# Patient Record
Sex: MALE | Race: BLACK OR AFRICAN AMERICAN | NOT HISPANIC OR LATINO | URBAN - METROPOLITAN AREA
[De-identification: names, ages, dates, MRNs, and addresses within clinical notes are randomized per-mention and may not be internally consistent; named-entity substitution may affect disease eponyms.]

---

## 2018-06-08 ENCOUNTER — INPATIENT (INPATIENT)
Facility: HOSPITAL | Age: 83
LOS: 2 days | Discharge: HOME HEALTH | End: 2018-06-11
Attending: INTERNAL MEDICINE | Admitting: INTERNAL MEDICINE
Payer: MEDICARE

## 2018-06-08 VITALS
HEART RATE: 92 BPM | DIASTOLIC BLOOD PRESSURE: 99 MMHG | OXYGEN SATURATION: 100 % | RESPIRATION RATE: 18 BRPM | TEMPERATURE: 98 F | SYSTOLIC BLOOD PRESSURE: 152 MMHG

## 2018-06-08 LAB
ALBUMIN SERPL ELPH-MCNC: 4.2 G/DL — SIGNIFICANT CHANGE UP (ref 3.3–5)
ALP SERPL-CCNC: 89 U/L — SIGNIFICANT CHANGE UP (ref 40–120)
ALT FLD-CCNC: 8 U/L — SIGNIFICANT CHANGE UP (ref 4–41)
APTT BLD: 36.6 SEC — SIGNIFICANT CHANGE UP (ref 27.5–37.4)
AST SERPL-CCNC: 26 U/L — SIGNIFICANT CHANGE UP (ref 4–40)
BASOPHILS # BLD AUTO: 0.02 K/UL — SIGNIFICANT CHANGE UP (ref 0–0.2)
BASOPHILS NFR BLD AUTO: 0.4 % — SIGNIFICANT CHANGE UP (ref 0–2)
BILIRUB SERPL-MCNC: 0.6 MG/DL — SIGNIFICANT CHANGE UP (ref 0.2–1.2)
BUN SERPL-MCNC: 28 MG/DL — HIGH (ref 7–23)
CALCIUM SERPL-MCNC: 9.5 MG/DL — SIGNIFICANT CHANGE UP (ref 8.4–10.5)
CHLORIDE SERPL-SCNC: 101 MMOL/L — SIGNIFICANT CHANGE UP (ref 98–107)
CO2 SERPL-SCNC: 27 MMOL/L — SIGNIFICANT CHANGE UP (ref 22–31)
CREAT SERPL-MCNC: 1.83 MG/DL — HIGH (ref 0.5–1.3)
EOSINOPHIL # BLD AUTO: 0.46 K/UL — SIGNIFICANT CHANGE UP (ref 0–0.5)
EOSINOPHIL NFR BLD AUTO: 8.2 % — HIGH (ref 0–6)
GLUCOSE SERPL-MCNC: 130 MG/DL — HIGH (ref 70–99)
HCT VFR BLD CALC: 31.1 % — LOW (ref 39–50)
HGB BLD-MCNC: 10.1 G/DL — LOW (ref 13–17)
IMM GRANULOCYTES # BLD AUTO: 0.02 # — SIGNIFICANT CHANGE UP
IMM GRANULOCYTES NFR BLD AUTO: 0.4 % — SIGNIFICANT CHANGE UP (ref 0–1.5)
INR BLD: 1.03 — SIGNIFICANT CHANGE UP (ref 0.88–1.17)
LYMPHOCYTES # BLD AUTO: 1.04 K/UL — SIGNIFICANT CHANGE UP (ref 1–3.3)
LYMPHOCYTES # BLD AUTO: 18.6 % — SIGNIFICANT CHANGE UP (ref 13–44)
MCHC RBC-ENTMCNC: 27.6 PG — SIGNIFICANT CHANGE UP (ref 27–34)
MCHC RBC-ENTMCNC: 32.5 % — SIGNIFICANT CHANGE UP (ref 32–36)
MCV RBC AUTO: 85 FL — SIGNIFICANT CHANGE UP (ref 80–100)
MONOCYTES # BLD AUTO: 0.42 K/UL — SIGNIFICANT CHANGE UP (ref 0–0.9)
MONOCYTES NFR BLD AUTO: 7.5 % — SIGNIFICANT CHANGE UP (ref 2–14)
NEUTROPHILS # BLD AUTO: 3.63 K/UL — SIGNIFICANT CHANGE UP (ref 1.8–7.4)
NEUTROPHILS NFR BLD AUTO: 64.9 % — SIGNIFICANT CHANGE UP (ref 43–77)
NRBC # FLD: 0 — SIGNIFICANT CHANGE UP
PLATELET # BLD AUTO: 212 K/UL — SIGNIFICANT CHANGE UP (ref 150–400)
PMV BLD: 9.5 FL — SIGNIFICANT CHANGE UP (ref 7–13)
POTASSIUM SERPL-MCNC: 5 MMOL/L — SIGNIFICANT CHANGE UP (ref 3.5–5.3)
POTASSIUM SERPL-SCNC: 5 MMOL/L — SIGNIFICANT CHANGE UP (ref 3.5–5.3)
PROT SERPL-MCNC: 8.4 G/DL — HIGH (ref 6–8.3)
PROTHROM AB SERPL-ACNC: 11.9 SEC — SIGNIFICANT CHANGE UP (ref 9.8–13.1)
RBC # BLD: 3.66 M/UL — LOW (ref 4.2–5.8)
RBC # FLD: 16.7 % — HIGH (ref 10.3–14.5)
SODIUM SERPL-SCNC: 141 MMOL/L — SIGNIFICANT CHANGE UP (ref 135–145)
TROPONIN T SERPL-MCNC: < 0.06 NG/ML — SIGNIFICANT CHANGE UP (ref 0–0.06)
WBC # BLD: 5.59 K/UL — SIGNIFICANT CHANGE UP (ref 3.8–10.5)
WBC # FLD AUTO: 5.59 K/UL — SIGNIFICANT CHANGE UP (ref 3.8–10.5)

## 2018-06-08 PROCEDURE — 71045 X-RAY EXAM CHEST 1 VIEW: CPT | Mod: 26

## 2018-06-08 PROCEDURE — 73552 X-RAY EXAM OF FEMUR 2/>: CPT | Mod: 26,RT

## 2018-06-08 PROCEDURE — 73502 X-RAY EXAM HIP UNI 2-3 VIEWS: CPT | Mod: 26,RT

## 2018-06-08 RX ORDER — SODIUM CHLORIDE 9 MG/ML
1000 INJECTION INTRAMUSCULAR; INTRAVENOUS; SUBCUTANEOUS ONCE
Qty: 0 | Refills: 0 | Status: COMPLETED | OUTPATIENT
Start: 2018-06-08 | End: 2018-06-08

## 2018-06-08 RX ADMIN — SODIUM CHLORIDE 1000 MILLILITER(S): 9 INJECTION INTRAMUSCULAR; INTRAVENOUS; SUBCUTANEOUS at 22:13

## 2018-06-08 NOTE — ED PROVIDER NOTE - ATTENDING CONTRIBUTION TO CARE
details… 89 yo male with PMH of BPH s/p prostatectomy, cataract surgery years ago, presents to the ED for fall two days ago on Wednesday morning in the setting of a syncopal episode. Pt states he had breakfast, immediately after felt "sleepy" and collapsed to the ground in the kitchen. Unwitnessed fall, patient alone in the home. States he woke up about 1-2 hours later with right hip pain. Able to ambulate immediately after he woke but has been using a cane he happened to own already to help ambulate since then. Pt denies preceding CP, SOB, palpitations, head or neck pain, tinnitus, change in vision, abdominal or back pain prior to the fall. Denies blood thinner use. Normally ambulates without assistance. Denies any cardiac history. A&O x3, GCS 15, at baseline is functional and performs his own ADL's. On exam: alert awake in mild pain, HEENT neg, neck non tender, lungs and heart normal, abdo soft non tender, neuro non focal. Musculoskeletal: Full ROM hips and knee b/l but with mild right hip pain with hip flexion; Mild tenderness to palpation over right hip and proximal RLE without overlying skin changes, crepitus, or palpable deformities; 2+ DP and PT pulses b/l, capillary refill less than 2 seconds b/l, sensation grossly intact b/l, strength 5/5 all motor groups b/l lower extremities; pt able to bear weight using cane for assistance. IMP 1) Syncope, 2) Fall with possible R hip/pelvis injury. Plan: labs EKG UA analgesia, Xrays Hip, pelvis

## 2018-06-08 NOTE — ED PROVIDER NOTE - OBJECTIVE STATEMENT
89 yo male with PMH of BPH s/p prostatectomy, cataract surgery years ago, presents to the ED for fall two days ago on Wednesday morning in the setting of a syncopal episode. Pt states he had breakfast, immediately after felt "sleepy" and collapsed to the ground in the kitchen. Unwitnessed fall, patient alone in the home. States he woke up about 1-2 hours later with right hip pain. Able to ambulate immediately after he woke but has been using a cane he happened to own already to help ambulate since then. Pt denies preceding CP, SOB, palpitations, head or neck pain, tinnitus, change in vision, abdominal or back pain prior to the fall. Denies blood thinner use. Normally ambulates without assistance. Denies any cardiac history. A&O x3, GCS 15, at baseline is functional and performs his own ADL's.     PMD: [facility]

## 2018-06-08 NOTE — ED PROVIDER NOTE - MEDICAL DECISION MAKING DETAILS
89 yo male with PMH of BPH, no known cardiac history presents to the ED s/p syncopal episode and fall two days ago on Wednesday morning, c/o right hip pain. Plan: EKG, CXR, xray pelvis, right hip, right femur, syncope w/u labs, admit vs. CDU for syncope w/u and echo; EKG showing RBBB and bifascicular block

## 2018-06-08 NOTE — ED PROVIDER NOTE - PHYSICAL EXAMINATION
MSK: Full ROM hips and knee b/l but with mild right hip pain with hip flexion; Mild tenderness to palpation over right hip and proximal RLE without overlying skin changes, crepitus, or palpable deformities; 2+ DP and PT pulses b/l, capillary refill less than 2 seconds b/l, sensation grossly intact b/l, strength 5/5 all motor groups b/l lower extremities; pt able to bear weight using cane for assistance

## 2018-06-08 NOTE — ED ADULT TRIAGE NOTE - CHIEF COMPLAINT QUOTE
C/o possible unwitnessed syncopal episode while home alone Wednesday reports being on the floor for unknown amount of time, denies LOC, denies hitting head but unsure if dizzy prior to fall. Denies blood thinner use, denies CP or SOB at this time, only reports right hip pain, unable to ambulate since fall. Normally ambulates without assistance. Denies PMH, EKG in progress

## 2018-06-08 NOTE — ED ADULT NURSE NOTE - OBJECTIVE STATEMENT
Skye RN:. 89 yo M received in spot 18.  Pt c/o unwitnessed fall on Wednesday.  Rpts he got himself up from the floor by himself but is unsure of how long he was lying there.  Denies blood thinner use.  Reports right hip pain, unable to ambulate since fall.  Skin tenting present.  Denies fever/chills, chest pain/palpitations, SOB,  abdominal pain, N/V/D,  headache, dizziness, weakness.  18gL AC

## 2018-06-08 NOTE — ED PROVIDER NOTE - ENMT, MLM
Dry mucous membranes; Airway patent, Nasal mucosa clear. Throat has no vesicles, no oropharyngeal exudates and uvula is midline.

## 2018-06-09 DIAGNOSIS — N40.0 BENIGN PROSTATIC HYPERPLASIA WITHOUT LOWER URINARY TRACT SYMPTOMS: ICD-10-CM

## 2018-06-09 DIAGNOSIS — R55 SYNCOPE AND COLLAPSE: ICD-10-CM

## 2018-06-09 DIAGNOSIS — Z29.9 ENCOUNTER FOR PROPHYLACTIC MEASURES, UNSPECIFIED: ICD-10-CM

## 2018-06-09 DIAGNOSIS — N17.9 ACUTE KIDNEY FAILURE, UNSPECIFIED: ICD-10-CM

## 2018-06-09 DIAGNOSIS — N18.9 CHRONIC KIDNEY DISEASE, UNSPECIFIED: ICD-10-CM

## 2018-06-09 LAB
APPEARANCE UR: CLEAR — SIGNIFICANT CHANGE UP
BACTERIA # UR AUTO: SIGNIFICANT CHANGE UP
BILIRUB UR-MCNC: NEGATIVE — SIGNIFICANT CHANGE UP
BLOOD UR QL VISUAL: NEGATIVE — SIGNIFICANT CHANGE UP
COLOR SPEC: YELLOW — SIGNIFICANT CHANGE UP
GLUCOSE UR-MCNC: NEGATIVE — SIGNIFICANT CHANGE UP
KETONES UR-MCNC: NEGATIVE — SIGNIFICANT CHANGE UP
LEUKOCYTE ESTERASE UR-ACNC: NEGATIVE — SIGNIFICANT CHANGE UP
MUCOUS THREADS # UR AUTO: SIGNIFICANT CHANGE UP
NITRITE UR-MCNC: NEGATIVE — SIGNIFICANT CHANGE UP
PH UR: 6.5 — SIGNIFICANT CHANGE UP (ref 4.6–8)
PROT UR-MCNC: 30 MG/DL — HIGH
RBC CASTS # UR COMP ASSIST: SIGNIFICANT CHANGE UP (ref 0–?)
SP GR SPEC: 1.01 — SIGNIFICANT CHANGE UP (ref 1–1.04)
UROBILINOGEN FLD QL: NORMAL MG/DL — SIGNIFICANT CHANGE UP
WBC UR QL: SIGNIFICANT CHANGE UP (ref 0–?)

## 2018-06-09 RX ORDER — ACETAMINOPHEN 500 MG
650 TABLET ORAL EVERY 6 HOURS
Qty: 0 | Refills: 0 | Status: DISCONTINUED | OUTPATIENT
Start: 2018-06-09 | End: 2018-06-11

## 2018-06-09 RX ORDER — HEPARIN SODIUM 5000 [USP'U]/ML
5000 INJECTION INTRAVENOUS; SUBCUTANEOUS EVERY 12 HOURS
Qty: 0 | Refills: 0 | Status: DISCONTINUED | OUTPATIENT
Start: 2018-06-09 | End: 2018-06-11

## 2018-06-09 RX ORDER — ACETAMINOPHEN 500 MG
650 TABLET ORAL ONCE
Qty: 0 | Refills: 0 | Status: COMPLETED | OUTPATIENT
Start: 2018-06-09 | End: 2018-06-09

## 2018-06-09 RX ADMIN — Medication 650 MILLIGRAM(S): at 17:04

## 2018-06-09 RX ADMIN — Medication 650 MILLIGRAM(S): at 05:00

## 2018-06-09 RX ADMIN — Medication 650 MILLIGRAM(S): at 10:54

## 2018-06-09 RX ADMIN — Medication 650 MILLIGRAM(S): at 11:20

## 2018-06-09 RX ADMIN — HEPARIN SODIUM 5000 UNIT(S): 5000 INJECTION INTRAVENOUS; SUBCUTANEOUS at 17:03

## 2018-06-09 RX ADMIN — HEPARIN SODIUM 5000 UNIT(S): 5000 INJECTION INTRAVENOUS; SUBCUTANEOUS at 05:17

## 2018-06-09 RX ADMIN — Medication 650 MILLIGRAM(S): at 04:04

## 2018-06-09 NOTE — H&P ADULT - HISTORY OF PRESENT ILLNESS
89 y/o M with h/o BPH s/p prostatectomy presents to the Ed s/p fall due to syncopal episode. Pt states the syncopal episode happened on Wednesday. Pt states he had breakfast and felt sleepy and when he woke up he found himself on the ground. Pt states he had pain in his right leg afterwards and he was walking with a cane. Pt usually independent, lives at home with daughter in law and walks without a cane or a walker. Pt states he follows up with his doctor regularly and he is not on any medications currently. Pt denies chest pain, shortness of breath, palpitations, numbness, tingling, slurred speech, tongue laceration, headache, visual/hearing changes, slurred speech, weakness, dysuria, urinary/bowel incontinence or any other complaints at this time.

## 2018-06-09 NOTE — PHYSICAL THERAPY INITIAL EVALUATION ADULT - PATIENT PROFILE REVIEW, REHAB EVAL
yes/Pt. profile reviewed, consulted with SHA NUNES prior to initial PT evaluation and tx, as per RN, Pt. is OK to participate in skilled therapy session, current activity orders; ambulate with assistance

## 2018-06-09 NOTE — H&P ADULT - ASSESSMENT
89 y/o M with h/o BPH s/p prostatectomy, presents to the ED for syncope and fall. Admit to telemetry.

## 2018-06-09 NOTE — H&P ADULT - ATTENDING COMMENTS
syncope does not appear cardiac, consulted neuro Dr Schreiber, TTE pending syncope does not appear cardiac, consulted neuro Dr Schreiber, renal Dr Villegas consulted for PHU/CKD, TTE pending

## 2018-06-09 NOTE — PHYSICAL THERAPY INITIAL EVALUATION ADULT - PLANNED THERAPY INTERVENTIONS, PT EVAL
gait training/stair negotiation/balance training/bed mobility training/strengthening/transfer training

## 2018-06-09 NOTE — PHYSICAL THERAPY INITIAL EVALUATION ADULT - ADDITIONAL COMMENTS
Pt. lives in a pvt home with daughter ( as per pt. daughter lives with pt. monday-friday) . Pt. has steps to negotiate at home. Pt. was previously ambulating household and community distances without the use of an AD independently. Pt. was previously independent with ADLs. Pt. returned to bed at end of therapy session with all lines and tubes intact, call bell in reach and in NAD.

## 2018-06-09 NOTE — PHYSICAL THERAPY INITIAL EVALUATION ADULT - PERTINENT HX OF CURRENT PROBLEM, REHAB EVAL
Pt is a 89 year old male admitted to [facility] secondary to syncope and collapse PMH: benign prostatic hypertrophy

## 2018-06-09 NOTE — H&P ADULT - NSHPLABSRESULTS_GEN_ALL_CORE
LABS:                        10.1   5.59  )-----------( 212      ( 08 Jun 2018 21:48 )             31.1     06-08    141  |  101  |  28<H>  ----------------------------<  130<H>  5.0   |  27  |  1.83<H>    Ca    9.5      08 Jun 2018 21:08    TPro  8.4<H>  /  Alb  4.2  /  TBili  0.6  /  DBili  x   /  AST  26  /  ALT  8   /  AlkPhos  89  06-08    PT/INR - ( 08 Jun 2018 21:48 )   PT: 11.9 SEC;   INR: 1.03          PTT - ( 08 Jun 2018 21:48 )  PTT:36.6 SEC    CAPILLARY BLOOD GLUCOSE      EKG shows NSR @ 89 bpm RBBB flatten T wave in AVL and V2 TWI in V1

## 2018-06-09 NOTE — H&P ADULT - PROBLEM SELECTOR PLAN 2
Kidney function at baseline  Continue to monitor kidney function, electrolytes,  and avoid nephrotoxic drugs.

## 2018-06-09 NOTE — H&P ADULT - PROBLEM SELECTOR PLAN 1
Admit to telemetry.   Trend CE. TTE ordered.   Orthostatics every 24 hours. PT  Fall precautions.   Check cbc,tsh,lipid, hemoglobin a1c, bmp with mag and phos.   f/u MD note

## 2018-06-09 NOTE — H&P ADULT - NSHPREVIEWOFSYSTEMS_GEN_ALL_CORE
Constitutional: No fever, fatigue or weight loss.  Skin: No rash.  Eyes: No recent vision problems or eye pain.  ENT: No congestion, ear pain, or sore throat.  Endocrine: No thyroid problems.  Cardiovascular: No chest pain or palpitation.  Respiratory: No cough, shortness of breath, congestion, or wheezing.  Gastrointestinal: No abdominal pain, nausea, vomiting, or diarrhea.  Genitourinary: No dysuria.  Musculoskeletal: No joint swelling.  Neurologic: No headache. + syncope.

## 2018-06-09 NOTE — H&P ADULT - NSHPPHYSICALEXAM_GEN_ALL_CORE
GENERAL APPEARANCE: Well developed, well nourished, alert and cooperative, and appears to be in no acute distress.  HEAD: normocephalic.  EYES: PERRL, EOMI.   EARS: External auditory canals clear, hearing grossly intact.  NECK: Neck supple, non-tender without lymphadenopathy, masses or thyromegaly.  CARDIAC: Normal S1 and S2. No S3, S4 or murmurs. Rhythm is regular.   LUNGS: Clear to auscultation and percussion without rales, rhonchi, wheezing or diminished breath sounds.  ABDOMEN: Positive bowel sounds. Soft, nondistended, nontender. No guarding or rebound. No masses.  EXTREMITIES: No significant deformity or joint abnormality. No edema. Peripheral pulses intact. Full ROM of all extremities. TTP to the right hip and RLE. No stepoff, palpable deformities or crepitus.   NEUROLOGICAL: CN II-XII intact. Strength and sensation symmetric and intact throughout.   SKIN: Skin normal color, texture and turgor with no lesions or eruptions.  PSYCHIATRIC: The mental examination revealed the patient was oriented to person, place, and time. The patient was able to demonstrate good judgement and reason, without hallucinations, abnormal affect or abnormal behaviors during the examination. Patient is not suicidal.

## 2018-06-09 NOTE — PHYSICAL THERAPY INITIAL EVALUATION ADULT - GAIT DISTANCE, PT EVAL
during ambulation, pt. presented with occassional Right knee buckling during single leg stance phase of gait./5 feet
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
## 2018-06-10 LAB
APPEARANCE UR: CLEAR — SIGNIFICANT CHANGE UP
BACTERIA UR CULT: SIGNIFICANT CHANGE UP
BASOPHILS # BLD AUTO: 0.02 K/UL — SIGNIFICANT CHANGE UP (ref 0–0.2)
BASOPHILS NFR BLD AUTO: 0.4 % — SIGNIFICANT CHANGE UP (ref 0–2)
BILIRUB UR-MCNC: NEGATIVE — SIGNIFICANT CHANGE UP
BLOOD UR QL VISUAL: NEGATIVE — SIGNIFICANT CHANGE UP
BUN SERPL-MCNC: 21 MG/DL — SIGNIFICANT CHANGE UP (ref 7–23)
CALCIUM SERPL-MCNC: 8.8 MG/DL — SIGNIFICANT CHANGE UP (ref 8.4–10.5)
CHLORIDE SERPL-SCNC: 103 MMOL/L — SIGNIFICANT CHANGE UP (ref 98–107)
CHLORIDE UR-SCNC: 57 MMOL/L — SIGNIFICANT CHANGE UP
CO2 SERPL-SCNC: 23 MMOL/L — SIGNIFICANT CHANGE UP (ref 22–31)
COLOR SPEC: YELLOW — SIGNIFICANT CHANGE UP
CREAT ?TM UR-MCNC: 105.4 MG/DL — SIGNIFICANT CHANGE UP
CREAT SERPL-MCNC: 1.48 MG/DL — HIGH (ref 0.5–1.3)
EOSINOPHIL # BLD AUTO: 0.86 K/UL — HIGH (ref 0–0.5)
EOSINOPHIL NFR BLD AUTO: 15.6 % — HIGH (ref 0–6)
GLUCOSE SERPL-MCNC: 85 MG/DL — SIGNIFICANT CHANGE UP (ref 70–99)
GLUCOSE UR-MCNC: NEGATIVE — SIGNIFICANT CHANGE UP
HCT VFR BLD CALC: 28.1 % — LOW (ref 39–50)
HGB BLD-MCNC: 9.1 G/DL — LOW (ref 13–17)
IMM GRANULOCYTES # BLD AUTO: 0.02 # — SIGNIFICANT CHANGE UP
IMM GRANULOCYTES NFR BLD AUTO: 0.4 % — SIGNIFICANT CHANGE UP (ref 0–1.5)
KETONES UR-MCNC: NEGATIVE — SIGNIFICANT CHANGE UP
LEUKOCYTE ESTERASE UR-ACNC: NEGATIVE — SIGNIFICANT CHANGE UP
LYMPHOCYTES # BLD AUTO: 1.46 K/UL — SIGNIFICANT CHANGE UP (ref 1–3.3)
LYMPHOCYTES # BLD AUTO: 26.4 % — SIGNIFICANT CHANGE UP (ref 13–44)
MAGNESIUM SERPL-MCNC: 2.2 MG/DL — SIGNIFICANT CHANGE UP (ref 1.6–2.6)
MCHC RBC-ENTMCNC: 27.4 PG — SIGNIFICANT CHANGE UP (ref 27–34)
MCHC RBC-ENTMCNC: 32.4 % — SIGNIFICANT CHANGE UP (ref 32–36)
MCV RBC AUTO: 84.6 FL — SIGNIFICANT CHANGE UP (ref 80–100)
MONOCYTES # BLD AUTO: 0.58 K/UL — SIGNIFICANT CHANGE UP (ref 0–0.9)
MONOCYTES NFR BLD AUTO: 10.5 % — SIGNIFICANT CHANGE UP (ref 2–14)
MUCOUS THREADS # UR AUTO: SIGNIFICANT CHANGE UP
NEUTROPHILS # BLD AUTO: 2.59 K/UL — SIGNIFICANT CHANGE UP (ref 1.8–7.4)
NEUTROPHILS NFR BLD AUTO: 46.7 % — SIGNIFICANT CHANGE UP (ref 43–77)
NITRITE UR-MCNC: NEGATIVE — SIGNIFICANT CHANGE UP
NRBC # FLD: 0 — SIGNIFICANT CHANGE UP
OSMOLALITY UR: 419 MOSMO/KG — SIGNIFICANT CHANGE UP (ref 50–1200)
PH UR: 6 — SIGNIFICANT CHANGE UP (ref 4.6–8)
PLATELET # BLD AUTO: 199 K/UL — SIGNIFICANT CHANGE UP (ref 150–400)
PMV BLD: 9.7 FL — SIGNIFICANT CHANGE UP (ref 7–13)
POTASSIUM SERPL-MCNC: 4.1 MMOL/L — SIGNIFICANT CHANGE UP (ref 3.5–5.3)
POTASSIUM SERPL-SCNC: 4.1 MMOL/L — SIGNIFICANT CHANGE UP (ref 3.5–5.3)
POTASSIUM UR-SCNC: 40 MMOL/L — SIGNIFICANT CHANGE UP
PROT UR-MCNC: 20 MG/DL — SIGNIFICANT CHANGE UP
PROT UR-MCNC: 34.6 MG/DL — SIGNIFICANT CHANGE UP
RBC # BLD: 3.32 M/UL — LOW (ref 4.2–5.8)
RBC # FLD: 16.7 % — HIGH (ref 10.3–14.5)
RBC CASTS # UR COMP ASSIST: SIGNIFICANT CHANGE UP (ref 0–?)
SODIUM SERPL-SCNC: 141 MMOL/L — SIGNIFICANT CHANGE UP (ref 135–145)
SODIUM UR-SCNC: 80 MMOL/L — SIGNIFICANT CHANGE UP
SP GR SPEC: 1.01 — SIGNIFICANT CHANGE UP (ref 1–1.04)
SPECIMEN SOURCE: SIGNIFICANT CHANGE UP
UROBILINOGEN FLD QL: 1 MG/DL — SIGNIFICANT CHANGE UP
UUN UR-MCNC: 557.9 MG/DL — SIGNIFICANT CHANGE UP
WBC # BLD: 5.53 K/UL — SIGNIFICANT CHANGE UP (ref 3.8–10.5)
WBC # FLD AUTO: 5.53 K/UL — SIGNIFICANT CHANGE UP (ref 3.8–10.5)
WBC UR QL: SIGNIFICANT CHANGE UP (ref 0–?)

## 2018-06-10 RX ORDER — ACETAMINOPHEN WITH CODEINE 300MG-30MG
5 TABLET ORAL ONCE
Qty: 0 | Refills: 0 | Status: DISCONTINUED | OUTPATIENT
Start: 2018-06-10 | End: 2018-06-10

## 2018-06-10 RX ADMIN — Medication 5 MILLILITER(S): at 11:04

## 2018-06-10 RX ADMIN — HEPARIN SODIUM 5000 UNIT(S): 5000 INJECTION INTRAVENOUS; SUBCUTANEOUS at 17:06

## 2018-06-10 RX ADMIN — Medication 650 MILLIGRAM(S): at 06:40

## 2018-06-10 RX ADMIN — HEPARIN SODIUM 5000 UNIT(S): 5000 INJECTION INTRAVENOUS; SUBCUTANEOUS at 06:07

## 2018-06-10 RX ADMIN — Medication 650 MILLIGRAM(S): at 06:07

## 2018-06-10 RX ADMIN — Medication 5 MILLILITER(S): at 10:12

## 2018-06-10 NOTE — CONSULT NOTE ADULT - PROBLEM SELECTOR RECOMMENDATION 9
initial imaging unremarkable  patient counseled on safety precautions  medical specialist recs appreciated

## 2018-06-10 NOTE — CONSULT NOTE ADULT - SUBJECTIVE AND OBJECTIVE BOX
[facility]), [facility]        Patient is a 89y old  Male who presents with a chief complaint of fall (2018 02:30)      HPI:  89 y/o M with h/o BPH s/p prostatectomy presents to the Ed s/p fall due to syncopal episode. Pt states the syncopal episode happened on Wednesday. Pt states he had breakfast and felt sleepy and when he woke up he found himself on the ground. Pt states he had pain in his right leg afterwards and he was walking with a cane. Pt usually independent, lives at home with daughter in law and walks without a cane or a walker. Pt states he follows up with his doctor regularly and he is not on any medications currently. Pt denies chest pain, shortness of breath, palpitations, numbness, tingling, slurred speech, tongue laceration, headache, visual/hearing changes, slurred speech, weakness, dysuria, urinary/bowel incontinence or any other complaints at this time.   he denies any confusion on coming together.   no tongue laceration or incontinence.  pt reports that he does not keep up with fluid intake.          *****PAST MEDICAL / Surgical  HISTORY:  PAST MEDICAL & SURGICAL HISTORY:  Benign prostatic hypertrophy  S/P cataract surgery, left: 3 yrs ago           *****FAMILY HISTORY:  FAMILY HISTORY:  No pertinent family history in first degree relatives           *****SOCIAL HISTORY:  Alcohol: None  Smoking: None  retired from the [employer].       *****ALLERGIES:   Allergies    No Known Allergies    Intolerances             *****MEDICATIONS: current medication reviewed and documented.   MEDICATIONS  (STANDING):  heparin  Injectable 5000 Unit(s) SubCutaneous every 12 hours    MEDICATIONS  (PRN):  acetaminophen   Tablet. 650 milliGRAM(s) Oral every 6 hours PRN mild and Moderate pain           *****REVIEW OF SYSTEM:  GEN: no fever, no chills, no pain  RESP: no SOB, no cough, no sputum  CVS: no chest pain, no palpitations, no edema  GI: no abdominal pain, no nausea, no vomiting, no constipation, no diarrhea  : no dysurea, no frequency, no hematurea  Neuro: no headache, no dizziness  PSYCH: no anxiety, no depression  Derm : no itching, no rash         *****VITAL SIGNS:  T(C): 36.8 (18 @ 12:26), Max: 36.8 (18 @ 20:51)  HR: 80 (18 @ 12:26) (80 - 92)  BP: 136/80 (18 @ 12:26) (136/80 - 184/98)  RR: 17 (18 @ 12:26) (14 - 18)  SpO2: 100% (18 @ 12:26) (98% - 100%)  Wt(kg): --     @ 07:01  -   @ 07:00  --------------------------------------------------------  IN: 100 mL / OUT: 500 mL / NET: -400 mL             *****PHYSICAL EXAM:    very pleasant gentleman.  Alert oriented x3  Attention comprehension are fair. Able to name, repeat, read without any difficulty.   Able to follow 3 step commands.     EOMI fundi not visualized,  VFF to confrontration  No facial asymmetry   Tongue is midline   Palate elevates symmetrically   Moving all 4 ext symmetrically no pronator drift.  R leg with pain on mvt of the R hip.   Reflexes are symmetric throughout   sensation is grossly symmetric  Gait : not assessed.  B/L down going toes        >>> <<<         *****LAB AND IMAGING:                          10.1   5.59  )-----------( 212      ( 2018 21:48 )             31.1                   141  |  101  |  28<H>  ----------------------------<  130<H>  5.0   |  27  |  1.83<H>    Ca    9.5      2018 21:08    TPro  8.4<H>  /  Alb  4.2  /  TBili  0.6  /  DBili  x   /  AST  26  /  ALT  8   /  AlkPhos  89      PT/INR - ( 2018 21:48 )   PT: 11.9 SEC;   INR: 1.03          PTT - ( 2018 21:48 )  PTT:36.6 SEC            CARDIAC MARKERS ( 2018 21:08 )  x     / < 0.06 ng/mL / x     / x     / x                  Urinalysis Basic - ( 2018 04:10 )    Color: YELLOW / Appearance: CLEAR / S.015 / pH: 6.5  Gluc: NEGATIVE / Ketone: NEGATIVE  / Bili: NEGATIVE / Urobili: NORMAL mg/dL   Blood: NEGATIVE / Protein: 30 mg/dL / Nitrite: NEGATIVE   Leuk Esterase: NEGATIVE / RBC: 0-2 / WBC 0-2   Sq Epi: x / Non Sq Epi: x / Bacteria: FEW    < from: Xray Femur 2 Views, Right (18 @ 22:26) >  No acute fracture or dislocation of the pelvis, right hip, or right   femur. There is bilateral hip arthrosis characterized by joint space   narrowing and osteophyte formation. There is lower lumbar spine   spondylosis. There are vascular calcifications.    IMPRESSION:     No acute fracture or dislocation.       < end of copied text >    < from: Xray Hip 2-3 Views, Right (18 @ 22:25) >    No acute fracture or dislocation of the pelvis, right hip, or right   femur. There is bilateral hip arthrosis characterized by joint space   narrowing and osteophyte formation. There is lower lumbar spine   spondylosis. There are vascular calcifications.    IMPRESSION:       < end of copied text >    [All pertinent recent Imaging reports reviewed]         *****A S S E S S M E N T   A N D   P L A N :      89 y/o M with h/o BPH s/p prostatectomy presents to the Ed s/p fall due to syncopal episode. Pt states the syncopal episode happened on Wednesday. Pt states he had breakfast and felt sleepy and when he woke up he found himself on the ground. Pt states he had pain in his right leg afterwards and he was walking with a cane. Pt usually independent, lives at home with daughter in law and walks without a cane or a walker. Pt states he follows up with his doctor regularly and he is not on any medications currently. Pt denies chest pain, shortness of breath, palpitations, numbness, tingling, slurred speech, tongue laceration, headache, visual/hearing changes, slurred speech, weakness, dysuria, urinary/bowel incontinence or any other complaints at this time.   he denies any confusion on coming together.   no tongue laceration or incontinence.  pt reports that he does not keep up with fluid intake.     acute kidney injury defer to nephrology for work up and management  doubt seizures as possible etiology.   lethargy prior to the syncope was likely related to the renal insufficiency  check orthostatics.   check post void residuals.          60 minutes spent on the total encounter;  more than 50 % of the visit was spent on counseling  and or coordinating care by the attending physician.    Thank you for allowing me to participate in the care of this ara patient. Please do not hesitate to call me if you have any questions.   ___________________________  Will follow with you.  Thank you,  Ava Schreiber MD  Diplomate of the American Board of Neurology and Psychiatry.  Diplomate of the American Board of Vascular Neurology.   [facility] ([facility]), [facility]   Ph: [phone]      This and subsequent notes were partially created using voice recognition software and will  inherently be subject to errors including those of syntax and sound alike substitutions which may escape proofreading. In such instances original meaning may be extrapolated by contextual derivation.
Patient seen and examined at bedside    Patient is a 89y old  Male who presents with a chief complaint of fall (2018 02:30)      HPI:  89 y/o M with h/o BPH s/p prostatectomy presents to the Ed s/p fall due to syncopal episode. Pt denies chest pain, shortness of breath, palpitations, numbness, tingling, slurred speech, tongue laceration, headache, visual/hearing changes, slurred speech, weakness, dysuria, urinary/bowel incontinence or any other complaints at this time. (2018 02:30)      PAST MEDICAL & SURGICAL HISTORY:  Benign prostatic hypertrophy  S/P cataract surgery, left: 3 yrs ago      MEDICATIONS  (STANDING):  acetaminophen with codeine Elixir 5 milliLiter(s) Oral once  heparin  Injectable 5000 Unit(s) SubCutaneous every 12 hours    MEDICATIONS  (PRN):  acetaminophen   Tablet. 650 milliGRAM(s) Oral every 6 hours PRN mild and Moderate pain      FAMILY HISTORY: Denies  SOCIAL HISTORY: Denies    REVIEW OF SYSTEMS:  CONSTITUTIONAL: (+) Malaise  EYES: No acute change in vision.  ENT:  No difficulty hearing, tinnitus, vertigo  NECK: No pain or stiffness  RESPIRATORY: No cough, wheezing, hemoptysis,  CARDIOVASCULAR: No chest pain, palpitations,  GASTROINTESTINAL: No hematemesis, diarrhea, melena, or hematochezia.  GENITOURINARY: No hematuria  NEUROLOGICAL: No headaches,  SKIN: No rashes or lesions   LYMPH NODES: No enlarged glands  ENDOCRINE: No heat or cold intolerance  MUSCULOSKELETAL: right hip pain.      Vital Signs Last 24 Hrs  T(C): 36.5 (10 Donny 2018 06:08), Max: 36.8 (2018 12:26)  T(F): 97.7 (10 Donny 2018 06:08), Max: 98.3 (2018 12:26)  HR: 80 (10 Donny 2018 06:08) (80 - 87)  BP: 140/88 (10 Donny 2018 06:08) (136/80 - 140/88)  BP(mean): --  RR: 15 (10 Donny 2018 06:08) (15 - 17)  SpO2: 98% (10 Donny 2018 06:08) (98% - 100%)    Constitutional: WD, NAD  HEENT: AT  Neck:  Supple  Respiratory:  CTA b/l Not using accessory muscles of respiration.   Cardiovascular:  no R/G. 2+ radial pulses b/l.   Gastrointestinal: Soft, NT, ND.  No organomegaly, rigidity, or RT.  Extremities: WWP.  Neurological:  right hip tenderness to palpation. Alert and awake.  Crude sensation intact.       LABS:                        9.1    5.53  )-----------( 199      ( 10 Donny 2018 06:00 )             28.1     06-10    141  |  103  |  21  ----------------------------<  85  4.1   |  23  |  1.48<H>    Ca    8.8      10 Donny 2018 06:00  Mg     2.2     06-10    TPro  8.4<H>  /  Alb  4.2  /  TBili  0.6  /  DBili  x   /  AST  26  /  ALT  8   /  AlkPhos  89  06-08    CARDIAC MARKERS ( 2018 21:08 )  x     / < 0.06 ng/mL / x     / x     / x          PT/INR - ( 2018 21:48 )   PT: 11.9 SEC;   INR: 1.03          PTT - ( 2018 21:48 )  PTT:36.6 SEC  Urinalysis Basic - ( 10 Donny 2018 05:58 )    Color: YELLOW / Appearance: CLEAR / S.014 / pH: 6.0  Gluc: NEGATIVE / Ketone: NEGATIVE  / Bili: NEGATIVE / Urobili: 1 mg/dL   Blood: NEGATIVE / Protein: 20 mg/dL / Nitrite: NEGATIVE   Leuk Esterase: NEGATIVE / RBC: 0-2 / WBC 0-2   Sq Epi: x / Non Sq Epi: x / Bacteria: x      CAPILLARY BLOOD GLUCOSE            RADIOLOGY & ADDITIONAL STUDIES:
[facility] Consult Note Nephrology - CONSULTATION NOTE    89 y/o M with h/o BPH s/p prostatectomy presents to the Ed s/p fall due to syncopal episode. Pt states the syncopal episode happened on Wednesday. Pt states he had breakfast and felt sleepy and when he woke up he found himself on the ground. Pt states he had pain in his right leg afterwards and he was walking with a cane. Pt usually independent, lives at home with daughter in law and walks without a cane or a walker. Pt states he follows up with his doctor regularly and he is not on any medications currently. Pt denies chest pain, shortness of breath, palpitations, numbness, tingling, slurred speech, tongue laceration, headache, visual/hearing changes, slurred speech, weakness, dysuria, urinary/bowel incontinence or any other complaints at this time    Pt denies any history of kidney disease; has not taken any nsaids; denies any n//v/d/v/ no hematuria or dysuria    PAST MEDICAL & SURGICAL HISTORY:  Benign prostatic hypertrophy  S/P cataract surgery, left: 3 yrs ago    No Known Allergies    Home Medications Reviewed  Hospital Medications:   MEDICATIONS  (STANDING):  heparin  Injectable 5000 Unit(s) SubCutaneous every 12 hours    SOCIAL HISTORY:  Denies ETOh,Smoking,   FAMILY HISTORY:  No pertinent family history in first degree relatives    REVIEW OF SYSTEMS:  CONSTITUTIONAL: No weakness, fevers or chills  EYES/ENT: No visual changes;  No vertigo or throat pain   NECK: No pain or stiffness  RESPIRATORY: No cough, wheezing, hemoptysis; No shortness of breath  CARDIOVASCULAR: No chest pain or palpitations.  GASTROINTESTINAL: No abdominal or epigastric pain. No nausea, vomiting, or hematemesis; No diarrhea or constipation. No melena or hematochezia.  GENITOURINARY: No dysuria, frequency, foamy urine, urinary urgency, incontinence or hematuria  NEUROLOGICAL: No numbness or weakness  SKIN: No itching, burning, rashes, or lesions   VASCULAR: No bilateral lower extremity edema.   All other review of systems is negative unless indicated above.    VITALS:  T(F): 98.3 (18 @ 12:26), Max: 98.3 (18 @ 20:51)  HR: 80 (18 @ 12:26)  BP: 136/80 (18 @ 12:26)  RR: 17 (18 @ 12:26)  SpO2: 100% (18 @ 12:26)  Wt(kg): --     @ 07:01  -   @ 07:00  --------------------------------------------------------  IN: 100 mL / OUT: 500 mL / NET: -400 mL      Height (cm): 177.8 ( @ 02:03)  Weight (kg): 70 (:03)  BMI (kg/m2): 22.1 ( @ :03)  BSA (m2): 1.87 (:03)  PHYSICAL EXAM:  Constitutional: NAD  HEENT: anicteric sclera, oropharynx clear, MMM  Neck: No JVD  Respiratory: CTAB, no wheezes, rales or rhonchi  Cardiovascular: S1, S2, RRR  Gastrointestinal: BS+, soft, NT/ND  Extremities: No cyanosis or clubbing. No peripheral edema  Neurological: A/O x 3, no focal deficits  Psychiatric: Normal mood, normal affect  : No CVA tenderness. No flores.       LABS:      141  |  101  |  28<H>  ----------------------------<  130<H>  5.0   |  27  |  1.83<H>    Ca    9.5      2018 21:08    TPro  8.4<H>  /  Alb  4.2  /  TBili  0.6  /  DBili      /  AST  26  /  ALT  8   /  AlkPhos  89  -08    Creatinine Trend: 1.83 <--                        10.1   5.59  )-----------( 212      ( 2018 21:48 )             31.1     Urine Studies:  Urinalysis Basic - ( 2018 04:10 )    Color: YELLOW / Appearance: CLEAR / S.015 / pH: 6.5  Gluc: NEGATIVE / Ketone: NEGATIVE  / Bili: NEGATIVE / Urobili: NORMAL mg/dL   Blood: NEGATIVE / Protein: 30 mg/dL / Nitrite: NEGATIVE   Leuk Esterase: NEGATIVE / RBC: 0-2 / WBC 0-2   Sq Epi:  / Non Sq Epi:  / Bacteria: FEW        RADIOLOGY & ADDITIONAL STUDIES:

## 2018-06-10 NOTE — CONSULT NOTE ADULT - PROBLEM SELECTOR RECOMMENDATION 2
with c/o pain to right hip/flank  initial imaging grossly unremarkable  c/w analgesics prn (avoid nsaids and nephrotoxic rx)

## 2018-06-11 ENCOUNTER — TRANSCRIPTION ENCOUNTER (OUTPATIENT)
Age: 83
End: 2018-06-11

## 2018-06-11 VITALS
OXYGEN SATURATION: 98 % | HEART RATE: 80 BPM | RESPIRATION RATE: 16 BRPM | DIASTOLIC BLOOD PRESSURE: 81 MMHG | TEMPERATURE: 98 F | SYSTOLIC BLOOD PRESSURE: 135 MMHG

## 2018-06-11 DIAGNOSIS — R52 PAIN, UNSPECIFIED: ICD-10-CM

## 2018-06-11 LAB
BASOPHILS # BLD AUTO: 0.03 K/UL — SIGNIFICANT CHANGE UP (ref 0–0.2)
BASOPHILS NFR BLD AUTO: 0.7 % — SIGNIFICANT CHANGE UP (ref 0–2)
BUN SERPL-MCNC: 21 MG/DL — SIGNIFICANT CHANGE UP (ref 7–23)
BUN SERPL-MCNC: 21 MG/DL — SIGNIFICANT CHANGE UP (ref 7–23)
CALCIUM SERPL-MCNC: 8.9 MG/DL — SIGNIFICANT CHANGE UP (ref 8.4–10.5)
CALCIUM SERPL-MCNC: 8.9 MG/DL — SIGNIFICANT CHANGE UP (ref 8.4–10.5)
CHLORIDE SERPL-SCNC: 101 MMOL/L — SIGNIFICANT CHANGE UP (ref 98–107)
CHLORIDE SERPL-SCNC: 101 MMOL/L — SIGNIFICANT CHANGE UP (ref 98–107)
CO2 SERPL-SCNC: 27 MMOL/L — SIGNIFICANT CHANGE UP (ref 22–31)
CO2 SERPL-SCNC: 27 MMOL/L — SIGNIFICANT CHANGE UP (ref 22–31)
CREAT SERPL-MCNC: 1.49 MG/DL — HIGH (ref 0.5–1.3)
CREAT SERPL-MCNC: 1.49 MG/DL — HIGH (ref 0.5–1.3)
EOSINOPHIL # BLD AUTO: 0.68 K/UL — HIGH (ref 0–0.5)
EOSINOPHIL NFR BLD AUTO: 15 % — HIGH (ref 0–6)
GLUCOSE SERPL-MCNC: 92 MG/DL — SIGNIFICANT CHANGE UP (ref 70–99)
GLUCOSE SERPL-MCNC: 92 MG/DL — SIGNIFICANT CHANGE UP (ref 70–99)
HCT VFR BLD CALC: 29.7 % — LOW (ref 39–50)
HCT VFR BLD CALC: 29.7 % — LOW (ref 39–50)
HGB BLD-MCNC: 9.3 G/DL — LOW (ref 13–17)
HGB BLD-MCNC: 9.3 G/DL — LOW (ref 13–17)
IMM GRANULOCYTES # BLD AUTO: 0.01 # — SIGNIFICANT CHANGE UP
IMM GRANULOCYTES NFR BLD AUTO: 0.2 % — SIGNIFICANT CHANGE UP (ref 0–1.5)
LYMPHOCYTES # BLD AUTO: 1.17 K/UL — SIGNIFICANT CHANGE UP (ref 1–3.3)
LYMPHOCYTES # BLD AUTO: 25.9 % — SIGNIFICANT CHANGE UP (ref 13–44)
MAGNESIUM SERPL-MCNC: 2.2 MG/DL — SIGNIFICANT CHANGE UP (ref 1.6–2.6)
MCHC RBC-ENTMCNC: 27.6 PG — SIGNIFICANT CHANGE UP (ref 27–34)
MCHC RBC-ENTMCNC: 27.6 PG — SIGNIFICANT CHANGE UP (ref 27–34)
MCHC RBC-ENTMCNC: 31.3 % — LOW (ref 32–36)
MCHC RBC-ENTMCNC: 31.3 % — LOW (ref 32–36)
MCV RBC AUTO: 88.1 FL — SIGNIFICANT CHANGE UP (ref 80–100)
MCV RBC AUTO: 88.1 FL — SIGNIFICANT CHANGE UP (ref 80–100)
MONOCYTES # BLD AUTO: 0.41 K/UL — SIGNIFICANT CHANGE UP (ref 0–0.9)
MONOCYTES NFR BLD AUTO: 9.1 % — SIGNIFICANT CHANGE UP (ref 2–14)
NEUTROPHILS # BLD AUTO: 2.22 K/UL — SIGNIFICANT CHANGE UP (ref 1.8–7.4)
NEUTROPHILS NFR BLD AUTO: 49.1 % — SIGNIFICANT CHANGE UP (ref 43–77)
NRBC # FLD: 0 — SIGNIFICANT CHANGE UP
NRBC # FLD: 0 — SIGNIFICANT CHANGE UP
PLATELET # BLD AUTO: 208 K/UL — SIGNIFICANT CHANGE UP (ref 150–400)
PLATELET # BLD AUTO: 208 K/UL — SIGNIFICANT CHANGE UP (ref 150–400)
PMV BLD: 9.7 FL — SIGNIFICANT CHANGE UP (ref 7–13)
PMV BLD: 9.7 FL — SIGNIFICANT CHANGE UP (ref 7–13)
POTASSIUM SERPL-MCNC: 4.8 MMOL/L — SIGNIFICANT CHANGE UP (ref 3.5–5.3)
POTASSIUM SERPL-MCNC: 4.8 MMOL/L — SIGNIFICANT CHANGE UP (ref 3.5–5.3)
POTASSIUM SERPL-SCNC: 4.8 MMOL/L — SIGNIFICANT CHANGE UP (ref 3.5–5.3)
POTASSIUM SERPL-SCNC: 4.8 MMOL/L — SIGNIFICANT CHANGE UP (ref 3.5–5.3)
RBC # BLD: 3.37 M/UL — LOW (ref 4.2–5.8)
RBC # BLD: 3.37 M/UL — LOW (ref 4.2–5.8)
RBC # FLD: 16.7 % — HIGH (ref 10.3–14.5)
RBC # FLD: 16.7 % — HIGH (ref 10.3–14.5)
SODIUM SERPL-SCNC: 139 MMOL/L — SIGNIFICANT CHANGE UP (ref 135–145)
SODIUM SERPL-SCNC: 139 MMOL/L — SIGNIFICANT CHANGE UP (ref 135–145)
WBC # BLD: 4.52 K/UL — SIGNIFICANT CHANGE UP (ref 3.8–10.5)
WBC # BLD: 4.52 K/UL — SIGNIFICANT CHANGE UP (ref 3.8–10.5)
WBC # FLD AUTO: 4.52 K/UL — SIGNIFICANT CHANGE UP (ref 3.8–10.5)
WBC # FLD AUTO: 4.52 K/UL — SIGNIFICANT CHANGE UP (ref 3.8–10.5)

## 2018-06-11 PROCEDURE — 76770 US EXAM ABDO BACK WALL COMP: CPT | Mod: 26

## 2018-06-11 PROCEDURE — 93306 TTE W/DOPPLER COMPLETE: CPT | Mod: 26

## 2018-06-11 RX ORDER — TAMSULOSIN HYDROCHLORIDE 0.4 MG/1
1 CAPSULE ORAL
Qty: 30 | Refills: 0 | OUTPATIENT
Start: 2018-06-11 | End: 2018-07-10

## 2018-06-11 RX ORDER — ACETAMINOPHEN WITH CODEINE 300MG-30MG
2 TABLET ORAL ONCE
Qty: 0 | Refills: 0 | Status: DISCONTINUED | OUTPATIENT
Start: 2018-06-11 | End: 2018-06-11

## 2018-06-11 RX ORDER — TAMSULOSIN HYDROCHLORIDE 0.4 MG/1
0.4 CAPSULE ORAL AT BEDTIME
Qty: 0 | Refills: 0 | Status: DISCONTINUED | OUTPATIENT
Start: 2018-06-11 | End: 2018-06-11

## 2018-06-11 RX ORDER — ACETAMINOPHEN WITH CODEINE 300MG-30MG
2 TABLET ORAL EVERY 6 HOURS
Qty: 0 | Refills: 0 | Status: DISCONTINUED | OUTPATIENT
Start: 2018-06-11 | End: 2018-06-11

## 2018-06-11 RX ADMIN — Medication 2 TABLET(S): at 11:41

## 2018-06-11 RX ADMIN — Medication 650 MILLIGRAM(S): at 00:00

## 2018-06-11 RX ADMIN — HEPARIN SODIUM 5000 UNIT(S): 5000 INJECTION INTRAVENOUS; SUBCUTANEOUS at 05:25

## 2018-06-11 RX ADMIN — Medication 2 TABLET(S): at 10:54

## 2018-06-11 RX ADMIN — Medication 650 MILLIGRAM(S): at 00:21

## 2018-06-11 NOTE — PROGRESS NOTE ADULT - PROBLEM SELECTOR PLAN 1
Plateau cr. PHU on likely CKD.   [place] UA.   Renal US suggestive of urinary retention. Check post void residual with bladder scan.   will keep off IVF and diuretics for now  encourage po intake  trend bmp daily. Will nephrology f/u after discharge.
ddx includes- pre renal vs atn vs obstruction vs ckd progression  cr improving  urine studies reviewed  f/u renal ultrasound  will keep off IVF and diuretics for now  encourage po intake  trend bmp daily.
MSK pain  tylenol # 3 prn   warm compress

## 2018-06-11 NOTE — PROGRESS NOTE ADULT - SUBJECTIVE AND OBJECTIVE BOX
Patient is a 89y old  Male who presents with a chief complaint of fall (09 Jun 2018 02:30)      INTERVAL HISTORY:  feels better      PHYSICAL EXAM:  T(C): 36.6 (06-10-18 @ 12:46), Max: 36.7 (06-09-18 @ 21:19)  HR: 80 (06-10-18 @ 12:46) (80 - 87)  BP: 172/96 (06-10-18 @ 12:46) (138/80 - 172/96)  RR: 17 (06-10-18 @ 12:46) (15 - 17)  SpO2: 100% (06-10-18 @ 12:46) (98% - 100%)  Wt(kg): --  I&O's Summary    09 Jun 2018 07:01  -  10 Donny 2018 07:00  --------------------------------------------------------  IN: 200 mL / OUT: 700 mL / NET: -500 mL          Appearance: Normal	  HEENT:    PERRL, EOMI	  Cardiovascular:  S1 S2, No JVD  Respiratory: Lungs clear to auscultation	  Psychiatry: Alert  Gastrointestinal:  Soft, Non-tender, + BS	  Skin: No rashes, No cyanosis  Extremities:  No edema of LE                                9.1    5.53  )-----------( 199      ( 10 Donny 2018 06:00 )             28.1     06-10    141  |  103  |  21  ----------------------------<  85  4.1   |  23  |  1.48<H>    Ca    8.8      10 Donny 2018 06:00  Mg     2.2     06-10    TPro  8.4<H>  /  Alb  4.2  /  TBili  0.6  /  DBili  x   /  AST  26  /  ALT  8   /  AlkPhos  89  06-08        Labs personally reviewed      ASSESSMENT/PLAN: 	  Assessment and Plan:   Assessment:  · Assessment		  89 y/o M with h/o BPH s/p prostatectomy, presents to the ED for syncope and fall. Admit to telemetry.     Problem/Plan - 1:  ·  Problem: Syncope.  Plan: does not appear cardiac. c/w tele to r/o denise/pasues   Neuro consult appreciated    Problem/Plan - 2:  ·  Problem: CKD (chronic kidney disease).  Plan: Kidney function at baseline  Continue to monitor kidney function, electrolytes,  and avoid nephrotoxic drugs.   Renal consult appreciated    Problem/Plan - 3:  ·  Problem: BPH (benign prostatic hyperplasia).  Plan: Continue to monitor urine output.     Problem/Plan - 4:  ·  Problem: Need for prophylactic measure.  Plan: Heparin SQ for vte prophylaxis.     Discharge planning if echo unremarkable and no events on tele over 72 hours             Peter Cristobal DO [facility]  Cardiovascular Medicine  [phone]
Patient seen and examined  no complaints    No Known Allergies    Hospital Medications:   MEDICATIONS  (STANDING):  heparin  Injectable 5000 Unit(s) SubCutaneous every 12 hours      VITALS:  T(F): 97.7 (06-10-18 @ 06:08), Max: 98 (18 @ 21:19)  HR: 80 (06-10-18 @ 06:08)  BP: 140/88 (06-10-18 @ 06:08)  RR: 15 (06-10-18 @ 06:08)  SpO2: 98% (06-10-18 @ 06:08)  Wt(kg): --     @ 07:01  -  06-10 @ 07:00  --------------------------------------------------------  IN: 200 mL / OUT: 700 mL / NET: -500 mL        PHYSICAL EXAM:  Constitutional: NAD  HEENT: anicteric sclera, oropharynx clear, MMM  Neck: No JVD  Respiratory: CTAB, no wheezes, rales or rhonchi  Cardiovascular: S1, S2, RRR  Gastrointestinal: BS+, soft, NT/ND  Extremities: No cyanosis or clubbing. No peripheral edema  Neurological: A/O x 3, no focal deficits  Psychiatric: Normal mood, normal affect  : No CVA tenderness. No flores.       LABS:  06-10    141  |  103  |  21  ----------------------------<  85  4.1   |  23  |  1.48<H>    Ca    8.8      10 Donny 2018 06:00  Mg     2.2     06-10    TPro  8.4<H>  /  Alb  4.2  /  TBili  0.6  /  DBili      /  AST  26  /  ALT  8   /  AlkPhos  89  08    Creatinine Trend: 1.48 <--, 1.83 <--                        9.1    5.53  )-----------( 199      ( 10 Donny 2018 06:00 )             28.1     Urine Studies:  Urinalysis Basic - ( 10 Donny 2018 05:58 )    Color: YELLOW / Appearance: CLEAR / S.014 / pH: 6.0  Gluc: NEGATIVE / Ketone: NEGATIVE  / Bili: NEGATIVE / Urobili: 1 mg/dL   Blood: NEGATIVE / Protein: 20 mg/dL / Nitrite: NEGATIVE   Leuk Esterase: NEGATIVE / RBC: 0-2 / WBC 0-2   Sq Epi:  / Non Sq Epi:  / Bacteria:       Sodium, Random Urine: 80 mmol/L (06-10 @ 05:50)  Potassium, Random Urine: 40.0 mmol/L (06-10 @ 05:50)  Chloride, Random Urine: 57 mmol/L (06-10 @ 05:50)  Creatinine, Random Urine: 105.40 mg/dL (06-10 @ 05:50)  Osmolality, Random Urine: 419 mosmo/kg (06-10 @ 05:50)  Protein, Random Urine: 34.6 mg/dL (06-10 @ 05:50)    RADIOLOGY & ADDITIONAL STUDIES:
Pt seen and examined at bedside  No complaints, other than right hip pain.     Allergies:  No Known Allergies    Hospital Medications:   MEDICATIONS  (STANDING):  heparin  Injectable 5000 Unit(s) SubCutaneous every 12 hours  tamsulosin 0.4 milliGRAM(s) Oral at bedtime      VITALS:  T(F): 98.1 (18 @ 13:37), Max: 98.1 (18 @ 13:37)  HR: 80 (18 @ 13:37)  BP: 135/81 (18 @ 13:37)  RR: 16 (18 @ 13:37)  SpO2: 98% (18 @ 13:37)  Wt(kg): --    06-10 @ 07:  -   @ 07:00  --------------------------------------------------------  IN: 100 mL / OUT: 600 mL / NET: -500 mL     @ 07:01  -   @ 16:23  --------------------------------------------------------  IN: 0 mL / OUT: 200 mL / NET: -200 mL        PHYSICAL EXAM:  Constitutional: NAD  HEENT: anicteric sclera, oropharynx clear, MMM  Neck: No JVD  Respiratory: CTAB, no wheezes, rales or rhonchi  Cardiovascular: S1, S2, RRR  Gastrointestinal: BS+, soft, NT/ND  Extremities: No cyanosis or clubbing. No peripheral edema  Neurological: A/O x 3, no focal deficits  Psychiatric: Normal mood, normal affect  : No CVA tenderness. No flores.   Skin: No rashes    LABS:      139  |  101  |  21  ----------------------------<  92  4.8   |  27  |  1.49<H>    Ca    8.9      2018 06:33  Mg     2.2           Creatinine Trend: 1.49 <--, 1.48 <--, 1.83 <--                        9.3    4.52  )-----------( 208      ( 2018 06:33 )             29.7     Urine Studies:  Urinalysis Basic - ( 10 Donny 2018 05:58 )    Color: YELLOW / Appearance: CLEAR / S.014 / pH: 6.0  Gluc: NEGATIVE / Ketone: NEGATIVE  / Bili: NEGATIVE / Urobili: 1 mg/dL   Blood: NEGATIVE / Protein: 20 mg/dL / Nitrite: NEGATIVE   Leuk Esterase: NEGATIVE / RBC: 0-2 / WBC 0-2   Sq Epi:  / Non Sq Epi:  / Bacteria:       Sodium, Random Urine: 80 mmol/L (06-10 @ 05:50)  Potassium, Random Urine: 40.0 mmol/L (06-10 @ 05:50)  Chloride, Random Urine: 57 mmol/L (06-10 @ 05:50)  Creatinine, Random Urine: 105.40 mg/dL (06-10 @ 05:50)  Osmolality, Random Urine: 419 mosmo/kg (06-10 @ 05:50)  Protein, Random Urine: 34.6 mg/dL (06-10 @ 05:50)    RADIOLOGY & ADDITIONAL STUDIES:  < from: US Kidney and Bladder (18 @ 08:38) >  IMPRESSION:     Parenchymal renal disease. No hydronephrosis.  Chronic bladder retention.    < end of copied text >
Patient seen and examined at bedside  c/o right hip/torso/flank pain  Case discussed with medical team    HPI:  89 y/o M with h/o BPH s/p prostatectomy presents to the Ed s/p fall due to syncopal episode. Pt states the syncopal episode happened on Wednesday. Pt states he had breakfast and felt sleepy and when he woke up he found himself on the ground. Pt states he had pain in his right leg afterwards and he was walking with a cane. Pt usually independent, lives at home with daughter in law and walks without a cane or a walker. Pt states he follows up with his doctor regularly and he is not on any medications currently. Pt denies chest pain, shortness of breath, palpitations, numbness, tingling, slurred speech, tongue laceration, headache, visual/hearing changes, slurred speech, weakness, dysuria, urinary/bowel incontinence or any other complaints at this time. (2018 02:30)      PAST MEDICAL & SURGICAL HISTORY:  Benign prostatic hypertrophy  S/P cataract surgery, left: 3 yrs ago      No Known Allergies       MEDICATIONS  (STANDING):  acetaminophen 300 mG/codeine 30 mG 2 Tablet(s) Oral once  heparin  Injectable 5000 Unit(s) SubCutaneous every 12 hours    MEDICATIONS  (PRN):  acetaminophen   Tablet. 650 milliGRAM(s) Oral every 6 hours PRN mild and Moderate pain  acetaminophen 300 mG/codeine 30 mG 2 Tablet(s) Oral every 6 hours PRN Moderate Pain (4 - 6)      REVIEW OF SYSTEMS:  CONSTITUTIONAL: (+) malaise.   EYES: No acute change in vision   ENT:  No tinnitus  NECK: No stiffness  RESPIRATORY: No hemoptysis  CARDIOVASCULAR: No chest pain, palpitations, syncope  GASTROINTESTINAL: No hematemesis, diarrhea, melena, or hematochezia.  GENITOURINARY: No hematuria  NEUROLOGICAL: musculoskeletal pain. No headaches  LYMPH Nodes: No enlarged glands  ENDOCRINE: No heat or cold intolerance	    T(C): 36.5 (18 @ 05:23), Max: 36.7 (06-10-18 @ 21:08)  HR: 78 (18 @ 05:23) (70 - 80)  BP: 140/80 (18 @ 05:23) (140/80 - 172/96)  RR: 16 (18 @ 05:23) (16 - 18)  SpO2: 98% (18 @ 05:23) (98% - 100%)    PHYSICAL EXAMINATION:   Constitutional: WD, NAD  HEENT: NC, AT  Neck:  Supple  Respiratory:  Adequate airflow b/l. Not using accessory muscles of respiration.  Cardiovascular:  S1 & S2 intact, no R/G, 2+ radial pulses b/l  Gastrointestinal: Soft, NT, ND, normoactive b.s., no organomegaly/RT/rigidity  Extremities: tender to palpation of right hip/flank/torso. WWP  Neurological:  Alert and awake.  No acute focal motor deficits. Crude sensation intact.     Labs and imaging reviewed    LABS:                        9.3    4.52  )-----------( 208      ( 2018 06:33 )             29.7     06-11    139  |  101  |  21  ----------------------------<  92  4.8   |  27  |  1.49<H>    Ca    8.9      2018 06:33  Mg     2.2     06-11            Urinalysis Basic - ( 10 Donny 2018 05:58 )    Color: YELLOW / Appearance: CLEAR / S.014 / pH: 6.0  Gluc: NEGATIVE / Ketone: NEGATIVE  / Bili: NEGATIVE / Urobili: 1 mg/dL   Blood: NEGATIVE / Protein: 20 mg/dL / Nitrite: NEGATIVE   Leuk Esterase: NEGATIVE / RBC: 0-2 / WBC 0-2   Sq Epi: x / Non Sq Epi: x / Bacteria: x      CAPILLARY BLOOD GLUCOSE                  RADIOLOGY & ADDITIONAL STUDIES:

## 2018-06-11 NOTE — DISCHARGE NOTE ADULT - CARE PROVIDER_API CALL
José Manuel Denton), Internal Medicine  [address] [place] [address]  Phone: [phone]  Fax: [phone]

## 2018-06-11 NOTE — PROGRESS NOTE ADULT - ASSESSMENT
89 y/o M with h/o BPH s/p prostatectomy a/w syncope found to have chuck on ?ckd4
89 y/o M with h/o BPH s/p prostatectomy a/w syncope found to have chuck on ?ckd4
89 y/o M with h/o BPH s/p prostatectomy presents to the Ed s/p fall 2/2 syncope

## 2018-06-11 NOTE — DISCHARGE NOTE ADULT - CARE PLAN
Principal Discharge DX:	Fall  Goal:	To prevent or reduce the incidence of falls and injuries.  To increase mobility and function and environmental safety.  Assessment and plan of treatment:	Fall precautions: keep your area clutter free, place night lights in hallways and stairwells, and add non-slip mats in the kitchen and bathrooms.  Exercise daily to improve muscle strength to avoid deconditioning.  Secondary Diagnosis:	Aortic regurgitation  Assessment and plan of treatment:	Your echocardiogram shows mild to moderate aortic regurgitation. You will need to monitor this with yearly echos to make sure the the leaky valve does not get bigger.  Secondary Diagnosis:	Bladder distension  Assessment and plan of treatment:	Your ultrasound shows that you have a distended bladder, and you were therefore started on Flomax. This could be causing you to have kidney injury. Please follow up with your PCP within one week.  Secondary Diagnosis:	Acute kidney injury  Assessment and plan of treatment:	as above

## 2018-06-11 NOTE — DISCHARGE NOTE ADULT - HOSPITAL COURSE
89 y/o M with h/o BPH s/p prostatectomy presents to the Ed s/p fall due to syncopal episode. Pt states the syncopal episode happened on Wednesday. Pt states he had breakfast and felt sleepy and when he woke up he found himself on the ground.     Pt found to be in PHU, Cr 1.83. A renal ultrasound showed bladder distension for which Flomax was started. Pt to f/u with renal post discharge.   Echo revealed mild-moderate AR.   Telemetry monitoring unremarkable.     Pt medically stable for discharge home.

## 2018-06-11 NOTE — DISCHARGE NOTE ADULT - MEDICATION SUMMARY - MEDICATIONS TO TAKE
I will START or STAY ON the medications listed below when I get home from the hospital:    tamsulosin 0.4 mg oral capsule  -- 1 cap(s) by mouth once a day (at bedtime)  -- Indication: For Bladder distension

## 2018-06-11 NOTE — DISCHARGE NOTE ADULT - PLAN OF CARE
To prevent or reduce the incidence of falls and injuries.  To increase mobility and function and environmental safety. Fall precautions: keep your area clutter free, place night lights in hallways and stairwells, and add non-slip mats in the kitchen and bathrooms.  Exercise daily to improve muscle strength to avoid deconditioning. Your echocardiogram shows mild to moderate aortic regurgitation. You will need to monitor this with yearly echos to make sure the the leaky valve does not get bigger. Your ultrasound shows that you have a distended bladder, and you were therefore started on Flomax. This could be causing you to have kidney injury. Please follow up with your PCP within one week. as above

## 2018-06-11 NOTE — DISCHARGE NOTE ADULT - PATIENT PORTAL LINK FT
You can access the [facility] Patient Portal, offered by [facility], by registering with the following website: [email or web address]

## 2018-06-28 NOTE — CONSULT NOTE ADULT - PROBLEM SELECTOR RECOMMENDATION 9
ddx includes- pre renal vs atn vs obstruction vs ckd progression  check ua  check urine na/cr/osm/k/cl  check urine pro/cr   check renal ultrasound  will keep off IVF and diuretics for now  encourage po intake  trend bmp daily O-L Flap Text: The defect edges were debeveled with a #15 scalpel blade.  Given the location of the defect, shape of the defect and the proximity to free margins an O-L flap was deemed most appropriate.  Using a sterile surgical marker, an appropriate advancement flap was drawn incorporating the defect and placing the expected incisions within the relaxed skin tension lines where possible.    The area thus outlined was incised deep to adipose tissue with a #15 scalpel blade.  The skin margins were undermined to an appropriate distance in all directions utilizing iris scissors.

## 2018-09-29 ENCOUNTER — INPATIENT (INPATIENT)
Facility: HOSPITAL | Age: 83
LOS: 7 days | Discharge: HOME | End: 2018-10-07
Attending: INTERNAL MEDICINE | Admitting: INTERNAL MEDICINE
Payer: COMMERCIAL

## 2018-09-29 VITALS
OXYGEN SATURATION: 97 % | TEMPERATURE: 98 F | DIASTOLIC BLOOD PRESSURE: 93 MMHG | SYSTOLIC BLOOD PRESSURE: 153 MMHG | RESPIRATION RATE: 16 BRPM | HEART RATE: 58 BPM

## 2018-09-29 DIAGNOSIS — Z29.9 ENCOUNTER FOR PROPHYLACTIC MEASURES, UNSPECIFIED: ICD-10-CM

## 2018-09-29 DIAGNOSIS — K62.5 HEMORRHAGE OF ANUS AND RECTUM: ICD-10-CM

## 2018-09-29 DIAGNOSIS — N40.0 BENIGN PROSTATIC HYPERPLASIA WITHOUT LOWER URINARY TRACT SYMPTOMS: ICD-10-CM

## 2018-09-29 DIAGNOSIS — N18.4 CHRONIC KIDNEY DISEASE, STAGE 4 (SEVERE): ICD-10-CM

## 2018-09-29 LAB
ALBUMIN SERPL ELPH-MCNC: 3.4 G/DL — SIGNIFICANT CHANGE UP (ref 3.3–5)
ALP SERPL-CCNC: 75 U/L — SIGNIFICANT CHANGE UP (ref 40–120)
ALT FLD-CCNC: 7 U/L — SIGNIFICANT CHANGE UP (ref 4–41)
APTT BLD: 38.6 SEC — HIGH (ref 27.5–37.4)
AST SERPL-CCNC: 18 U/L — SIGNIFICANT CHANGE UP (ref 4–40)
BASOPHILS # BLD AUTO: 0.01 K/UL — SIGNIFICANT CHANGE UP (ref 0–0.2)
BASOPHILS # BLD AUTO: 0.03 K/UL — SIGNIFICANT CHANGE UP (ref 0–0.2)
BASOPHILS NFR BLD AUTO: 0.2 % — SIGNIFICANT CHANGE UP (ref 0–2)
BASOPHILS NFR BLD AUTO: 0.5 % — SIGNIFICANT CHANGE UP (ref 0–2)
BILIRUB SERPL-MCNC: 0.3 MG/DL — SIGNIFICANT CHANGE UP (ref 0.2–1.2)
BLD GP AB SCN SERPL QL: NEGATIVE — SIGNIFICANT CHANGE UP
BUN SERPL-MCNC: 27 MG/DL — HIGH (ref 7–23)
CALCIUM SERPL-MCNC: 8.6 MG/DL — SIGNIFICANT CHANGE UP (ref 8.4–10.5)
CHLORIDE SERPL-SCNC: 103 MMOL/L — SIGNIFICANT CHANGE UP (ref 98–107)
CO2 SERPL-SCNC: 23 MMOL/L — SIGNIFICANT CHANGE UP (ref 22–31)
CREAT SERPL-MCNC: 1.63 MG/DL — HIGH (ref 0.5–1.3)
EOSINOPHIL # BLD AUTO: 0.26 K/UL — SIGNIFICANT CHANGE UP (ref 0–0.5)
EOSINOPHIL # BLD AUTO: 0.33 K/UL — SIGNIFICANT CHANGE UP (ref 0–0.5)
EOSINOPHIL NFR BLD AUTO: 4.1 % — SIGNIFICANT CHANGE UP (ref 0–6)
EOSINOPHIL NFR BLD AUTO: 5.4 % — SIGNIFICANT CHANGE UP (ref 0–6)
GLUCOSE SERPL-MCNC: 113 MG/DL — HIGH (ref 70–99)
HCT VFR BLD CALC: 26 % — LOW (ref 39–50)
HCT VFR BLD CALC: 27.8 % — LOW (ref 39–50)
HGB BLD-MCNC: 8.2 G/DL — LOW (ref 13–17)
HGB BLD-MCNC: 8.7 G/DL — LOW (ref 13–17)
IMM GRANULOCYTES # BLD AUTO: 0.02 # — SIGNIFICANT CHANGE UP
IMM GRANULOCYTES # BLD AUTO: 0.02 # — SIGNIFICANT CHANGE UP
IMM GRANULOCYTES NFR BLD AUTO: 0.3 % — SIGNIFICANT CHANGE UP (ref 0–1.5)
IMM GRANULOCYTES NFR BLD AUTO: 0.3 % — SIGNIFICANT CHANGE UP (ref 0–1.5)
INR BLD: 1.04 — SIGNIFICANT CHANGE UP (ref 0.88–1.17)
LYMPHOCYTES # BLD AUTO: 1.07 K/UL — SIGNIFICANT CHANGE UP (ref 1–3.3)
LYMPHOCYTES # BLD AUTO: 1.23 K/UL — SIGNIFICANT CHANGE UP (ref 1–3.3)
LYMPHOCYTES # BLD AUTO: 17.4 % — SIGNIFICANT CHANGE UP (ref 13–44)
LYMPHOCYTES # BLD AUTO: 19.6 % — SIGNIFICANT CHANGE UP (ref 13–44)
MCHC RBC-ENTMCNC: 27.4 PG — SIGNIFICANT CHANGE UP (ref 27–34)
MCHC RBC-ENTMCNC: 27.6 PG — SIGNIFICANT CHANGE UP (ref 27–34)
MCHC RBC-ENTMCNC: 31.3 % — LOW (ref 32–36)
MCHC RBC-ENTMCNC: 31.5 % — LOW (ref 32–36)
MCV RBC AUTO: 87.5 FL — SIGNIFICANT CHANGE UP (ref 80–100)
MCV RBC AUTO: 87.7 FL — SIGNIFICANT CHANGE UP (ref 80–100)
MONOCYTES # BLD AUTO: 0.39 K/UL — SIGNIFICANT CHANGE UP (ref 0–0.9)
MONOCYTES # BLD AUTO: 0.51 K/UL — SIGNIFICANT CHANGE UP (ref 0–0.9)
MONOCYTES NFR BLD AUTO: 6.2 % — SIGNIFICANT CHANGE UP (ref 2–14)
MONOCYTES NFR BLD AUTO: 8.3 % — SIGNIFICANT CHANGE UP (ref 2–14)
NEUTROPHILS # BLD AUTO: 4.2 K/UL — SIGNIFICANT CHANGE UP (ref 1.8–7.4)
NEUTROPHILS # BLD AUTO: 4.37 K/UL — SIGNIFICANT CHANGE UP (ref 1.8–7.4)
NEUTROPHILS NFR BLD AUTO: 68.1 % — SIGNIFICANT CHANGE UP (ref 43–77)
NEUTROPHILS NFR BLD AUTO: 69.6 % — SIGNIFICANT CHANGE UP (ref 43–77)
NRBC # FLD: 0 — SIGNIFICANT CHANGE UP
NRBC # FLD: 0 — SIGNIFICANT CHANGE UP
OB PNL STL: POSITIVE — SIGNIFICANT CHANGE UP
PLATELET # BLD AUTO: 195 K/UL — SIGNIFICANT CHANGE UP (ref 150–400)
PLATELET # BLD AUTO: 202 K/UL — SIGNIFICANT CHANGE UP (ref 150–400)
PMV BLD: 9.4 FL — SIGNIFICANT CHANGE UP (ref 7–13)
PMV BLD: 9.6 FL — SIGNIFICANT CHANGE UP (ref 7–13)
POTASSIUM SERPL-MCNC: 4.1 MMOL/L — SIGNIFICANT CHANGE UP (ref 3.5–5.3)
POTASSIUM SERPL-SCNC: 4.1 MMOL/L — SIGNIFICANT CHANGE UP (ref 3.5–5.3)
PROT SERPL-MCNC: 7.1 G/DL — SIGNIFICANT CHANGE UP (ref 6–8.3)
PROTHROM AB SERPL-ACNC: 12 SEC — SIGNIFICANT CHANGE UP (ref 9.8–13.1)
RBC # BLD: 2.97 M/UL — LOW (ref 4.2–5.8)
RBC # BLD: 3.17 M/UL — LOW (ref 4.2–5.8)
RBC # FLD: 16.7 % — HIGH (ref 10.3–14.5)
RBC # FLD: 16.8 % — HIGH (ref 10.3–14.5)
RH IG SCN BLD-IMP: POSITIVE — SIGNIFICANT CHANGE UP
SODIUM SERPL-SCNC: 142 MMOL/L — SIGNIFICANT CHANGE UP (ref 135–145)
WBC # BLD: 6.16 K/UL — SIGNIFICANT CHANGE UP (ref 3.8–10.5)
WBC # BLD: 6.28 K/UL — SIGNIFICANT CHANGE UP (ref 3.8–10.5)
WBC # FLD AUTO: 6.16 K/UL — SIGNIFICANT CHANGE UP (ref 3.8–10.5)
WBC # FLD AUTO: 6.28 K/UL — SIGNIFICANT CHANGE UP (ref 3.8–10.5)

## 2018-09-29 RX ORDER — TAMSULOSIN HYDROCHLORIDE 0.4 MG/1
0.4 CAPSULE ORAL AT BEDTIME
Qty: 0 | Refills: 0 | Status: DISCONTINUED | OUTPATIENT
Start: 2018-09-29 | End: 2018-09-29

## 2018-09-29 RX ORDER — SODIUM CHLORIDE 9 MG/ML
1000 INJECTION INTRAMUSCULAR; INTRAVENOUS; SUBCUTANEOUS ONCE
Qty: 0 | Refills: 0 | Status: COMPLETED | OUTPATIENT
Start: 2018-09-29 | End: 2018-09-29

## 2018-09-29 RX ORDER — PANTOPRAZOLE SODIUM 20 MG/1
80 TABLET, DELAYED RELEASE ORAL ONCE
Qty: 0 | Refills: 0 | Status: COMPLETED | OUTPATIENT
Start: 2018-09-29 | End: 2018-09-29

## 2018-09-29 RX ORDER — TAMSULOSIN HYDROCHLORIDE 0.4 MG/1
0.4 CAPSULE ORAL AT BEDTIME
Qty: 0 | Refills: 0 | Status: DISCONTINUED | OUTPATIENT
Start: 2018-09-29 | End: 2018-10-07

## 2018-09-29 RX ORDER — OXYCODONE AND ACETAMINOPHEN 5; 325 MG/1; MG/1
1 TABLET ORAL EVERY 6 HOURS
Qty: 0 | Refills: 0 | Status: DISCONTINUED | OUTPATIENT
Start: 2018-09-29 | End: 2018-10-04

## 2018-09-29 RX ADMIN — TAMSULOSIN HYDROCHLORIDE 0.4 MILLIGRAM(S): 0.4 CAPSULE ORAL at 22:36

## 2018-09-29 RX ADMIN — PANTOPRAZOLE SODIUM 80 MILLIGRAM(S): 20 TABLET, DELAYED RELEASE ORAL at 14:05

## 2018-09-29 RX ADMIN — SODIUM CHLORIDE 1000 MILLILITER(S): 9 INJECTION INTRAMUSCULAR; INTRAVENOUS; SUBCUTANEOUS at 13:27

## 2018-09-29 NOTE — ED ADULT NURSE NOTE - OBJECTIVE STATEMENT
pt A&Ox3 c/o one episode of bright red rectal bleeding that began this morning denies sob cp fever chills abdomen soft and non tender,

## 2018-09-29 NOTE — H&P ADULT - NSHPPHYSICALEXAM_GEN_ALL_CORE
PHYSICAL EXAM:    General: No acute distress.  HEENT: Normocephalic, atraumatic.  PERRL.  EOMI.  No scleral icterus.  Moist MM.  No oropharyngeal exudates.    Neck: Supple.  Full range of motion.  No JVD.  No carotid bruits.  No thyromegaly.  Trachea midline.  No lymphadenopathy.   Heart: RRR.  Normal S1 and S2.  No murmurs, rubs, or gallops.   Lungs: CTAB. No wheezes, crackles, or rhonchi.    Abdomen: BS+, soft, NT/ND.  No organomegaly.  Skin: Warm and dry.  No rashes.  Extremities: No edema, clubbing, or cyanosis.  2+ peripheral pulses b/l.  Musculoskeletal: No deformities.  No spinal or paraspinal tenderness.  Neuro: A&Ox3.  CN II-XII intact.  5/5 strength in UE and LE b/l.  Tactile sensation intact in UE and LE b/l.  Cerebellar function intact as assessed by finger-to-nose test. PHYSICAL EXAM:    General: No acute distress.  HEENT: Normocephalic, atraumatic.  PERRL.  EOMI.  No scleral icterus.      Neck: Supple.  Full range of motion.   Heart: RRR.  Normal S1 and S2.  No murmurs, rubs, or gallops.   Lungs: CTAB. No wheezes, crackles, or rhonchi.    Abdomen: BS+, soft, NT/ND.  No organomegaly.  Skin: Warm and dry.  No rashes.  Extremities: No edema, clubbing, or cyanosis.  2+ peripheral pulses b/l.  Musculoskeletal: No deformities.  No spinal or paraspinal tenderness.  Neuro: A&Ox3.  CN II-XII intact. PHYSICAL EXAM:    General: No acute distress.  HEENT: Normocephalic, atraumatic.  PERRL.  EOMI.  No scleral icterus.      Neck: Supple.  Full range of motion.   Heart: RRR.  Normal S1 and S2.  No murmurs, rubs, or gallops.   Lungs: CTAB. No wheezes, crackles, or rhonchi.    Abdomen: BS+, soft, NT/ND.  No organomegaly.  Skin: Warm and dry.  No rashes.  Extremities: No edema, clubbing, or cyanosis.  2+ peripheral pulses b/l.  Musculoskeletal: No deformities.  No spinal or paraspinal tenderness.  Neuro: A&Ox3.  CN II-XII intact.  ANGIE: Gross bright red blood

## 2018-09-29 NOTE — ED PROVIDER NOTE - PHYSICAL EXAMINATION
Gen: NAD, AOx3, able to make his needs known, non-toxic //            Head: NCAT //            HEENT: EOMI, oral mucosa moist, normal conjunctiva //            Lung: CTAB, no respiratory distress, no wheezes/rhonchi/rales B/L, speaking in full sentences. //            CV: RRR, no murmurs or rubs//            Abd: soft, NTND, no guarding, no CVA tenderness //            MSK: no visible deformities //            Neuro: No focal sensory or motor deficits //            Skin: Warm, well perfused //            Psych: normal affect.

## 2018-09-29 NOTE — ED PROVIDER NOTE - OBJECTIVE STATEMENT
89 y/o M w/ PMH of BPH s/p prostatectomy presenting with blood in his stool. Pt presents with adult nephew. HPI obtained through patient and nephew. Pt had a bowel movement this morning and his aid noticed that there was blood when she helped to clean him. Per nephew she reported it as bright red and she showed him what he described as blood clots. Pt reports never having this happen to him before. Right before he had the bloody bowel movement he felt dizzy. Currently feels well and has no complaints except for being hungry. Denies recent shortness of breath with activity or fatigue. Pt states that in March he suffered a fall and has been dealing with hip pain. Reports taking ibuprofen regularly for that pain. States he has had a colonoscopy in the past but nothing recently. No additional complaints at this time.

## 2018-09-29 NOTE — H&P ADULT - NSHPREVIEWOFSYSTEMS_GEN_ALL_CORE
REVIEW OF SYSTEMS:    CONSTITUTIONAL: Denies any fatigue, weakness, fevers or chills  EYES/ENT: No visual changes;  No vertigo or throat pain   NECK: No pain or stiffness  RESPIRATORY: No cough, wheezing, hemoptysis; No shortness of breath  CARDIOVASCULAR: No chest pain or palpitations  GASTROINTESTINAL: + BRBPR  GENITOURINARY: No dysuria, frequency or hematuria  NEUROLOGICAL: No numbness or weakness  SKIN: No itching, burning, rashes, or lesions   All other review of systems is negative unless indicated above.

## 2018-09-29 NOTE — H&P ADULT - PROBLEM SELECTOR PLAN 1
- DDx: Hemorrhoids, Colon CA, rectal ulcer, Anal fissures, diverticulosis  - Initial ANGIE w/ gross bright red blood in ED. BUN/Cr 16 making UGIB less likely. - Holding on CT A/P at this time as pt hemodynamically stable w/ no overt signs of bleeding at this time.   - Transfuse Hb < 7  - Maintain active Type and Screen  - Monitor CBC's daily - DDx: Hemorrhoids, Colon CA, rectal ulcer, Anal fissures, diverticulosis  - Initial ANGIE w/ gross bright red blood in ED. BUN/Cr 16 making UGIB less likely. - Holding on CT A/P at this time as pt hemodynamically stable w/ no overt signs of bleeding at this time. If pt has overt GI bleed would check stat CBC and consult GI for eval.  - Transfuse Hb < 7  - Maintain active Type and Screen  - Monitor CBC's daily - DDx: Hemorrhoids, Colon CA, rectal ulcer, Anal fissures, diverticulosis  - Initial ANGIE w/ gross bright red blood in ED. BUN/Cr 16 making UGIB less likely. - Holding on CT A/P at this time as pt hemodynamically stable w/ no overt signs of bleeding at this time. If pt has overt GI bleed would check stat CBC and consult GI for eval.  - Transfuse Hb < 7  - Maintain active Type and Screen  - Monitor CBC q8h

## 2018-09-29 NOTE — ED PROVIDER NOTE - MEDICAL DECISION MAKING DETAILS
89 y/o M w/ rectal bleeding. Likely GI bleed, possibly 2/2 NSAID use. No need for CT bleeding study to eval for lower GI bleed at this time as pt is hemodynamically stable, but will consider if pt begins to decompensate. Labs, fluids, fecal occult test, likely admission. Reassess.

## 2018-09-29 NOTE — ED PROVIDER NOTE - PROGRESS NOTE DETAILS
Oniel: Jimbo performed and chaperoned by medical student Iqra. Grossly positive. Card sent to lab for confirmation. Pt to be admitted.

## 2018-09-29 NOTE — H&P ADULT - HISTORY OF PRESENT ILLNESS
89 y/o M w/ a pmh significant for BPH s/p prostatectomy and CKD4 presents to the ED w/ a chief complaint of BRBPR.           In the ED, pt afebrile, /85, HR 82, RR 15 (100% RA).Labs notable for Hb 8.7 (MCV 87.7) BUN/Cr 16. FOBT positive. Pt bolused 1L NS and Protonix 80mg IVP x1. 89 y/o pleasant Male w/ a pmh significant for CKD4 and BPH s/p prostatectomy presenting to the ED w/ a chief complaint of BRBPR. Pt was in his usual state of health up until yesterday evening when his aid noticed some bright red blood in his diaper. He says this has never happened to him before. He says the stool was 'mostly brown w/ small red clots'. Pt had two other bowel movements after this episode that was w/o blood/clots. Of note, pt says he fell in March 2018, after which he was taking Ibuprofen (unknown dosage) two times per day for R hip pain. He had a colonoscopy done ~10-15 years ago that was reportedly normal per family at bedside. He has approximately 2 BM's per day and has no hx of constipation or painful bowel movements. He denies any new medications, n/v, hematemesis, hemoptysis, CP, palpitations SOB, abdominal pain, melena or hemorrhoids.      In the ED, pt afebrile, /85, HR 82, RR 15 (100% RA).Labs notable for Hb 8.7 (MCV 87.7) BUN/Cr 16. FOBT positive. Pt bolused 1L NS and Protonix 80mg IVP x1.

## 2018-09-29 NOTE — H&P ADULT - NSHPLABSRESULTS_GEN_ALL_CORE
8.7    6.16  )-----------( 202      ( 29 Sep 2018 11:45 )             27.8       09-29    142  |  103  |  27<H>  ----------------------------<  113<H>  4.1   |  23  |  1.63<H>    Ca    8.6      29 Sep 2018 11:45    TPro  7.1  /  Alb  3.4  /  TBili  0.3  /  DBili  x   /  AST  18  /  ALT  7   /  AlkPhos  75  09-29                  PT/INR - ( 29 Sep 2018 12:47 )   PT: 12.0 SEC;   INR: 1.04          PTT - ( 29 Sep 2018 12:47 )  PTT:38.6 SEC    Lactate Trend            CAPILLARY BLOOD GLUCOSE

## 2018-09-29 NOTE — CONSULT NOTE ADULT - ASSESSMENT
pt with brbpr.  normal mcv.  ? anemia secondary to bleeding or chronic.  willl do ct scan in am to rule out mass/ lesion, diverticular vs outlet less likely malignancy would recommend conservative managmetn. will speak with family in am

## 2018-09-29 NOTE — H&P ADULT - ASSESSMENT
89 y/o M w/ a pmh significant for BPH s/p prostatectomy and CKD4 presents to the ED w/ a chief complaint of BRBPR.

## 2018-09-29 NOTE — CONSULT NOTE ADULT - SUBJECTIVE AND OBJECTIVE BOX
Patient is a 89y Male     Patient is a 89y old  Male who presents with a chief complaint of     HPI:  89 y/o pleasant Male w/ a pmh significant for CKD4 and BPH s/p prostatectomy presenting to the ED w/ a chief complaint of BRBPR. Pt was in his usual state of health up until yesterday evening when his aid noticed some bright red blood in his diaper. He says this has never happened to him before. He says the stool was 'mostly brown w/ small red clots'. Pt had two other bowel movements after this episode that was w/o blood/clots. Of note, pt says he fell in March 2018, after which he was taking Ibuprofen (unknown dosage) two times per day for R hip pain. He had a colonoscopy done ~10-15 years ago that was reportedly normal per family at bedside. He has approximately 2 BM's per day and has no hx of constipation or painful bowel movements. He denies any new medications, n/v, hematemesis, hemoptysis, CP, palpitations SOB, abdominal pain, melena or hemorrhoids.      In the ED, pt afebrile, /85, HR 82, RR 15 (100% RA).Labs notable for Hb 8.7 (MCV 87.7) BUN/Cr 16. FOBT positive. Pt bolused 1L NS and Protonix 80mg IVP x1. (29 Sep 2018 15:55)      PAST MEDICAL & SURGICAL HISTORY:  Chronic kidney disease (CKD), stage IV (severe)  Benign prostatic hypertrophy  S/P cataract surgery, left: 3 yrs ago      MEDICATIONS  (STANDING):  tamsulosin 0.4 milliGRAM(s) Oral at bedtime      Allergies    No Known Allergies    Intolerances        SOCIAL HISTORY:  Denies ETOh,Smoking,     FAMILY HISTORY:  No pertinent family history in first degree relatives      REVIEW OF SYSTEMS:    CONSTITUTIONAL: No weakness, fevers or chills  EYES/ENT: No visual changes;  No vertigo or throat pain   NECK: No pain or stiffness  RESPIRATORY: No cough, wheezing, hemoptysis; No shortness of breath  CARDIOVASCULAR: No chest pain or palpitations  GASTROINTESTINAL: No abdominal or epigastric pain. No nausea, vomiting, or hematemesis; No diarrhea or constipation. No melena or hematochezia.  GENITOURINARY: No dysuria, frequency or hematuria  NEUROLOGICAL: No numbness or weakness  SKIN: No itching, burning, rashes, or lesions   All other review of systems is negative unless indicated above.    VITAL:  T(C): , Max: 36.8 (09-29-18 @ 18:55)  T(F): , Max: 98.2 (09-29-18 @ 18:55)  HR: 75 (09-29-18 @ 22:05)  BP: 155/94 (09-29-18 @ 22:05)  BP(mean): --  RR: 18 (09-29-18 @ 22:05)  SpO2: 100% (09-29-18 @ 22:05)  Wt(kg): --    I and O's:        PHYSICAL EXAM:    Constitutional: NAD  HEENT: PERRLA,   Neck: No JVD  Respiratory: CTA B/L  Cardiovascular: S1 and S2  Gastrointestinal: BS+, soft, NT/ND  Extremities: No peripheral edema  Neurological: A/O x 3, no focal deficits  Psychiatric: Normal mood, normal affect  : No [place]  Skin: No rashes  Access: Not applicable  Back: No CVA tenderness    LABS:                        8.2    6.28  )-----------( 195      ( 29 Sep 2018 16:29 )             26.0     09-29    142  |  103  |  27<H>  ----------------------------<  113<H>  4.1   |  23  |  1.63<H>    Ca    8.6      29 Sep 2018 11:45    TPro  7.1  /  Alb  3.4  /  TBili  0.3  /  DBili  x   /  AST  18  /  ALT  7   /  AlkPhos  75  09-29          RADIOLOGY & ADDITIONAL STUDIES:

## 2018-09-29 NOTE — H&P ADULT - ATTENDING COMMENTS
I agree with the abvoe information, changes made above as needed, pt p/w brbpr, lower gi bleed, symptomatic with fatigue, lethargy.   trend h/h, monitor clinical status, f/u ct abd/pelvis

## 2018-09-29 NOTE — ED PROVIDER NOTE - ATTENDING CONTRIBUTION TO CARE
Dr. Mishra: 89 yo male with BPH in ED after found to have blood in diaper this morning.  Pt denies any complaints, including abdominal pain, N/V/D or fever.  No CP/SOB.  He states that if he had not seen blood in diaper he would not have noticed anything.  He continues to eat and drink normally.  On exam pt well appearing, in NAD, heart RRR, lungs CTAB, abd NTND, extremities without swelling, strength 5/5 in all extremities and skin without rash.  Rectal exam as documented by Dr. Engle.

## 2018-09-30 DIAGNOSIS — D64.9 ANEMIA, UNSPECIFIED: ICD-10-CM

## 2018-09-30 DIAGNOSIS — N18.3 CHRONIC KIDNEY DISEASE, STAGE 3 (MODERATE): ICD-10-CM

## 2018-09-30 LAB
ALBUMIN SERPL ELPH-MCNC: 3.5 G/DL — SIGNIFICANT CHANGE UP (ref 3.3–5)
ALP SERPL-CCNC: 71 U/L — SIGNIFICANT CHANGE UP (ref 40–120)
ALT FLD-CCNC: 7 U/L — SIGNIFICANT CHANGE UP (ref 4–41)
AST SERPL-CCNC: 15 U/L — SIGNIFICANT CHANGE UP (ref 4–40)
BILIRUB SERPL-MCNC: 0.4 MG/DL — SIGNIFICANT CHANGE UP (ref 0.2–1.2)
BUN SERPL-MCNC: 25 MG/DL — HIGH (ref 7–23)
CALCIUM SERPL-MCNC: 8.7 MG/DL — SIGNIFICANT CHANGE UP (ref 8.4–10.5)
CHLORIDE SERPL-SCNC: 103 MMOL/L — SIGNIFICANT CHANGE UP (ref 98–107)
CO2 SERPL-SCNC: 28 MMOL/L — SIGNIFICANT CHANGE UP (ref 22–31)
CREAT SERPL-MCNC: 1.42 MG/DL — HIGH (ref 0.5–1.3)
GLUCOSE SERPL-MCNC: 88 MG/DL — SIGNIFICANT CHANGE UP (ref 70–99)
HCT VFR BLD CALC: 24.5 % — LOW (ref 39–50)
HGB BLD-MCNC: 7.8 G/DL — LOW (ref 13–17)
MAGNESIUM SERPL-MCNC: 1.7 MG/DL — SIGNIFICANT CHANGE UP (ref 1.6–2.6)
MCHC RBC-ENTMCNC: 28.1 PG — SIGNIFICANT CHANGE UP (ref 27–34)
MCHC RBC-ENTMCNC: 31.8 % — LOW (ref 32–36)
MCV RBC AUTO: 88.1 FL — SIGNIFICANT CHANGE UP (ref 80–100)
NRBC # FLD: 0 — SIGNIFICANT CHANGE UP
PHOSPHATE SERPL-MCNC: 2.7 MG/DL — SIGNIFICANT CHANGE UP (ref 2.5–4.5)
PLATELET # BLD AUTO: 183 K/UL — SIGNIFICANT CHANGE UP (ref 150–400)
PMV BLD: 9.3 FL — SIGNIFICANT CHANGE UP (ref 7–13)
POTASSIUM SERPL-MCNC: 3.8 MMOL/L — SIGNIFICANT CHANGE UP (ref 3.5–5.3)
POTASSIUM SERPL-SCNC: 3.8 MMOL/L — SIGNIFICANT CHANGE UP (ref 3.5–5.3)
PROT SERPL-MCNC: 6.5 G/DL — SIGNIFICANT CHANGE UP (ref 6–8.3)
RBC # BLD: 2.78 M/UL — LOW (ref 4.2–5.8)
RBC # FLD: 16.7 % — HIGH (ref 10.3–14.5)
SODIUM SERPL-SCNC: 142 MMOL/L — SIGNIFICANT CHANGE UP (ref 135–145)
WBC # BLD: 4.79 K/UL — SIGNIFICANT CHANGE UP (ref 3.8–10.5)
WBC # FLD AUTO: 4.79 K/UL — SIGNIFICANT CHANGE UP (ref 3.8–10.5)

## 2018-09-30 PROCEDURE — 74176 CT ABD & PELVIS W/O CONTRAST: CPT | Mod: 26

## 2018-09-30 RX ORDER — INFLUENZA VIRUS VACCINE 15; 15; 15; 15 UG/.5ML; UG/.5ML; UG/.5ML; UG/.5ML
0.5 SUSPENSION INTRAMUSCULAR ONCE
Qty: 0 | Refills: 0 | Status: COMPLETED | OUTPATIENT
Start: 2018-09-30 | End: 2018-10-07

## 2018-09-30 RX ADMIN — TAMSULOSIN HYDROCHLORIDE 0.4 MILLIGRAM(S): 0.4 CAPSULE ORAL at 21:21

## 2018-09-30 NOTE — CONSULT NOTE ADULT - SUBJECTIVE AND OBJECTIVE BOX
[facility] Consult Note Nephrology - CONSULTATION NOTE    89 y/o pleasant Male w/ a pmh significant for CKD3 ( with b/l cr appearing around 1.4 to 1.8mg/dl) and BPH s/p prostatectomy presenting to the ED w/ a chief complaint of BRBPR. Pt was in his usual state of health up until yesterday evening when his aid noticed some bright red blood in his diaper. He says this has never happened to him before. He says the stool was 'mostly brown w/ small red clots'. Pt had two other bowel movements after this episode that was w/o blood/clots. Of note, pt says he fell in March 2018, after which he was taking Ibuprofen (unknown dosage) two times per day for R hip pain. He had a colonoscopy done ~10-15 years ago that was reportedly normal per family at bedside. He has approximately 2 BM's per day and has no hx of constipation or painful bowel movements. He denies any new medications, n/v, hematemesis, hemoptysis, CP, palpitations SOB, abdominal pain, melena or hemorrhoids.      In the ED, pt afebrile, /85, HR 82, RR 15 (100% RA).Labs notable for Hb 8.7 (MCV 87.7) BUN/Cr 16. FOBT positive. Pt bolused 1L NS and Protonix 80mg IVP x1.     PAST MEDICAL & SURGICAL HISTORY:  Chronic kidney disease (CKD), stage IV (severe)  Benign prostatic hypertrophy  S/P cataract surgery, left: 3 yrs ago    No Known Allergies    Home Medications Reviewed  Hospital Medications:   MEDICATIONS  (STANDING):  influenza   Vaccine 0.5 milliLiter(s) IntraMuscular once  tamsulosin 0.4 milliGRAM(s) Oral at bedtime    SOCIAL HISTORY:  Denies ETOh,Smoking,   FAMILY HISTORY:  No pertinent family history in first degree relatives    REVIEW OF SYSTEMS:  CONSTITUTIONAL: No weakness, fevers or chills  EYES/ENT: No visual changes;  No vertigo or throat pain   NECK: No pain or stiffness  RESPIRATORY: No cough, wheezing, hemoptysis; No shortness of breath  CARDIOVASCULAR: No chest pain or palpitations.  GASTROINTESTINAL: +GIB  GENITOURINARY: No dysuria, frequency, foamy urine, urinary urgency, incontinence or hematuria  NEUROLOGICAL: No numbness or weakness  SKIN: No itching, burning, rashes, or lesions   VASCULAR: No bilateral lower extremity edema.   All other review of systems is negative unless indicated above.    VITALS:  T(F): 97.7 (09-30-18 @ 10:49), Max: 98.2 (09-29-18 @ 18:55)  HR: 80 (09-30-18 @ 10:49)  BP: 162/97 (09-30-18 @ 10:49)  RR: 18 (09-30-18 @ 10:49)  SpO2: 96% (09-30-18 @ 10:49)  Wt(kg): --    09-29 @ 07:01  -  09-30 @ 07:00  --------------------------------------------------------  IN: 0 mL / OUT: 350 mL / NET: -350 mL        PHYSICAL EXAM:  Constitutional: NAD  HEENT: anicteric sclera, oropharynx clear, MMM  Neck: No JVD  Respiratory: CTAB, no wheezes, rales or rhonchi  Cardiovascular: S1, S2, RRR  Gastrointestinal: BS+, soft, NT/ND  Extremities: No cyanosis or clubbing. No peripheral edema  Neurological: A/O x 2, no focal deficits  Psychiatric: Normal mood, normal affect  : No CVA tenderness. No flores.   Skin: No rashes      LABS:  09-30    142  |  103  |  25<H>  ----------------------------<  88  3.8   |  28  |  1.42<H>    Ca    8.7      30 Sep 2018 06:15  Phos  2.7     09-30  Mg     1.7     09-30    TPro  6.5  /  Alb  3.5  /  TBili  0.4  /  DBili      /  AST  15  /  ALT  7   /  AlkPhos  71  09-30    Creatinine Trend: 1.42 <--, 1.63 <--                        7.8    4.79  )-----------( 183      ( 30 Sep 2018 11:10 )             24.5     Urine Studies:      RADIOLOGY & ADDITIONAL STUDIES:

## 2018-09-30 NOTE — PROGRESS NOTE ADULT - SUBJECTIVE AND OBJECTIVE BOX
Patient seen and examined at bedside  No acute events noted overnight  Case discussed with medical team    HPI:  89 y/o pleasant Male w/ a pmh significant for CKD4 and BPH s/p prostatectomy presenting to the ED w/ a chief complaint of BRBPR. Pt was in his usual state of health up until yesterday evening when his aid noticed some bright red blood in his diaper. He says this has never happened to him before. He says the stool was 'mostly brown w/ small red clots'. Pt had two other bowel movements after this episode that was w/o blood/clots. Of note, pt says he fell in March 2018, after which he was taking Ibuprofen (unknown dosage) two times per day for R hip pain. He had a colonoscopy done ~10-15 years ago that was reportedly normal per family at bedside. He has approximately 2 BM's per day and has no hx of constipation or painful bowel movements. He denies any new medications, n/v, hematemesis, hemoptysis, CP, palpitations SOB, abdominal pain, melena or hemorrhoids.      In the ED, pt afebrile, /85, HR 82, RR 15 (100% RA).Labs notable for Hb 8.7 (MCV 87.7) BUN/Cr 16. FOBT positive. Pt bolused 1L NS and Protonix 80mg IVP x1. (29 Sep 2018 15:55)      PAST MEDICAL & SURGICAL HISTORY:  Chronic kidney disease (CKD), stage IV (severe)  Benign prostatic hypertrophy  S/P cataract surgery, left: 3 yrs ago      No Known Allergies       MEDICATIONS  (STANDING):  influenza   Vaccine 0.5 milliLiter(s) IntraMuscular once  tamsulosin 0.4 milliGRAM(s) Oral at bedtime    MEDICATIONS  (PRN):  oxyCODONE    5 mG/acetaminophen 325 mG 1 Tablet(s) Oral every 6 hours PRN hip pain      REVIEW OF SYSTEMS:  CONSTITUTIONAL: (+) malaise. lethargy  EYES: No acute change in vision   ENT:  No tinnitus  NECK: No stiffness  RESPIRATORY: No hemoptysis  CARDIOVASCULAR: No chest pain, palpitations, syncope  GASTROINTESTINAL: No hematemesis  GENITOURINARY: No hematuria  NEUROLOGICAL: No headaches  LYMPH Nodes: No enlarged glands  ENDOCRINE: No heat or cold intolerance	    T(C): 36.5 (09-30-18 @ 10:49), Max: 36.8 (09-29-18 @ 18:55)  HR: 80 (09-30-18 @ 10:49) (75 - 86)  BP: 162/97 (09-30-18 @ 10:49) (146/84 - 200/98)  RR: 18 (09-30-18 @ 10:49) (18 - 18)  SpO2: 96% (09-30-18 @ 10:49) (96% - 100%)    PHYSICAL EXAMINATION:   Constitutional: WD, NAD  HEENT: NC, AT  Neck:  Supple  Respiratory:  Adequate airflow b/l. Not using accessory muscles of respiration.  Cardiovascular:  S1 & S2 intact, no R/G, 2+ radial pulses b/l  Gastrointestinal: Soft, NT, ND, normoactive b.s., no organomegaly/RT/rigidity  Extremities: WWP  Neurological:  Alert and awake.  No acute focal motor deficits. Crude sensation intact.     Labs and imaging reviewed    LABS:                        7.8    4.79  )-----------( 183      ( 30 Sep 2018 11:10 )             24.5     09-30    142  |  103  |  25<H>  ----------------------------<  88  3.8   |  28  |  1.42<H>    Ca    8.7      30 Sep 2018 06:15  Phos  2.7     09-30  Mg     1.7     09-30    TPro  6.5  /  Alb  3.5  /  TBili  0.4  /  DBili  x   /  AST  15  /  ALT  7   /  AlkPhos  71  09-30        PT/INR - ( 29 Sep 2018 12:47 )   PT: 12.0 SEC;   INR: 1.04          PTT - ( 29 Sep 2018 12:47 )  PTT:38.6 SEC    CAPILLARY BLOOD GLUCOSE            LIVER FUNCTIONS - ( 30 Sep 2018 06:15 )  Alb: 3.5 g/dL / Pro: 6.5 g/dL / ALK PHOS: 71 u/L / ALT: 7 u/L / AST: 15 u/L / GGT: x               RADIOLOGY & ADDITIONAL STUDIES:

## 2018-09-30 NOTE — CONSULT NOTE ADULT - SUBJECTIVE AND OBJECTIVE BOX
HPI:  89 y/o pleasant Male w/ a pmh significant for CKD4 and BPH s/p prostatectomy presented to the ED w  BRBPR. Pt was in his usual state of health up until yesterday evening when his aid noticed some bright red blood in his diaper. He says this has never happened to him before. He says the stool was 'mostly brown w/ small red clots'. Pt had two other bowel movements after this episode that was w/o blood/clots. Of note, pt says he fell in March 2018, after which he was taking Ibuprofen (unknown dosage) two times per day for R hip pain. He had a colonoscopy done ~10-15 years ago that was reportedly normal per family at bedside. He has approximately 2 BM's per day and has no hx of constipation or painful bowel movements. He denies any new medications, n/v, hematemesis, hemoptysis, CP, palpitations SOB, abdominal pain, melena or hemorrhoids.      Pt was noted to have normocytic anemia w Hgb/Hct 8.2/26. WBC and Platelets normal. Pt denies hx of anemia. No other bleeding reported. Denies anorexia or weight loss. No fevers or night sweats.       PAST MEDICAL & SURGICAL HISTORY:  Chronic kidney disease (CKD), stage IV (severe)  Benign prostatic hypertrophy  S/P cataract surgery, left: 3 yrs ago      ROS:  Negative except for: RBPR    MEDICATIONS  (STANDING):  influenza   Vaccine 0.5 milliLiter(s) IntraMuscular once  tamsulosin 0.4 milliGRAM(s) Oral at bedtime    MEDICATIONS  (PRN):  oxyCODONE    5 mG/acetaminophen 325 mG 1 Tablet(s) Oral every 6 hours PRN hip pain      Allergies    No Known Allergies    Intolerances        Vital Signs Last 24 Hrs  T(C): 36.5 (30 Sep 2018 10:49), Max: 36.8 (29 Sep 2018 18:55)  T(F): 97.7 (30 Sep 2018 10:49), Max: 98.2 (29 Sep 2018 18:55)  HR: 80 (30 Sep 2018 10:49) (75 - 86)  BP: 162/97 (30 Sep 2018 10:49) (146/84 - 200/98)  BP(mean): --  RR: 18 (30 Sep 2018 10:49) (15 - 18)  SpO2: 96% (30 Sep 2018 10:49) (96% - 100%)    PHYSICAL EXAM  General: adult in NAD, Thin, Looks Chronically ill   HEENT: clear oropharynx, anicteric sclera, pink conjunctiva  Neck: supple  CV: normal S1/S2 with no murmur rubs or gallops  Lungs: positive air movement b/l ant lungs,clear to auscultation, no wheezes, no rales  Abdomen: soft non-tender non-distended, no hepatosplenomegaly  Ext: no clubbing cyanosis or edema  Skin: no rashes and no petechiae  Neuro: alert and oriented X 4, no focal deficits    LABS:                          8.2    6.28  )-----------( 195      ( 29 Sep 2018 16:29 )             26.0     Serial CBC's  09-29 @ 16:29  Hct-26.0 / Hgb-8.2 / Plat-195 / RBC-2.97 / WBC-6.28          Serial CBC's  09-29 @ 11:45  Hct-27.8 / Hgb-8.7 / Plat-202 / RBC-3.17 / WBC-6.16            09-30    142  |  103  |  25<H>  ----------------------------<  88  3.8   |  28  |  1.42<H>    Ca    8.7      30 Sep 2018 06:15  Phos  2.7     09-30  Mg     1.7     09-30    TPro  6.5  /  Alb  3.5  /  TBili  0.4  /  DBili  x   /  AST  15  /  ALT  7   /  AlkPhos  71  09-30      PT/INR - ( 29 Sep 2018 12:47 )   PT: 12.0 SEC;   INR: 1.04          PTT - ( 29 Sep 2018 12:47 )  PTT:38.6 SEC    WBC Count: 6.28 K/uL (09-29 @ 16:29)  Hemoglobin: 8.2 g/dL (09-29 @ 16:29)            RADIOLOGY & ADDITIONAL STUDIES:

## 2018-09-30 NOTE — CONSULT NOTE ADULT - ASSESSMENT
1. Normocytic Anemia w recent RBPR    -- await CT A/P   -- check Iron Studies, B12/Folate, Hemolysis labs, SPEP/RAUL  -- Anemia of CKD may be a factor as his CrCl is ~ 35-40. will check Epo level  -- If Iron stores adequate and pt not bleeding , can start Procrit 10,000 u TIW  -- Transfuse if Hgb < 7    Rosa Gloria MD  [phone]

## 2018-09-30 NOTE — PROGRESS NOTE ADULT - SUBJECTIVE AND OBJECTIVE BOX
PASCUAL OLSEN:[id number],   89yMale followed for:  No Known Allergies    PAST MEDICAL & SURGICAL HISTORY:  Chronic kidney disease (CKD), stage IV (severe)  Benign prostatic hypertrophy  S/P cataract surgery, left: 3 yrs ago    FAMILY HISTORY:  No pertinent family history in first degree relatives    MEDICATIONS  (STANDING):  influenza   Vaccine 0.5 milliLiter(s) IntraMuscular once  tamsulosin 0.4 milliGRAM(s) Oral at bedtime    MEDICATIONS  (PRN):  oxyCODONE    5 mG/acetaminophen 325 mG 1 Tablet(s) Oral every 6 hours PRN hip pain      Vital Signs Last 24 Hrs  T(C): 36.5 (30 Sep 2018 07:04), Max: 36.8 (29 Sep 2018 18:55)  T(F): 97.7 (30 Sep 2018 07:04), Max: 98.2 (29 Sep 2018 18:55)  HR: 80 (30 Sep 2018 07:04) (58 - 86)  BP: 146/84 (30 Sep 2018 07:04) (146/84 - 200/98)  BP(mean): --  RR: 18 (30 Sep 2018 07:04) (15 - 18)  SpO2: 100% (30 Sep 2018 07:04) (97% - 100%)  nc/at  s1s2  cta  soft, nt, nd no guarding or rebound  no c/c/e    CBC Full  -  ( 29 Sep 2018 16:29 )  WBC Count : 6.28 K/uL  Hemoglobin : 8.2 g/dL  Hematocrit : 26.0 %  Platelet Count - Automated : 195 K/uL  Mean Cell Volume : 87.5 fL  Mean Cell Hemoglobin : 27.6 pg  Mean Cell Hemoglobin Concentration : 31.5 %  Auto Neutrophil # : 4.37 K/uL  Auto Lymphocyte # : 1.23 K/uL  Auto Monocyte # : 0.39 K/uL  Auto Eosinophil # : 0.26 K/uL  Auto Basophil # : 0.01 K/uL  Auto Neutrophil % : 69.6 %  Auto Lymphocyte % : 19.6 %  Auto Monocyte % : 6.2 %  Auto Eosinophil % : 4.1 %  Auto Basophil % : 0.2 %    09-30    142  |  103  |  25<H>  ----------------------------<  88  3.8   |  28  |  1.42<H>    Ca    8.7      30 Sep 2018 06:15  Phos  2.7     09-30  Mg     1.7     09-30    TPro  6.5  /  Alb  3.5  /  TBili  0.4  /  DBili  x   /  AST  15  /  ALT  7   /  AlkPhos  71  09-30    PT/INR - ( 29 Sep 2018 12:47 )   PT: 12.0 SEC;   INR: 1.04          PTT - ( 29 Sep 2018 12:47 )  PTT:38.6 SEC

## 2018-09-30 NOTE — CONSULT NOTE ADULT - ASSESSMENT
89 y/o pleasant Male w/ a pmh significant for CKD3 vand BPH s/p prostatectomy presenting to the ED w/ a chief complaint of BRBPR.

## 2018-09-30 NOTE — CONSULT NOTE ADULT - SUBJECTIVE AND OBJECTIVE BOX
CARDIOLOGY ATTENDING      HISTORY OF PRESENT ILLNESS: He is a pleasant 89 y/o male PMH bifascicular block who is now admitted with BRBPR. He denies palpitations, syncope, nor angina. His last echo in June 2018 was normal. His RCRI score is 0-1 for mild CKD.     PAST MEDICAL & SURGICAL HISTORY:  Chronic kidney disease (CKD)  bifascicular block  Benign prostatic hypertrophy  S/P cataract surgery, left: 3 yrs ago    MEDICATIONS  (STANDING):  influenza   Vaccine 0.5 milliLiter(s) IntraMuscular once  tamsulosin 0.4 milliGRAM(s) Oral at bedtime    No Known Allergies    FAMILY HISTORY:  No pertinent family history in first degree relatives  Non-contributary for premature coronary disease or sudden cardiac death    SOCIAL HISTORY:    [x ] Non-smoker  [ ] Smoker  [ ] Alcohol      REVIEW OF SYSTEMS:  [ ]chest pain  [  ]shortness of breath  [  ]palpitations  [  ]syncope  [ ]near syncope [ ]upper extremity weakness   [ ] lower extremity weakness  [  ]diplopia  [  ]altered mental status   [  ]fevers  [ ]chills [ ]nausea  [ ]vomitting  [  ]dysphagia    [ ]abdominal pain  [ ]melena  [x ]BRBPR    [  ]epistaxis  [  ]rash    [ ]lower extremity edema        x] All others negative	  [ ] Unable to obtain    PHYSICAL EXAM:  T(C): 36.5 (09-30-18 @ 10:49), Max: 36.8 (09-29-18 @ 18:55)  HR: 80 (09-30-18 @ 10:49) (75 - 86)  BP: 162/97 (09-30-18 @ 10:49) (146/84 - 200/98)  RR: 18 (09-30-18 @ 10:49) (18 - 18)  SpO2: 96% (09-30-18 @ 10:49) (96% - 100%)  Wt(kg): --    Appearance: Normal	  HEENT:   Normal oral mucosa, PERRL, EOMI	  Lymphatic: No lymphadenopathy , no edema  Cardiovascular: Normal S1 S2, No JVD, No murmurs , Peripheral pulses palpable 2+ bilaterally  Respiratory: Lungs clear to auscultation, normal effort 	  Gastrointestinal:  Soft, Non-tender, + BS	  Skin: No rashes, No ecchymoses, No cyanosis, warm to touch  Musculoskeletal: Normal range of motion, normal strength  Psychiatry:  Mood & affect appropriate      TELEMETRY: 	    ECG:  	    Echo:  NST:  Cath:  	  	  LABS:	 	                          7.8    4.79  )-----------( 183      ( 30 Sep 2018 11:10 )             24.5     09-30    142  |  103  |  25<H>  ----------------------------<  88  3.8   |  28  |  1.42<H>    Ca    8.7      30 Sep 2018 06:15  Phos  2.7     09-30  Mg     1.7     09-30    TPro  6.5  /  Alb  3.5  /  TBili  0.4  /  DBili  x   /  AST  15  /  ALT  7   /  AlkPhos  71  09-30    proBNP:   Lipid Profile:   HgA1c:   TSH:     A/P) He is a pleasant 89 y/o male PMH bifascicular block who is now admitted with BRBPR. He denies palpitations, syncope, nor angina. His last echo in June 2018 was normal. His RCRI score is 0-1 for mild CKD.    -given RCRI score of 0-1 he is low risk for low risk procedures (colonoscopy +/- EGD) and is otherwise optimized from a cardiac perspective  -no further inpatient cardiac workup needed as long as he doesn't have syncope      Shayna Poole M.D., RS  Cardiac Electrophysiology  [facility]  [address] [place] [address], [address] [place] [address]  [email or web address]    office [phone]  pager [phone]

## 2018-10-01 LAB
ALBUMIN SERPL ELPH-MCNC: 3.1 G/DL — LOW (ref 3.3–5)
ALP SERPL-CCNC: 71 U/L — SIGNIFICANT CHANGE UP (ref 40–120)
ALT FLD-CCNC: 6 U/L — SIGNIFICANT CHANGE UP (ref 4–41)
AST SERPL-CCNC: 14 U/L — SIGNIFICANT CHANGE UP (ref 4–40)
BILIRUB SERPL-MCNC: 0.5 MG/DL — SIGNIFICANT CHANGE UP (ref 0.2–1.2)
BUN SERPL-MCNC: 24 MG/DL — HIGH (ref 7–23)
CALCIUM SERPL-MCNC: 8.2 MG/DL — LOW (ref 8.4–10.5)
CHLORIDE SERPL-SCNC: 104 MMOL/L — SIGNIFICANT CHANGE UP (ref 98–107)
CO2 SERPL-SCNC: 24 MMOL/L — SIGNIFICANT CHANGE UP (ref 22–31)
CREAT SERPL-MCNC: 1.36 MG/DL — HIGH (ref 0.5–1.3)
FERRITIN SERPL-MCNC: 149.5 NG/ML — SIGNIFICANT CHANGE UP (ref 30–400)
FOLATE SERPL-MCNC: > 20 NG/ML — HIGH (ref 4.7–20)
GLUCOSE SERPL-MCNC: 123 MG/DL — HIGH (ref 70–99)
HAPTOGLOB SERPL-MCNC: 187 MG/DL — SIGNIFICANT CHANGE UP (ref 34–200)
HCT VFR BLD CALC: 23 % — LOW (ref 39–50)
HCT VFR BLD CALC: 28.9 % — LOW (ref 39–50)
HCT VFR BLD CALC: 29.7 % — LOW (ref 39–50)
HGB BLD-MCNC: 7.2 G/DL — LOW (ref 13–17)
HGB BLD-MCNC: 9.3 G/DL — LOW (ref 13–17)
HGB BLD-MCNC: 9.6 G/DL — LOW (ref 13–17)
INR BLD: 1 — SIGNIFICANT CHANGE UP (ref 0.88–1.17)
IRON SATN MFR SERPL: 240 UG/DL — SIGNIFICANT CHANGE UP (ref 155–535)
IRON SATN MFR SERPL: 76 UG/DL — SIGNIFICANT CHANGE UP (ref 45–165)
LDH SERPL L TO P-CCNC: 226 U/L — HIGH (ref 135–225)
MAGNESIUM SERPL-MCNC: 1.7 MG/DL — SIGNIFICANT CHANGE UP (ref 1.6–2.6)
MCHC RBC-ENTMCNC: 27.8 PG — SIGNIFICANT CHANGE UP (ref 27–34)
MCHC RBC-ENTMCNC: 28 PG — SIGNIFICANT CHANGE UP (ref 27–34)
MCHC RBC-ENTMCNC: 28 PG — SIGNIFICANT CHANGE UP (ref 27–34)
MCHC RBC-ENTMCNC: 31.3 % — LOW (ref 32–36)
MCHC RBC-ENTMCNC: 32.2 % — SIGNIFICANT CHANGE UP (ref 32–36)
MCHC RBC-ENTMCNC: 32.3 % — SIGNIFICANT CHANGE UP (ref 32–36)
MCV RBC AUTO: 86.6 FL — SIGNIFICANT CHANGE UP (ref 80–100)
MCV RBC AUTO: 87 FL — SIGNIFICANT CHANGE UP (ref 80–100)
MCV RBC AUTO: 88.8 FL — SIGNIFICANT CHANGE UP (ref 80–100)
NRBC # FLD: 0 — SIGNIFICANT CHANGE UP
PLATELET # BLD AUTO: 159 K/UL — SIGNIFICANT CHANGE UP (ref 150–400)
PLATELET # BLD AUTO: 170 K/UL — SIGNIFICANT CHANGE UP (ref 150–400)
PLATELET # BLD AUTO: 184 K/UL — SIGNIFICANT CHANGE UP (ref 150–400)
PMV BLD: 9.6 FL — SIGNIFICANT CHANGE UP (ref 7–13)
PMV BLD: 9.9 FL — SIGNIFICANT CHANGE UP (ref 7–13)
PMV BLD: 9.9 FL — SIGNIFICANT CHANGE UP (ref 7–13)
POTASSIUM SERPL-MCNC: 3.5 MMOL/L — SIGNIFICANT CHANGE UP (ref 3.5–5.3)
POTASSIUM SERPL-SCNC: 3.5 MMOL/L — SIGNIFICANT CHANGE UP (ref 3.5–5.3)
PROT SERPL-MCNC: 6.4 G/DL — SIGNIFICANT CHANGE UP (ref 6–8.3)
PROT SERPL-MCNC: 6.8 G/DL — SIGNIFICANT CHANGE UP (ref 6–8.3)
PROTHROM AB SERPL-ACNC: 11.5 SEC — SIGNIFICANT CHANGE UP (ref 9.8–13.1)
RBC # BLD: 2.59 M/UL — LOW (ref 4.2–5.8)
RBC # BLD: 3.32 M/UL — LOW (ref 4.2–5.8)
RBC # BLD: 3.43 M/UL — LOW (ref 4.2–5.8)
RBC # FLD: 16 % — HIGH (ref 10.3–14.5)
RBC # FLD: 16.2 % — HIGH (ref 10.3–14.5)
RBC # FLD: 16.6 % — HIGH (ref 10.3–14.5)
RETICS #: 48 K/UL — SIGNIFICANT CHANGE UP (ref 25–125)
RETICS/RBC NFR: 1.4 % — SIGNIFICANT CHANGE UP (ref 0.5–2.5)
SODIUM SERPL-SCNC: 141 MMOL/L — SIGNIFICANT CHANGE UP (ref 135–145)
UIBC SERPL-MCNC: 163.8 UG/DL — SIGNIFICANT CHANGE UP (ref 110–370)
VIT B12 SERPL-MCNC: 499 PG/ML — SIGNIFICANT CHANGE UP (ref 200–900)
WBC # BLD: 6.77 K/UL — SIGNIFICANT CHANGE UP (ref 3.8–10.5)
WBC # BLD: 8.29 K/UL — SIGNIFICANT CHANGE UP (ref 3.8–10.5)
WBC # BLD: 8.34 K/UL — SIGNIFICANT CHANGE UP (ref 3.8–10.5)
WBC # FLD AUTO: 6.77 K/UL — SIGNIFICANT CHANGE UP (ref 3.8–10.5)
WBC # FLD AUTO: 8.29 K/UL — SIGNIFICANT CHANGE UP (ref 3.8–10.5)
WBC # FLD AUTO: 8.34 K/UL — SIGNIFICANT CHANGE UP (ref 3.8–10.5)

## 2018-10-01 PROCEDURE — 86334 IMMUNOFIX E-PHORESIS SERUM: CPT | Mod: 26

## 2018-10-01 PROCEDURE — 84165 PROTEIN E-PHORESIS SERUM: CPT | Mod: 26

## 2018-10-01 PROCEDURE — 36556 INSERT NON-TUNNEL CV CATH: CPT

## 2018-10-01 PROCEDURE — 76937 US GUIDE VASCULAR ACCESS: CPT | Mod: 26

## 2018-10-01 RX ORDER — CHLORHEXIDINE GLUCONATE 213 G/1000ML
1 SOLUTION TOPICAL
Qty: 0 | Refills: 0 | Status: DISCONTINUED | OUTPATIENT
Start: 2018-10-01 | End: 2018-10-07

## 2018-10-01 RX ORDER — PANTOPRAZOLE SODIUM 20 MG/1
8 TABLET, DELAYED RELEASE ORAL
Qty: 80 | Refills: 0 | Status: DISCONTINUED | OUTPATIENT
Start: 2018-10-01 | End: 2018-10-02

## 2018-10-01 RX ORDER — SODIUM CHLORIDE 9 MG/ML
1000 INJECTION INTRAMUSCULAR; INTRAVENOUS; SUBCUTANEOUS ONCE
Qty: 0 | Refills: 0 | Status: COMPLETED | OUTPATIENT
Start: 2018-10-01 | End: 2018-10-01

## 2018-10-01 RX ADMIN — SODIUM CHLORIDE 1000 MILLILITER(S): 9 INJECTION INTRAMUSCULAR; INTRAVENOUS; SUBCUTANEOUS at 00:13

## 2018-10-01 RX ADMIN — PANTOPRAZOLE SODIUM 10 MG/HR: 20 TABLET, DELAYED RELEASE ORAL at 00:30

## 2018-10-01 RX ADMIN — TAMSULOSIN HYDROCHLORIDE 0.4 MILLIGRAM(S): 0.4 CAPSULE ORAL at 21:18

## 2018-10-01 RX ADMIN — CHLORHEXIDINE GLUCONATE 1 APPLICATION(S): 213 SOLUTION TOPICAL at 05:25

## 2018-10-01 RX ADMIN — PANTOPRAZOLE SODIUM 10 MG/HR: 20 TABLET, DELAYED RELEASE ORAL at 09:51

## 2018-10-01 RX ADMIN — PANTOPRAZOLE SODIUM 10 MG/HR: 20 TABLET, DELAYED RELEASE ORAL at 21:18

## 2018-10-01 NOTE — PROCEDURE NOTE - NSPROCDETAILS_GEN_ALL_CORE
flushes easily/location identified, draped/prepped, sterile technique used/dressing applied/secured in place/blood seen on insertion/sterile technique, catheter placed/ultrasound utilization

## 2018-10-01 NOTE — PROGRESS NOTE ADULT - SUBJECTIVE AND OBJECTIVE BOX
SUBJECTIVE: No CP or SOB, events noted this AM s/p RRT/ s/p PRBCs      MEDICATIONS  (STANDING):  chlorhexidine 4% Liquid 1 Application(s) Topical <User Schedule>  influenza   Vaccine 0.5 milliLiter(s) IntraMuscular once  pantoprazole Infusion 8 mG/Hr (10 mL/Hr) IV Continuous <Continuous>  tamsulosin 0.4 milliGRAM(s) Oral at bedtime    MEDICATIONS  (PRN):  oxyCODONE    5 mG/acetaminophen 325 mG 1 Tablet(s) Oral every 6 hours PRN hip pain      LABS:                        9.6    8.29  )-----------( 170      ( 01 Oct 2018 06:40 )             29.7     141  |  104  |  24<H>  ----------------------------<  123<H>  3.5   |  24  |  1.36<H>    Ca    8.2<L>      01 Oct 2018 00:31  Phos  2.7     09-30  Mg     1.7     10-01    TPro  6.8  /  Alb  x   /  TBili  x   /  DBili  x   /  AST  x   /  ALT  x   /  AlkPhos  x   10-01  PT/INR - ( 01 Oct 2018 00:31 )   PT: 11.5 SEC;   INR: 1.00          PHYSICAL EXAM:  Vital Signs Last 24 Hrs  T(C): 36.8 (01 Oct 2018 09:42), Max: 36.8 (30 Sep 2018 21:17)  T(F): 98.2 (01 Oct 2018 09:42), Max: 98.3 (30 Sep 2018 21:17)  HR: 90 (01 Oct 2018 09:42) (72 - 90)  BP: 155/- (01 Oct 2018 09:42) (139/76 - 165/95)  BP(mean): 94 (01 Oct 2018 09:42) (94 - 94)  RR: 18 (01 Oct 2018 09:42) (18 - 18)  SpO2: 100% (01 Oct 2018 09:42) (88% - 100%)    Cardiovascular:  S1S2 RRR, No JVD  Respiratory: Lungs clear to auscultation, normal effort  Gastrointestinal: Abdomen soft, ND, NT, +BS  Skin: Warm, dry, intact. No rash.  Musculoskeletal: Normal ROM, normal strength  Ext: No C/C/E B/L LE    DIAGNOSTIC DATA    < from: Transthoracic Echocardiogram (06.11.18 @ 00:00) >  CONCLUSIONS:  1. Mitral annular calcification, otherwise normal mitral  valve. Minimal mitral regurgitation.  2. Aortic valve leaflet morphology not well visualized.  Mild-moderate aortic regurgitation.  3. Endocardium not well visualized; grossly normal left  ventricular systolic function.  4. Normal right ventricular size and function.  5. Estimated right ventricular systolic pressure equals 42  mm Hg, assuming right atrial pressure equals 10 mm Hg,  consistent with mild pulmonary hypertension.  ------------------------------------------------------------------------  Confirmed on  6/11/2018 - 16:34:30 by Nilo Diallo M.D.    < end of copied text >    < from: CT Abdomen and Pelvis w/ Oral Cont (09.30.18 @ 17:18) >  IMPRESSION:     No mass or lymphadenopathy.    Colonic diverticulosis.    Trabeculated urinary bladder with multiple diverticula and an enlarged   prostate gland again noted.    Severe L4 vertebral body compression deformity, new since 2/2/2015 but   otherwise age-indeterminate.    Unchanged 4.0 cm distal descending thoracic aortic aneurysm.     < end of copied text >      ASSESSMENT AND PLAN:    He is a pleasant 89 y/o male PMH bifascicular block who is now admitted with BRBPR. He denies palpitations, syncope, nor angina. His last echo in June 2018 was normal. His RCRI score is 0-1 for mild CKD.    -given RCRI score of 0-1 he is low risk for low risk procedures (colonoscopy +/- EGD) and is otherwise optimized from a cardiac perspective  -PRBCs per primary team  -no further inpatient cardiac workup needed as long as he doesn't have syncope

## 2018-10-01 NOTE — CHART NOTE - NSCHARTNOTEFT_GEN_A_CORE
Patient is a 89y old  Male who presents with a chief complaint of BRBPR (30 Sep 2018 13:38)    Vital Signs Last 24 Hrs  T(C): 36.8 (30 Sep 2018 21:17), Max: 36.8 (30 Sep 2018 21:17)  T(F): 98.3 (30 Sep 2018 21:17), Max: 98.3 (30 Sep 2018 21:17)  HR: 72 (30 Sep 2018 21:17) (72 - 80)  BP: 165/95 (30 Sep 2018 21:17) (146/84 - 165/95)  BP(mean): --  RR: 18 (30 Sep 2018 21:17) (18 - 18)  SpO2: 100% (30 Sep 2018 21:17) (96% - 100%)      PT/ INR - ( 01 Oct 2018 00:31 )   PT: 11.5 SEC;   INR: 1.00          PTT - ( 29 Sep 2018 12:47 )  PTT:38.6 SEC                        7.2    8.34  )-----------( 184      ( 01 Oct 2018 00:30 )             23.0     10-01    141  |  104  |  24<H>  ----------------------------<  123<H>  3.5   |  24  |  1.36<H>    Ca    8.2<L>      01 Oct 2018 00:31  Phos  2.7     09-30  Mg     1.7     10-01    TPro  6.4  /  Alb  3.1<L>  /  TBili  0.5  /  DBili  x   /  AST  14  /  ALT  6   /  AlkPhos  71  10-01    LIVER FUNCTIONS - ( 01 Oct 2018 00:31 )  Alb: 3.1 g/dL / Pro: 6.4 g/dL / ALK PHOS: 71 u/L / ALT: 6 u/L / AST: 14 u/L / GGT: x                                     RADIOLOGY & ADDITIONAL TESTS  Imaging Personally Reviewed:  X YES  [ ] NO    Consultant(s) Notes Reviewed:  X YES  [ ] NO        REVIEW OF SYSTEMS:    RESPIRATORY: No cough, wheezing, chills or hemoptysis; No shortness of breath  CARDIOVASCULAR: No chest pain, palpitations, dizziness, or leg swelling  GASTROINTESTINAL: No abdominal or epigastric pain. No nausea, vomiting, or hematemesis  GENITOURINARY: No dysuria, frequency, hematuria, or incontinence  NEUROLOGICAL: No headaches, memory loss, loss of strength, numbness, or tremors      PHYSICAL EXAM:    GENERAL: NAD, well-groomed, well-developed  NERVOUS SYSTEM:  Alert & Oriented. No acute focal motor deficits  CHEST/LUNG: Clear to percussion bilaterally; No rales, rhonchi, wheezing, or rubs  HEART: Regular rate and rhythm; No murmurs, rubs, or gallops, S1 & S2 intact  ABDOMEN: Soft, Nontender, Nondistended; Bowel sounds present  EXTREMITIES:  2+ Peripheral Pulses, No clubbing, cyanosis, or edema      Assessment: This is a 89 year old male with PMH of Chronic kidney disease (CKD) stage IV (severe), and BPH s/p prostatectomy admitted with BRBPR. Notified by RN patient had one large of bloody diarrhea with BP of 68/42 and altered mental status at which time rapid response was called.  Patient back to baseline and VSS.       Plan:  -NS bolus x2  - Transfuse 2 units of PRBC  -Protonix drip  - follow up labs post transfusion  - D/w MAR and agree with the plan  - will continue to follow up

## 2018-10-01 NOTE — PROGRESS NOTE ADULT - SUBJECTIVE AND OBJECTIVE BOX
Pt is seen and examined  pt is  lying in bed, comfortable  Yesterdays events noted, pt had bloody BM assoc w hypotension (BP 68/42)and AMS  transfused PRBCs       PAST MEDICAL & SURGICAL HISTORY:  Chronic kidney disease (CKD), stage IV (severe)  Benign prostatic hypertrophy  S/P cataract surgery, left: 3 yrs ago      ROS:  Negative except for: weakness, AMS    MEDICATIONS  (STANDING):  chlorhexidine 4% Liquid 1 Application(s) Topical <User Schedule>  influenza   Vaccine 0.5 milliLiter(s) IntraMuscular once  pantoprazole Infusion 8 mG/Hr (10 mL/Hr) IV Continuous <Continuous>  tamsulosin 0.4 milliGRAM(s) Oral at bedtime    MEDICATIONS  (PRN):  oxyCODONE    5 mG/acetaminophen 325 mG 1 Tablet(s) Oral every 6 hours PRN hip pain      Allergies    No Known Allergies    Intolerances        Vital Signs Last 24 Hrs  T(C): 36.4 (01 Oct 2018 05:25), Max: 36.8 (30 Sep 2018 21:17)  T(F): 97.6 (01 Oct 2018 05:25), Max: 98.3 (30 Sep 2018 21:17)  HR: 85 (01 Oct 2018 05:25) (72 - 85)  BP: 158/100 (01 Oct 2018 05:25) (139/76 - 165/95)  BP(mean): --  RR: 18 (01 Oct 2018 05:25) (18 - 18)  SpO2: 100% (01 Oct 2018 05:25) (88% - 100%)    PHYSICAL EXAM     General: adult in NAD, Thin, Looks Chronically ill   HEENT: clear oropharynx, anicteric sclera, pink conjunctiva  Neck: supple  CV: normal S1/S2 with no murmur rubs or gallops  Lungs: positive air movement b/l ant lungs,clear to auscultation, no wheezes, no rales  Abdomen: soft non-tender non-distended, no hepatosplenomegaly  Ext: no clubbing cyanosis or edema         LABS:                          7.2    8.34  )-----------( 184      ( 01 Oct 2018 00:30 )             23.0     Serial CBC's  10-01 @ 00:30  Hct-23.0 / Hgb-7.2 / Plat-184 / RBC-2.59 / WBC-8.34          Serial CBC's  09-30 @ 11:10  Hct-24.5 / Hgb-7.8 / Plat-183 / RBC-2.78 / WBC-4.79            10-01    141  |  104  |  24<H>  ----------------------------<  123<H>  3.5   |  24  |  1.36<H>    Ca    8.2<L>      01 Oct 2018 00:31  Phos  2.7     09-30  Mg     1.7     10-01    TPro  6.4  /  Alb  3.1<L>  /  TBili  0.5  /  DBili  x   /  AST  14  /  ALT  6   /  AlkPhos  71  10-01      PT/INR - ( 01 Oct 2018 00:31 )   PT: 11.5 SEC;   INR: 1.00          PTT - ( 29 Sep 2018 12:47 )  PTT:38.6 SEC    WBC Count: 8.34 K/uL (10-01 @ 00:30)  Hemoglobin: 7.2 g/dL (10-01 @ 00:30)            RADIOLOGY & ADDITIONAL STUDIES:

## 2018-10-01 NOTE — PROGRESS NOTE ADULT - SUBJECTIVE AND OBJECTIVE BOX
PASCUAL OLSEN:[id number],   89yMale followed for:  No Known Allergies    PAST MEDICAL & SURGICAL HISTORY:  Chronic kidney disease (CKD), stage IV (severe)  Benign prostatic hypertrophy  S/P cataract surgery, left: 3 yrs ago    FAMILY HISTORY:  No pertinent family history in first degree relatives    MEDICATIONS  (STANDING):  chlorhexidine 4% Liquid 1 Application(s) Topical <User Schedule>  influenza   Vaccine 0.5 milliLiter(s) IntraMuscular once  pantoprazole Infusion 8 mG/Hr (10 mL/Hr) IV Continuous <Continuous>  tamsulosin 0.4 milliGRAM(s) Oral at bedtime    MEDICATIONS  (PRN):  oxyCODONE    5 mG/acetaminophen 325 mG 1 Tablet(s) Oral every 6 hours PRN hip pain      Vital Signs Last 24 Hrs  T(C): 36.4 (01 Oct 2018 05:25), Max: 36.8 (30 Sep 2018 21:17)  T(F): 97.6 (01 Oct 2018 05:25), Max: 98.3 (30 Sep 2018 21:17)  HR: 85 (01 Oct 2018 05:25) (72 - 85)  BP: 158/100 (01 Oct 2018 05:25) (139/76 - 165/95)  BP(mean): --  RR: 18 (01 Oct 2018 05:25) (18 - 18)  SpO2: 100% (01 Oct 2018 05:25) (88% - 100%)  nc/at  s1s2  cta  soft, nt, nd no guarding or rebound  no c/c/e    CBC Full  -  ( 01 Oct 2018 06:40 )  WBC Count : 8.29 K/uL  Hemoglobin : 9.6 g/dL  Hematocrit : 29.7 %  Platelet Count - Automated : 170 K/uL  Mean Cell Volume : 86.6 fL  Mean Cell Hemoglobin : 28.0 pg  Mean Cell Hemoglobin Concentration : 32.3 %  Auto Neutrophil # : x  Auto Lymphocyte # : x  Auto Monocyte # : x  Auto Eosinophil # : x  Auto Basophil # : x  Auto Neutrophil % : x  Auto Lymphocyte % : x  Auto Monocyte % : x  Auto Eosinophil % : x  Auto Basophil % : x    10-01    141  |  104  |  24<H>  ----------------------------<  123<H>  3.5   |  24  |  1.36<H>    Ca    8.2<L>      01 Oct 2018 00:31  Phos  2.7     09-30  Mg     1.7     10-01    TPro  6.8  /  Alb  x   /  TBili  x   /  DBili  x   /  AST  x   /  ALT  x   /  AlkPhos  x   10-01    PT/INR - ( 01 Oct 2018 00:31 )   PT: 11.5 SEC;   INR: 1.00          PTT - ( 29 Sep 2018 12:47 )  PTT:38.6 SEC

## 2018-10-01 NOTE — PROGRESS NOTE ADULT - ASSESSMENT
no gib.  await offical ct, prelim negative, would transuse prior to d/c as age/ comorbidies put at risk for mi if bleeds agian op.  colonosocoyp, informed refusal.

## 2018-10-01 NOTE — PROGRESS NOTE ADULT - SUBJECTIVE AND OBJECTIVE BOX
Patient seen and examined at bedside  gi bleed s/p prbc transfusion  Case discussed with medical team    HPI:  89 y/o pleasant Male w/ a pmh significant for CKD4 and BPH s/p prostatectomy presenting to the ED w/ a chief complaint of BRBPR. Pt was in his usual state of health up until yesterday evening when his aid noticed some bright red blood in his diaper. He says this has never happened to him before. He says the stool was 'mostly brown w/ small red clots'. Pt had two other bowel movements after this episode that was w/o blood/clots. Of note, pt says he fell in March 2018, after which he was taking Ibuprofen (unknown dosage) two times per day for R hip pain. He had a colonoscopy done ~10-15 years ago that was reportedly normal per family at bedside. He has approximately 2 BM's per day and has no hx of constipation or painful bowel movements. He denies any new medications, n/v, hematemesis, hemoptysis, CP, palpitations SOB, abdominal pain, melena or hemorrhoids.      In the ED, pt afebrile, /85, HR 82, RR 15 (100% RA).Labs notable for Hb 8.7 (MCV 87.7) BUN/Cr 16. FOBT positive. Pt bolused 1L NS and Protonix 80mg IVP x1. (29 Sep 2018 15:55)      PAST MEDICAL & SURGICAL HISTORY:  Chronic kidney disease (CKD), stage IV (severe)  Benign prostatic hypertrophy  S/P cataract surgery, left: 3 yrs ago      No Known Allergies       MEDICATIONS  (STANDING):  chlorhexidine 4% Liquid 1 Application(s) Topical <User Schedule>  influenza   Vaccine 0.5 milliLiter(s) IntraMuscular once  pantoprazole Infusion 8 mG/Hr (10 mL/Hr) IV Continuous <Continuous>  tamsulosin 0.4 milliGRAM(s) Oral at bedtime    MEDICATIONS  (PRN):  oxyCODONE    5 mG/acetaminophen 325 mG 1 Tablet(s) Oral every 6 hours PRN hip pain      REVIEW OF SYSTEMS:  CONSTITUTIONAL: (+) malaise.   EYES: No acute change in vision   ENT:  No tinnitus  NECK: No stiffness  RESPIRATORY: No hemoptysis  CARDIOVASCULAR: No chest pain, palpitations, syncope  GASTROINTESTINAL: No hematemesis, diarrhea, melena, or hematochezia.  GENITOURINARY: No hematuria  NEUROLOGICAL: No headaches  LYMPH Nodes: No enlarged glands  ENDOCRINE: No heat or cold intolerance	    T(C): 36.8 (10-01-18 @ 09:42), Max: 36.8 (09-30-18 @ 21:17)  HR: 90 (10-01-18 @ 09:42) (72 - 90)  BP: 155/- (10-01-18 @ 09:42) (139/76 - 165/95)  RR: 18 (10-01-18 @ 09:42) (18 - 18)  SpO2: 100% (10-01-18 @ 09:42) (88% - 100%)    PHYSICAL EXAMINATION:   Constitutional: WD, NAD  HEENT: NC, AT  Neck:  Supple  Respiratory:  Adequate airflow b/l. Not using accessory muscles of respiration.  Cardiovascular:  S1 & S2 intact, no R/G, 2+ radial pulses b/l  Gastrointestinal: Soft, NT, ND, normoactive b.s., no organomegaly/RT/rigidity  Extremities: WWP  Neurological:  Alert and awake.  No acute focal motor deficits. Crude sensation intact.     Labs and imaging reviewed    LABS:                        9.6    8.29  )-----------( 170      ( 01 Oct 2018 06:40 )             29.7     10-01    141  |  104  |  24<H>  ----------------------------<  123<H>  3.5   |  24  |  1.36<H>    Ca    8.2<L>      01 Oct 2018 00:31  Phos  2.7     09-30  Mg     1.7     10-01    TPro  6.8  /  Alb  x   /  TBili  x   /  DBili  x   /  AST  x   /  ALT  x   /  AlkPhos  x   10-01        PT/INR - ( 01 Oct 2018 00:31 )   PT: 11.5 SEC;   INR: 1.00          PTT - ( 29 Sep 2018 12:47 )  PTT:38.6 SEC    CAPILLARY BLOOD GLUCOSE      POCT Blood Glucose.: 118 mg/dL (01 Oct 2018 00:13)        LIVER FUNCTIONS - ( 01 Oct 2018 06:40 )  Alb: x     / Pro: 6.8 g/dL / ALK PHOS: x     / ALT: x     / AST: x     / GGT: x               RADIOLOGY & ADDITIONAL STUDIES:

## 2018-10-01 NOTE — CHART NOTE - NSCHARTNOTEFT_GEN_A_CORE
89 year old male with PMH of Chronic kidney disease (CKD) stage IV (severe), and BPH s/p prostatectomy admitted with BRBPR. Notified by RN patient had one large bloody BM with BP of 68/42 and altered mental status at which time rapid response was called. Started IVF bolus, protonix gtt. Patient returned back to baseline with vitals WNL.     Plan:  -NS bolus x2  - Transfuse 2 units of PRBC  -Protonix drip  - follow up labs post transfusion  - D/w MAR and agree with the plan  - will continue to follow up.

## 2018-10-01 NOTE — PROGRESS NOTE ADULT - ASSESSMENT
89 y/o M w/ a pmh significant for BPH s/p prostatectomy and CKD4 presents to the ED w/ a chief complaint of BRBPR.

## 2018-10-01 NOTE — PROGRESS NOTE ADULT - ATTENDING COMMENTS
Agree with above assessment and plan as outlined above.    - No need for further inpatient cardiac work up.    Jeff Galicia MD, FACC

## 2018-10-01 NOTE — CONSULT NOTE ADULT - SUBJECTIVE AND OBJECTIVE BOX
[facility]        Patient is a 89y old  Male who presents with a chief complaint of gib (01 Oct 2018 08:37)      HPI:  89 y/o pleasant Male w/ a pmh significant for CKD4 and BPH s/p prostatectomy presenting to the ED w/ a chief complaint of BRBPR. Pt was in his usual state of health up until yesterday evening when his aid noticed some bright red blood in his diaper. He says this has never happened to him before. He says the stool was 'mostly brown w/ small red clots'. Pt had two other bowel movements after this episode that was w/o blood/clots. Of note, pt says he fell in 2018, after which he was taking Ibuprofen (unknown dosage) two times per day for R hip pain. He had a colonoscopy done ~10-15 years ago that was reportedly normal per family at bedside. He has approximately 2 BM's per day and has no hx of constipation or painful bowel movements. He denies any new medications, n/v, hematemesis, hemoptysis, CP, palpitations SOB, abdominal pain, melena or hemorrhoids.    pt with no new complaints.        *****PAST MEDICAL / Surgical  HISTORY:  PAST MEDICAL & SURGICAL HISTORY:  Chronic kidney disease (CKD), stage IV (severe)  Benign prostatic hypertrophy  S/P cataract surgery, left: 3 yrs ago           *****FAMILY HISTORY:  FAMILY HISTORY:  No pertinent family history in first degree relatives           *****SOCIAL HISTORY:  Alcohol: None  Smoking: None         *****ALLERGIES:   Allergies    No Known Allergies    Intolerances             *****MEDICATIONS: current medication reviewed and documented.   MEDICATIONS  (STANDING):  chlorhexidine 4% Liquid 1 Application(s) Topical <User Schedule>  influenza   Vaccine 0.5 milliLiter(s) IntraMuscular once  pantoprazole Infusion 8 mG/Hr (10 mL/Hr) IV Continuous <Continuous>  tamsulosin 0.4 milliGRAM(s) Oral at bedtime    MEDICATIONS  (PRN):  oxyCODONE    5 mG/acetaminophen 325 mG 1 Tablet(s) Oral every 6 hours PRN hip pain           *****REVIEW OF SYSTEM:  GEN: no fever, no chills, no pain  RESP: no SOB, no cough, no sputum  CVS: no chest pain, no palpitations, no edema  GI: no abdominal pain, no nausea, no vomiting, no constipation, no diarrhea  : no dysurea, no frequency, no hematurea  Neuro: no headache, no dizziness  PSYCH: no anxiety, no depression  Derm : no itching, no rash         *****VITAL SIGNS:  T(C): 36.8 (10-01-18 @ 09:42), Max: 36.8 (18 @ 21:17)  HR: 90 (10-01-18 @ 09:42) (72 - 90)  BP: 155/- (10-01-18 @ 09:42) (139/76 - 165/95)  RR: 18 (10-01-18 @ 09:42) (18 - 18)  SpO2: 100% (10-01-18 @ 09:42) (88% - 100%)  Wt(kg): --     @ 07:01  -  10-01 @ 07:00  --------------------------------------------------------  IN: 708 mL / OUT: 800 mL / NET: -92 mL             *****PHYSICAL EXAM:  very pleasant gentleman.  Alert oriented x3  Attention comprehension are fair. Able to name, repeat, read without any difficulty.   Able to follow 3 step commands.     EOMI fundi not visualized,  VFF to confrontration  No facial asymmetry   Tongue is midline   Palate elevates symmetrically   Moving all 4 ext symmetrically no pronator drift.  R leg with pain on mvt of the R hip.   Reflexes are symmetric throughout   sensation is grossly symmetric  Gait : not assessed.  B/L down going toes   	         *****LAB AND IMAGIN.6    8.29  )-----------( 170      ( 01 Oct 2018 06:40 )             29.7               10    141  |  104  |  24<H>  ----------------------------<  123<H>  3.5   |  24  |  1.36<H>    Ca    8.2<L>      01 Oct 2018 00:31  Phos  2.7       Mg     1.7     10-01    TPro  6.8  /  Alb  x   /  TBili  x   /  DBili  x   /  AST  x   /  ALT  x   /  AlkPhos  x   10    PT/INR - ( 01 Oct 2018 00:31 )   PT: 11.5 SEC;   INR: 1.00          PTT - ( 29 Sep 2018 12:47 )  PTT:38.6 SEC                          < from: CT Abdomen and Pelvis w/ Oral Cont (18 @ 17:18) >  bronchiectasis.     Limited evaluation of solid viscera and bowel on this noncontrast study.  LIVER: Within normal limits.  BILE DUCTS: Normal caliber.  GALLBLADDER: Within normal limits.  SPLEEN: Within normal limits.  PANCREAS: Within normal limits.  ADRENALS: Within normal limits.  KIDNEYS/URETERS: Within normal limits.    BLADDER: Trabeculated appearing urinary bladder with multiple bladder   diverticulum again noted.  REPRODUCTIVE ORGANS: The prostate is markedly enlarged.    BOWEL: No mass. No bowel obstruction or edema. Colonic diverticulosis   without diverticulitis. Appendix is normal.  PERITONEUM: No ascites or pneumoperitoneum.  VESSELS:  Distal descending thoracic aorta measures 4.0 cm, unchanged.   Ectatic right common iliac artery measures 2.0 cm, unchanged. Ectatic   left common iliac artery measures 1.7 cm, unchanged. Atherosclerotic   changes.  RETROPERITONEUM: No lymphadenopathy.    ABDOMINAL WALL: Left inguinal hernia contains nonobstructed small bowel.  BONES: Severe L4 vertebral body compression deformity, new since 2015   but otherwise age-indeterminate.    IMPRESSION:     No mass or lymphadenopathy.    Colonic diverticulosis.    Trabeculated urinary bladder with multiple diverticula and an enlarged   prostate gland again noted.    Severe L4 vertebral body compression deformity, new since 2015 but     < end of copied text >    [All pertinent recent Imaging reports reviewed]         *****A S S E S S M E N T   A N D   P L A N :      89 y/o M w/ a pmh significant for BPH s/p prostatectomy and CKD4 presents to the ED w/ a chief complaint of BRBPR.    Problem/Plan - 1:  ·  Problem: Bright red blood per rectum.  Plan: downtrending h/h   -Trend H/H  -Transfuse prbc if hgb < 7.5 or additional active gi bleed   monitor vitals and clinical status closely.     Problem/Plan - 2: l 4 compression fracture, likely old,   no limitation of mvt.   can consider tlso brace if there is pain with ambulation.      Will follow with you.  Thank you,  Ava Schreiber MD  Diplomate of the American Board of Neurology and Psychiatry.  Diplomate of the American Board of Vascular Neurology.   [facility] ([facility]   Ph: [phone]    Differential diagnosis and plan of care discussed with patient after the evaluation.   Advanced care planning options discussed.   Pain assessed and judicious use of narcotics when appropriate was discussed.  Importance of Fall prevention discussed.  Counseling on Smoking and Alcohol cessation was offered when appropriate.  Counseling on Diet, exercise, and medication compliance was done.     80 minutes spent on the total encounter;  more than 50 % of the visit was spent on counseling  and or coordinating care by the attending physician.    Thank you for allowing me to participate in the care of this ara patient. Please do not hesitate to call me if you have any questions.     This and subsequent notes were partially created using voice recognition software and will  inherently be subject to errors including those of syntax and sound alike substitutions which may escape proofreading. In such instances original meaning may be extrapolated by contextual derivation.

## 2018-10-01 NOTE — PROGRESS NOTE ADULT - ASSESSMENT
89 y/o pleasant Male w/ a pmh significant for CKD3 and BPH s/p prostatectomy presenting to the ED w/ a chief complaint of BRBPR. Renal following for PHU, CKD      PHU on CKD stage III. b/l cr apperas to be around 1.4mg/dl  Age and HTN nephropathy likely underlying cause  cr improving  keep off nephrotoxins  stabilize GIB  trend bmp daily.    Anemia.  likely 2/2 blood loss/GIB. unlikely 2/2 CKD  Tsat 31% ferritin 149- not low  PRBC prn  f/u GI. on PPI drip    labs, chart reviewed

## 2018-10-01 NOTE — PROGRESS NOTE ADULT - ASSESSMENT
1. Normocytic Anemia sec to acute blood loss from rectal bleed    -- s/p 2 u PRBCs yesterday, f/u CBC  -- Keep Hgb > 8 in light of active bleed  -- await final CT A/P report  -- Await Iron Studies, B12/Folate, Hemolysis labs, SPEP/RAUL, Epo level  -- Anemia of CKD less likely a major factor  -- Hold off on Procrit in light of Active Bleed  -- GI eval re poss scope      Rosa Gloria MD  [phone]

## 2018-10-01 NOTE — PROGRESS NOTE ADULT - SUBJECTIVE AND OBJECTIVE BOX
[facility] - Crystal / Corrie S /Kylah/ LARON Villegas/ LARON Beard/ Shiva Mullen / MAGUI Njeranu  ---------------------------------------------------------------------------------------------------------------    Patient seen and examined bedside    Subjective and Objective: No new complaints today. reports last blood per rectum last night  denies urinary sxs/V/D    Allergies: No Known Allergies    Hospital Medications:   MEDICATIONS  (STANDING):  chlorhexidine 4% Liquid 1 Application(s) Topical <User Schedule>  influenza   Vaccine 0.5 milliLiter(s) IntraMuscular once  pantoprazole Infusion 8 mG/Hr (10 mL/Hr) IV Continuous <Continuous>  tamsulosin 0.4 milliGRAM(s) Oral at bedtime      VITALS:  T(F): 98.2 (10-01-18 @ 09:42), Max: 98.3 (09-30-18 @ 21:17)  HR: 90 (10-01-18 @ 09:42)  BP: 155/- (10-01-18 @ 09:42)  RR: 18 (10-01-18 @ 09:42)  SpO2: 100% (10-01-18 @ 09:42)  Wt(kg): --    09-30 @ 07:01  -  10-01 @ 07:00  --------------------------------------------------------  IN: 708 mL / OUT: 800 mL / NET: -92 mL      Height (cm): 175.26 (10-01 @ 05:25)  Weight (kg): 57 (10-01 @ 05:25)  BMI (kg/m2): 18.6 (10-01 @ 05:25)  BSA (m2): 1.7 (10-01 @ 05:25)    PHYSICAL EXAM:  Constitutional: NAD  HEENT: anicteric sclera, oropharynx clear  Neck: No JVD  Respiratory: CTAB, no wheezes, rales or rhonchi  Cardiovascular: S1, S2, RRR  Gastrointestinal: BS+, soft, NT/ND  Extremities: No cyanosis or clubbing. No peripheral edema  Neurological: A/O x 3, no focal deficits  Psychiatric: Normal mood, normal affect  : No CVA tenderness. No flores.   Skin: No rashes      LABS:  10-01    141  |  104  |  24<H>  ----------------------------<  123<H>  3.5   |  24  |  1.36<H>    Ca    8.2<L>      01 Oct 2018 00:31  Phos  2.7     09-30  Mg     1.7     10-01    TPro  6.8  /  Alb      /  TBili      /  DBili      /  AST      /  ALT      /  AlkPhos      10-01    Creatinine Trend: 1.36 <--, 1.42 <--, 1.63 <--                        9.6    8.29  )-----------( 170      ( 01 Oct 2018 06:40 )             29.7     Urine Studies:        RADIOLOGY & ADDITIONAL STUDIES:

## 2018-10-02 LAB
BASOPHILS # BLD AUTO: 0.03 K/UL — SIGNIFICANT CHANGE UP (ref 0–0.2)
BASOPHILS NFR BLD AUTO: 0.4 % — SIGNIFICANT CHANGE UP (ref 0–2)
BUN SERPL-MCNC: 23 MG/DL — SIGNIFICANT CHANGE UP (ref 7–23)
CALCIUM SERPL-MCNC: 8.8 MG/DL — SIGNIFICANT CHANGE UP (ref 8.4–10.5)
CHLORIDE SERPL-SCNC: 103 MMOL/L — SIGNIFICANT CHANGE UP (ref 98–107)
CO2 SERPL-SCNC: 23 MMOL/L — SIGNIFICANT CHANGE UP (ref 22–31)
CREAT SERPL-MCNC: 1.31 MG/DL — HIGH (ref 0.5–1.3)
EOSINOPHIL # BLD AUTO: 0.63 K/UL — HIGH (ref 0–0.5)
EOSINOPHIL NFR BLD AUTO: 8.8 % — HIGH (ref 0–6)
EPO SERPL-MCNC: 29.2 MIU/ML — HIGH (ref 2.6–18.5)
GAS PNL BLDMV: SIGNIFICANT CHANGE UP
GLUCOSE SERPL-MCNC: 79 MG/DL — SIGNIFICANT CHANGE UP (ref 70–99)
HCT VFR BLD CALC: 23.7 % — LOW (ref 39–50)
HCT VFR BLD CALC: 28.8 % — LOW (ref 39–50)
HCT VFR BLD CALC: 30.4 % — LOW (ref 39–50)
HGB BLD-MCNC: 7.7 G/DL — LOW (ref 13–17)
HGB BLD-MCNC: 9.3 G/DL — LOW (ref 13–17)
HGB BLD-MCNC: 9.8 G/DL — LOW (ref 13–17)
IMM GRANULOCYTES # BLD AUTO: 0.02 # — SIGNIFICANT CHANGE UP
IMM GRANULOCYTES NFR BLD AUTO: 0.3 % — SIGNIFICANT CHANGE UP (ref 0–1.5)
LYMPHOCYTES # BLD AUTO: 1.49 K/UL — SIGNIFICANT CHANGE UP (ref 1–3.3)
LYMPHOCYTES # BLD AUTO: 20.9 % — SIGNIFICANT CHANGE UP (ref 13–44)
MAGNESIUM SERPL-MCNC: 1.8 MG/DL — SIGNIFICANT CHANGE UP (ref 1.6–2.6)
MCHC RBC-ENTMCNC: 27.7 PG — SIGNIFICANT CHANGE UP (ref 27–34)
MCHC RBC-ENTMCNC: 27.7 PG — SIGNIFICANT CHANGE UP (ref 27–34)
MCHC RBC-ENTMCNC: 28.4 PG — SIGNIFICANT CHANGE UP (ref 27–34)
MCHC RBC-ENTMCNC: 32.2 % — SIGNIFICANT CHANGE UP (ref 32–36)
MCHC RBC-ENTMCNC: 32.3 % — SIGNIFICANT CHANGE UP (ref 32–36)
MCHC RBC-ENTMCNC: 32.5 % — SIGNIFICANT CHANGE UP (ref 32–36)
MCV RBC AUTO: 85.7 FL — SIGNIFICANT CHANGE UP (ref 80–100)
MCV RBC AUTO: 85.9 FL — SIGNIFICANT CHANGE UP (ref 80–100)
MCV RBC AUTO: 87.5 FL — SIGNIFICANT CHANGE UP (ref 80–100)
MONOCYTES # BLD AUTO: 0.58 K/UL — SIGNIFICANT CHANGE UP (ref 0–0.9)
MONOCYTES NFR BLD AUTO: 8.1 % — SIGNIFICANT CHANGE UP (ref 2–14)
NEUTROPHILS # BLD AUTO: 4.37 K/UL — SIGNIFICANT CHANGE UP (ref 1.8–7.4)
NEUTROPHILS NFR BLD AUTO: 61.5 % — SIGNIFICANT CHANGE UP (ref 43–77)
NRBC # FLD: 0 — SIGNIFICANT CHANGE UP
PHOSPHATE SERPL-MCNC: 2.7 MG/DL — SIGNIFICANT CHANGE UP (ref 2.5–4.5)
PLATELET # BLD AUTO: 152 K/UL — SIGNIFICANT CHANGE UP (ref 150–400)
PLATELET # BLD AUTO: 154 K/UL — SIGNIFICANT CHANGE UP (ref 150–400)
PLATELET # BLD AUTO: 157 K/UL — SIGNIFICANT CHANGE UP (ref 150–400)
PMV BLD: 9.2 FL — SIGNIFICANT CHANGE UP (ref 7–13)
PMV BLD: 9.5 FL — SIGNIFICANT CHANGE UP (ref 7–13)
PMV BLD: 9.8 FL — SIGNIFICANT CHANGE UP (ref 7–13)
POTASSIUM SERPL-MCNC: 4.1 MMOL/L — SIGNIFICANT CHANGE UP (ref 3.5–5.3)
POTASSIUM SERPL-SCNC: 4.1 MMOL/L — SIGNIFICANT CHANGE UP (ref 3.5–5.3)
RBC # BLD: 2.71 M/UL — LOW (ref 4.2–5.8)
RBC # BLD: 3.36 M/UL — LOW (ref 4.2–5.8)
RBC # BLD: 3.54 M/UL — LOW (ref 4.2–5.8)
RBC # FLD: 16 % — HIGH (ref 10.3–14.5)
RBC # FLD: 16 % — HIGH (ref 10.3–14.5)
RBC # FLD: 16.1 % — HIGH (ref 10.3–14.5)
SODIUM SERPL-SCNC: 140 MMOL/L — SIGNIFICANT CHANGE UP (ref 135–145)
WBC # BLD: 7.06 K/UL — SIGNIFICANT CHANGE UP (ref 3.8–10.5)
WBC # BLD: 7.12 K/UL — SIGNIFICANT CHANGE UP (ref 3.8–10.5)
WBC # BLD: 8.45 K/UL — SIGNIFICANT CHANGE UP (ref 3.8–10.5)
WBC # FLD AUTO: 7.06 K/UL — SIGNIFICANT CHANGE UP (ref 3.8–10.5)
WBC # FLD AUTO: 7.12 K/UL — SIGNIFICANT CHANGE UP (ref 3.8–10.5)
WBC # FLD AUTO: 8.45 K/UL — SIGNIFICANT CHANGE UP (ref 3.8–10.5)

## 2018-10-02 RX ORDER — PANTOPRAZOLE SODIUM 20 MG/1
8 TABLET, DELAYED RELEASE ORAL
Qty: 80 | Refills: 0 | Status: DISCONTINUED | OUTPATIENT
Start: 2018-10-02 | End: 2018-10-05

## 2018-10-02 RX ORDER — SODIUM CHLORIDE 9 MG/ML
1000 INJECTION INTRAMUSCULAR; INTRAVENOUS; SUBCUTANEOUS
Qty: 0 | Refills: 0 | Status: DISCONTINUED | OUTPATIENT
Start: 2018-10-02 | End: 2018-10-03

## 2018-10-02 RX ORDER — LACTOBACILLUS ACIDOPHILUS 100MM CELL
1 CAPSULE ORAL DAILY
Qty: 0 | Refills: 0 | Status: DISCONTINUED | OUTPATIENT
Start: 2018-10-02 | End: 2018-10-07

## 2018-10-02 RX ORDER — PANTOPRAZOLE SODIUM 20 MG/1
40 TABLET, DELAYED RELEASE ORAL
Qty: 0 | Refills: 0 | Status: DISCONTINUED | OUTPATIENT
Start: 2018-10-02 | End: 2018-10-02

## 2018-10-02 RX ORDER — PANTOPRAZOLE SODIUM 20 MG/1
40 TABLET, DELAYED RELEASE ORAL ONCE
Qty: 0 | Refills: 0 | Status: COMPLETED | OUTPATIENT
Start: 2018-10-02 | End: 2018-10-02

## 2018-10-02 RX ADMIN — Medication 1 TABLET(S): at 18:57

## 2018-10-02 RX ADMIN — TAMSULOSIN HYDROCHLORIDE 0.4 MILLIGRAM(S): 0.4 CAPSULE ORAL at 21:53

## 2018-10-02 RX ADMIN — PANTOPRAZOLE SODIUM 40 MILLIGRAM(S): 20 TABLET, DELAYED RELEASE ORAL at 11:55

## 2018-10-02 RX ADMIN — PANTOPRAZOLE SODIUM 10 MG/HR: 20 TABLET, DELAYED RELEASE ORAL at 15:35

## 2018-10-02 RX ADMIN — CHLORHEXIDINE GLUCONATE 1 APPLICATION(S): 213 SOLUTION TOPICAL at 05:07

## 2018-10-02 NOTE — PROGRESS NOTE ADULT - SUBJECTIVE AND OBJECTIVE BOX
Pt is seen and examined  No new complaints  Transfused 2 u PRBCs        PAST MEDICAL & SURGICAL HISTORY:  Chronic kidney disease (CKD), stage IV (severe)  Benign prostatic hypertrophy  S/P cataract surgery, left: 3 yrs ago      ROS:  Negative except for: fatigue    MEDICATIONS  (STANDING):  chlorhexidine 4% Liquid 1 Application(s) Topical <User Schedule>  influenza   Vaccine 0.5 milliLiter(s) IntraMuscular once  pantoprazole Infusion 8 mG/Hr (10 mL/Hr) IV Continuous <Continuous>  tamsulosin 0.4 milliGRAM(s) Oral at bedtime    MEDICATIONS  (PRN):  oxyCODONE    5 mG/acetaminophen 325 mG 1 Tablet(s) Oral every 6 hours PRN hip pain      Allergies    No Known Allergies    Intolerances        Vital Signs Last 24 Hrs  T(C): 36.7 (02 Oct 2018 05:04), Max: 36.8 (01 Oct 2018 09:42)  T(F): 98.1 (02 Oct 2018 05:04), Max: 98.2 (01 Oct 2018 09:42)  HR: 88 (02 Oct 2018 05:04) (77 - 743)  BP: 159/92 (02 Oct 2018 05:04) (148/78 - 169/102)  BP(mean): 94 (01 Oct 2018 09:42) (94 - 94)  RR: 18 (02 Oct 2018 05:04) (14 - 20)  SpO2: 98% (02 Oct 2018 05:04) (96% - 100%)    PHYSICAL EXAM     General: adult in NAD, Thin, Looks Chronically ill   HEENT: clear oropharynx, anicteric sclera, pink conjunctiva  Neck: supple  CV: normal S1/S2 with no murmur rubs or gallops  Lungs: positive air movement b/l ant lungs,clear to auscultation, no wheezes, no rales  Abdomen: soft non-tender non-distended, no hepatosplenomegaly  Ext: no clubbing cyanosis or edema    LABS:                          9.3    7.06  )-----------( 152      ( 02 Oct 2018 00:44 )             28.8     Serial CBC's  10-02 @ 00:44  Hct-28.8 / Hgb-9.3 / Plat-152 / RBC-3.36 / WBC-7.06          Serial CBC's  10-01 @ 16:50  Hct-28.9 / Hgb-9.3 / Plat-159 / RBC-3.32 / WBC-6.77            10-01    141  |  104  |  24<H>  ----------------------------<  123<H>  3.5   |  24  |  1.36<H>    Ca    8.2<L>      01 Oct 2018 00:31  Mg     1.7     10-01    TPro  6.8  /  Alb  x   /  TBili  x   /  DBili  x   /  AST  x   /  ALT  x   /  AlkPhos  x   10-01      PT/INR - ( 01 Oct 2018 00:31 )   PT: 11.5 SEC;   INR: 1.00              WBC Count: 7.06 K/uL (10-02 @ 00:44)  Hemoglobin: 9.3 g/dL (10-02 @ 00:44)            RADIOLOGY & ADDITIONAL STUDIES:

## 2018-10-02 NOTE — PROGRESS NOTE ADULT - ASSESSMENT
89 y/o pleasant Male w/ a pmh significant for CKD3 and BPH s/p prostatectomy presenting to the ED w/ a chief complaint of BRBPR. Renal following for PHU, CKD      PHU on CKD stage III. b/l cr apperas to be around 1.4mg/dl  Age and HTN nephropathy likely underlying cause  cr improving  keep off nephrotoxins  stabilize GIB  trend bmp daily.    Anemia.  likely 2/2 blood loss/GIB. unlikely 2/2 CKD  Tsat 31% ferritin 149- not low  PRBC prn  f/u GI. on PPI drip    labs, chart reviewed

## 2018-10-02 NOTE — PROGRESS NOTE ADULT - SUBJECTIVE AND OBJECTIVE BOX
Patient seen and examined at bedside  Case discussed with medical team    HPI:  89 y/o pleasant Male w/ a pmh significant for CKD4 and BPH s/p prostatectomy presenting to the ED w/ a chief complaint of BRBPR. Pt was in his usual state of health up until yesterday evening when his aid noticed some bright red blood in his diaper. He says this has never happened to him before. He says the stool was 'mostly brown w/ small red clots'. Pt had two other bowel movements after this episode that was w/o blood/clots. Of note, pt says he fell in March 2018, after which he was taking Ibuprofen (unknown dosage) two times per day for R hip pain. He had a colonoscopy done ~10-15 years ago that was reportedly normal per family at bedside. He has approximately 2 BM's per day and has no hx of constipation or painful bowel movements. He denies any new medications, n/v, hematemesis, hemoptysis, CP, palpitations SOB, abdominal pain, melena or hemorrhoids.      In the ED, pt afebrile, /85, HR 82, RR 15 (100% RA).Labs notable for Hb 8.7 (MCV 87.7) BUN/Cr 16. FOBT positive. Pt bolused 1L NS and Protonix 80mg IVP x1. (29 Sep 2018 15:55)      PAST MEDICAL & SURGICAL HISTORY:  Chronic kidney disease (CKD), stage IV (severe)  Benign prostatic hypertrophy  S/P cataract surgery, left: 3 yrs ago      No Known Allergies       MEDICATIONS  (STANDING):  chlorhexidine 4% Liquid 1 Application(s) Topical <User Schedule>  influenza   Vaccine 0.5 milliLiter(s) IntraMuscular once  pantoprazole Infusion 8 mG/Hr (10 mL/Hr) IV Continuous <Continuous>  tamsulosin 0.4 milliGRAM(s) Oral at bedtime    MEDICATIONS  (PRN):  oxyCODONE    5 mG/acetaminophen 325 mG 1 Tablet(s) Oral every 6 hours PRN hip pain      REVIEW OF SYSTEMS:  CONSTITUTIONAL: (+) malaise.   EYES: No acute change in vision   ENT:  No tinnitus  NECK: No stiffness  RESPIRATORY: No hemoptysis  CARDIOVASCULAR: No chest pain, palpitations, syncope  GASTROINTESTINAL: No hematemesis, diarrhea, melena, or hematochezia.  GENITOURINARY: No hematuria  NEUROLOGICAL: No headaches  LYMPH Nodes: No enlarged glands  ENDOCRINE: No heat or cold intolerance	    Vital Signs Last 24 Hrs  T(C): 36.6 (02 Oct 2018 09:50), Max: 36.7 (02 Oct 2018 01:17)  T(F): 97.9 (02 Oct 2018 09:50), Max: 98.1 (02 Oct 2018 01:17)  HR: 87 (02 Oct 2018 09:50) (77 - 743)  BP: 148/93 (02 Oct 2018 09:50) (148/78 - 169/102)  BP(mean): --  RR: 18 (02 Oct 2018 09:50) (14 - 20)  SpO2: 96% (02 Oct 2018 09:50) (96% - 100%)    PHYSICAL EXAMINATION:   Constitutional: WD, NAD  HEENT: NC, AT  Neck:  Supple  Respiratory:  Adequate airflow b/l. Not using accessory muscles of respiration.  Cardiovascular:  S1 & S2 intact, no R/G, 2+ radial pulses b/l  Gastrointestinal: Soft, NT, ND, normoactive b.s., no organomegaly/RT/rigidity  Extremities: WWP  Neurological:  Alert and awake.  No acute focal motor deficits. Crude sensation intact.     Labs and imaging reviewed

## 2018-10-02 NOTE — CHART NOTE - NSCHARTNOTEFT_GEN_A_CORE
9/2 1 episode of painless rectal bleed, pt denies dizziness, no N/V, abdomen soft, VSS, pt is on clears, PPI. Spoke with pt's HCP who called requesting an update. As  of now, conservative management as per Dr Norton and Dr Branham. Pt is updated with POC    ADSNP [id number]

## 2018-10-02 NOTE — PROGRESS NOTE ADULT - SUBJECTIVE AND OBJECTIVE BOX
[facility]        - Patient seen and examined.  - Today, patient is without complaints.         *****MEDICATIONS: Current medication reviewed and documented.    MEDICATIONS  (STANDING):  chlorhexidine 4% Liquid 1 Application(s) Topical <User Schedule>  influenza   Vaccine 0.5 milliLiter(s) IntraMuscular once  pantoprazole Infusion 8 mG/Hr (10 mL/Hr) IV Continuous <Continuous>  tamsulosin 0.4 milliGRAM(s) Oral at bedtime    MEDICATIONS  (PRN):  oxyCODONE    5 mG/acetaminophen 325 mG 1 Tablet(s) Oral every 6 hours PRN hip pain           ***** REVIEW OF SYSTEM:  GEN: no fever, no chills, no pain  RESP: no SOB, no cough, no sputum  CVS: no chest pain, no palpitations, no edema  GI: no abdominal pain, no nausea, no vomiting, no constipation, no diarrhea  : no dysurea, no frequency  NEURO: no headache, no diziness  PSYCH: no depression, not anxious  Derm : no itching, no rash         ***** VITAL SIGNS:  T(F): 97.2 (10-02-18 @ 14:10), Max: 98.1 (10-02-18 @ 01:17)  HR: 88 (10-02-18 @ 14:10) (77 - 743)  BP: 113/62 (10-02-18 @ 14:10) (113/62 - 169/102)  RR: 18 (10-02-18 @ 14:10) (14 - 20)  SpO2: 100% (10-02-18 @ 14:10) (96% - 100%)  Wt(kg): --  ,   I&O's Summary    01 Oct 2018 07:01  -  02 Oct 2018 07:00  --------------------------------------------------------  IN: 0 mL / OUT: 375 mL / NET: -375 mL             *****PHYSICAL EXAM:   very pleasant gentleman.  Alert oriented x3  Attention comprehension are fair. Able to name, repeat, read without any difficulty.   Able to follow 3 step commands.     EOMI fundi not visualized,  VFF to confrontration  No facial asymmetry   Tongue is midline   Palate elevates symmetrically   Moving all 4 ext symmetrically no pronator drift.  R leg with pain on mvt of the R hip.   Reflexes are symmetric throughout   sensation is grossly symmetric  Gait : not assessed.  B/L down going toes   	         *****LAB AND IMAGIN.8    7.12  )-----------( 157      ( 02 Oct 2018 06:44 )             30.4               10-02    140  |  103  |  23  ----------------------------<  79  4.1   |  23  |  1.31<H>    Ca    8.8      02 Oct 2018 06:44  Phos  2.7     10-  Mg     1.8     10-    TPro  6.8  /  Alb  x   /  TBili  x   /  DBili  x   /  AST  x   /  ALT  x   /  AlkPhos  x   10-    PT/INR - ( 01 Oct 2018 00:31 )   PT: 11.5 SEC;   INR: 1.00                               [All pertinent recent Imaging/Reports reviewed]           *****A S S E S S M E N T   A N D   P L A N :        89 y/o M w/ a pmh significant for BPH s/p prostatectomy and CKD4 presents to the ED w/ a chief complaint of BRBPR.    Problem/Plan - 1: gi bleed.   h/h stable.     Problem/Plan - 2: l 4 compression fracture, likely old,   no limitation of mvt  can consider tlso brace if there is pain with ambulation.        Thank you for allowing me to participate in the care of this patient. Please do not hesitate to call me if you have any  questions.        ________________  Ava Schreiber MD  [facility] ([facility]  [phone]     30 minutes spent on total encounter; more than 50 % of the visit was  spent counseling and or  coordinating care by the attending physician.   At the present time, [facility] does not  provide outpatient followup, best to call the your insurance to find a participating provider.  This was explained to you at the time of the visit. Alternatively, if your insurance allows it, you can follow up with a neurologist  Dr. Jed Gallagher([place]) [phone] or Dr. Abdiaziz Scott ( [place]) [phone]

## 2018-10-02 NOTE — PROGRESS NOTE ADULT - SUBJECTIVE AND OBJECTIVE BOX
Pt seen and examined at bedside  feels well.   denies any compls  no recurrence of rectal bleeding, though has not moved bowels in 2 days.  no abd pain, vomiting  on clear liquid diet  no dyspnea, though using nasal O2    Allergies:  No Known Allergies    Hospital Medications:   MEDICATIONS  (STANDING):  chlorhexidine 4% Liquid 1 Application(s) Topical <User Schedule>  influenza   Vaccine 0.5 milliLiter(s) IntraMuscular once  pantoprazole    Tablet 40 milliGRAM(s) Oral once  pantoprazole    Tablet 40 milliGRAM(s) Oral before breakfast  tamsulosin 0.4 milliGRAM(s) Oral at bedtime         VITALS:  T(F): 97.9 (10-02-18 @ 09:50), Max: 98.1 (10-02-18 @ 01:17)  HR: 87 (10-02-18 @ 09:50)  BP: 148/93 (10-02-18 @ 09:50)  RR: 18 (10-02-18 @ 09:50)  SpO2: 96% (10-02-18 @ 09:50)  Wt(kg): --    10-01 @ 07:01  -  10-02 @ 07:00  --------------------------------------------------------  IN: 0 mL / OUT: 375 mL / NET: -375 mL        PHYSICAL EXAM:  Constitutional: NAD. appears comfortable lying down  HEENT: anicteric sclera, oropharynx clear, MMM  Neck: No JVD  Respiratory: CTAB, no wheezes, rales or rhonchi  Cardiovascular: S1, S2, RRR  Gastrointestinal: BS+, soft, NT/ND  Extremities: No cyanosis or clubbing. No peripheral edema  Neurological: A/O x 3, no focal deficits  Psychiatric: Normal mood, normal affect  : No CVA tenderness. No flores.   Skin: No rashes       LABS:  10-02    140  |  103  |  23  ----------------------------<  79  4.1   |  23  |  1.31<H>    Ca    8.8      02 Oct 2018 06:44  Phos  2.7     10-02  Mg     1.8     10-02    TPro  6.8  /  Alb      /  TBili      /  DBili      /  AST      /  ALT      /  AlkPhos      10-01    Creatinine Trend: 1.31 <--, 1.36 <--, 1.42 <--, 1.63 <--                        9.8    7.12  )-----------( 157      ( 02 Oct 2018 06:44 )             30.4     Urine Studies:      RADIOLOGY & ADDITIONAL STUDIES:

## 2018-10-02 NOTE — PROGRESS NOTE ADULT - SUBJECTIVE AND OBJECTIVE BOX
SUBJECTIVE: No CP or SOB    MEDICATIONS  (STANDING):  chlorhexidine 4% Liquid 1 Application(s) Topical <User Schedule>  influenza   Vaccine 0.5 milliLiter(s) IntraMuscular once  pantoprazole Infusion 8 mG/Hr (10 mL/Hr) IV Continuous <Continuous>  tamsulosin 0.4 milliGRAM(s) Oral at bedtime    LABS:                        9.8    7.12  )-----------( 157      ( 02 Oct 2018 06:44 )             30.4     140  |  103  |  23  ----------------------------<  79  4.1   |  23  |  1.31<H>    Ca    8.8      02 Oct 2018 06:44  Phos  2.7     10-02  Mg     1.8     10-02    TPro  6.8  /  Alb  x   /  TBili  x   /  DBili  x   /  AST  x   /  ALT  x   /  AlkPhos  x   10-01    Creatinine Trend: 1.31<--, 1.36<--, 1.42<--, 1.63<--     PHYSICAL EXAM  Vital Signs Last 24 Hrs  T(C): 36.2 (02 Oct 2018 14:10), Max: 36.7 (02 Oct 2018 01:17)  T(F): 97.2 (02 Oct 2018 14:10), Max: 98.1 (02 Oct 2018 01:17)  HR: 88 (02 Oct 2018 14:10) (77 - 743)  BP: 113/62 (02 Oct 2018 14:10) (113/62 - 169/102)  RR: 18 (02 Oct 2018 14:10) (14 - 20)  SpO2: 100% (02 Oct 2018 14:10) (96% - 100%)    Cardiovascular:  S1S2 RRR, No JVD  Respiratory: Lungs clear to auscultation, normal effort  Gastrointestinal: Abdomen soft, ND, NT, +BS  Skin: Warm, dry, intact. No rash.  Musculoskeletal: Normal ROM, normal strength  Ext: No C/C/E B/L LE    DIAGNOSTIC DATA    < from: Transthoracic Echocardiogram (06.11.18 @ 00:00) >  CONCLUSIONS:  1. Mitral annular calcification, otherwise normal mitral  valve. Minimal mitral regurgitation.  2. Aortic valve leaflet morphology not well visualized.  Mild-moderate aortic regurgitation.  3. Endocardium not well visualized; grossly normal left  ventricular systolic function.  4. Normal right ventricular size and function.  5. Estimated right ventricular systolic pressure equals 42  mm Hg, assuming right atrial pressure equals 10 mm Hg,  consistent with mild pulmonary hypertension.  ------------------------------------------------------------------------  Confirmed on  6/11/2018 - 16:34:30 by Nilo Diallo M.D.    < end of copied text >    < from: CT Abdomen and Pelvis w/ Oral Cont (09.30.18 @ 17:18) >  IMPRESSION:     No mass or lymphadenopathy.    Colonic diverticulosis.    Trabeculated urinary bladder with multiple diverticula and an enlarged   prostate gland again noted.    Severe L4 vertebral body compression deformity, new since 2/2/2015 but   otherwise age-indeterminate.    Unchanged 4.0 cm distal descending thoracic aortic aneurysm.     < end of copied text >      ASSESSMENT AND PLAN:    He is a pleasant 89 y/o male PMH bifascicular block who is now admitted with BRBPR/ suspected diverticular bleed per GI.  His last echo in June 2018 was normal. His RCRI score is 0-1 for mild CKD.    -given RCRI score of 0-1 he is low risk for low risk procedures (colonoscopy +/- EGD) and is otherwise optimized from a cardiac perspective if planned  -PRBCs per primary team  -no further inpatient cardiac workup needed as long as he doesn't have syncope

## 2018-10-02 NOTE — PROGRESS NOTE ADULT - ASSESSMENT
1. Normocytic Anemia sec to acute blood loss from rectal bleed    -- appropriate response to PRBCs  -- CT A/P w no masses or TRAVON but extensive diverticulosis  -- declined colonoscopy  -- Iron Studies normal (post transfusion)  -- No B12/Folate Deficiency  -- No Hemolysis  -- Keep Hgb > 8 in light of active bleed  -- await final CT A/P report  -- Await SPEP/RAUL, Epo level  -- Anemia of CKD less likely a major factor, Creatinine trending down  -- Hold off on Procrit in light of Active Bleed    Rosa Gloria MD  [phone]

## 2018-10-02 NOTE — PROGRESS NOTE ADULT - SUBJECTIVE AND OBJECTIVE BOX
PASCUAL OLSEN:[id number],   89yMale followed for:  No Known Allergies    PAST MEDICAL & SURGICAL HISTORY:  Chronic kidney disease (CKD), stage IV (severe)  Benign prostatic hypertrophy  S/P cataract surgery, left: 3 yrs ago    FAMILY HISTORY:  No pertinent family history in first degree relatives    MEDICATIONS  (STANDING):  chlorhexidine 4% Liquid 1 Application(s) Topical <User Schedule>  influenza   Vaccine 0.5 milliLiter(s) IntraMuscular once  pantoprazole Infusion 8 mG/Hr (10 mL/Hr) IV Continuous <Continuous>  tamsulosin 0.4 milliGRAM(s) Oral at bedtime    MEDICATIONS  (PRN):  oxyCODONE    5 mG/acetaminophen 325 mG 1 Tablet(s) Oral every 6 hours PRN hip pain      Vital Signs Last 24 Hrs  T(C): 36.7 (02 Oct 2018 05:04), Max: 36.8 (01 Oct 2018 09:42)  T(F): 98.1 (02 Oct 2018 05:04), Max: 98.2 (01 Oct 2018 09:42)  HR: 88 (02 Oct 2018 05:04) (77 - 743)  BP: 159/92 (02 Oct 2018 05:04) (148/78 - 169/102)  BP(mean): 94 (01 Oct 2018 09:42) (94 - 94)  RR: 18 (02 Oct 2018 05:04) (14 - 20)  SpO2: 98% (02 Oct 2018 05:04) (96% - 100%)  nc/at  s1s2  cta  soft, nt, nd no guarding or rebound  no c/c/e    CBC Full  -  ( 02 Oct 2018 06:44 )  WBC Count : 7.12 K/uL  Hemoglobin : 9.8 g/dL  Hematocrit : 30.4 %  Platelet Count - Automated : 157 K/uL  Mean Cell Volume : 85.9 fL  Mean Cell Hemoglobin : 27.7 pg  Mean Cell Hemoglobin Concentration : 32.2 %  Auto Neutrophil # : 4.37 K/uL  Auto Lymphocyte # : 1.49 K/uL  Auto Monocyte # : 0.58 K/uL  Auto Eosinophil # : 0.63 K/uL  Auto Basophil # : 0.03 K/uL  Auto Neutrophil % : 61.5 %  Auto Lymphocyte % : 20.9 %  Auto Monocyte % : 8.1 %  Auto Eosinophil % : 8.8 %  Auto Basophil % : 0.4 %    10-02    140  |  103  |  23  ----------------------------<  79  4.1   |  23  |  1.31<H>    Ca    8.8      02 Oct 2018 06:44  Phos  2.7     10-02  Mg     1.8     10-02    TPro  6.8  /  Alb  x   /  TBili  x   /  DBili  x   /  AST  x   /  ALT  x   /  AlkPhos  x   10-01    PT/INR - ( 01 Oct 2018 00:31 )   PT: 11.5 SEC;   INR: 1.00

## 2018-10-02 NOTE — PROGRESS NOTE ADULT - ATTENDING COMMENTS
Agree with above assessment and plan as outlined above.    - No need for further inpatient cardiac work up.    Jeff Galicia MD, FACC Agree with above.   No further inpatient cardiac workup needed at this time.     Rashmi Morrison MD

## 2018-10-03 LAB
BLD GP AB SCN SERPL QL: NEGATIVE — SIGNIFICANT CHANGE UP
BUN SERPL-MCNC: 32 MG/DL — HIGH (ref 7–23)
CALCIUM SERPL-MCNC: 8 MG/DL — LOW (ref 8.4–10.5)
CHLORIDE SERPL-SCNC: 105 MMOL/L — SIGNIFICANT CHANGE UP (ref 98–107)
CO2 SERPL-SCNC: 22 MMOL/L — SIGNIFICANT CHANGE UP (ref 22–31)
CREAT SERPL-MCNC: 1.58 MG/DL — HIGH (ref 0.5–1.3)
GAS PNL BLDMV: SIGNIFICANT CHANGE UP
GLUCOSE SERPL-MCNC: 87 MG/DL — SIGNIFICANT CHANGE UP (ref 70–99)
HCT VFR BLD CALC: 24 % — LOW (ref 39–50)
HCT VFR BLD CALC: 24.2 % — LOW (ref 39–50)
HGB BLD-MCNC: 7.8 G/DL — LOW (ref 13–17)
HGB BLD-MCNC: 7.9 G/DL — LOW (ref 13–17)
MCHC RBC-ENTMCNC: 28.1 PG — SIGNIFICANT CHANGE UP (ref 27–34)
MCHC RBC-ENTMCNC: 28.5 PG — SIGNIFICANT CHANGE UP (ref 27–34)
MCHC RBC-ENTMCNC: 32.2 % — SIGNIFICANT CHANGE UP (ref 32–36)
MCHC RBC-ENTMCNC: 32.9 % — SIGNIFICANT CHANGE UP (ref 32–36)
MCV RBC AUTO: 85.4 FL — SIGNIFICANT CHANGE UP (ref 80–100)
MCV RBC AUTO: 88.3 FL — SIGNIFICANT CHANGE UP (ref 80–100)
NRBC # FLD: 0 — SIGNIFICANT CHANGE UP
NRBC # FLD: 0 — SIGNIFICANT CHANGE UP
PLATELET # BLD AUTO: 138 K/UL — LOW (ref 150–400)
PLATELET # BLD AUTO: 139 K/UL — LOW (ref 150–400)
PMV BLD: 9.6 FL — SIGNIFICANT CHANGE UP (ref 7–13)
PMV BLD: 9.7 FL — SIGNIFICANT CHANGE UP (ref 7–13)
POTASSIUM SERPL-MCNC: 4.4 MMOL/L — SIGNIFICANT CHANGE UP (ref 3.5–5.3)
POTASSIUM SERPL-SCNC: 4.4 MMOL/L — SIGNIFICANT CHANGE UP (ref 3.5–5.3)
RBC # BLD: 2.74 M/UL — LOW (ref 4.2–5.8)
RBC # BLD: 2.81 M/UL — LOW (ref 4.2–5.8)
RBC # FLD: 15.5 % — HIGH (ref 10.3–14.5)
RBC # FLD: 15.5 % — HIGH (ref 10.3–14.5)
RH IG SCN BLD-IMP: POSITIVE — SIGNIFICANT CHANGE UP
SODIUM SERPL-SCNC: 142 MMOL/L — SIGNIFICANT CHANGE UP (ref 135–145)
WBC # BLD: 7.4 K/UL — SIGNIFICANT CHANGE UP (ref 3.8–10.5)
WBC # BLD: 7.59 K/UL — SIGNIFICANT CHANGE UP (ref 3.8–10.5)
WBC # FLD AUTO: 7.4 K/UL — SIGNIFICANT CHANGE UP (ref 3.8–10.5)
WBC # FLD AUTO: 7.59 K/UL — SIGNIFICANT CHANGE UP (ref 3.8–10.5)

## 2018-10-03 PROCEDURE — 93010 ELECTROCARDIOGRAM REPORT: CPT

## 2018-10-03 PROCEDURE — 99223 1ST HOSP IP/OBS HIGH 75: CPT | Mod: AI

## 2018-10-03 RX ORDER — SOD SULF/SODIUM/NAHCO3/KCL/PEG
4000 SOLUTION, RECONSTITUTED, ORAL ORAL ONCE
Qty: 0 | Refills: 0 | Status: COMPLETED | OUTPATIENT
Start: 2018-10-03 | End: 2018-10-03

## 2018-10-03 RX ADMIN — CHLORHEXIDINE GLUCONATE 1 APPLICATION(S): 213 SOLUTION TOPICAL at 07:00

## 2018-10-03 RX ADMIN — PANTOPRAZOLE SODIUM 10 MG/HR: 20 TABLET, DELAYED RELEASE ORAL at 07:01

## 2018-10-03 RX ADMIN — Medication 1 TABLET(S): at 12:14

## 2018-10-03 RX ADMIN — Medication 4000 MILLILITER(S): at 18:30

## 2018-10-03 RX ADMIN — TAMSULOSIN HYDROCHLORIDE 0.4 MILLIGRAM(S): 0.4 CAPSULE ORAL at 21:32

## 2018-10-03 NOTE — CONSULT NOTE ADULT - SUBJECTIVE AND OBJECTIVE BOX
General Surgery Consult  Consulting surgical team: A  Consulting attending: Dr. Michaels     HPI: Pia Joyce is a 89 y.o. with history of CKD4, BPH s/p prostatectomy, cataract surgery, and bifascicular block who initially presented to the hospital on 9/29 for BRBPR. The patient denies any history of similar bleeding. The patient denies any abdominal pain, back pain, lightheadedness, fatigue, nausea or vomiting.     The patient's last colonoscopy was 10-15 years; reports that it was normal      PAST MEDICAL HISTORY:  Chronic kidney disease (CKD), stage IV (severe)  Benign prostatic hypertrophy  History of BPH  No pertinent past medical history      PAST SURGICAL HISTORY:  S/P cataract surgery, left  No significant past surgical history      MEDICATIONS:  chlorhexidine 4% Liquid 1 Application(s) Topical <User Schedule>  influenza   Vaccine 0.5 milliLiter(s) IntraMuscular once  lactobacillus acidophilus 1 Tablet(s) Oral daily  oxyCODONE    5 mG/acetaminophen 325 mG 1 Tablet(s) Oral every 6 hours PRN  pantoprazole Infusion 8 mG/Hr IV Continuous <Continuous>  polyethylene glycol/electrolyte Solution. 4000 milliLiter(s) Oral once  tamsulosin 0.4 milliGRAM(s) Oral at bedtime      ALLERGIES:  No Known Allergies      VITALS & I/Os:  Vital Signs Last 24 Hrs  T(C): 36.9 (03 Oct 2018 09:53), Max: 37 (02 Oct 2018 16:45)  T(F): 98.4 (03 Oct 2018 09:53), Max: 98.6 (02 Oct 2018 16:45)  HR: 82 (03 Oct 2018 09:53) (79 - 94)  BP: 132/83 (03 Oct 2018 09:53) (122/70 - 132/83)  BP(mean): --  RR: 18 (03 Oct 2018 09:53) (17 - 19)  SpO2: 100% (03 Oct 2018 09:53) (100% - 100%)    I&O's Summary    02 Oct 2018 07:01  -  03 Oct 2018 07:00  --------------------------------------------------------  IN: 390 mL / OUT: 600 mL / NET: -210 mL    03 Oct 2018 07:01  -  03 Oct 2018 14:34  --------------------------------------------------------  IN: 0 mL / OUT: 275 mL / NET: -275 mL        PHYSICAL EXAM:  General: No acute distress  Respiratory: Nonlabored  Cardiovascular: RRR  Abdominal: Soft, nondistended, nontender. No rebound or guarding. No organomegaly, no palpable mass.  Extremities: Warm    LABS:                        7.8    7.59  )-----------( 139      ( 03 Oct 2018 12:28 )             24.2     10-03    142  |  105  |  32<H>  ----------------------------<  87  4.4   |  22  |  1.58<H>    Ca    8.0<L>      03 Oct 2018 06:30  Phos  2.7     10-02  Mg     1.8     10-02      IMAGING:  CT Abdomen and Pelvis w/ Oral Cont (09.30.18 @ 17:18)  LOWER CHEST: Mild bibasilar dependent changes. Trace left lower lobe   bronchiectasis.     Limited evaluation of solid viscera and bowel on this noncontrast study.  LIVER: Within normal limits.  BILE DUCTS: Normal caliber.  GALLBLADDER: Within normal limits.  SPLEEN: Within normal limits.  PANCREAS: Within normal limits.  ADRENALS: Within normal limits.  KIDNEYS/URETERS: Within normal limits.    BLADDER: Trabeculated appearing urinary bladder with multiple bladder   diverticulum again noted.  REPRODUCTIVE ORGANS: The prostate is markedly enlarged.    BOWEL: No mass. No bowel obstruction or edema. Colonic diverticulosis   without diverticulitis. Appendix is normal.  PERITONEUM: No ascites or pneumoperitoneum.  VESSELS:  Distal descending thoracic aorta measures 4.0 cm, unchanged.   Ectatic right common iliac artery measures 2.0 cm, unchanged. Ectatic   left common iliac artery measures 1.7 cm, unchanged. Atherosclerotic   changes.  RETROPERITONEUM: No lymphadenopathy.    ABDOMINAL WALL: Left inguinal hernia contains nonobstructed small bowel.  BONES: Severe L4 vertebral body compression deformity, new since 2/2/2015   but otherwise age-indeterminate.    IMPRESSION:     No mass or lymphadenopathy.    Colonic diverticulosis.    Trabeculated urinary bladder with multiple diverticula and an enlarged   prostate gland again noted.    Severe L4 vertebral body compression deformity, new since 2/2/2015 but   otherwise age-indeterminate.    Unchanged 4.0 cm distal descending thoracic aortic aneurysm. General Surgery Consult  Consulting surgical team: A  Consulting attending: Dr. Michaels     HPI: Pia Joyce is a 89 y.o. with history of CKD4, BPH s/p prostatectomy, cataract surgery, and bifascicular block who initially presented to the hospital on 9/29 for BRBPR. The patient denies any history of similar bleeding. The patient denies any abdominal pain, back pain, lightheadedness, fatigue, nausea or vomiting.     Patient states that he has not had any melena or BRBPR for over 24 hours.     The patient's last colonoscopy was 10-15 years; reports that it was normal      PAST MEDICAL HISTORY:  Chronic kidney disease (CKD), stage IV (severe)  Benign prostatic hypertrophy  History of BPH  No pertinent past medical history      PAST SURGICAL HISTORY:  S/P cataract surgery, left  No significant past surgical history      MEDICATIONS:  chlorhexidine 4% Liquid 1 Application(s) Topical <User Schedule>  influenza   Vaccine 0.5 milliLiter(s) IntraMuscular once  lactobacillus acidophilus 1 Tablet(s) Oral daily  oxyCODONE    5 mG/acetaminophen 325 mG 1 Tablet(s) Oral every 6 hours PRN  pantoprazole Infusion 8 mG/Hr IV Continuous <Continuous>  polyethylene glycol/electrolyte Solution. 4000 milliLiter(s) Oral once  tamsulosin 0.4 milliGRAM(s) Oral at bedtime      ALLERGIES:  No Known Allergies      VITALS & I/Os:  Vital Signs Last 24 Hrs  T(C): 36.9 (03 Oct 2018 09:53), Max: 37 (02 Oct 2018 16:45)  T(F): 98.4 (03 Oct 2018 09:53), Max: 98.6 (02 Oct 2018 16:45)  HR: 82 (03 Oct 2018 09:53) (79 - 94)  BP: 132/83 (03 Oct 2018 09:53) (122/70 - 132/83)  BP(mean): --  RR: 18 (03 Oct 2018 09:53) (17 - 19)  SpO2: 100% (03 Oct 2018 09:53) (100% - 100%)    I&O's Summary    02 Oct 2018 07:01  -  03 Oct 2018 07:00  --------------------------------------------------------  IN: 390 mL / OUT: 600 mL / NET: -210 mL    03 Oct 2018 07:01  -  03 Oct 2018 14:34  --------------------------------------------------------  IN: 0 mL / OUT: 275 mL / NET: -275 mL        PHYSICAL EXAM:  General: No acute distress  Respiratory: Nonlabored  Cardiovascular: normotensive, regular rate   Abdominal: Soft, nondistended, nontender. No rebound or guarding. No organomegaly, no palpable mass.  Extremities: Warm  Rectal: no external hemorrhoid seen, no blood in rectal vault      LABS:                        7.8    7.59  )-----------( 139      ( 03 Oct 2018 12:28 )             24.2     10-03    142  |  105  |  32<H>  ----------------------------<  87  4.4   |  22  |  1.58<H>    Ca    8.0<L>      03 Oct 2018 06:30  Phos  2.7     10-02  Mg     1.8     10-02      IMAGING:  CT Abdomen and Pelvis w/ Oral Cont (09.30.18 @ 17:18)  LOWER CHEST: Mild bibasilar dependent changes. Trace left lower lobe   bronchiectasis.     Limited evaluation of solid viscera and bowel on this noncontrast study.  LIVER: Within normal limits.  BILE DUCTS: Normal caliber.  GALLBLADDER: Within normal limits.  SPLEEN: Within normal limits.  PANCREAS: Within normal limits.  ADRENALS: Within normal limits.  KIDNEYS/URETERS: Within normal limits.    BLADDER: Trabeculated appearing urinary bladder with multiple bladder   diverticulum again noted.  REPRODUCTIVE ORGANS: The prostate is markedly enlarged.    BOWEL: No mass. No bowel obstruction or edema. Colonic diverticulosis   without diverticulitis. Appendix is normal.  PERITONEUM: No ascites or pneumoperitoneum.  VESSELS:  Distal descending thoracic aorta measures 4.0 cm, unchanged.   Ectatic right common iliac artery measures 2.0 cm, unchanged. Ectatic   left common iliac artery measures 1.7 cm, unchanged. Atherosclerotic   changes.  RETROPERITONEUM: No lymphadenopathy.    ABDOMINAL WALL: Left inguinal hernia contains nonobstructed small bowel.  BONES: Severe L4 vertebral body compression deformity, new since 2/2/2015   but otherwise age-indeterminate.    IMPRESSION:     No mass or lymphadenopathy.    Colonic diverticulosis.    Trabeculated urinary bladder with multiple diverticula and an enlarged   prostate gland again noted.    Severe L4 vertebral body compression deformity, new since 2/2/2015 but   otherwise age-indeterminate.    Unchanged 4.0 cm distal descending thoracic aortic aneurysm.

## 2018-10-03 NOTE — PROGRESS NOTE ADULT - ATTENDING COMMENTS
Agree with above.   No further inpatient cardiac workup needed at this time.     Rashmi Morrison MD Patient seen and examined.  Agree with above.   -optimized from cv perspective for procedures    Rashmi Morrison MD

## 2018-10-03 NOTE — PROGRESS NOTE ADULT - SUBJECTIVE AND OBJECTIVE BOX
Patient seen and examined at bedside  Case discussed with medical team  gi bleed, s/p prbc transfusion overnight    HPI:  89 y/o pleasant Male w/ a pmh significant for CKD4 and BPH s/p prostatectomy presenting to the ED w/ a chief complaint of BRBPR. Pt was in his usual state of health up until yesterday evening when his aid noticed some bright red blood in his diaper. He says this has never happened to him before. He says the stool was 'mostly brown w/ small red clots'. Pt had two other bowel movements after this episode that was w/o blood/clots. Of note, pt says he fell in March 2018, after which he was taking Ibuprofen (unknown dosage) two times per day for R hip pain. He had a colonoscopy done ~10-15 years ago that was reportedly normal per family at bedside. He has approximately 2 BM's per day and has no hx of constipation or painful bowel movements. He denies any new medications, n/v, hematemesis, hemoptysis, CP, palpitations SOB, abdominal pain, melena or hemorrhoids.      In the ED, pt afebrile, /85, HR 82, RR 15 (100% RA).Labs notable for Hb 8.7 (MCV 87.7) BUN/Cr 16. FOBT positive. Pt bolused 1L NS and Protonix 80mg IVP x1. (29 Sep 2018 15:55)      PAST MEDICAL & SURGICAL HISTORY:  Chronic kidney disease (CKD), stage IV (severe)  Benign prostatic hypertrophy  S/P cataract surgery, left: 3 yrs ago      No Known Allergies       MEDICATIONS  (STANDING):  chlorhexidine 4% Liquid 1 Application(s) Topical <User Schedule>  influenza   Vaccine 0.5 milliLiter(s) IntraMuscular once  pantoprazole Infusion 8 mG/Hr (10 mL/Hr) IV Continuous <Continuous>  tamsulosin 0.4 milliGRAM(s) Oral at bedtime    MEDICATIONS  (PRN):  oxyCODONE    5 mG/acetaminophen 325 mG 1 Tablet(s) Oral every 6 hours PRN hip pain      REVIEW OF SYSTEMS:  CONSTITUTIONAL: (+) malaise.   EYES: No acute change in vision   ENT:  No tinnitus  NECK: No stiffness  RESPIRATORY: No hemoptysis  CARDIOVASCULAR: No chest pain, palpitations, syncope  GASTROINTESTINAL: gi bleed  GENITOURINARY: No hematuria  NEUROLOGICAL: No headaches  LYMPH Nodes: No enlarged glands  ENDOCRINE: No heat or cold intolerance	    Vital Signs Last 24 Hrs  T(C): 36.9 (03 Oct 2018 09:53), Max: 37 (02 Oct 2018 16:45)  T(F): 98.4 (03 Oct 2018 09:53), Max: 98.6 (02 Oct 2018 16:45)  HR: 82 (03 Oct 2018 09:53) (79 - 94)  BP: 132/83 (03 Oct 2018 09:53) (113/62 - 132/83)  BP(mean): --  RR: 18 (03 Oct 2018 09:53) (17 - 19)  SpO2: 100% (03 Oct 2018 09:53) (100% - 100%)  PHYSICAL EXAMINATION:   Constitutional: NAD  HEENT: NC, AT  Neck:  Supple  Respiratory:  Adequate airflow b/l. Not using accessory muscles of respiration.  Cardiovascular:  S1 & S2 intact, no R/G, 2+ radial pulses b/l  Gastrointestinal: Soft, NT, ND, normoactive b.s., no organomegaly/RT/rigidity  Extremities: WWP  Neurological:  Alert and awake.  No acute focal motor deficits. Crude sensation intact.     Labs and imaging reviewed

## 2018-10-03 NOTE — PROVIDER CONTACT NOTE (OTHER) - ACTION/TREATMENT ORDERED:
MATT Malloy made aware, no further interventions at this time. Will continue to monitor
Administer ordered PRBC.
MATT Claire made aware, no further interventions at this time. Will continue to monitor

## 2018-10-03 NOTE — CONSULT NOTE ADULT - ASSESSMENT
Pia Joyce is a 89 y.o. with history of CKD4, BPH s/p prostatectomy, cataract surgery, and bifascicular block who initially presented to the hospital on 9/29 for BRBPR. Required a total of 3 units pRBC since admission; continued trend down since last transfusion (10/1).    Plan:  - GI planning for colonoscopy on 10/4  - Will discuss with attending    Omar Blount, PGY2  [id number] Pia Joyce is a 89 y.o. with history of CKD4, BPH s/p prostatectomy, cataract surgery, and bifascicular block who initially presented to the hospital on 9/29 for BRBPR. Required a total of 3 units pRBC since admission; continued trend down since last transfusion (10/1). Hematocrit stable since 2040 on 10/2.    Plan:  - GI planning for colonoscopy on 10/4  - Trend H&H; transfuse as needed  - Colorectal surgery will follow    Omar Blount, PGY2  [id number]

## 2018-10-03 NOTE — PROGRESS NOTE ADULT - ASSESSMENT
89 y/o pleasant Male w/ a pmh significant for CKD3 and BPH s/p prostatectomy presenting to the ED w/ a chief complaint of BRBPR. Renal following for PHU, CKD      PHU on CKD stage III. b/l cr apperas to be around 1.4mg/dl  Age and HTN nephropathy likely underlying cause    HTN, controlled well  Anemia.  likely 2/2 blood loss/GIB. unlikely 2/2 CKD  Tsat 31% ferritin 149- not low    labs, chart reviewed

## 2018-10-03 NOTE — PROGRESS NOTE ADULT - SUBJECTIVE AND OBJECTIVE BOX
Pt seen, no complaints.       MEDICATIONS  (STANDING):  chlorhexidine 4% Liquid 1 Application(s) Topical <User Schedule>  influenza   Vaccine 0.5 milliLiter(s) IntraMuscular once  lactobacillus acidophilus 1 Tablet(s) Oral daily  pantoprazole Infusion 8 mG/Hr (10 mL/Hr) IV Continuous <Continuous>  polyethylene glycol/electrolyte Solution. 4000 milliLiter(s) Oral once  tamsulosin 0.4 milliGRAM(s) Oral at bedtime    MEDICATIONS  (PRN):  oxyCODONE    5 mG/acetaminophen 325 mG 1 Tablet(s) Oral every 6 hours PRN hip pain      ROS  limited 2/2 participation, no pain, no CP/SOB/n/v/d/abd pain/edema    Vital Signs Last 24 Hrs  T(C): 36.9 (03 Oct 2018 09:53), Max: 37 (02 Oct 2018 16:45)  T(F): 98.4 (03 Oct 2018 09:53), Max: 98.6 (02 Oct 2018 16:45)  HR: 82 (03 Oct 2018 09:53) (79 - 94)  BP: 132/83 (03 Oct 2018 09:53) (113/62 - 132/83)  BP(mean): --  RR: 18 (03 Oct 2018 09:53) (17 - 19)  SpO2: 100% (03 Oct 2018 09:53) (100% - 100%)    PE  NAD  Awake, alert  Anicteric, MMM  RRR  CTAB  Abd soft, NT, ND  No c/c/e  No rash grossly  FROM                          7.8    7.59  )-----------( 139      ( 03 Oct 2018 12:28 )             24.2       10-03    142  |  105  |  32<H>  ----------------------------<  87  4.4   |  22  |  1.58<H>    Ca    8.0<L>      03 Oct 2018 06:30  Phos  2.7     10-02  Mg     1.8     10-02

## 2018-10-03 NOTE — PROGRESS NOTE ADULT - ASSESSMENT
pt still with rectal bleeding.  spoke with pa last night and called son.  pt was refusing colonosopcy.  I was told "daughter in law" was angry. I offered to speak with her and called all numbers on the computer.  spoke with son who agreed we should follow patient wishes.  This am, patient says "don't talk to my son"  he is biploar.  He now agrees to colonosocpy.  I explained that this is still likely a diverticular bleed.  I will consult colorectal surgey and prep for colonosocpy tomorrow.  can consider ct bleeding study if more blood today.  would transfuse another unit of prbcs.  of note the patient seems to have issues with short term memory, but clearly has capacity.

## 2018-10-03 NOTE — PROGRESS NOTE ADULT - SUBJECTIVE AND OBJECTIVE BOX
SUBJECTIVE: No CP or SOB    MEDICATIONS  (STANDING):  chlorhexidine 4% Liquid 1 Application(s) Topical <User Schedule>  influenza   Vaccine 0.5 milliLiter(s) IntraMuscular once  lactobacillus acidophilus 1 Tablet(s) Oral daily  pantoprazole Infusion 8 mG/Hr (10 mL/Hr) IV Continuous <Continuous>  polyethylene glycol/electrolyte Solution. 4000 milliLiter(s) Oral once  sodium chloride 0.9%. 1000 milliLiter(s) (65 mL/Hr) IV Continuous <Continuous>  tamsulosin 0.4 milliGRAM(s) Oral at bedtime    MEDICATIONS  (PRN):  oxyCODONE    5 mG/acetaminophen 325 mG 1 Tablet(s) Oral every 6 hours PRN hip pain      LABS:                        7.9    7.40  )-----------( 138      ( 03 Oct 2018 06:30 )             24.0     142  |  105  |  32<H>  ----------------------------<  87  4.4   |  22  |  1.58<H>    Ca    8.0<L>      03 Oct 2018 06:30  Phos  2.7     10-02  Mg     1.8     10-02    Creatinine Trend: 1.58<--, 1.31<--, 1.36<--, 1.42<--, 1.63<--     PHYSICAL EXAM  Vital Signs Last 24 Hrs  T(C): 36.9 (03 Oct 2018 09:53), Max: 37 (02 Oct 2018 16:45)  T(F): 98.4 (03 Oct 2018 09:53), Max: 98.6 (02 Oct 2018 16:45)  HR: 82 (03 Oct 2018 09:53) (79 - 94)  BP: 132/83 (03 Oct 2018 09:53) (113/62 - 132/83)  RR: 18 (03 Oct 2018 09:53) (17 - 19)  SpO2: 100% (03 Oct 2018 09:53) (100% - 100%)    Cardiovascular:  S1S2 RRR, No JVD  Respiratory: Lungs clear to auscultation, normal effort  Gastrointestinal: Abdomen soft, ND, NT, +BS  Skin: Warm, dry, intact. No rash.  Musculoskeletal: Normal ROM, normal strength  Ext: No C/C/E B/L LE    DIAGNOSTIC DATA    < from: Transthoracic Echocardiogram (06.11.18 @ 00:00) >  CONCLUSIONS:  1. Mitral annular calcification, otherwise normal mitral  valve. Minimal mitral regurgitation.  2. Aortic valve leaflet morphology not well visualized.  Mild-moderate aortic regurgitation.  3. Endocardium not well visualized; grossly normal left  ventricular systolic function.  4. Normal right ventricular size and function.  5. Estimated right ventricular systolic pressure equals 42  mm Hg, assuming right atrial pressure equals 10 mm Hg,  consistent with mild pulmonary hypertension.  ------------------------------------------------------------------------  Confirmed on  6/11/2018 - 16:34:30 by Nilo Diallo M.D.    < end of copied text >    < from: CT Abdomen and Pelvis w/ Oral Cont (09.30.18 @ 17:18) >  IMPRESSION:     No mass or lymphadenopathy.    Colonic diverticulosis.    Trabeculated urinary bladder with multiple diverticula and an enlarged   prostate gland again noted.    Severe L4 vertebral body compression deformity, new since 2/2/2015 but   otherwise age-indeterminate.    Unchanged 4.0 cm distal descending thoracic aortic aneurysm.     < end of copied text >      ASSESSMENT AND PLAN:    He is a pleasant 89 y/o male PMH bifascicular block who is now admitted with BRBPR/ suspected diverticular bleed per GI.  His last echo in June 2018 was normal. His RCRI score is 0-1 for mild CKD.    -given RCRI score of 0-1 he is low risk for low risk procedures (colonoscopy +/- EGD) and is otherwise optimized from a cardiac perspective if planned  -PRBCs per primary team  -no further inpatient cardiac workup needed as long as he doesn't have syncope

## 2018-10-03 NOTE — PROGRESS NOTE ADULT - SUBJECTIVE AND OBJECTIVE BOX
[facility] - Crystal / Corrie S /Kylah/ LARON Villegas/ LARON Beard/ Shiva Mullen / MAGUI Njeranu  ---------------------------------------------------------------------------------------------------------------    Patient seen and examined bedside    Subjective and Objective: No overnight events, denies sob. No new complaints today.     Allergies: No Known Allergies      Hospital Medications:   MEDICATIONS  (STANDING):  chlorhexidine 4% Liquid 1 Application(s) Topical <User Schedule>  influenza   Vaccine 0.5 milliLiter(s) IntraMuscular once  lactobacillus acidophilus 1 Tablet(s) Oral daily  pantoprazole Infusion 8 mG/Hr (10 mL/Hr) IV Continuous <Continuous>  polyethylene glycol/electrolyte Solution. 4000 milliLiter(s) Oral once  tamsulosin 0.4 milliGRAM(s) Oral at bedtime      VITALS:  T(F): 98.4 (10-03-18 @ 09:53), Max: 98.6 (10-02-18 @ 16:45)  HR: 82 (10-03-18 @ 09:53)  BP: 132/83 (10-03-18 @ 09:53)  RR: 18 (10-03-18 @ 09:53)  SpO2: 100% (10-03-18 @ 09:53)  Wt(kg): --    10-02 @ 07:01  -  10-03 @ 07:00  --------------------------------------------------------  IN: 390 mL / OUT: 600 mL / NET: -210 mL    10-03 @ 07:01  -  10-03 @ 13:44  --------------------------------------------------------  IN: 0 mL / OUT: 275 mL / NET: -275 mL      PHYSICAL EXAM:  Constitutional: NAD  HEENT: anicteric sclera, oropharynx clear  Neck: No JVD  Respiratory: CTAB, no wheezes, rales or rhonchi  Cardiovascular: S1, S2, RRR  Gastrointestinal: BS+, soft, NT/ND  Extremities: No cyanosis or clubbing. No peripheral edema  Neurological: A/O x 3, no focal deficits  Psychiatric: Normal mood, normal affect  : No CVA tenderness. No flores.   Skin: No rashes    LABS:  10-03    142  |  105  |  32<H>  ----------------------------<  87  4.4   |  22  |  1.58<H>    Ca    8.0<L>      03 Oct 2018 06:30  Phos  2.7     10-02  Mg     1.8     10-02      Creatinine Trend: 1.58 <--, 1.31 <--, 1.36 <--, 1.42 <--, 1.63 <--                        7.8    7.59  )-----------( 139      ( 03 Oct 2018 12:28 )             24.2     Urine Studies:        RADIOLOGY & ADDITIONAL STUDIES:

## 2018-10-03 NOTE — PROGRESS NOTE ADULT - ASSESSMENT
1. Normocytic Anemia sec to acute blood loss from rectal bleed    -- appropriate response to PRBCs  -- CT A/P w no masses or TRAVON but extensive diverticulosis  -- declined colonoscopy initially but apparently now willing  -- Iron Studies normal (post transfusion)  -- No B12/Folate Deficiency  -- No Hemolysis  -- Keep Hgb > 8 in light of active bleed  -- neg SPEP, Epo level low at 29  -- Anemia of CKD less likely a major factor  -- Hold off on Procrit in light of Active Bleed  -- f/u GI    Pls call with questions, [phone]

## 2018-10-03 NOTE — PROGRESS NOTE ADULT - SUBJECTIVE AND OBJECTIVE BOX
PASCUAL OLSEN:[id number],   89yMale followed for:  No Known Allergies    PAST MEDICAL & SURGICAL HISTORY:  Chronic kidney disease (CKD), stage IV (severe)  Benign prostatic hypertrophy  S/P cataract surgery, left: 3 yrs ago    FAMILY HISTORY:  No pertinent family history in first degree relatives    MEDICATIONS  (STANDING):  chlorhexidine 4% Liquid 1 Application(s) Topical <User Schedule>  influenza   Vaccine 0.5 milliLiter(s) IntraMuscular once  lactobacillus acidophilus 1 Tablet(s) Oral daily  pantoprazole Infusion 8 mG/Hr (10 mL/Hr) IV Continuous <Continuous>  sodium chloride 0.9%. 1000 milliLiter(s) (65 mL/Hr) IV Continuous <Continuous>  tamsulosin 0.4 milliGRAM(s) Oral at bedtime    MEDICATIONS  (PRN):  oxyCODONE    5 mG/acetaminophen 325 mG 1 Tablet(s) Oral every 6 hours PRN hip pain      Vital Signs Last 24 Hrs  T(C): 36.9 (03 Oct 2018 09:53), Max: 37 (02 Oct 2018 16:45)  T(F): 98.4 (03 Oct 2018 09:53), Max: 98.6 (02 Oct 2018 16:45)  HR: 82 (03 Oct 2018 09:53) (79 - 94)  BP: 132/83 (03 Oct 2018 09:53) (113/62 - 132/83)  BP(mean): --  RR: 18 (03 Oct 2018 09:53) (17 - 19)  SpO2: 100% (03 Oct 2018 09:53) (100% - 100%)  nc/at  s1s2  cta  soft, nt, nd no guarding or rebound  no c/c/e    CBC Full  -  ( 03 Oct 2018 06:30 )  WBC Count : 7.40 K/uL  Hemoglobin : 7.9 g/dL  Hematocrit : 24.0 %  Platelet Count - Automated : 138 K/uL  Mean Cell Volume : 85.4 fL  Mean Cell Hemoglobin : 28.1 pg  Mean Cell Hemoglobin Concentration : 32.9 %  Auto Neutrophil # : x  Auto Lymphocyte # : x  Auto Monocyte # : x  Auto Eosinophil # : x  Auto Basophil # : x  Auto Neutrophil % : x  Auto Lymphocyte % : x  Auto Monocyte % : x  Auto Eosinophil % : x  Auto Basophil % : x    10-03    142  |  105  |  32<H>  ----------------------------<  87  4.4   |  22  |  1.58<H>    Ca    8.0<L>      03 Oct 2018 06:30  Phos  2.7     10-02  Mg     1.8     10-02

## 2018-10-04 LAB
BUN SERPL-MCNC: 43 MG/DL — HIGH (ref 7–23)
CALCIUM SERPL-MCNC: 8.7 MG/DL — SIGNIFICANT CHANGE UP (ref 8.4–10.5)
CHLORIDE SERPL-SCNC: 107 MMOL/L — SIGNIFICANT CHANGE UP (ref 98–107)
CO2 SERPL-SCNC: 23 MMOL/L — SIGNIFICANT CHANGE UP (ref 22–31)
CREAT SERPL-MCNC: 1.61 MG/DL — HIGH (ref 0.5–1.3)
GLUCOSE SERPL-MCNC: 82 MG/DL — SIGNIFICANT CHANGE UP (ref 70–99)
HCT VFR BLD CALC: 25.3 % — LOW (ref 39–50)
HGB BLD-MCNC: 8.3 G/DL — LOW (ref 13–17)
MAGNESIUM SERPL-MCNC: 1.9 MG/DL — SIGNIFICANT CHANGE UP (ref 1.6–2.6)
MCHC RBC-ENTMCNC: 28.5 PG — SIGNIFICANT CHANGE UP (ref 27–34)
MCHC RBC-ENTMCNC: 32.8 % — SIGNIFICANT CHANGE UP (ref 32–36)
MCV RBC AUTO: 86.9 FL — SIGNIFICANT CHANGE UP (ref 80–100)
NRBC # FLD: 0 — SIGNIFICANT CHANGE UP
PHOSPHATE SERPL-MCNC: 2.5 MG/DL — SIGNIFICANT CHANGE UP (ref 2.5–4.5)
PLATELET # BLD AUTO: 144 K/UL — LOW (ref 150–400)
PMV BLD: 10.3 FL — SIGNIFICANT CHANGE UP (ref 7–13)
POTASSIUM SERPL-MCNC: 4 MMOL/L — SIGNIFICANT CHANGE UP (ref 3.5–5.3)
POTASSIUM SERPL-SCNC: 4 MMOL/L — SIGNIFICANT CHANGE UP (ref 3.5–5.3)
RBC # BLD: 2.91 M/UL — LOW (ref 4.2–5.8)
RBC # FLD: 15.7 % — HIGH (ref 10.3–14.5)
SODIUM SERPL-SCNC: 143 MMOL/L — SIGNIFICANT CHANGE UP (ref 135–145)
WBC # BLD: 7.35 K/UL — SIGNIFICANT CHANGE UP (ref 3.8–10.5)
WBC # FLD AUTO: 7.35 K/UL — SIGNIFICANT CHANGE UP (ref 3.8–10.5)

## 2018-10-04 RX ORDER — SODIUM CHLORIDE 9 MG/ML
1000 INJECTION INTRAMUSCULAR; INTRAVENOUS; SUBCUTANEOUS
Qty: 0 | Refills: 0 | Status: DISCONTINUED | OUTPATIENT
Start: 2018-10-04 | End: 2018-10-05

## 2018-10-04 RX ORDER — OXYCODONE AND ACETAMINOPHEN 5; 325 MG/1; MG/1
1 TABLET ORAL EVERY 6 HOURS
Qty: 0 | Refills: 0 | Status: DISCONTINUED | OUTPATIENT
Start: 2018-10-04 | End: 2018-10-07

## 2018-10-04 RX ADMIN — PANTOPRAZOLE SODIUM 10 MG/HR: 20 TABLET, DELAYED RELEASE ORAL at 19:15

## 2018-10-04 RX ADMIN — CHLORHEXIDINE GLUCONATE 1 APPLICATION(S): 213 SOLUTION TOPICAL at 05:36

## 2018-10-04 RX ADMIN — Medication 10 MILLIGRAM(S): at 01:23

## 2018-10-04 RX ADMIN — SODIUM CHLORIDE 75 MILLILITER(S): 9 INJECTION INTRAMUSCULAR; INTRAVENOUS; SUBCUTANEOUS at 19:15

## 2018-10-04 RX ADMIN — SODIUM CHLORIDE 75 MILLILITER(S): 9 INJECTION INTRAMUSCULAR; INTRAVENOUS; SUBCUTANEOUS at 11:43

## 2018-10-04 RX ADMIN — PANTOPRAZOLE SODIUM 10 MG/HR: 20 TABLET, DELAYED RELEASE ORAL at 01:23

## 2018-10-04 RX ADMIN — TAMSULOSIN HYDROCHLORIDE 0.4 MILLIGRAM(S): 0.4 CAPSULE ORAL at 22:49

## 2018-10-04 RX ADMIN — PANTOPRAZOLE SODIUM 10 MG/HR: 20 TABLET, DELAYED RELEASE ORAL at 14:05

## 2018-10-04 NOTE — PROGRESS NOTE ADULT - SUBJECTIVE AND OBJECTIVE BOX
[facility]        - Patient seen and examined.  - Today, patient is without complaints.         *****MEDICATIONS: Current medication reviewed and documented.    MEDICATIONS  (STANDING):  chlorhexidine 4% Liquid 1 Application(s) Topical <User Schedule>  influenza   Vaccine 0.5 milliLiter(s) IntraMuscular once  lactobacillus acidophilus 1 Tablet(s) Oral daily  pantoprazole Infusion 8 mG/Hr (10 mL/Hr) IV Continuous <Continuous>  sodium chloride 0.9%. 1000 milliLiter(s) (75 mL/Hr) IV Continuous <Continuous>  tamsulosin 0.4 milliGRAM(s) Oral at bedtime    MEDICATIONS  (PRN):  oxyCODONE    5 mG/acetaminophen 325 mG 1 Tablet(s) Oral every 6 hours PRN hip pain           ***** REVIEW OF SYSTEM:  GEN: no fever, no chills, no pain  RESP: no SOB, no cough, no sputum  CVS: no chest pain, no palpitations, no edema  GI: no abdominal pain, no nausea, no vomiting, no constipation, no diarrhea  : no dysurea, no frequency  NEURO: no headache, no diziness  PSYCH: no depression, not anxious  Derm : no itching, no rash         ***** VITAL SIGNS:  T(F): 97.5 (10-04-18 @ 09:42), Max: 98.4 (10-03-18 @ 17:10)  HR: 88 (10-04-18 @ 09:42) (81 - 92)  BP: 141/83 (10-04-18 @ 09:42) (136/90 - 145/93)  RR: 18 (10-04-18 @ 09:42) (17 - 18)  SpO2: 100% (10-04-18 @ 09:42) (100% - 100%)  Wt(kg): --  ,   I&O's Summary    03 Oct 2018 07:01  -  04 Oct 2018 07:00  --------------------------------------------------------  IN: 0 mL / OUT: 375 mL / NET: -375 mL             *****PHYSICAL EXAM:  very pleasant gentleman.  Alert oriented x3  Attention comprehension are fair. Able to name, repeat, read without any difficulty.   Able to follow 3 step commands.     EOMI fundi not visualized,  VFF to confrontration  No facial asymmetry   Tongue is midline   Palate elevates symmetrically   Moving all 4 ext symmetrically no pronator drift.  R leg with pain on mvt of the R hip.   Reflexes are symmetric throughout   sensation is grossly symmetric  Gait : not assessed.  B/L down going toes          *****LAB AND IMAGIN.3    7.35  )-----------( 144      ( 04 Oct 2018 06:30 )             25.3               10-04    143  |  107  |  43<H>  ----------------------------<  82  4.0   |  23  |  1.61<H>    Ca    8.7      04 Oct 2018 06:30  Phos  2.5     10-04  Mg     1.9     10-04                           [All pertinent recent Imaging/Reports reviewed]           *****A S S E S S M E N T   A N D   P L A N :       89 y/o M w/ a pmhx significant for BPH s/p prostatectomy and CKD4 presents to the ED w/ a chief complaint of BRBPR.    Problem/Plan - 1: gi bleed.    pt agreeable to colonoscopy.     Problem/Plan - 2: l 4 compression fracture, likely old,   no limitation of mvt  can consider tlso brace if there is pain with ambulation.    Thank you for allowing me to participate in the care of this patient. Please do not hesitate to call me if you have any  questions.        ________________  Ava Schreiber MD  [facility] ([facility]  [phone]     30 minutes spent on total encounter; more than 50 % of the visit was  spent counseling and or  coordinating care by the attending physician.   At the present time, [facility] does not  provide outpatient followup, best to call the your insurance to find a participating provider.  This was explained to you at the time of the visit. Alternatively, if your insurance allows it, you can follow up with a neurologist  Dr. Jed Gallagher([place]) [phone] or Dr. Abdiaziz Scott ( [place]) [phone]

## 2018-10-04 NOTE — PROGRESS NOTE ADULT - SUBJECTIVE AND OBJECTIVE BOX
"I'm hungry!"  Patient with no anginal chest pain or shortness of breath  No syncope/near syncope or palps  ROS otherwise negative       MEDICATIONS  (STANDING):  chlorhexidine 4% Liquid 1 Application(s) Topical <User Schedule>  influenza   Vaccine 0.5 milliLiter(s) IntraMuscular once  lactobacillus acidophilus 1 Tablet(s) Oral daily  pantoprazole Infusion 8 mG/Hr (10 mL/Hr) IV Continuous <Continuous>  tamsulosin 0.4 milliGRAM(s) Oral at bedtime    MEDICATIONS  (PRN):  oxyCODONE    5 mG/acetaminophen 325 mG 1 Tablet(s) Oral every 6 hours PRN hip pain      LABS:                        8.3    7.35  )-----------( 144      ( 04 Oct 2018 06:30 )             25.3     Hemoglobin: 8.3 g/dL (10-04 @ 06:30)  Hemoglobin: 7.8 g/dL (10-03 @ 12:28)  Hemoglobin: 7.9 g/dL (10-03 @ 06:30)  Hemoglobin: 7.7 g/dL (10-02 @ 20:40)  Hemoglobin: 9.8 g/dL (10-02 @ 06:44)    10-04    143  |  107  |  43<H>  ----------------------------<  82  4.0   |  23  |  1.61<H>    Ca    8.7      04 Oct 2018 06:30  Phos  2.5     10-04  Mg     1.9     10-04      Creatinine Trend: 1.61<--, 1.58<--, 1.31<--, 1.36<--, 1.42<--, 1.63<--           PHYSICAL EXAM  Vital Signs Last 24 Hrs  T(C): 36.7 (04 Oct 2018 05:35), Max: 36.9 (03 Oct 2018 09:53)  T(F): 98 (04 Oct 2018 05:35), Max: 98.4 (03 Oct 2018 09:53)  HR: 92 (04 Oct 2018 05:35) (81 - 92)  BP: 138/84 (04 Oct 2018 05:35) (132/83 - 145/93)  BP(mean): --  RR: 18 (04 Oct 2018 05:35) (17 - 18)  SpO2: 100% (04 Oct 2018 05:35) (100% - 100%)      Cardiovascular:  S1S2 RRR, No JVD  Respiratory: Lungs clear to auscultation, normal effort  Gastrointestinal: Abdomen soft, ND, NT, +BS  Skin: Warm, dry, intact. No rash.  Musculoskeletal: Normal ROM, normal strength  Ext: No C/C/E B/L LE    DIAGNOSTIC DATA    < from: Transthoracic Echocardiogram (06.11.18 @ 00:00) >  CONCLUSIONS:  1. Mitral annular calcification, otherwise normal mitral  valve. Minimal mitral regurgitation.  2. Aortic valve leaflet morphology not well visualized.  Mild-moderate aortic regurgitation.  3. Endocardium not well visualized; grossly normal left  ventricular systolic function.  4. Normal right ventricular size and function.  5. Estimated right ventricular systolic pressure equals 42  mm Hg, assuming right atrial pressure equals 10 mm Hg,  consistent with mild pulmonary hypertension.  ------------------------------------------------------------------------  Confirmed on  6/11/2018 - 16:34:30 by Nilo Diallo M.D.    < end of copied text >    < from: CT Abdomen and Pelvis w/ Oral Cont (09.30.18 @ 17:18) >  IMPRESSION:     No mass or lymphadenopathy.    Colonic diverticulosis.    Trabeculated urinary bladder with multiple diverticula and an enlarged   prostate gland again noted.    Severe L4 vertebral body compression deformity, new since 2/2/2015 but   otherwise age-indeterminate.    Unchanged 4.0 cm distal descending thoracic aortic aneurysm.     < end of copied text >      ASSESSMENT AND PLAN:    He is a pleasant 89 y/o male PMH bifascicular block who is now admitted with BRBPR/ suspected diverticular bleed per GI.  His last echo in June 2018 was normal. His RCRI score is 0-1 for mild CKD.    -given RCRI score of 0-1 he is low risk for low risk procedures (colonoscopy +/- EGD) and is otherwise optimized from a cardiac perspective if planned (likely for colonoscopy today)  -Anemia - received PRBC 10/1 and 10/3 with slight improvement in h/h   - care  per primary team  -no further inpatient cardiac workup needed as long as he doesn't have syncope

## 2018-10-04 NOTE — PROGRESS NOTE ADULT - SUBJECTIVE AND OBJECTIVE BOX
Patient seen and examined at bedside  No acute events noted overnight  Case discussed with medical team    HPI:  89 y/o pleasant Male w/ a pmh significant for CKD4 and BPH s/p prostatectomy presenting to the ED w/ a chief complaint of BRBPR. Pt was in his usual state of health up until yesterday evening when his aid noticed some bright red blood in his diaper. He says this has never happened to him before. He says the stool was 'mostly brown w/ small red clots'. Pt had two other bowel movements after this episode that was w/o blood/clots. Of note, pt says he fell in March 2018, after which he was taking Ibuprofen (unknown dosage) two times per day for R hip pain. He had a colonoscopy done ~10-15 years ago that was reportedly normal per family at bedside. He has approximately 2 BM's per day and has no hx of constipation or painful bowel movements. He denies any new medications, n/v, hematemesis, hemoptysis, CP, palpitations SOB, abdominal pain, melena or hemorrhoids.      In the ED, pt afebrile, /85, HR 82, RR 15 (100% RA).Labs notable for Hb 8.7 (MCV 87.7) BUN/Cr 16. FOBT positive. Pt bolused 1L NS and Protonix 80mg IVP x1. (29 Sep 2018 15:55)      PAST MEDICAL & SURGICAL HISTORY:  Chronic kidney disease (CKD), stage IV (severe)  Benign prostatic hypertrophy  S/P cataract surgery, left: 3 yrs ago      No Known Allergies       MEDICATIONS  (STANDING):  chlorhexidine 4% Liquid 1 Application(s) Topical <User Schedule>  influenza   Vaccine 0.5 milliLiter(s) IntraMuscular once  lactobacillus acidophilus 1 Tablet(s) Oral daily  pantoprazole Infusion 8 mG/Hr (10 mL/Hr) IV Continuous <Continuous>  tamsulosin 0.4 milliGRAM(s) Oral at bedtime    MEDICATIONS  (PRN):  oxyCODONE    5 mG/acetaminophen 325 mG 1 Tablet(s) Oral every 6 hours PRN hip pain      REVIEW OF SYSTEMS:  CONSTITUTIONAL: (+) malaise.   EYES: No acute change in vision   ENT:  No tinnitus  NECK: No stiffness  RESPIRATORY: No hemoptysis  CARDIOVASCULAR: No chest pain, palpitations, syncope  GASTROINTESTINAL: No hematemesis, diarrhea, melena, or hematochezia.  GENITOURINARY: No hematuria  NEUROLOGICAL: No headaches  LYMPH Nodes: No enlarged glands  ENDOCRINE: No heat or cold intolerance	    T(C): 36.4 (10-04-18 @ 09:42), Max: 36.9 (10-03-18 @ 17:10)  HR: 88 (10-04-18 @ 09:42) (81 - 92)  BP: 141/83 (10-04-18 @ 09:42) (136/90 - 145/93)  RR: 18 (10-04-18 @ 09:42) (17 - 18)  SpO2: 100% (10-04-18 @ 09:42) (100% - 100%)    PHYSICAL EXAMINATION:   Constitutional: WD, NAD  HEENT: NC, AT  Neck:  Supple  Respiratory:  Adequate airflow b/l. Not using accessory muscles of respiration.  Cardiovascular:  S1 & S2 intact, no R/G, 2+ radial pulses b/l  Gastrointestinal: Soft, NT, ND, normoactive b.s., no organomegaly/RT/rigidity  Extremities: WWP  Neurological:  Alert and awake.  No acute focal motor deficits. Crude sensation intact.     Labs and imaging reviewed    LABS:                        8.3    7.35  )-----------( 144      ( 04 Oct 2018 06:30 )             25.3     10-04    143  |  107  |  43<H>  ----------------------------<  82  4.0   |  23  |  1.61<H>    Ca    8.7      04 Oct 2018 06:30  Phos  2.5     10-04  Mg     1.9     10-04              CAPILLARY BLOOD GLUCOSE                  RADIOLOGY & ADDITIONAL STUDIES:

## 2018-10-04 NOTE — PROGRESS NOTE ADULT - SUBJECTIVE AND OBJECTIVE BOX
Pt seen and examined at bedside  feels fairly well  had slight recal bleeding last night  to have colonoscopy yesterday  npo  no dyspnea      Allergies:  No Known Allergies    Hospital Medications:   MEDICATIONS  (STANDING):  chlorhexidine 4% Liquid 1 Application(s) Topical <User Schedule>  influenza   Vaccine 0.5 milliLiter(s) IntraMuscular once  lactobacillus acidophilus 1 Tablet(s) Oral daily  pantoprazole Infusion 8 mG/Hr (10 mL/Hr) IV Continuous <Continuous>  tamsulosin 0.4 milliGRAM(s) Oral at bedtime         VITALS:  T(F): 97.5 (10-04-18 @ 09:42), Max: 98.4 (10-03-18 @ 17:10)  HR: 88 (10-04-18 @ 09:42)  BP: 141/83 (10-04-18 @ 09:42)  RR: 18 (10-04-18 @ 09:42)  SpO2: 100% (10-04-18 @ 09:42)  Wt(kg): --    10-03 @ 07:01  -  10-04 @ 07:00  --------------------------------------------------------  IN: 0 mL / OUT: 375 mL / NET: -375 mL        PHYSICAL EXAM:  Constitutional: NAD.lying comfortably in bed  HEENT: anicteric sclera, oropharynx clear, MMM  Neck:   JVD  flat. poor skin turgor  Respiratory: CTAB, no wheezes, rales or rhonchi  Cardiovascular: S1, S2, RRR  Gastrointestinal: BS+, soft, NT/ND  Extremities: No cyanosis or clubbing. No peripheral edema  Neurological: A/O x 3, no focal deficits  Psychiatric: Normal mood, normal affect  : No CVA tenderness. No flores.   Skin: No rashes       LABS:  10-04    143  |  107  |  43<H>  ----------------------------<  82  4.0   |  23  |  1.61<H>    Ca    8.7      04 Oct 2018 06:30  Phos  2.5     10-04  Mg     1.9     10-04      Creatinine Trend: 1.61 <--, 1.58 <--, 1.31 <--, 1.36 <--, 1.42 <--, 1.63 <--                        8.3    7.35  )-----------( 144      ( 04 Oct 2018 06:30 )             25.3     Urine Studies:      RADIOLOGY & ADDITIONAL STUDIES:

## 2018-10-05 ENCOUNTER — TRANSCRIPTION ENCOUNTER (OUTPATIENT)
Age: 83
End: 2018-10-05

## 2018-10-05 LAB
BUN SERPL-MCNC: 34 MG/DL — HIGH (ref 7–23)
CALCIUM SERPL-MCNC: 9.3 MG/DL — SIGNIFICANT CHANGE UP (ref 8.4–10.5)
CHLORIDE SERPL-SCNC: 108 MMOL/L — HIGH (ref 98–107)
CO2 SERPL-SCNC: 22 MMOL/L — SIGNIFICANT CHANGE UP (ref 22–31)
CREAT SERPL-MCNC: 1.47 MG/DL — HIGH (ref 0.5–1.3)
GLUCOSE SERPL-MCNC: 118 MG/DL — HIGH (ref 70–99)
HCT VFR BLD CALC: 28.9 % — LOW (ref 39–50)
HGB BLD-MCNC: 9.3 G/DL — LOW (ref 13–17)
MAGNESIUM SERPL-MCNC: 1.9 MG/DL — SIGNIFICANT CHANGE UP (ref 1.6–2.6)
MCHC RBC-ENTMCNC: 29.1 PG — SIGNIFICANT CHANGE UP (ref 27–34)
MCHC RBC-ENTMCNC: 32.2 % — SIGNIFICANT CHANGE UP (ref 32–36)
MCV RBC AUTO: 90.3 FL — SIGNIFICANT CHANGE UP (ref 80–100)
NRBC # FLD: 0 — SIGNIFICANT CHANGE UP
PHOSPHATE SERPL-MCNC: 2.6 MG/DL — SIGNIFICANT CHANGE UP (ref 2.5–4.5)
PLATELET # BLD AUTO: 180 K/UL — SIGNIFICANT CHANGE UP (ref 150–400)
PMV BLD: 10 FL — SIGNIFICANT CHANGE UP (ref 7–13)
POTASSIUM SERPL-MCNC: 4.4 MMOL/L — SIGNIFICANT CHANGE UP (ref 3.5–5.3)
POTASSIUM SERPL-SCNC: 4.4 MMOL/L — SIGNIFICANT CHANGE UP (ref 3.5–5.3)
RBC # BLD: 3.2 M/UL — LOW (ref 4.2–5.8)
RBC # FLD: 15.9 % — HIGH (ref 10.3–14.5)
SODIUM SERPL-SCNC: 142 MMOL/L — SIGNIFICANT CHANGE UP (ref 135–145)
WBC # BLD: 6.42 K/UL — SIGNIFICANT CHANGE UP (ref 3.8–10.5)
WBC # FLD AUTO: 6.42 K/UL — SIGNIFICANT CHANGE UP (ref 3.8–10.5)

## 2018-10-05 RX ORDER — PANTOPRAZOLE SODIUM 20 MG/1
40 TABLET, DELAYED RELEASE ORAL
Qty: 0 | Refills: 0 | Status: DISCONTINUED | OUTPATIENT
Start: 2018-10-05 | End: 2018-10-07

## 2018-10-05 RX ORDER — TAMSULOSIN HYDROCHLORIDE 0.4 MG/1
1 CAPSULE ORAL
Qty: 0 | Refills: 0 | COMMUNITY
Start: 2018-10-05

## 2018-10-05 RX ORDER — PANTOPRAZOLE SODIUM 20 MG/1
1 TABLET, DELAYED RELEASE ORAL
Qty: 0 | Refills: 0 | COMMUNITY
Start: 2018-10-05

## 2018-10-05 RX ORDER — LACTOBACILLUS ACIDOPHILUS 100MM CELL
1 CAPSULE ORAL
Qty: 0 | Refills: 0 | COMMUNITY
Start: 2018-10-05

## 2018-10-05 RX ADMIN — PANTOPRAZOLE SODIUM 40 MILLIGRAM(S): 20 TABLET, DELAYED RELEASE ORAL at 17:36

## 2018-10-05 RX ADMIN — TAMSULOSIN HYDROCHLORIDE 0.4 MILLIGRAM(S): 0.4 CAPSULE ORAL at 23:36

## 2018-10-05 RX ADMIN — CHLORHEXIDINE GLUCONATE 1 APPLICATION(S): 213 SOLUTION TOPICAL at 05:32

## 2018-10-05 RX ADMIN — Medication 1 TABLET(S): at 12:27

## 2018-10-05 RX ADMIN — SODIUM CHLORIDE 75 MILLILITER(S): 9 INJECTION INTRAMUSCULAR; INTRAVENOUS; SUBCUTANEOUS at 05:50

## 2018-10-05 RX ADMIN — PANTOPRAZOLE SODIUM 10 MG/HR: 20 TABLET, DELAYED RELEASE ORAL at 05:50

## 2018-10-05 NOTE — PROGRESS NOTE ADULT - ASSESSMENT
PASCUAL OLSEN is a 89yo F admitted after p/w hematochezia in ED requiring transfusions. Hematocrit stable since 2040 on 10/2.    PLAN:  - GI thinks pt had diverticular bleed  - Trend H&H; transfuse as needed  - Will continue to follow closely with you    A SURGERY  [id number]

## 2018-10-05 NOTE — PROGRESS NOTE ADULT - ASSESSMENT
1. Normocytic Anemia sec to acute blood loss from rectal bleed    -- colonoscopy report noted. Likelt diverticular bleed  -- GI and CRS follow up  -- CT A/P w no masses or TRAVON but extensive diverticulosis  -- Keep Hgb > 8 in light of active bleed  -- neg SPEP, Epo level low at 29  -- Anemia of CKD less likely a major factor  -- Hold off on Procrit in light of Active Bleed     Carlos Gloria MD  [phone]

## 2018-10-05 NOTE — DISCHARGE NOTE ADULT - HOSPITAL COURSE
89 y/o M w/ a pmh significant for BPH s/p prostatectomy and CKD4 presents to the ED w/ a chief complaint of BRBPR.     Hospital Course    Bright red blood per rectum- GI Dr Norton consulted. Transfused 2 U PRBC 10/1. 10/2 & 10/3 s/p 2 u PRBC (total 4 units). Colorectal Sx Dr Michaels consulted. CT A/P- No mass or lymphadenopathy. Colonic diverticulosis. Trabeculated urinary bladder with multiple diverticula and an enlarged prostate gland. 10/4 s/p Colonoscopy-  Multiple small and large-mouthed diverticula were found in the sigmoid colon. No specimen collected    Anemia-  CT A/P w no masses or TRAVON but extensive diverticulosis. Iron Studies normal, No B12/Folate Deficiency, No Hemolysis. Keep Hgb > 8 in light of active bleed. SPEP- neg, RAUL- NO EVIDENCE OF MONOCLONAL COMPONENTS. Hold off on Procrit in light of Active Bleed    BPH - Tamsulosin 0.4mg PO QD    CKD 4- Nephro Dr Villegas following. Avoid nephrotoxins, NSAIDs, Renally dose meds    Compression fracture- Neuro Dr Schreiber consulted. CT A/P- Severe L4 vertebral body compression deformity, new since 2/2/2015 age-indeterminate. Unchanged 4.0 cm distal descending thoracic aortic aneurysm. Per Neuro if pain w/ ambulation, can consider TLSO brace ( pt denies pain with ambulation). PT -home no needs    **10/1 s/p RRT  hypotensive and AMS with large bloody BM. s/p 2 units PRBC  Dispo Home with home care and outpatient f/u 89 y/o M w/ a pmh significant for BPH s/p prostatectomy and CKD4 presents to the ED w/ a chief complaint of BRBPR.     Hospital Course    Bright red blood per rectum- GI Dr Norton consulted. Transfused 2 U PRBC 10/1. 10/2 & 10/3 s/p 2 u PRBC (total 4 units). Colorectal Sx Dr Michaels consulted. CT A/P- No mass or lymphadenopathy. Colonic diverticulosis. Trabeculated urinary bladder with multiple diverticula and an enlarged prostate gland. 10/4 s/p Colonoscopy-  Multiple small and large-mouthed diverticula were found in the sigmoid colon. No specimen collected. Hgb remained stable on discharge.     Anemia-  CT A/P w no masses or TRAVON but extensive diverticulosis. Iron Studies normal, No B12/Folate Deficiency, No Hemolysis. Keep Hgb > 8 in light of active bleed. SPEP- neg, RAUL- NO EVIDENCE OF MONOCLONAL COMPONENTS. Hold off on Procrit in light of Active Bleed    BPH - Tamsulosin 0.4mg PO QD    CKD 4- Nephro Dr Villegas following. Avoid nephrotoxins, NSAIDs, Renally dose meds    Compression fracture- Neuro Dr Schreiber consulted. CT A/P- Severe L4 vertebral body compression deformity, new since 2/2/2015 age-indeterminate. Unchanged 4.0 cm distal descending thoracic aortic aneurysm. Per Neuro if pain w/ ambulation, can consider TLSO brace ( pt denies pain with ambulation). PT -home no needs    Dispo Home with home care and outpatient f/u

## 2018-10-05 NOTE — PROGRESS NOTE ADULT - ASSESSMENT
Pia Joyce is a 89 y.o. with history of CKD4, BPH s/p prostatectomy, cataract surgery, and bifascicular block who initially presented to the hospital on 9/29 for BRBPR. Required a total of 3 units pRBC since admission; continued trend down since last transfusion (10/1). Hematocrit stable since 2040 on 10/2.    Plan:  - F/U Colonoscopy planning  - C/w CLD  - Trend H&H; transfuse as needed  - Colorectal surgery will follow    [id number]

## 2018-10-05 NOTE — PROGRESS NOTE ADULT - SUBJECTIVE AND OBJECTIVE BOX
PASCUAL OLSEN:[id number],   89yMale followed for:  No Known Allergies    PAST MEDICAL & SURGICAL HISTORY:  Chronic kidney disease (CKD), stage IV (severe)  Benign prostatic hypertrophy  S/P cataract surgery, left: 3 yrs ago    FAMILY HISTORY:  No pertinent family history in first degree relatives    MEDICATIONS  (STANDING):  chlorhexidine 4% Liquid 1 Application(s) Topical <User Schedule>  influenza   Vaccine 0.5 milliLiter(s) IntraMuscular once  lactobacillus acidophilus 1 Tablet(s) Oral daily  pantoprazole Infusion 8 mG/Hr (10 mL/Hr) IV Continuous <Continuous>  tamsulosin 0.4 milliGRAM(s) Oral at bedtime    MEDICATIONS  (PRN):  oxyCODONE    5 mG/acetaminophen 325 mG 1 Tablet(s) Oral every 6 hours PRN hip pain      Vital Signs Last 24 Hrs  T(C): 36.5 (05 Oct 2018 07:18), Max: 36.8 (04 Oct 2018 21:01)  T(F): 97.7 (05 Oct 2018 07:18), Max: 98.3 (04 Oct 2018 21:01)  HR: 85 (05 Oct 2018 07:18) (63 - 88)  BP: 136/81 (05 Oct 2018 07:18) (136/81 - 152/91)  BP(mean): --  RR: 16 (05 Oct 2018 07:18) (16 - 18)  SpO2: 100% (05 Oct 2018 07:18) (95% - 100%)  nc/at  s1s2  cta  soft, nt, nd no guarding or rebound  no c/c/e    CBC Full  -  ( 04 Oct 2018 06:30 )  WBC Count : 7.35 K/uL  Hemoglobin : 8.3 g/dL  Hematocrit : 25.3 %  Platelet Count - Automated : 144 K/uL  Mean Cell Volume : 86.9 fL  Mean Cell Hemoglobin : 28.5 pg  Mean Cell Hemoglobin Concentration : 32.8 %  Auto Neutrophil # : x  Auto Lymphocyte # : x  Auto Monocyte # : x  Auto Eosinophil # : x  Auto Basophil # : x  Auto Neutrophil % : x  Auto Lymphocyte % : x  Auto Monocyte % : x  Auto Eosinophil % : x  Auto Basophil % : x    10-04    143  |  107  |  43<H>  ----------------------------<  82  4.0   |  23  |  1.61<H>    Ca    8.7      04 Oct 2018 06:30  Phos  2.5     10-04  Mg     1.9     10-04

## 2018-10-05 NOTE — PROGRESS NOTE ADULT - SUBJECTIVE AND OBJECTIVE BOX
Patient seen and examined at bedside  No acute events noted overnight  Case discussed with medical team    HPI:  89 y/o pleasant Male w/ a pmh significant for CKD4 and BPH s/p prostatectomy presenting to the ED w/ a chief complaint of BRBPR. Pt was in his usual state of health up until yesterday evening when his aid noticed some bright red blood in his diaper. He says this has never happened to him before. He says the stool was 'mostly brown w/ small red clots'. Pt had two other bowel movements after this episode that was w/o blood/clots. Of note, pt says he fell in March 2018, after which he was taking Ibuprofen (unknown dosage) two times per day for R hip pain. He had a colonoscopy done ~10-15 years ago that was reportedly normal per family at bedside. He has approximately 2 BM's per day and has no hx of constipation or painful bowel movements. He denies any new medications, n/v, hematemesis, hemoptysis, CP, palpitations SOB, abdominal pain, melena or hemorrhoids.      In the ED, pt afebrile, /85, HR 82, RR 15 (100% RA).Labs notable for Hb 8.7 (MCV 87.7) BUN/Cr 16. FOBT positive. Pt bolused 1L NS and Protonix 80mg IVP x1. (29 Sep 2018 15:55)      PAST MEDICAL & SURGICAL HISTORY:  Chronic kidney disease (CKD), stage IV (severe)  Benign prostatic hypertrophy  S/P cataract surgery, left: 3 yrs ago      No Known Allergies       MEDICATIONS  (STANDING):  chlorhexidine 4% Liquid 1 Application(s) Topical <User Schedule>  influenza   Vaccine 0.5 milliLiter(s) IntraMuscular once  lactobacillus acidophilus 1 Tablet(s) Oral daily  pantoprazole Infusion 8 mG/Hr (10 mL/Hr) IV Continuous <Continuous>  tamsulosin 0.4 milliGRAM(s) Oral at bedtime    MEDICATIONS  (PRN):  oxyCODONE    5 mG/acetaminophen 325 mG 1 Tablet(s) Oral every 6 hours PRN hip pain      REVIEW OF SYSTEMS:  CONSTITUTIONAL: (+) malaise.   EYES: No acute change in vision   ENT:  No tinnitus  NECK: No stiffness  RESPIRATORY: No hemoptysis  CARDIOVASCULAR: No chest pain, palpitations, syncope  GASTROINTESTINAL: No hematemesis, diarrhea, melena, or hematochezia.  GENITOURINARY: No hematuria  NEUROLOGICAL: No headaches  LYMPH Nodes: No enlarged glands  ENDOCRINE: No heat or cold intolerance	    T(C): 36.5 (10-05-18 @ 07:18), Max: 36.8 (10-04-18 @ 21:01)  HR: 85 (10-05-18 @ 07:18) (63 - 88)  BP: 136/81 (10-05-18 @ 07:18) (136/81 - 152/91)  RR: 16 (10-05-18 @ 07:18) (16 - 18)  SpO2: 100% (10-05-18 @ 07:18) (95% - 100%)    PHYSICAL EXAMINATION:   Constitutional: WD, NAD  HEENT: NC, AT  Neck:  Supple  Respiratory:  Adequate airflow b/l. Not using accessory muscles of respiration.  Cardiovascular:  S1 & S2 intact, no R/G, 2+ radial pulses b/l  Gastrointestinal: Soft, NT, ND, normoactive b.s., no organomegaly/RT/rigidity  Extremities: WWP  Neurological:  Alert and awake.  No acute focal motor deficits. Crude sensation intact.     Labs and imaging reviewed    LABS:                        8.3    7.35  )-----------( 144      ( 04 Oct 2018 06:30 )             25.3     10-04    143  |  107  |  43<H>  ----------------------------<  82  4.0   |  23  |  1.61<H>    Ca    8.7      04 Oct 2018 06:30  Phos  2.5     10-04  Mg     1.9     10-04              CAPILLARY BLOOD GLUCOSE                  RADIOLOGY & ADDITIONAL STUDIES:

## 2018-10-05 NOTE — PROGRESS NOTE ADULT - SUBJECTIVE AND OBJECTIVE BOX
[facility]        - Patient seen and examined.  - Today, patient is without complaints.         *****MEDICATIONS: Current medication reviewed and documented.    MEDICATIONS  (STANDING):  chlorhexidine 4% Liquid 1 Application(s) Topical <User Schedule>  influenza   Vaccine 0.5 milliLiter(s) IntraMuscular once  lactobacillus acidophilus 1 Tablet(s) Oral daily  pantoprazole    Tablet 40 milliGRAM(s) Oral two times a day  tamsulosin 0.4 milliGRAM(s) Oral at bedtime    MEDICATIONS  (PRN):  oxyCODONE    5 mG/acetaminophen 325 mG 1 Tablet(s) Oral every 6 hours PRN hip pain           ***** REVIEW OF SYSTEM:  GEN: no fever, no chills, no pain  RESP: no SOB, no cough, no sputum  CVS: no chest pain, no palpitations, no edema  GI: no abdominal pain, no nausea, no vomiting, no constipation, no diarrhea  : no dysurea, no frequency  NEURO: no headache, no diziness  PSYCH: no depression, not anxious  Derm : no itching, no rash         ***** VITAL SIGNS:  T(F): 98.7 (10-05-18 @ 21:26), Max: 98.7 (10-05-18 @ 21:26)  HR: 87 (10-05-18 @ 21:26) (84 - 87)  BP: 117/73 (10-05-18 @ 21:26) (117/73 - 136/81)  RR: 17 (10-05-18 @ 21:26) (16 - 17)  SpO2: 100% (10-05-18 @ 21:26) (100% - 100%)  Wt(kg): --  ,   I&O's Summary    04 Oct 2018 07:01  -  05 Oct 2018 07:00  --------------------------------------------------------  IN: 0 mL / OUT: 450 mL / NET: -450 mL             *****PHYSICAL EXAM:   alert oriented x 3 attention comprehension are fair.  Able to name, repeat.   EOmi fundi not visualized   no nystagmus VFF to confrontation  Tongue is midline  Palate elevates symmetrically   Moving all 4 ext spontaneously no drift appreciated    Gait not assessed.            *****LAB AND IMAGIN.3    6.42  )-----------( 180      ( 05 Oct 2018 13:34 )             28.9               10-05    142  |  108<H>  |  34<H>  ----------------------------<  118<H>  4.4   |  22  |  1.47<H>    Ca    9.3      05 Oct 2018 13:34  Phos  2.6     10-05  Mg     1.9     10-                           [All pertinent recent Imaging/Reports reviewed]       *****A S S E S S M E N T   A N D   P L A N :       89 y/o M w/ a pmhx significant for BPH s/p prostatectomy and CKD4 presents to the ED w/ a chief complaint of BRBPR.    Problem/Plan - 1: gi bleed.   s/p colonoscopy no source of blood was found.     Problem/Plan - 2: l 4 compression fracture, likely old,   no limitation of mvt  can consider tlso brace if there is pain with ambulation.         *****A S S E S S M E N T   A N D   P L A N :  	        Thank you for allowing me to participate in the care of this patient. Please do not hesitate to call me if you have any  questions.        ________________  Ava Schreiber MD  [facility] ([facility]  [phone]     30 minutes spent on total encounter; more than 50 % of the visit was  spent counseling and or  coordinating care by the attending physician.   At the present time, [facility] does not  provide outpatient followup, best to call the your insurance to find a participating provider.  This was explained to you at the time of the visit. Alternatively, if your insurance allows it, you can follow up with a neurologist  Dr. Jed Gallagher([place]) [phone] or Dr. Abdiaziz Scott ( [place]) [phone]

## 2018-10-05 NOTE — PROGRESS NOTE ADULT - SUBJECTIVE AND OBJECTIVE BOX
General Surgery Consult  Consulting surgical team: A  Consulting attending: Dr. Brando Yu melena overnight.  denies n/v, +Flatus, tolerating CLD      Vital Signs Last 24 Hrs  T(C): 36.8 (04 Oct 2018 21:01), Max: 36.8 (04 Oct 2018 21:01)  T(F): 98.3 (04 Oct 2018 21:01), Max: 98.3 (04 Oct 2018 21:01)  HR: 63 (04 Oct 2018 21:01) (63 - 88)  BP: 140/90 (04 Oct 2018 21:01) (140/90 - 152/91)  BP(mean): --  RR: 18 (04 Oct 2018 21:01) (18 - 18)  SpO2: 100% (04 Oct 2018 21:01) (95% - 100%)    10-04-18 @ 07:01  -  10-05-18 @ 07:00  --------------------------------------------------------  IN: 0 mL / OUT: 0 mL / NET: 0 mL      MEDICATIONS  (STANDING):  chlorhexidine 4% Liquid 1 Application(s) Topical <User Schedule>  influenza   Vaccine 0.5 milliLiter(s) IntraMuscular once  lactobacillus acidophilus 1 Tablet(s) Oral daily  pantoprazole Infusion 8 mG/Hr (10 mL/Hr) IV Continuous <Continuous>  sodium chloride 0.9%. 1000 milliLiter(s) (75 mL/Hr) IV Continuous <Continuous>  tamsulosin 0.4 milliGRAM(s) Oral at bedtime    MEDICATIONS  (PRN):  oxyCODONE    5 mG/acetaminophen 325 mG 1 Tablet(s) Oral every 6 hours PRN hip pain      PHYSICAL EXAM:  General: No acute distress  Respiratory: Nonlabored  Cardiovascular: normotensive, regular rate   Abdominal: Soft, nondistended, nontender. No rebound or guarding. No organomegaly, no palpable mass.  Extremities: Warm  Rectal: no external hemorrhoid seen, no blood in rectal vault      LABS:         CBC (10-04 @ 06:30)                              8.3<L>                         7.35    )----------------(  144<L>     --    % Neutrophils, --    % Lymphocytes, ANC: --                                  25.3<L>                [facility] (10-04 @ 06:30)             143     |  107     |  43<H> 		Ca++ --      Ca 8.7                ---------------------------------( 82    		Mg 1.9                4.0     |  23      |  1.61<H>			Ph 2.5

## 2018-10-05 NOTE — PROGRESS NOTE ADULT - ASSESSMENT
89 y/o pleasant Male w/ a pmh significant for CKD3 and BPH s/p prostatectomy presenting to the ED w/ a chief complaint of BRBPR. Renal following for PHU, CKD      PHU on CKD stage III. b/l cr apperas to be around 1.4mg/dl  Age and HTN nephropathy likely underlying cause    HTN, controlled well  Anemia.  likely 2/2 blood loss/GIB. unlikely 2/2 CKD  Tsat 31% ferritin 149- not low     chart reviewed. no new labs today

## 2018-10-05 NOTE — DISCHARGE NOTE ADULT - MEDICATION SUMMARY - MEDICATIONS TO TAKE
I will START or STAY ON the medications listed below when I get home from the hospital:    tamsulosin 0.4 mg oral capsule  -- 1 cap(s) by mouth once a day (at bedtime)  -- Indication: For BPH (benign prostatic hyperplasia)    lactobacillus acidophilus oral capsule  -- 1 tab(s) by mouth 2 times a day  -- Indication: For Rectal bleeding    pantoprazole 40 mg oral delayed release tablet  -- 1 tab(s) by mouth 2 times a day  -- Indication: For Bright red blood per rectum

## 2018-10-05 NOTE — PROGRESS NOTE ADULT - SUBJECTIVE AND OBJECTIVE BOX
Patient with no anginal chest pain or shortness of breath  No syncope/near syncope or palps  ROS otherwise negative       MEDICATIONS  (STANDING):  chlorhexidine 4% Liquid 1 Application(s) Topical <User Schedule>  influenza   Vaccine 0.5 milliLiter(s) IntraMuscular once  lactobacillus acidophilus 1 Tablet(s) Oral daily  pantoprazole Infusion 8 mG/Hr (10 mL/Hr) IV Continuous <Continuous>  tamsulosin 0.4 milliGRAM(s) Oral at bedtime    MEDICATIONS  (PRN):  oxyCODONE    5 mG/acetaminophen 325 mG 1 Tablet(s) Oral every 6 hours PRN hip pain      LABS:                        8.3    7.35  )-----------( 144      ( 04 Oct 2018 06:30 )             25.3     Hemoglobin: 8.3 g/dL (10-04 @ 06:30)  Hemoglobin: 7.8 g/dL (10-03 @ 12:28)  Hemoglobin: 7.9 g/dL (10-03 @ 06:30)  Hemoglobin: 7.7 g/dL (10-02 @ 20:40)  Hemoglobin: 9.8 g/dL (10-02 @ 06:44)    10-04    143  |  107  |  43<H>  ----------------------------<  82  4.0   |  23  |  1.61<H>    Ca    8.7      04 Oct 2018 06:30  Phos  2.5     10-04  Mg     1.9     10-04      Creatinine Trend: 1.61<--, 1.58<--, 1.31<--, 1.36<--, 1.42<--, 1.63<--           PHYSICAL EXAM  Vital Signs Last 24 Hrs  T(C): 36.5 (05 Oct 2018 07:18), Max: 36.8 (04 Oct 2018 21:01)  T(F): 97.7 (05 Oct 2018 07:18), Max: 98.3 (04 Oct 2018 21:01)  HR: 85 (05 Oct 2018 07:18) (63 - 88)  BP: 136/81 (05 Oct 2018 07:18) (136/81 - 152/91)  BP(mean): --  RR: 16 (05 Oct 2018 07:18) (16 - 18)  SpO2: 100% (05 Oct 2018 07:18) (95% - 100%)        Cardiovascular:  S1S2 RRR, No JVD  Respiratory: Lungs clear to auscultation, normal effort  Gastrointestinal: Abdomen soft, ND, NT, +BS  Skin: Warm, dry, intact. No rash.  Musculoskeletal: Normal ROM, normal strength  Ext: No C/C/E B/L LE    DIAGNOSTIC DATA    < from: Transthoracic Echocardiogram (06.11.18 @ 00:00) >  CONCLUSIONS:  1. Mitral annular calcification, otherwise normal mitral  valve. Minimal mitral regurgitation.  2. Aortic valve leaflet morphology not well visualized.  Mild-moderate aortic regurgitation.  3. Endocardium not well visualized; grossly normal left  ventricular systolic function.  4. Normal right ventricular size and function.  5. Estimated right ventricular systolic pressure equals 42  mm Hg, assuming right atrial pressure equals 10 mm Hg,  consistent with mild pulmonary hypertension.  ------------------------------------------------------------------------  Confirmed on  6/11/2018 - 16:34:30 by Nilo Diallo M.D.    < end of copied text >    < from: CT Abdomen and Pelvis w/ Oral Cont (09.30.18 @ 17:18) >  IMPRESSION:     No mass or lymphadenopathy.    Colonic diverticulosis.    Trabeculated urinary bladder with multiple diverticula and an enlarged   prostate gland again noted.    Severe L4 vertebral body compression deformity, new since 2/2/2015 but   otherwise age-indeterminate.    Unchanged 4.0 cm distal descending thoracic aortic aneurysm.     < end of copied text >      ASSESSMENT AND PLAN:    He is a pleasant 89 y/o male PMH bifascicular block who is now admitted with BRBPR/ suspected diverticular bleed per GI.  His last echo in June 2018 was normal. His RCRI score is 0-1 for mild CKD.  Patient is s/p colonoscopy showing likely diverticular bleed.     - he tolerated the procedure well   -Anemia - received PRBC 10/1 and 10/3   - pending todays labs  - GI /surgery noted - no apparent plans for further procedures at this time  - care  per primary team  -no further inpatient cardiac workup needed as long as he doesn't have syncope

## 2018-10-05 NOTE — DISCHARGE NOTE ADULT - PLAN OF CARE
Remain free from bleeding - GI Dr Norton consulted  - Transfused 2 U PRBC on 10/1. 10/2 & 10/3 s/p 2 u PRBC (total 4 units) in the hospital  - Colorectal Sx Dr Michaels consulted no intervention offered  - CT A/P- No mass or lymphadenopathy. Colonic diverticulosis. Trabeculated urinary bladder with multiple diverticula and an enlarged prostate gland   - 10/4 s/p Colonoscopy-  Multiple small and large-mouthed diverticula were found in the sigmoid colon. No specimen collected  - follow up with your PCP and GI as outpatient for further management -  CT A/P w no masses or TRAVON but extensive diverticulosis  - Iron Studies normal, No B12/Folate Deficiency, No Hemolysis  - Keep Hgb > 8 in light of active bleed  - SPEP- neg, RAUL- NO EVIDENCE OF MONOCLONAL COMPONENTS  - Hold off on Procrit in light of Active Bleed for now  - follow up with your PCP and Heme Dr Gloria as outpatient for further management - Tamsulosin 0.4mg PO QD  - follow up with your PCP as outpatient for further management - Nephro Dr Villegas following  - Avoid nephrotoxins, NSAIDs, Renally dose meds  - follow up with your PCP as outpatient for further management Follow-up with GI and Colorectal Surgery Follow-up with primary care doctor Follow-up with your primary care doctor

## 2018-10-05 NOTE — DISCHARGE NOTE ADULT - CARE PROVIDER_API CALL
Nigel Norton (DO), Gastroenterology; Internal Medicine  [address]  [place] [address]  Phone: [phone]  Fax: [phone]    Carlos Gloria), Hematology; Medical Oncology  [address] [address]  Phone: [phone]  Fax: [phone]    Raya Rojas), Internal Medicine; Nephrology  [address] [place] [address]  Phone: [phone]  Fax: [phone]    Rashmi Morrison), Cardiovascular Disease; Internal Medicine; Interventional Cardiology  [address] [facility]  [place] [address]  Phone: [phone]  Fax: [phone]

## 2018-10-05 NOTE — PROGRESS NOTE ADULT - SUBJECTIVE AND OBJECTIVE BOX
[facility] - Crystal / Corrie LARRY /Kylah/ LARON Villegas/ LARON Beard/ Shiva Mullen / MAGUI Montielu  ---------------------------------------------------------------------------------------------------------------    Patient seen and examined bedside    Subjective and Objective: No overnight events. No complaints today. off PPI drip. tolerating reg diet. no further bleeding    Allergies: No Known Allergies      Hospital Medications:   MEDICATIONS  (STANDING):  chlorhexidine 4% Liquid 1 Application(s) Topical <User Schedule>  influenza   Vaccine 0.5 milliLiter(s) IntraMuscular once  lactobacillus acidophilus 1 Tablet(s) Oral daily  pantoprazole    Tablet 40 milliGRAM(s) Oral two times a day  tamsulosin 0.4 milliGRAM(s) Oral at bedtime      VITALS:  T(F): 97.7 (10-05-18 @ 07:18), Max: 98.3 (10-04-18 @ 21:01)  HR: 85 (10-05-18 @ 07:18)  BP: 136/81 (10-05-18 @ 07:18)  RR: 16 (10-05-18 @ 07:18)  SpO2: 100% (10-05-18 @ 07:18)  Wt(kg): --    10-04 @ 07:01  -  10-05 @ 07:00  --------------------------------------------------------  IN: 0 mL / OUT: 450 mL / NET: -450 mL    PHYSICAL EXAM:  Constitutional: NAD  HEENT: anicteric sclera, oropharynx clear  Neck: No JVD  Respiratory: CTAB, no wheezes, rales or rhonchi  Cardiovascular: S1, S2, RRR  Gastrointestinal: BS+, soft, NT/ND  Extremities: No cyanosis or clubbing. No peripheral edema  Neurological: A/O x 3, no focal deficits  Psychiatric: Normal mood, normal affect  : No CVA tenderness. No flores.   Skin: No rashes    LABS:  10-05    142  |  108<H>  |  34<H>  ----------------------------<  118<H>  4.4   |  22  |  1.47<H>    Ca    9.3      05 Oct 2018 13:34  Phos  2.6     10-05  Mg     1.9     10-05      Creatinine Trend: 1.47 <--, 1.61 <--, 1.58 <--, 1.31 <--, 1.36 <--, 1.42 <--, 1.63 <--                        9.3    6.42  )-----------( 180      ( 05 Oct 2018 13:34 )             28.9     Urine Studies:        RADIOLOGY & ADDITIONAL STUDIES:

## 2018-10-05 NOTE — PROGRESS NOTE ADULT - ATTENDING COMMENTS
Patient seen and examined, agree with above assessment and plan as transcribed above.    - Outpatient cardiac f/u  - No clinical CHF  - I will sign off please call back if needed    Jeff Galicia MD, FACC

## 2018-10-05 NOTE — DISCHARGE NOTE ADULT - CARE PROVIDERS DIRECT ADDRESSES
[email or web address],DirectAddress_Unknown,DirectAddress_Unknown,DirectAddress_Unknown

## 2018-10-05 NOTE — DISCHARGE NOTE ADULT - SECONDARY DIAGNOSIS.
Anemia due to acute blood loss BPH (benign prostatic hyperplasia) CKD (chronic kidney disease), stage III

## 2018-10-05 NOTE — PROGRESS NOTE ADULT - SUBJECTIVE AND OBJECTIVE BOX
[facility] TEAM SURGERY DAILY PROGRESS NOTE    SUBJECTIVE:    The patient was seen and examined at bedside this morning.  -  had colonoscopy, no mass or lesion so GI is thinking this is diverticulosis  -  pt with no bm and no melena overnight      OBJECTIVE:    Vital Signs Last 24 Hrs  T(C): 36.5 (05 Oct 2018 17:01), Max: 36.8 (04 Oct 2018 21:01)  T(F): 97.7 (05 Oct 2018 17:01), Max: 98.3 (04 Oct 2018 21:01)  HR: 84 (05 Oct 2018 17:01) (63 - 85)  BP: 132/74 (05 Oct 2018 17:01) (132/74 - 140/90)  BP(mean): --  RR: 17 (05 Oct 2018 17:01) (16 - 18)  SpO2: 100% (05 Oct 2018 17:01) (100% - 100%)    I&O's Detail    04 Oct 2018 07:01  -  05 Oct 2018 07:00  --------------------------------------------------------  IN:  Total IN: 0 mL    OUT:    Voided: 450 mL  Total OUT: 450 mL    Total NET: -450 mL      LABS:                        9.3    6.42  )-----------( 180      ( 05 Oct 2018 13:34 )             28.9     10-05    142  |  108<H>  |  34<H>  ----------------------------<  118<H>  4.4   |  22  |  1.47<H>    Ca    9.3      05 Oct 2018 13:34  Phos  2.6     10-05  Mg     1.9     10-05      EXAM:  General: No acute distress  Respiratory: Nonlabored  Cardiovascular: normotensive, regular rate   Abdominal: Soft, nondistended, nontender. No rebound or guarding. No organomegaly, no palpable mass.  Extremities: Warm  Rectal: no external hemorrhoid seen, no blood in rectal vault [facility] TEAM SURGERY DAILY PROGRESS NOTE    SUBJECTIVE:    The patient was seen and examined at bedside this morning.  -  had colonoscopy, no mass or lesion so GI is thinking this is diverticulosis  -  pt with no bm and no melena overnight      OBJECTIVE:    Vital Signs Last 24 Hrs  T(C): 36.5 (05 Oct 2018 17:01), Max: 36.8 (04 Oct 2018 21:01)  T(F): 97.7 (05 Oct 2018 17:01), Max: 98.3 (04 Oct 2018 21:01)  HR: 84 (05 Oct 2018 17:01) (63 - 85)  BP: 132/74 (05 Oct 2018 17:01) (132/74 - 140/90)  BP(mean): --  RR: 17 (05 Oct 2018 17:01) (16 - 18)  SpO2: 100% (05 Oct 2018 17:01) (100% - 100%)    I&O's Detail    04 Oct 2018 07:01  -  05 Oct 2018 07:00  --------------------------------------------------------  IN:  Total IN: 0 mL    OUT:    Voided: 450 mL  Total OUT: 450 mL    Total NET: -450 mL      LABS:                        9.3    6.42  )-----------( 180      ( 05 Oct 2018 13:34 )             28.9     10-05    142  |  108<H>  |  34<H>  ----------------------------<  118<H>  4.4   |  22  |  1.47<H>    Ca    9.3      05 Oct 2018 13:34  Phos  2.6     10-05  Mg     1.9     10-05      EXAM:  General: No acute distress  Respiratory: Nonlabored  Cardiovascular: normotensive, regular rate   Abdominal: Soft, nondistended, nontender. No rebound or guarding. No organomegaly, no palpable mass.  Extremities: Warm  Rectal: no dried or judi blood at rectum upon visualilzation [facility] TEAM SURGERY DAILY PROGRESS NOTE    SUBJECTIVE:    The patient was seen and examined at bedside this morning.  -  had colonoscopy, no mass or lesion so GI is thinking this is diverticulosis  -  pt with no bm and no melena/hematochezia overnight      OBJECTIVE:    Vital Signs Last 24 Hrs  T(C): 36.5 (05 Oct 2018 17:01), Max: 36.8 (04 Oct 2018 21:01)  T(F): 97.7 (05 Oct 2018 17:01), Max: 98.3 (04 Oct 2018 21:01)  HR: 84 (05 Oct 2018 17:01) (63 - 85)  BP: 132/74 (05 Oct 2018 17:01) (132/74 - 140/90)  BP(mean): --  RR: 17 (05 Oct 2018 17:01) (16 - 18)  SpO2: 100% (05 Oct 2018 17:01) (100% - 100%)    I&O's Detail    04 Oct 2018 07:01  -  05 Oct 2018 07:00  --------------------------------------------------------  IN:  Total IN: 0 mL    OUT:    Voided: 450 mL  Total OUT: 450 mL    Total NET: -450 mL      LABS:                        9.3    6.42  )-----------( 180      ( 05 Oct 2018 13:34 )             28.9     10-05    142  |  108<H>  |  34<H>  ----------------------------<  118<H>  4.4   |  22  |  1.47<H>    Ca    9.3      05 Oct 2018 13:34  Phos  2.6     10-05  Mg     1.9     10-05      EXAM:  General: No acute distress  Respiratory: Nonlabored  Cardiovascular: normotensive, regular rate   Abdominal: Soft, nondistended, nontender. No rebound or guarding. No organomegaly, no palpable mass.  Extremities: Warm  Rectal: no dried or judi blood at rectum upon visualilzation

## 2018-10-05 NOTE — PROGRESS NOTE ADULT - ASSESSMENT
likely diverticular bleed, no bm overnight.  start regular diet.  observe another 24-48 hours.  melena on colonosocpy, kunaley old blood, not good visualization, but no mass or lesion could be seen.  likely divertiuclar, but can't rule out avm

## 2018-10-05 NOTE — DISCHARGE NOTE ADULT - [facility], social work and an eval for an aide pending insurance approval with start of care day after discharge

## 2018-10-06 LAB
BUN SERPL-MCNC: 29 MG/DL — HIGH (ref 7–23)
CALCIUM SERPL-MCNC: 9.2 MG/DL — SIGNIFICANT CHANGE UP (ref 8.4–10.5)
CHLORIDE SERPL-SCNC: 110 MMOL/L — HIGH (ref 98–107)
CO2 SERPL-SCNC: 24 MMOL/L — SIGNIFICANT CHANGE UP (ref 22–31)
CREAT SERPL-MCNC: 1.54 MG/DL — HIGH (ref 0.5–1.3)
GLUCOSE SERPL-MCNC: 111 MG/DL — HIGH (ref 70–99)
HCT VFR BLD CALC: 24.7 % — LOW (ref 39–50)
HCT VFR BLD CALC: 26.1 % — LOW (ref 39–50)
HGB BLD-MCNC: 7.9 G/DL — LOW (ref 13–17)
HGB BLD-MCNC: 8.4 G/DL — LOW (ref 13–17)
MAGNESIUM SERPL-MCNC: 1.8 MG/DL — SIGNIFICANT CHANGE UP (ref 1.6–2.6)
MCHC RBC-ENTMCNC: 28.4 PG — SIGNIFICANT CHANGE UP (ref 27–34)
MCHC RBC-ENTMCNC: 29.1 PG — SIGNIFICANT CHANGE UP (ref 27–34)
MCHC RBC-ENTMCNC: 32 % — SIGNIFICANT CHANGE UP (ref 32–36)
MCHC RBC-ENTMCNC: 32.2 % — SIGNIFICANT CHANGE UP (ref 32–36)
MCV RBC AUTO: 88.8 FL — SIGNIFICANT CHANGE UP (ref 80–100)
MCV RBC AUTO: 90.3 FL — SIGNIFICANT CHANGE UP (ref 80–100)
NRBC # FLD: 0 — SIGNIFICANT CHANGE UP
NRBC # FLD: 0 — SIGNIFICANT CHANGE UP
PHOSPHATE SERPL-MCNC: 2.2 MG/DL — LOW (ref 2.5–4.5)
PLATELET # BLD AUTO: 164 K/UL — SIGNIFICANT CHANGE UP (ref 150–400)
PLATELET # BLD AUTO: 169 K/UL — SIGNIFICANT CHANGE UP (ref 150–400)
PMV BLD: 9.7 FL — SIGNIFICANT CHANGE UP (ref 7–13)
PMV BLD: 9.8 FL — SIGNIFICANT CHANGE UP (ref 7–13)
POTASSIUM SERPL-MCNC: 4.1 MMOL/L — SIGNIFICANT CHANGE UP (ref 3.5–5.3)
POTASSIUM SERPL-SCNC: 4.1 MMOL/L — SIGNIFICANT CHANGE UP (ref 3.5–5.3)
RBC # BLD: 2.78 M/UL — LOW (ref 4.2–5.8)
RBC # BLD: 2.89 M/UL — LOW (ref 4.2–5.8)
RBC # FLD: 16 % — HIGH (ref 10.3–14.5)
RBC # FLD: 16.3 % — HIGH (ref 10.3–14.5)
SODIUM SERPL-SCNC: 145 MMOL/L — SIGNIFICANT CHANGE UP (ref 135–145)
WBC # BLD: 5.34 K/UL — SIGNIFICANT CHANGE UP (ref 3.8–10.5)
WBC # BLD: 5.53 K/UL — SIGNIFICANT CHANGE UP (ref 3.8–10.5)
WBC # FLD AUTO: 5.34 K/UL — SIGNIFICANT CHANGE UP (ref 3.8–10.5)
WBC # FLD AUTO: 5.53 K/UL — SIGNIFICANT CHANGE UP (ref 3.8–10.5)

## 2018-10-06 RX ORDER — POTASSIUM PHOSPHATE, MONOBASIC POTASSIUM PHOSPHATE, DIBASIC 236; 224 MG/ML; MG/ML
15 INJECTION, SOLUTION INTRAVENOUS ONCE
Qty: 0 | Refills: 0 | Status: COMPLETED | OUTPATIENT
Start: 2018-10-06 | End: 2018-10-06

## 2018-10-06 RX ADMIN — Medication 1 TABLET(S): at 12:33

## 2018-10-06 RX ADMIN — TAMSULOSIN HYDROCHLORIDE 0.4 MILLIGRAM(S): 0.4 CAPSULE ORAL at 21:42

## 2018-10-06 RX ADMIN — PANTOPRAZOLE SODIUM 40 MILLIGRAM(S): 20 TABLET, DELAYED RELEASE ORAL at 18:16

## 2018-10-06 RX ADMIN — POTASSIUM PHOSPHATE, MONOBASIC POTASSIUM PHOSPHATE, DIBASIC 62.5 MILLIMOLE(S): 236; 224 INJECTION, SOLUTION INTRAVENOUS at 12:33

## 2018-10-06 RX ADMIN — PANTOPRAZOLE SODIUM 40 MILLIGRAM(S): 20 TABLET, DELAYED RELEASE ORAL at 05:55

## 2018-10-06 RX ADMIN — CHLORHEXIDINE GLUCONATE 1 APPLICATION(S): 213 SOLUTION TOPICAL at 05:55

## 2018-10-06 NOTE — PROGRESS NOTE ADULT - SUBJECTIVE AND OBJECTIVE BOX
[facility] TEAM SURGERY DAILY PROGRESS NOTE    SUBJECTIVE:  The patient denies any hematochezia or melena. Denies abdominal pain.    OBJECTIVE:  Vital Signs Last 24 Hrs  T(C): 37.1 (05 Oct 2018 21:26), Max: 37.1 (05 Oct 2018 21:26)  T(F): 98.7 (05 Oct 2018 21:26), Max: 98.7 (05 Oct 2018 21:26)  HR: 73 (06 Oct 2018 08:30) (73 - 87)  BP: 150/95 (06 Oct 2018 08:30) (117/73 - 150/95)  BP(mean): --  RR: 16 (06 Oct 2018 08:30) (16 - 17)  SpO2: 100% (06 Oct 2018 08:30) (100% - 100%)    MEDICATIONS  (STANDING):  chlorhexidine 4% Liquid 1 Application(s) Topical <User Schedule>  influenza   Vaccine 0.5 milliLiter(s) IntraMuscular once  lactobacillus acidophilus 1 Tablet(s) Oral daily  pantoprazole    Tablet 40 milliGRAM(s) Oral two times a day  potassium phosphate IVPB 15 milliMole(s) IV Intermittent once  tamsulosin 0.4 milliGRAM(s) Oral at bedtime    MEDICATIONS  (PRN):  oxyCODONE    5 mG/acetaminophen 325 mG 1 Tablet(s) Oral every 6 hours PRN hip pain    LABS:  CBC (10-06 @ 06:30)                              7.9<L>                         5.53    )----------------(  164        --    % Neutrophils, --    % Lymphocytes, ANC: --                                  24.7<L>              CBC (10-05 @ 13:34)                              9.3<L>                         6.42    )----------------(  180        --    % Neutrophils, --    % Lymphocytes, ANC: --                                  28.9<L>                BMP (10-06 @ 06:30)             145     |  110<H>  |  29<H> 		Ca++ --      Ca 9.2                ---------------------------------( 111<H>		Mg 1.8                4.1     |  24      |  1.54<H>			Ph 2.2<L>  BMP (10-05 @ 13:34)             142     |  108<H>  |  34<H> 		Ca++ --      Ca 9.3                ---------------------------------( 118<H>		Mg 1.9                4.4     |  22      |  1.47<H>			Ph 2.6       EXAM:  General: No acute distress  Respiratory: Nonlabored  Cardiovascular: normotensive, regular rate   Abdominal: Soft, nondistended, nontender. No rebound or guarding. No organomegaly, no palpable mass.  Extremities: Warm      LABS:  CBC (10-06 @ 06:30)                              7.9<L>                         5.53    )----------------(  164        --    % Neutrophils, --    % Lymphocytes, ANC: --                                  24.7<L>              CBC (10-05 @ 13:34)                              9.3<L>                         6.42    )----------------(  180        --    % Neutrophils, --    % Lymphocytes, ANC: --                                  28.9<L>                BMP (10-06 @ 06:30)             145     |  110<H>  |  29<H> 		Ca++ --      Ca 9.2                ---------------------------------( 111<H>		Mg 1.8                4.1     |  24      |  1.54<H>			Ph 2.2<L>  BMP (10-05 @ 13:34)             142     |  108<H>  |  34<H> 		Ca++ --      Ca 9.3                ---------------------------------( 118<H>		Mg 1.9                4.4     |  22      |  1.47<H>			Ph 2.6         RADS:  Colonoscopy (10.04.18 @ 15:50)   Multiple small and large-mouthed diverticula were found in the sigmoid colon.                                                                                   Impression:            - Diverticulosis in the sigmoid colon.  - No specimens collected.

## 2018-10-06 NOTE — PROGRESS NOTE ADULT - SUBJECTIVE AND OBJECTIVE BOX
[facility] - Crystal / Corrie S /Kylah/ S Villegas/ S Kisha/ Shiva Sebas / MAGUI Njeru  ---------------------------------------------------------------------------------------------------------------    Patient seen and examined bedside    Subjective and Objective: No overnight events, denies V/D/sob/further bleeding. No complaints today. feeling better    Allergies: No Known Allergies      Hospital Medications:   MEDICATIONS  (STANDING):  chlorhexidine 4% Liquid 1 Application(s) Topical <User Schedule>  influenza   Vaccine 0.5 milliLiter(s) IntraMuscular once  lactobacillus acidophilus 1 Tablet(s) Oral daily  pantoprazole    Tablet 40 milliGRAM(s) Oral two times a day  tamsulosin 0.4 milliGRAM(s) Oral at bedtime    VITALS:  T(F): 97.9 (10-06-18 @ 10:15), Max: 98.7 (10-05-18 @ 21:26)  HR: 86 (10-06-18 @ 10:15)  BP: 165/95 (10-06-18 @ 10:15)  RR: 17 (10-06-18 @ 10:15)  SpO2: 95% (10-06-18 @ 10:15)  Wt(kg): --        PHYSICAL EXAM:  Constitutional: NAD  HEENT: anicteric sclera, oropharynx clear  Neck: No JVD  Respiratory: CTAB, no wheezes, rales or rhonchi  Cardiovascular: S1, S2, RRR  Gastrointestinal: BS+, soft, NT/ND  Extremities: No cyanosis or clubbing. No peripheral edema  Neurological: A/O x 2, no focal deficits  Psychiatric: Normal mood, normal affect  : No CVA tenderness. No flores.   Skin: No rashes    LABS:  10-06    145  |  110<H>  |  29<H>  ----------------------------<  111<H>  4.1   |  24  |  1.54<H>    Ca    9.2      06 Oct 2018 06:30  Phos  2.2     10-06  Mg     1.8     10-06      Creatinine Trend: 1.54 <--, 1.47 <--, 1.61 <--, 1.58 <--, 1.31 <--, 1.36 <--, 1.42 <--                        8.4    5.34  )-----------( 169      ( 06 Oct 2018 11:35 )             26.1     Urine Studies:        RADIOLOGY & ADDITIONAL STUDIES:

## 2018-10-06 NOTE — PROGRESS NOTE ADULT - ASSESSMENT
89 y/o pleasant Male w/ a pmh significant for CKD3 and BPH s/p prostatectomy presenting to the ED w/ a chief complaint of BRBPR. Renal following for PHU, CKD      PHU on CKD stage III. b/l cr apperas to be around 1.4mg/dl  Age and HTN nephropathy likely underlying cause    HTN, controlled well  Anemia.  likely 2/2 blood loss/GIB. unlikely 2/2 CKD  Tsat 31% ferritin 149- not low    labs, chart reviewed.

## 2018-10-06 NOTE — PROGRESS NOTE ADULT - SUBJECTIVE AND OBJECTIVE BOX
Pt seen, no complaints.       MEDICATIONS  (STANDING):  chlorhexidine 4% Liquid 1 Application(s) Topical <User Schedule>  influenza   Vaccine 0.5 milliLiter(s) IntraMuscular once  lactobacillus acidophilus 1 Tablet(s) Oral daily  pantoprazole    Tablet 40 milliGRAM(s) Oral two times a day  potassium phosphate IVPB 15 milliMole(s) IV Intermittent once  tamsulosin 0.4 milliGRAM(s) Oral at bedtime    MEDICATIONS  (PRN):  oxyCODONE    5 mG/acetaminophen 325 mG 1 Tablet(s) Oral every 6 hours PRN hip pain      ROS  No fever, sweats, chills  No epistaxis, HA, sore throat  No CP, SOB, cough, sputum  No n/v/d, abd pain, melena, hematochezia  No edema  No rash  No anxiety  No back pain, joint pain  No bleeding, bruising  No dysuria, hematuria    Vital Signs Last 24 Hrs  T(C): 36.6 (06 Oct 2018 10:15), Max: 37.1 (05 Oct 2018 21:26)  T(F): 97.9 (06 Oct 2018 10:15), Max: 98.7 (05 Oct 2018 21:26)  HR: 86 (06 Oct 2018 10:15) (73 - 87)  BP: 165/95 (06 Oct 2018 10:15) (117/73 - 165/95)  BP(mean): --  RR: 17 (06 Oct 2018 10:15) (16 - 17)  SpO2: 95% (06 Oct 2018 10:15) (95% - 100%)    PE  NAD  Awake, alert  Anicteric, MMM  RRR  CTAB  Abd soft, NT, ND  No c/c/e  No rash grossly  FROM                          7.9    5.53  )-----------( 164      ( 06 Oct 2018 06:30 )             24.7       10-06    145  |  110<H>  |  29<H>  ----------------------------<  111<H>  4.1   |  24  |  1.54<H>    Ca    9.2      06 Oct 2018 06:30  Phos  2.2     10-06  Mg     1.8     10-06 Pt seen, no complaints.       MEDICATIONS  (STANDING):  chlorhexidine 4% Liquid 1 Application(s) Topical <User Schedule>  influenza   Vaccine 0.5 milliLiter(s) IntraMuscular once  lactobacillus acidophilus 1 Tablet(s) Oral daily  pantoprazole    Tablet 40 milliGRAM(s) Oral two times a day  potassium phosphate IVPB 15 milliMole(s) IV Intermittent once  tamsulosin 0.4 milliGRAM(s) Oral at bedtime    MEDICATIONS  (PRN):  oxyCODONE    5 mG/acetaminophen 325 mG 1 Tablet(s) Oral every 6 hours PRN hip pain      ROS  no pain, no bleeding, no CP/SOB/n/v/d/abd pain. Remainder of ROS limited 2/2 participation    Vital Signs Last 24 Hrs  T(C): 36.6 (06 Oct 2018 10:15), Max: 37.1 (05 Oct 2018 21:26)  T(F): 97.9 (06 Oct 2018 10:15), Max: 98.7 (05 Oct 2018 21:26)  HR: 86 (06 Oct 2018 10:15) (73 - 87)  BP: 165/95 (06 Oct 2018 10:15) (117/73 - 165/95)  BP(mean): --  RR: 17 (06 Oct 2018 10:15) (16 - 17)  SpO2: 95% (06 Oct 2018 10:15) (95% - 100%)    PE  NAD  Awake, alert  Anicteric, MMM  RRR  CTAB ant chest  Abd soft, NT, ND  No c/c/e  No rash grossly  FROM                          7.9    5.53  )-----------( 164      ( 06 Oct 2018 06:30 )             24.7       10-06    145  |  110<H>  |  29<H>  ----------------------------<  111<H>  4.1   |  24  |  1.54<H>    Ca    9.2      06 Oct 2018 06:30  Phos  2.2     10-06  Mg     1.8     10-06

## 2018-10-06 NOTE — PROGRESS NOTE ADULT - SUBJECTIVE AND OBJECTIVE BOX
PASCUAL OLSEN:[id number],   89yMale followed for:  No Known Allergies    PAST MEDICAL & SURGICAL HISTORY:  Chronic kidney disease (CKD), stage IV (severe)  Benign prostatic hypertrophy  S/P cataract surgery, left: 3 yrs ago    FAMILY HISTORY:  No pertinent family history in first degree relatives    MEDICATIONS  (STANDING):  chlorhexidine 4% Liquid 1 Application(s) Topical <User Schedule>  influenza   Vaccine 0.5 milliLiter(s) IntraMuscular once  lactobacillus acidophilus 1 Tablet(s) Oral daily  pantoprazole    Tablet 40 milliGRAM(s) Oral two times a day  potassium phosphate IVPB 15 milliMole(s) IV Intermittent once  tamsulosin 0.4 milliGRAM(s) Oral at bedtime    MEDICATIONS  (PRN):  oxyCODONE    5 mG/acetaminophen 325 mG 1 Tablet(s) Oral every 6 hours PRN hip pain      Vital Signs Last 24 Hrs  T(C): 37.1 (05 Oct 2018 21:26), Max: 37.1 (05 Oct 2018 21:26)  T(F): 98.7 (05 Oct 2018 21:26), Max: 98.7 (05 Oct 2018 21:26)  HR: 73 (06 Oct 2018 08:30) (73 - 87)  BP: 150/95 (06 Oct 2018 08:30) (117/73 - 150/95)  BP(mean): --  RR: 16 (06 Oct 2018 08:30) (16 - 17)  SpO2: 100% (06 Oct 2018 08:30) (100% - 100%)  nc/at  s1s2  cta  soft, nt, nd no guarding or rebound  no c/c/e    CBC Full  -  ( 06 Oct 2018 06:30 )  WBC Count : 5.53 K/uL  Hemoglobin : 7.9 g/dL  Hematocrit : 24.7 %  Platelet Count - Automated : 164 K/uL  Mean Cell Volume : 88.8 fL  Mean Cell Hemoglobin : 28.4 pg  Mean Cell Hemoglobin Concentration : 32.0 %  Auto Neutrophil # : x  Auto Lymphocyte # : x  Auto Monocyte # : x  Auto Eosinophil # : x  Auto Basophil # : x  Auto Neutrophil % : x  Auto Lymphocyte % : x  Auto Monocyte % : x  Auto Eosinophil % : x  Auto Basophil % : x    10-06    145  |  110<H>  |  29<H>  ----------------------------<  111<H>  4.1   |  24  |  1.54<H>    Ca    9.2      06 Oct 2018 06:30  Phos  2.2     10-06  Mg     1.8     10-06

## 2018-10-06 NOTE — PROGRESS NOTE ADULT - ASSESSMENT
1. Normocytic Anemia sec to acute blood loss from rectal bleed    -- colonoscopy report noted. Likelt diverticular bleed  -- GI and CRS follow up  -- CT A/P w no masses or TRAVON but extensive diverticulosis  -- Keep Hgb > 8 in light of active bleed, adequate today  -- neg SPEP, Epo level low at 29  -- Anemia of CKD less likely a major factor  -- Hold off on Procrit in light of Active Bleed     Will follow, [phone]

## 2018-10-06 NOTE — PROGRESS NOTE ADULT - ASSESSMENT
PASCUAL OLSEN is a 89yo F admitted after p/w hematochezia in ED requiring transfusions. Hematocrit decreased in the past 24 hours; 28.9 (10/5) to 24.7 (10/6). However, no more GIB noted.     PLAN:  - GI thinks pt had diverticular bleed  - Trend H&H; transfuse as needed  - Will continue to follow closely with you    A SURGERY  [id number] PASCUAL OLSEN is a 89yo F admitted after p/w hematochezia in ED requiring transfusions. Hematocrit decreased in the past 24 hours; 28.9 (10/5) to 24.7 (10/6). However, no more GIB noted.     PLAN:  - GI thinks pt had diverticular bleed  - Trend H&H; transfuse as needed  - Would recommend repeat of H&H today  - Will continue to follow closely with you    A SURGERY  [phone]

## 2018-10-06 NOTE — PROGRESS NOTE ADULT - SUBJECTIVE AND OBJECTIVE BOX
[facility]        - Patient seen and examined.  - Today, patient is without complaints.         *****MEDICATIONS: Current medication reviewed and documented.    MEDICATIONS  (STANDING):  chlorhexidine 4% Liquid 1 Application(s) Topical <User Schedule>  influenza   Vaccine 0.5 milliLiter(s) IntraMuscular once  lactobacillus acidophilus 1 Tablet(s) Oral daily  pantoprazole    Tablet 40 milliGRAM(s) Oral two times a day  tamsulosin 0.4 milliGRAM(s) Oral at bedtime    MEDICATIONS  (PRN):  oxyCODONE    5 mG/acetaminophen 325 mG 1 Tablet(s) Oral every 6 hours PRN hip pain           ***** REVIEW OF SYSTEM:  GEN: no fever, no chills, no pain  RESP: no SOB, no cough, no sputum  CVS: no chest pain, no palpitations, no edema  GI: no abdominal pain, no nausea, no vomiting, no constipation, no diarrhea  : no dysurea, no frequency  NEURO: no headache, no diziness  PSYCH: no depression, not anxious  Derm : no itching, no rash         ***** VITAL SIGNS:  T(F): 98.7 (10-05-18 @ 21:26), Max: 98.7 (10-05-18 @ 21:26)  HR: 87 (10-05-18 @ 21:26) (84 - 87)  BP: 117/73 (10-05-18 @ 21:26) (117/73 - 132/74)  RR: 17 (10-05-18 @ 21:26) (17 - 17)  SpO2: 100% (10-05-18 @ 21:26) (100% - 100%)  Wt(kg): --  ,   I&O's Summary           *****PHYSICAL EXAM:   alert oriented x 3 attention comprehension are fair.  Able to name, repeat.   EOmi fundi not visualized   no nystagmus VFF to confrontation  Tongue is midline  Palate elevates symmetrically   Moving all 4 ext spontaneously no drift appreciated    Gait not assessed.            *****LAB AND IMAGIN.9    5.53  )-----------( 164      ( 06 Oct 2018 06:30 )             24.7               10    145  |  110<H>  |  29<H>  ----------------------------<  111<H>  4.1   |  24  |  1.54<H>    Ca    9.2      06 Oct 2018 06:30  Phos  2.2     10-06  Mg     1.8     10-                           [All pertinent recent Imaging/Reports reviewed]           *****A S S E S S M E N T   A N D   P L A N :         89 y/o M w/ a pmhx significant for BPH s/p prostatectomy and CKD4 presents to the ED w/ a chief complaint of BRBPR.    Problem/Plan - 1: gi bleed.   s/p colonoscopy no source of blood was found.     Problem/Plan - 2: l 4 compression fracture, likely old,   no limitation of mvt    Thank you for allowing me to participate in the care of this patient. Please do not hesitate to call me if you have any  questions.        ________________  Ava Schreiber MD  [facility] ([facility]  [phone]     30 minutes spent on total encounter; more than 50 % of the visit was  spent counseling and or  coordinating care by the attending physician.   At the present time, [facility] does not  provide outpatient followup, best to call the your insurance to find a participating provider.  This was explained to you at the time of the visit. Alternatively, if your insurance allows it, you can follow up with a neurologist  Dr. Jed Gallagher([place]) [phone] or Dr. Abdiaziz Scott ( [place]) [phone]

## 2018-10-07 VITALS
TEMPERATURE: 98 F | HEART RATE: 98 BPM | SYSTOLIC BLOOD PRESSURE: 113 MMHG | RESPIRATION RATE: 18 BRPM | DIASTOLIC BLOOD PRESSURE: 80 MMHG | OXYGEN SATURATION: 100 %

## 2018-10-07 RX ADMIN — Medication 1 TABLET(S): at 11:18

## 2018-10-07 RX ADMIN — INFLUENZA VIRUS VACCINE 0.5 MILLILITER(S): 15; 15; 15; 15 SUSPENSION INTRAMUSCULAR at 15:18

## 2018-10-07 RX ADMIN — PANTOPRAZOLE SODIUM 40 MILLIGRAM(S): 20 TABLET, DELAYED RELEASE ORAL at 06:21

## 2018-10-07 RX ADMIN — CHLORHEXIDINE GLUCONATE 1 APPLICATION(S): 213 SOLUTION TOPICAL at 07:34

## 2018-10-07 NOTE — PROGRESS NOTE ADULT - SUBJECTIVE AND OBJECTIVE BOX
Patient seen and examined at bedside  No acute events noted overnight  Case discussed with medical team    HPI:  89 y/o pleasant Male w/ a pmh significant for CKD4 and BPH s/p prostatectomy presenting to the ED w/ a chief complaint of BRBPR. Pt was in his usual state of health up until yesterday evening when his aid noticed some bright red blood in his diaper. He says this has never happened to him before. He says the stool was 'mostly brown w/ small red clots'. Pt had two other bowel movements after this episode that was w/o blood/clots. Of note, pt says he fell in March 2018, after which he was taking Ibuprofen (unknown dosage) two times per day for R hip pain. He had a colonoscopy done ~10-15 years ago that was reportedly normal per family at bedside. He has approximately 2 BM's per day and has no hx of constipation or painful bowel movements. He denies any new medications, n/v, hematemesis, hemoptysis, CP, palpitations SOB, abdominal pain, melena or hemorrhoids.      In the ED, pt afebrile, /85, HR 82, RR 15 (100% RA).Labs notable for Hb 8.7 (MCV 87.7) BUN/Cr 16. FOBT positive. Pt bolused 1L NS and Protonix 80mg IVP x1. (29 Sep 2018 15:55)      PAST MEDICAL & SURGICAL HISTORY:  Chronic kidney disease (CKD), stage IV (severe)  Benign prostatic hypertrophy  S/P cataract surgery, left: 3 yrs ago      No Known Allergies       MEDICATIONS  (STANDING):  chlorhexidine 4% Liquid 1 Application(s) Topical <User Schedule>  influenza   Vaccine 0.5 milliLiter(s) IntraMuscular once  lactobacillus acidophilus 1 Tablet(s) Oral daily  pantoprazole Infusion 8 mG/Hr (10 mL/Hr) IV Continuous <Continuous>  tamsulosin 0.4 milliGRAM(s) Oral at bedtime    MEDICATIONS  (PRN):  oxyCODONE    5 mG/acetaminophen 325 mG 1 Tablet(s) Oral every 6 hours PRN hip pain      REVIEW OF SYSTEMS:  CONSTITUTIONAL: (+) malaise.   EYES: No acute change in vision   ENT:  No tinnitus  NECK: No stiffness  RESPIRATORY: No hemoptysis  CARDIOVASCULAR: No chest pain, palpitations, syncope  GASTROINTESTINAL: No hematemesis, diarrhea, melena, or hematochezia.  GENITOURINARY: No hematuria  NEUROLOGICAL: No headaches  LYMPH Nodes: No enlarged glands  ENDOCRINE: No heat or cold intolerance	    Vital Signs Last 24 Hrs  T(C): 36.4 (07 Oct 2018 06:06), Max: 36.8 (06 Oct 2018 21:54)  T(F): 97.6 (07 Oct 2018 06:06), Max: 98.3 (06 Oct 2018 21:54)  HR: 80 (07 Oct 2018 06:06) (78 - 88)  BP: 152/96 (07 Oct 2018 06:06) (152/96 - 169/84)  BP(mean): --  RR: 18 (07 Oct 2018 06:06) (17 - 18)  SpO2: 99% (07 Oct 2018 06:06) (95% - 100%)    PHYSICAL EXAMINATION:   Constitutional: WD, NAD  HEENT: NC, AT  Neck:  Supple  Respiratory:  Adequate airflow b/l. Not using accessory muscles of respiration.  Cardiovascular:  S1 & S2 intact, no R/G, 2+ radial pulses b/l  Gastrointestinal: Soft, NT, ND, normoactive b.s., no organomegaly/RT/rigidity  Extremities: WWP  Neurological:  Alert and awake.  No acute focal motor deficits. Crude sensation intact.     Labs and imaging reviewed

## 2018-10-07 NOTE — PROGRESS NOTE ADULT - PROBLEM SELECTOR PROBLEM 1
Bright red blood per rectum
CKD (chronic kidney disease), stage III
Bright red blood per rectum
CKD (chronic kidney disease), stage III

## 2018-10-07 NOTE — PROGRESS NOTE ADULT - REASON FOR ADMISSION
gi bleed.
gib
medicine
medicine
brbpr
rectal bleeding/chuck
gi bleed

## 2018-10-07 NOTE — PROGRESS NOTE ADULT - PROVIDER SPECIALTY LIST ADULT
Cardiology
Gastroenterology
Heme/Onc
Internal Medicine
Nephrology
Neurology
Surgery
Nephrology
Internal Medicine
Internal Medicine

## 2018-10-07 NOTE — PROGRESS NOTE ADULT - PROBLEM SELECTOR PLAN 1
clinically improved. h/h stable  advance diet, po protonix  if labs and clinical status are stable then d/c planning tmrw
persistent, likely diverticular bleed  s/p another prbc transfusion  monitor clinical status, repeat cbc, maintain h/h > 7.5, transfuse prn, monitor vitals and clinical status closely, conservative therapy for now
Cr further dec to 1.31  off iv fluids  lytes ok  oral hydration. on clear liquid diet  advised inc po Intake
Cr rel stable, at 1.4-1.5. 1.54 today. clinically euvolemic  montiro BMP daily  avoid nephrotoxics/NSAIDS  encourage po intake
Cr trending up, 1.3 to 1.61  appears ' dry '  exacerbatred by colon preparation for colonoscoy,npo  will give iv ns 75 cc hrly for 24 hrs     advised inc po Intake  montiro BMP daily  avoid nephrotoxics  stabilize GIB
Cr trending up, 1.3 to 1.61 10/4   montiro BMP daily  avoid nephrotoxics/NSAIDS
colonoscopy today  monitor clinical status, repeat cbc, maintain h/h > 7.5, transfuse prn, monitor vitals and clinical status closely
downtrending h/h yesterday, s/p prbc transfusion with appropraite increased in h/h. C/w close monitoring of s/s. Trend H/H  -Transfuse prbc if hgb < 7.5 or additional active gi bleed
improved, tolerating diet, no additional acute events noted, h/h stable, d/c home
s/p colonoscopy  like diverticular bleed, melena  monitor clinical status and labs closely for 24-48 hrs  protonix, diet as tolerated
Cr rel stable  watch off iv fluids  lytes ok  oral hydration. on clear liquid diet  advised inc po Intake  montiro BMP daily  avoid nephrotoxics  stabilize GIB
downtrending h/h   -F/u CT A/P  -Trend H/H  -Transfuse prbc if hgb < 7.5 or additional active gi bleed   monitor vitals and clinical status closely

## 2018-10-07 NOTE — PROGRESS NOTE ADULT - PROBLEM SELECTOR PLAN 5
dvt/gi ppx  scd's b/l

## 2018-10-07 NOTE — PROGRESS NOTE ADULT - PROBLEM SELECTOR PLAN 3
- c/w Tamsulosin 0.4mg PO QD

## 2018-10-07 NOTE — PROGRESS NOTE ADULT - SUBJECTIVE AND OBJECTIVE BOX
PASCUAL OLSEN:[id number],   89yMale followed for:  No Known Allergies    PAST MEDICAL & SURGICAL HISTORY:  Chronic kidney disease (CKD), stage IV (severe)  Benign prostatic hypertrophy  S/P cataract surgery, left: 3 yrs ago    FAMILY HISTORY:  No pertinent family history in first degree relatives    MEDICATIONS  (STANDING):  chlorhexidine 4% Liquid 1 Application(s) Topical <User Schedule>  influenza   Vaccine 0.5 milliLiter(s) IntraMuscular once  lactobacillus acidophilus 1 Tablet(s) Oral daily  pantoprazole    Tablet 40 milliGRAM(s) Oral two times a day  tamsulosin 0.4 milliGRAM(s) Oral at bedtime    MEDICATIONS  (PRN):  oxyCODONE    5 mG/acetaminophen 325 mG 1 Tablet(s) Oral every 6 hours PRN hip pain      Vital Signs Last 24 Hrs  T(C): 36.4 (07 Oct 2018 06:06), Max: 36.8 (06 Oct 2018 21:54)  T(F): 97.6 (07 Oct 2018 06:06), Max: 98.3 (06 Oct 2018 21:54)  HR: 80 (07 Oct 2018 06:06) (78 - 88)  BP: 152/96 (07 Oct 2018 06:06) (152/96 - 169/84)  BP(mean): --  RR: 18 (07 Oct 2018 06:06) (17 - 18)  SpO2: 99% (07 Oct 2018 06:06) (95% - 100%)  nc/at  s1s2  cta  soft, nt, nd no guarding or rebound  no c/c/e    CBC Full  -  ( 06 Oct 2018 11:35 )  WBC Count : 5.34 K/uL  Hemoglobin : 8.4 g/dL  Hematocrit : 26.1 %  Platelet Count - Automated : 169 K/uL  Mean Cell Volume : 90.3 fL  Mean Cell Hemoglobin : 29.1 pg  Mean Cell Hemoglobin Concentration : 32.2 %  Auto Neutrophil # : x  Auto Lymphocyte # : x  Auto Monocyte # : x  Auto Eosinophil # : x  Auto Basophil # : x  Auto Neutrophil % : x  Auto Lymphocyte % : x  Auto Monocyte % : x  Auto Eosinophil % : x  Auto Basophil % : x    10-06    145  |  110<H>  |  29<H>  ----------------------------<  111<H>  4.1   |  24  |  1.54<H>    Ca    9.2      06 Oct 2018 06:30  Phos  2.2     10-06  Mg     1.8     10-06

## 2018-10-07 NOTE — PROGRESS NOTE ADULT - PROBLEM SELECTOR PROBLEM 4
Chronic kidney disease (CKD), stage IV (severe)

## 2018-10-07 NOTE — PROGRESS NOTE ADULT - PROBLEM SELECTOR PLAN 4
- Monitor UOP  - Avoid nephrotoxins, NSAIDs, Renally dose meds
- Monitor UOP  - Avoid nephrotoxins, NSAIDs, Renally dose meds
- Avoid nephrotoxins, NSAIDs  - Renally dose meds
- Monitor UOP  - Avoid nephrotoxins, NSAIDs, Renally dose meds

## 2018-10-07 NOTE — PROGRESS NOTE ADULT - PROBLEM SELECTOR PROBLEM 2
Anemia
Symptomatic anemia
Symptomatic anemia
Anemia
Symptomatic anemia

## 2018-10-07 NOTE — PROGRESS NOTE ADULT - SUBJECTIVE AND OBJECTIVE BOX
Pt seen, no complaints.       MEDICATIONS  (STANDING):  chlorhexidine 4% Liquid 1 Application(s) Topical <User Schedule>  influenza   Vaccine 0.5 milliLiter(s) IntraMuscular once  lactobacillus acidophilus 1 Tablet(s) Oral daily  pantoprazole    Tablet 40 milliGRAM(s) Oral two times a day  tamsulosin 0.4 milliGRAM(s) Oral at bedtime    MEDICATIONS  (PRN):  oxyCODONE    5 mG/acetaminophen 325 mG 1 Tablet(s) Oral every 6 hours PRN hip pain      ROS  No fever, sweats, chills  No epistaxis, HA, sore throat  No CP, SOB, cough, sputum  No n/v/d, abd pain, melena, hematochezia  No edema  No rash  No anxiety  No back pain, joint pain  No bleeding, bruising  No dysuria, hematuria    Vital Signs Last 24 Hrs  T(C): 36.4 (07 Oct 2018 10:51), Max: 36.8 (06 Oct 2018 21:54)  T(F): 97.5 (07 Oct 2018 10:51), Max: 98.3 (06 Oct 2018 21:54)  HR: 94 (07 Oct 2018 10:51) (78 - 94)  BP: 136/84 (07 Oct 2018 10:51) (136/84 - 169/84)  BP(mean): --  RR: 18 (07 Oct 2018 10:51) (18 - 18)  SpO2: 98% (07 Oct 2018 10:51) (98% - 100%)    PE  NAD  Awake, alert  Anicteric, MMM  RRR  CTAB  Abd soft, NT, ND  No c/c/e  No rash grossly  FROM                          8.4    5.34  )-----------( 169      ( 06 Oct 2018 11:35 )             26.1       10-06    145  |  110<H>  |  29<H>  ----------------------------<  111<H>  4.1   |  24  |  1.54<H>    Ca    9.2      06 Oct 2018 06:30  Phos  2.2     10-06  Mg     1.8     10-06

## 2018-10-07 NOTE — PROGRESS NOTE ADULT - ASSESSMENT
1. Normocytic Anemia sec to acute blood loss from rectal bleed    -- colonoscopy report noted. Likelt diverticular bleed  -- GI and CRS follow up  -- CT A/P w no masses or TRAVON but extensive diverticulosis  -- Keep Hgb > 8 in light of active bleed, adequate today  -- neg SPEP, Epo level low at 29  -- Anemia of CKD less likely a major factor  -- Hold off on Procrit in light of Active Bleed     Will follow, d/c planning from heme standpoint, [phone]

## 2018-10-07 NOTE — PROGRESS NOTE ADULT - PROBLEM SELECTOR PLAN 2
hb dropped 9.3 >7.9  PRBC prn. f/u GI, hem  watch H/H closely  c/w PPI drip
2/2 lower gi bleed  as above
PRBC prn. f/u GI, hem  watch H/H closely  s/p colonoscoy 10/4 likely diverticular bleed  off PPI drip
PRBC prn. f/u GI, hem  watch H/H closely. Hb 9.3>7.9 trending down. prn PRBC  s/p colonoscoy 10/4 likely diverticular bleed  off PPI drip
hb dropped 9.3 >7.9 > 8.3  PRBC prn. f/u GI, hem  watch H/H closely  to have colonoscoy today  c/w PPI drip
hb stable 9.3  no recurrence of bleeding
2/2 gi bleed  as above

## 2019-03-14 ENCOUNTER — INPATIENT (INPATIENT)
Facility: HOSPITAL | Age: 84
LOS: 26 days | End: 2019-04-10
Attending: INTERNAL MEDICINE | Admitting: INTERNAL MEDICINE
Payer: COMMERCIAL

## 2019-03-14 VITALS
TEMPERATURE: 98 F | OXYGEN SATURATION: 98 % | DIASTOLIC BLOOD PRESSURE: 85 MMHG | HEART RATE: 94 BPM | RESPIRATION RATE: 18 BRPM | SYSTOLIC BLOOD PRESSURE: 154 MMHG

## 2019-03-14 NOTE — ED ADULT TRIAGE NOTE - CHIEF COMPLAINT QUOTE
BIBEMS complaining of urinary attention, A/Ox4, brought in for weakness/urinary retention and loss of appetite for 4 days. PMH GERD. Denies pain, patient states he urinated 30ml last night into a urinal but it was very dark. Patients sclera are yellow.

## 2019-03-15 DIAGNOSIS — R53.1 WEAKNESS: ICD-10-CM

## 2019-03-15 PROBLEM — N18.4 CHRONIC KIDNEY DISEASE, STAGE 4 (SEVERE): Chronic | Status: ACTIVE | Noted: 2018-09-29

## 2019-03-15 LAB
ALBUMIN SERPL ELPH-MCNC: 2.8 G/DL — LOW (ref 3.3–5)
ALP SERPL-CCNC: 290 U/L — HIGH (ref 40–120)
ALT FLD-CCNC: 331 U/L — HIGH (ref 4–41)
AMMONIA BLD-MCNC: 91 UMOL/L — HIGH (ref 11–55)
AMYLASE P1 CFR SERPL: 142 U/L — HIGH (ref 25–125)
ANION GAP SERPL CALC-SCNC: 13 MMO/L — SIGNIFICANT CHANGE UP (ref 7–14)
APPEARANCE UR: CLEAR — SIGNIFICANT CHANGE UP
APTT BLD: 46.5 SEC — HIGH (ref 27.5–36.3)
AST SERPL-CCNC: 591 U/L — HIGH (ref 4–40)
BASOPHILS # BLD AUTO: 0.04 K/UL — SIGNIFICANT CHANGE UP (ref 0–0.2)
BASOPHILS NFR BLD AUTO: 0.8 % — SIGNIFICANT CHANGE UP (ref 0–2)
BILIRUB DIRECT SERPL-MCNC: 16.6 MG/DL — HIGH (ref 0.1–0.2)
BILIRUB SERPL-MCNC: 20.1 MG/DL — HIGH (ref 0.2–1.2)
BILIRUB UR-MCNC: HIGH
BLOOD UR QL VISUAL: NEGATIVE — SIGNIFICANT CHANGE UP
BUN SERPL-MCNC: 24 MG/DL — HIGH (ref 7–23)
C3 SERPL-MCNC: 77.5 MG/DL — LOW (ref 90–180)
C4 SERPL-MCNC: 24 MG/DL — SIGNIFICANT CHANGE UP (ref 10–40)
CALCIUM SERPL-MCNC: 9.3 MG/DL — SIGNIFICANT CHANGE UP (ref 8.4–10.5)
CHLORIDE SERPL-SCNC: 106 MMOL/L — SIGNIFICANT CHANGE UP (ref 98–107)
CO2 SERPL-SCNC: 21 MMOL/L — LOW (ref 22–31)
COLOR SPEC: SIGNIFICANT CHANGE UP
CREAT SERPL-MCNC: 1.78 MG/DL — HIGH (ref 0.5–1.3)
CRP SERPL-MCNC: 30.6 MG/L — HIGH
EOSINOPHIL # BLD AUTO: 0.33 K/UL — SIGNIFICANT CHANGE UP (ref 0–0.5)
EOSINOPHIL NFR BLD AUTO: 6.3 % — HIGH (ref 0–6)
ERYTHROCYTE [SEDIMENTATION RATE] IN BLOOD: 32 MM/HR — HIGH (ref 1–15)
GGT SERPL-CCNC: 240 U/L — HIGH (ref 8–61)
GLUCOSE SERPL-MCNC: 93 MG/DL — SIGNIFICANT CHANGE UP (ref 70–99)
GLUCOSE UR-MCNC: NEGATIVE — SIGNIFICANT CHANGE UP
HCT VFR BLD CALC: 28.3 % — LOW (ref 39–50)
HGB BLD-MCNC: 10 G/DL — LOW (ref 13–17)
IMM GRANULOCYTES NFR BLD AUTO: 1 % — SIGNIFICANT CHANGE UP (ref 0–1.5)
INR BLD: 1.88 — HIGH (ref 0.88–1.17)
KETONES UR-MCNC: NEGATIVE — SIGNIFICANT CHANGE UP
LACTATE SERPL-SCNC: 2.8 MMOL/L — HIGH (ref 0.5–2)
LEUKOCYTE ESTERASE UR-ACNC: NEGATIVE — SIGNIFICANT CHANGE UP
LIDOCAIN IGE QN: 42.7 U/L — SIGNIFICANT CHANGE UP (ref 7–60)
LYMPHOCYTES # BLD AUTO: 1.11 K/UL — SIGNIFICANT CHANGE UP (ref 1–3.3)
LYMPHOCYTES # BLD AUTO: 21.1 % — SIGNIFICANT CHANGE UP (ref 13–44)
MCHC RBC-ENTMCNC: 26.8 PG — LOW (ref 27–34)
MCHC RBC-ENTMCNC: 35.3 % — SIGNIFICANT CHANGE UP (ref 32–36)
MCV RBC AUTO: 75.9 FL — LOW (ref 80–100)
MONOCYTES # BLD AUTO: 0.81 K/UL — SIGNIFICANT CHANGE UP (ref 0–0.9)
MONOCYTES NFR BLD AUTO: 15.4 % — HIGH (ref 2–14)
NEUTROPHILS # BLD AUTO: 2.91 K/UL — SIGNIFICANT CHANGE UP (ref 1.8–7.4)
NEUTROPHILS NFR BLD AUTO: 55.4 % — SIGNIFICANT CHANGE UP (ref 43–77)
NITRITE UR-MCNC: NEGATIVE — SIGNIFICANT CHANGE UP
NRBC # FLD: 0 K/UL — LOW (ref 25–125)
PH UR: 6.5 — SIGNIFICANT CHANGE UP (ref 5–8)
PLATELET # BLD AUTO: 145 K/UL — LOW (ref 150–400)
PMV BLD: SIGNIFICANT CHANGE UP FL (ref 7–13)
POTASSIUM SERPL-MCNC: 4.2 MMOL/L — SIGNIFICANT CHANGE UP (ref 3.5–5.3)
POTASSIUM SERPL-SCNC: 4.2 MMOL/L — SIGNIFICANT CHANGE UP (ref 3.5–5.3)
PROT SERPL-MCNC: 7.2 G/DL — SIGNIFICANT CHANGE UP (ref 6–8.3)
PROT UR-MCNC: 10 — SIGNIFICANT CHANGE UP
PROTHROM AB SERPL-ACNC: 21.8 SEC — HIGH (ref 9.8–13.1)
RBC # BLD: 3.73 M/UL — LOW (ref 4.2–5.8)
RBC # FLD: 26.5 % — HIGH (ref 10.3–14.5)
SODIUM SERPL-SCNC: 140 MMOL/L — SIGNIFICANT CHANGE UP (ref 135–145)
SP GR SPEC: 1.01 — SIGNIFICANT CHANGE UP (ref 1–1.04)
UROBILINOGEN FLD QL: HIGH
WBC # BLD: 5.25 K/UL — SIGNIFICANT CHANGE UP (ref 3.8–10.5)
WBC # FLD AUTO: 5.25 K/UL — SIGNIFICANT CHANGE UP (ref 3.8–10.5)

## 2019-03-15 PROCEDURE — 74176 CT ABD & PELVIS W/O CONTRAST: CPT | Mod: 26

## 2019-03-15 RX ORDER — AMLODIPINE BESYLATE 2.5 MG/1
5 TABLET ORAL DAILY
Qty: 0 | Refills: 0 | Status: DISCONTINUED | OUTPATIENT
Start: 2019-03-15 | End: 2019-03-20

## 2019-03-15 RX ORDER — HYDRALAZINE HCL 50 MG
25 TABLET ORAL ONCE
Qty: 0 | Refills: 0 | Status: DISCONTINUED | OUTPATIENT
Start: 2019-03-15 | End: 2019-03-15

## 2019-03-15 RX ORDER — SODIUM CHLORIDE 9 MG/ML
1000 INJECTION INTRAMUSCULAR; INTRAVENOUS; SUBCUTANEOUS ONCE
Qty: 0 | Refills: 0 | Status: COMPLETED | OUTPATIENT
Start: 2019-03-15 | End: 2019-03-15

## 2019-03-15 RX ORDER — SODIUM CHLORIDE 9 MG/ML
1000 INJECTION, SOLUTION INTRAVENOUS
Qty: 0 | Refills: 0 | Status: DISCONTINUED | OUTPATIENT
Start: 2019-03-15 | End: 2019-03-16

## 2019-03-15 RX ORDER — AMLODIPINE BESYLATE 2.5 MG/1
5 TABLET ORAL DAILY
Qty: 0 | Refills: 0 | Status: DISCONTINUED | OUTPATIENT
Start: 2019-03-15 | End: 2019-03-15

## 2019-03-15 RX ORDER — TAMSULOSIN HYDROCHLORIDE 0.4 MG/1
0.4 CAPSULE ORAL AT BEDTIME
Qty: 0 | Refills: 0 | Status: DISCONTINUED | OUTPATIENT
Start: 2019-03-15 | End: 2019-03-23

## 2019-03-15 RX ORDER — PANTOPRAZOLE SODIUM 20 MG/1
40 TABLET, DELAYED RELEASE ORAL DAILY
Qty: 0 | Refills: 0 | Status: DISCONTINUED | OUTPATIENT
Start: 2019-03-15 | End: 2019-03-23

## 2019-03-15 RX ADMIN — AMLODIPINE BESYLATE 5 MILLIGRAM(S): 2.5 TABLET ORAL at 18:57

## 2019-03-15 RX ADMIN — TAMSULOSIN HYDROCHLORIDE 0.4 MILLIGRAM(S): 0.4 CAPSULE ORAL at 21:14

## 2019-03-15 RX ADMIN — PANTOPRAZOLE SODIUM 40 MILLIGRAM(S): 20 TABLET, DELAYED RELEASE ORAL at 11:58

## 2019-03-15 RX ADMIN — SODIUM CHLORIDE 1000 MILLILITER(S): 9 INJECTION INTRAMUSCULAR; INTRAVENOUS; SUBCUTANEOUS at 04:34

## 2019-03-15 RX ADMIN — SODIUM CHLORIDE 50 MILLILITER(S): 9 INJECTION, SOLUTION INTRAVENOUS at 21:14

## 2019-03-15 RX ADMIN — SODIUM CHLORIDE 50 MILLILITER(S): 9 INJECTION, SOLUTION INTRAVENOUS at 10:40

## 2019-03-15 NOTE — PROGRESS NOTE ADULT - ASSESSMENT
1. Hyperbilirubinemia    -- highly susp for malignancy but CT A/P unremarkable  -- consider U/S w doppler of Liver  -- GI following  -- check AFP, CEA, CA 19-9    2. Anemia    -- stable  -- hx of GI Bleed sec to divertic  -- monitor for now    Carlos Gloria MD  [phone]

## 2019-03-15 NOTE — CONSULT NOTE ADULT - ASSESSMENT
# Encephalopathy  # Urinary retention  # R/O biliary sepsis      would recommend:    1. Repeat urine analysis  2. Obtain HIDA scan to rule out cholecystitis  3. Follow up culture      will follow the patient with you and make further recommendation based on the clinical course and Lab results  Thank you for the opportunity to participate in Ms. OLSEN's care A 89 yo Male with BPH, Cataracts, Diverticulosis, Anemia 2/2 GI Bleed with previous PRBC transfusions, who presents to the ER for evaluation of  generalized  weakness, urinary retention and confusion. On admission, he found to have elevated bilirubin, LFTS, transaminitis, scleral jaundice. and urinary retention. Oshea catheter has placed with negative Urine analysis. The CT abdomen  and pelvis shows Distended gallbladder. Consider further evaluation with ultrasound if  acute cholecystitis is a concern. No discrete mass identified on this limited noncontrast study. The ID consult requested to assist with evaluation of Biliary sepsis.    # Encephalopathy  # Urinary retention  # R/O biliary sepsis    would recommend:    1. Repeat urine analysis  2. Obtain HIDA scan to rule out cholecystitis  3. Follow up culture  4. MRCP of abdomen and pelvis with contrast for further evaluation   5. Hold off antibiotic fro now.      will follow the patient with you and make further recommendation based on the clinical course and Lab results  Thank you for the opportunity to participate in Ms. OLSEN's care

## 2019-03-15 NOTE — PROGRESS NOTE ADULT - ASSESSMENT
await ct scan.  concern for malignacy, pancreatic vs mets.  furthrer recommendation when results obtained.  workup refused on prior admission

## 2019-03-15 NOTE — CONSULT NOTE ADULT - ASSESSMENT
89 yo M with PMHx CKD, BPH, Diverticulosis, Anemia 2/2 GI Bleed with previous PRBC transfusions, who p/w 2 days genearlized weakness, urinary retention and confusion, found to have elevated LFTs. Renal consult called for PHU, CKD management.      PHU on CKD 3 b/l Cr 1.4-1.6 10/2018   PHU most likely 2/2 pre renal. clincially appears dry. C3 77- low, C4 wnl -noted- likely 2/2 dec synthesis 2/2 liver dz   urinary retention on CT w/o hydronephrosis. no e/o acute GN  electrolytes acceptable  Transaminitis and Bilirubinemia, r/o liver failure, r/o biliary etiology:  BPH: c/w flomax  Hepatic encephalopathy- sr NH4 level 91  HTN    labs, chart reviewed  bladder scan for bladder volume, if >300ml -consider flores insertion vs straight cath q6h  c/w D5NS for now  diet per GI/medicine  f/u GI eval. f/u hepatic/gi labs  consider adding lactulose  watch BP for now  avoid ACEi/ARB/NSAIDs/nephrotoxics  montior BMP, LFTs daily  will f/u

## 2019-03-15 NOTE — H&P ADULT - NSHPLABSRESULTS_GEN_ALL_CORE
Labs and imaging reviewed    LABS:                        10.0   5.25  )-----------( 145      ( 15 Mar 2019 03:00 )             28.3     03-15    140  |  106  |  24<H>  ----------------------------<  93  4.2   |  21<L>  |  1.78<H>    Ca    9.3      15 Mar 2019 03:00    TPro  7.2  /  Alb  2.8<L>  /  TBili  20.1<H>  /  DBili  x   /  AST  591<H>  /  ALT  331<H>  /  AlkPhos  290<H>  03-15        PT/INR - ( 15 Mar 2019 05:02 )   PT: 21.8 SEC;   INR: 1.88          PTT - ( 15 Mar 2019 05:02 )  PTT:46.5 SEC  Urinalysis Basic - ( 15 Mar 2019 06:18 )    Color: DARK YELLOW / Appearance: CLEAR / S.013 / pH: 6.5  Gluc: NEGATIVE / Ketone: NEGATIVE  / Bili: SMALL / Urobili: SMALL   Blood: NEGATIVE / Protein: 10 / Nitrite: NEGATIVE   Leuk Esterase: NEGATIVE / RBC: x / WBC x   Sq Epi: x / Non Sq Epi: x / Bacteria: x      CAPILLARY BLOOD GLUCOSE            LIVER FUNCTIONS - ( 15 Mar 2019 03:00 )  Alb: 2.8 g/dL / Pro: 7.2 g/dL / ALK PHOS: 290 u/L / ALT: 331 u/L / AST: 591 u/L / GGT: x               RADIOLOGY & ADDITIONAL STUDIES:

## 2019-03-15 NOTE — CONSULT NOTE ADULT - SUBJECTIVE AND OBJECTIVE BOX
89 yo M, poor historian, charts reviewed, with PMHx BPH, Cataracts, Diverticulosis, Anemia 2/2 GI Bleed with previous PRBC transfusions, who p/w 2 days genearlized weakness, urinary retention and confusion. LFTs significant for transaminitis, scleral jaundice. Had recent admission 10/18 for rectal bleed 2/2 diverticulitis,  fall 2/2 syncope, anemia seen by Dr. Pearl (heme/onc) . On admission patient with scleral icterus. Last ECHO  with mild MR and normal LV fx. Denies SOB, chest pain, N/V, recent travels.       REVIEW OF SYSTEMS: Total of twelve systems have been reviewed with patient and found to be negative unless mentioned in HPI        PAST MEDICAL & SURGICAL HISTORY:  Chronic kidney disease (CKD), stage IV (severe)  Benign prostatic hypertrophy  S/P cataract surgery, left: 3 yrs ago            SOCIAL HISTORY  Alcohol: Does not drink  Tobacco: Does not smoke  Illicit substance use: None      FAMILY HISTORY: Non contributory to the present illness        No Known Allergies        T(C): 36.3 (03-15-19 @ 17:55), Max: 36.8 (03-15-19 @ 09:56)  HR: 84 (03-15-19 @ 17:55) (69 - 94)  BP: 177/100 (03-15-19 @ 17:55) (154/85 - 196/103)  RR: 17 (03-15-19 @ 17:55) (16 - 18)  SpO2: 100% (03-15-19 @ 17:55) (98% - 100%)        PHYSICAL EXAM:  GENERAL: Not in distress   CHEST/LUNG:  Aire ntry bilaterally  HEART: s1 and s2 present  ABDOMEN:  Nontender and  Nondistended  EXTREMITIES: No pedal  edema  CNS: Awake and Alert      LABS:                        10.0   5.25  )-----------( 145      ( 15 Mar 2019 03:00 )             28.3         -15    140  |  106  |  24<H>  ----------------------------<  93  4.2   |  21<L>  |  1.78<H>    Ca    9.3      15 Mar 2019 03:00    TPro  x   /  Alb  x   /  TBili  x   /  DBili  16.6<H>  /  AST  x   /  ALT  x   /  AlkPhos  x   03-15    PT/INR - ( 15 Mar 2019 05:02 )   PT: 21.8 SEC;   INR: 1.88     PTT - ( 15 Mar 2019 05:02 )  PTT:46.5 SEC      Urinalysis Basic - ( 15 Mar 2019 06:18 )  Color: DARK YELLOW / Appearance: CLEAR / S.013 / pH: 6.5  Gluc: NEGATIVE / Ketone: NEGATIVE  / Bili: SMALL / Urobili: SMALL   Blood: NEGATIVE / Protein: 10 / Nitrite: NEGATIVE   Leuk Esterase: NEGATIVE / RBC: x / WBC x   Sq Epi: x / Non Sq Epi: x / Bacteria: x          MEDICATIONS  (STANDING):  amLODIPine   Tablet 5 milliGRAM(s) Oral daily  dextrose 5% + sodium chloride 0.9%. 1000 milliLiter(s) (50 mL/Hr) IV Continuous <Continuous>  pantoprazole  Injectable 40 milliGRAM(s) IV Push daily  tamsulosin Oral Tab/Cap - Peds 0.4 milliGRAM(s) Oral at bedtime    MEDICATIONS  (PRN):          RADIOLOGY & ADDITIONAL TESTS:    3/15/19 : CT Abdomen and Pelvis No Cont (03.15.19 @ 09:39) Distended gallbladder. Consider further evaluation with ultrasound if  acute cholecystitis is a concern.  No discrete mass identified on this limited noncontrast study.          MICROBIOLOGY DATA:    pending A 89 yo Male with BPH, Cataracts, Diverticulosis, Anemia 2/2 GI Bleed with previous PRBC transfusions, who presents to the ER for evaluation of  generalized  weakness, urinary retention and confusion. On admission, he found to have elevated bilirubin, LFTS, transaminitis, scleral jaundice. and urinary retention. Oshea catheter has placed with negative Urine analysis. The CT abdomen  and pelvis shows Distended gallbladder. Consider further evaluation with ultrasound if  acute cholecystitis is a concern. No discrete mass identified on this limited noncontrast study. The ID consult requested to assist with evaluation of Biliary sepsis.        REVIEW OF SYSTEMS: Total of twelve systems have been reviewed with patient and found to be negative unless mentioned in HPI        PAST MEDICAL & SURGICAL HISTORY:  Chronic kidney disease (CKD), stage IV (severe)  Benign prostatic hypertrophy  S/P cataract surgery, left: 3 yrs ago            SOCIAL HISTORY  Alcohol: Does not drink  Tobacco: Does not smoke  Illicit substance use: None      FAMILY HISTORY: Non contributory to the present illness        No Known Allergies        T(C): 36.3 (03-15-19 @ 17:55), Max: 36.8 (03-15-19 @ 09:56)  HR: 84 (03-15-19 @ 17:55) (69 - 94)  BP: 177/100 (03-15-19 @ 17:55) (154/85 - 196/103)  RR: 17 (03-15-19 @ 17:55) (16 - 18)  SpO2: 100% (03-15-19 @ 17:55) (98% - 100%)        PHYSICAL EXAM:  GENERAL: Not in distress   HEENT: Sclera jaundiced  CHEST/LUNG:  Air entry bilaterally  HEART: s1 and s2 present  ABDOMEN:  Nontender and  Nondistended  : Oshea catheter in placed  EXTREMITIES: No pedal  edema  CNS: Awake and Alert      LABS:                        10.0   5.25  )-----------( 145      ( 15 Mar 2019 03:00 )             28.3         -15    140  |  106  |  24<H>  ----------------------------<  93  4.2   |  21<L>  |  1.78<H>    Ca    9.3      15 Mar 2019 03:00    TPro  x   /  Alb  x   /  TBili  x   /  DBili  16.6<H>  /  AST  x   /  ALT  x   /  AlkPhos  x   03-15    PT/INR - ( 15 Mar 2019 05:02 )   PT: 21.8 SEC;   INR: 1.88     PTT - ( 15 Mar 2019 05:02 )  PTT:46.5 SEC      Urinalysis Basic - ( 15 Mar 2019 06:18 )  Color: DARK YELLOW / Appearance: CLEAR / S.013 / pH: 6.5  Gluc: NEGATIVE / Ketone: NEGATIVE  / Bili: SMALL / Urobili: SMALL   Blood: NEGATIVE / Protein: 10 / Nitrite: NEGATIVE   Leuk Esterase: NEGATIVE / RBC: x / WBC x   Sq Epi: x / Non Sq Epi: x / Bacteria: x          MEDICATIONS  (STANDING):  amLODIPine   Tablet 5 milliGRAM(s) Oral daily  dextrose 5% + sodium chloride 0.9%. 1000 milliLiter(s) (50 mL/Hr) IV Continuous <Continuous>  pantoprazole  Injectable 40 milliGRAM(s) IV Push daily  tamsulosin Oral Tab/Cap - Peds 0.4 milliGRAM(s) Oral at bedtime    MEDICATIONS  (PRN):          RADIOLOGY & ADDITIONAL TESTS:    3/15/19 : CT Abdomen and Pelvis No Cont (03.15.19 @ 09:39) Distended gallbladder. Consider further evaluation with ultrasound if  acute cholecystitis is a concern.  No discrete mass identified on this limited noncontrast study.          MICROBIOLOGY DATA:    pending

## 2019-03-15 NOTE — ED PROVIDER NOTE - NS ED ROS FT
GENERAL: +generalized weakness. No fever or chills  EYES: no change in vision  HEENT: no trouble swallowing or speaking  CARDIAC: no chest pain  PULMONARY: no cough or SOB  GI: no abdominal pain, no nausea, no vomiting, no diarrhea or constipation  : +retention  SKIN: no rashes  NEURO: no headache, numbness, focal weakness, tingling.   MSK: No joint pain     ~Oswaldo Ruiz PGY1

## 2019-03-15 NOTE — H&P ADULT - HISTORY OF PRESENT ILLNESS
HPI:  89 yo M, poor historian, charts reviewed, with PMHx BPH, Cataracts, Diverticulosis, Anemia 2/2 GI Bleed with previous PRBC transfusions, who p/w 2 days genearlized weakness, urinary retention and confusion.  Upon arrival to the ED, pt with scleral icterus and labs significant for transaminitis and TBili > 20

## 2019-03-15 NOTE — PROGRESS NOTE ADULT - SUBJECTIVE AND OBJECTIVE BOX
Patient is a 89y Male     Patient is a 89y old  Male who presents with a chief complaint of jaudnice    HPI: 89 year old known in past for gib.  refused workup, now with weakness, found to be jaundice      PAST MEDICAL & SURGICAL HISTORY:  Chronic kidney disease (CKD), stage IV (severe)  Benign prostatic hypertrophy  S/P cataract surgery, left: 3 yrs ago      MEDICATIONS  (STANDING):      Allergies    No Known Allergies    Intolerances        SOCIAL HISTORY:  Denies ETOh,Smoking,     FAMILY HISTORY:  No pertinent family history in first degree relatives      REVIEW OF SYSTEMS:    CONSTITUTIONAL: No weakness, fevers or chills  EYES/ENT: No visual changes;  No vertigo or throat pain   NECK: No pain or stiffness  RESPIRATORY: No cough, wheezing, hemoptysis; No shortness of breath  CARDIOVASCULAR: No chest pain or palpitations  GASTROINTESTINAL: No abdominal or epigastric pain. No nausea, vomiting, or hematemesis; No diarrhea or constipation. No melena or hematochezia.  GENITOURINARY: No dysuria, frequency or hematuria  NEUROLOGICAL: No numbness or weakness  SKIN: No itching, burning, rashes, or lesions   All other review of systems is negative unless indicated above.    VITAL:  T(C): , Max: 36.4 (03-14-19 @ 21:55)  T(F): , Max: 97.6 (03-14-19 @ 21:55)  HR: 83 (03-15-19 @ 06:44)  BP: 157/97 (03-15-19 @ 06:44)  BP(mean): --  RR: 17 (03-15-19 @ 06:44)  SpO2: 100% (03-15-19 @ 06:44)  Wt(kg): --    I and O's:        PHYSICAL EXAM:    Constitutional: NAD  HEENT: PERRLA,   Neck: No JVD  Respiratory: CTA B/L  Cardiovascular: S1 and S2  Gastrointestinal: BS+, soft, NT/ND  Extremities: No peripheral edema  Neurological: A/O x 3, no focal deficits  Psychiatric: Normal mood, normal affect  : No Oshea  Skin: No rashes  Access: Not applicable  Back: No CVA tenderness    LABS:                        10.0   5.25  )-----------( 145      ( 15 Mar 2019 03:00 )             28.3     03-15    140  |  106  |  24<H>  ----------------------------<  93  4.2   |  21<L>  |  1.78<H>    Ca    9.3      15 Mar 2019 03:00    TPro  7.2  /  Alb  2.8<L>  /  TBili  20.1<H>  /  DBili  x   /  AST  591<H>  /  ALT  331<H>  /  AlkPhos  290<H>  03-15          RADIOLOGY & ADDITIONAL STUDIES:

## 2019-03-15 NOTE — CONSULT NOTE ADULT - SUBJECTIVE AND OBJECTIVE BOX
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  Upon arrival to the ED, pt with scleral icterus and labs significant for transaminitis and TBili > 20    called to evaluate pt for ams         *****PAST MEDICAL / Surgical  HISTORY:  PAST MEDICAL & SURGICAL HISTORY:  Chronic kidney disease (CKD), stage IV (severe)  Benign prostatic hypertrophy  S/P cataract surgery, left: 3 yrs ago           *****FAMILY HISTORY:  FAMILY HISTORY:  No pertinent family history in first degree relatives           *****SOCIAL HISTORY:  Alcohol: None  Smoking: None         *****ALLERGIES:   Allergies    No Known Allergies    Intolerances             *****MEDICATIONS: current medication reviewed and documented.   MEDICATIONS  (STANDING):  amLODIPine   Tablet 5 milliGRAM(s) Oral daily  dextrose 5% + sodium chloride 0.9%. 1000 milliLiter(s) (50 mL/Hr) IV Continuous <Continuous>  pantoprazole  Injectable 40 milliGRAM(s) IV Push daily  tamsulosin Oral Tab/Cap - Peds 0.4 milliGRAM(s) Oral at bedtime    MEDICATIONS  (PRN):           *****REVIEW OF SYSTEM:  GEN: no fever, no chills, no pain  RESP: no SOB, no cough, no sputum  CVS: no chest pain, no palpitations, no edema  GI: no abdominal pain, no nausea, no vomiting, no constipation, no diarrhea  : no dysurea, no frequency, no hematurea  Neuro: no headache, no dizziness  PSYCH: no anxiety, no depression  Derm : no itching, no rash         *****VITAL SIGNS:  T(C): 36.3 (03-15-19 @ 20:55), Max: 36.8 (03-15-19 @ 09:56)  HR: 81 (03-15-19 @ 20:55) (69 - 94)  BP: 157/95 (03-15-19 @ 20:55) (154/85 - 196/103)  RR: 17 (03-15-19 @ 20:55) (16 - 18)  SpO2: 100% (03-15-19 @ 20:55) (98% - 100%)  Wt(kg): --    03-15 @ 07:01  -  03-15 @ 21:00  --------------------------------------------------------  IN: 250 mL / OUT: 0 mL / NET: 250 mL             *****PHYSICAL EXAM:   Alert oriented x 2   able to recognize grand daughter at bedside.    Attention comprehension are fair. Able to name, repeat, without any difficulty.   Able to follow 1-2 step commands.     EOMI fundi not visualized,  VFF to confrontration  No facial asymmetry   Tongue is midline   Palate elevates symmetrically   Moving all 4 ext symmetrically no pronator drift   Reflexes are symmetric throughout   sensation is grossly symmetric  Gait : not assessed.  B/L down going toes               *****LAB AND IMAGING:                          10.0   5.25  )-----------( 145      ( 15 Mar 2019 03:00 )             28.3               03-15    140  |  106  |  24<H>  ----------------------------<  93  4.2   |  21<L>  |  1.78<H>    Ca    9.3      15 Mar 2019 03:00    TPro  x   /  Alb  x   /  TBili  x   /  DBili  16.6<H>  /  AST  x   /  ALT  x   /  AlkPhos  x   03-15    PT/INR - ( 15 Mar 2019 05:02 )   PT: 21.8 SEC;   INR: 1.88          PTT - ( 15 Mar 2019 05:02 )  PTT:46.5 SEC                        Urinalysis Basic - ( 15 Mar 2019 06:18 )    Color: DARK YELLOW / Appearance: CLEAR / S.013 / pH: 6.5  Gluc: NEGATIVE / Ketone: NEGATIVE  / Bili: SMALL / Urobili: SMALL   Blood: NEGATIVE / Protein: 10 / Nitrite: NEGATIVE   Leuk Esterase: NEGATIVE / RBC: x / WBC x   Sq Epi: x / Non Sq Epi: x / Bacteria: x        [All pertinent recent Imaging reports reviewed]         *****A S S E S S M E N T   A N D   P L A N :     89 yo M, poor historian, charts reviewed, with PMHx BPH, Cataracts, Diverticulosis, Anemia 2/2 GI Bleed with previous PRBC transfusions, who p/w 2 days genearlized weakness, urinary retention and confusion.  Upon arrival to the ED, pt with scleral icterus and labs significant for transaminitis and TBili > 20    called to evaluate pt for ams         Problem/Recommendations 1:  Multifactorial toxic metabolic encephalopathy likely related to high bilirubin  +/- chronic renal insufficiency   will correct metabolic dyscrasias  will continue to monitor         Problem/Recommendations 2:  Hyperbilirubinemia of unclear etiology  gi f/u   u/s pending        ___________________________  Will follow with you.  Thank you,  Ava Schreiber MD  Diplomate of the American Board of Neurology and Psychiatry.  Diplomate of the American Board of Vascular Neurology.   [facility] ([facility]), [facility]   Ph: [phone]    Differential diagnosis and plan of care discussed with patient after the evaluation.   Advanced care planning options discussed.   Pain assessed and judicious use of narcotics when appropriate was discussed.  Importance of Fall prevention discussed.  Counseling on Smoking and Alcohol cessation was offered when appropriate.  Counseling on Diet, exercise, and medication compliance was done.     62 minutes spent on the total encounter;  more than 50 % of the visit was spent on counseling  and or coordinating care by the attending physician.    Thank you for allowing me to participate in the care of this ara patient. Please do not hesitate to call me if you have any questions.     This and subsequent notes were partially created using voice recognition software and will  inherently be subject to errors including those of syntax and sound alike substitutions which may escape proofreading. In such instances original meaning may be extrapolated by contextual derivation.

## 2019-03-15 NOTE — ED PROVIDER NOTE - PHYSICAL EXAMINATION
Gen: AAOx3, non-toxic  Head: NCAT  HEENT: EOMI, oral mucosa moist, normal conjunctiva  Lung: CTAB, no respiratory distress, no wheezes/rhonchi/rales B/L, speaking in full sentences  CV: RRR, no murmurs, rubs or gallops  Abd: soft, NTND, no guarding, no CVA tenderness  MSK: no visible deformities  Neuro: No focal sensory or motor deficits, normal CN exam   Skin: jaundiced. Warm, well perfused, no rash  Psych: normal affect.     ~Oswaldo Ruiz PGY1

## 2019-03-15 NOTE — ED PROVIDER NOTE - ATTENDING CONTRIBUTION TO CARE
MD Jenkins:  I performed a face to face bedside interview with patient regarding history of present illness, review of symptoms and past medical history. I completed an independent physical exam(documented below).  I have discussed patient's plan of care with resident.   I agree with note as stated above, having amended the EMR as needed to reflect my findings. I have discussed the assessment and plan of care.  This includes during the time I functioned as the attending physician for this patient.  PE:  Gen: Alert, NAD  Head: NC, AT,  EOMI, severe icterus of b/l sclera  ENT:  normal hearing, patent oropharynx without erythema/exudate  Neck: +supple, no tenderness/meningismus/JVD, +Trachea midline  Chest: no chest wall tenderness, equal chest rise  Pulm: Bilateral BS, normal resp effort, no wheeze/stridor/retractions  CV: RRR, no M/R/G, +dist pulses  Abd: +BS, soft, NT/ND  Rectal: deferred  Mskel: no edema/erythema/cyanosis  Skin: severely jaundiced  Neuro: AAOx3  MDM:  89yo M w/ pmh of BPH, CKD (stage IV), c/o general weakness for days, urinary retention X 1 day associated w/ dark urine. Pt is severely jaundiced w/ icteric sclera (supposedly new per his daughter in law). Pt urinating into urinal in his room. post void bladder scan, labs, ua, possibly imaging.

## 2019-03-15 NOTE — ED PROVIDER NOTE - CLINICAL SUMMARY MEDICAL DECISION MAKING FREE TEXT BOX
Generalized weakness + urinary retention. Pt A&Ox3, NAD. Denies any pain, fever, or SOB. Will screen basic labs, flores, UA, then reassess.

## 2019-03-15 NOTE — CONSULT NOTE ADULT - SUBJECTIVE AND OBJECTIVE BOX
[facility] [place] [facility] - Crystal / Corrie S /Kylah/ S Bakari/ LARON Beard/ Shiva Mullen / MAGUI Njeranu  ---------------------------------------------------------------------------------------------------------------  Patient seen and examined bedside    PAST MEDICAL & SURGICAL HISTORY:  Chronic kidney disease (CKD), stage IV (severe)  Benign prostatic hypertrophy  S/P cataract surgery, left: 3 yrs ago      Allergies: No Known Allergies    Home Medications Reviewed  Hospital Medications:   MEDICATIONS  (STANDING):  dextrose 5% + sodium chloride 0.9%. 1000 milliLiter(s) (50 mL/Hr) IV Continuous <Continuous>  pantoprazole  Injectable 40 milliGRAM(s) IV Push daily  tamsulosin Oral Tab/Cap - Peds 0.4 milliGRAM(s) Oral at bedtime    SOCIAL HISTORY:  Denies ETOh,Smoking, illicit drug use  FAMILY HISTORY:  No pertinent family history in first degree relatives      REVIEW OF SYSTEMS:  limited, as above. demented    VITALS:  T(F): 97.3 (03-15-19 @ 13:59), Max: 98.2 (03-15-19 @ 09:56)  HR: 69 (03-15-19 @ 13:59)  BP: 166/78 (03-15-19 @ 13:59)  RR: 18 (03-15-19 @ 13:59)  SpO2: 100% (03-15-19 @ 13:59)  Wt(kg): --    Height (cm): 180.34 (03-15 @ 13:59)  Weight (kg): 53.5 (03-15 @ 13:59)  BMI (kg/m2): 16.5 (03-15 @ 13:59)  BSA (m2): 1.68 (03-15 @ 13:59)    PHYSICAL EXAM:  Constitutional: NAD  HEENT: anicteric sclera, oropharynx clear, MMM  Neck: No JVD  Respiratory: CTAB, no wheezes, rales or rhonchi  Cardiovascular: S1, S2, RRR  Gastrointestinal: BS+, soft, NT/ND  Extremities: No cyanosis or clubbing. No peripheral edema  Neurological: A/O x1  Psychiatric: Normal mood, normal affect  : No CVA tenderness. No flores.   Skin: dry      LABS:  03-15    140  |  106  |  24<H>  ----------------------------<  93  4.2   |  21<L>  |  1.78<H>    Ca    9.3      15 Mar 2019 03:00    TPro      /  Alb      /  TBili      /  DBili  16.6<H>  /  AST      /  ALT      /  AlkPhos      03-15    Creatinine Trend: 1.78 <--                        10.0   5.25  )-----------( 145      ( 15 Mar 2019 03:00 )             28.3     Urine Studies:  Urinalysis Basic - ( 15 Mar 2019 06:18 )    Color: DARK YELLOW / Appearance: CLEAR / S.013 / pH: 6.5  Gluc: NEGATIVE / Ketone: NEGATIVE  / Bili: SMALL / Urobili: SMALL   Blood: NEGATIVE / Protein: 10 / Nitrite: NEGATIVE   Leuk Esterase: NEGATIVE / RBC:  / WBC    Sq Epi:  / Non Sq Epi:  / Bacteria:           RADIOLOGY & ADDITIONAL STUDIES: [facility] [place] [facility] - Crystal / Corrie S /Kylah/ S Bakari/ S Beard/ Shiva Mullen / MAGUI Njeru  ---------------------------------------------------------------------------------------------------------------  Patient seen and examined bedside    89 yo M, currently confused, chart reviewed, with PMHx CKD, BPH, Cataracts, Diverticulosis, Anemia 2/2 GI Bleed with previous PRBC transfusions, who p/w 2 days genearlized weakness, urinary retention and confusion. Upon arrival to the ED, pt with scleral icterus and labs significant for transaminitis and TBili > 20. CT abd showed markedly distended bladder. Renal consult called for   PHU, CKD management. pt states he urinated twice so far today, states he is hungry, didn't eat for 2 days. Denied abd pain/V/D/sob. per RN pt came up from ER w/wet sheets.     PAST MEDICAL & SURGICAL HISTORY:  Chronic kidney disease (CKD), stage IV (severe)  Benign prostatic hypertrophy  S/P cataract surgery, left: 3 yrs ago      Allergies: No Known Allergies    Home Medications Reviewed  Hospital Medications:   MEDICATIONS  (STANDING):  dextrose 5% + sodium chloride 0.9%. 1000 milliLiter(s) (50 mL/Hr) IV Continuous <Continuous>  pantoprazole  Injectable 40 milliGRAM(s) IV Push daily  tamsulosin Oral Tab/Cap - Peds 0.4 milliGRAM(s) Oral at bedtime    SOCIAL HISTORY:  unavailable  FAMILY HISTORY:  No pertinent family history in first degree relatives      REVIEW OF SYSTEMS:  limited, as above. demented, confused    VITALS:  T(F): 97.3 (03-15-19 @ 13:59), Max: 98.2 (03-15-19 @ 09:56)  HR: 69 (03-15-19 @ 13:59)  BP: 166/78 (03-15-19 @ 13:59)  RR: 18 (03-15-19 @ 13:59)  SpO2: 100% (03-15-19 @ 13:59)  Wt(kg): --    Height (cm): 180.34 (03-15 @ 13:59)  Weight (kg): 53.5 (03-15 @ 13:59)  BMI (kg/m2): 16.5 (03-15 @ 13:59)  BSA (m2): 1.68 (03-15 @ 13:59)    PHYSICAL EXAM:  Constitutional: NAD, cachectic  HEENT: icteric sclera+  Neck: No JVD  Respiratory: CTAB, no wheezes, rales or rhonchi  Cardiovascular: S1, S2, RRR  Gastrointestinal: BS+, soft, NT/ND  Extremities: No cyanosis or clubbing. No peripheral edema  Neurological: A/O x1  Psychiatric: Normal mood, normal affect  : No CVA tenderness. No flores.   Skin: dry      LABS:  03-15    140  |  106  |  24<H>  ----------------------------<  93  4.2   |  21<L>  |  1.78<H>    Ca    9.3      15 Mar 2019 03:00    TPro      /  Alb      /  TBili      /  DBili  16.6<H>  /  AST      /  ALT      /  AlkPhos      03-15    Creatinine Trend: 1.78 <--                        10.0   5.25  )-----------( 145      ( 15 Mar 2019 03:00 )             28.3     Urine Studies:  Urinalysis Basic - ( 15 Mar 2019 06:18 )    Color: DARK YELLOW / Appearance: CLEAR / S.013 / pH: 6.5  Gluc: NEGATIVE / Ketone: NEGATIVE  / Bili: SMALL / Urobili: SMALL   Blood: NEGATIVE / Protein: 10 / Nitrite: NEGATIVE   Leuk Esterase: NEGATIVE / RBC:  / WBC    Sq Epi:  / Non Sq Epi:  / Bacteria:     RADIOLOGY & ADDITIONAL STUDIES:  < from: CT Abdomen and Pelvis No Cont (03.15.19 @ 09:39) >    IMPRESSION:   Distended gallbladder. Consider further evaluation with ultrasound if   acute cholecystitis is a concern.  KIDNEYS/URETERS: Within normal limits.  No discrete mass identified on this limited noncontrast study.    < end of copied text >

## 2019-03-15 NOTE — CONSULT NOTE ADULT - SUBJECTIVE AND OBJECTIVE BOX
HISTORY OF PRESENT ILLNESS: HPI:    HPI:    89 yo M, poor historian, charts reviewed, with PMHx BPH, Cataracts, Diverticulosis, Anemia 2/2 GI Bleed with previous PRBC transfusions, who p/w 2 days genearlized weakness, urinary retention and confusion. LFTs significant for transaminitis, scleral jaundice. Had recent admission 10/18 for rectal bleed 2/2 diverticulitis, 6/18 syncope . On admission patient with scleral icterus. Last ECHO 6/18 with mild MR and normal LV fx      PAST MEDICAL & SURGICAL HISTORY:  Chronic kidney disease (CKD), stage IV (severe)  Benign prostatic hypertrophy  S/P cataract surgery, left: 3 yrs ago          MEDICATIONS:  MEDICATIONS  (STANDING):  dextrose 5% + sodium chloride 0.9%. 1000 milliLiter(s) (50 mL/Hr) IV Continuous <Continuous>  pantoprazole  Injectable 40 milliGRAM(s) IV Push daily  tamsulosin Oral Tab/Cap - Peds 0.4 milliGRAM(s) Oral at bedtime      Allergies    No Known Allergies    Intolerances        FAMILY HISTORY:  No pertinent family history in first degree relatives    Non-contributary for premature coronary disease or sudden cardiac death    SOCIAL HISTORY:    [ ] Non-smoker  [ ] Smoker  [ ] Alcohol      REVIEW OF SYSTEMS:  [ ]chest pain  [  ]shortness of breath  [  ]palpitations  [  ]syncope  [ ]near syncope [ ]upper extremity weakness   [ ] lower extremity weakness  [  ]diplopia  [  ]altered mental status   [  ]fevers  [ ]chills [ ]nausea  [ ]vomitting  [  ]dysphagia    [ ]abdominal pain  [ ]melena  [ ]BRBPR    [  ]epistaxis  [  ]rash    [ ]lower extremity edema        [ ] All others negative	  [ ] Unable to obtain      LABS:	 	    CARDIAC MARKERS:                              10.0   5.25  )-----------( 145      ( 15 Mar 2019 03:00 )             28.3     Hb Trend: 10.0<--    03-15    140  |  106  |  24<H>  ----------------------------<  93  4.2   |  21<L>  |  1.78<H>    Ca    9.3      15 Mar 2019 03:00    TPro  7.2  /  Alb  2.8<L>  /  TBili  20.1<H>  /  DBili  x   /  AST  591<H>  /  ALT  331<H>  /  AlkPhos  290<H>  03-15    Creatinine Trend: 1.78<--    Coags:  PT/INR - ( 15 Mar 2019 05:02 )   PT: 21.8 SEC;   INR: 1.88          PTT - ( 15 Mar 2019 05:02 )  PTT:46.5 SEC    proBNP:   Lipid Profile:   HgA1c:   TSH:     PHYSICAL EXAM:  T(C): 36.8 (03-15-19 @ 09:56), Max: 36.8 (03-15-19 @ 09:56)  HR: 77 (03-15-19 @ 09:56) (77 - 94)  BP: 190/110 (03-15-19 @ 09:56) (154/85 - 196/103)  RR: 18 (03-15-19 @ 09:56) (16 - 18)  SpO2: 100% (03-15-19 @ 09:56) (98% - 100%)  Wt(kg): --  I&O's Summary      Gen: Appears well in NAD  HEENT:  (-)icterus (-)pallor  CV: N S1 S2 1/6 DEBBIE (+)2 Pulses B/l  Resp:  Clear to ausculatation B/L, normal effort  GI: (+) BS Soft, NT, ND  Lymph:  (-)Edema, (-)obvious lymphadenopathy  Skin: Warm to touch, Normal turgor  Psych: Appropriate mood and affect        TELEMETRY: 	      ECG:  	    RADIOLOGY:         CXR:     ASSESSMENT/PLAN: 	89y Male HISTORY OF PRESENT ILLNESS: HPI:    HPI:    89 yo M, poor historian, charts reviewed, with PMHx BPH, Cataracts, Diverticulosis, Anemia 2/2 GI Bleed with previous PRBC transfusions, who p/w 2 days genearlized weakness, urinary retention and confusion. LFTs significant for transaminitis, scleral jaundice. Had recent admission 10/18 for rectal bleed 2/2 diverticulitis, 6/18 fall 2/2 syncope, anemia seen by Dr. Pearl (heme/onc) . On admission patient with scleral icterus. Last ECHO 6/18 with mild MR and normal LV fx.     PAST MEDICAL & SURGICAL HISTORY:  Chronic kidney disease (CKD), stage IV (severe)  Benign prostatic hypertrophy  S/P cataract surgery, left: 3 yrs ago    MEDICATIONS:  MEDICATIONS  (STANDING):  dextrose 5% + sodium chloride 0.9%. 1000 milliLiter(s) (50 mL/Hr) IV Continuous <Continuous>  pantoprazole  Injectable 40 milliGRAM(s) IV Push daily  tamsulosin Oral Tab/Cap - Peds 0.4 milliGRAM(s) Oral at bedtime    Allergies    No Known Allergies    FAMILY HISTORY:  No pertinent family history in first degree relatives    Non-contributary for premature coronary disease or sudden cardiac death    SOCIAL HISTORY:    [X ] Non-smoker  [ ] Smoker  [ ] Alcohol      REVIEW OF SYSTEMS:  [ ]chest pain  [  ]shortness of breath  [  ]palpitations  [  ]syncope  [ ]near syncope [X ]  generalized extremity weakness   [ ] lower extremity weakness  [  ]diplopia  [  ]altered mental status   [  ]fevers  [ ]chills [ ]nausea  [ ]vomitting  [  ]dysphagia    [ ]abdominal pain  [ ]melena  [ ]BRBPR    [  ]epistaxis  [  ]rash    [ ]lower extremity edema        [X ] All others negative	  [ ] Unable to obtain    LABS:	 	    CARDIAC MARKERS:                        10.0   5.25  )-----------( 145      ( 15 Mar 2019 03:00 )             28.3     Hb Trend: 10.0<--    03-15    140  |  106  |  24<H>  ----------------------------<  93  4.2   |  21<L>  |  1.78<H>    Ca    9.3      15 Mar 2019 03:00    TPro  7.2  /  Alb  2.8<L>  /  TBili  20.1<H>  /  DBili  x   /  AST  591<H>  /  ALT  331<H>  /  AlkPhos  290<H>  03-15    Creatinine Trend: 1.78<--    Coags:  PT/INR - ( 15 Mar 2019 05:02 )   PT: 21.8 SEC;   INR: 1.88        PTT - ( 15 Mar 2019 05:02 )  PTT:46.5 SEC    proBNP:   Lipid Profile:   HgA1c:   TSH:     PHYSICAL EXAM:  T(C): 36.8 (03-15-19 @ 09:56), Max: 36.8 (03-15-19 @ 09:56)  HR: 77 (03-15-19 @ 09:56) (77 - 94)  BP: 190/110 (03-15-19 @ 09:56) (154/85 - 196/103)  RR: 18 (03-15-19 @ 09:56) (16 - 18)  SpO2: 100% (03-15-19 @ 09:56) (98% - 100%)  Wt(kg): --  I&O's Summary    Gen: Appears well in NAD  HEENT:  (+)icterus (-)pallor  CV: N S1 S2 1/6 DEBBIE (+)2 Pulses B/l  Resp:  Clear to ausculatation B/L, normal effort  GI: (+) BS Soft, NT, ND  Lymph:  (-)Edema, (-)obvious lymphadenopathy  Skin: Warm to touch, Normal turgor  Psych: Appropriate mood and affect    TELEMETRY: 	      ECG:  	NSR w/RBBB    RADIOLOGY:         CXR: pending     ASSESSMENT/PLAN: 	    89 yo M, poor historian, charts reviewed, with PMHx BPH, Cataracts, Diverticulosis, Anemia 2/2 GI Bleed with previous PRBC transfusions, who p/w 2 days generalized  weakness, urinary retention and confusion. LFTs significant for transaminitis, scleral jaundice.    -- NSR with RBBB on EKG  -- GI consult appreciated, f/u CT Abd / Pelv results r/o malignancy  -- further cardiac w/u pending GI work up HISTORY OF PRESENT ILLNESS: HPI:    HPI:    89 yo M, poor historian, charts reviewed, with PMHx BPH, Cataracts, Diverticulosis, Anemia 2/2 GI Bleed with previous PRBC transfusions, who p/w 2 days genearlized weakness, urinary retention and confusion. LFTs significant for transaminitis, scleral jaundice. Had recent admission 10/18 for rectal bleed 2/2 diverticulitis, 6/18 fall 2/2 syncope, anemia seen by Dr. Pearl (heme/onc) . On admission patient with scleral icterus. Last ECHO 6/18 with mild MR and normal LV fx. Denies SOB, chest pain, N/V, recent travels.     PAST MEDICAL & SURGICAL HISTORY:  Chronic kidney disease (CKD), stage IV (severe)  Benign prostatic hypertrophy  S/P cataract surgery, left: 3 yrs ago    MEDICATIONS:  MEDICATIONS  (STANDING):  dextrose 5% + sodium chloride 0.9%. 1000 milliLiter(s) (50 mL/Hr) IV Continuous <Continuous>  pantoprazole  Injectable 40 milliGRAM(s) IV Push daily  tamsulosin Oral Tab/Cap - Peds 0.4 milliGRAM(s) Oral at bedtime    Allergies    No Known Allergies    FAMILY HISTORY:  No pertinent family history in first degree relatives    Non-contributary for premature coronary disease or sudden cardiac death    SOCIAL HISTORY:    [X ] Non-smoker  [ ] Smoker  [ ] Alcohol      REVIEW OF SYSTEMS:  [ ]chest pain  [  ]shortness of breath  [  ]palpitations  [  ]syncope  [ ]near syncope [X ]  generalized extremity weakness   [ ] lower extremity weakness  [  ]diplopia  [  ]altered mental status   [  ]fevers  [ ]chills [ ]nausea  [ ]vomitting  [  ]dysphagia    [ ]abdominal pain  [ ]melena  [ ]BRBPR    [  ]epistaxis  [  ]rash    [ ]lower extremity edema        [X ] All others negative	  [ ] Unable to obtain    LABS:	 	    CARDIAC MARKERS:                        10.0   5.25  )-----------( 145      ( 15 Mar 2019 03:00 )             28.3     Hb Trend: 10.0<--    03-15    140  |  106  |  24<H>  ----------------------------<  93  4.2   |  21<L>  |  1.78<H>    Ca    9.3      15 Mar 2019 03:00    TPro  7.2  /  Alb  2.8<L>  /  TBili  20.1<H>  /  DBili  x   /  AST  591<H>  /  ALT  331<H>  /  AlkPhos  290<H>  03-15    Creatinine Trend: 1.78<--    Coags:  PT/INR - ( 15 Mar 2019 05:02 )   PT: 21.8 SEC;   INR: 1.88        PTT - ( 15 Mar 2019 05:02 )  PTT:46.5 SEC    proBNP:   Lipid Profile:   HgA1c:   TSH:     PHYSICAL EXAM:  T(C): 36.8 (03-15-19 @ 09:56), Max: 36.8 (03-15-19 @ 09:56)  HR: 77 (03-15-19 @ 09:56) (77 - 94)  BP: 190/110 (03-15-19 @ 09:56) (154/85 - 196/103)  RR: 18 (03-15-19 @ 09:56) (16 - 18)  SpO2: 100% (03-15-19 @ 09:56) (98% - 100%)  Wt(kg): --  I&O's Summary    Gen: Appears well in NAD  HEENT:  (+)icterus (-)pallor  CV: N S1 S2 1/6 DEBBIE (+)2 Pulses B/l  Resp:  Clear to ausculatation B/L, normal effort  GI: (+) BS Soft, NT, ND  Lymph:  (-)Edema, (-)obvious lymphadenopathy  Skin: Warm to touch, Normal turgor  Psych: Appropriate mood and affect    TELEMETRY: 	      ECG:  	NSR w/RBBB    RADIOLOGY:         CXR: pending     ASSESSMENT/PLAN: 	    89 yo M, poor historian, charts reviewed, with PMHx BPH, Cataracts, Diverticulosis, Anemia 2/2 GI Bleed with previous PRBC transfusions, who p/w 2 days generalized  weakness, urinary retention and confusion. LFTs significant for transaminitis, scleral jaundice.    -- NSR with RBBB on EKG  -- GI consult appreciated, f/u CT Abd / Pelv results r/o malignancy  -- further cardiac w/u pending GI work up

## 2019-03-15 NOTE — H&P ADULT - ASSESSMENT
89 yo M p/w confusion and generalized weakness    -> Transaminitis and Bilirubinemia:      - f/u ct a/p      - f/u hepatic/gi labs      - unspecified etiology      - all consultants management appreciated  -> Metabolic Encephalopathy:      - 2/2 hepatic/gi      - f/u ammonia       - neuro checks       - fall precautions   -> BPH:     - flomax  -> Need for prophylactic measure:      - dvt/gi ppx 89 yo M p/w confusion and generalized weakness    -> Transaminitis and Bilirubinemia, r/o liver failure, r/o biliary etiology:      - f/u ct a/p      - f/u hepatic/gi labs      - unspecified etiology      - all consultants management appreciated  -> Metabolic Encephalopathy:      - 2/2 hepatic/gi      - f/u ammonia       - neuro checks       - fall precautions   -> BPH:     - flomax  -> Need for prophylactic measure:      - dvt/gi ppx  -> Severe Protein Calorie Malnutrition:      - supplement diet

## 2019-03-15 NOTE — H&P ADULT - NSHPPHYSICALEXAM_GEN_ALL_CORE
T(C): 36.8 (03-15-19 @ 09:56), Max: 36.8 (03-15-19 @ 09:56)  HR: 77 (03-15-19 @ 09:56) (77 - 94)  BP: 190/110 (03-15-19 @ 09:56) (154/85 - 196/103)  RR: 18 (03-15-19 @ 09:56) (16 - 18)  SpO2: 100% (03-15-19 @ 09:56) (98% - 100%)    PHYSICAL EXAMINATION:   Constitutional: ill appearing  HEENT: scleral icterus, bitemp wasting.   Neck:  Supple  Respiratory:  Adequate airflow b/l. Not using accessory muscles of respiration.  Cardiovascular:  S1 & S2 intact, no R/G, 2+ radial pulses b/l  Gastrointestinal: Soft, NT, ND, normoactive b.s., no organomegaly/RT/rigidity  Extremities: WWP  Neurological:  confused, disoriented, awake, arousable. No acute focal motor deficits. Crude sensation intact.

## 2019-03-15 NOTE — PROGRESS NOTE ADULT - SUBJECTIVE AND OBJECTIVE BOX
89 yo M, poor historian, charts reviewed, with PMHx BPH, Cataracts, Diverticulosis, Anemia 2/2 GI Bleed with previous PRBC transfusions, who p/w 2 days genearlized weakness, urinary retention and confusion.  Upon arrival to the ED, pt with scleral icterus and labs significant for transaminitis and TBili > 20     Known to me from prior admission for anemia and rectal bleeding. He had colonoscopy w severe diverticulosis    CT A/P w/o contrast showing distended Gall Bladder but no distinct  masses      Pt is seen and examined  pt is awake and lying in bed/out of bed to chair  pt seems comfortable and denies any complaints at this time      PAST MEDICAL & SURGICAL HISTORY:  Chronic kidney disease (CKD), stage IV (severe)  Benign prostatic hypertrophy  S/P cataract surgery, left: 3 yrs ago      ROS:  Negative except for:    MEDICATIONS  (STANDING):  amLODIPine   Tablet 5 milliGRAM(s) Oral daily  dextrose 5% + sodium chloride 0.9%. 1000 milliLiter(s) (50 mL/Hr) IV Continuous <Continuous>  pantoprazole  Injectable 40 milliGRAM(s) IV Push daily  tamsulosin Oral Tab/Cap - Peds 0.4 milliGRAM(s) Oral at bedtime    MEDICATIONS  (PRN):      Allergies    No Known Allergies    Intolerances        Vital Signs Last 24 Hrs  T(C): 36.3 (15 Mar 2019 17:55), Max: 36.8 (15 Mar 2019 09:56)  T(F): 97.3 (15 Mar 2019 17:55), Max: 98.2 (15 Mar 2019 09:56)  HR: 84 (15 Mar 2019 17:55) (69 - 94)  BP: 177/100 (15 Mar 2019 17:55) (154/85 - 196/103)  BP(mean): --  RR: 17 (15 Mar 2019 17:55) (16 - 18)  SpO2: 100% (15 Mar 2019 17:55) (98% - 100%)    PHYSICAL EXAM  General: elderly male in NAD  HEENT: + Icterus  Neck: supple  CV: normal S1/S2 with no murmur rubs or gallops  Lungs: positive air movement b/l ant lungs,clear to auscultation, no wheezes, no rales  Abdomen: soft non-tender non-distended, no hepatosplenomegaly  Ext: no clubbing cyanosis or edema    LABS:                          10.0   5.25  )-----------( 145      ( 15 Mar 2019 03:00 )             28.3     Serial CBC's  03-15 @ 03:00  Hct-28.3 / Hgb-10.0 / Plat-145 / RBC-3.73 / WBC-5.25            03-15    140  |  106  |  24<H>  ----------------------------<  93  4.2   |  21<L>  |  1.78<H>    Ca    9.3      15 Mar 2019 03:00    TPro  x   /  Alb  x   /  TBili  x   /  DBili  16.6<H>  /  AST  x   /  ALT  x   /  AlkPhos  x   03-15      PT/INR - ( 15 Mar 2019 05:02 )   PT: 21.8 SEC;   INR: 1.88          PTT - ( 15 Mar 2019 05:02 )  PTT:46.5 SEC    Hemoglobin: 10.0 g/dL (03-15 @ 03:00)  Platelet Count - Automated: 145 K/uL (03-15 @ 03:00)            RADIOLOGY & ADDITIONAL STUDIES:

## 2019-03-15 NOTE — H&P ADULT - NSICDXPASTMEDICALHX_GEN_ALL_CORE_FT
PAST MEDICAL HISTORY:  Benign prostatic hypertrophy     Chronic kidney disease (CKD), stage IV (severe)

## 2019-03-16 LAB
AFP-TM SERPL-MCNC: < 1.8 NG/ML — SIGNIFICANT CHANGE UP
ALBUMIN SERPL ELPH-MCNC: 2.5 G/DL — LOW (ref 3.3–5)
ALBUMIN SERPL ELPH-MCNC: 2.5 G/DL — LOW (ref 3.3–5)
ALP SERPL-CCNC: 270 U/L — HIGH (ref 40–120)
ALP SERPL-CCNC: 270 U/L — HIGH (ref 40–120)
ALT FLD-CCNC: 290 U/L — HIGH (ref 4–41)
ALT FLD-CCNC: 290 U/L — HIGH (ref 4–41)
ANION GAP SERPL CALC-SCNC: 14 MMO/L — SIGNIFICANT CHANGE UP (ref 7–14)
ANION GAP SERPL CALC-SCNC: 14 MMO/L — SIGNIFICANT CHANGE UP (ref 7–14)
APTT BLD: 48.6 SEC — HIGH (ref 27.5–36.3)
AST SERPL-CCNC: 516 U/L — HIGH (ref 4–40)
AST SERPL-CCNC: 516 U/L — HIGH (ref 4–40)
BASOPHILS # BLD AUTO: 0.04 K/UL — SIGNIFICANT CHANGE UP (ref 0–0.2)
BASOPHILS NFR BLD AUTO: 0.7 % — SIGNIFICANT CHANGE UP (ref 0–2)
BILIRUB DIRECT SERPL-MCNC: 13.6 MG/DL — HIGH (ref 0.1–0.2)
BILIRUB SERPL-MCNC: 20 MG/DL — HIGH (ref 0.2–1.2)
BILIRUB SERPL-MCNC: 20 MG/DL — HIGH (ref 0.2–1.2)
BUN SERPL-MCNC: 20 MG/DL — SIGNIFICANT CHANGE UP (ref 7–23)
BUN SERPL-MCNC: 20 MG/DL — SIGNIFICANT CHANGE UP (ref 7–23)
CALCIUM SERPL-MCNC: 9 MG/DL — SIGNIFICANT CHANGE UP (ref 8.4–10.5)
CALCIUM SERPL-MCNC: 9 MG/DL — SIGNIFICANT CHANGE UP (ref 8.4–10.5)
CANCER AG125 SERPL-ACNC: 15 U/ML — SIGNIFICANT CHANGE UP
CANCER AG19-9 SERPL-ACNC: < 2 U/ML — SIGNIFICANT CHANGE UP
CANCER AG19-9 SERPL-ACNC: < 2 U/ML — SIGNIFICANT CHANGE UP
CEA SERPL-MCNC: 14.7 NG/ML — HIGH (ref 1–3.8)
CERULOPLASMIN SERPL-MCNC: 24 MG/DL — SIGNIFICANT CHANGE UP (ref 15–30)
CHLORIDE SERPL-SCNC: 107 MMOL/L — SIGNIFICANT CHANGE UP (ref 98–107)
CHLORIDE SERPL-SCNC: 107 MMOL/L — SIGNIFICANT CHANGE UP (ref 98–107)
CHOLEST SERPL-MCNC: 89 MG/DL — LOW (ref 120–199)
CO2 SERPL-SCNC: 21 MMOL/L — LOW (ref 22–31)
CO2 SERPL-SCNC: 21 MMOL/L — LOW (ref 22–31)
CREAT SERPL-MCNC: 1.5 MG/DL — HIGH (ref 0.5–1.3)
CREAT SERPL-MCNC: 1.5 MG/DL — HIGH (ref 0.5–1.3)
EOSINOPHIL # BLD AUTO: 0.52 K/UL — HIGH (ref 0–0.5)
EOSINOPHIL NFR BLD AUTO: 9.5 % — HIGH (ref 0–6)
FERRITIN SERPL-MCNC: 827 NG/ML — HIGH (ref 30–400)
FIBRINOGEN PPP-MCNC: 106.5 MG/DL — LOW (ref 350–510)
FOLATE SERPL-MCNC: 17.8 NG/ML — SIGNIFICANT CHANGE UP (ref 4.7–20)
GLUCOSE SERPL-MCNC: 96 MG/DL — SIGNIFICANT CHANGE UP (ref 70–99)
GLUCOSE SERPL-MCNC: 96 MG/DL — SIGNIFICANT CHANGE UP (ref 70–99)
HAPTOGLOB SERPL-MCNC: < 20 MG/DL — LOW (ref 34–200)
HAV IGM SER-ACNC: NONREACTIVE — SIGNIFICANT CHANGE UP
HBA1C BLD-MCNC: 4.6 % — SIGNIFICANT CHANGE UP (ref 4–5.6)
HBV CORE IGM SER-ACNC: NONREACTIVE — SIGNIFICANT CHANGE UP
HBV SURFACE AG SER-ACNC: NONREACTIVE — SIGNIFICANT CHANGE UP
HCT VFR BLD CALC: 27.4 % — LOW (ref 39–50)
HCT VFR BLD CALC: 27.4 % — LOW (ref 39–50)
HCV AB S/CO SERPL IA: 0.2 S/CO — SIGNIFICANT CHANGE UP (ref 0–0.79)
HCV AB SERPL-IMP: SIGNIFICANT CHANGE UP
HDLC SERPL-MCNC: 11 MG/DL — LOW (ref 35–55)
HGB BLD-MCNC: 9.6 G/DL — LOW (ref 13–17)
HGB BLD-MCNC: 9.6 G/DL — LOW (ref 13–17)
IMM GRANULOCYTES NFR BLD AUTO: 0.5 % — SIGNIFICANT CHANGE UP (ref 0–1.5)
IRON SATN MFR SERPL: 180 UG/DL — HIGH (ref 45–165)
LDH SERPL L TO P-CCNC: 360 U/L — HIGH (ref 135–225)
LIPID PNL WITH DIRECT LDL SERPL: 55 MG/DL — SIGNIFICANT CHANGE UP
LYMPHOCYTES # BLD AUTO: 1 K/UL — SIGNIFICANT CHANGE UP (ref 1–3.3)
LYMPHOCYTES # BLD AUTO: 18.2 % — SIGNIFICANT CHANGE UP (ref 13–44)
MAGNESIUM SERPL-MCNC: 1.9 MG/DL — SIGNIFICANT CHANGE UP (ref 1.6–2.6)
MCHC RBC-ENTMCNC: 26.1 PG — LOW (ref 27–34)
MCHC RBC-ENTMCNC: 26.1 PG — LOW (ref 27–34)
MCHC RBC-ENTMCNC: 35 % — SIGNIFICANT CHANGE UP (ref 32–36)
MCHC RBC-ENTMCNC: 35 % — SIGNIFICANT CHANGE UP (ref 32–36)
MCV RBC AUTO: 74.5 FL — LOW (ref 80–100)
MCV RBC AUTO: 74.5 FL — LOW (ref 80–100)
MONOCYTES # BLD AUTO: 0.72 K/UL — SIGNIFICANT CHANGE UP (ref 0–0.9)
MONOCYTES NFR BLD AUTO: 13.1 % — SIGNIFICANT CHANGE UP (ref 2–14)
NEUTROPHILS # BLD AUTO: 3.17 K/UL — SIGNIFICANT CHANGE UP (ref 1.8–7.4)
NEUTROPHILS NFR BLD AUTO: 58 % — SIGNIFICANT CHANGE UP (ref 43–77)
NRBC # FLD: 0 K/UL — LOW (ref 25–125)
NRBC # FLD: 0 K/UL — LOW (ref 25–125)
PHOSPHATE SERPL-MCNC: 2.3 MG/DL — LOW (ref 2.5–4.5)
PLATELET # BLD AUTO: 146 K/UL — LOW (ref 150–400)
PLATELET # BLD AUTO: 146 K/UL — LOW (ref 150–400)
PMV BLD: SIGNIFICANT CHANGE UP FL (ref 7–13)
PMV BLD: SIGNIFICANT CHANGE UP FL (ref 7–13)
POTASSIUM SERPL-MCNC: 3.9 MMOL/L — SIGNIFICANT CHANGE UP (ref 3.5–5.3)
POTASSIUM SERPL-MCNC: 3.9 MMOL/L — SIGNIFICANT CHANGE UP (ref 3.5–5.3)
POTASSIUM SERPL-SCNC: 3.9 MMOL/L — SIGNIFICANT CHANGE UP (ref 3.5–5.3)
POTASSIUM SERPL-SCNC: 3.9 MMOL/L — SIGNIFICANT CHANGE UP (ref 3.5–5.3)
PREALB SERPL-MCNC: 3 MG/DL — LOW (ref 20–40)
PROT SERPL-MCNC: 6.7 G/DL — SIGNIFICANT CHANGE UP (ref 6–8.3)
PROT SERPL-MCNC: 6.7 G/DL — SIGNIFICANT CHANGE UP (ref 6–8.3)
RBC # BLD: 3.68 M/UL — LOW (ref 4.2–5.8)
RBC # BLD: 3.68 M/UL — LOW (ref 4.2–5.8)
RBC # FLD: 27.3 % — HIGH (ref 10.3–14.5)
RBC # FLD: 27.3 % — HIGH (ref 10.3–14.5)
SODIUM SERPL-SCNC: 142 MMOL/L — SIGNIFICANT CHANGE UP (ref 135–145)
SODIUM SERPL-SCNC: 142 MMOL/L — SIGNIFICANT CHANGE UP (ref 135–145)
SPECIMEN SOURCE: SIGNIFICANT CHANGE UP
TRIGL SERPL-MCNC: 76 MG/DL — SIGNIFICANT CHANGE UP (ref 10–149)
TSH SERPL-MCNC: 1.85 UIU/ML — SIGNIFICANT CHANGE UP (ref 0.27–4.2)
VIT B12 SERPL-MCNC: > 2000 PG/ML — HIGH (ref 200–900)
WBC # BLD: 5.48 K/UL — SIGNIFICANT CHANGE UP (ref 3.8–10.5)
WBC # BLD: 5.48 K/UL — SIGNIFICANT CHANGE UP (ref 3.8–10.5)
WBC # FLD AUTO: 5.48 K/UL — SIGNIFICANT CHANGE UP (ref 3.8–10.5)
WBC # FLD AUTO: 5.48 K/UL — SIGNIFICANT CHANGE UP (ref 3.8–10.5)

## 2019-03-16 RX ORDER — SODIUM CHLORIDE 9 MG/ML
1000 INJECTION, SOLUTION INTRAVENOUS
Qty: 0 | Refills: 0 | Status: DISCONTINUED | OUTPATIENT
Start: 2019-03-16 | End: 2019-03-19

## 2019-03-16 RX ORDER — SODIUM CHLORIDE 9 MG/ML
500 INJECTION INTRAMUSCULAR; INTRAVENOUS; SUBCUTANEOUS ONCE
Qty: 0 | Refills: 0 | Status: COMPLETED | OUTPATIENT
Start: 2019-03-16 | End: 2019-03-16

## 2019-03-16 RX ORDER — PHYTONADIONE (VIT K1) 5 MG
5 TABLET ORAL ONCE
Qty: 0 | Refills: 0 | Status: COMPLETED | OUTPATIENT
Start: 2019-03-16 | End: 2019-03-16

## 2019-03-16 RX ORDER — POTASSIUM PHOSPHATE, MONOBASIC POTASSIUM PHOSPHATE, DIBASIC 236; 224 MG/ML; MG/ML
15 INJECTION, SOLUTION INTRAVENOUS ONCE
Qty: 0 | Refills: 0 | Status: COMPLETED | OUTPATIENT
Start: 2019-03-16 | End: 2019-03-16

## 2019-03-16 RX ADMIN — POTASSIUM PHOSPHATE, MONOBASIC POTASSIUM PHOSPHATE, DIBASIC 62.5 MILLIMOLE(S): 236; 224 INJECTION, SOLUTION INTRAVENOUS at 10:16

## 2019-03-16 RX ADMIN — SODIUM CHLORIDE 50 MILLILITER(S): 9 INJECTION, SOLUTION INTRAVENOUS at 05:29

## 2019-03-16 RX ADMIN — SODIUM CHLORIDE 75 MILLILITER(S): 9 INJECTION, SOLUTION INTRAVENOUS at 22:56

## 2019-03-16 RX ADMIN — SODIUM CHLORIDE 75 MILLILITER(S): 9 INJECTION, SOLUTION INTRAVENOUS at 10:16

## 2019-03-16 RX ADMIN — AMLODIPINE BESYLATE 5 MILLIGRAM(S): 2.5 TABLET ORAL at 05:29

## 2019-03-16 RX ADMIN — TAMSULOSIN HYDROCHLORIDE 0.4 MILLIGRAM(S): 0.4 CAPSULE ORAL at 22:56

## 2019-03-16 RX ADMIN — Medication 101 MILLIGRAM(S): at 15:38

## 2019-03-16 RX ADMIN — PANTOPRAZOLE SODIUM 40 MILLIGRAM(S): 20 TABLET, DELAYED RELEASE ORAL at 11:41

## 2019-03-16 RX ADMIN — SODIUM CHLORIDE 500 MILLILITER(S): 9 INJECTION INTRAMUSCULAR; INTRAVENOUS; SUBCUTANEOUS at 10:15

## 2019-03-16 NOTE — PROGRESS NOTE ADULT - SUBJECTIVE AND OBJECTIVE BOX
Patient seen and examined at bedside  No acute events noted overnight  Case discussed with medical team    HPI:  HPI:  89 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  Upon arrival to the ED, pt with scleral icterus and labs significant for transaminitis and TBili > 20 (15 Mar 2019 06:59)      PAST MEDICAL & SURGICAL HISTORY:  Chronic kidney disease (CKD), stage IV (severe)  Benign prostatic hypertrophy  S/P cataract surgery, left: 3 yrs ago      No Known Allergies       MEDICATIONS  (STANDING):  amLODIPine   Tablet 5 milliGRAM(s) Oral daily  dextrose 5% + sodium chloride 0.9%. 1000 milliLiter(s) (75 mL/Hr) IV Continuous <Continuous>  pantoprazole  Injectable 40 milliGRAM(s) IV Push daily  potassium phosphate IVPB 15 milliMole(s) IV Intermittent once  sodium chloride 0.9% Bolus 500 milliLiter(s) IV Bolus once  tamsulosin Oral Tab/Cap - Peds 0.4 milliGRAM(s) Oral at bedtime    MEDICATIONS  (PRN):      REVIEW OF SYSTEMS:  CONSTITUTIONAL: (+) malaise.   EYES: No acute change in vision   ENT:  No tinnitus  NECK: No stiffness  RESPIRATORY: No hemoptysis  CARDIOVASCULAR: No chest pain, palpitations, syncope  GASTROINTESTINAL: mild abd pain. No hematemesis, diarrhea, melena, or hematochezia.  GENITOURINARY: No hematuria  NEUROLOGICAL: No headaches  LYMPH Nodes: No enlarged glands  ENDOCRINE: No heat or cold intolerance	    T(C): 36.6 (19 @ 05:25), Max: 36.8 (03-15-19 @ 09:56)  HR: 65 (19 @ 05:25) (65 - 84)  BP: 132/67 (19 @ 05:25) (132/67 - 190/110)  RR: 16 (19 @ 05:25) (16 - 18)  SpO2: 100% (19 @ 05:25) (100% - 100%)    PHYSICAL EXAMINATION:   Constitutional: NAD  HEENT: scleral icterus. AT  Neck:  Supple  Respiratory:  Adequate airflow b/l. Not using accessory muscles of respiration.  Cardiovascular:  S1 & S2 intact, no R/G, 2+ radial pulses b/l  Gastrointestinal: Soft, mild tenderness, ND, normoactive b.s., no organomegaly/RT/rigidity  Extremities: poor skin turgor and elasticity. WWP  Neurological:  mild confusion. awake.  No acute focal motor deficits. Crude sensation intact.     Labs and imaging reviewed    LABS:                        9.6    5.48  )-----------( 146      ( 16 Mar 2019 05:45 )             27.4     03-    142  |  107  |  20  ----------------------------<  96  3.9   |  21<L>  |  1.50<H>    Ca    9.0      16 Mar 2019 05:45  Phos  2.3     -  Mg     1.9     -    TPro  6.7  /  Alb  2.5<L>  /  TBili  20.0<H>  /  DBili  13.6<H>  /  AST  516<H>  /  ALT  290<H>  /  AlkPhos  270<H>  03-16        PT/INR - ( 15 Mar 2019 05:02 )   PT: 21.8 SEC;   INR: 1.88          PTT - ( 16 Mar 2019 05:45 )  PTT:48.6 SEC  Urinalysis Basic - ( 15 Mar 2019 06:18 )    Color: DARK YELLOW / Appearance: CLEAR / S.013 / pH: 6.5  Gluc: NEGATIVE / Ketone: NEGATIVE  / Bili: SMALL / Urobili: SMALL   Blood: NEGATIVE / Protein: 10 / Nitrite: NEGATIVE   Leuk Esterase: NEGATIVE / RBC: x / WBC x   Sq Epi: x / Non Sq Epi: x / Bacteria: x      CAPILLARY BLOOD GLUCOSE            LIVER FUNCTIONS - ( 16 Mar 2019 05:45 )  Alb: 2.5 g/dL / Pro: 6.7 g/dL / ALK PHOS: 270 u/L / ALT: 290 u/L / AST: 516 u/L / GGT: x               RADIOLOGY & ADDITIONAL STUDIES:

## 2019-03-16 NOTE — CHART NOTE - NSCHARTNOTEFT_GEN_A_CORE
Nuclear Medicine technician E[place] called, patient needs to NPO for HIDA scan as ordered by attending. NPO after midnight for imaging to be done tomorrow since Nuclear medicine finishes at 4pm today and patient is already consuming lunch at this time.    Thank you,  Sarah Shetty NP

## 2019-03-16 NOTE — PROGRESS NOTE ADULT - SUBJECTIVE AND OBJECTIVE BOX
Patient is seen and examined at the bed side, is afebrile.        REVIEW OF SYSTEMS: All other review systems are negative        No Known Allergies      Vital Signs Last 24 Hrs  T(C): 36.1 (16 Mar 2019 14:49), Max: 36.6 (16 Mar 2019 05:25)  T(F): 97 (16 Mar 2019 14:49), Max: 97.8 (16 Mar 2019 05:25)  HR: 84 (16 Mar 2019 14:49) (65 - 84)  BP: 130/75 (16 Mar 2019 14:49) (123/71 - 157/95)  BP(mean): --  RR: 18 (16 Mar 2019 14:49) (16 - 18)  SpO2: 100% (16 Mar 2019 14:49) (100% - 100%)      PHYSICAL EXAM:  GENERAL: Not in distress   HEENT: Sclera jaundiced  CHEST/LUNG:  Air entry bilaterally  HEART: s1 and s2 present  ABDOMEN:  Nontender and  Nondistended  : Oshea catheter in placed  EXTREMITIES: No pedal  edema  CNS: Awake and Alert        LABS:                        9.6    5.48  )-----------( 146      ( 16 Mar 2019 05:45 )             27.4                           10.0   5.25  )-----------( 145      ( 15 Mar 2019 03:00 )             28.3             03-16    142  |  107  |  20  ----------------------------<  96  3.9   |  21<L>  |  1.50<H>    Ca    9.0      16 Mar 2019 05:45  Phos  2.3     -16  Mg     1.9     -16    TPro  6.7  /  Alb  2.5<L>  /  TBili  20.0<H>  /  DBili  13.6<H>  /  AST  516<H>  /  ALT  290<H>  /  AlkPhos  270<H>  -    03-15    140  |  106  |  24<H>  ----------------------------<  93  4.2   |  21<L>  |  1.78<H>    Ca    9.3      15 Mar 2019 03:00    TPro  x   /  Alb  x   /  TBili  x   /  DBili  16.6<H>  /  AST  x   /  ALT  x   /  AlkPhos  x   03-15    PT/INR - ( 15 Mar 2019 05:02 )   PT: 21.8 SEC;   INR: 1.88     PTT - ( 15 Mar 2019 05:02 )  PTT:46.5 SEC      Urinalysis Basic - ( 15 Mar 2019 06:18 )  Color: DARK YELLOW / Appearance: CLEAR / S.013 / pH: 6.5  Gluc: NEGATIVE / Ketone: NEGATIVE  / Bili: SMALL / Urobili: SMALL   Blood: NEGATIVE / Protein: 10 / Nitrite: NEGATIVE   Leuk Esterase: NEGATIVE / RBC: x / WBC x   Sq Epi: x / Non Sq Epi: x / Bacteria: x          MEDICATIONS  (STANDING):  amLODIPine   Tablet 5 milliGRAM(s) Oral daily  dextrose 5% + sodium chloride 0.9%. 1000 milliLiter(s) (75 mL/Hr) IV Continuous <Continuous>  pantoprazole  Injectable 40 milliGRAM(s) IV Push daily  tamsulosin Oral Tab/Cap - Peds 0.4 milliGRAM(s) Oral at bedtime    MEDICATIONS  (PRN):          RADIOLOGY & ADDITIONAL TESTS:    3/15/19 : CT Abdomen and Pelvis No Cont (03.15.19 @ 09:39) Distended gallbladder. Consider further evaluation with ultrasound if  acute cholecystitis is a concern.  No discrete mass identified on this limited noncontrast study.          MICROBIOLOGY DATA:    Culture - Blood (03.15.19 @ 14:03)    Culture - Blood:   NO ORGANISMS ISOLATED  NO ORGANISMS ISOLATED AT 24 HOURS    Specimen Source: BLOOD Patient is seen and examined at the bed side, is afebrile. He has no complaints.         REVIEW OF SYSTEMS: All other review systems are negative        ALLERGIES: No Known Allergies      Vital Signs Last 24 Hrs  T(C): 36.1 (16 Mar 2019 14:49), Max: 36.6 (16 Mar 2019 05:25)  T(F): 97 (16 Mar 2019 14:49), Max: 97.8 (16 Mar 2019 05:25)  HR: 84 (16 Mar 2019 14:49) (65 - 84)  BP: 130/75 (16 Mar 2019 14:49) (123/71 - 157/95)  BP(mean): --  RR: 18 (16 Mar 2019 14:49) (16 - 18)  SpO2: 100% (16 Mar 2019 14:49) (100% - 100%)      PHYSICAL EXAM:  GENERAL: Not in distress   HEENT: Sclera jaundiced  CHEST/LUNG:  Air entry bilaterally  HEART: s1 and s2 present  ABDOMEN:  Nontender and  Nondistended  : Oshea catheter in placed  EXTREMITIES: No pedal  edema  CNS: Awake and Alert        LABS:                        9.6    5.48  )-----------( 146      ( 16 Mar 2019 05:45 )             27.4                           10.0   5.25  )-----------( 145      ( 15 Mar 2019 03:00 )             28.3             03-16    142  |  107  |  20  ----------------------------<  96  3.9   |  21<L>  |  1.50<H>    Ca    9.0      16 Mar 2019 05:45  Phos  2.3     03-16  Mg     1.9     -16    TPro  6.7  /  Alb  2.5<L>  /  TBili  20.0<H>  /  DBili  13.6<H>  /  AST  516<H>  /  ALT  290<H>  /  AlkPhos  270<H>      03-15    140  |  106  |  24<H>  ----------------------------<  93  4.2   |  21<L>  |  1.78<H>    Ca    9.3      15 Mar 2019 03:00    TPro  x   /  Alb  x   /  TBili  x   /  DBili  16.6<H>  /  AST  x   /  ALT  x   /  AlkPhos  x   03-15    PT/INR - ( 15 Mar 2019 05:02 )   PT: 21.8 SEC;   INR: 1.88     PTT - ( 15 Mar 2019 05:02 )  PTT:46.5 SEC      Urinalysis Basic - ( 15 Mar 2019 06:18 )  Color: DARK YELLOW / Appearance: CLEAR / S.013 / pH: 6.5  Gluc: NEGATIVE / Ketone: NEGATIVE  / Bili: SMALL / Urobili: SMALL   Blood: NEGATIVE / Protein: 10 / Nitrite: NEGATIVE   Leuk Esterase: NEGATIVE / RBC: x / WBC x   Sq Epi: x / Non Sq Epi: x / Bacteria: x          MEDICATIONS  (STANDING):  amLODIPine   Tablet 5 milliGRAM(s) Oral daily  dextrose 5% + sodium chloride 0.9%. 1000 milliLiter(s) (75 mL/Hr) IV Continuous <Continuous>  pantoprazole  Injectable 40 milliGRAM(s) IV Push daily  tamsulosin Oral Tab/Cap - Peds 0.4 milliGRAM(s) Oral at bedtime    MEDICATIONS  (PRN):          RADIOLOGY & ADDITIONAL TESTS:      3/15/19 : CT Abdomen and Pelvis No Cont (03.15.19 @ 09:39) Distended gallbladder. Consider further evaluation with ultrasound if  acute cholecystitis is a concern.  No discrete mass identified on this limited noncontrast study.          MICROBIOLOGY DATA:    Culture - Blood (03.15.19 @ 14:03)    Culture - Blood:   NO ORGANISMS ISOLATED  NO ORGANISMS ISOLATED AT 24 HOURS    Specimen Source: BLOOD

## 2019-03-16 NOTE — PROGRESS NOTE ADULT - ASSESSMENT
A 89 yo Male with BPH, Cataracts, Diverticulosis, Anemia 2/2 GI Bleed with previous PRBC transfusions, who presents to the ER for evaluation of  generalized  weakness, urinary retention and confusion. On admission, he found to have elevated bilirubin, LFTS, transaminitis, scleral jaundice. and urinary retention. Oshea catheter has placed with negative Urine analysis. The CT abdomen  and pelvis shows Distended gallbladder. Consider further evaluation with ultrasound if  acute cholecystitis is a concern. No discrete mass identified on this limited noncontrast study. The ID consult requested to assist with evaluation of Biliary sepsis.    # Encephalopathy  # Urinary retention  # R/O biliary sepsis    would recommend:    1. Obtain HIDA scan to rule out cholecystitis  3. Follow up culture  4. MRCP of abdomen and pelvis with contrast for further evaluation   5. Hold off antibiotic fro now.      will follow the patient with you A 89 yo Male with BPH, Cataracts, Diverticulosis, Anemia 2/2 GI Bleed with previous PRBC transfusions, who presents to the ER for evaluation of  generalized  weakness, urinary retention and confusion. On admission, he found to have elevated bilirubin, LFTS, transaminitis, scleral jaundice. and urinary retention. Oshea catheter has placed with negative Urine analysis. The CT abdomen  and pelvis shows Distended gallbladder. Consider further evaluation with ultrasound if  acute cholecystitis is a concern. No discrete mass identified on this limited noncontrast study. The ID consult requested to assist with evaluation of Biliary sepsis.    # Encephalopathy  # Urinary retention  # R/O biliary sepsis - Most likely acute cholestatic hepatitis as per GI    would recommend:    1. HIDA scan to rule out cholecystitis   3. Follow up final culture  4. Trend LFTs and bilirubins  5. Hold off antibiotic for now.    d/w patient   will follow the patient with you

## 2019-03-16 NOTE — PROGRESS NOTE ADULT - SUBJECTIVE AND OBJECTIVE BOX
Subjective: pt seen and examined, no complaints, ROS - .     no chest pain or sob overnight     amLODIPine   Tablet 5 milliGRAM(s) Oral daily  dextrose 5% + sodium chloride 0.9%. 1000 milliLiter(s) IV Continuous <Continuous>  pantoprazole  Injectable 40 milliGRAM(s) IV Push daily  tamsulosin Oral Tab/Cap - Peds 0.4 milliGRAM(s) Oral at bedtime                            10.0   5.25  )-----------( 145      ( 15 Mar 2019 03:00 )             28.3       Hemoglobin: 10.0 g/dL (03-15 @ 03:00)      03-15    140  |  106  |  24<H>  ----------------------------<  93  4.2   |  21<L>  |  1.78<H>    Ca    9.3      15 Mar 2019 03:00    TPro  x   /  Alb  x   /  TBili  x   /  DBili  16.6<H>  /  AST  x   /  ALT  x   /  AlkPhos  x   03-15    Creatinine Trend: 1.78<--    COAGS:           T(C): 36.3 (03-16-19 @ 01:36), Max: 36.8 (03-15-19 @ 09:56)  HR: 69 (03-16-19 @ 01:36) (69 - 84)  BP: 139/73 (03-16-19 @ 01:36) (139/73 - 190/110)  RR: 18 (03-16-19 @ 01:36) (17 - 18)  SpO2: 100% (03-16-19 @ 01:36) (100% - 100%)  Wt(kg): --    I&O's Summary    15 Mar 2019 07:01  -  16 Mar 2019 03:53  --------------------------------------------------------  IN: 800 mL / OUT: 775 mL / NET: 25 mL        Gen: Appears well in NAD  HEENT:  (+)icterus (-)pallor  CV: N S1 S2 1/6 DEBBIE (+)2 Pulses B/l  Resp:  Clear to ausculatation B/L, normal effort  GI: (+) BS Soft, NT, ND  Lymph:  (-)Edema, (-)obvious lymphadenopathy  Skin: Warm to touch, Normal turgor  Psych: Appropriate mood and affect    TELEMETRY: 	      ECG:  	NSR w/RBBB    RADIOLOGY:         CXR: pending     ASSESSMENT/PLAN: 	    89 yo M, poor historian, charts reviewed, with PMHx BPH, Cataracts, Diverticulosis, Anemia 2/2 GI Bleed with previous PRBC transfusions, who p/w 2 days generalized  weakness, urinary retention and confusion. LFTs significant for transaminitis, scleral jaundice.    --  GI / DVT prophylaxis,  keep K>4, mag >2.0   -- further cardiac w/u pending GI work up   -- BP stable on norvasc,   -- H/H stable , GI follow up   ** no s/s of chf on exam   D/W Dr Morrison

## 2019-03-16 NOTE — PHYSICAL THERAPY INITIAL EVALUATION ADULT - PERTINENT HX OF CURRENT PROBLEM, REHAB EVAL
Patient is a 89 year old male admitted to [facility] on 3/15 with 2 days generalized weakness, urinary retention and confusion.  PMH includes: BPH, Cataracts, Diverticulosis, Anemia 2/2 GI Bleed with previous PRBC transfusions.

## 2019-03-16 NOTE — PROGRESS NOTE ADULT - ASSESSMENT
1. Hyperbilirubinemia    -- GI eval noted - acute cholestatic hepatitis of unclear etiology   -- no evidence of malignancy on CT A/P  -- consider U/S w doppler of Liver  -- tumor markers unremarkable   -- coagulopathy secondary to liver disease - monitor ; trial Vit K     2. Anemia    -- no acute hemolysis ; lab values altered due to acute liver disease  -- check Retic, Chloe, repeat Hapto , stool heme , Hb electrophoresis   -- stable  -- hx of GI Bleed sec to divertic  -- monitor for now

## 2019-03-16 NOTE — PROGRESS NOTE ADULT - SUBJECTIVE AND OBJECTIVE BOX
[facility] - Crystal / Corrie LARRY /Kylah/ LARON Villegas/ LARON Beard/ Shiva Mullen / MAGUI Montielu  ---------------------------------------------------------------------------------------------------------------    Patient seen and examined bedside    Subjective and Objective: denied N/V/D/sob. No complaints today. states feeling better  Bladder Volume by Ultrasound : 400 mL yesterday, flores was inserted    Allergies: No Known Allergies      Hospital Medications:   MEDICATIONS  (STANDING):  amLODIPine   Tablet 5 milliGRAM(s) Oral daily  dextrose 5% + sodium chloride 0.9%. 1000 milliLiter(s) (75 mL/Hr) IV Continuous <Continuous>  pantoprazole  Injectable 40 milliGRAM(s) IV Push daily  phytonadione  IVPB 5 milliGRAM(s) IV Intermittent once  tamsulosin Oral Tab/Cap - Peds 0.4 milliGRAM(s) Oral at bedtime      VITALS:  T(F): 97.8 (19 @ 05:25), Max: 97.8 (19 @ 05:25)  HR: 65 (19 @ 05:25)  BP: 132/67 (19 @ 05:25)  RR: 16 (19 @ 05:25)  SpO2: 100% (19 @ 05:25)  Wt(kg): --    03-15 @ 07:01  -   @ 07:00  --------------------------------------------------------  IN: 1150 mL / OUT: 775 mL / NET: 375 mL     @ 07:01  -   @ 14:08  --------------------------------------------------------  IN: 0 mL / OUT: 300 mL / NET: -300 mL      PHYSICAL EXAM:  Constitutional: NAD  HEENT: icteric sclera+, oropharynx clear  Neck: No JVD  Respiratory: CTAB, no wheezes, rales or rhonchi  Cardiovascular: S1, S2, RRR  Gastrointestinal: BS+, soft, NT/ND  Extremities: No cyanosis or clubbing. No peripheral edema  Neurological: A/O x 2, no focal deficits  Psychiatric: Normal mood, normal affect  : No CVA tenderness. +flores w/clear urine 250ml in bag   Skin: dry      LABS:      142  |  107  |  20  ----------------------------<  96  3.9   |  21<L>  |  1.50<H>    Ca    9.0      16 Mar 2019 05:45  Phos  2.3       Mg     1.9         TPro  6.7  /  Alb  2.5<L>  /  TBili  20.0<H>  /  DBili  13.6<H>  /  AST  516<H>  /  ALT  290<H>  /  AlkPhos  270<H>      Creatinine Trend: 1.50 <--, 1.78 <--                        9.6    5.48  )-----------( 146      ( 16 Mar 2019 05:45 )             27.4     Urine Studies:  Urinalysis Basic - ( 15 Mar 2019 06:18 )    Color: DARK YELLOW / Appearance: CLEAR / S.013 / pH: 6.5  Gluc: NEGATIVE / Ketone: NEGATIVE  / Bili: SMALL / Urobili: SMALL   Blood: NEGATIVE / Protein: 10 / Nitrite: NEGATIVE   Leuk Esterase: NEGATIVE / RBC:  / WBC    Sq Epi:  / Non Sq Epi:  / Bacteria:       RADIOLOGY & ADDITIONAL STUDIES:

## 2019-03-16 NOTE — PROGRESS NOTE ADULT - ASSESSMENT
89 yo M p/w confusion and generalized weakness    -> Transaminitis and Bilirubinemia, r/o liver failure, r/o biliary etiology such as cholangitis, r/o cholecystitis:      - f/u u/s abd      - f/u hida scan       - stable labs      - adjust management accordingly (ie: abx, intervention, etc.)      - all consultants management appreciated  -> Lactate elevation:      - ivf, trend lactate to wnl   -> Metabolic Encephalopathy:      - 2/2 hepatic/gi      - treat underlying hepatic/gi etiology, f/u ammonia       - neuro checks       - fall precautions   -> BPH:     - flomax  -> Need for prophylactic measure:      - dvt/gi ppx  -> Severe Protein Calorie Malnutrition:      - supplement diet

## 2019-03-16 NOTE — PROGRESS NOTE ADULT - SUBJECTIVE AND OBJECTIVE BOX
89 yo M, poor historian, charts reviewed, with PMHx BPH, Cataracts, Diverticulosis, Anemia 2/2 GI Bleed with previous PRBC transfusions, who p/w 2 days genearlized weakness, urinary retention and confusion.  Upon arrival to the ED, pt with scleral icterus and labs significant for transaminitis and TBili > 20     Known to me from prior admission for anemia and rectal bleeding. He had colonoscopy w severe diverticulosis    CT A/P w/o contrast showing distended Gall Bladder but no distinct  masses      Pt is seen and examined  pt is awake and lying in bed/out of bed to chair  pt seems comfortable and denies any complaints at this time      PAST MEDICAL & SURGICAL HISTORY:  Chronic kidney disease (CKD), stage IV (severe)  Benign prostatic hypertrophy  S/P cataract surgery, left: 3 yrs ago      ROS:  Negative except for:    MEDICATIONS  (STANDING):  amLODIPine   Tablet 5 milliGRAM(s) Oral daily  dextrose 5% + sodium chloride 0.9%. 1000 milliLiter(s) (50 mL/Hr) IV Continuous <Continuous>  pantoprazole  Injectable 40 milliGRAM(s) IV Push daily  tamsulosin Oral Tab/Cap - Peds 0.4 milliGRAM(s) Oral at bedtime    MEDICATIONS  (PRN):      Allergies    No Known Allergies    Intolerances      Vital Signs Last 24 Hrs  T(C): 36.6 (16 Mar 2019 05:25), Max: 36.6 (16 Mar 2019 05:25)  T(F): 97.8 (16 Mar 2019 05:25), Max: 97.8 (16 Mar 2019 05:25)  HR: 65 (16 Mar 2019 05:25) (65 - 84)  BP: 132/67 (16 Mar 2019 05:25) (132/67 - 178/99)  BP(mean): --  RR: 16 (16 Mar 2019 05:25) (16 - 18)  SpO2: 100% (16 Mar 2019 05:25) (100% - 100%)    PHYSICAL EXAM  General: elderly male in NAD  HEENT: + Icterus  Neck: supple  CV: normal S1/S2 with no murmur rubs or gallops  Lungs: positive air movement b/l ant lungs,clear to auscultation, no wheezes, no rales  Abdomen: soft non-tender non-distended, no hepatosplenomegaly  Ext: no clubbing cyanosis or edema    LABS:                            9.6    5.48  )-----------( 146      ( 16 Mar 2019 05:45 )             27.4         RADIOLOGY & ADDITIONAL STUDIES:

## 2019-03-16 NOTE — PROGRESS NOTE ADULT - SUBJECTIVE AND OBJECTIVE BOX
PASCUAL OLSEN:[id number],   89yMale followed for:  No Known Allergies    PAST MEDICAL & SURGICAL HISTORY:  Chronic kidney disease (CKD), stage IV (severe)  Benign prostatic hypertrophy  S/P cataract surgery, left: 3 yrs ago    FAMILY HISTORY:  No pertinent family history in first degree relatives    MEDICATIONS  (STANDING):  amLODIPine   Tablet 5 milliGRAM(s) Oral daily  dextrose 5% + sodium chloride 0.9%. 1000 milliLiter(s) (75 mL/Hr) IV Continuous <Continuous>  pantoprazole  Injectable 40 milliGRAM(s) IV Push daily  tamsulosin Oral Tab/Cap - Peds 0.4 milliGRAM(s) Oral at bedtime    MEDICATIONS  (PRN):      Vital Signs Last 24 Hrs  T(C): 36.6 (16 Mar 2019 05:25), Max: 36.7 (15 Mar 2019 13:10)  T(F): 97.8 (16 Mar 2019 05:25), Max: 98 (15 Mar 2019 13:10)  HR: 65 (16 Mar 2019 05:25) (65 - 84)  BP: 132/67 (16 Mar 2019 05:25) (132/67 - 178/99)  BP(mean): --  RR: 16 (16 Mar 2019 05:25) (16 - 18)  SpO2: 100% (16 Mar 2019 05:25) (100% - 100%)  nc/at  s1s2  cta  soft, nt, nd no guarding or rebound  no c/c/e    CBC Full  -  ( 16 Mar 2019 05:45 )  WBC Count : 5.48 K/uL  Hemoglobin : 9.6 g/dL  Hematocrit : 27.4 %  Platelet Count - Automated : 146 K/uL  Mean Cell Volume : 74.5 fL  Mean Cell Hemoglobin : 26.1 pg  Mean Cell Hemoglobin Concentration : 35.0 %  Auto Neutrophil # : 3.17 K/uL  Auto Lymphocyte # : 1.00 K/uL  Auto Monocyte # : 0.72 K/uL  Auto Eosinophil # : 0.52 K/uL  Auto Basophil # : 0.04 K/uL  Auto Neutrophil % : 58.0 %  Auto Lymphocyte % : 18.2 %  Auto Monocyte % : 13.1 %  Auto Eosinophil % : 9.5 %  Auto Basophil % : 0.7 %    03-16    142  |  107  |  20  ----------------------------<  96  3.9   |  21<L>  |  1.50<H>    Ca    9.0      16 Mar 2019 05:45  Phos  2.3     03-16  Mg     1.9     03-16    TPro  6.7  /  Alb  2.5<L>  /  TBili  20.0<H>  /  DBili  13.6<H>  /  AST  516<H>  /  ALT  290<H>  /  AlkPhos  270<H>  03-16    PT/INR - ( 15 Mar 2019 05:02 )   PT: 21.8 SEC;   INR: 1.88          PTT - ( 16 Mar 2019 05:45 )  PTT:48.6 SEC

## 2019-03-16 NOTE — PHYSICAL THERAPY INITIAL EVALUATION ADULT - ADDITIONAL COMMENTS
Patient reports he lives with his daughter-in-law in a private house, 3 steps to enter.  Patient reports he required varying levels of assistance with ADLs and ambulated with a rollator prior to admission. Patient has a home health aide 9am-1pm.    Patient was left semi-supine in bed as found, all lines/tubes intact and call calderon within reach, SHA viveros

## 2019-03-16 NOTE — PROGRESS NOTE ADULT - ATTENDING COMMENTS
Agree with above.   follow up GI  further cardiac workup pending clinical course and GI workup    Rashmi Morrison MD

## 2019-03-16 NOTE — PROGRESS NOTE ADULT - ASSESSMENT
acute cholestatic hepatitis, ? medication induced. vitamin k for inr.  will recommend hep profile autoimmune, acute issue like this in elderly most commonly related to new meds. will speak to fmaily, it is unclar.

## 2019-03-16 NOTE — PHYSICAL THERAPY INITIAL EVALUATION ADULT - GAIT DEVIATIONS NOTED, PT EVAL
decreased step length/decreased weight-shifting ability/decreased velocity of limb motion/decreased stride length/increased time in double stance/decreased praveen

## 2019-03-16 NOTE — PHYSICAL THERAPY INITIAL EVALUATION ADULT - PATIENT PROFILE REVIEW, REHAB EVAL
PT orders received:  no formal activity order. Consult with RN Evelin VELAZQUEZ, pt may participate in PT evaluation./yes

## 2019-03-16 NOTE — PROGRESS NOTE ADULT - ASSESSMENT
89 yo M with PMHx CKD, BPH, Diverticulosis, Anemia 2/2 GI Bleed with previous PRBC transfusions, who p/w 2 days genearlized weakness, urinary retention and confusion, found to have elevated LFTs. Renal following for PHU, CKD management.    PHU on CKD 3 b/l Cr 1.4-1.6 10/2018  PHU most likely 2/2 pre renal. clincially appears dry. C3 77- low, C4 wnl -noted- likely 2/2 dec synthesis 2/2 liver dz   urinary retention on CT w/o hydronephrosis. no e/o acute GN  electrolytes acceptable. Cr trending down 1.8>1.5, at baseline now. u/o 775+300ml noted since flores insertion -adequate  Transaminitis and Bilirubinemia, -acute cholestatic hepatitis of unclear etiology   Hepatic encephalopathy- sr NH4 level 91. more alert today  HTN, controlled-bp better today  BPH: c/w flomax    labs, chart reviewed  keep flores for now  may d/c D5NS if tolerating po food  diet, w/u per GI/medicine  f/u GI and hem  lactulose per GI  c/w Norvasc  avoid ACEi/ARB/NSAIDs/nephrotoxics  montior BMP, LFTs daily  will f/u

## 2019-03-17 LAB
24R-OH-CALCIDIOL SERPL-MCNC: 11.2 NG/ML — LOW (ref 30–80)
DAT C3-SP REAG RBC QL: NEGATIVE — SIGNIFICANT CHANGE UP
DAT POLY-SP REAG RBC QL: NEGATIVE — SIGNIFICANT CHANGE UP
DIRECT COOMBS IGG: NEGATIVE — SIGNIFICANT CHANGE UP
HAPTOGLOB SERPL-MCNC: < 20 MG/DL — LOW (ref 34–200)
RETICS #: 75 K/UL — SIGNIFICANT CHANGE UP (ref 25–125)
RETICS/RBC NFR: 2.1 % — SIGNIFICANT CHANGE UP (ref 0.5–2.5)

## 2019-03-17 PROCEDURE — 78226 HEPATOBILIARY SYSTEM IMAGING: CPT | Mod: 26,GC

## 2019-03-17 RX ORDER — ONDANSETRON 8 MG/1
4 TABLET, FILM COATED ORAL ONCE
Qty: 0 | Refills: 0 | Status: COMPLETED | OUTPATIENT
Start: 2019-03-17 | End: 2019-03-17

## 2019-03-17 RX ADMIN — TAMSULOSIN HYDROCHLORIDE 0.4 MILLIGRAM(S): 0.4 CAPSULE ORAL at 21:44

## 2019-03-17 RX ADMIN — ONDANSETRON 4 MILLIGRAM(S): 8 TABLET, FILM COATED ORAL at 09:30

## 2019-03-17 RX ADMIN — SODIUM CHLORIDE 75 MILLILITER(S): 9 INJECTION, SOLUTION INTRAVENOUS at 05:21

## 2019-03-17 NOTE — PROGRESS NOTE ADULT - SUBJECTIVE AND OBJECTIVE BOX
[facility]        - Patient seen and examined.  - Today, patient is without complaints.         *****MEDICATIONS: Current medication reviewed and documented.    MEDICATIONS  (STANDING):  amLODIPine   Tablet 5 milliGRAM(s) Oral daily  dextrose 5% + sodium chloride 0.9%. 1000 milliLiter(s) (75 mL/Hr) IV Continuous <Continuous>  pantoprazole  Injectable 40 milliGRAM(s) IV Push daily  tamsulosin Oral Tab/Cap - Peds 0.4 milliGRAM(s) Oral at bedtime    MEDICATIONS  (PRN):          ***** VITAL SIGNS:  T(F): 97.4 (19 @ 05:14), Max: 98 (19 @ 14:31)  HR: 86 (19 @ 05:14) (79 - 89)  BP: 125/68 (19 @ 05:14) (125/68 - 138/69)  RR: 16 (19 @ 05:14) (16 - 18)  SpO2: 100% (19 @ 05:14) (100% - 100%)  Wt(kg): --  ,   I&O's Summary    17 Mar 2019 07:01  -  18 Mar 2019 07:00  --------------------------------------------------------  IN: 0 mL / OUT: 700 mL / NET: -700 mL             *****PHYSICAL EXAM:   Alert oriented x 2   able to recognize grand daughter at bedside.    Attention comprehension are fair. Able to name, repeat, without any difficulty.   Able to follow 1-2 step commands.     EOMI fundi not visualized,  VFF to confrontration  No facial asymmetry   Tongue is midline   Palate elevates symmetrically   Moving all 4 ext symmetrically no pronator drift   Reflexes are symmetric throughout   sensation is grossly symmetric  Gait : not assessed.  B/L down going toes            *****LAB AND IMAGIN.2    5.19  )-----------( 117      ( 18 Mar 2019 06:35 )             23.0               -18    142  |  111<H>  |  16  ----------------------------<  171<H>  3.9   |  20<L>  |  1.57<H>    Ca    8.8      18 Mar 2019 06:35    TPro  5.7<L>  /  Alb  2.2<L>  /  TBili  18.4<H>  /  DBili  x   /  AST  462<H>  /  ALT  264<H>  /  AlkPhos  259<H>                           [All pertinent recent Imaging/Reports reviewed]           *****A S S E S S M E N T   A N D   P L A N :  89 yo M, poor historian, charts reviewed, with PMHx BPH, Cataracts, Diverticulosis, Anemia 2/2 GI Bleed with previous PRBC transfusions, who p/w 2 days genearlized weakness, urinary retention and confusion.  Upon arrival to the ED, pt with scleral icterus and labs significant for transaminitis and TBili > 20    called to evaluate pt for ams         Problem/Recommendations 1:  Multifactorial toxic metabolic encephalopathy  improving   will correct metabolic dyscrasias  will continue to monitor         Problem/Recommendations 2:  Hyperbilirubinemia  suspicious for cholestatic pattern   gi f/u         Thank you for allowing me to participate in the care of this patient. Please do not hesitate to call me if you have any  questions.        ________________  Ava Schreiber MD  [facility] ([facility]  [phone]      30 minutes spent on total encounter; more than 50 % of the visit was  spent counseling about plan of care, compliance to diet/exercise and medication regimen and or  coordinating care by the attending physician.      It is advised that s stroke patients follow up with DULCE MARIA Rausch @ [phone] in 1- 2 weeks.   Others please follow up with Dr. Michael Nissenbaum [phone]

## 2019-03-17 NOTE — PROGRESS NOTE ADULT - ASSESSMENT
painless jaundice. will order mrcp.  Pt does not have symptoms of acute chica.  mixed cholestatic/ hepatocellual picture, but more cholestatic.  most common would be drug. Hida not helpful with severe cholestasis.  hold all drugs, hepatoxic, ? new meds, will again try to reach family.

## 2019-03-17 NOTE — PROGRESS NOTE ADULT - SUBJECTIVE AND OBJECTIVE BOX
PASCUAL OLSEN:[id number],   89yMale followed for:  No Known Allergies    PAST MEDICAL & SURGICAL HISTORY:  Chronic kidney disease (CKD), stage IV (severe)  Benign prostatic hypertrophy  S/P cataract surgery, left: 3 yrs ago    FAMILY HISTORY:  No pertinent family history in first degree relatives    MEDICATIONS  (STANDING):  amLODIPine   Tablet 5 milliGRAM(s) Oral daily  dextrose 5% + sodium chloride 0.9%. 1000 milliLiter(s) (75 mL/Hr) IV Continuous <Continuous>  pantoprazole  Injectable 40 milliGRAM(s) IV Push daily  tamsulosin Oral Tab/Cap - Peds 0.4 milliGRAM(s) Oral at bedtime    MEDICATIONS  (PRN):      Vital Signs Last 24 Hrs  T(C): 36.2 (17 Mar 2019 05:12), Max: 36.7 (16 Mar 2019 21:29)  T(F): 97.2 (17 Mar 2019 05:12), Max: 98 (16 Mar 2019 21:29)  HR: 83 (17 Mar 2019 05:12) (75 - 84)  BP: 134/79 (17 Mar 2019 05:12) (123/64 - 134/79)  BP(mean): --  RR: 17 (17 Mar 2019 05:12) (17 - 18)  SpO2: 100% (17 Mar 2019 05:12) (99% - 100%)  nc/at  s1s2  cta  soft, nt, nd no guarding or rebound  no c/c/e    CBC Full  -  ( 16 Mar 2019 05:45 )  WBC Count : 5.48 K/uL  Hemoglobin : 9.6 g/dL  Hematocrit : 27.4 %  Platelet Count - Automated : 146 K/uL  Mean Cell Volume : 74.5 fL  Mean Cell Hemoglobin : 26.1 pg  Mean Cell Hemoglobin Concentration : 35.0 %  Auto Neutrophil # : 3.17 K/uL  Auto Lymphocyte # : 1.00 K/uL  Auto Monocyte # : 0.72 K/uL  Auto Eosinophil # : 0.52 K/uL  Auto Basophil # : 0.04 K/uL  Auto Neutrophil % : 58.0 %  Auto Lymphocyte % : 18.2 %  Auto Monocyte % : 13.1 %  Auto Eosinophil % : 9.5 %  Auto Basophil % : 0.7 %    03-16    142  |  107  |  20  ----------------------------<  96  3.9   |  21<L>  |  1.50<H>    Ca    9.0      16 Mar 2019 05:45  Phos  2.3     03-16  Mg     1.9     03-16    TPro  6.7  /  Alb  2.5<L>  /  TBili  20.0<H>  /  DBili  13.6<H>  /  AST  516<H>  /  ALT  290<H>  /  AlkPhos  270<H>  03-16    PTT - ( 16 Mar 2019 05:45 )  PTT:48.6 SEC

## 2019-03-18 LAB
ALBUMIN SERPL ELPH-MCNC: 2.2 G/DL — LOW (ref 3.3–5)
ALP SERPL-CCNC: 259 U/L — HIGH (ref 40–120)
ALT FLD-CCNC: 264 U/L — HIGH (ref 4–41)
ANION GAP SERPL CALC-SCNC: 11 MMO/L — SIGNIFICANT CHANGE UP (ref 7–14)
AST SERPL-CCNC: 462 U/L — HIGH (ref 4–40)
BASOPHILS # BLD AUTO: 0.02 K/UL — SIGNIFICANT CHANGE UP (ref 0–0.2)
BASOPHILS NFR BLD AUTO: 0.4 % — SIGNIFICANT CHANGE UP (ref 0–2)
BILIRUB SERPL-MCNC: 18.4 MG/DL — HIGH (ref 0.2–1.2)
BUN SERPL-MCNC: 16 MG/DL — SIGNIFICANT CHANGE UP (ref 7–23)
CALCIUM SERPL-MCNC: 8.8 MG/DL — SIGNIFICANT CHANGE UP (ref 8.4–10.5)
CHLORIDE SERPL-SCNC: 111 MMOL/L — HIGH (ref 98–107)
CO2 SERPL-SCNC: 20 MMOL/L — LOW (ref 22–31)
CREAT SERPL-MCNC: 1.57 MG/DL — HIGH (ref 0.5–1.3)
EOSINOPHIL # BLD AUTO: 0.28 K/UL — SIGNIFICANT CHANGE UP (ref 0–0.5)
EOSINOPHIL NFR BLD AUTO: 5.4 % — SIGNIFICANT CHANGE UP (ref 0–6)
GLUCOSE SERPL-MCNC: 171 MG/DL — HIGH (ref 70–99)
HAV IGM SER-ACNC: NONREACTIVE — SIGNIFICANT CHANGE UP
HBV CORE IGM SER-ACNC: NONREACTIVE — SIGNIFICANT CHANGE UP
HBV SURFACE AG SER-ACNC: NONREACTIVE — SIGNIFICANT CHANGE UP
HCT VFR BLD CALC: 23 % — LOW (ref 39–50)
HCV AB S/CO SERPL IA: 0.18 S/CO — SIGNIFICANT CHANGE UP (ref 0–0.79)
HCV AB SERPL-IMP: SIGNIFICANT CHANGE UP
HGB BLD-MCNC: 8.2 G/DL — LOW (ref 13–17)
IMM GRANULOCYTES NFR BLD AUTO: 0.6 % — SIGNIFICANT CHANGE UP (ref 0–1.5)
LACTATE SERPL-SCNC: 1.6 MMOL/L — SIGNIFICANT CHANGE UP (ref 0.5–2)
LYMPHOCYTES # BLD AUTO: 0.76 K/UL — LOW (ref 1–3.3)
LYMPHOCYTES # BLD AUTO: 14.6 % — SIGNIFICANT CHANGE UP (ref 13–44)
MCHC RBC-ENTMCNC: 26.6 PG — LOW (ref 27–34)
MCHC RBC-ENTMCNC: 35.7 % — SIGNIFICANT CHANGE UP (ref 32–36)
MCV RBC AUTO: 74.7 FL — LOW (ref 80–100)
MONOCYTES # BLD AUTO: 0.63 K/UL — SIGNIFICANT CHANGE UP (ref 0–0.9)
MONOCYTES NFR BLD AUTO: 12.1 % — SIGNIFICANT CHANGE UP (ref 2–14)
NEUTROPHILS # BLD AUTO: 3.47 K/UL — SIGNIFICANT CHANGE UP (ref 1.8–7.4)
NEUTROPHILS NFR BLD AUTO: 66.9 % — SIGNIFICANT CHANGE UP (ref 43–77)
NRBC # FLD: 0 K/UL — LOW (ref 25–125)
PLATELET # BLD AUTO: 117 K/UL — LOW (ref 150–400)
PMV BLD: SIGNIFICANT CHANGE UP FL (ref 7–13)
POTASSIUM SERPL-MCNC: 3.9 MMOL/L — SIGNIFICANT CHANGE UP (ref 3.5–5.3)
POTASSIUM SERPL-SCNC: 3.9 MMOL/L — SIGNIFICANT CHANGE UP (ref 3.5–5.3)
PROT SERPL-MCNC: 5.7 G/DL — LOW (ref 6–8.3)
RBC # BLD: 3.08 M/UL — LOW (ref 4.2–5.8)
RBC # FLD: 27.9 % — HIGH (ref 10.3–14.5)
SODIUM SERPL-SCNC: 142 MMOL/L — SIGNIFICANT CHANGE UP (ref 135–145)
WBC # BLD: 5.19 K/UL — SIGNIFICANT CHANGE UP (ref 3.8–10.5)
WBC # FLD AUTO: 5.19 K/UL — SIGNIFICANT CHANGE UP (ref 3.8–10.5)

## 2019-03-18 PROCEDURE — 74181 MRI ABDOMEN W/O CONTRAST: CPT | Mod: 26

## 2019-03-18 PROCEDURE — 76705 ECHO EXAM OF ABDOMEN: CPT | Mod: 26

## 2019-03-18 RX ORDER — ENOXAPARIN SODIUM 100 MG/ML
30 INJECTION SUBCUTANEOUS DAILY
Qty: 0 | Refills: 0 | Status: DISCONTINUED | OUTPATIENT
Start: 2019-03-18 | End: 2019-03-19

## 2019-03-18 RX ADMIN — SODIUM CHLORIDE 75 MILLILITER(S): 9 INJECTION, SOLUTION INTRAVENOUS at 08:33

## 2019-03-18 RX ADMIN — ENOXAPARIN SODIUM 30 MILLIGRAM(S): 100 INJECTION SUBCUTANEOUS at 12:54

## 2019-03-18 RX ADMIN — AMLODIPINE BESYLATE 5 MILLIGRAM(S): 2.5 TABLET ORAL at 05:16

## 2019-03-18 RX ADMIN — TAMSULOSIN HYDROCHLORIDE 0.4 MILLIGRAM(S): 0.4 CAPSULE ORAL at 21:06

## 2019-03-18 RX ADMIN — SODIUM CHLORIDE 75 MILLILITER(S): 9 INJECTION, SOLUTION INTRAVENOUS at 05:15

## 2019-03-18 RX ADMIN — SODIUM CHLORIDE 75 MILLILITER(S): 9 INJECTION, SOLUTION INTRAVENOUS at 21:06

## 2019-03-18 RX ADMIN — PANTOPRAZOLE SODIUM 40 MILLIGRAM(S): 20 TABLET, DELAYED RELEASE ORAL at 12:48

## 2019-03-18 NOTE — PROGRESS NOTE ADULT - SUBJECTIVE AND OBJECTIVE BOX
[facility] - Crystal / Corrie LARRY /Kylah/ LARON Villegas/ LARON Beard/ Shiva Mullen / MAGUI Montielu  ---------------------------------------------------------------------------------------------------------------    Patient seen and examined bedside    Subjective and Objective: No overnight events, denied sob. No complaints today. feeling better  NPO    Allergies: No Known Allergies      Hospital Medications:   MEDICATIONS  (STANDING):  amLODIPine   Tablet 5 milliGRAM(s) Oral daily  dextrose 5% + sodium chloride 0.9%. 1000 milliLiter(s) (75 mL/Hr) IV Continuous <Continuous>  enoxaparin Injectable 30 milliGRAM(s) SubCutaneous daily  pantoprazole  Injectable 40 milliGRAM(s) IV Push daily  tamsulosin Oral Tab/Cap - Peds 0.4 milliGRAM(s) Oral at bedtime    VITALS:  T(F): 97.4 (19 @ 05:14), Max: 98 (19 @ 14:31)  HR: 86 (19 @ 05:14)  BP: 125/68 (19 @ 05:14)  RR: 16 (19 @ 05:14)  SpO2: 100% (19 @ 05:14)  Wt(kg): --     07:01  -   @ 07:00  --------------------------------------------------------  IN: 0 mL / OUT: 700 mL / NET: -700 mL     @ 07:01  -   @ 14:07  --------------------------------------------------------  IN: 0 mL / OUT: 400 mL / NET: -400 mL      PHYSICAL EXAM:  Constitutional: NAD  HEENT: anicteric sclera, oropharynx clear  Neck: No JVD  Respiratory: CTAB, no wheezes, rales or rhonchi  Cardiovascular: S1, S2, RRR  Gastrointestinal: BS+, soft, NT/ND  Extremities: No cyanosis or clubbing. No peripheral edema  Neurological: A/O x 2, no focal deficits  Psychiatric: Normal mood, normal affect  : No CVA tenderness. +flores.   Skin: No rashes      LABS:      142  |  111<H>  |  16  ----------------------------<  171<H>  3.9   |  20<L>  |  1.57<H>    Ca    8.8      18 Mar 2019 06:35    TPro  5.7<L>  /  Alb  2.2<L>  /  TBili  18.4<H>  /  DBili      /  AST  462<H>  /  ALT  264<H>  /  AlkPhos  259<H>      Creatinine Trend: 1.57 <--, 1.50 <--, 1.78 <--                        8.2    5.19  )-----------( 117      ( 18 Mar 2019 06:35 )             23.0     Urine Studies:  Urinalysis Basic - ( 15 Mar 2019 06:18 )    Color: DARK YELLOW / Appearance: CLEAR / S.013 / pH: 6.5  Gluc: NEGATIVE / Ketone: NEGATIVE  / Bili: SMALL / Urobili: SMALL   Blood: NEGATIVE / Protein: 10 / Nitrite: NEGATIVE   Leuk Esterase: NEGATIVE / RBC:  / WBC    Sq Epi:  / Non Sq Epi:  / Bacteria:           RADIOLOGY & ADDITIONAL STUDIES:

## 2019-03-18 NOTE — PROGRESS NOTE ADULT - ASSESSMENT
A 89 yo Male with BPH, Cataracts, Diverticulosis, Anemia 2/2 GI Bleed with previous PRBC transfusions, who presents to the ER for evaluation of  generalized  weakness, urinary retention and confusion. On admission, he found to have elevated bilirubin, LFTS, transaminitis, scleral jaundice. and urinary retention. Oshea catheter has placed with negative Urine analysis. The CT abdomen  and pelvis shows Distended gallbladder. Consider further evaluation with ultrasound if  acute cholecystitis is a concern. No discrete mass identified on this limited noncontrast study. The ID consult requested to assist with evaluation of Biliary sepsis.    # Encephalopathy  # Urinary retention  # R/O biliary sepsis/ Cholangitis - Most likely acute cholestatic hepatitis as per GI    would recommend:    1. Follow up MRCP result  3. Consider Surgical evaluation since GB distended with sludge abd stone, possibly one of the stone may obstructing CBD, hence, bilirubin and LFTs are elevated  4. Trend LFTs and bilirubins  5. Hold off antibiotic since has no fever, no leukocytosis and no RUQ or epigastric pain    d/w patient, Covering NP and DR. Branham    will follow the patient with you

## 2019-03-18 NOTE — PROGRESS NOTE ADULT - ASSESSMENT
89 yo M with PMHx CKD, BPH, Diverticulosis, Anemia 2/2 GI Bleed with previous PRBC transfusions, who p/w 2 days genearlized weakness, urinary retention and confusion, found to have elevated LFTs. Renal following for PHU, CKD management.    PHU on CKD 3 b/l Cr 1.4-1.6 10/2018  PHU most likely 2/2 pre renal. clincially appears dry. C3 77- low, C4 wnl -noted- likely 2/2 dec synthesis 2/2 liver dz   urinary retention on CT w/o hydronephrosis. no e/o acute GN  electrolytes acceptable. Cr trended down 1.8>1.5, now stable at baseline. u/o 700ml noted past 24 hrs  Transaminitis and Bilirubinemia, -acute cholestatic hepatitis of unclear etiology   Hepatic encephalopathy- sr NH4 level 91 on admission. more alert today  HTN, controlled  BPH: c/w flomax    labs, chart reviewed  keep flores for now  c/w D5NS @75ml/hr while NPO  diet, w/u per GI/medicine  f/u GI and hem  awit mri/ mrcp-per GI  c/w Norvasc  avoid ACEi/ARB/NSAIDs/nephrotoxics  montior BMP, LFTs daily  will f/u

## 2019-03-18 NOTE — PROGRESS NOTE ADULT - ASSESSMENT
no dilated bile duct on ct.  hida not helpful with cholestatic hepatitis.  awit mri/ mrcp.  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

## 2019-03-18 NOTE — PROGRESS NOTE ADULT - SUBJECTIVE AND OBJECTIVE BOX
[facility]        - Patient seen and examined.  - Today, patient is without complaints.         *****MEDICATIONS: Current medication reviewed and documented.    MEDICATIONS  (STANDING):  amLODIPine   Tablet 5 milliGRAM(s) Oral daily  dextrose 5% + sodium chloride 0.9%. 1000 milliLiter(s) (75 mL/Hr) IV Continuous <Continuous>  enoxaparin Injectable 30 milliGRAM(s) SubCutaneous daily  pantoprazole  Injectable 40 milliGRAM(s) IV Push daily  tamsulosin Oral Tab/Cap - Peds 0.4 milliGRAM(s) Oral at bedtime    MEDICATIONS  (PRN):          ***** VITAL SIGNS:  T(F): 97.1 (19 @ 14:52), Max: 97.6 (19 @ 21:41)  HR: 85 (19 @ 14:52) (79 - 86)  BP: 110/61 (19 @ 14:52) (110/61 - 128/72)  RR: 17 (19 @ 14:52) (16 - 17)  SpO2: 96% (19 @ 14:52) (96% - 100%)  Wt(kg): --  ,   I&O's Summary    17 Mar 2019 07:  -  18 Mar 2019 07:00  --------------------------------------------------------  IN: 0 mL / OUT: 700 mL / NET: -700 mL    18 Mar 2019 07:  -  18 Mar 2019 16:35  --------------------------------------------------------  IN: 0 mL / OUT: 500 mL / NET: -500 mL             *****PHYSICAL EXAM:  Alert oriented x 2   able to recognize grand daughter at bedside.    Attention comprehension are fair. Able to name, repeat, without any difficulty.   Able to follow 1-2 step commands.     EOMI fundi not visualized,  VFF to confrontration  No facial asymmetry   Tongue is midline   Palate elevates symmetrically   Moving all 4 ext symmetrically no pronator drift   Reflexes are symmetric throughout   sensation is grossly symmetric  Gait : not assessed.  B/L down going toes            *****LAB AND IMAGIN.2    5.19  )-----------( 117      ( 18 Mar 2019 06:35 )             23.0               03-18    142  |  111<H>  |  16  ----------------------------<  171<H>  3.9   |  20<L>  |  1.57<H>    Ca    8.8      18 Mar 2019 06:35    TPro  5.7<L>  /  Alb  2.2<L>  /  TBili  18.4<H>  /  DBili  x   /  AST  462<H>  /  ALT  264<H>  /  AlkPhos  259<H>                           [All pertinent recent Imaging/Reports reviewed]           *****A S S E S S M E N T   A N D   P L A N :        89 yo M, poor historian, charts reviewed, with PMHx BPH, Cataracts, Diverticulosis, Anemia 2/2 GI Bleed with previous PRBC transfusions, who p/w 2 days genearlized weakness, urinary retention and confusion.  Upon arrival to the ED, pt with scleral icterus and labs significant for transaminitis and TBili > 20    called to evaluate pt for ams         Problem/Recommendations 1:  Multifactorial toxic metabolic encephalopathy  improving   will correct metabolic dyscrasias  will continue to monitor         Problem/Recommendations 2:  Hyperbilirubinemia  suspicious for cholestatic pattern   gi f/u   pt going for ercp today.       Thank you for allowing me to participate in the care of this patient. Please do not hesitate to call me if you have any  questions.        ________________  Ava Schreiber MD  [facility] ([facility]  [phone]      30 minutes spent on total encounter; more than 50 % of the visit was  spent counseling about plan of care, compliance to diet/exercise and medication regimen and or  coordinating care by the attending physician.      It is advised that s stroke patients follow up with DULCE MARIA Rausch @ [phone] in 1- 2 weeks.   Others please follow up with Dr. Michael Nissenbaum [phone]

## 2019-03-18 NOTE — PROGRESS NOTE ADULT - SUBJECTIVE AND OBJECTIVE BOX
Patient seen and examined at bedside  No acute events noted overnight  Case discussed with medical team    HPI:  89 yo M, poor historian, charts reviewed, with PMHx BPH, Cataracts, Diverticulosis, Anemia 2/2 GI Bleed with previous PRBC transfusions, who p/w 2 days genearlized weakness, urinary retention and confusion.  Upon arrival to the ED, pt with scleral icterus and labs significant for transaminitis and TBili > 20 (15 Mar 2019 06:59)      PAST MEDICAL & SURGICAL HISTORY:  Chronic kidney disease (CKD), stage IV (severe)  Benign prostatic hypertrophy  S/P cataract surgery, left: 3 yrs ago      No Known Allergies       MEDICATIONS  (STANDING):  amLODIPine   Tablet 5 milliGRAM(s) Oral daily  dextrose 5% + sodium chloride 0.9%. 1000 milliLiter(s) (75 mL/Hr) IV Continuous <Continuous>  pantoprazole  Injectable 40 milliGRAM(s) IV Push daily  tamsulosin Oral Tab/Cap - Peds 0.4 milliGRAM(s) Oral at bedtime    MEDICATIONS  (PRN):      REVIEW OF SYSTEMS:  CONSTITUTIONAL: (+) malaise.   EYES: No acute change in vision   ENT:  No tinnitus  NECK: No stiffness  RESPIRATORY: No hemoptysis  CARDIOVASCULAR: No chest pain, palpitations, syncope  GASTROINTESTINAL: No hematemesis, diarrhea, melena, or hematochezia.  GENITOURINARY: No hematuria  NEUROLOGICAL: No headaches  LYMPH Nodes: No enlarged glands  ENDOCRINE: No heat or cold intolerance	    T(C): 36.3 (03-18-19 @ 05:14), Max: 36.7 (03-17-19 @ 14:31)  HR: 86 (03-18-19 @ 05:14) (79 - 89)  BP: 125/68 (03-18-19 @ 05:14) (125/68 - 138/69)  RR: 16 (03-18-19 @ 05:14) (16 - 18)  SpO2: 100% (03-18-19 @ 05:14) (100% - 100%)    PHYSICAL EXAMINATION:   Constitutional: NAD  HEENT: icterus. AT  Neck:  Supple  Respiratory:  Adequate airflow b/l. Not using accessory muscles of respiration.  Cardiovascular:  S1 & S2 intact, no R/G, 2+ radial pulses b/l  Gastrointestinal: Soft, NT, ND, normoactive b.s., no organomegaly/RT/rigidity  Extremities: WWP  Neurological:  Awake. No acute focal motor deficits. Crude sensation intact.     Labs and imaging reviewed    LABS:                        8.2    5.19  )-----------( 117      ( 18 Mar 2019 06:35 )             23.0     03-18    142  |  111<H>  |  16  ----------------------------<  171<H>  3.9   |  20<L>  |  1.57<H>    Ca    8.8      18 Mar 2019 06:35    TPro  5.7<L>  /  Alb  2.2<L>  /  TBili  18.4<H>  /  DBili  x   /  AST  462<H>  /  ALT  264<H>  /  AlkPhos  259<H>  03-18            CAPILLARY BLOOD GLUCOSE            LIVER FUNCTIONS - ( 18 Mar 2019 06:35 )  Alb: 2.2 g/dL / Pro: 5.7 g/dL / ALK PHOS: 259 u/L / ALT: 264 u/L / AST: 462 u/L / GGT: x               RADIOLOGY & ADDITIONAL STUDIES:

## 2019-03-18 NOTE — PROGRESS NOTE ADULT - SUBJECTIVE AND OBJECTIVE BOX
Patient is seen and examined at the bed side, is afebrile.  The HIDA scan shows no acute cholecystitis but suspicious for CBD obstruction. And the abdominal US shows  Gallbladder distended with sludge and stones.        REVIEW OF SYSTEMS: All other review systems are negative        ALLERGIES: No Known Allergies        Vital Signs Last 24 Hrs  T(C): 36.3 (18 Mar 2019 05:14), Max: 36.7 (17 Mar 2019 14:31)  T(F): 97.4 (18 Mar 2019 05:14), Max: 98 (17 Mar 2019 14:31)  HR: 86 (18 Mar 2019 05:14) (79 - 89)  BP: 125/68 (18 Mar 2019 05:14) (125/68 - 138/69)  BP(mean): --  RR: 16 (18 Mar 2019 05:14) (16 - 18)  SpO2: 100% (18 Mar 2019 05:14) (100% - 100%)        PHYSICAL EXAM:  GENERAL: Not in distress   HEENT: Sclera jaundiced  CHEST/LUNG:  Air entry bilaterally  HEART: s1 and s2 present  ABDOMEN:  Nontender and  Nondistended  : Oshea catheter in placed  EXTREMITIES: No pedal  edema  CNS: Awake and Alert        LABS:                        8.2    5.19  )-----------( 117      ( 18 Mar 2019 06:35 )             23.0                           9.6    5.48  )-----------( 146      ( 16 Mar 2019 05:45 )             27.4         18    142  |  111<H>  |  16  ----------------------------<  171<H>  3.9   |  20<L>  |  1.57<H>    Ca    8.8      18 Mar 2019 06:35    TPro  5.7<L>  /  Alb  2.2<L>  /  TBili  18.4<H>  /  DBili  x   /  AST  462<H>  /  ALT  264<H>  /  AlkPhos  259<H>  -18      03-16    142  |  107  |  20  ----------------------------<  96  3.9   |  21<L>  |  1.50<H>    Ca    9.0      16 Mar 2019 05:45  Phos  2.3     03-16  Mg     1.9         TPro  6.7  /  Alb  2.5<L>  /  TBili  20.0<H>  /  DBili  13.6<H>  /  AST  516<H>  /  ALT  290<H>  /  AlkPhos  270<H>        Urinalysis Basic - ( 15 Mar 2019 06:18 )  Color: DARK YELLOW / Appearance: CLEAR / S.013 / pH: 6.5  Gluc: NEGATIVE / Ketone: NEGATIVE  / Bili: SMALL / Urobili: SMALL   Blood: NEGATIVE / Protein: 10 / Nitrite: NEGATIVE   Leuk Esterase: NEGATIVE / RBC: x / WBC x   Sq Epi: x / Non Sq Epi: x / Bacteria: x          MEDICATIONS  (STANDING):  amLODIPine   Tablet 5 milliGRAM(s) Oral daily  dextrose 5% + sodium chloride 0.9%. 1000 milliLiter(s) (75 mL/Hr) IV Continuous <Continuous>  enoxaparin Injectable 30 milliGRAM(s) SubCutaneous daily  pantoprazole  Injectable 40 milliGRAM(s) IV Push daily  tamsulosin Oral Tab/Cap - Peds 0.4 milliGRAM(s) Oral at bedtime    MEDICATIONS  (PRN):        RADIOLOGY & ADDITIONAL TESTS:    3/17/19 : NM Hepatobiliary Imaging (19 @ 12:21) Abnormal hepatobiliary scan suspicious for common bile duct obstruction. Hepatocellular dysfunction. No evidence of acute cholecystitis.      3/18/19 : US Abdomen Limited (19 @ 07:48) Gallbladder is distended with sludge and stones.  Mural thickening of the wall of the common bile duct, possibly  cholangitis. No biliary ductal dilatation.      3/15/19 : CT Abdomen and Pelvis No Cont (03.15.19 @ 09:39) Distended gallbladder. Consider further evaluation with ultrasound if  acute cholecystitis is a concern.  No discrete mass identified on this limited noncontrast study.          MICROBIOLOGY DATA:    Culture - Blood (03.15.19 @ 14:03)    Culture - Blood:   NO ORGANISMS ISOLATED  NO ORGANISMS ISOLATED AT 24 HOURS    Specimen Source: BLOOD

## 2019-03-18 NOTE — PROGRESS NOTE ADULT - ASSESSMENT
89 yo M p/w confusion and generalized weakness    -> Transaminitis and Bilirubinemia 2/2 CBD obstruction:      - noted on imaging      - F/u MRCP      - If confirmed then will need intervention        - adjust management accordingly (ie: abx, intervention, etc.)      - all consultants management appreciated  -> Lactate elevation:      - ivf, trend lactate to wnl   -> Metabolic Encephalopathy:      - 2/2 cbd obstruction and bilirubinemia       - treat underlying gi condition      - neuro checks       - fall precautions   -> BPH:     - flomax  -> Need for prophylactic measure:      - dvt/gi ppx  -> Severe Protein Calorie Malnutrition:      - supplement diet

## 2019-03-18 NOTE — PROGRESS NOTE ADULT - SUBJECTIVE AND OBJECTIVE BOX
PASCUAL OLSEN:[id number],   89yMale followed for:  No Known Allergies    PAST MEDICAL & SURGICAL HISTORY:  Chronic kidney disease (CKD), stage IV (severe)  Benign prostatic hypertrophy  S/P cataract surgery, left: 3 yrs ago    FAMILY HISTORY:  No pertinent family history in first degree relatives    MEDICATIONS  (STANDING):  amLODIPine   Tablet 5 milliGRAM(s) Oral daily  dextrose 5% + sodium chloride 0.9%. 1000 milliLiter(s) (75 mL/Hr) IV Continuous <Continuous>  enoxaparin Injectable 30 milliGRAM(s) SubCutaneous daily  pantoprazole  Injectable 40 milliGRAM(s) IV Push daily  tamsulosin Oral Tab/Cap - Peds 0.4 milliGRAM(s) Oral at bedtime    MEDICATIONS  (PRN):      Vital Signs Last 24 Hrs  T(C): 36.3 (18 Mar 2019 05:14), Max: 36.7 (17 Mar 2019 14:31)  T(F): 97.4 (18 Mar 2019 05:14), Max: 98 (17 Mar 2019 14:31)  HR: 86 (18 Mar 2019 05:14) (79 - 89)  BP: 125/68 (18 Mar 2019 05:14) (125/68 - 138/69)  BP(mean): --  RR: 16 (18 Mar 2019 05:14) (16 - 18)  SpO2: 100% (18 Mar 2019 05:14) (100% - 100%)  nc/at  s1s2  cta  soft, nt, nd no guarding or rebound  no c/c/e    CBC Full  -  ( 18 Mar 2019 06:35 )  WBC Count : 5.19 K/uL  Hemoglobin : 8.2 g/dL  Hematocrit : 23.0 %  Platelet Count - Automated : 117 K/uL  Mean Cell Volume : 74.7 fL  Mean Cell Hemoglobin : 26.6 pg  Mean Cell Hemoglobin Concentration : 35.7 %  Auto Neutrophil # : 3.47 K/uL  Auto Lymphocyte # : 0.76 K/uL  Auto Monocyte # : 0.63 K/uL  Auto Eosinophil # : 0.28 K/uL  Auto Basophil # : 0.02 K/uL  Auto Neutrophil % : 66.9 %  Auto Lymphocyte % : 14.6 %  Auto Monocyte % : 12.1 %  Auto Eosinophil % : 5.4 %  Auto Basophil % : 0.4 %    03-18    142  |  111<H>  |  16  ----------------------------<  171<H>  3.9   |  20<L>  |  1.57<H>    Ca    8.8      18 Mar 2019 06:35    TPro  5.7<L>  /  Alb  2.2<L>  /  TBili  18.4<H>  /  DBili  x   /  AST  462<H>  /  ALT  264<H>  /  AlkPhos  259<H>  03-18

## 2019-03-19 LAB
ALBUMIN SERPL ELPH-MCNC: 2.4 G/DL — LOW (ref 3.3–5)
ALP SERPL-CCNC: 260 U/L — HIGH (ref 40–120)
ALT FLD-CCNC: 253 U/L — HIGH (ref 4–41)
ANA PAT FLD IF-IMP: SIGNIFICANT CHANGE UP
ANA PAT FLD IF-IMP: SIGNIFICANT CHANGE UP
ANA TITR SER: SIGNIFICANT CHANGE UP
ANA TITR SER: SIGNIFICANT CHANGE UP
ANION GAP SERPL CALC-SCNC: 12 MMO/L — SIGNIFICANT CHANGE UP (ref 7–14)
AST SERPL-CCNC: 415 U/L — HIGH (ref 4–40)
BILIRUB SERPL-MCNC: 19.2 MG/DL — HIGH (ref 0.2–1.2)
BUN SERPL-MCNC: 18 MG/DL — SIGNIFICANT CHANGE UP (ref 7–23)
CALCIUM SERPL-MCNC: 8.9 MG/DL — SIGNIFICANT CHANGE UP (ref 8.4–10.5)
CHLORIDE SERPL-SCNC: 113 MMOL/L — HIGH (ref 98–107)
CO2 SERPL-SCNC: 19 MMOL/L — LOW (ref 22–31)
CREAT SERPL-MCNC: 1.64 MG/DL — HIGH (ref 0.5–1.3)
GLUCOSE SERPL-MCNC: 100 MG/DL — HIGH (ref 70–99)
HCT VFR BLD CALC: 26.1 % — LOW (ref 39–50)
HGB A MFR BLD: 97.6 % — SIGNIFICANT CHANGE UP
HGB A2 MFR BLD: 2.2 % — LOW (ref 2.4–3.5)
HGB BLD-MCNC: 9.3 G/DL — LOW (ref 13–17)
HGB ELECT COMMENT: SIGNIFICANT CHANGE UP
HGB F MFR BLD: < 1 % — SIGNIFICANT CHANGE UP (ref 0–1.5)
INR BLD: 1.99 — HIGH (ref 0.88–1.17)
MCHC RBC-ENTMCNC: 26.8 PG — LOW (ref 27–34)
MCHC RBC-ENTMCNC: 35.6 % — SIGNIFICANT CHANGE UP (ref 32–36)
MCV RBC AUTO: 75.2 FL — LOW (ref 80–100)
MITOCHONDRIA AB SER-ACNC: SIGNIFICANT CHANGE UP
NRBC # FLD: 0 K/UL — LOW (ref 25–125)
PLATELET # BLD AUTO: 122 K/UL — LOW (ref 150–400)
PMV BLD: SIGNIFICANT CHANGE UP FL (ref 7–13)
POTASSIUM SERPL-MCNC: 3.8 MMOL/L — SIGNIFICANT CHANGE UP (ref 3.5–5.3)
POTASSIUM SERPL-SCNC: 3.8 MMOL/L — SIGNIFICANT CHANGE UP (ref 3.5–5.3)
PROT SERPL-MCNC: 5.9 G/DL — LOW (ref 6–8.3)
PROTHROM AB SERPL-ACNC: 23.2 SEC — HIGH (ref 9.8–13.1)
RBC # BLD: 3.47 M/UL — LOW (ref 4.2–5.8)
RBC # FLD: 27.4 % — HIGH (ref 10.3–14.5)
SMOOTH MUSCLE AB SER-ACNC: SIGNIFICANT CHANGE UP
SODIUM SERPL-SCNC: 144 MMOL/L — SIGNIFICANT CHANGE UP (ref 135–145)
WBC # BLD: 5.32 K/UL — SIGNIFICANT CHANGE UP (ref 3.8–10.5)
WBC # FLD AUTO: 5.32 K/UL — SIGNIFICANT CHANGE UP (ref 3.8–10.5)

## 2019-03-19 RX ORDER — SODIUM CHLORIDE 9 MG/ML
1000 INJECTION, SOLUTION INTRAVENOUS
Qty: 0 | Refills: 0 | Status: DISCONTINUED | OUTPATIENT
Start: 2019-03-19 | End: 2019-03-21

## 2019-03-19 RX ORDER — PHYTONADIONE (VIT K1) 5 MG
10 TABLET ORAL ONCE
Qty: 0 | Refills: 0 | Status: COMPLETED | OUTPATIENT
Start: 2019-03-19 | End: 2019-03-19

## 2019-03-19 RX ORDER — ERGOCALCIFEROL 1.25 MG/1
50000 CAPSULE ORAL
Qty: 0 | Refills: 0 | Status: DISCONTINUED | OUTPATIENT
Start: 2019-03-19 | End: 2019-03-28

## 2019-03-19 RX ADMIN — AMLODIPINE BESYLATE 5 MILLIGRAM(S): 2.5 TABLET ORAL at 05:47

## 2019-03-19 RX ADMIN — Medication 102 MILLIGRAM(S): at 09:40

## 2019-03-19 RX ADMIN — PANTOPRAZOLE SODIUM 40 MILLIGRAM(S): 20 TABLET, DELAYED RELEASE ORAL at 12:20

## 2019-03-19 RX ADMIN — SODIUM CHLORIDE 75 MILLILITER(S): 9 INJECTION, SOLUTION INTRAVENOUS at 12:21

## 2019-03-19 RX ADMIN — TAMSULOSIN HYDROCHLORIDE 0.4 MILLIGRAM(S): 0.4 CAPSULE ORAL at 22:24

## 2019-03-19 RX ADMIN — ENOXAPARIN SODIUM 30 MILLIGRAM(S): 100 INJECTION SUBCUTANEOUS at 17:12

## 2019-03-19 RX ADMIN — ERGOCALCIFEROL 50000 UNIT(S): 1.25 CAPSULE ORAL at 12:20

## 2019-03-19 NOTE — CONSULT NOTE ADULT - ASSESSMENT
Patient is a 89M with concern for cholestatic hepatitis  - recommend IR consult for liver biopsy  - no acute surgical intervention at this time  - please call with any additional questions  - discussed with Dr. Dudley Menard PGY2  [id number]

## 2019-03-19 NOTE — PROGRESS NOTE ADULT - SUBJECTIVE AND OBJECTIVE BOX
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  Upon arrival to the ED, pt with scleral icterus and labs significant for transaminitis and TBili > 20 (15 Mar 2019 06:59)      PAST MEDICAL & SURGICAL HISTORY:  Chronic kidney disease (CKD), stage IV (severe)  Benign prostatic hypertrophy  S/P cataract surgery, left: 3 yrs ago      No Known Allergies       MEDICATIONS  (STANDING):  amLODIPine   Tablet 5 milliGRAM(s) Oral daily  dextrose 5% + sodium chloride 0.9%. 1000 milliLiter(s) (75 mL/Hr) IV Continuous <Continuous>  enoxaparin Injectable 30 milliGRAM(s) SubCutaneous daily  pantoprazole  Injectable 40 milliGRAM(s) IV Push daily  tamsulosin Oral Tab/Cap - Peds 0.4 milliGRAM(s) Oral at bedtime    MEDICATIONS  (PRN):      REVIEW OF SYSTEMS:  CONSTITUTIONAL: (+) malaise.   EYES: No acute change in vision   ENT:  No tinnitus  NECK: No stiffness  RESPIRATORY: No hemoptysis  CARDIOVASCULAR: No chest pain, palpitations, syncope  GASTROINTESTINAL: abd discomfort. No hematemesis, diarrhea, melena, or hematochezia.  GENITOURINARY: No hematuria  NEUROLOGICAL: No headaches  LYMPH Nodes: No enlarged glands  ENDOCRINE: No heat or cold intolerance	    T(C): 36.7 (03-18-19 @ 20:59), Max: 36.7 (03-18-19 @ 20:59)  HR: 70 (03-19-19 @ 05:45) (70 - 85)  BP: 123/58 (03-19-19 @ 05:45) (110/58 - 123/58)  RR: 16 (03-19-19 @ 05:45) (16 - 17)  SpO2: 100% (03-19-19 @ 05:45) (96% - 100%)    PHYSICAL EXAMINATION:   Constitutional: NAD  HEENT: scleral icterus. AT  Neck:  Supple  Respiratory:  Adequate airflow b/l. Not using accessory muscles of respiration.  Cardiovascular:  S1 & S2 intact, no R/G, 2+ radial pulses b/l  Gastrointestinal: Soft, NT, ND, normoactive b.s., no organomegaly/RT/rigidity  Extremities: WWP  Neurological:  Alert and awake.  No acute focal motor deficits. Crude sensation intact.     Labs and imaging reviewed    LABS:                        9.3    5.32  )-----------( 122      ( 19 Mar 2019 05:12 )             26.1     03-19    144  |  113<H>  |  18  ----------------------------<  100<H>  3.8   |  19<L>  |  1.64<H>    Ca    8.9      19 Mar 2019 05:12    TPro  5.9<L>  /  Alb  2.4<L>  /  TBili  19.2<H>  /  DBili  x   /  AST  415<H>  /  ALT  253<H>  /  AlkPhos  260<H>  03-19        PT/INR - ( 19 Mar 2019 05:12 )   PT: 23.2 SEC;   INR: 1.99              CAPILLARY BLOOD GLUCOSE            LIVER FUNCTIONS - ( 19 Mar 2019 05:12 )  Alb: 2.4 g/dL / Pro: 5.9 g/dL / ALK PHOS: 260 u/L / ALT: 253 u/L / AST: 415 u/L / GGT: x               RADIOLOGY & ADDITIONAL STUDIES:

## 2019-03-19 NOTE — PROGRESS NOTE ADULT - ASSESSMENT
defer to id.  re: surgery eval.  positive hida is expected in setting of increased bili. lfts trending down.  will sondier biopsy.  cholestatic hepatitis likly acute drug reaction as opposed to stone.  no billiary dilatation.  typically do not treat asymptomatic cholelithiasis, and no sign of mirizzi syndorome.  will discuss case with dr. flannery

## 2019-03-19 NOTE — CONSULT NOTE ADULT - SUBJECTIVE AND OBJECTIVE BOX
CC: 89y old Male admitted with a chief complaint of generalized weakness, urinary retention, and confusion X 2 days, now    HPI: Patient is a 89M with PMHx BPH, Cataracts, Diverticulosis, Anemia 2/2 GI Bleed with previous PRBC transfusions, who p/w 2 days genearlized weakness, urinary retention and confusion.  Upon arrival to the ED, pt with scleral icterus and labs significant for transaminitis and TBili > 20.  Patient admitted to medicine for workup.  GI evaluation with concern for cholestatic hepatitis.  Requesting surgical eval for liver biopsy.  Patient denies any abdominal pain, N/V.  He has never had abdominal surgery in the past.        PMHx: Chronic kidney disease (CKD), stage IV (severe)  Benign prostatic hypertrophy  History of BPH  GIB    PSHx: S/P cataract surgery, left      Medications (inpatient): amLODIPine   Tablet 5 milliGRAM(s) Oral daily  dextrose 5% + sodium chloride 0.9%. 1000 milliLiter(s) IV Continuous <Continuous>  enoxaparin Injectable 30 milliGRAM(s) SubCutaneous daily  ergocalciferol 55807 Unit(s) Oral every week  pantoprazole  Injectable 40 milliGRAM(s) IV Push daily  tamsulosin Oral Tab/Cap - Peds 0.4 milliGRAM(s) Oral at bedtime      Allergies: No Known Allergies    Family Hx: No pertinent family history in first degree relatives      Physical Exam  T(C): 36.7  HR: 70 (70 - 82)  BP: 104/61 (104/61 - 123/58)  RR: 16 (16 - 17)  SpO2: 100% (98% - 100%)  Tmax: T(C): , Max: 36.7 (03-18-19 @ 20:59)    03-18-19 - 03-19-19  --------------------------------------------------------  IN:  Total IN: 0 mL    OUT:    Indwelling Catheter - Urethral: 500 mL  Total OUT: 500 mL    Total NET: -500 mL      03-19-19 - 03-19-19  --------------------------------------------------------  IN:  Total IN: 0 mL    OUT:    Indwelling Catheter - Urethral: 200 mL  Total OUT: 200 mL    Total NET: -200 mL        General: well developed, well nourished, NAD  Neuro: alert and oriented, no focal deficits, moves all extremities spontaneously  HEENT: NCAT, EOMI, scleral icterus   Respiratory: airway patent, respirations unlabored  CVS: regular rate and rhythm  Abdomen: soft, nontender, nondistended  Extremities: no edema, sensation and movement grossly intact  Skin: warm, dry, jaundiced     Labs:                        9.3    5.32  )-----------( 122      ( 19 Mar 2019 05:12 )             26.1     PT/INR - ( 19 Mar 2019 05:12 )   PT: 23.2 SEC;   INR: 1.99            03-19    144  |  113<H>  |  18  ----------------------------<  100<H>  3.8   |  19<L>  |  1.64<H>    Ca    8.9      19 Mar 2019 05:12    TPro  5.9<L>  /  Alb  2.4<L>  /  TBili  19.2<H>  /  DBili  x   /  AST  415<H>  /  ALT  253<H>  /  AlkPhos  260<H>  03-19            Imaging and other studies:  < from: CT Abdomen and Pelvis No Cont (03.15.19 @ 09:39) >  IMPRESSION:   Distended gallbladder. Consider further evaluation with ultrasound if   acute cholecystitis is a concern.    No discrete mass identified on this limited noncontrast study.    < end of copied text >

## 2019-03-19 NOTE — PROGRESS NOTE ADULT - ASSESSMENT
A 89 yo Male with BPH, Cataracts, Diverticulosis, Anemia 2/2 GI Bleed with previous PRBC transfusions, who presents to the ER for evaluation of  generalized  weakness, urinary retention and confusion. On admission, he found to have elevated bilirubin, LFTS, transaminitis, scleral jaundice. and urinary retention. Oshea catheter has placed with negative Urine analysis. The CT abdomen  and pelvis shows Distended gallbladder. Consider further evaluation with ultrasound if  acute cholecystitis is a concern. No discrete mass identified on this limited noncontrast study. The ID consult requested to assist with evaluation of Biliary sepsis.    # Encephalopathy  # Urinary retention  # R/O biliary sepsis/ Cholangitis - Most likely acute cholestatic hepatitis as per GI  # Distended GB with cholelithiasis on MRCP 3/18/19    would recommend:    1. Surgical evaluation ask to evaluate if  since  is a candidate for cholecystectomy or [id number] distended with cholelithiasis, if he  2. Trend LFTs and bilirubins  3. Hold off antibiotic since he has no septic indices    d/w patient, Covering NP and DR. Branham    will follow the patient with you A 89 yo Male with BPH, Cataracts, Diverticulosis, Anemia 2/2 GI Bleed with previous PRBC transfusions, who presents to the ER for evaluation of  generalized  weakness, urinary retention and confusion. On admission, he found to have elevated bilirubin, LFTS, transaminitis, scleral jaundice. and urinary retention. Oshea catheter has placed with negative Urine analysis. The CT abdomen  and pelvis shows Distended gallbladder. Consider further evaluation with ultrasound if  acute cholecystitis is a concern. No discrete mass identified on this limited noncontrast study. The ID consult requested to assist with evaluation of Biliary sepsis.    # Encephalopathy  # Urinary retention  # R/O biliary sepsis/ Cholangitis - Most likely acute cholestatic hepatitis as per GI  # Distended GB with cholelithiasis on MRCP 3/18/19    would recommend:    1. Surgical evaluation ask to evaluate if  since  is a candidate for cholecystectomy in the setting of distended Gall bladder with cholelithiasis  2. Trend LFTs and bilirubins  3. Hold off antibiotic since he has no septic indices    d/w patient, Covering NP and DR. Branham    will follow the patient with you A 89 yo Male with BPH, Cataracts, Diverticulosis, Anemia 2/2 GI Bleed with previous PRBC transfusions, who presents to the ER for evaluation of  generalized  weakness, urinary retention and confusion. On admission, he found to have elevated bilirubin, LFTS, transaminitis, scleral jaundice. and urinary retention. Oshea catheter has placed with negative Urine analysis. The CT abdomen  and pelvis shows Distended gallbladder. Consider further evaluation with ultrasound if  acute cholecystitis is a concern. No discrete mass identified on this limited noncontrast study. The ID consult requested to assist with evaluation of Biliary sepsis.    # Encephalopathy  # Urinary retention  # R/O biliary sepsis/ Cholangitis - Most likely acute cholestatic hepatitis as per GI  # Distended GB with cholelithiasis on MRCP 3/18/19    would recommend:    1. If spiked fever , obtain cultures   2. Surgical evaluation ask to evaluate if patient  is a candidate for cholecystectomy in the setting of distended Gall bladder with cholelithiasis, to prevent further problem  3. Trend LFTs and bilirubins  4. Hold off antibiotic since he has no septic indices at this time    d/w patient and family at the bed side    will follow the patient with you A 89 yo Male with BPH, Cataracts, Diverticulosis, Anemia 2/2 GI Bleed with previous PRBC transfusions, who presents to the ER for evaluation of  generalized  weakness, urinary retention and confusion. On admission, he found to have elevated bilirubin, LFTS, transaminitis, scleral jaundice. and urinary retention. Oshea catheter has placed with negative Urine analysis. The CT abdomen  and pelvis shows Distended gallbladder. Consider further evaluation with ultrasound if  acute cholecystitis is a concern. No discrete mass identified on this limited noncontrast study. The ID consult requested to assist with evaluation of Biliary sepsis.    # Encephalopathy  # Urinary retention  # R/O biliary sepsis/ Cholangitis - Most likely acute cholestatic hepatitis as per GI  # Distended GB with cholelithiasis on MRCP 3/18/19    would recommend:    1. If spiked fever , obtain cultures   2. Surgical evaluation ask to evaluate if patient  is a candidate for cholecystectomy in the setting of distended Gall bladder with cholelithiasis, to prevent further problem, sepsis  3. Trend LFTs and bilirubins  4. Hold off antibiotic since he has no septic indices at this time    d/w patient and family at the bed side    will follow the patient with you

## 2019-03-19 NOTE — PROGRESS NOTE ADULT - SUBJECTIVE AND OBJECTIVE BOX
Pt seen and examined at bedside  denies any compls  no abd pain. no nausea,vomiting,diarrea  no fever  no dyspnea      Allergies:  No Known Allergies    Hospital Medications:   MEDICATIONS  (STANDING):  amLODIPine   Tablet 5 milliGRAM(s) Oral daily  dextrose 5% + sodium chloride 0.9%. 1000 milliLiter(s) (75 mL/Hr) IV Continuous <Continuous>  enoxaparin Injectable 30 milliGRAM(s) SubCutaneous daily  ergocalciferol 94262 Unit(s) Oral every week  pantoprazole  Injectable 40 milliGRAM(s) IV Push daily  tamsulosin Oral Tab/Cap - Peds 0.4 milliGRAM(s) Oral at bedtime         VITALS:  T(F): 98.1 (19 @ 20:59), Max: 98.1 (19 @ 20:59)  HR: 70 (19 @ 05:45)  BP: 123/58 (19 @ 05:45)  RR: 16 (19 @ 05:45)  SpO2: 100% (19 @ 05:45)  Wt(kg): --     @ 07:01  -   @ 07:00  --------------------------------------------------------  IN: 0 mL / OUT: 500 mL / NET: -500 mL        PHYSICAL EXAM:  Constitutional: NAD, sitting comfortably in chair. cachectic  HEENT: deeply icteric  sclera, oropharynx clear, MMM  Neck: No JVD  Respiratory: CTAB, no wheezes, rales or rhonchi  Cardiovascular: S1, S2, RRR  Gastrointestinal: BS+, soft, NT/ND, no ascites  Extremities: No cyanosis or clubbing. No peripheral edema  Neurological: A/O x 3, no focal deficits  Psychiatric: Normal mood, normal affect  : No CVA tenderness.  flores. in place  Skin: No rashes       LABS:      144  |  113<H>  |  18  ----------------------------<  100<H>  3.8   |  19<L>  |  1.64<H>    Ca    8.9      19 Mar 2019 05:12    TPro  5.9<L>  /  Alb  2.4<L>  /  TBili  19.2<H>  /  DBili      /  AST  415<H>  /  ALT  253<H>  /  AlkPhos  260<H>      Creatinine Trend: 1.64 <--, 1.57 <--, 1.50 <--, 1.78 <--                        9.3    5.32  )-----------( 122      ( 19 Mar 2019 05:12 )             26.1     Urine Studies:  Urinalysis Basic - ( 15 Mar 2019 06:18 )    Color: DARK YELLOW / Appearance: CLEAR / S.013 / pH: 6.5  Gluc: NEGATIVE / Ketone: NEGATIVE  / Bili: SMALL / Urobili: SMALL   Blood: NEGATIVE / Protein: 10 / Nitrite: NEGATIVE   Leuk Esterase: NEGATIVE / RBC:  / WBC    Sq Epi:  / Non Sq Epi:  / Bacteria:         RADIOLOGY & ADDITIONAL STUDIES:

## 2019-03-19 NOTE — PROGRESS NOTE ADULT - ASSESSMENT
89 yo M p/w confusion and generalized weakness    -> Transaminitis and Bilirubinemia 2/2 previous CBD obstruction:      - MRCP (+) cholelithiasis, (-) cbd obstruction      - f/u gi for management, potentially previous cbd obstruction 2/2 passed stone       - adjust management accordingly (ie: diet, intervention, etc.)      - all consultants management appreciated  -> Lactate elevation:      - ivf, trend lactate to wnl   -> Metabolic Encephalopathy:      - improved       - treat underlying gi condition      - neuro checks       - fall precautions   -> BPH:     - flomax  -> Need for prophylactic measure:      - dvt/gi ppx  -> Severe Protein Calorie Malnutrition:      - supplement diet 89 yo M p/w confusion and generalized weakness    -> Transaminitis and Bilirubinemia 2/2 cholestatic hepatitis:      - MRCP (+) cholelithiasis, (-) cbd obstruction      - all consultants management appreciated      - adjust management accordingly (ie: diet, intervention, etc.)  -> Lactate elevation:      - ivf, trend lactate to wnl   -> Metabolic Encephalopathy:      - improved       - treat underlying gi condition      - neuro checks       - fall precautions   -> BPH:     - flomax  -> Need for prophylactic measure:      - dvt/gi ppx  -> Severe Protein Calorie Malnutrition:      - supplement diet 89 yo M p/w confusion and generalized weakness    -> Transaminitis and Bilirubinemia 2/2 cholestatic hepatitis:      - MRCP (+) cholelithiasis, (-) cbd obstruction      - surgery consulted for liver bx      - all consultants management appreciated      - adjust management accordingly (ie: diet, intervention, etc.)  -> Lactate elevation:      - ivf, trend lactate to wnl   -> Metabolic Encephalopathy:      - improved       - treat underlying gi condition      - neuro checks       - fall precautions   -> BPH:     - flomax  -> Need for prophylactic measure:      - dvt/gi ppx  -> Severe Protein Calorie Malnutrition:      - supplement diet

## 2019-03-19 NOTE — PROGRESS NOTE ADULT - ASSESSMENT
89 yo M with PMHx CKD, BPH, Diverticulosis, Anemia 2/2 GI Bleed with previous PRBC transfusions, who p/w 2 days genearlized weakness, urinary retention and confusion, found to have elevated LFTs. Renal following for PHU, CKD management.    PHU on CKD 3 b/l Cr 1.4-1.6 10/2018  PHU most likely 2/2 pre renal. clincially appears dry. C3 77- low, C4 wnl -noted- likely 2/2 dec synthesis 2/2 liver dz   urinary retention on CT w/o hydronephrosis. no e/o acute GN  electrolytes acceptable. Cr trended down 1.8>1.5, now stable at baseline. u/o 700ml noted past 24 hrs  Transaminitis and Bilirubinemia, -acute cholestatic hepatitis of unclear etiology   Hepatic encephalopathy- sr NH4 level 91 on admission. more alert today  HTN, controlled  BPH: c/w flomax    labs, chart reviewed  keep flores for now  c/w D5NS @75ml/hr while NPO  diet, w/u per GI/medicine  f/u GI and hem  awit mri/ mrcp-per GI  c/w Norvasc  avoid ACEi/ARB/NSAIDs/nephrotoxics  montior BMP, LFTs daily  will f/u

## 2019-03-19 NOTE — PROGRESS NOTE ADULT - SUBJECTIVE AND OBJECTIVE BOX
Patient is seen and examined at the bed side, is afebrile.  The MRCP of abdomen shows Gallbladder is distended with cholelithiasis.  The LFTs are trending down but bilirubin.       REVIEW OF SYSTEMS: All other review systems are negative        ALLERGIES: No Known Allergies        Vital Signs Last 24 Hrs  T(C): 36.7 (18 Mar 2019 20:59), Max: 36.7 (18 Mar 2019 20:59)  T(F): 98.1 (18 Mar 2019 20:59), Max: 98.1 (18 Mar 2019 20:59)  HR: 70 (19 Mar 2019 14:58) (70 - 82)  BP: 104/61 (19 Mar 2019 14:58) (104/61 - 123/58)  BP(mean): --  RR: 16 (19 Mar 2019 14:58) (16 - 17)  SpO2: 100% (19 Mar 2019 14:58) (98% - 100%)        PHYSICAL EXAM:  GENERAL: Not in distress   HEENT: Sclera jaundiced  CHEST/LUNG:  Air entry bilaterally  HEART: s1 and s2 present  ABDOMEN:  Nontender and  Nondistended  : Oshea catheter in placed  EXTREMITIES: No pedal  edema  CNS: Awake and Alert        LABS:                        9.3    5.32  )-----------( 122      ( 19 Mar 2019 05:12 )             26.1                           8.2    5.19  )-----------( 117      ( 18 Mar 2019 06:35 )             23.0            0319    144  |  113<H>  |  18  ----------------------------<  100<H>  3.8   |  19<L>  |  1.64<H>    Ca    8.9      19 Mar 2019 05:12    TPro  5.9<L>  /  Alb  2.4<L>  /  TBili  19.2<H>  /  DBili  x   /  AST  415<H>  /  ALT  253<H>  /  AlkPhos  260<H>  --18    142  |  111<H>  |  16  ----------------------------<  171<H>  3.9   |  20<L>  |  1.57<H>    Ca    8.8      18 Mar 2019 06:35    TPro  5.7<L>  /  Alb  2.2<L>  /  TBili  18.4<H>  /  DBili  x   /  AST  462<H>  /  ALT  264<H>  /  AlkPhos  259<H>          Urinalysis Basic - ( 15 Mar 2019 06:18 )  Color: DARK YELLOW / Appearance: CLEAR / S.013 / pH: 6.5  Gluc: NEGATIVE / Ketone: NEGATIVE  / Bili: SMALL / Urobili: SMALL   Blood: NEGATIVE / Protein: 10 / Nitrite: NEGATIVE   Leuk Esterase: NEGATIVE / RBC: x / WBC x   Sq Epi: x / Non Sq Epi: x / Bacteria: x      MEDICATIONS  (STANDING):  amLODIPine   Tablet 5 milliGRAM(s) Oral daily  dextrose 5% + sodium chloride 0.9%. 1000 milliLiter(s) (75 mL/Hr) IV Continuous <Continuous>  enoxaparin Injectable 30 milliGRAM(s) SubCutaneous daily  ergocalciferol 01487 Unit(s) Oral every week  pantoprazole  Injectable 40 milliGRAM(s) IV Push daily  tamsulosin Oral Tab/Cap - Peds 0.4 milliGRAM(s) Oral at bedtime    MEDICATIONS  (PRN):        RADIOLOGY & ADDITIONAL TESTS:    3/18/19 : MR MRCP No Cont (19 @ 14:22) Gallbladder is distended with cholelithiasis.  No choledocholithiasis or biliary ductal dilatation.      3/17/19 : NM Hepatobiliary Imaging (19 @ 12:21) Abnormal hepatobiliary scan suspicious for common bile duct obstruction. Hepatocellular dysfunction. No evidence of acute cholecystitis.      3/18/19 : US Abdomen Limited (19 @ 07:48) Gallbladder is distended with sludge and stones.  Mural thickening of the wall of the common bile duct, possibly  cholangitis. No biliary ductal dilatation.      3/15/19 : CT Abdomen and Pelvis No Cont (03.15.19 @ 09:39) Distended gallbladder. Consider further evaluation with ultrasound if  acute cholecystitis is a concern.  No discrete mass identified on this limited noncontrast study.          MICROBIOLOGY DATA:    Culture - Blood (03.15.19 @ 14:03)    Culture - Blood:   NO ORGANISMS ISOLATED  NO ORGANISMS ISOLATED AT 24 HOURS    Specimen Source: BLOOD Patient is seen and examined at the bed side, is afebrile.  The MRCP of abdomen shows Gallbladder is distended with cholelithiasis.  The LFTs are trending down but not bilirubin.       REVIEW OF SYSTEMS: All other review systems are negative        ALLERGIES: No Known Allergies        Vital Signs Last 24 Hrs  T(C): 36.7 (18 Mar 2019 20:59), Max: 36.7 (18 Mar 2019 20:59)  T(F): 98.1 (18 Mar 2019 20:59), Max: 98.1 (18 Mar 2019 20:59)  HR: 70 (19 Mar 2019 14:58) (70 - 82)  BP: 104/61 (19 Mar 2019 14:58) (104/61 - 123/58)  BP(mean): --  RR: 16 (19 Mar 2019 14:58) (16 - 17)  SpO2: 100% (19 Mar 2019 14:58) (98% - 100%)        PHYSICAL EXAM:  GENERAL: Not in distress   HEENT: Sclera jaundiced  CHEST/LUNG:  Air entry bilaterally  HEART: s1 and s2 present  ABDOMEN:  Nontender and  Nondistended  : Oshea catheter in placed  EXTREMITIES: No pedal  edema  CNS: Awake and Alert        LABS:                        9.3    5.32  )-----------( 122      ( 19 Mar 2019 05:12 )             26.1                           8.2    5.19  )-----------( 117      ( 18 Mar 2019 06:35 )             23.0            03-19    144  |  113<H>  |  18  ----------------------------<  100<H>  3.8   |  19<L>  |  1.64<H>    Ca    8.9      19 Mar 2019 05:12    TPro  5.9<L>  /  Alb  2.4<L>  /  TBili  19.2<H>  /  DBili  x   /  AST  415<H>  /  ALT  253<H>  /  AlkPhos  260<H>  --18    142  |  111<H>  |  16  ----------------------------<  171<H>  3.9   |  20<L>  |  1.57<H>    Ca    8.8      18 Mar 2019 06:35    TPro  5.7<L>  /  Alb  2.2<L>  /  TBili  18.4<H>  /  DBili  x   /  AST  462<H>  /  ALT  264<H>  /  AlkPhos  259<H>          Urinalysis Basic - ( 15 Mar 2019 06:18 )  Color: DARK YELLOW / Appearance: CLEAR / S.013 / pH: 6.5  Gluc: NEGATIVE / Ketone: NEGATIVE  / Bili: SMALL / Urobili: SMALL   Blood: NEGATIVE / Protein: 10 / Nitrite: NEGATIVE   Leuk Esterase: NEGATIVE / RBC: x / WBC x   Sq Epi: x / Non Sq Epi: x / Bacteria: x      MEDICATIONS  (STANDING):  amLODIPine   Tablet 5 milliGRAM(s) Oral daily  dextrose 5% + sodium chloride 0.9%. 1000 milliLiter(s) (75 mL/Hr) IV Continuous <Continuous>  enoxaparin Injectable 30 milliGRAM(s) SubCutaneous daily  ergocalciferol 53536 Unit(s) Oral every week  pantoprazole  Injectable 40 milliGRAM(s) IV Push daily  tamsulosin Oral Tab/Cap - Peds 0.4 milliGRAM(s) Oral at bedtime    MEDICATIONS  (PRN):        RADIOLOGY & ADDITIONAL TESTS:    3/18/19 : MR MRCP No Cont (19 @ 14:22) Gallbladder is distended with cholelithiasis.  No choledocholithiasis or biliary ductal dilatation.      3/17/19 : NM Hepatobiliary Imaging (19 @ 12:21) Abnormal hepatobiliary scan suspicious for common bile duct obstruction. Hepatocellular dysfunction. No evidence of acute cholecystitis.      3/18/19 : US Abdomen Limited (19 @ 07:48) Gallbladder is distended with sludge and stones.  Mural thickening of the wall of the common bile duct, possibly  cholangitis. No biliary ductal dilatation.      3/15/19 : CT Abdomen and Pelvis No Cont (03.15.19 @ 09:39) Distended gallbladder. Consider further evaluation with ultrasound if  acute cholecystitis is a concern.  No discrete mass identified on this limited noncontrast study.          MICROBIOLOGY DATA:    Culture - Blood (03.15.19 @ 14:03)    Culture - Blood:   NO ORGANISMS ISOLATED  NO ORGANISMS ISOLATED AT 24 HOURS    Specimen Source: BLOOD

## 2019-03-19 NOTE — PROGRESS NOTE ADULT - SUBJECTIVE AND OBJECTIVE BOX
S: no chest pain or sob             amLODIPine   Tablet 5 milliGRAM(s) Oral daily  dextrose 5% + sodium chloride 0.9%. 1000 milliLiter(s) IV Continuous <Continuous>  enoxaparin Injectable 30 milliGRAM(s) SubCutaneous daily  ergocalciferol 13099 Unit(s) Oral every week  pantoprazole  Injectable 40 milliGRAM(s) IV Push daily  tamsulosin Oral Tab/Cap - Peds 0.4 milliGRAM(s) Oral at bedtime                            9.3    5.32  )-----------( 122      ( 19 Mar 2019 05:12 )             26.1       03-19    144  |  113<H>  |  18  ----------------------------<  100<H>  3.8   |  19<L>  |  1.64<H>    Ca    8.9      19 Mar 2019 05:12    TPro  5.9<L>  /  Alb  2.4<L>  /  TBili  19.2<H>  /  DBili  x   /  AST  415<H>  /  ALT  253<H>  /  AlkPhos  260<H>  03-19            T(C): 36.7 (03-18-19 @ 20:59), Max: 36.7 (03-18-19 @ 20:59)  HR: 70 (03-19-19 @ 05:45) (70 - 85)  BP: 123/58 (03-19-19 @ 05:45) (110/58 - 123/58)  RR: 16 (03-19-19 @ 05:45) (16 - 17)  SpO2: 100% (03-19-19 @ 05:45) (96% - 100%)  Wt(kg): --    I&O's Summary    18 Mar 2019 07:01  -  19 Mar 2019 07:00  --------------------------------------------------------  IN: 0 mL / OUT: 500 mL / NET: -500 mL      Heart: normal S1, S2, RRR, no m/r/g  Lungs: cta b/l  Abd: soft nT  Ext: no edema     TELEMETRY: 	      ECG:  	NSR w/RBBB    RADIOLOGY:         CXR: pending     ASSESSMENT/PLAN: 	    89 yo M, poor historian, charts reviewed, with PMHx BPH, Cataracts, Diverticulosis, Anemia 2/2 GI Bleed with previous PRBC transfusions, who p/w 2 days generalized  weakness, urinary retention and confusion. LFTs significant for transaminitis, scleral jaundice.    -no clinical heart failure or anginal symptoms  -follow up surgery and GI  -further cardiac workup pending clinical course  -primary care per medicine    Rashmi Morrison MD

## 2019-03-19 NOTE — CHART NOTE - NSCHARTNOTEFT_GEN_A_CORE
89 yo M with PMHx BPH, Cataracts, Diverticulosis, Anemia 2/2 GI Bleed  who p/w 2 days generalized weakness, urinary retention and confusion, is now complaining of felling cold.     Patient is a 89y old  Male who presents with a chief complaint of generalized weakness, urinary retention, and confusion X 2 days (20 Mar 2019 05:16)   SOB    INTERVAL HPI/OVERNIGHT EVENTS:    MEDICATIONS  (STANDING):  amLODIPine   Tablet 5 milliGRAM(s) Oral daily  dextrose 5% + sodium chloride 0.9%. 1000 milliLiter(s) (75 mL/Hr) IV Continuous <Continuous>  ergocalciferol 26366 Unit(s) Oral every week  pantoprazole  Injectable 40 milliGRAM(s) IV Push daily  tamsulosin Oral Tab/Cap - Peds 0.4 milliGRAM(s) Oral at bedtime      MEDICATIONS  (PRN):      Allergies    No Known Allergies    Intolerances        REVIEW OF SYSTEMS:  CONSTITUTIONAL: No fever, weight loss, or fatigue    ENMT:  No difficulty hearing, tinnitus, vertigo; No sinus or throat pain  RESPIRATORY: No cough, wheezing, chills or hemoptysis; No shortness of breath  CARDIOVASCULAR: No chest pain, palpitations, dizziness, or leg swelling  GASTROINTESTINAL: No abdominal or epigastric pain. No nausea, vomiting, or hematemesis; No diarrhea or constipation.  GENITOURINARY: No dysuria, frequency, hematuria, or incontinence  NEUROLOGICAL: No headaches, memory loss, loss of strength, numbness, or tremors  SKIN: No itching, burning, rashes, or lesions   LYMPH NODES: No enlarged glands  MUSCULOSKELETAL: No joint pain or swelling; No muscle, back, or extremity pain      Vital Signs Last 24 Hrs      BP: 103/61   HR: 72   RR: 18   T 33.9 C (rectal)  SpO2: 100%     PHYSICAL EXAM:    NECK: Supple, No JVD, Normal thyroid  NERVOUS SYSTEM:  Alert & Oriented X3, Good concentration; Motor Strength 5/5 B/L upper and lower extremities; DTRs 2+ intact and symmetric  CHEST/LUNG: Clear to auscultation bilaterally; No rales, rhonchi, wheezing, or rubs  HEART: Regular rate and rhythm; No murmurs, rubs, or gallops  ABDOMEN: Soft, Nontender, Nondistended; Bowel sounds present  EXTREMITIES:  2+ Peripheral Pulses, No clubbing, cyanosis, or edema  LYMPH: No lymphadenopathy noted  SKIN: No rashes or lesions    LABS:                          9.3    5.32  )-----------( 122      ( 19 Mar 2019 05:12 )             26.1         144  |  113<H>  |  18  ----------------------------<  100<H>  3.8   |  19<L>  |  1.64<H>    Ca    8.9      19 Mar 2019 05:12    TPro  5.9<L>  /  Alb  2.4<L>  /  TBili  19.2<H>  /  DBili  x   /  AST  415<H>  /  ALT  253<H>  /  AlkPhos  260<H>  03-19      PT/INR - ( 19 Mar 2019 05:12 )   PT: 23.2 SEC;   INR: 1.99       LIVER FUNCTIONS - ( 19 Mar 2019 05:12 )  Alb: 2.4 g/dL / Pro: 5.9 g/dL / ALK PHOS: 260 u/L / ALT: 253 u/L / AST: 415 u/L / GGT: x                Assessment and Plan:       Assessment	  89 yo Male with periods of confusion and hypothermia.     -> Hypothermia      - VS every 4 hours      - hypothermia blanket      - neuro checks      -> Metabolic Encephalopathy:      - neuro checks       - fall precautions     -> BPH:     - flomax    -> Need for prophylactic measure:      - dvt/gi ppx    -> Severe Protein Calorie Malnutrition:      - supplement diet       Care Discussed with patient and RN Leona

## 2019-03-19 NOTE — PROGRESS NOTE ADULT - SUBJECTIVE AND OBJECTIVE BOX
PASCUAL OLSEN:[id number],   89yMale followed for:  No Known Allergies    PAST MEDICAL & SURGICAL HISTORY:  Chronic kidney disease (CKD), stage IV (severe)  Benign prostatic hypertrophy  S/P cataract surgery, left: 3 yrs ago    FAMILY HISTORY:  No pertinent family history in first degree relatives    MEDICATIONS  (STANDING):  amLODIPine   Tablet 5 milliGRAM(s) Oral daily  dextrose 5% + sodium chloride 0.9%. 1000 milliLiter(s) (75 mL/Hr) IV Continuous <Continuous>  enoxaparin Injectable 30 milliGRAM(s) SubCutaneous daily  ergocalciferol 80587 Unit(s) Oral every week  pantoprazole  Injectable 40 milliGRAM(s) IV Push daily  tamsulosin Oral Tab/Cap - Peds 0.4 milliGRAM(s) Oral at bedtime    MEDICATIONS  (PRN):      Vital Signs Last 24 Hrs  T(C): 36.7 (18 Mar 2019 20:59), Max: 36.7 (18 Mar 2019 20:59)  T(F): 98.1 (18 Mar 2019 20:59), Max: 98.1 (18 Mar 2019 20:59)  HR: 70 (19 Mar 2019 05:45) (70 - 85)  BP: 123/58 (19 Mar 2019 05:45) (110/58 - 123/58)  BP(mean): --  RR: 16 (19 Mar 2019 05:45) (16 - 17)  SpO2: 100% (19 Mar 2019 05:45) (96% - 100%)  nc/at  s1s2  cta  soft, nt, nd no guarding or rebound  no c/c/e    CBC Full  -  ( 19 Mar 2019 05:12 )  WBC Count : 5.32 K/uL  Hemoglobin : 9.3 g/dL  Hematocrit : 26.1 %  Platelet Count - Automated : 122 K/uL  Mean Cell Volume : 75.2 fL  Mean Cell Hemoglobin : 26.8 pg  Mean Cell Hemoglobin Concentration : 35.6 %  Auto Neutrophil # : x  Auto Lymphocyte # : x  Auto Monocyte # : x  Auto Eosinophil # : x  Auto Basophil # : x  Auto Neutrophil % : x  Auto Lymphocyte % : x  Auto Monocyte % : x  Auto Eosinophil % : x  Auto Basophil % : x    03-19    144  |  113<H>  |  18  ----------------------------<  100<H>  3.8   |  19<L>  |  1.64<H>    Ca    8.9      19 Mar 2019 05:12    TPro  5.9<L>  /  Alb  2.4<L>  /  TBili  19.2<H>  /  DBili  x   /  AST  415<H>  /  ALT  253<H>  /  AlkPhos  260<H>  03-19    PT/INR - ( 19 Mar 2019 05:12 )   PT: 23.2 SEC;   INR: 1.99

## 2019-03-19 NOTE — CHART NOTE - NSCHARTNOTEFT_GEN_A_CORE
sx consulted Dr. Meneses awaiting recs   hold off on liver bx as per medical attending until sx evaluates pt

## 2019-03-20 LAB
ALBUMIN SERPL ELPH-MCNC: 2.3 G/DL — LOW (ref 3.3–5)
ALP SERPL-CCNC: 232 U/L — HIGH (ref 40–120)
ALT FLD-CCNC: 238 U/L — HIGH (ref 4–41)
ANION GAP SERPL CALC-SCNC: 10 MMO/L — SIGNIFICANT CHANGE UP (ref 7–14)
ANION GAP SERPL CALC-SCNC: 11 MMO/L — SIGNIFICANT CHANGE UP (ref 7–14)
APPEARANCE UR: SIGNIFICANT CHANGE UP
APTT BLD: 65.8 SEC — HIGH (ref 27.5–36.3)
AST SERPL-CCNC: 397 U/L — HIGH (ref 4–40)
BACTERIA # UR AUTO: HIGH
BACTERIA BLD CULT: SIGNIFICANT CHANGE UP
BILIRUB SERPL-MCNC: 18.4 MG/DL — HIGH (ref 0.2–1.2)
BILIRUB UR-MCNC: SIGNIFICANT CHANGE UP
BLOOD UR QL VISUAL: SIGNIFICANT CHANGE UP
BUN SERPL-MCNC: 19 MG/DL — SIGNIFICANT CHANGE UP (ref 7–23)
BUN SERPL-MCNC: 20 MG/DL — SIGNIFICANT CHANGE UP (ref 7–23)
CALCIUM SERPL-MCNC: 8.8 MG/DL — SIGNIFICANT CHANGE UP (ref 8.4–10.5)
CALCIUM SERPL-MCNC: 8.9 MG/DL — SIGNIFICANT CHANGE UP (ref 8.4–10.5)
CHLORIDE SERPL-SCNC: 114 MMOL/L — HIGH (ref 98–107)
CHLORIDE SERPL-SCNC: 116 MMOL/L — HIGH (ref 98–107)
CO2 SERPL-SCNC: 18 MMOL/L — LOW (ref 22–31)
CO2 SERPL-SCNC: 19 MMOL/L — LOW (ref 22–31)
COARSE GRAN CASTS #/AREA URNS AUTO: SIGNIFICANT CHANGE UP (ref 0–?)
COLOR SPEC: SIGNIFICANT CHANGE UP
CREAT SERPL-MCNC: 1.88 MG/DL — HIGH (ref 0.5–1.3)
CREAT SERPL-MCNC: 1.97 MG/DL — HIGH (ref 0.5–1.3)
EPI CELLS # UR: SIGNIFICANT CHANGE UP
GLUCOSE SERPL-MCNC: 100 MG/DL — HIGH (ref 70–99)
GLUCOSE SERPL-MCNC: 84 MG/DL — SIGNIFICANT CHANGE UP (ref 70–99)
GLUCOSE UR-MCNC: 100 — SIGNIFICANT CHANGE UP
HCT VFR BLD CALC: 23 % — LOW (ref 39–50)
HGB BLD-MCNC: 8.2 G/DL — LOW (ref 13–17)
INR BLD: 2 — HIGH (ref 0.88–1.17)
KETONES UR-MCNC: NEGATIVE — SIGNIFICANT CHANGE UP
LEUKOCYTE ESTERASE UR-ACNC: HIGH
MAGNESIUM SERPL-MCNC: 1.6 MG/DL — SIGNIFICANT CHANGE UP (ref 1.6–2.6)
MCHC RBC-ENTMCNC: 26.9 PG — LOW (ref 27–34)
MCHC RBC-ENTMCNC: 35.7 % — SIGNIFICANT CHANGE UP (ref 32–36)
MCV RBC AUTO: 75.4 FL — LOW (ref 80–100)
MRSA SPEC QL CULT: SIGNIFICANT CHANGE UP
NITRITE UR-MCNC: NEGATIVE — SIGNIFICANT CHANGE UP
NRBC # FLD: 0.02 K/UL — LOW (ref 25–125)
PH UR: 6 — SIGNIFICANT CHANGE UP (ref 5–8)
PHOSPHATE SERPL-MCNC: 2.1 MG/DL — LOW (ref 2.5–4.5)
PLATELET # BLD AUTO: 120 K/UL — LOW (ref 150–400)
PMV BLD: SIGNIFICANT CHANGE UP FL (ref 7–13)
POTASSIUM SERPL-MCNC: 3.7 MMOL/L — SIGNIFICANT CHANGE UP (ref 3.5–5.3)
POTASSIUM SERPL-MCNC: SIGNIFICANT CHANGE UP MMOL/L (ref 3.5–5.3)
POTASSIUM SERPL-SCNC: 3.7 MMOL/L — SIGNIFICANT CHANGE UP (ref 3.5–5.3)
POTASSIUM SERPL-SCNC: SIGNIFICANT CHANGE UP MMOL/L (ref 3.5–5.3)
PROT SERPL-MCNC: 5.8 G/DL — LOW (ref 6–8.3)
PROT UR-MCNC: 100 — HIGH
PROTHROM AB SERPL-ACNC: 22.7 SEC — HIGH (ref 9.8–13.1)
RBC # BLD: 3.05 M/UL — LOW (ref 4.2–5.8)
RBC # FLD: 28 % — HIGH (ref 10.3–14.5)
RBC CASTS # UR COMP ASSIST: SIGNIFICANT CHANGE UP (ref 0–?)
SODIUM SERPL-SCNC: 143 MMOL/L — SIGNIFICANT CHANGE UP (ref 135–145)
SODIUM SERPL-SCNC: 145 MMOL/L — SIGNIFICANT CHANGE UP (ref 135–145)
SP GR SPEC: 1.02 — SIGNIFICANT CHANGE UP (ref 1–1.04)
UROBILINOGEN FLD QL: 4 — SIGNIFICANT CHANGE UP
WBC # BLD: 5.72 K/UL — SIGNIFICANT CHANGE UP (ref 3.8–10.5)
WBC # FLD AUTO: 5.72 K/UL — SIGNIFICANT CHANGE UP (ref 3.8–10.5)
WBC CASTS UR QL COMP ASSIST: SIGNIFICANT CHANGE UP (ref 0–?)
WBC UR QL: >50 — HIGH (ref 0–?)

## 2019-03-20 PROCEDURE — 71045 X-RAY EXAM CHEST 1 VIEW: CPT | Mod: 26

## 2019-03-20 RX ORDER — CEFEPIME 1 G/1
1000 INJECTION, POWDER, FOR SOLUTION INTRAMUSCULAR; INTRAVENOUS ONCE
Qty: 0 | Refills: 0 | Status: COMPLETED | OUTPATIENT
Start: 2019-03-20 | End: 2019-03-20

## 2019-03-20 RX ORDER — PIPERACILLIN AND TAZOBACTAM 4; .5 G/20ML; G/20ML
3.38 INJECTION, POWDER, LYOPHILIZED, FOR SOLUTION INTRAVENOUS ONCE
Qty: 0 | Refills: 0 | Status: DISCONTINUED | OUTPATIENT
Start: 2019-03-20 | End: 2019-03-20

## 2019-03-20 RX ORDER — SODIUM CHLORIDE 9 MG/ML
250 INJECTION INTRAMUSCULAR; INTRAVENOUS; SUBCUTANEOUS ONCE
Qty: 0 | Refills: 0 | Status: COMPLETED | OUTPATIENT
Start: 2019-03-20 | End: 2019-03-20

## 2019-03-20 RX ORDER — CEFEPIME 1 G/1
INJECTION, POWDER, FOR SOLUTION INTRAMUSCULAR; INTRAVENOUS
Qty: 0 | Refills: 0 | Status: DISCONTINUED | OUTPATIENT
Start: 2019-03-20 | End: 2019-03-21

## 2019-03-20 RX ORDER — PHYTONADIONE (VIT K1) 5 MG
10 TABLET ORAL ONCE
Qty: 0 | Refills: 0 | Status: COMPLETED | OUTPATIENT
Start: 2019-03-20 | End: 2019-03-20

## 2019-03-20 RX ORDER — CEFEPIME 1 G/1
1000 INJECTION, POWDER, FOR SOLUTION INTRAMUSCULAR; INTRAVENOUS EVERY 24 HOURS
Qty: 0 | Refills: 0 | Status: DISCONTINUED | OUTPATIENT
Start: 2019-03-21 | End: 2019-03-21

## 2019-03-20 RX ORDER — SODIUM CHLORIDE 9 MG/ML
1000 INJECTION, SOLUTION INTRAVENOUS
Qty: 0 | Refills: 0 | Status: DISCONTINUED | OUTPATIENT
Start: 2019-03-20 | End: 2019-03-21

## 2019-03-20 RX ORDER — VANCOMYCIN HCL 1 G
750 VIAL (EA) INTRAVENOUS ONCE
Qty: 0 | Refills: 0 | Status: COMPLETED | OUTPATIENT
Start: 2019-03-20 | End: 2019-03-20

## 2019-03-20 RX ADMIN — SODIUM CHLORIDE 75 MILLILITER(S): 9 INJECTION, SOLUTION INTRAVENOUS at 11:00

## 2019-03-20 RX ADMIN — AMLODIPINE BESYLATE 5 MILLIGRAM(S): 2.5 TABLET ORAL at 12:04

## 2019-03-20 RX ADMIN — Medication 250 MILLIGRAM(S): at 16:40

## 2019-03-20 RX ADMIN — TAMSULOSIN HYDROCHLORIDE 0.4 MILLIGRAM(S): 0.4 CAPSULE ORAL at 22:24

## 2019-03-20 RX ADMIN — PANTOPRAZOLE SODIUM 40 MILLIGRAM(S): 20 TABLET, DELAYED RELEASE ORAL at 12:04

## 2019-03-20 RX ADMIN — CEFEPIME 100 MILLIGRAM(S): 1 INJECTION, POWDER, FOR SOLUTION INTRAMUSCULAR; INTRAVENOUS at 15:07

## 2019-03-20 RX ADMIN — SODIUM CHLORIDE 250 MILLILITER(S): 9 INJECTION INTRAMUSCULAR; INTRAVENOUS; SUBCUTANEOUS at 22:24

## 2019-03-20 RX ADMIN — Medication 102 MILLIGRAM(S): at 12:03

## 2019-03-20 NOTE — PROGRESS NOTE ADULT - SUBJECTIVE AND OBJECTIVE BOX
[facility]        - Patient seen and examined.  - Today, patient is without complaints.  "i feel very slow "       *****MEDICATIONS: Current medication reviewed and documented.    MEDICATIONS  (STANDING):  cefepime   IVPB      dextrose 5% + sodium chloride 0.9%. 1000 milliLiter(s) (75 mL/Hr) IV Continuous <Continuous>  dextrose 5% + sodium chloride 0.9%. 1000 milliLiter(s) (75 mL/Hr) IV Continuous <Continuous>  ergocalciferol 02549 Unit(s) Oral every week  pantoprazole  Injectable 40 milliGRAM(s) IV Push daily  tamsulosin Oral Tab/Cap - Peds 0.4 milliGRAM(s) Oral at bedtime    MEDICATIONS  (PRN):          ***** VITAL SIGNS:  T(F): 97.9 (19 @ 16:30), Max: 98.2 (19 @ 13:57)  HR: 80 (19 @ 21:02) (72 - 81)  BP: 137/70 (19 @ 21:02) (94/60 - 137/70)  RR: 18 (19 @ 21:02) (18 - 18)  SpO2: 100% (19 @ 21:02) (99% - 100%)  Wt(kg): --  ,   I&O's Summary    19 Mar 2019 07:  -  20 Mar 2019 07:00  --------------------------------------------------------  IN: 900 mL / OUT: 600 mL / NET: 300 mL    20 Mar 2019 07:  -  20 Mar 2019 21:04  --------------------------------------------------------  IN: 0 mL / OUT: 200 mL / NET: -200 mL             *****PHYSICAL EXAM:   alert oriented x 3 attention comprehension are fair.  Able to name, repeat.   EOmi fundi not visualized   no nystagmus VFF to confrontation  Tongue is midline  Palate elevates symmetrically   Moving all 4 ext spontaneously no drift appreciated    Gait not assessed.            *****LAB AND IMAGIN.2    5.72  )-----------( 120      ( 20 Mar 2019 05:45 )             23.0               03-20    145  |  116<H>  |  20  ----------------------------<  100<H>  3.7   |  19<L>  |  1.97<H>    Ca    8.9      20 Mar 2019 10:40  Phos  2.1     03-20  Mg     1.6     03-20    TPro  5.8<L>  /  Alb  2.3<L>  /  TBili  18.4<H>  /  DBili  x   /  AST  397<H>  /  ALT  238<H>  /  AlkPhos  232<H>  03-20    PT/INR - ( 20 Mar 2019 05:45 )   PT: 22.7 SEC;   INR: 2.00          PTT - ( 20 Mar 2019 05:45 )  PTT:65.8 SEC                   Urinalysis Basic - ( 20 Mar 2019 13:25 )    Color: ELSA / Appearance: TURBID / S.020 / pH: 6.0  Gluc: 100 / Ketone: NEGATIVE  / Bili: LARGE / Urobili: 4.0   Blood: MODERATE / Protein: 100 / Nitrite: NEGATIVE   Leuk Esterase: LARGE / RBC: 3-5 / WBC >50   Sq Epi: x / Non Sq Epi: OCC / Bacteria: MANY      [All pertinent recent Imaging/Reports reviewed]           *****A S S E S S M E N T   A N D   P L A N :        89 yo M, poor historian, charts reviewed, with PMHx BPH, Cataracts, Diverticulosis, Anemia 2/2 GI Bleed with previous PRBC transfusions, who p/w 2 days genearlized weakness, urinary retention and confusion.  Upon arrival to the ED, pt with scleral icterus and labs significant for transaminitis and TBili > 20    called to evaluate pt for ams         Problem/Recommendations 1:  Multifactorial toxic metabolic encephalopathy  worsening   worsening renal function, dark urine likely atn/prerenal   hypothermia suspicious for infectious process. id input appreciated.    correct metabolic dyscrasias  will continue to monitor         Problem/Recommendations 2:  Hyperbilirubinemia  suspicious for cholestatic pattern   gi f/u   pt going for ercp today.     Thank you for allowing me to participate in the care of this patient. Please do not hesitate to call me if you have any  questions.        ________________  Ava Schreiber MD  [facility]  [phone]      30 minutes spent on total encounter; more than 50 % of the visit was  spent counseling about plan of care, compliance to diet/exercise and medication regimen and or  coordinating care by the attending physician.      It is advised that s stroke patients follow up with DULCE MARIA Rausch @ [phone] in 1- 2 weeks.   Others please follow up with Dr. Michael Nissenbaum [phone]

## 2019-03-20 NOTE — CHART NOTE - NSCHARTNOTEFT_GEN_A_CORE
Pt hypothermic 93F overnight, improved to 98F with hypothermic blanket- provider discussed case w/ ID attg and Dr. Branham, cont. to hold off on abx for now, follow up repeat cx. Will continue to monitor closely Pt hypothermic 93F overnight, improved to 98F with hypothermic blanket- provider discussed case w/ ID attg and Dr. Branham, cont. to hold off on abx for now, follow up repeat cx. Will continue to monitor closely    Addendum 1:46pm: Patient rectal T 97F on hypothermia blanket, UA sent, repeat blood culture pending, CXR showing right lower lobe opacity- possib pneumonia. Discussed with ID attending again- will initiate IV vanco/cefepime. Also discussed case with Dr. Parrish- in agreement with antibiotics. Will continue to monitor closely.

## 2019-03-20 NOTE — PROGRESS NOTE ADULT - ASSESSMENT
89 yo M with PMHx CKD, BPH, Diverticulosis, Anemia 2/2 GI Bleed with previous PRBC transfusions, who p/w 2 days genearlized weakness, urinary retention and confusion, found to have elevated LFTs. Renal following for PHU, CKD management.    PHU on CKD 3 b/l Cr 1.4-1.6 10/2018  PHU most likely 2/2 pre renal. clincially appears dry. C3 77- low, C4 wnl -noted- likely 2/2 dec synthesis 2/2 liver dz   urinary retention on CT w/o hydronephrosis. no e/o acute GN  electrolytes acceptable. Cr improved, trended up now 1.8>1.97- likely pre renal vs ATN. u/o 600ml noted past 24 hrs  Transaminitis and Bilirubinemia, -acute cholestatic hepatitis of unclear etiology   Hepatic encephalopathy- sr NH4 level 91 on admission.  HTN, controlled  BPH: c/w flomax    labs, chart reviewed  keep flores for now  c/w D5NS @75ml/hr while NPO  w/u per GI/medicine. possiblr liver Bx by IR  f/u GI and hem  hold Norvasc 5mg daily as BP borderline  avoid ACEi/ARB/NSAIDs/nephrotoxics  dose all meds for eGFR <15ml/min  montior BMP, LFTs daily  monitor U/O  will f/u

## 2019-03-20 NOTE — PROVIDER CONTACT NOTE (OTHER) - ASSESSMENT
Patient Axox4, no s/s of chills noted, skin luke-warm to touch, patient states feels cold, given extra blankets and warm packs, other VS stable as per flowsheet, no acute distress noted at this time

## 2019-03-20 NOTE — PROGRESS NOTE ADULT - ASSESSMENT
cholestatic hepatitis.  likely secondary to drug but patient does not report drug.  recommend liver biopsy to determine etiology.  ? inr from cholestasis and vit k malabsorbion.  I do not think this is from a liver production issue of factors (I do not htink that he has fulminant hepatic failure)  He is not a transplant candidate based on age.  recommend repeat vitamin k and ffp if no reponse.

## 2019-03-20 NOTE — PROGRESS NOTE ADULT - SUBJECTIVE AND OBJECTIVE BOX
Patient is seen and examined at the bed side, currently is normothermic.  The CXR shows Right      REVIEW OF SYSTEMS: All other review systems are negative        ALLERGIES: No Known Allergies        Vital Signs Last 24 Hrs  T(C): 36.6 (20 Mar 2019 16:30), Max: 36.8 (20 Mar 2019 13:57)  T(F): 97.9 (20 Mar 2019 16:30), Max: 98.2 (20 Mar 2019 13:57)  HR: 79 (20 Mar 2019 14:46) (72 - 82)  BP: 122/67 (20 Mar 2019 14:46) (94/60 - 122/67)  BP(mean): --  RR: 18 (20 Mar 2019 14:46) (17 - 18)  SpO2: 100% (20 Mar 2019 14:46) (98% - 100%)      PHYSICAL EXAM:  GENERAL: Not in distress   HEENT: Sclera jaundiced  CHEST/LUNG:  Air entry bilaterally  HEART: s1 and s2 present  ABDOMEN:  Nontender and  Nondistended  : Oshea catheter in placed  EXTREMITIES: No pedal  edema  CNS: Awake and Alert        LABS:                        8.2    5.72  )-----------( 120      ( 20 Mar 2019 05:45 )             23.0                           9.3    5.32  )-----------( 122      ( 19 Mar 2019 05:12 )             26.1            -20    145  |  116<H>  |  20  ----------------------------<  100<H>  3.7   |  19<L>  |  1.97<H>    Ca    8.9      20 Mar 2019 10:40  Phos  2.1       Mg     1.6         TPro  5.8<L>  /  Alb  2.3<L>  /  TBili  18.4<H>  /  DBili  x   /  AST  397<H>  /  ALT  238<H>  /  AlkPhos  232<H>      144  |  113<H>  |  18  ----------------------------<  100<H>  3.8   |  19<L>  |  1.64<H>    Ca    8.9      19 Mar 2019 05:12    TPro  5.9<L>  /  Alb  2.4<L>  /  TBili  19.2<H>  /  DBili  x   /  AST  415<H>  /  ALT  253<H>  /  AlkPhos  260<H>            Urinalysis Basic - ( 15 Mar 2019 06:18 )  Color: DARK YELLOW / Appearance: CLEAR / S.013 / pH: 6.5  Gluc: NEGATIVE / Ketone: NEGATIVE  / Bili: SMALL / Urobili: SMALL   Blood: NEGATIVE / Protein: 10 / Nitrite: NEGATIVE   Leuk Esterase: NEGATIVE / RBC: x / WBC x   Sq Epi: x / Non Sq Epi: x / Bacteria: x      MEDICATIONS  (STANDING):  cefepime   IVPB      dextrose 5% + sodium chloride 0.9%. 1000 milliLiter(s) (75 mL/Hr) IV Continuous <Continuous>  dextrose 5% + sodium chloride 0.9%. 1000 milliLiter(s) (75 mL/Hr) IV Continuous <Continuous>  ergocalciferol 01015 Unit(s) Oral every week  pantoprazole  Injectable 40 milliGRAM(s) IV Push daily  tamsulosin Oral Tab/Cap - Peds 0.4 milliGRAM(s) Oral at bedtime    MEDICATIONS  (PRN):        RADIOLOGY & ADDITIONAL TESTS:    3/18/19 : MR MRCP No Cont (19 @ 14:22) Gallbladder is distended with cholelithiasis.  No choledocholithiasis or biliary ductal dilatation.      3/17/19 : NM Hepatobiliary Imaging (19 @ 12:21) Abnormal hepatobiliary scan suspicious for common bile duct obstruction. Hepatocellular dysfunction. No evidence of acute cholecystitis.      3/18/19 : US Abdomen Limited (19 @ 07:48) Gallbladder is distended with sludge and stones.  Mural thickening of the wall of the common bile duct, possibly  cholangitis. No biliary ductal dilatation.      3/15/19 : CT Abdomen and Pelvis No Cont (03.15.19 @ 09:39) Distended gallbladder. Consider further evaluation with ultrasound if  acute cholecystitis is a concern.  No discrete mass identified on this limited noncontrast study.          MICROBIOLOGY DATA:    Culture - Blood (03.15.19 @ 14:03)    Culture - Blood:   NO ORGANISMS ISOLATED  NO ORGANISMS ISOLATED AT 24 HOURS    Specimen Source: BLOOD Patient is seen and examined at the bed side, become hypothermic  required Jaiden Hugger and currently is normothermic.  The CXR shows Right Lower lung patchy opacity.         REVIEW OF SYSTEMS: All other review systems are negative        ALLERGIES: No Known Allergies        Vital Signs Last 24 Hrs  T(C): 36.6 (20 Mar 2019 16:30), Max: 36.8 (20 Mar 2019 13:57)  T(F): 97.9 (20 Mar 2019 16:30), Max: 98.2 (20 Mar 2019 13:57)  HR: 79 (20 Mar 2019 14:46) (72 - 82)  BP: 122/67 (20 Mar 2019 14:46) (94/60 - 122/67)  BP(mean): --  RR: 18 (20 Mar 2019 14:46) (17 - 18)  SpO2: 100% (20 Mar 2019 14:46) (98% - 100%)        PHYSICAL EXAM:  GENERAL: Not in distress   HEENT: Sclera jaundiced  CHEST/LUNG:  Air entry bilaterally  HEART: s1 and s2 present  ABDOMEN:  Nontender and  Nondistended  : Oshea catheter in placed  EXTREMITIES: No pedal  edema  CNS: Awake and Alert        LABS:                        8.2    5.72  )-----------( 120      ( 20 Mar 2019 05:45 )             23.0                           9.3    5.32  )-----------( 122      ( 19 Mar 2019 05:12 )             26.1            03-20    145  |  116<H>  |  20  ----------------------------<  100<H>  3.7   |  19<L>  |  1.97<H>    Ca    8.9      20 Mar 2019 10:40  Phos  2.1       Mg     1.6         TPro  5.8<L>  /  Alb  2.3<L>  /  TBili  18.4<H>  /  DBili  x   /  AST  397<H>  /  ALT  238<H>  /  AlkPhos  232<H>      144  |  113<H>  |  18  ----------------------------<  100<H>  3.8   |  19<L>  |  1.64<H>    Ca    8.9      19 Mar 2019 05:12    TPro  5.9<L>  /  Alb  2.4<L>  /  TBili  19.2<H>  /  DBili  x   /  AST  415<H>  /  ALT  253<H>  /  AlkPhos  260<H>            Urinalysis Basic - ( 15 Mar 2019 06:18 )  Color: DARK YELLOW / Appearance: CLEAR / S.013 / pH: 6.5  Gluc: NEGATIVE / Ketone: NEGATIVE  / Bili: SMALL / Urobili: SMALL   Blood: NEGATIVE / Protein: 10 / Nitrite: NEGATIVE   Leuk Esterase: NEGATIVE / RBC: x / WBC x   Sq Epi: x / Non Sq Epi: x / Bacteria: x      MEDICATIONS  (STANDING):  cefepime   IVPB      dextrose 5% + sodium chloride 0.9%. 1000 milliLiter(s) (75 mL/Hr) IV Continuous <Continuous>  dextrose 5% + sodium chloride 0.9%. 1000 milliLiter(s) (75 mL/Hr) IV Continuous <Continuous>  ergocalciferol 63798 Unit(s) Oral every week  pantoprazole  Injectable 40 milliGRAM(s) IV Push daily  tamsulosin Oral Tab/Cap - Peds 0.4 milliGRAM(s) Oral at bedtime    MEDICATIONS  (PRN):        RADIOLOGY & ADDITIONAL TESTS:    3/20/19 : Xray Chest 1 View- PORTABLE-Urgent (19 @ 09:37) Patchy opacity within the right lower lung may represent pneumonia. Subsegmental atelectasis at the left base. No pleural effusion or   pneumothorax. No pulmonary edema. The cardiac silhouette remains enlarged. Aortic aneurysm. Recommend  further evaluation with aortogram.      3/18/19 : MR MRCP No Cont (19 @ 14:22) Gallbladder is distended with cholelithiasis.  No choledocholithiasis or biliary ductal dilatation.      3/17/19 : NM Hepatobiliary Imaging (19 @ 12:21) Abnormal hepatobiliary scan suspicious for common bile duct obstruction. Hepatocellular dysfunction. No evidence of acute cholecystitis.      3/18/19 : US Abdomen Limited (19 @ 07:48) Gallbladder is distended with sludge and stones.  Mural thickening of the wall of the common bile duct, possibly  cholangitis. No biliary ductal dilatation.      3/15/19 : CT Abdomen and Pelvis No Cont (03.15.19 @ 09:39) Distended gallbladder. Consider further evaluation with ultrasound if  acute cholecystitis is a concern.  No discrete mass identified on this limited noncontrast study.        MICROBIOLOGY DATA:    Culture - Blood (03.15.19 @ 14:03)    Culture - Blood:   NO ORGANISMS ISOLATED  NO ORGANISMS ISOLATED AT 24 HOURS    Specimen Source: BLOOD

## 2019-03-20 NOTE — PROGRESS NOTE ADULT - SUBJECTIVE AND OBJECTIVE BOX
PASCUAL OLSEN:[id number],   89yMale followed for:  No Known Allergies    PAST MEDICAL & SURGICAL HISTORY:  Chronic kidney disease (CKD), stage IV (severe)  Benign prostatic hypertrophy  S/P cataract surgery, left: 3 yrs ago    FAMILY HISTORY:  No pertinent family history in first degree relatives    MEDICATIONS  (STANDING):  amLODIPine   Tablet 5 milliGRAM(s) Oral daily  dextrose 5% + sodium chloride 0.9%. 1000 milliLiter(s) (75 mL/Hr) IV Continuous <Continuous>  ergocalciferol 14006 Unit(s) Oral every week  pantoprazole  Injectable 40 milliGRAM(s) IV Push daily  tamsulosin Oral Tab/Cap - Peds 0.4 milliGRAM(s) Oral at bedtime    MEDICATIONS  (PRN):      Vital Signs Last 24 Hrs  T(C): 36.7 (20 Mar 2019 06:07), Max: 36.7 (20 Mar 2019 06:07)  T(F): 98.1 (20 Mar 2019 06:07), Max: 98.1 (20 Mar 2019 06:07)  HR: 81 (20 Mar 2019 06:07) (70 - 82)  BP: 110/67 (20 Mar 2019 08:50) (94/60 - 110/67)  BP(mean): --  RR: 18 (20 Mar 2019 06:07) (16 - 18)  SpO2: 99% (20 Mar 2019 06:07) (98% - 100%)  nc/at  s1s2  cta  soft, nt, nd no guarding or rebound  no c/c/e    CBC Full  -  ( 20 Mar 2019 05:45 )  WBC Count : 5.72 K/uL  Hemoglobin : 8.2 g/dL  Hematocrit : 23.0 %  Platelet Count - Automated : 120 K/uL  Mean Cell Volume : 75.4 fL  Mean Cell Hemoglobin : 26.9 pg  Mean Cell Hemoglobin Concentration : 35.7 %  Auto Neutrophil # : x  Auto Lymphocyte # : x  Auto Monocyte # : x  Auto Eosinophil # : x  Auto Basophil # : x  Auto Neutrophil % : x  Auto Lymphocyte % : x  Auto Monocyte % : x  Auto Eosinophil % : x  Auto Basophil % : x    03-20    143  |  114<H>  |  19  ----------------------------<  84  Test not performed SPECIMEN SEVERELY HEMOLYZED   |  18<L>  |  1.88<H>    Ca    8.8      20 Mar 2019 05:45    TPro  5.8<L>  /  Alb  2.3<L>  /  TBili  18.4<H>  /  DBili  x   /  AST  397<H>  /  ALT  238<H>  /  AlkPhos  232<H>  03-20    PT/INR - ( 20 Mar 2019 05:45 )   PT: 22.7 SEC;   INR: 2.00          PTT - ( 20 Mar 2019 05:45 )  PTT:65.8 SEC

## 2019-03-20 NOTE — PROGRESS NOTE ADULT - ASSESSMENT
A 89 yo Male with BPH, Cataracts, Diverticulosis, Anemia 2/2 GI Bleed with previous PRBC transfusions, who presents to the ER for evaluation of  generalized  weakness, urinary retention and confusion. On admission, he found to have elevated bilirubin, LFTS, transaminitis, scleral jaundice. and urinary retention. Oshea catheter has placed with negative Urine analysis. The CT abdomen  and pelvis shows Distended gallbladder. Consider further evaluation with ultrasound if  acute cholecystitis is a concern. No discrete mass identified on this limited noncontrast study. The ID consult requested to assist with evaluation of Biliary sepsis.    # Encephalopathy  # Urinary retention  # R/O biliary sepsis/ Cholangitis - Most likely acute cholestatic hepatitis as per GI  # Distended GB with cholelithiasis on MRCP 3/18/19  # Hypothermia- Pneumonia on CXR    would recommend:    1. Follow up cultures   2. start on cefepime and Vancomycin until work up is done  3. Trend LFTs and bilirubins  4.Monitor Temp. and c/w supportive care    d/w patient and Covering PA    will follow the patient with you A 89 yo Male with BPH, Cataracts, Diverticulosis, Anemia 2/2 GI Bleed with previous PRBC transfusions, who presents to the ER for evaluation of  generalized  weakness, urinary retention and confusion. On admission, he found to have elevated bilirubin, LFTS, transaminitis, scleral jaundice. and urinary retention. Oshea catheter has placed with negative Urine analysis. The CT abdomen  and pelvis shows Distended gallbladder. Consider further evaluation with ultrasound if  acute cholecystitis is a concern. No discrete mass identified on this limited noncontrast study. The ID consult requested to assist with evaluation of Biliary sepsis.    # Encephalopathy  # Urinary retention  # R/O biliary sepsis/ Cholangitis - Most likely acute cholestatic hepatitis as per GI  # Distended GB with cholelithiasis on MRCP 3/18/19  # Hypothermia- Pneumonia on CXR    would recommend:    1. Follow up cultures   2. start on cefepime and Vancomycin until work up is done  3. Obtain Nasal swab for MRSA PCR  4.Monitor Temp. and c/w supportive care  5. Supplemental oxygenation and  bronchodilator as needed    d/w patient and Covering PA    will follow the patient with you

## 2019-03-20 NOTE — PROVIDER CONTACT NOTE (OTHER) - ASSESSMENT
patient asleep between care, states extra blankets and warm packs helped, no s/s of acute distress noted at this time, hypothermia blanket in place, will continue to monitor

## 2019-03-20 NOTE — PROGRESS NOTE ADULT - SUBJECTIVE AND OBJECTIVE BOX
Patient seen and examined at bedside  hypothermic overnight  Case discussed with medical team    HPI:  89 yo M, poor historian, charts reviewed, with PMHx BPH, Cataracts, Diverticulosis, Anemia 2/2 GI Bleed with previous PRBC transfusions, who p/w 2 days genearlized weakness, urinary retention and confusion.  Upon arrival to the ED, pt with scleral icterus and labs significant for transaminitis and TBili > 20 (15 Mar 2019 06:59)      PAST MEDICAL & SURGICAL HISTORY:  Chronic kidney disease (CKD), stage IV (severe)  Benign prostatic hypertrophy  S/P cataract surgery, left: 3 yrs ago      No Known Allergies       MEDICATIONS  (STANDING):  amLODIPine   Tablet 5 milliGRAM(s) Oral daily  dextrose 5% + sodium chloride 0.9%. 1000 milliLiter(s) (75 mL/Hr) IV Continuous <Continuous>  ergocalciferol 85314 Unit(s) Oral every week  pantoprazole  Injectable 40 milliGRAM(s) IV Push daily  tamsulosin Oral Tab/Cap - Peds 0.4 milliGRAM(s) Oral at bedtime    MEDICATIONS  (PRN):      REVIEW OF SYSTEMS:  CONSTITUTIONAL: (+) malaise. gen weakness.   EYES: No acute change in vision   ENT:  No tinnitus  NECK: No stiffness  RESPIRATORY: No hemoptysis  CARDIOVASCULAR: No chest pain, palpitations, syncope  GASTROINTESTINAL: abd discomfort. No hematemesis, diarrhea, melena, or hematochezia.  GENITOURINARY: No hematuria  NEUROLOGICAL: No headaches  LYMPH Nodes: No enlarged glands  ENDOCRINE: No heat or cold intolerance	    T(C): 36.7 (03-20-19 @ 06:07), Max: 36.7 (03-20-19 @ 06:07)  HR: 81 (03-20-19 @ 06:07) (70 - 82)  BP: 94/60 (03-20-19 @ 06:07) (94/60 - 104/61)  RR: 18 (03-20-19 @ 06:07) (16 - 18)  SpO2: 99% (03-20-19 @ 06:07) (98% - 100%)    PHYSICAL EXAMINATION:   Constitutional: ill appearing  HEENT: bitemp wasting, scleral icterus  Neck:  Supple  Respiratory:  Adequate airflow b/l. Not using accessory muscles of respiration.  Cardiovascular:  S1 & S2 intact, no R/G, 2+ radial pulses b/l  Gastrointestinal: Soft, mild stable tenderness to palpation. ND, normoactive b.s., no organomegaly/RT/rigidity  Extremities: poor skin turgor and elasticity. warm.   Neurological:  aao x 2, stable intermittent confusion.  No acute focal motor deficits. Crude sensation intact.     Labs and imaging reviewed    LABS:                        9.3    5.32  )-----------( 122      ( 19 Mar 2019 05:12 )             26.1     03-20    143  |  114<H>  |  19  ----------------------------<  84  Test not performed SPECIMEN SEVERELY HEMOLYZED   |  18<L>  |  1.88<H>    Ca    8.8      20 Mar 2019 05:45    TPro  5.8<L>  /  Alb  2.3<L>  /  TBili  18.4<H>  /  DBili  x   /  AST  397<H>  /  ALT  238<H>  /  AlkPhos  232<H>  03-20        PT/INR - ( 20 Mar 2019 05:45 )   PT: 22.7 SEC;   INR: 2.00          PTT - ( 20 Mar 2019 05:45 )  PTT:65.8 SEC    CAPILLARY BLOOD GLUCOSE            LIVER FUNCTIONS - ( 20 Mar 2019 05:45 )  Alb: 2.3 g/dL / Pro: 5.8 g/dL / ALK PHOS: 232 u/L / ALT: 238 u/L / AST: 397 u/L / GGT: x               RADIOLOGY & ADDITIONAL STUDIES:

## 2019-03-20 NOTE — PROGRESS NOTE ADULT - SUBJECTIVE AND OBJECTIVE BOX
[facility] [place] [facility] - Crystal / Corrie S /Kylah/ LARON Villegas/ LARON Beard/ Shiva Mullen / MAGUI Njeru  ---------------------------------------------------------------------------------------------------------------    Patient seen and examined bedside    Subjective and Objective: No overnight events, sob resolved. No complaints today. feeling better    Allergies: No Known Allergies      Hospital Medications:   MEDICATIONS  (STANDING):  amLODIPine   Tablet 5 milliGRAM(s) Oral daily  dextrose 5% + sodium chloride 0.9%. 1000 milliLiter(s) (75 mL/Hr) IV Continuous <Continuous>  dextrose 5% + sodium chloride 0.9%. 1000 milliLiter(s) (75 mL/Hr) IV Continuous <Continuous>  ergocalciferol 99283 Unit(s) Oral every week  pantoprazole  Injectable 40 milliGRAM(s) IV Push daily  piperacillin/tazobactam IVPB. 3.375 Gram(s) IV Intermittent once  tamsulosin Oral Tab/Cap - Peds 0.4 milliGRAM(s) Oral at bedtime      REVIEW OF SYSTEMS:  CONSTITUTIONAL: No weakness, fevers or chills  EYES/ENT: No visual changes;  No vertigo or throat pain   NECK: No pain or stiffness  RESPIRATORY: No cough, wheezing, hemoptysis; No shortness of breath  CARDIOVASCULAR: No chest pain or palpitations.  GASTROINTESTINAL: No abdominal or epigastric pain. No nausea, vomiting, or hematemesis; No diarrhea or constipation. No melena or hematochezia.  GENITOURINARY: No dysuria, frequency, foamy urine, urinary urgency, incontinence or hematuria  NEUROLOGICAL: No numbness or weakness  SKIN: No itching, burning, rashes, or lesions   VASCULAR: No bilateral lower extremity edema.   All other review of systems is negative unless indicated above.    VITALS:  T(F): 97.2 (19 @ 10:15), Max: 98.1 (19 @ 06:07)  HR: 81 (19 @ 06:07)  BP: 110/67 (19 @ 08:50)  RR: 18 (19 @ 06:07)  SpO2: 99% (19 @ 06:07)  Wt(kg): --     @ 07:01  -   @ 07:00  --------------------------------------------------------  IN: 900 mL / OUT: 600 mL / NET: 300 mL      PHYSICAL EXAM:  Constitutional: NAD  HEENT: anicteric sclera, oropharynx clear  Neck: No JVD  Respiratory: CTAB, no wheezes, rales or rhonchi  Cardiovascular: S1, S2, RRR  Gastrointestinal: BS+, soft, NT/ND  Extremities: No cyanosis or clubbing. No peripheral edema  Neurological: A/O x 3, no focal deficits  Psychiatric: Normal mood, normal affect  : No CVA tenderness. +flores.   Skin: No rashes      LABS:      145  |  116<H>  |  20  ----------------------------<  100<H>  3.7   |  19<L>  |  1.97<H>    Ca    8.9      20 Mar 2019 10:40  Phos  2.1       Mg     1.6         TPro  5.8<L>  /  Alb  2.3<L>  /  TBili  18.4<H>  /  DBili      /  AST  397<H>  /  ALT  238<H>  /  AlkPhos  232<H>      Creatinine Trend: 1.97 <--, 1.88 <--, 1.64 <--, 1.57 <--, 1.50 <--, 1.78 <--                        8.2    5.72  )-----------( 120      ( 20 Mar 2019 05:45 )             23.0     Urine Studies:  Urinalysis Basic - ( 15 Mar 2019 06:18 )    Color: DARK YELLOW / Appearance: CLEAR / S.013 / pH: 6.5  Gluc: NEGATIVE / Ketone: NEGATIVE  / Bili: SMALL / Urobili: SMALL   Blood: NEGATIVE / Protein: 10 / Nitrite: NEGATIVE   Leuk Esterase: NEGATIVE / RBC:  / WBC    Sq Epi:  / Non Sq Epi:  / Bacteria:           RADIOLOGY & ADDITIONAL STUDIES: [facility] - Crystal / Corrie S /Kylah/ LARON Villegas/ LARON Beard/ Shiva Mullen / MAGUI Njeru  ---------------------------------------------------------------------------------------------------------------    Patient seen and examined bedside    Subjective and Objective: hypothermic overnight. drowsy today. arousable to verbal stimuli  remains NPO. on IVF    Allergies: No Known Allergies      Hospital Medications:   MEDICATIONS  (STANDING):  amLODIPine   Tablet 5 milliGRAM(s) Oral daily  dextrose 5% + sodium chloride 0.9%. 1000 milliLiter(s) (75 mL/Hr) IV Continuous <Continuous>  dextrose 5% + sodium chloride 0.9%. 1000 milliLiter(s) (75 mL/Hr) IV Continuous <Continuous>  ergocalciferol 45062 Unit(s) Oral every week  pantoprazole  Injectable 40 milliGRAM(s) IV Push daily  piperacillin/tazobactam IVPB. 3.375 Gram(s) IV Intermittent once  tamsulosin Oral Tab/Cap - Peds 0.4 milliGRAM(s) Oral at bedtime    VITALS:  T(F): 97.2 (19 @ 10:15), Max: 98.1 (19 @ 06:07)  HR: 81 (19 @ 06:07)  BP: 110/67 (19 @ 08:50)  RR: 18 (19 @ 06:07)  SpO2: 99% (19 @ 06:07)  Wt(kg): --     07:01  -   @ 07:00  --------------------------------------------------------  IN: 900 mL / OUT: 600 mL / NET: 300 mL      PHYSICAL EXAM:  Constitutional: NAD  HEENT: anicteric sclera, oropharynx clear  Neck: No JVD  Respiratory: CTAB, no wheezes, rales or rhonchi  Cardiovascular: S1, S2, RRR  Gastrointestinal: BS+, soft  Extremities: No cyanosis or clubbing. No peripheral edema  Neurological: drowsy today. arousable to verbal stimuli  Psychiatric: Normal mood, normal affect  : +flores.   Skin: No rashes      LABS:      145  |  116<H>  |  20  ----------------------------<  100<H>  3.7   |  19<L>  |  1.97<H>    Ca    8.9      20 Mar 2019 10:40  Phos  2.1       Mg     1.6         TPro  5.8<L>  /  Alb  2.3<L>  /  TBili  18.4<H>  /  DBili      /  AST  397<H>  /  ALT  238<H>  /  AlkPhos  232<H>      Creatinine Trend: 1.97 <--, 1.88 <--, 1.64 <--, 1.57 <--, 1.50 <--, 1.78 <--                        8.2    5.72  )-----------( 120      ( 20 Mar 2019 05:45 )             23.0     Urine Studies:  Urinalysis Basic - ( 15 Mar 2019 06:18 )    Color: DARK YELLOW / Appearance: CLEAR / S.013 / pH: 6.5  Gluc: NEGATIVE / Ketone: NEGATIVE  / Bili: SMALL / Urobili: SMALL   Blood: NEGATIVE / Protein: 10 / Nitrite: NEGATIVE   Leuk Esterase: NEGATIVE / RBC:  / WBC    Sq Epi:  / Non Sq Epi:  / Bacteria:           RADIOLOGY & ADDITIONAL STUDIES:

## 2019-03-20 NOTE — PROVIDER CONTACT NOTE (OTHER) - ASSESSMENT
Patient asleep between care, hypothermia blanket in place continuously, Axox4, asymptomatic at this time, denies chest pain, SOB, chills, dizziness, headaches, or lightheadedness at this time. Patient given more warm packs at this time

## 2019-03-20 NOTE — PROVIDER CONTACT NOTE (OTHER) - ASSESSMENT
Patient Axox4, denies chest pain, SOB, N/V/dizziness, no acute distress noted at this time, patient asleep between care, hypothermia blanket maintained as ordered, IV fluids maintained as ordered, will continue to monitor

## 2019-03-20 NOTE — CHART NOTE - NSCHARTNOTEFT_GEN_A_CORE
INR 2.00 today, discussed with IR, plan for liver bx tomorrow am as long as INR 1.5 or less. Discussed with Dr. Norton, ok to give 10mg IV Vit K x 1 dose, will repeat coags 12 hours after receiving vit K. Dr. Branham made aware.

## 2019-03-20 NOTE — PROGRESS NOTE ADULT - SUBJECTIVE AND OBJECTIVE BOX
Pt is seen and examined  pt is awake and lying in bed   hypothermic overnight   platelets low  no bleeding       PAST MEDICAL & SURGICAL HISTORY:  Chronic kidney disease (CKD), stage IV (severe)  Benign prostatic hypertrophy  S/P cataract surgery, left: 3 yrs ago      ROS:  Negative except for:    MEDICATIONS  (STANDING):  amLODIPine   Tablet 5 milliGRAM(s) Oral daily  dextrose 5% + sodium chloride 0.9%. 1000 milliLiter(s) (75 mL/Hr) IV Continuous <Continuous>  ergocalciferol 89332 Unit(s) Oral every week  pantoprazole  Injectable 40 milliGRAM(s) IV Push daily  tamsulosin Oral Tab/Cap - Peds 0.4 milliGRAM(s) Oral at bedtime    MEDICATIONS  (PRN):      Allergies    No Known Allergies    Intolerances        Vital Signs Last 24 Hrs  T(C): 36.7 (20 Mar 2019 06:07), Max: 36.7 (20 Mar 2019 06:07)  T(F): 98.1 (20 Mar 2019 06:07), Max: 98.1 (20 Mar 2019 06:07)  HR: 81 (20 Mar 2019 06:07) (70 - 82)  BP: 94/60 (20 Mar 2019 06:07) (94/60 - 104/61)  BP(mean): --  RR: 18 (20 Mar 2019 06:07) (16 - 18)  SpO2: 99% (20 Mar 2019 06:07) (98% - 100%)    PHYSICAL EXAM     General: elderly male in NAD  HEENT: + Icterus  Neck: supple  CV: normal S1/S2 with no murmur rubs or gallops  Lungs: positive air movement b/l ant lungs,clear to auscultation, no wheezes, no rales  Abdomen: soft non-tender non-distended, no hepatosplenomegaly  Ext: no clubbing cyanosis or edema      LABS:                          8.2    5.72  )-----------( 120      ( 20 Mar 2019 05:45 )             23.0     Serial CBC's  03-20 @ 05:45  Hct-23.0 / Hgb-8.2 / Plat-120 / RBC-3.05 / WBC-5.72          Serial CBC's  03-19 @ 05:12  Hct-26.1 / Hgb-9.3 / Plat-122 / RBC-3.47 / WBC-5.32            03-20    143  |  114<H>  |  19  ----------------------------<  84  Test not performed SPECIMEN SEVERELY HEMOLYZED   |  18<L>  |  1.88<H>    Ca    8.8      20 Mar 2019 05:45    TPro  5.8<L>  /  Alb  2.3<L>  /  TBili  18.4<H>  /  DBili  x   /  AST  397<H>  /  ALT  238<H>  /  AlkPhos  232<H>  03-20      PT/INR - ( 20 Mar 2019 05:45 )   PT: 22.7 SEC;   INR: 2.00          PTT - ( 20 Mar 2019 05:45 )  PTT:65.8 SEC    WBC Count: 5.72 K/uL (03-20 @ 05:45)  Hemoglobin: 8.2 g/dL (03-20 @ 05:45)            RADIOLOGY & ADDITIONAL STUDIES:

## 2019-03-20 NOTE — PROGRESS NOTE ADULT - ASSESSMENT
1. Hyperbilirubinemia    -- GI eval noted - acute cholestatic hepatitis of unclear etiology. ? Drug related  -- MRI/MRCP distended GB w Cholelithiasis. No biliary ductal dilatation  -- no evidence of malignancy on CT A/P  -- consider U/S w doppler of Liver  -- tumor markers unremarkable   -- coagulopathy secondary to liver disease   -- vitamin K given, FFP if bleeding or if procedure planned    2. Anemia    -- Ferritin elevated   -- stable  -- hx of GI Bleed sec to divertic  -- monitor for now    3. Thrombocytopenia    -- sec to acute liver injury  -- mild and no bleeding  -- monitor for now    Carlos Gloria MD  [phone]

## 2019-03-20 NOTE — PROGRESS NOTE ADULT - ASSESSMENT
89 yo M p/w confusion and generalized weakness    -> Hypothermia:      - unspecified etiology, no infectious source identified thus far, f/u cxr, cultures, monitor vitals, warming blanket  -> Transaminitis and Bilirubinemia 2/2 cholestatic hepatitis:      - lfts trending down. MRCP (+) cholelithiasis, (-) cbd obstruction      ** Day Team please Consult IR for Liver Biopsy ASAP      - all consultants management appreciated      - adjust management accordingly (ie: diet, intervention, etc.)  -> Thrombocytopenia:      - potentially 2/2 liver dysfunction, hem/onc on board  -> PHU:      - fluid hydration, treat underlying liver condition, avoid nephrotoxic rx, strict i/o, nephro on board  -> Metabolic Encephalopathy:      - treat underlying gi condition      - neuro checks       - fall precautions   -> BPH:     - flomax  -> Need for prophylactic measure:      - dvt/gi ppx  -> Severe Protein Calorie Malnutrition:      - supplement diet

## 2019-03-20 NOTE — PROGRESS NOTE ADULT - SUBJECTIVE AND OBJECTIVE BOX
S: no chest pain or sob     Overnight: hypothermic, bear hugger placed, ID following, started on abx pending cultures.       amLODIPine   Tablet 5 milliGRAM(s) Oral daily  cefepime   IVPB      dextrose 5% + sodium chloride 0.9%. 1000 milliLiter(s) IV Continuous <Continuous>  dextrose 5% + sodium chloride 0.9%. 1000 milliLiter(s) IV Continuous <Continuous>  ergocalciferol 45465 Unit(s) Oral every week  pantoprazole  Injectable 40 milliGRAM(s) IV Push daily  tamsulosin Oral Tab/Cap - Peds 0.4 milliGRAM(s) Oral at bedtime  vancomycin  IVPB 750 milliGRAM(s) IV Intermittent once                       8.2    5.72  )-----------( 120      ( 20 Mar 2019 05:45 )             23.0     Hemoglobin: 8.2 g/dL (03-20 @ 05:45)  Hemoglobin: 9.3 g/dL (03-19 @ 05:12)  Hemoglobin: 8.2 g/dL (03-18 @ 06:35)  Hemoglobin: 9.6 g/dL (03-16 @ 05:45)  Hemoglobin: 9.6 g/dL (03-16 @ 05:45)      03-20    145  |  116<H>  |  20  ----------------------------<  100<H>  3.7   |  19<L>  |  1.97<H>    Ca    8.9      20 Mar 2019 10:40  Phos  2.1     03-20  Mg     1.6     03-20    TPro  5.8<L>  /  Alb  2.3<L>  /  TBili  18.4<H>  /  DBili  x   /  AST  397<H>  /  ALT  238<H>  /  AlkPhos  232<H>  03-20    Creatinine Trend: 1.97<--, 1.88<--, 1.64<--, 1.57<--, 1.50<--, 1.78<--    COAGS: PT/INR - ( 20 Mar 2019 05:45 )   PT: 22.7 SEC;   INR: 2.00        PTT - ( 20 Mar 2019 05:45 )  PTT:65.8 SEC    T(C): 36.8 (03-20-19 @ 13:57), Max: 36.8 (03-20-19 @ 13:57)  HR: 79 (03-20-19 @ 14:46) (72 - 82)  BP: 122/67 (03-20-19 @ 14:46) (94/60 - 122/67)  RR: 18 (03-20-19 @ 14:46) (17 - 18)  SpO2: 100% (03-20-19 @ 14:46) (98% - 100%)  Wt(kg): --    I&O's Summary    19 Mar 2019 07:01  -  20 Mar 2019 07:00  --------------------------------------------------------  IN: 900 mL / OUT: 600 mL / NET: 300 mL    20 Mar 2019 07:01  -  20 Mar 2019 16:10  --------------------------------------------------------  IN: 0 mL / OUT: 200 mL / NET: -200 mL    18 Mar 2019 07:01  -  19 Mar 2019 07:00  --------------------------------------------------------  IN: 0 mL / OUT: 500 mL / NET: -500 mL    Heart: normal S1, S2, RRR, no m/r/g  Lungs: cta b/l  Abd: soft nT  Ext: no edema     TELEMETRY: 	      ECG:  	NSR 70's    RADIOLOGY:         CXR: pending     ASSESSMENT/PLAN: 	    89 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 LFTs significant for transaminitis, scleral jaundice.     -no clinical heart failure or anginal symptoms, HR 70's  - hypothermic overnight, now on bear huggeran  -GI note appreciated, recommendations noted, f/u liver bx w/primary team   - Surgical note appreciated, f/u liver bx recommendations  -further cardiac workup pending clinical course  -primary care per medicine

## 2019-03-20 NOTE — PROVIDER CONTACT NOTE (OTHER) - ASSESSMENT
Patient AxOx4, asymptomatic at this time, denies chest pain, SOB, N/V/dizziness, or lightheadedness, lying comfortably in bed, complains of feeling cold at this time, see other provider contact note with more details, will continue to monitor

## 2019-03-20 NOTE — PROGRESS NOTE ADULT - SUBJECTIVE AND OBJECTIVE BOX
Vital Signs Last 24 Hrs  T(C): 34.2 (20 Mar 2019 03:44), Max: 34.2 (20 Mar 2019 03:44)  T(F): 93.5 (20 Mar 2019 03:44), Max: 93.5 (20 Mar 2019 03:44)  HR: 72 (19 Mar 2019 22:19) (70 - 82)  BP: 103/61 (19 Mar 2019 22:19) (95/57 - 123/58)  BP(mean): --  RR: 18 (19 Mar 2019 22:19) (16 - 18)  SpO2: 100% (19 Mar 2019 22:19) (98% - 100%)    patient is hypothermic    would recommend:    1. Monitor Temp. and c/w supportive care, will benefit from Jaiden Tellez  2, Obtain cultures since hypothermic    -will continue to monitor

## 2019-03-20 NOTE — PROVIDER CONTACT NOTE (OTHER) - ASSESSMENT
Patient Axox4, asleep between care, lethargic, no acute distress noted at this time, hypothermia blanket maintained as ordered. No s/s of difficulty breathing, chills, dizziness noted. will continue to monitor

## 2019-03-21 DIAGNOSIS — N17.9 ACUTE KIDNEY FAILURE, UNSPECIFIED: ICD-10-CM

## 2019-03-21 LAB
ALBUMIN SERPL ELPH-MCNC: 2.3 G/DL — LOW (ref 3.3–5)
ALP SERPL-CCNC: 255 U/L — HIGH (ref 40–120)
ALT FLD-CCNC: 240 U/L — HIGH (ref 4–41)
ANA PAT FLD IF-IMP: SIGNIFICANT CHANGE UP
ANA TITR SER: SIGNIFICANT CHANGE UP
ANION GAP SERPL CALC-SCNC: 10 MMO/L — SIGNIFICANT CHANGE UP (ref 7–14)
APTT BLD: 57.8 SEC — HIGH (ref 27.5–36.3)
AST SERPL-CCNC: 385 U/L — HIGH (ref 4–40)
BASOPHILS # BLD AUTO: 0.04 K/UL — SIGNIFICANT CHANGE UP (ref 0–0.2)
BASOPHILS NFR BLD AUTO: 0.7 % — SIGNIFICANT CHANGE UP (ref 0–2)
BILIRUB SERPL-MCNC: 21.8 MG/DL — HIGH (ref 0.2–1.2)
BLD GP AB SCN SERPL QL: NEGATIVE — SIGNIFICANT CHANGE UP
BUN SERPL-MCNC: 20 MG/DL — SIGNIFICANT CHANGE UP (ref 7–23)
CALCIUM SERPL-MCNC: 8.6 MG/DL — SIGNIFICANT CHANGE UP (ref 8.4–10.5)
CHLORIDE SERPL-SCNC: 116 MMOL/L — HIGH (ref 98–107)
CO2 SERPL-SCNC: 20 MMOL/L — LOW (ref 22–31)
CREAT SERPL-MCNC: 1.89 MG/DL — HIGH (ref 0.5–1.3)
EOSINOPHIL # BLD AUTO: 0.27 K/UL — SIGNIFICANT CHANGE UP (ref 0–0.5)
EOSINOPHIL NFR BLD AUTO: 4.5 % — SIGNIFICANT CHANGE UP (ref 0–6)
GLUCOSE SERPL-MCNC: 99 MG/DL — SIGNIFICANT CHANGE UP (ref 70–99)
HCT VFR BLD CALC: 25.5 % — LOW (ref 39–50)
HGB BLD-MCNC: 8.9 G/DL — LOW (ref 13–17)
IMM GRANULOCYTES NFR BLD AUTO: 1.5 % — SIGNIFICANT CHANGE UP (ref 0–1.5)
INR BLD: 1.96 — HIGH (ref 0.88–1.17)
LYMPHOCYTES # BLD AUTO: 0.85 K/UL — LOW (ref 1–3.3)
LYMPHOCYTES # BLD AUTO: 14.2 % — SIGNIFICANT CHANGE UP (ref 13–44)
MAGNESIUM SERPL-MCNC: 1.6 MG/DL — SIGNIFICANT CHANGE UP (ref 1.6–2.6)
MCHC RBC-ENTMCNC: 26.4 PG — LOW (ref 27–34)
MCHC RBC-ENTMCNC: 34.9 % — SIGNIFICANT CHANGE UP (ref 32–36)
MCV RBC AUTO: 75.7 FL — LOW (ref 80–100)
MONOCYTES # BLD AUTO: 0.83 K/UL — SIGNIFICANT CHANGE UP (ref 0–0.9)
MONOCYTES NFR BLD AUTO: 13.8 % — SIGNIFICANT CHANGE UP (ref 2–14)
NEUTROPHILS # BLD AUTO: 3.92 K/UL — SIGNIFICANT CHANGE UP (ref 1.8–7.4)
NEUTROPHILS NFR BLD AUTO: 65.3 % — SIGNIFICANT CHANGE UP (ref 43–77)
NRBC # FLD: 0 K/UL — LOW (ref 25–125)
PHOSPHATE SERPL-MCNC: 2.4 MG/DL — LOW (ref 2.5–4.5)
PLATELET # BLD AUTO: 133 K/UL — LOW (ref 150–400)
PMV BLD: SIGNIFICANT CHANGE UP FL (ref 7–13)
POTASSIUM SERPL-MCNC: 3.5 MMOL/L — SIGNIFICANT CHANGE UP (ref 3.5–5.3)
POTASSIUM SERPL-SCNC: 3.5 MMOL/L — SIGNIFICANT CHANGE UP (ref 3.5–5.3)
PROT SERPL-MCNC: 6.2 G/DL — SIGNIFICANT CHANGE UP (ref 6–8.3)
PROTHROM AB SERPL-ACNC: 22.8 SEC — HIGH (ref 9.8–13.1)
RBC # BLD: 3.37 M/UL — LOW (ref 4.2–5.8)
RBC # FLD: 27.8 % — HIGH (ref 10.3–14.5)
RH IG SCN BLD-IMP: POSITIVE — SIGNIFICANT CHANGE UP
SODIUM SERPL-SCNC: 146 MMOL/L — HIGH (ref 135–145)
SPECIMEN SOURCE: SIGNIFICANT CHANGE UP
SPECIMEN SOURCE: SIGNIFICANT CHANGE UP
VANCOMYCIN FLD-MCNC: 6.8 UG/ML — SIGNIFICANT CHANGE UP
WBC # BLD: 6 K/UL — SIGNIFICANT CHANGE UP (ref 3.8–10.5)
WBC # FLD AUTO: 6 K/UL — SIGNIFICANT CHANGE UP (ref 3.8–10.5)

## 2019-03-21 PROCEDURE — 36011 PLACE CATHETER IN VEIN: CPT

## 2019-03-21 PROCEDURE — 37200 TRANSCATHETER BIOPSY: CPT

## 2019-03-21 PROCEDURE — 76937 US GUIDE VASCULAR ACCESS: CPT | Mod: 26

## 2019-03-21 PROCEDURE — 75970 VASCULAR BIOPSY: CPT | Mod: 26

## 2019-03-21 RX ORDER — AMPICILLIN SODIUM AND SULBACTAM SODIUM 250; 125 MG/ML; MG/ML
3 INJECTION, POWDER, FOR SUSPENSION INTRAMUSCULAR; INTRAVENOUS ONCE
Qty: 0 | Refills: 0 | Status: COMPLETED | OUTPATIENT
Start: 2019-03-21 | End: 2019-03-22

## 2019-03-21 RX ORDER — VANCOMYCIN HCL 1 G
750 VIAL (EA) INTRAVENOUS ONCE
Qty: 0 | Refills: 0 | Status: COMPLETED | OUTPATIENT
Start: 2019-03-21 | End: 2019-03-21

## 2019-03-21 RX ORDER — AMPICILLIN SODIUM AND SULBACTAM SODIUM 250; 125 MG/ML; MG/ML
INJECTION, POWDER, FOR SUSPENSION INTRAMUSCULAR; INTRAVENOUS
Qty: 0 | Refills: 0 | Status: DISCONTINUED | OUTPATIENT
Start: 2019-03-22 | End: 2019-03-26

## 2019-03-21 RX ORDER — SODIUM CHLORIDE 9 MG/ML
1000 INJECTION, SOLUTION INTRAVENOUS
Qty: 0 | Refills: 0 | Status: DISCONTINUED | OUTPATIENT
Start: 2019-03-21 | End: 2019-03-21

## 2019-03-21 RX ORDER — ENOXAPARIN SODIUM 100 MG/ML
30 INJECTION SUBCUTANEOUS DAILY
Qty: 0 | Refills: 0 | Status: DISCONTINUED | OUTPATIENT
Start: 2019-03-21 | End: 2019-03-28

## 2019-03-21 RX ORDER — DEXTROSE MONOHYDRATE, SODIUM CHLORIDE, AND POTASSIUM CHLORIDE 50; .745; 4.5 G/1000ML; G/1000ML; G/1000ML
1000 INJECTION, SOLUTION INTRAVENOUS
Qty: 0 | Refills: 0 | Status: DISCONTINUED | OUTPATIENT
Start: 2019-03-21 | End: 2019-03-21

## 2019-03-21 RX ORDER — AMPICILLIN SODIUM AND SULBACTAM SODIUM 250; 125 MG/ML; MG/ML
3 INJECTION, POWDER, FOR SUSPENSION INTRAMUSCULAR; INTRAVENOUS EVERY 12 HOURS
Qty: 0 | Refills: 0 | Status: DISCONTINUED | OUTPATIENT
Start: 2019-03-22 | End: 2019-03-26

## 2019-03-21 RX ADMIN — ENOXAPARIN SODIUM 30 MILLIGRAM(S): 100 INJECTION SUBCUTANEOUS at 23:38

## 2019-03-21 RX ADMIN — TAMSULOSIN HYDROCHLORIDE 0.4 MILLIGRAM(S): 0.4 CAPSULE ORAL at 22:14

## 2019-03-21 RX ADMIN — Medication 250 MILLIGRAM(S): at 11:45

## 2019-03-21 RX ADMIN — PANTOPRAZOLE SODIUM 40 MILLIGRAM(S): 20 TABLET, DELAYED RELEASE ORAL at 13:42

## 2019-03-21 RX ADMIN — CEFEPIME 100 MILLIGRAM(S): 1 INJECTION, POWDER, FOR SOLUTION INTRAMUSCULAR; INTRAVENOUS at 13:42

## 2019-03-21 NOTE — PROGRESS NOTE ADULT - SUBJECTIVE AND OBJECTIVE BOX
[facility]        - Patient seen and examined.  - Today, patient is without complaints.         *****MEDICATIONS: Current medication reviewed and documented.    MEDICATIONS  (STANDING):  cefepime   IVPB      cefepime   IVPB 1000 milliGRAM(s) IV Intermittent every 24 hours  dextrose 5% + sodium chloride 0.45% with potassium chloride 20 mEq/L 1000 milliLiter(s) (75 mL/Hr) IV Continuous <Continuous>  ergocalciferol 45799 Unit(s) Oral every week  pantoprazole  Injectable 40 milliGRAM(s) IV Push daily  tamsulosin Oral Tab/Cap - Peds 0.4 milliGRAM(s) Oral at bedtime    MEDICATIONS  (PRN):          ***** VITAL SIGNS:  T(F): 97.1 (19 @ 14:53), Max: 97.9 (19 @ 16:30)  HR: 77 (19 @ 14:53) (77 - 80)  BP: 141/70 (19 @ 14:53) (127/73 - 141/70)  RR: 18 (19 @ 14:53) (18 - 18)  SpO2: 99% (19 @ 14:53) (99% - 100%)  Wt(kg): --  ,   I&O's Summary    20 Mar 2019 07:  -  21 Mar 2019 07:00  --------------------------------------------------------  IN: 0 mL / OUT: 400 mL / NET: -400 mL    21 Mar 2019 07:  -  21 Mar 2019 16:25  --------------------------------------------------------  IN: 0 mL / OUT: 850 mL / NET: -850 mL        alert oriented x 3 attention comprehension are fair.  Able to name, repeat.   EOmi fundi not visualized   no nystagmus VFF to confrontation  Tongue is midline  Palate elevates symmetrically   Moving all 4 ext spontaneously no drift appreciated    Gait not assessed.   Gait not assessed.            *****LAB AND IMAGIN.9    6.00  )-----------( 133      ( 21 Mar 2019 07:26 )             25.5                   146<H>  |  116<H>  |  20  ----------------------------<  99  3.5   |  20<L>  |  1.89<H>    Ca    8.6      21 Mar 2019 07:30  Phos  2.4       Mg     1.6         TPro  6.2  /  Alb  2.3<L>  /  TBili  21.8<H>  /  DBili  x   /  AST  385<H>  /  ALT  240<H>  /  AlkPhos  255<H>      PT/INR - ( 21 Mar 2019 07:30 )   PT: 22.8 SEC;   INR: 1.96          PTT - ( 21 Mar 2019 07:30 )  PTT:57.8 SEC                   Urinalysis Basic - ( 20 Mar 2019 13:25 )    Color: ELSA / Appearance: TURBID / S.020 / pH: 6.0  Gluc: 100 / Ketone: NEGATIVE  / Bili: LARGE / Urobili: 4.0   Blood: MODERATE / Protein: 100 / Nitrite: NEGATIVE   Leuk Esterase: LARGE / RBC: 3-5 / WBC >50   Sq Epi: x / Non Sq Epi: OCC / Bacteria: MANY      [All pertinent recent Imaging/Reports reviewed]           *****A S S E S S M E N T   A N D   P L A N :            89 yo M, poor historian, charts reviewed, with PMHx BPH, Cataracts, Diverticulosis, Anemia 2/2 GI Bleed with previous PRBC transfusions, who p/w 2 days genearlized weakness, urinary retention and confusion.  Upon arrival to the ED, pt with scleral icterus and labs significant for transaminitis and TBili > 20    called to evaluate pt for ams         Problem/Recommendations 1:  Multifactorial toxic metabolic encephalopathy  worsening   worsening renal function, dark urine likely atn/prerenal   hypothermia suspicious for infectious process. id input appreciated.    correct metabolic dyscrasias  will continue to monitor         Problem/Recommendations 2:  Hyperbilirubinemia  suspicious for cholestatic pattern   gi f/u   check ammonia level    pending liver biopsy    Thank you for allowing me to participate in the care of this patient. Please do not hesitate to call me if you have any  questions.        ________________  Ava Schreiber MD  [facility]  [phone]      30 minutes spent on total encounter; more than 50 % of the visit was  spent counseling about plan of care, compliance to diet/exercise and medication regimen and or  coordinating care by the attending physician.      It is advised that s stroke patients follow up with DULCE MARIA Rausch @ [phone] in 1- 2 weeks.   Others please follow up with Dr. Michael Nissenbaum [phone]

## 2019-03-21 NOTE — PROGRESS NOTE ADULT - SUBJECTIVE AND OBJECTIVE BOX
Patient seen and examined at bedside  No new acute events noted overnight  Case discussed with medical team    HPI:  89 yo M, poor historian, charts reviewed, with PMHx BPH, Cataracts, Diverticulosis, Anemia 2/2 GI Bleed with previous PRBC transfusions, who p/w 2 days genearlized weakness, urinary retention and confusion.  Upon arrival to the ED, pt with scleral icterus and labs significant for transaminitis and TBili > 20 (15 Mar 2019 06:59)      PAST MEDICAL & SURGICAL HISTORY:  Chronic kidney disease (CKD), stage IV (severe)  Benign prostatic hypertrophy  S/P cataract surgery, left: 3 yrs ago      No Known Allergies       MEDICATIONS  (STANDING):  cefepime   IVPB      cefepime   IVPB 1000 milliGRAM(s) IV Intermittent every 24 hours  dextrose 5% + sodium chloride 0.45%. 1000 milliLiter(s) (75 mL/Hr) IV Continuous <Continuous>  ergocalciferol 17725 Unit(s) Oral every week  pantoprazole  Injectable 40 milliGRAM(s) IV Push daily  tamsulosin Oral Tab/Cap - Peds 0.4 milliGRAM(s) Oral at bedtime    MEDICATIONS  (PRN):      REVIEW OF SYSTEMS:  CONSTITUTIONAL: (+) malaise.   EYES: No acute change in vision   ENT:  No tinnitus  NECK: No stiffness  RESPIRATORY: No hemoptysis  CARDIOVASCULAR: No chest pain, palpitations, syncope  GASTROINTESTINAL: abd pain. No hematemesis, diarrhea, melena, or hematochezia.  GENITOURINARY: No hematuria  NEUROLOGICAL: No headaches  LYMPH Nodes: No enlarged glands  ENDOCRINE: No heat or cold intolerance	    T(C): 36.3 (19 @ 06:46), Max: 36.8 (19 @ 13:57)  HR: 80 (19 @ 06:46) (79 - 80)  BP: 127/73 (19 @ 06:46) (122/67 - 137/70)  RR: 18 (19 @ 06:46) (18 - 18)  SpO2: 100% (19 @ 06:46) (100% - 100%)    PHYSICAL EXAMINATION:   Constitutional: ill appearing. NAD  HEENT: scleral icterus. AT  Neck:  Supple  Respiratory:  Adequate airflow b/l. Not using accessory muscles of respiration.  Cardiovascular:  S1 & S2 intact, no R/G, 2+ radial pulses b/l  Gastrointestinal: Soft, NT, ND, normoactive b.s., no organomegaly/RT/rigidity  Extremities: WWP  Neurological:  Alert and awake.   Crude sensation intact.     Labs and imaging reviewed    LABS:                        8.9    6.00  )-----------( 133      ( 21 Mar 2019 07:26 )             25.5     03-    146<H>  |  116<H>  |  20  ----------------------------<  99  3.5   |  20<L>  |  1.89<H>    Ca    8.6      21 Mar 2019 07:30  Phos  2.4     -  Mg     1.6         TPro  6.2  /  Alb  2.3<L>  /  TBili  21.8<H>  /  DBili  x   /  AST  385<H>  /  ALT  240<H>  /  AlkPhos  255<H>          PT/INR - ( 21 Mar 2019 07:30 )   PT: 22.8 SEC;   INR: 1.96          PTT - ( 21 Mar 2019 07:30 )  PTT:57.8 SEC  Urinalysis Basic - ( 20 Mar 2019 13:25 )    Color: ELSA / Appearance: TURBID / S.020 / pH: 6.0  Gluc: 100 / Ketone: NEGATIVE  / Bili: LARGE / Urobili: 4.0   Blood: MODERATE / Protein: 100 / Nitrite: NEGATIVE   Leuk Esterase: LARGE / RBC: 3-5 / WBC >50   Sq Epi: x / Non Sq Epi: OCC / Bacteria: MANY      CAPILLARY BLOOD GLUCOSE            LIVER FUNCTIONS - ( 21 Mar 2019 07:30 )  Alb: 2.3 g/dL / Pro: 6.2 g/dL / ALK PHOS: 255 u/L / ALT: 240 u/L / AST: 385 u/L / GGT: x               RADIOLOGY & ADDITIONAL STUDIES:

## 2019-03-21 NOTE — PROGRESS NOTE ADULT - PROBLEM SELECTOR PLAN 1
PHU on ckd-3.  renal function stable over past 3 mths  urinating well  slight hypernatremia  deeply jaundiced, ? Cholestatic. doubt Hepat-renal  cont  iv fluids for another 24 hrs  To have Liver Biopsy today, NPO

## 2019-03-21 NOTE — PROVIDER CONTACT NOTE (OTHER) - ASSESSMENT
patient is alert and oriented, clenching and reporting pain with rectal temp check. Oral temp is 97.5. unable to obtain accurate rectal temp

## 2019-03-21 NOTE — PROGRESS NOTE ADULT - SUBJECTIVE AND OBJECTIVE BOX
PASCUAL [id number]  89yMale  T(C): 36.3 (03-21-19 @ 06:46), Max: 36.8 (03-20-19 @ 13:57)  HR: 80 (03-21-19 @ 06:46) (79 - 80)  BP: 127/73 (03-21-19 @ 06:46) (122/67 - 137/70)  RR: 18 (03-21-19 @ 06:46) (18 - 18)  SpO2: 100% (03-21-19 @ 06:46) (100% - 100%)  Wt(kg): --  03-20 @ 07:01  -  03-21 @ 07:00  --------------------------------------------------------  IN: 0 mL / OUT: 400 mL / NET: -400 mL      normal cephalic atraumatic  s1s2   clear to ascultation bilaterally  soft, non tender, non distended no guarding or rebound  no clubbing cyanosis or edema

## 2019-03-21 NOTE — PROGRESS NOTE ADULT - SUBJECTIVE AND OBJECTIVE BOX
Vascular & Interventional Radiology Post-Procedure Note    Pre-Procedure Diagnosis: Transaminitis  Post-Procedure Diagnosis: Same as pre.  Indications for Procedure: Coagulopathy and Transaminitis    : Rachel ABRAHAM  Assistant(s): Abiel ABRAHAM, Bobbi ABRAHAM    Procedure Details/Findings: Successful transjugular liver biopsy with two passes. Hepatic wedge pressure measures 9mmHg.  No evidence for portal hypertension.     Complications: A small contrast stained autologous blood clot was noted emanating from the sheath and passed into the right atrium.   Estimated Blood Loss: Minimal  Specimen: 2 Samples.  Contrast: See Report  Sedation: MAC  Patient Condition/Disposition: Stable. IRS/PACU for one hour then Floor.       Plan:     1) Recommend observation for symptoms/signs for PE on a telemetry floor.   2) Ok to restart lovenox for dvt ppx tonight    Discussed with DULCE MARIA hCin on 03/21/19 at [phone]

## 2019-03-21 NOTE — CHART NOTE - NSCHARTNOTEFT_GEN_A_CORE
Patient Age: 89    Patient Gender:Male    Procedure (including site/side if known):Liver biopsy    Diagnosis/Indication:Elevated LFTs    Interventional Radiology Attending Physician:Dr. Hong     Ordering Attending Physician:Dr. Hong    Pertinent medical history:weakness, urinary retention, bph, metabolic encephalopathy     Pertinent Labs:                        8.2    5.72  )-----------( 120      ( 20 Mar 2019 05:45 )             23.0   03-20    145  |  116<H>  |  20  ----------------------------<  100<H>  3.7   |  19<L>  |  1.97<H>    Ca    8.9      20 Mar 2019 10:40  Phos  2.1     03-20  Mg     1.6     03-20    TPro  5.8<L>  /  Alb  2.3<L>  /  TBili  18.4<H>  /  DBili  x   /  AST  397<H>  /  ALT  238<H>  /  AlkPhos  232<H>  03-20    PT/INR - ( 20 Mar 2019 05:45 )   PT: 22.7 SEC;   INR: 2.00          PTT - ( 20 Mar 2019 05:45 )  PTT:65.8 SEC  Patient and Family aware? yes      Attending/Resident/NP/PA    Contact:         [id number]                   Date:            3/21                        time:0803

## 2019-03-21 NOTE — PROGRESS NOTE ADULT - SUBJECTIVE AND OBJECTIVE BOX
S: no chest pain or sob     Overnight: normothermic Temp 97. ID following, started on abx pending cultures.     cefepime   IVPB      cefepime   IVPB 1000 milliGRAM(s) IV Intermittent every 24 hours  dextrose 5% + sodium chloride 0.45% with potassium chloride 20 mEq/L 1000 milliLiter(s) IV Continuous <Continuous>  ergocalciferol 21936 Unit(s) Oral every week  pantoprazole  Injectable 40 milliGRAM(s) IV Push daily  tamsulosin Oral Tab/Cap - Peds 0.4 milliGRAM(s) Oral at bedtime                       8.9    6.00  )-----------( 133      ( 21 Mar 2019 07:26 )             25.5       Hemoglobin: 8.9 g/dL (03-21 @ 07:26)  Hemoglobin: 8.2 g/dL (03-20 @ 05:45)  Hemoglobin: 9.3 g/dL (03-19 @ 05:12)  Hemoglobin: 8.2 g/dL (03-18 @ 06:35)    03-21    146<H>  |  116<H>  |  20  ----------------------------<  99  3.5   |  20<L>  |  1.89<H>    Ca    8.6      21 Mar 2019 07:30  Phos  2.4     03-21  Mg     1.6     03-21    TPro  6.2  /  Alb  2.3<L>  /  TBili  21.8<H>  /  DBili  x   /  AST  385<H>  /  ALT  240<H>  /  AlkPhos  255<H>  03-21    Creatinine Trend: 1.89<--, 1.97<--, 1.88<--, 1.64<--, 1.57<--, 1.50<--    COAGS: PT/INR - ( 21 Mar 2019 07:30 )   PT: 22.8 SEC;   INR: 1.96        PTT - ( 21 Mar 2019 07:30 )  PTT:57.8 SEC    T(C): 36.3 (03-21-19 @ 06:46), Max: 36.8 (03-20-19 @ 13:57)  HR: 80 (03-21-19 @ 06:46) (79 - 80)  BP: 127/73 (03-21-19 @ 06:46) (122/67 - 137/70)  RR: 18 (03-21-19 @ 06:46) (18 - 18)  SpO2: 100% (03-21-19 @ 06:46) (100% - 100%)  Wt(kg): --    I&O's Summary    20 Mar 2019 07:01  -  21 Mar 2019 07:00  --------------------------------------------------------  IN: 0 mL / OUT: 400 mL / NET: -400 mL    21 Mar 2019 07:01  -  21 Mar 2019 12:44  --------------------------------------------------------  IN: 0 mL / OUT: 700 mL / NET: -700 mL      19 Mar 2019 07:01  -  20 Mar 2019 07:00  --------------------------------------------------------  IN: 900 mL / OUT: 600 mL / NET: 300 mL    20 Mar 2019 07:01  -  20 Mar 2019 16:10  --------------------------------------------------------  IN: 0 mL / OUT: 200 mL / NET: -200 mL    18 Mar 2019 07:01  -  19 Mar 2019 07:00  --------------------------------------------------------  IN: 0 mL / OUT: 500 mL / NET: -500 mL    Heart: normal S1, S2, RRR, no m/r/g  Lungs: cta b/l  Abd: soft nT  Ext: no edema     TELEMETRY: 	      EKG: NSR 70    RADIOLOGY:         CXR:  Patchy opacity within the right lower lung may represent pneumonia. Subsegmental atelectasis at the left base. No pleural effusion or   	pneumothorax. No pulmonary edema.      ASSESSMENT/PLAN: 	    89 yo M, poor historian, charts reviewed, with PMHx BPH, Cataracts, Diverticulosis, Anemia 2/2 GI Bleed with previous PRBC transfusions, who p/w 2 days generalized  weakness, urinary retention and confusion. LFTs significant for transaminitis, scleral jaundice.     -no clinical heart failure or anginal symptoms, HR 70's  - Normothermic, monitor temp curve   -GI note appreciated, awaiting liver bx today   - Liver Bx today, f/u pathology report   -further cardiac workup pending clinical course  -primary care per medicine

## 2019-03-21 NOTE — PROGRESS NOTE ADULT - ATTENDING COMMENTS
Agree with above.  follow up biopsy and GI  further cardiac workup pending clinical course    Rashmi Morrison MD

## 2019-03-21 NOTE — PROGRESS NOTE ADULT - ASSESSMENT
A 89 yo Male with BPH, Cataracts, Diverticulosis, Anemia 2/2 GI Bleed with previous PRBC transfusions, who presents to the ER for evaluation of  generalized  weakness, urinary retention and confusion. On admission, he found to have elevated bilirubin, LFTS, transaminitis, scleral jaundice. and urinary retention. Oshea catheter has placed with negative Urine analysis. The CT abdomen  and pelvis shows Distended gallbladder. Consider further evaluation with ultrasound if  acute cholecystitis is a concern. No discrete mass identified on this limited noncontrast study. The ID consult requested to assist with evaluation of Biliary sepsis.    # Encephalopathy  # Urinary retention  # R/O biliary sepsis/ Cholangitis - Most likely acute cholestatic hepatitis as per GI  # Distended GB with cholelithiasis on MRCP 3/18/19  # Hypothermia- Pneumonia on CXR - MRSA pCR is negative   # UTI- Enterococcus faecalis 3/20/19    would recommend:    1. Change Cefepime to Unasyn to cover Amp. sensitive Enterococcus and c/w Vancomycin until sensitivity of Enterococcus resulted  2. Follow up Liver biopsy  3.  Monitor Temp. and c/w supportive care  4. Supplemental oxygenation and  bronchodilator as needed    d/w Nursing staff    will follow the patient with you

## 2019-03-21 NOTE — PROGRESS NOTE ADULT - ASSESSMENT
89 yo M p/w confusion and generalized weakness    -> Hypothermia:      - improved, 2/2 complicated bacterial pneumonia   -> Transaminitis and Bilirubinemia 2/2 cholestatic hepatitis:      - IR Biopsy      - advance diet post bx      - all consultants management appreciated      - adjust management accordingly (ie: diet, intervention, rx, etc.)  -> Complicated Bacterial Pneumonia likely gram negative organism:      - iv abx      - nebs, oxygen supplementation prn      - adjust abx per ID   -> Thrombocytopenia:      - potentially 2/2 liver dysfunction, hem/onc on board  -> INR Elevation:      - 2/2 vitamin k deficiency      - s/p vit k, monitor inr and clinical status  -> PHU:      - stable, c/w hydration, treat underlying liver condition, avoid nephrotoxic rx, strict i/o, nephro on board  -> Metabolic Encephalopathy:      - mildly improving, treat underlying gi condition      - neuro checks       - fall precautions   -> Hypernatermia Hyperchloremia:      - IVF, monitor bmp, adjust accordingly   -> BPH:     - flomax  -> Need for prophylactic measure:      - dvt/gi ppx  -> Severe Protein Calorie Malnutrition:      - supplement diet

## 2019-03-21 NOTE — PROGRESS NOTE ADULT - SUBJECTIVE AND OBJECTIVE BOX
Patient remains normothermic. Currently he is in procedure of transjugular liver biopsy with two passes, tolerated the procedure well. He will be transferred to Telemetry since found to have small right atrium blood clot. The Urine culture growing Enterococcus.         REVIEW OF SYSTEMS: All other review systems are negative        ALLERGIES: No Known Allergies        Vital Signs Last 24 Hrs  T(C): 36.2 (21 Mar 2019 14:53), Max: 36.4 (20 Mar 2019 22:23)  T(F): 97.1 (21 Mar 2019 14:53), Max: 97.5 (20 Mar 2019 22:23)  HR: 77 (21 Mar 2019 14:53) (77 - 80)  BP: 141/70 (21 Mar 2019 14:53) (127/73 - 141/70)  BP(mean): --  RR: 18 (21 Mar 2019 14:53) (18 - 18)  SpO2: 99% (21 Mar 2019 14:53) (99% - 100%)      PHYSICAL EXAM:  GENERAL: Not in distress   HEENT: Sclera jaundiced  CHEST/LUNG:  Air entry bilaterally  HEART: s1 and s2 present  ABDOMEN:  Nontender and  Nondistended  : Oshea catheter in placed  EXTREMITIES: No pedal  edema  CNS: Awake and Alert        LABS:                        8.9    6.00  )-----------( 133      ( 21 Mar 2019 07:26 )             25.5                           8.2    5.72  )-----------( 120      ( 20 Mar 2019 05:45 )             23.0         03-21    146<H>  |  116<H>  |  20  ----------------------------<  99  3.5   |  20<L>  |  1.89<H>    Ca    8.6      21 Mar 2019 07:30  Phos  2.4     03-21  Mg     1.6     03-21    TPro  6.2  /  Alb  2.3<L>  /  TBili  21.8<H>  /  DBili  x   /  AST  385<H>  /  ALT  240<H>  /  AlkPhos  255<H>  03-21         03-20    145  |  116<H>  |  20  ----------------------------<  100<H>  3.7   |  19<L>  |  1.97<H>    Ca    8.9      20 Mar 2019 10:40  Phos  2.1     03-20  Mg     1.6     03-20    TPro  5.8<L>  /  Alb  2.3<L>  /  TBili  18.4<H>  /  DBili  x   /  AST  397<H>  /  ALT  238<H>  /  AlkPhos  232<H>  03-20      Urinalysis (03.20.19 @ 13:25)    Color: ELSA    Urine Appearance: TURBID    Glucose: 100    Bilirubin: LARGE    Ketone - Urine: NEGATIVE    Specific Gravity: 1.020    Blood: MODERATE    pH - Urine: 6.0    Protein, Urine: 100    Urobilinogen: 4.0    Nitrite: NEGATIVE    Leukocyte Esterase Concentration: LARGE    Red Blood Cell - Urine: 3-5    White Blood Cell - Urine: >50    White Blood Cell Casts: 2-5/LPF    Epithelial Cells: OCC    Bacteria: MANY    Coarse Granular Casts: 0-2/LPF        MEDICATIONS  (STANDING):  cefepime   IVPB      dextrose 5% + sodium chloride 0.9%. 1000 milliLiter(s) (75 mL/Hr) IV Continuous <Continuous>  dextrose 5% + sodium chloride 0.9%. 1000 milliLiter(s) (75 mL/Hr) IV Continuous <Continuous>  ergocalciferol 26783 Unit(s) Oral every week  pantoprazole  Injectable 40 milliGRAM(s) IV Push daily  tamsulosin Oral Tab/Cap - Peds 0.4 milliGRAM(s) Oral at bedtime    MEDICATIONS  (PRN):        RADIOLOGY & ADDITIONAL TESTS:    3/20/19 : Xray Chest 1 View- PORTABLE-Urgent (03.20.19 @ 09:37) Patchy opacity within the right lower lung may represent pneumonia. Subsegmental atelectasis at the left base. No pleural effusion or   pneumothorax. No pulmonary edema. The cardiac silhouette remains enlarged. Aortic aneurysm. Recommend  further evaluation with aortogram.      3/18/19 : MR MRCP No Cont (03.18.19 @ 14:22) Gallbladder is distended with cholelithiasis.  No choledocholithiasis or biliary ductal dilatation.      3/17/19 : NM Hepatobiliary Imaging (03.17.19 @ 12:21) Abnormal hepatobiliary scan suspicious for common bile duct obstruction. Hepatocellular dysfunction. No evidence of acute cholecystitis.      3/18/19 : US Abdomen Limited (03.18.19 @ 07:48) Gallbladder is distended with sludge and stones.  Mural thickening of the wall of the common bile duct, possibly  cholangitis. No biliary ductal dilatation.      3/15/19 : CT Abdomen and Pelvis No Cont (03.15.19 @ 09:39) Distended gallbladder. Consider further evaluation with ultrasound if  acute cholecystitis is a concern.  No discrete mass identified on this limited noncontrast study.          MICROBIOLOGY DATA:    Culture - Urine (03.20.19 @ 15:21)    Culture - Urine:   ENTF^Enterococcus faecalis  COLONY COUNT: > = 100,000 CFU/ML    Specimen Source: URINE CATHETER        MRSA Infection Control Screen (PCR) (03.20.19 @ 13:46)    MRSA Infection Control Screen (PCR): NOT DETECTED     REFERENCE RANGE:  MRSA DNA NOT DETECTED    A result of MRSA DNA not detected does not preclude the  possibility of colonization with MRSA.  This result was obtained using the Cep[employer]tron[employer] Xpert  MRSA assay and the GeneXpert Dx System.      Culture - Blood (03.15.19 @ 14:03)    Culture - Blood:   NO ORGANISMS ISOLATED  NO ORGANISMS ISOLATED AT 24 HOURS    Specimen Source: BLOOD

## 2019-03-21 NOTE — PROGRESS NOTE ADULT - SUBJECTIVE AND OBJECTIVE BOX
Pt seen and examined at bedside  feels weak  denies vomiting, diarrea.  Has Flores cath, urinating well  no fever,chills  no dyspnea      Allergies:  No Known Allergies    Hospital Medications:   MEDICATIONS  (STANDING):  cefepime   IVPB      cefepime   IVPB 1000 milliGRAM(s) IV Intermittent every 24 hours  dextrose 5% + sodium chloride 0.45%. 1000 milliLiter(s) (75 mL/Hr) IV Continuous <Continuous>  ergocalciferol 73972 Unit(s) Oral every week  pantoprazole  Injectable 40 milliGRAM(s) IV Push daily  tamsulosin Oral Tab/Cap - Peds 0.4 milliGRAM(s) Oral at bedtime  vancomycin  IVPB 750 milliGRAM(s) IV Intermittent once         VITALS:  T(F): 97.3 (19 @ 06:46), Max: 98.2 (19 @ 13:57)  HR: 80 (19 @ 06:46)  BP: 127/73 (19 @ 06:46)  RR: 18 (19 @ 06:46)  SpO2: 100% (19 @ 06:46)  Wt(kg): --     @ 07:01  -   @ 07:00  --------------------------------------------------------  IN: 0 mL / OUT: 400 mL / NET: -400 mL        PHYSICAL EXAM:  Constitutional: NAD  HEENT: deeply icteric  sclera, oropharynx clear, MMM  Neck: No JVD  Respiratory: CTAB, no wheezes, rales or rhonchi  Cardiovascular: S1, S2, RRR  Gastrointestinal: BS+, soft, NT/ND  Extremities: No cyanosis or clubbing. No peripheral edema  Neurological: A/O x 3, no focal deficits  Psychiatric: Normal mood, normal affect  : No CVA tenderness.  flores.  in place  Skin: No rashes       LABS:      146<H>  |  116<H>  |  20  ----------------------------<  99  3.5   |  20<L>  |  1.89<H>    Ca    8.6      21 Mar 2019 07:30  Phos  2.4     -  Mg     1.6     -    TPro  6.2  /  Alb  2.3<L>  /  TBili  21.8<H>  /  DBili      /  AST  385<H>  /  ALT  240<H>  /  AlkPhos  255<H>      Creatinine Trend: 1.89 <--, 1.97 <--, 1.88 <--, 1.64 <--, 1.57 <--, 1.50 <--, 1.78 <--                        8.9    6.00  )-----------( 133      ( 21 Mar 2019 07:26 )             25.5     Urine Studies:  Urinalysis Basic - ( 20 Mar 2019 13:25 )    Color: ELSA / Appearance: TURBID / S.020 / pH: 6.0  Gluc: 100 / Ketone: NEGATIVE  / Bili: LARGE / Urobili: 4.0   Blood: MODERATE / Protein: 100 / Nitrite: NEGATIVE   Leuk Esterase: LARGE / RBC: 3-5 / WBC >50   Sq Epi:  / Non Sq Epi: OCC / Bacteria: MANY        RADIOLOGY & ADDITIONAL STUDIES:

## 2019-03-21 NOTE — CHART NOTE - NSCHARTNOTEFT_GEN_A_CORE
D/w IR resident will perform transjugular liver biopsy ok with INR<2.0.  D/w medical attending Dr. Branham and Gastroenterologist Dr. Norton.

## 2019-03-22 LAB
ALBUMIN SERPL ELPH-MCNC: 2.5 G/DL — LOW (ref 3.3–5)
ALP SERPL-CCNC: 249 U/L — HIGH (ref 40–120)
ALT FLD-CCNC: 209 U/L — HIGH (ref 4–41)
ANION GAP SERPL CALC-SCNC: 15 MMO/L — HIGH (ref 7–14)
APTT BLD: 62.8 SEC — HIGH (ref 27.5–36.3)
AST SERPL-CCNC: 333 U/L — HIGH (ref 4–40)
BASOPHILS # BLD AUTO: 0.03 K/UL — SIGNIFICANT CHANGE UP (ref 0–0.2)
BASOPHILS NFR BLD AUTO: 0.5 % — SIGNIFICANT CHANGE UP (ref 0–2)
BILIRUB SERPL-MCNC: 22.1 MG/DL — HIGH (ref 0.2–1.2)
BUN SERPL-MCNC: 23 MG/DL — SIGNIFICANT CHANGE UP (ref 7–23)
CALCIUM SERPL-MCNC: 9 MG/DL — SIGNIFICANT CHANGE UP (ref 8.4–10.5)
CHLORIDE SERPL-SCNC: 115 MMOL/L — HIGH (ref 98–107)
CO2 SERPL-SCNC: 17 MMOL/L — LOW (ref 22–31)
CREAT SERPL-MCNC: 1.91 MG/DL — HIGH (ref 0.5–1.3)
EOSINOPHIL # BLD AUTO: 0.09 K/UL — SIGNIFICANT CHANGE UP (ref 0–0.5)
EOSINOPHIL NFR BLD AUTO: 1.4 % — SIGNIFICANT CHANGE UP (ref 0–6)
GLUCOSE SERPL-MCNC: 55 MG/DL — LOW (ref 70–99)
HCT VFR BLD CALC: 24.8 % — LOW (ref 39–50)
HGB BLD-MCNC: 8.7 G/DL — LOW (ref 13–17)
IMM GRANULOCYTES NFR BLD AUTO: 1.4 % — SIGNIFICANT CHANGE UP (ref 0–1.5)
INR BLD: 1.86 — HIGH (ref 0.88–1.17)
LYMPHOCYTES # BLD AUTO: 0.76 K/UL — LOW (ref 1–3.3)
LYMPHOCYTES # BLD AUTO: 11.7 % — LOW (ref 13–44)
MAGNESIUM SERPL-MCNC: 1.6 MG/DL — SIGNIFICANT CHANGE UP (ref 1.6–2.6)
MCHC RBC-ENTMCNC: 26.9 PG — LOW (ref 27–34)
MCHC RBC-ENTMCNC: 35.1 % — SIGNIFICANT CHANGE UP (ref 32–36)
MCV RBC AUTO: 76.5 FL — LOW (ref 80–100)
MONOCYTES # BLD AUTO: 0.84 K/UL — SIGNIFICANT CHANGE UP (ref 0–0.9)
MONOCYTES NFR BLD AUTO: 12.9 % — SIGNIFICANT CHANGE UP (ref 2–14)
NEUTROPHILS # BLD AUTO: 4.71 K/UL — SIGNIFICANT CHANGE UP (ref 1.8–7.4)
NEUTROPHILS NFR BLD AUTO: 72.1 % — SIGNIFICANT CHANGE UP (ref 43–77)
NRBC # FLD: 0.02 K/UL — LOW (ref 25–125)
PHOSPHATE SERPL-MCNC: 3.4 MG/DL — SIGNIFICANT CHANGE UP (ref 2.5–4.5)
PLATELET # BLD AUTO: 120 K/UL — LOW (ref 150–400)
PMV BLD: SIGNIFICANT CHANGE UP FL (ref 7–13)
POTASSIUM SERPL-MCNC: 3.6 MMOL/L — SIGNIFICANT CHANGE UP (ref 3.5–5.3)
POTASSIUM SERPL-SCNC: 3.6 MMOL/L — SIGNIFICANT CHANGE UP (ref 3.5–5.3)
PROT SERPL-MCNC: 6.1 G/DL — SIGNIFICANT CHANGE UP (ref 6–8.3)
PROTHROM AB SERPL-ACNC: 21.6 SEC — HIGH (ref 9.8–13.1)
RBC # BLD: 3.24 M/UL — LOW (ref 4.2–5.8)
RBC # FLD: 27.9 % — HIGH (ref 10.3–14.5)
SMOOTH MUSCLE AB SER-ACNC: SIGNIFICANT CHANGE UP
SODIUM SERPL-SCNC: 147 MMOL/L — HIGH (ref 135–145)
VANCOMYCIN FLD-MCNC: 10.5 UG/ML — SIGNIFICANT CHANGE UP
WBC # BLD: 6.52 K/UL — SIGNIFICANT CHANGE UP (ref 3.8–10.5)
WBC # FLD AUTO: 6.52 K/UL — SIGNIFICANT CHANGE UP (ref 3.8–10.5)

## 2019-03-22 RX ORDER — SODIUM CHLORIDE 9 MG/ML
1000 INJECTION, SOLUTION INTRAVENOUS
Qty: 0 | Refills: 0 | Status: DISCONTINUED | OUTPATIENT
Start: 2019-03-22 | End: 2019-03-23

## 2019-03-22 RX ORDER — VANCOMYCIN HCL 1 G
750 VIAL (EA) INTRAVENOUS ONCE
Qty: 0 | Refills: 0 | Status: COMPLETED | OUTPATIENT
Start: 2019-03-22 | End: 2019-03-22

## 2019-03-22 RX ORDER — VANCOMYCIN HCL 1 G
1000 VIAL (EA) INTRAVENOUS EVERY 24 HOURS
Qty: 0 | Refills: 0 | Status: DISCONTINUED | OUTPATIENT
Start: 2019-03-22 | End: 2019-03-22

## 2019-03-22 RX ORDER — VANCOMYCIN HCL 1 G
750 VIAL (EA) INTRAVENOUS EVERY 24 HOURS
Qty: 0 | Refills: 0 | Status: DISCONTINUED | OUTPATIENT
Start: 2019-03-22 | End: 2019-03-22

## 2019-03-22 RX ADMIN — AMPICILLIN SODIUM AND SULBACTAM SODIUM 200 GRAM(S): 250; 125 INJECTION, POWDER, FOR SUSPENSION INTRAMUSCULAR; INTRAVENOUS at 01:12

## 2019-03-22 RX ADMIN — AMPICILLIN SODIUM AND SULBACTAM SODIUM 200 GRAM(S): 250; 125 INJECTION, POWDER, FOR SUSPENSION INTRAMUSCULAR; INTRAVENOUS at 13:04

## 2019-03-22 RX ADMIN — SODIUM CHLORIDE 50 MILLILITER(S): 9 INJECTION, SOLUTION INTRAVENOUS at 08:49

## 2019-03-22 RX ADMIN — TAMSULOSIN HYDROCHLORIDE 0.4 MILLIGRAM(S): 0.4 CAPSULE ORAL at 21:26

## 2019-03-22 RX ADMIN — PANTOPRAZOLE SODIUM 40 MILLIGRAM(S): 20 TABLET, DELAYED RELEASE ORAL at 11:54

## 2019-03-22 RX ADMIN — Medication 250 MILLIGRAM(S): at 23:33

## 2019-03-22 RX ADMIN — ENOXAPARIN SODIUM 30 MILLIGRAM(S): 100 INJECTION SUBCUTANEOUS at 11:53

## 2019-03-22 NOTE — PROGRESS NOTE ADULT - SUBJECTIVE AND OBJECTIVE BOX
[facility] - Crystal / Corrie S /Kylah/ S Bakari/ LARON Beard/ Shiva Mullen / MAGUI Montielu  ---------------------------------------------------------------------------------------------------------------    Patient seen and examined bedside    Subjective and Objective:  s/p IR liver Biopsy 3/21. denied N/D/V/ sob. No complaints today. feeling better  more alert today    Allergies: No Known Allergies      Hospital Medications:   MEDICATIONS  (STANDING):  ampicillin/sulbactam  IVPB      ampicillin/sulbactam  IVPB 3 Gram(s) IV Intermittent every 12 hours  enoxaparin Injectable 30 milliGRAM(s) SubCutaneous daily  ergocalciferol 91320 Unit(s) Oral every week  pantoprazole  Injectable 40 milliGRAM(s) IV Push daily  sodium chloride 0.45%. 1000 milliLiter(s) (50 mL/Hr) IV Continuous <Continuous>  tamsulosin Oral Tab/Cap - Peds 0.4 milliGRAM(s) Oral at bedtime      VITALS:  T(F): 97.1 (19 @ 13:56), Max: 97.4 (19 @ 09:56)  HR: 87 (19 @ 13:56)  BP: 121/65 (19 @ 13:56)  RR: 18 (19 @ 13:56)  SpO2: 100% (19 @ 13:56)  Wt(kg): --     @ 07:01  -   @ 07:00  --------------------------------------------------------  IN: 100 mL / OUT: 850 mL / NET: -750 mL    03-22 @ 07:01  -   @ 16:02  --------------------------------------------------------  IN: 0 mL / OUT: 750 mL / NET: -750 mL      PHYSICAL EXAM:  Constitutional: NAD  HEENT: icteric sclera,+  Neck: No JVD  Respiratory: CTAB, no wheezes, rales or rhonchi  Cardiovascular: S1, S2, RRR  Gastrointestinal: BS+, soft, NT/ND  Extremities: No cyanosis or clubbing. No peripheral edema  Neurological: A/O x 2, no focal deficits  : No CVA tenderness. +flores.   Skin: No rashes      LABS:      147<H>  |  115<H>  |  23  ----------------------------<  55<L>  3.6   |  17<L>  |  1.91<H>    Ca    9.0      22 Mar 2019 06:00  Phos  3.4       Mg     1.6         TPro  6.1  /  Alb  2.5<L>  /  TBili  22.1<H>  /  DBili      /  AST  333<H>  /  ALT  209<H>  /  AlkPhos  249<H>      Creatinine Trend: 1.91 <--, 1.89 <--, 1.97 <--, 1.88 <--, 1.64 <--, 1.57 <--, 1.50 <--                        8.7    6.52  )-----------( 120      ( 22 Mar 2019 06:00 )             24.8     Urine Studies:  Urinalysis Basic - ( 20 Mar 2019 13:25 )    Color: ELSA / Appearance: TURBID / S.020 / pH: 6.0  Gluc: 100 / Ketone: NEGATIVE  / Bili: LARGE / Urobili: 4.0   Blood: MODERATE / Protein: 100 / Nitrite: NEGATIVE   Leuk Esterase: LARGE / RBC: 3-5 / WBC >50   Sq Epi:  / Non Sq Epi: OCC / Bacteria: MANY          RADIOLOGY & ADDITIONAL STUDIES:

## 2019-03-22 NOTE — PROGRESS NOTE ADULT - ASSESSMENT
1. Hyperbilirubinemia    -- s/p TJ Liver Bx on 3/21. F/u path  -- GI following - acute cholestatic hepatitis of unclear etiology. ? Drug related  -- MRI/MRCP distended GB w Cholelithiasis. No biliary ductal dilatation  -- no evidence of malignancy on CT A/P  -- tumor markers unremarkable     2. coagulopathy secondary to liver disease   -- vitamin K given  -- FFP if bleeding     3 Anemia    -- stable H/H  -- Ferritin elevated   -- hx of GI Bleed sec to divertic  -- monitor for now    4. Thrombocytopenia    -- platelet count stable  -- sec to acute liver injury  --  no bleeding  -- monitor for now    Carlos Gloria MD  [phone]

## 2019-03-22 NOTE — PROGRESS NOTE ADULT - ASSESSMENT
89 yo M p/w confusion and generalized weakness    -> Transaminitis and Bilirubinemia 2/2 cholestatic hepatitis:      - s/p IR liver Biopsy, f/u results/pathology      - IR biopsy complicated by a blood clot passing into the right atrium - c/w close monitoring, tele      - diet      - all consultants management appreciated      - adjust management accordingly (ie: diet, intervention, rx, etc.)  -> Complicated Bacterial Pneumonia likely gram negative organism:      - iv abx      - nebs, oxygen supplementation prn      - adjust abx per ID   -> Acute Cystitis:      - abx per ID   -> Thrombocytopenia:      - potentially 2/2 liver dysfunction, hem/onc on board  -> INR Elevation:      - 2/2 vitamin k deficiency      - s/p vit k, monitor inr and clinical status  -> PHU:      - stable, c/w hydration, treat underlying liver condition, avoid nephrotoxic rx, strict i/o, nephro on board  -> Metabolic Encephalopathy:      - mildly improving, treat underlying gi condition      - neuro checks       - fall precautions   -> Hypernatermia Hyperchloremia:      - IVF, monitor bmp, adjust accordingly   -> BPH:     - flomax  -> Need for prophylactic measure:      - dvt/gi ppx  -> Severe Protein Calorie Malnutrition:      - supplement diet

## 2019-03-22 NOTE — PROGRESS NOTE ADULT - SUBJECTIVE AND OBJECTIVE BOX
PASCUAL OLSEN:[id number],   89yMale followed for:  No Known Allergies    PAST MEDICAL & SURGICAL HISTORY:  Chronic kidney disease (CKD), stage IV (severe)  Benign prostatic hypertrophy  S/P cataract surgery, left: 3 yrs ago    FAMILY HISTORY:  No pertinent family history in first degree relatives    MEDICATIONS  (STANDING):  ampicillin/sulbactam  IVPB      ampicillin/sulbactam  IVPB 3 Gram(s) IV Intermittent every 12 hours  enoxaparin Injectable 30 milliGRAM(s) SubCutaneous daily  ergocalciferol 92136 Unit(s) Oral every week  pantoprazole  Injectable 40 milliGRAM(s) IV Push daily  sodium chloride 0.45%. 1000 milliLiter(s) (50 mL/Hr) IV Continuous <Continuous>  tamsulosin Oral Tab/Cap - Peds 0.4 milliGRAM(s) Oral at bedtime    MEDICATIONS  (PRN):      Vital Signs Last 24 Hrs  T(C): 36.1 (22 Mar 2019 05:56), Max: 36.2 (21 Mar 2019 14:53)  T(F): 97 (22 Mar 2019 05:56), Max: 97.2 (22 Mar 2019 02:00)  HR: 85 (22 Mar 2019 05:56) (77 - 91)  BP: 114/61 (22 Mar 2019 05:56) (114/61 - 141/70)  BP(mean): --  RR: 18 (22 Mar 2019 05:56) (18 - 20)  SpO2: 100% (22 Mar 2019 05:56) (99% - 100%)  nc/at  s1s2  cta  soft, nt, nd no guarding or rebound  no c/c/e    CBC Full  -  ( 22 Mar 2019 06:00 )  WBC Count : 6.52 K/uL  Hemoglobin : 8.7 g/dL  Hematocrit : 24.8 %  Platelet Count - Automated : 120 K/uL  Mean Cell Volume : 76.5 fL  Mean Cell Hemoglobin : 26.9 pg  Mean Cell Hemoglobin Concentration : 35.1 %  Auto Neutrophil # : 4.71 K/uL  Auto Lymphocyte # : 0.76 K/uL  Auto Monocyte # : 0.84 K/uL  Auto Eosinophil # : 0.09 K/uL  Auto Basophil # : 0.03 K/uL  Auto Neutrophil % : 72.1 %  Auto Lymphocyte % : 11.7 %  Auto Monocyte % : 12.9 %  Auto Eosinophil % : 1.4 %  Auto Basophil % : 0.5 %    03-22    147<H>  |  115<H>  |  23  ----------------------------<  55<L>  3.6   |  17<L>  |  1.91<H>    Ca    9.0      22 Mar 2019 06:00  Phos  3.4     03-22  Mg     1.6     03-22    TPro  6.1  /  Alb  2.5<L>  /  TBili  22.1<H>  /  DBili  x   /  AST  333<H>  /  ALT  209<H>  /  AlkPhos  249<H>  03-22    PT/INR - ( 22 Mar 2019 06:00 )   PT: 21.6 SEC;   INR: 1.86          PTT - ( 22 Mar 2019 06:00 )  PTT:62.8 SEC

## 2019-03-22 NOTE — PROGRESS NOTE ADULT - ASSESSMENT
A 89 yo Male with BPH, Cataracts, Diverticulosis, Anemia 2/2 GI Bleed with previous PRBC transfusions, who presents to the ER for evaluation of  generalized  weakness, urinary retention and confusion. On admission, he found to have elevated bilirubin, LFTS, transaminitis, scleral jaundice. and urinary retention. Oshea catheter has placed with negative Urine analysis. The CT abdomen  and pelvis shows Distended gallbladder. Consider further evaluation with ultrasound if  acute cholecystitis is a concern. No discrete mass identified on this limited noncontrast study. The ID consult requested to assist with evaluation of Biliary sepsis.    # Encephalopathy  # Urinary retention  # R/O biliary sepsis/ Cholangitis - Most likely acute cholestatic hepatitis as per GI -s/p  transjugular liver biopsy with two passes. and found to have small right atrium blood clot. 3/21/19  # Distended GB with cholelithiasis on MRCP 3/18/19  # Hypothermia- Pneumonia on CXR - MRSA PCR is negative   # UTI- Enterococcus faecalis 3/20/19    would recommend:    1. Follow up sensitivity of Enterococcus in urine and Antibiotics accordingly  2. Continue Unasyn and  Vancomycin until sensitivity of Enterococcus resulted  3. Follow up Liver biopsy  4.  Monitor Temp. and c/w supportive care  5. Supplemental oxygenation and  bronchodilator as needed    d/w Dr. Branham, patient and Nursing staff    will follow the patient with you

## 2019-03-22 NOTE — PROGRESS NOTE ADULT - ASSESSMENT
courtney postiive, ? autimmune heaptitis, ? drug induced, unclear if new meds.  pt with severe cholestatis.  bx yestday.  ivan path

## 2019-03-22 NOTE — PROGRESS NOTE ADULT - SUBJECTIVE AND OBJECTIVE BOX
S: no complaints         ampicillin/sulbactam  IVPB      ampicillin/sulbactam  IVPB 3 Gram(s) IV Intermittent every 12 hours  enoxaparin Injectable 30 milliGRAM(s) SubCutaneous daily  ergocalciferol 51743 Unit(s) Oral every week  pantoprazole  Injectable 40 milliGRAM(s) IV Push daily  sodium chloride 0.45%. 1000 milliLiter(s) IV Continuous <Continuous>  tamsulosin Oral Tab/Cap - Peds 0.4 milliGRAM(s) Oral at bedtime                            8.7    6.52  )-----------( 120      ( 22 Mar 2019 06:00 )             24.8       03-22    147<H>  |  115<H>  |  23  ----------------------------<  55<L>  3.6   |  17<L>  |  1.91<H>    Ca    9.0      22 Mar 2019 06:00  Phos  3.4     03-22  Mg     1.6     03-22    TPro  6.1  /  Alb  2.5<L>  /  TBili  22.1<H>  /  DBili  x   /  AST  333<H>  /  ALT  209<H>  /  AlkPhos  249<H>  03-22            T(C): 36.3 (03-22-19 @ 09:56), Max: 36.3 (03-22-19 @ 09:56)  HR: 92 (03-22-19 @ 09:56) (85 - 92)  BP: 128/80 (03-22-19 @ 09:56) (114/61 - 134/66)  RR: 18 (03-22-19 @ 09:56) (18 - 20)  SpO2: 100% (03-22-19 @ 09:56) (100% - 100%)  Wt(kg): --    I&O's Summary    21 Mar 2019 07:01  -  22 Mar 2019 07:00  --------------------------------------------------------  IN: 100 mL / OUT: 850 mL / NET: -750 mL    22 Mar 2019 07:01  -  22 Mar 2019 15:15  --------------------------------------------------------  IN: 0 mL / OUT: 600 mL / NET: -600 mL        Heart: normal S1, S2, RRR, no m/r/g  Lungs: cta b/l  Abd: soft nT  Ext: no edema     TELEMETRY: SR	      EKG: NSR 70    RADIOLOGY:         CXR:  Patchy opacity within the right lower lung may represent pneumonia. Subsegmental atelectasis at the left base. No pleural effusion or   	pneumothorax. No pulmonary edema.      ASSESSMENT/PLAN: 	    89 yo M, poor historian, charts reviewed, with PMHx BPH, Cataracts, Diverticulosis, Anemia 2/2 GI Bleed with previous PRBC transfusions, who p/w 2 days generalized  weakness, urinary retention and confusion. LFTs significant for transaminitis, scleral jaundice.     -pt. tolerated biopsy well in terms of cv perspective  -tele stable  -no clinical heart failure or anginal symptoms  -follow up GI  -no further cardiac workup needed at this time    Rashmi Morrison MD

## 2019-03-22 NOTE — PROGRESS NOTE ADULT - SUBJECTIVE AND OBJECTIVE BOX
Interventional Radiology Follow- Up Note      89y Male s/p Transjugular Liver Biopsy on 03/22/19. Mild right neck soreness. No other complaints.    Vitals: T(F): 97 (03-22-19 @ 05:56), Max: 97.2 (03-22-19 @ 02:00)  HR: 85 (03-22-19 @ 05:56) (77 - 91)  BP: 114/61 (03-22-19 @ 05:56) (114/61 - 141/70)  RR: 18 (03-22-19 @ 05:56) (18 - 20)  SpO2: 100% (03-22-19 @ 05:56) (99% - 100%)  Wt(kg): --    LABS:                        8.9    6.00  )-----------( 133      ( 21 Mar 2019 07:26 )             25.5     03-21    146<H>  |  116<H>  |  20  ----------------------------<  99  3.5   |  20<L>  |  1.89<H>    Ca    8.6      21 Mar 2019 07:30  Phos  2.4     03-21  Mg     1.6     03-21    TPro  6.2  /  Alb  2.3<L>  /  TBili  21.8<H>  /  DBili  x   /  AST  385<H>  /  ALT  240<H>  /  AlkPhos  255<H>  03-21    PT/INR - ( 22 Mar 2019 06:00 )   PT: 21.6 SEC;   INR: 1.86          PTT - ( 22 Mar 2019 06:00 )  PTT:62.8 SEC  I&O's Detail    21 Mar 2019 07:01  -  22 Mar 2019 07:00  --------------------------------------------------------  IN:    IV PiggyBack: 100 mL  Total IN: 100 mL    OUT:    Indwelling Catheter - Urethral: 850 mL  Total OUT: 850 mL    Total NET: -750 mL      PHYSICAL EXAM:    General: Nontoxic, in NAD  Neuro:  Alert & oriented x 3  Right neck site c,d,i, No right neck hematoma.     Impression: 89y Male s/p Transjugular Liver Biopsy on 03/22/19 c/b inadvertent flush of an autologous blood clot.     Plan:  -Patient was monitored overnight on telemetry with no tacchycardia or sustained irregular heart rate or arrythmias. Currently patient is hemodynamically stable.   -F/Up CBC  -Patient may come off tele given lack of cardiac arrythmia and given hemodynamic stability.

## 2019-03-22 NOTE — PROGRESS NOTE ADULT - ASSESSMENT
89 yo M with PMHx CKD, BPH, Diverticulosis, Anemia 2/2 GI Bleed with previous PRBC transfusions, who p/w 2 days genearlized weakness, urinary retention and confusion, found to have elevated LFTs. Renal following for PHU, CKD management.    PHU on CKD 3 b/l Cr 1.4-1.6 10/2018  PHU most likely 2/2 pre renal. clincially appears dry. C3 77- low, C4 wnl -noted- likely 2/2 dec synthesis 2/2 liver dz   urinary retention on CT w/o hydronephrosis. no e/o acute GN  electrolytes acceptable. Cr improved, stable now at 1.9- likely pre renal vs ATN. u/o good, 850ml noted past 24 hrs    Mild hypernatremia  Transaminitis and Bilirubinemia, -acute cholestatic hepatitis of unclear etiology.  s/p liver Bx by IR 3/21  Hepatic encephalopathy- sr NH4 level 91 on admission.  HTN, controlled. bp stable  BPH: c/w flomax    labs, chart reviewed  keep flores for now  c/w 1/2NS @75ml/hr for now  encourage po intake  f/u GI and hem  off Norvasc 5mg daily -BP better  avoid ACEi/ARB/NSAIDs/nephrotoxics  dose all meds for eGFR <15ml/min  montior BMP, LFTs daily  monitor U/O  will f/u

## 2019-03-22 NOTE — PROGRESS NOTE ADULT - SUBJECTIVE AND OBJECTIVE BOX
Patient is seen and examined at the bed side, is afebrile. He mentioned feeling better today. The sensitivity of Enterococcus still  pending.         REVIEW OF SYSTEMS: All other review systems are negative        ALLERGIES: No Known Allergies        Vital Signs Last 24 Hrs  T(C): 36.3 (22 Mar 2019 09:56), Max: 36.3 (22 Mar 2019 09:56)  T(F): 97.4 (22 Mar 2019 09:56), Max: 97.4 (22 Mar 2019 09:56)  HR: 92 (22 Mar 2019 09:56) (77 - 92)  BP: 128/80 (22 Mar 2019 09:56) (114/61 - 141/70)  BP(mean): --  RR: 18 (22 Mar 2019 09:56) (18 - 20)  SpO2: 100% (22 Mar 2019 09:56) (99% - 100%)      PHYSICAL EXAM:  GENERAL: Not in distress   HEENT: Sclera jaundiced  CHEST/LUNG:  Air entry bilaterally  HEART: s1 and s2 present  ABDOMEN:  Nontender and  Nondistended  : Oshea catheter in placed  EXTREMITIES: No pedal  edema  CNS: Awake and Alert        LABS:                        8.9    6.00  )-----------( 133      ( 21 Mar 2019 07:26 )             25.5                           8.2    5.72  )-----------( 120      ( 20 Mar 2019 05:45 )             23.0         03-21    146<H>  |  116<H>  |  20  ----------------------------<  99  3.5   |  20<L>  |  1.89<H>    Ca    8.6      21 Mar 2019 07:30  Phos  2.4     03-21  Mg     1.6     03-21    TPro  6.2  /  Alb  2.3<L>  /  TBili  21.8<H>  /  DBili  x   /  AST  385<H>  /  ALT  240<H>  /  AlkPhos  255<H>  03-21         03-20    145  |  116<H>  |  20  ----------------------------<  100<H>  3.7   |  19<L>  |  1.97<H>    Ca    8.9      20 Mar 2019 10:40  Phos  2.1     03-20  Mg     1.6     03-20    TPro  5.8<L>  /  Alb  2.3<L>  /  TBili  18.4<H>  /  DBili  x   /  AST  397<H>  /  ALT  238<H>  /  AlkPhos  232<H>  03-20      Urinalysis (03.20.19 @ 13:25)    Color: ELSA    Urine Appearance: TURBID    Glucose: 100    Bilirubin: LARGE    Ketone - Urine: NEGATIVE    Specific Gravity: 1.020    Blood: MODERATE    pH - Urine: 6.0    Protein, Urine: 100    Urobilinogen: 4.0    Nitrite: NEGATIVE    Leukocyte Esterase Concentration: LARGE    Red Blood Cell - Urine: 3-5    White Blood Cell - Urine: >50    White Blood Cell Casts: 2-5/LPF    Epithelial Cells: OCC    Bacteria: MANY    Coarse Granular Casts: 0-2/LPF      MEDICATIONS  (STANDING):  ampicillin/sulbactam  IVPB      ampicillin/sulbactam  IVPB 3 Gram(s) IV Intermittent every 12 hours  enoxaparin Injectable 30 milliGRAM(s) SubCutaneous daily  ergocalciferol 63536 Unit(s) Oral every week  pantoprazole  Injectable 40 milliGRAM(s) IV Push daily  sodium chloride 0.45%. 1000 milliLiter(s) (50 mL/Hr) IV Continuous <Continuous>  tamsulosin Oral Tab/Cap - Peds 0.4 milliGRAM(s) Oral at bedtime    MEDICATIONS  (PRN):        RADIOLOGY & ADDITIONAL TESTS:    3/20/19 : Xray Chest 1 View- PORTABLE-Urgent (03.20.19 @ 09:37) Patchy opacity within the right lower lung may represent pneumonia. Subsegmental atelectasis at the left base. No pleural effusion or   pneumothorax. No pulmonary edema. The cardiac silhouette remains enlarged. Aortic aneurysm. Recommend  further evaluation with aortogram.      3/18/19 : MR MRCP No Cont (03.18.19 @ 14:22) Gallbladder is distended with cholelithiasis.  No choledocholithiasis or biliary ductal dilatation.      3/17/19 : NM Hepatobiliary Imaging (03.17.19 @ 12:21) Abnormal hepatobiliary scan suspicious for common bile duct obstruction. Hepatocellular dysfunction. No evidence of acute cholecystitis.      3/18/19 : US Abdomen Limited (03.18.19 @ 07:48) Gallbladder is distended with sludge and stones.  Mural thickening of the wall of the common bile duct, possibly  cholangitis. No biliary ductal dilatation.      3/15/19 : CT Abdomen and Pelvis No Cont (03.15.19 @ 09:39) Distended gallbladder. Consider further evaluation with ultrasound if  acute cholecystitis is a concern.  No discrete mass identified on this limited noncontrast study.          MICROBIOLOGY DATA:    Culture - Urine (03.20.19 @ 15:21)    Culture - Urine:   ENTF^Enterococcus faecalis  COLONY COUNT: > = 100,000 CFU/ML    Specimen Source: URINE CATHETER        MRSA Infection Control Screen (PCR) (03.20.19 @ 13:46)    MRSA Infection Control Screen (PCR): NOT DETECTED     REFERENCE RANGE:  MRSA DNA NOT DETECTED    A result of MRSA DNA not detected does not preclude the  possibility of colonization with MRSA.  This result was obtained using the Mantex Xpert  MRSA assay and the GeneXpert Dx System.      Culture - Blood (03.15.19 @ 14:03)    Culture - Blood:   NO ORGANISMS ISOLATED  NO ORGANISMS ISOLATED AT 24 HOURS    Specimen Source: BLOOD

## 2019-03-22 NOTE — PROGRESS NOTE ADULT - SUBJECTIVE AND OBJECTIVE BOX
[facility]        - Patient seen and examined.  - Today, patient is without complaints.         *****MEDICATIONS: Current medication reviewed and documented.    MEDICATIONS  (STANDING):  ampicillin/sulbactam  IVPB      ampicillin/sulbactam  IVPB 3 Gram(s) IV Intermittent every 12 hours  enoxaparin Injectable 30 milliGRAM(s) SubCutaneous daily  ergocalciferol 02760 Unit(s) Oral every week  pantoprazole  Injectable 40 milliGRAM(s) IV Push daily  sodium chloride 0.45%. 1000 milliLiter(s) (50 mL/Hr) IV Continuous <Continuous>  tamsulosin Oral Tab/Cap - Peds 0.4 milliGRAM(s) Oral at bedtime    MEDICATIONS  (PRN):          ***** VITAL SIGNS:  T(F): 97.1 (19 @ 13:56), Max: 97.4 (19 @ 09:56)  HR: 87 (19 @ 13:56) (85 - 92)  BP: 121/65 (19 @ 13:56) (114/61 - 134/66)  RR: 18 (19 @ 13:56) (18 - 20)  SpO2: 100% (19 @ 13:56) (100% - 100%)  Wt(kg): --  ,   I&O's Summary    21 Mar 2019 07:  -  22 Mar 2019 07:00  --------------------------------------------------------  IN: 100 mL / OUT: 850 mL / NET: -750 mL    22 Mar 2019 07:01  -  22 Mar 2019 18:07  --------------------------------------------------------  IN: 0 mL / OUT: 750 mL / NET: -750 mL             *****PHYSICAL EXAM:   alert oriented x 3 attention comprehension are fair.  Able to name, repeat.   EOmi fundi not visualized   no nystagmus VFF to confrontation  Tongue is midline  Palate elevates symmetrically   Moving all 4 ext spontaneously no drift appreciated    Gait not assessed.            *****LAB AND IMAGIN.7    6.52  )-----------( 120      ( 22 Mar 2019 06:00 )             24.8               03-22    147<H>  |  115<H>  |  23  ----------------------------<  55<L>  3.6   |  17<L>  |  1.91<H>    Ca    9.0      22 Mar 2019 06:00  Phos  3.4     03-  Mg     1.6     -    TPro  6.1  /  Alb  2.5<L>  /  TBili  22.1<H>  /  DBili  x   /  AST  333<H>  /  ALT  209<H>  /  AlkPhos  249<H>      PT/INR - ( 22 Mar 2019 06:00 )   PT: 21.6 SEC;   INR: 1.86          PTT - ( 22 Mar 2019 06:00 )  PTT:62.8 SEC                     [All pertinent recent Imaging/Reports reviewed]           *****A S S E S S M E N T   A N D   P L A N :      89 yo M, poor historian, charts reviewed, with PMHx BPH, Cataracts, Diverticulosis, Anemia 2/2 GI Bleed with previous PRBC transfusions, who p/w 2 days genearlized weakness, urinary retention and confusion.  Upon arrival to the ED, pt with scleral icterus and labs significant for transaminitis and TBili > 20    called to evaluate pt for ams         Problem/Recommendations 1:  Multifactorial toxic metabolic encephalopathy  , hyperbilirubinemia, hyperammonemia, and acute on chronic renal insufficiency.   correct metabolic dyscrasias defer to renal/gi   repeat ammonia level consider lactulose   will continue to monitor   encourage po intake.      Problem/Recommendations 2: sepsis ID on board   defer to id for management     Problem/Recommendations 3:  Hyperbilirubinemia  suspicious for cholestatic pattern   distended gb with cholelithiasis, mural thickening of the cbd. defer to gi for management.      pending liver biopsy results.   Thank you for allowing me to participate in the care of this patient. Please do not hesitate to call me if you have any  questions.        ________________      Thank you for allowing me to participate in the care of this patient. Please do not hesitate to call me if you have any  questions.        ________________  Ava Schreiber MD  [facility] ([facility]  [phone]      30 minutes spent on total encounter; more than 50 % of the visit was  spent counseling about plan of care, compliance to diet/exercise and medication regimen and or  coordinating care by the attending physician.      It is advised that s stroke patients follow up with DULCE MARIA Rausch @ [phone] in 1- 2 weeks.   Others please follow up with Dr. Michael Nissenbaum [phone]

## 2019-03-22 NOTE — PROGRESS NOTE ADULT - SUBJECTIVE AND OBJECTIVE BOX
Pt is seen and examined  pt is awake and lying in bed   had transjugular  liver bx yesterday      PAST MEDICAL & SURGICAL HISTORY:  Chronic kidney disease (CKD), stage IV (severe)  Benign prostatic hypertrophy  S/P cataract surgery, left: 3 yrs ago      ROS:  Negative except for:    MEDICATIONS  (STANDING):  ampicillin/sulbactam  IVPB      ampicillin/sulbactam  IVPB 3 Gram(s) IV Intermittent every 12 hours  enoxaparin Injectable 30 milliGRAM(s) SubCutaneous daily  ergocalciferol 71802 Unit(s) Oral every week  pantoprazole  Injectable 40 milliGRAM(s) IV Push daily  sodium chloride 0.45%. 1000 milliLiter(s) (50 mL/Hr) IV Continuous <Continuous>  tamsulosin Oral Tab/Cap - Peds 0.4 milliGRAM(s) Oral at bedtime  vancomycin  IVPB 750 milliGRAM(s) IV Intermittent every 24 hours    MEDICATIONS  (PRN):      Allergies    No Known Allergies    Intolerances        Vital Signs Last 24 Hrs  T(C): 36.1 (22 Mar 2019 05:56), Max: 36.2 (21 Mar 2019 14:53)  T(F): 97 (22 Mar 2019 05:56), Max: 97.2 (22 Mar 2019 02:00)  HR: 85 (22 Mar 2019 05:56) (77 - 91)  BP: 114/61 (22 Mar 2019 05:56) (114/61 - 141/70)  BP(mean): --  RR: 18 (22 Mar 2019 05:56) (18 - 20)  SpO2: 100% (22 Mar 2019 05:56) (99% - 100%)    PHYSICAL EXAM    General: elderly male in NAD  HEENT: + Icterus  Neck: supple  CV: normal S1/S2 with no murmur rubs or gallops  Lungs: positive air movement b/l ant lungs,clear to auscultation, no wheezes, no rales  Abdomen: soft non-tender non-distended, no hepatosplenomegaly  Ext: no clubbing cyanosis or edema      LABS:                          8.7    6.52  )-----------( 120      ( 22 Mar 2019 06:00 )             24.8     Serial CBC's  03-22 @ 06:00  Hct-24.8 / Hgb-8.7 / Plat-120 / RBC-3.24 / WBC-6.52          Serial CBC's  03-21 @ 07:26  Hct-25.5 / Hgb-8.9 / Plat-133 / RBC-3.37 / WBC-6.00            03-22    147<H>  |  115<H>  |  23  ----------------------------<  55<L>  3.6   |  17<L>  |  1.91<H>    Ca    9.0      22 Mar 2019 06:00  Phos  3.4     03-22  Mg     1.6     03-22    TPro  6.1  /  Alb  2.5<L>  /  TBili  22.1<H>  /  DBili  x   /  AST  333<H>  /  ALT  209<H>  /  AlkPhos  249<H>  03-22      PT/INR - ( 22 Mar 2019 06:00 )   PT: 21.6 SEC;   INR: 1.86          PTT - ( 22 Mar 2019 06:00 )  PTT:62.8 SEC    WBC Count: 6.52 K/uL (03-22 @ 06:00)  Hemoglobin: 8.7 g/dL (03-22 @ 06:00)            RADIOLOGY & ADDITIONAL STUDIES:

## 2019-03-23 LAB
-  AMPICILLIN: SIGNIFICANT CHANGE UP
-  CIPROFLOXACIN: SIGNIFICANT CHANGE UP
-  NITROFURANTOIN: SIGNIFICANT CHANGE UP
-  TETRACYCLINE: SIGNIFICANT CHANGE UP
-  VANCOMYCIN: SIGNIFICANT CHANGE UP
ALBUMIN SERPL ELPH-MCNC: 2.2 G/DL — LOW (ref 3.3–5)
ALP SERPL-CCNC: 255 U/L — HIGH (ref 40–120)
ALT FLD-CCNC: 194 U/L — HIGH (ref 4–41)
ANION GAP SERPL CALC-SCNC: 11 MMO/L — SIGNIFICANT CHANGE UP (ref 7–14)
APTT BLD: 65.2 SEC — HIGH (ref 27.5–36.3)
AST SERPL-CCNC: 287 U/L — HIGH (ref 4–40)
BACTERIA UR CULT: SIGNIFICANT CHANGE UP
BILIRUB SERPL-MCNC: 20.2 MG/DL — HIGH (ref 0.2–1.2)
BUN SERPL-MCNC: 27 MG/DL — HIGH (ref 7–23)
CALCIUM SERPL-MCNC: 8.8 MG/DL — SIGNIFICANT CHANGE UP (ref 8.4–10.5)
CHLORIDE SERPL-SCNC: 116 MMOL/L — HIGH (ref 98–107)
CO2 SERPL-SCNC: 17 MMOL/L — LOW (ref 22–31)
CREAT SERPL-MCNC: 2.07 MG/DL — HIGH (ref 0.5–1.3)
GLUCOSE SERPL-MCNC: 78 MG/DL — SIGNIFICANT CHANGE UP (ref 70–99)
HCT VFR BLD CALC: 23.6 % — LOW (ref 39–50)
HGB BLD-MCNC: 8.5 G/DL — LOW (ref 13–17)
INR BLD: 1.85 — HIGH (ref 0.88–1.17)
MAGNESIUM SERPL-MCNC: 1.7 MG/DL — SIGNIFICANT CHANGE UP (ref 1.6–2.6)
MCHC RBC-ENTMCNC: 26.7 PG — LOW (ref 27–34)
MCHC RBC-ENTMCNC: 36 % — SIGNIFICANT CHANGE UP (ref 32–36)
MCV RBC AUTO: 74.2 FL — LOW (ref 80–100)
METHOD TYPE: SIGNIFICANT CHANGE UP
NRBC # FLD: 0.04 K/UL — LOW (ref 25–125)
ORGANISM # SPEC MICROSCOPIC CNT: SIGNIFICANT CHANGE UP
PLATELET # BLD AUTO: 110 K/UL — LOW (ref 150–400)
PMV BLD: SIGNIFICANT CHANGE UP FL (ref 7–13)
POTASSIUM SERPL-MCNC: 3.5 MMOL/L — SIGNIFICANT CHANGE UP (ref 3.5–5.3)
POTASSIUM SERPL-SCNC: 3.5 MMOL/L — SIGNIFICANT CHANGE UP (ref 3.5–5.3)
PROT SERPL-MCNC: 5.7 G/DL — LOW (ref 6–8.3)
PROTHROM AB SERPL-ACNC: 21.5 SEC — HIGH (ref 9.8–13.1)
RBC # BLD: 3.18 M/UL — LOW (ref 4.2–5.8)
RBC # FLD: 27.9 % — HIGH (ref 10.3–14.5)
SODIUM SERPL-SCNC: 144 MMOL/L — SIGNIFICANT CHANGE UP (ref 135–145)
WBC # BLD: 5.74 K/UL — SIGNIFICANT CHANGE UP (ref 3.8–10.5)
WBC # FLD AUTO: 5.74 K/UL — SIGNIFICANT CHANGE UP (ref 3.8–10.5)

## 2019-03-23 PROCEDURE — 92610 EVALUATE SWALLOWING FUNCTION: CPT

## 2019-03-23 PROCEDURE — 93306 TTE W/DOPPLER COMPLETE: CPT | Mod: 26

## 2019-03-23 RX ORDER — URSODIOL 250 MG/1
300 TABLET, FILM COATED ORAL
Qty: 0 | Refills: 0 | Status: DISCONTINUED | OUTPATIENT
Start: 2019-03-23 | End: 2019-03-28

## 2019-03-23 RX ORDER — SODIUM CHLORIDE 9 MG/ML
1000 INJECTION, SOLUTION INTRAVENOUS
Qty: 0 | Refills: 0 | Status: DISCONTINUED | OUTPATIENT
Start: 2019-03-23 | End: 2019-03-24

## 2019-03-23 RX ORDER — SODIUM CHLORIDE 9 MG/ML
1000 INJECTION INTRAMUSCULAR; INTRAVENOUS; SUBCUTANEOUS
Qty: 0 | Refills: 0 | Status: DISCONTINUED | OUTPATIENT
Start: 2019-03-23 | End: 2019-03-23

## 2019-03-23 RX ADMIN — SODIUM CHLORIDE 100 MILLILITER(S): 9 INJECTION INTRAMUSCULAR; INTRAVENOUS; SUBCUTANEOUS at 12:49

## 2019-03-23 RX ADMIN — Medication 60 MILLIGRAM(S): at 12:56

## 2019-03-23 RX ADMIN — SODIUM CHLORIDE 100 MILLILITER(S): 9 INJECTION, SOLUTION INTRAVENOUS at 14:37

## 2019-03-23 RX ADMIN — URSODIOL 300 MILLIGRAM(S): 250 TABLET, FILM COATED ORAL at 11:33

## 2019-03-23 RX ADMIN — ENOXAPARIN SODIUM 30 MILLIGRAM(S): 100 INJECTION SUBCUTANEOUS at 11:33

## 2019-03-23 RX ADMIN — AMPICILLIN SODIUM AND SULBACTAM SODIUM 200 GRAM(S): 250; 125 INJECTION, POWDER, FOR SUSPENSION INTRAMUSCULAR; INTRAVENOUS at 01:42

## 2019-03-23 RX ADMIN — AMPICILLIN SODIUM AND SULBACTAM SODIUM 200 GRAM(S): 250; 125 INJECTION, POWDER, FOR SUSPENSION INTRAMUSCULAR; INTRAVENOUS at 13:37

## 2019-03-23 NOTE — PROGRESS NOTE ADULT - SUBJECTIVE AND OBJECTIVE BOX
[facility]        - Patient seen and examined.  - Today, patient is without complaints.         *****MEDICATIONS: Current medication reviewed and documented.    MEDICATIONS  (STANDING):  ampicillin/sulbactam  IVPB      ampicillin/sulbactam  IVPB 3 Gram(s) IV Intermittent every 12 hours  enoxaparin Injectable 30 milliGRAM(s) SubCutaneous daily  ergocalciferol 69629 Unit(s) Oral every week  levoFLOXacin IVPB      methylPREDNISolone sodium succinate Injectable 60 milliGRAM(s) IV Push daily  sodium chloride 0.45%. 1000 milliLiter(s) (100 mL/Hr) IV Continuous <Continuous>  ursodiol Capsule 300 milliGRAM(s) Oral two times a day    MEDICATIONS  (PRN):          ***** VITAL SIGNS:  T(F): 97.4 (19 @ 19:30), Max: 97.6 (19 @ 15:37)  HR: 73 (19 @ 19:30) (70 - 76)  BP: 135/78 (19 @ 19:30) (109/59 - 135/78)  RR: 17 (19 @ 19:30) (17 - 20)  SpO2: 100% (19 @ 19:30) (99% - 100%)  Wt(kg): --  ,   I&O's Summary    22 Mar 2019 07:01  -  23 Mar 2019 07:00  --------------------------------------------------------  IN: 950 mL / OUT: 1350 mL / NET: -400 mL             *****PHYSICAL EXAM: somewhat lethargic   alert oriented x 3 attention comprehension are fair.  Able to name, repeat.   EOmi fundi not visualized   no nystagmus VFF to confrontation  Tongue is midline  Palate elevates symmetrically   Moving all 4 ext spontaneously no drift appreciated    Gait not assessed.            *****LAB AND IMAGIN.5    5.74  )-----------( 110      ( 23 Mar 2019 05:33 )             23.6               -    144  |  116<H>  |  27<H>  ----------------------------<  78  3.5   |  17<L>  |  2.07<H>    Ca    8.8      23 Mar 2019 05:33  Phos  3.4     03-  Mg     1.7         TPro  5.7<L>  /  Alb  2.2<L>  /  TBili  20.2<H>  /  DBili  x   /  AST  287<H>  /  ALT  194<H>  /  AlkPhos  255<H>  23    PT/INR - ( 23 Mar 2019 05:33 )   PT: 21.5 SEC;   INR: 1.85          PTT - ( 23 Mar 2019 05:33 )  PTT:65.2 SEC                     [All pertinent recent Imaging/Reports reviewed]           *****A S S E S S M E N T   A N D   P L A N :          89 yo M, poor historian, charts reviewed, with PMHx BPH, Cataracts, Diverticulosis, Anemia 2/2 GI Bleed with previous PRBC transfusions, who p/w 2 days genearlized weakness, urinary retention and confusion.  Upon arrival to the ED, pt with scleral icterus and labs significant for transaminitis and TBili > 20    called to evaluate pt for ams         Problem/Recommendations 1:  Multifactorial toxic metabolic encephalopathy  , hyperbilirubinemia, hyperammonemia, and acute on chronic renal insufficiency.   correct metabolic dyscrasias defer to renal/gi   repeat ammonia level consider lactulose   will continue to monitor   encourage po intake.      Problem/Recommendations 2: sepsis ID on board   defer to id for management     Problem/Recommendations 3:  Hyperbilirubinemia  suspicious for cholestatic pattern   distended gb with cholelithiasis, mural thickening of the cbd. defer to gi for management.      pending liver biopsy result    Thank you for allowing me to participate in the care of this patient. Please do not hesitate to call me if you have any  questions.        ________________  Ava Schreiber MD  [facility] ([facility]  [phone]      30 minutes spent on total encounter; more than 50 % of the visit was  spent counseling about plan of care, compliance to diet/exercise and medication regimen and or  coordinating care by the attending physician.      It is advised that s stroke patients follow up with DULCE MARIA Rausch @ [phone] in 1- 2 weeks.   Others please follow up with Dr. Michael Nissenbaum [phone]

## 2019-03-23 NOTE — SWALLOW BEDSIDE ASSESSMENT ADULT - ORAL PHASE
Within functional limits Delayed oral transit time/Lingual stasis/Decreased anterior-posterior movement of the bolus

## 2019-03-23 NOTE — PROGRESS NOTE ADULT - SUBJECTIVE AND OBJECTIVE BOX
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  Upon arrival to the ED, pt with scleral icterus and labs significant for transaminitis and TBili > 20 (15 Mar 2019 06:59)      PAST MEDICAL & SURGICAL HISTORY:  Chronic kidney disease (CKD), stage IV (severe)  Benign prostatic hypertrophy  S/P cataract surgery, left: 3 yrs ago      No Known Allergies       MEDICATIONS  (STANDING):  ampicillin/sulbactam  IVPB      ampicillin/sulbactam  IVPB 3 Gram(s) IV Intermittent every 12 hours  enoxaparin Injectable 30 milliGRAM(s) SubCutaneous daily  ergocalciferol 80188 Unit(s) Oral every week  methylPREDNISolone sodium succinate Injectable 60 milliGRAM(s) IV Push daily  sodium chloride 0.45%. 1000 milliLiter(s) (50 mL/Hr) IV Continuous <Continuous>  sodium chloride 0.9%. 1000 milliLiter(s) (100 mL/Hr) IV Continuous <Continuous>  ursodiol Capsule 300 milliGRAM(s) Oral two times a day    MEDICATIONS  (PRN):      REVIEW OF SYSTEMS:  CONSTITUTIONAL: (+) malaise. fatigue.   EYES: No acute change in vision   ENT:  No tinnitus  NECK: No stiffness  RESPIRATORY: No hemoptysis  CARDIOVASCULAR: No chest pain, palpitations, syncope  GASTROINTESTINAL: poor oral intake, mild abd discomfort. no diarrhea, melena, or hematochezia.  GENITOURINARY: No hematuria  NEUROLOGICAL: No headaches  LYMPH Nodes: No enlarged glands  ENDOCRINE: No heat or cold intolerance	    T(C): 36.2 (03-23-19 @ 05:20), Max: 36.4 (03-22-19 @ 17:56)  HR: 71 (03-23-19 @ 05:20) (71 - 87)  BP: 109/59 (03-23-19 @ 05:20) (109/59 - 136/79)  RR: 20 (03-23-19 @ 05:20) (18 - 20)  SpO2: 99% (03-23-19 @ 05:20) (99% - 100%)    PHYSICAL EXAMINATION:   Constitutional: frail male in NAD  HEENT: mild slceral icertus. AT  Neck:  Supple  Respiratory:  Adequate airflow b/l. Not using accessory muscles of respiration.  Cardiovascular:  S1 & S2 intact, no R/G, 2+ radial pulses b/l  Gastrointestinal: Soft, ND, normoactive b.s., no organomegaly/RT/rigidity  Extremities: warm  Neurological:  Alert and awake, improving confusion, improving alertness.  No acute focal motor deficits. Crude sensation intact.     Labs and imaging reviewed    LABS:                        8.5    5.74  )-----------( 110      ( 23 Mar 2019 05:33 )             23.6     03-23    144  |  116<H>  |  27<H>  ----------------------------<  78  3.5   |  17<L>  |  2.07<H>    Ca    8.8      23 Mar 2019 05:33  Phos  3.4     03-22  Mg     1.7     03-23    TPro  5.7<L>  /  Alb  2.2<L>  /  TBili  20.2<H>  /  DBili  x   /  AST  287<H>  /  ALT  194<H>  /  AlkPhos  255<H>  03-23        PT/INR - ( 23 Mar 2019 05:33 )   PT: 21.5 SEC;   INR: 1.85          PTT - ( 23 Mar 2019 05:33 )  PTT:65.2 SEC    CAPILLARY BLOOD GLUCOSE            LIVER FUNCTIONS - ( 23 Mar 2019 05:33 )  Alb: 2.2 g/dL / Pro: 5.7 g/dL / ALK PHOS: 255 u/L / ALT: 194 u/L / AST: 287 u/L / GGT: x               RADIOLOGY & ADDITIONAL STUDIES:

## 2019-03-23 NOTE — PROGRESS NOTE ADULT - SUBJECTIVE AND OBJECTIVE BOX
PASCUAL OLSEN:[id number],   89yMale followed for:  No Known Allergies    PAST MEDICAL & SURGICAL HISTORY:  Chronic kidney disease (CKD), stage IV (severe)  Benign prostatic hypertrophy  S/P cataract surgery, left: 3 yrs ago    FAMILY HISTORY:  No pertinent family history in first degree relatives    MEDICATIONS  (STANDING):  ampicillin/sulbactam  IVPB      ampicillin/sulbactam  IVPB 3 Gram(s) IV Intermittent every 12 hours  enoxaparin Injectable 30 milliGRAM(s) SubCutaneous daily  ergocalciferol 93578 Unit(s) Oral every week  sodium chloride 0.45%. 1000 milliLiter(s) (50 mL/Hr) IV Continuous <Continuous>    MEDICATIONS  (PRN):      Vital Signs Last 24 Hrs  T(C): 36.2 (23 Mar 2019 05:20), Max: 36.4 (22 Mar 2019 17:56)  T(F): 97.1 (23 Mar 2019 05:20), Max: 97.5 (22 Mar 2019 17:56)  HR: 71 (23 Mar 2019 05:20) (71 - 92)  BP: 109/59 (23 Mar 2019 05:20) (109/59 - 136/79)  BP(mean): --  RR: 20 (23 Mar 2019 05:20) (18 - 20)  SpO2: 99% (23 Mar 2019 05:20) (99% - 100%)  nc/at  s1s2  cta  soft, nt, nd no guarding or rebound  no c/c/e    CBC Full  -  ( 23 Mar 2019 05:33 )  WBC Count : 5.74 K/uL  Hemoglobin : 8.5 g/dL  Hematocrit : 23.6 %  Platelet Count - Automated : 110 K/uL  Mean Cell Volume : 74.2 fL  Mean Cell Hemoglobin : 26.7 pg  Mean Cell Hemoglobin Concentration : 36.0 %  Auto Neutrophil # : x  Auto Lymphocyte # : x  Auto Monocyte # : x  Auto Eosinophil # : x  Auto Basophil # : x  Auto Neutrophil % : x  Auto Lymphocyte % : x  Auto Monocyte % : x  Auto Eosinophil % : x  Auto Basophil % : x    03-23    144  |  116<H>  |  27<H>  ----------------------------<  78  3.5   |  17<L>  |  2.07<H>    Ca    8.8      23 Mar 2019 05:33  Phos  3.4     03-22  Mg     1.7     03-23    TPro  5.7<L>  /  Alb  2.2<L>  /  TBili  20.2<H>  /  DBili  x   /  AST  287<H>  /  ALT  194<H>  /  AlkPhos  255<H>  03-23    PT/INR - ( 23 Mar 2019 05:33 )   PT: 21.5 SEC;   INR: 1.85          PTT - ( 23 Mar 2019 05:33 )  PTT:65.2 SEC

## 2019-03-23 NOTE — PROGRESS NOTE ADULT - ASSESSMENT
A 89 yo Male with BPH, Cataracts, Diverticulosis, Anemia 2/2 GI Bleed with previous PRBC transfusions, who presents to the ER for evaluation of  generalized  weakness, urinary retention and confusion. On admission, he found to have elevated bilirubin, LFTS, transaminitis, scleral jaundice. and urinary retention. Oshea catheter has placed with negative Urine analysis. The CT abdomen  and pelvis shows Distended gallbladder. Consider further evaluation with ultrasound if  acute cholecystitis is a concern. No discrete mass identified on this limited noncontrast study. The ID consult requested to assist with evaluation of Biliary sepsis.    # Encephalopathy  # Urinary retention  # R/O biliary sepsis/ Cholangitis - Most likely acute cholestatic hepatitis as per GI -s/p  transjugular liver biopsy with two passes. and found to have small right atrium blood clot. 3/21/19  # Distended GB with cholelithiasis on MRCP 3/18/19  # Hypothermia- Pneumonia on CXR - MRSA PCR is negative   # UTI- Enterococcus faecalis 3/20/19    would recommend:    1. Discontinue Vancomycin since MRSA PCR is negative and also Enterococcus is Amp. sensitive  2. Continue Unasyn and  Add Levaquin  3. Follow up Liver biopsy  4.  Monitor Temp. and c/w supportive care  5. Supplemental oxygenation and  bronchodilator as needed    d/w patient and Nursing staff    will follow the patient with you

## 2019-03-23 NOTE — PROGRESS NOTE ADULT - ASSESSMENT
pt with severe cholestatic predominant hepatitis.  would recommend d/c all hepatoxic drugs  postive courtney makes autoimmune most likely. hydralazine can do this.  pt not transplant candidate based on age and comotbidies  tamulosin not likely culprit but d/c today.  unasyn is new but can cause cholestatic heptitis, ? d/c or change. defer to medicine id.  hydralzine stopped. clinically stable.

## 2019-03-23 NOTE — PROGRESS NOTE ADULT - ASSESSMENT
89 yo M with PMHx CKD, BPH, Diverticulosis, Anemia 2/2 GI Bleed with previous PRBC transfusions, who p/w 2 days genearlized weakness, urinary retention and confusion, found to have elevated LFTs. Renal following for PHU, CKD management.    PHU on CKD 3 b/l Cr 1.4-1.6 10/2018  PHU most likely 2/2 pre renal. clincially appears dry. C3 77- low, C4 wnl -noted- likely 2/2 dec synthesis 2/2 liver dz   urinary retention on CT w/o hydronephrosis. no e/o acute GN  electrolytes acceptable. Cr improved, stable now at 1.9- likely pre renal vs ATN. u/o good,     Mild hypernatremia  Transaminitis and Bilirubinemia, -acute cholestatic hepatitis of unclear etiology.  s/p liver Bx by IR 3/21  Hepatic encephalopathy- sr NH4 level 91 on admission.  HTN, controlled. bp stable  BPH: c/w flomax    labs, chart reviewed  keep flores for now  increase 1/2NS @100ml/hr for now  encourage po intake  f/u GI and hem  off Norvasc 5mg daily -BP better  avoid ACEi/ARB/NSAIDs/nephrotoxics  dose all meds for eGFR <15ml/min  montior BMP, LFTs daily  monitor U/O  will f/u

## 2019-03-23 NOTE — PROGRESS NOTE ADULT - SUBJECTIVE AND OBJECTIVE BOX
S: no complaints       ampicillin/sulbactam  IVPB      ampicillin/sulbactam  IVPB 3 Gram(s) IV Intermittent every 12 hours  enoxaparin Injectable 30 milliGRAM(s) SubCutaneous daily  ergocalciferol 61293 Unit(s) Oral every week  methylPREDNISolone sodium succinate Injectable 60 milliGRAM(s) IV Push daily  sodium chloride 0.45%. 1000 milliLiter(s) IV Continuous <Continuous>  ursodiol Capsule 300 milliGRAM(s) Oral two times a day                            8.5    5.74  )-----------( 110      ( 23 Mar 2019 05:33 )             23.6       Hemoglobin: 8.5 g/dL (03-23 @ 05:33)  Hemoglobin: 8.7 g/dL (03-22 @ 06:00)  Hemoglobin: 8.9 g/dL (03-21 @ 07:26)  Hemoglobin: 8.2 g/dL (03-20 @ 05:45)  Hemoglobin: 9.3 g/dL (03-19 @ 05:12)      03-23    144  |  116<H>  |  27<H>  ----------------------------<  78  3.5   |  17<L>  |  2.07<H>    Ca    8.8      23 Mar 2019 05:33  Phos  3.4     03-22  Mg     1.7     03-23    TPro  5.7<L>  /  Alb  2.2<L>  /  TBili  20.2<H>  /  DBili  x   /  AST  287<H>  /  ALT  194<H>  /  AlkPhos  255<H>  03-23    Creatinine Trend: 2.07<--, 1.91<--, 1.89<--, 1.97<--, 1.88<--, 1.64<--    COAGS: PT/INR - ( 23 Mar 2019 05:33 )   PT: 21.5 SEC;   INR: 1.85       PTT - ( 23 Mar 2019 05:33 )  PTT:65.2 SEC      T(C): 36.4 (03-23-19 @ 15:37), Max: 36.4 (03-22-19 @ 17:56)  HR: 73 (03-23-19 @ 15:37) (71 - 87)  BP: 121/73 (03-23-19 @ 15:37) (109/59 - 136/79)  RR: 18 (03-23-19 @ 15:37) (18 - 20)  SpO2: 100% (03-23-19 @ 15:37) (99% - 100%)  Wt(kg): --  53.5    I&O's Summary    22 Mar 2019 07:01  -  23 Mar 2019 07:00  --------------------------------------------------------  IN: 950 mL / OUT: 1350 mL / NET: -400 mL      TELEMETRY: SR	      EKG: NSR 69    RADIOLOGY:         CXR:  Patchy opacity within the right lower lung may represent pneumonia. Subsegmental atelectasis at the left base. No pleural effusion or   	pneumothorax. No pulmonary edema.      PHYSICAL EXAM:    Heart: NSR, RRR   Lungs: cta b/l  Abd: soft non tender, not distended, + bs   Ext: no edema     ASSESSMENT/PLAN: 	    89 yo M, poor historian, charts reviewed, with PMHx BPH, Cataracts, Diverticulosis, Anemia 2/2 GI Bleed with previous PRBC transfusions, who p/w 2 days generalized  weakness, urinary retention and confusion. LFTs significant for transaminitis, scleral jaundice.     -pt. tolerated biopsy well in terms of cv perspective  -tele stable  -no clinical heart failure or anginal symptoms  -follow up GI, liver bx (3/21/19)   -no further cardiac workup needed at this time

## 2019-03-23 NOTE — SWALLOW BEDSIDE ASSESSMENT ADULT - SWALLOW EVAL: RECOMMENDED FEEDING/EATING TECHNIQUES
crush medication (when feasible)/position upright (90 degrees)/small sips/bites/maintain upright posture during/after eating for 30 mins/no straws/oral hygiene/allow for swallow between intakes/check mouth frequently for oral residue/pocketing/alternate food with liquid

## 2019-03-23 NOTE — PROGRESS NOTE ADULT - ASSESSMENT
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:      - f/u IR liver biopsy results/pathology      - solumedrol, ursodiol       - IR biopsy complicated by a blood clot passing into the right atrium - c/w close monitoring, tele      - asssist pt in oral consumption/diet      - all consultants management appreciated      - adjust management accordingly (ie: diet, intervention, rx, etc.)  -> Complicated Bacterial Pneumonia likely gram negative organism:      - iv abx      - nebs, oxygen supplementation prn      - adjust abx per ID   -> Acute Cystitis:      - abx per ID   -> Thrombocytopenia:      - potentially 2/2 liver dysfunction, hem/onc on board  -> INR Elevation:      - 2/2 vitamin k deficiency      -  monitor inr and clinical status  -> PHU:      - c/w hydration, treat underlying liver condition, avoid nephrotoxic rx, strict i/o, nephro on board  -> Metabolic Encephalopathy:      - improving, treat underlying gi condition      - neuro checks       - fall precautions   -> Hypernatermia Hyperchloremia:      - improved   -> BPH:     - flomax  -> Need for prophylactic measure:      - dvt/gi ppx  -> Severe Protein Calorie Malnutrition:      - supplement diet

## 2019-03-23 NOTE — PROGRESS NOTE ADULT - SUBJECTIVE AND OBJECTIVE BOX
Patient is seen and examined at the bed side, is afebrile. The sensitivity of Enterococcus resulted. The Bilirubin and LFts stay elevated.        REVIEW OF SYSTEMS: All other review systems are negative        ALLERGIES: No Known Allergies        Vital Signs Last 24 Hrs  T(C): 36.4 (23 Mar 2019 15:37), Max: 36.4 (23 Mar 2019 15:37)  T(F): 97.6 (23 Mar 2019 15:37), Max: 97.6 (23 Mar 2019 15:37)  HR: 73 (23 Mar 2019 15:37) (70 - 76)  BP: 121/73 (23 Mar 2019 15:37) (109/59 - 122/72)  BP(mean): --  RR: 18 (23 Mar 2019 15:37) (18 - 20)  SpO2: 100% (23 Mar 2019 15:37) (99% - 100%)      PHYSICAL EXAM:  GENERAL: Not in distress   HEENT: Sclera jaundiced  CHEST/LUNG:  Air entry bilaterally  HEART: s1 and s2 present  ABDOMEN:  Nontender and  Nondistended  : Oshea catheter in placed  EXTREMITIES: No pedal  edema  CNS: Awake and Alert        LABS:                          8.5    5.74  )-----------( 110      ( 23 Mar 2019 05:33 )             23.6                           8.9    6.00  )-----------( 133      ( 21 Mar 2019 07:26 )             25.5                    03-23    144  |  116<H>  |  27<H>  ----------------------------<  78  3.5   |  17<L>  |  2.07<H>    Ca    8.8      23 Mar 2019 05:33  Phos  3.4     03-22  Mg     1.7     03-23    TPro  5.7<L>  /  Alb  2.2<L>  /  TBili  20.2<H>  /  DBili  x   /  AST  287<H>  /  ALT  194<H>  /  AlkPhos  255<H>  03-23  03-21    146<H>  |  116<H>  |  20  ----------------------------<  99  3.5   |  20<L>  |  1.89<H>    Ca    8.6      21 Mar 2019 07:30  Phos  2.4     03-21  Mg     1.6     03-21    TPro  6.2  /  Alb  2.3<L>  /  TBili  21.8<H>  /  DBili  x   /  AST  385<H>  /  ALT  240<H>  /  AlkPhos  255<H>  03-21    TPro  5.8<L>  /  Alb  2.3<L>  /  TBili  18.4<H>  /  DBili  x   /  AST  397<H>  /  ALT  238<H>  /  AlkPhos  232<H>  03-20      Urinalysis (03.20.19 @ 13:25)    Color: ELSA    Urine Appearance: TURBID    Glucose: 100    Bilirubin: LARGE    Ketone - Urine: NEGATIVE    Specific Gravity: 1.020    Blood: MODERATE    pH - Urine: 6.0    Protein, Urine: 100    Urobilinogen: 4.0    Nitrite: NEGATIVE    Leukocyte Esterase Concentration: LARGE    Red Blood Cell - Urine: 3-5    White Blood Cell - Urine: >50    White Blood Cell Casts: 2-5/LPF    Epithelial Cells: OCC    Bacteria: MANY    Coarse Granular Casts: 0-2/LPF      MEDICATIONS  (STANDING):  ampicillin/sulbactam  IVPB      ampicillin/sulbactam  IVPB 3 Gram(s) IV Intermittent every 12 hours  enoxaparin Injectable 30 milliGRAM(s) SubCutaneous daily  ergocalciferol 38447 Unit(s) Oral every week  pantoprazole  Injectable 40 milliGRAM(s) IV Push daily  sodium chloride 0.45%. 1000 milliLiter(s) (50 mL/Hr) IV Continuous <Continuous>  tamsulosin Oral Tab/Cap - Peds 0.4 milliGRAM(s) Oral at bedtime    MEDICATIONS  (PRN):        RADIOLOGY & ADDITIONAL TESTS:    3/20/19 : Xray Chest 1 View- PORTABLE-Urgent (03.20.19 @ 09:37) Patchy opacity within the right lower lung may represent pneumonia. Subsegmental atelectasis at the left base. No pleural effusion or   pneumothorax. No pulmonary edema. The cardiac silhouette remains enlarged. Aortic aneurysm. Recommend  further evaluation with aortogram.      3/18/19 : MR MRCP No Cont (03.18.19 @ 14:22) Gallbladder is distended with cholelithiasis.  No choledocholithiasis or biliary ductal dilatation.      3/17/19 : NM Hepatobiliary Imaging (03.17.19 @ 12:21) Abnormal hepatobiliary scan suspicious for common bile duct obstruction. Hepatocellular dysfunction. No evidence of acute cholecystitis.      3/18/19 : US Abdomen Limited (03.18.19 @ 07:48) Gallbladder is distended with sludge and stones.  Mural thickening of the wall of the common bile duct, possibly  cholangitis. No biliary ductal dilatation.      3/15/19 : CT Abdomen and Pelvis No Cont (03.15.19 @ 09:39) Distended gallbladder. Consider further evaluation with ultrasound if  acute cholecystitis is a concern.  No discrete mass identified on this limited noncontrast study.          MICROBIOLOGY DATA:    Culture - Urine (03.20.19 @ 15:21)    Culture - Urine:   ENTF^Enterococcus faecalis  COLONY COUNT: > = 100,000 CFU/ML    Specimen Source: URINE CATHETER        MRSA Infection Control Screen (PCR) (03.20.19 @ 13:46)    MRSA Infection Control Screen (PCR): NOT DETECTED     REFERENCE RANGE:  MRSA DNA NOT DETECTED    A result of MRSA DNA not detected does not preclude the  possibility of colonization with MRSA.  This result was obtained using the [employer] Xpert  MRSA assay and the [employer] Dx System.      Culture - Blood (03.15.19 @ 14:03)    Culture - Blood:   NO ORGANISMS ISOLATED  NO ORGANISMS ISOLATED AT 24 HOURS    Specimen Source: BLOOD

## 2019-03-23 NOTE — SWALLOW BEDSIDE ASSESSMENT ADULT - PHARYNGEAL PHASE
Delayed pharyngeal swallow/Decreased laryngeal elevation Delayed pharyngeal swallow/Wet vocal quality post oral intake/Multiple swallows/Decreased laryngeal elevation

## 2019-03-23 NOTE — PROGRESS NOTE ADULT - SUBJECTIVE AND OBJECTIVE BOX
Pt is seen and examined  pt is awake and lying in bed       PAST MEDICAL & SURGICAL HISTORY:  Chronic kidney disease (CKD), stage IV (severe)  Benign prostatic hypertrophy  S/P cataract surgery, left: 3 yrs ago      ROS:  Negative except for:    MEDICATIONS  (STANDING):  ampicillin/sulbactam  IVPB      ampicillin/sulbactam  IVPB 3 Gram(s) IV Intermittent every 12 hours  enoxaparin Injectable 30 milliGRAM(s) SubCutaneous daily  ergocalciferol 25027 Unit(s) Oral every week  methylPREDNISolone sodium succinate Injectable 60 milliGRAM(s) IV Push daily  sodium chloride 0.45%. 1000 milliLiter(s) (50 mL/Hr) IV Continuous <Continuous>  sodium chloride 0.9%. 1000 milliLiter(s) (100 mL/Hr) IV Continuous <Continuous>  ursodiol Capsule 300 milliGRAM(s) Oral two times a day    MEDICATIONS  (PRN):      Allergies    No Known Allergies    Intolerances        Vital Signs Last 24 Hrs  T(C): 36.2 (23 Mar 2019 05:20), Max: 36.4 (22 Mar 2019 17:56)  T(F): 97.1 (23 Mar 2019 05:20), Max: 97.5 (22 Mar 2019 17:56)  HR: 71 (23 Mar 2019 05:20) (71 - 87)  BP: 109/59 (23 Mar 2019 05:20) (109/59 - 136/79)  BP(mean): --  RR: 20 (23 Mar 2019 05:20) (18 - 20)  SpO2: 99% (23 Mar 2019 05:20) (99% - 100%)    PHYSICAL EXAM    General: elderly male in NAD  HEENT: + Icterus  Neck: supple  CV: normal S1/S2 with no murmur rubs or gallops  Lungs: positive air movement b/l ant lungs,clear to auscultation, no wheezes, no rales  Abdomen: soft non-tender non-distended, no hepatosplenomegaly  Ext: no clubbing cyanosis or edema    LABS:                          8.5    5.74  )-----------( 110      ( 23 Mar 2019 05:33 )             23.6     Serial CBC's  03-23 @ 05:33  Hct-23.6 / Hgb-8.5 / Plat-110 / RBC-3.18 / WBC-5.74          Serial CBC's  03-22 @ 06:00  Hct-24.8 / Hgb-8.7 / Plat-120 / RBC-3.24 / WBC-6.52            03-23    144  |  116<H>  |  27<H>  ----------------------------<  78  3.5   |  17<L>  |  2.07<H>    Ca    8.8      23 Mar 2019 05:33  Phos  3.4     03-22  Mg     1.7     03-23    TPro  5.7<L>  /  Alb  2.2<L>  /  TBili  20.2<H>  /  DBili  x   /  AST  287<H>  /  ALT  194<H>  /  AlkPhos  255<H>  03-23      PT/INR - ( 23 Mar 2019 05:33 )   PT: 21.5 SEC;   INR: 1.85          PTT - ( 23 Mar 2019 05:33 )  PTT:65.2 SEC    WBC Count: 5.74 K/uL (03-23 @ 05:33)  Hemoglobin: 8.5 g/dL (03-23 @ 05:33)            RADIOLOGY & ADDITIONAL STUDIES:

## 2019-03-24 LAB
ALBUMIN SERPL ELPH-MCNC: 2.2 G/DL — LOW (ref 3.3–5)
ALP SERPL-CCNC: 255 U/L — HIGH (ref 40–120)
ALT FLD-CCNC: 182 U/L — HIGH (ref 4–41)
ANION GAP SERPL CALC-SCNC: 12 MMO/L — SIGNIFICANT CHANGE UP (ref 7–14)
APTT BLD: 64 SEC — HIGH (ref 27.5–36.3)
AST SERPL-CCNC: 276 U/L — HIGH (ref 4–40)
BILIRUB SERPL-MCNC: 19.8 MG/DL — HIGH (ref 0.2–1.2)
BUN SERPL-MCNC: 30 MG/DL — HIGH (ref 7–23)
CALCIUM SERPL-MCNC: 8.9 MG/DL — SIGNIFICANT CHANGE UP (ref 8.4–10.5)
CHLORIDE SERPL-SCNC: 115 MMOL/L — HIGH (ref 98–107)
CO2 SERPL-SCNC: 17 MMOL/L — LOW (ref 22–31)
CREAT SERPL-MCNC: 2.08 MG/DL — HIGH (ref 0.5–1.3)
GLUCOSE SERPL-MCNC: 65 MG/DL — LOW (ref 70–99)
HCT VFR BLD CALC: 23 % — LOW (ref 39–50)
HGB BLD-MCNC: 8.4 G/DL — LOW (ref 13–17)
INR BLD: 1.98 — HIGH (ref 0.88–1.17)
MAGNESIUM SERPL-MCNC: 1.8 MG/DL — SIGNIFICANT CHANGE UP (ref 1.6–2.6)
MCHC RBC-ENTMCNC: 27.5 PG — SIGNIFICANT CHANGE UP (ref 27–34)
MCHC RBC-ENTMCNC: 36.5 % — HIGH (ref 32–36)
MCV RBC AUTO: 75.2 FL — LOW (ref 80–100)
NRBC # FLD: 0.06 K/UL — LOW (ref 25–125)
NRBC FLD-RTO: 1 — SIGNIFICANT CHANGE UP
PLATELET # BLD AUTO: 112 K/UL — LOW (ref 150–400)
PMV BLD: SIGNIFICANT CHANGE UP FL (ref 7–13)
POTASSIUM SERPL-MCNC: 3.7 MMOL/L — SIGNIFICANT CHANGE UP (ref 3.5–5.3)
POTASSIUM SERPL-SCNC: 3.7 MMOL/L — SIGNIFICANT CHANGE UP (ref 3.5–5.3)
PROT SERPL-MCNC: 5.6 G/DL — LOW (ref 6–8.3)
PROTHROM AB SERPL-ACNC: 22.4 SEC — HIGH (ref 9.8–13.1)
RBC # BLD: 3.06 M/UL — LOW (ref 4.2–5.8)
RBC # FLD: 28.2 % — HIGH (ref 10.3–14.5)
SODIUM SERPL-SCNC: 144 MMOL/L — SIGNIFICANT CHANGE UP (ref 135–145)
VANCOMYCIN FLD-MCNC: 11.2 UG/ML — SIGNIFICANT CHANGE UP
WBC # BLD: 5.98 K/UL — SIGNIFICANT CHANGE UP (ref 3.8–10.5)
WBC # FLD AUTO: 5.98 K/UL — SIGNIFICANT CHANGE UP (ref 3.8–10.5)

## 2019-03-24 PROCEDURE — 71250 CT THORAX DX C-: CPT | Mod: 26

## 2019-03-24 RX ORDER — SODIUM CHLORIDE 9 MG/ML
1000 INJECTION, SOLUTION INTRAVENOUS
Qty: 0 | Refills: 0 | Status: DISCONTINUED | OUTPATIENT
Start: 2019-03-24 | End: 2019-03-25

## 2019-03-24 RX ADMIN — URSODIOL 300 MILLIGRAM(S): 250 TABLET, FILM COATED ORAL at 11:46

## 2019-03-24 RX ADMIN — URSODIOL 300 MILLIGRAM(S): 250 TABLET, FILM COATED ORAL at 00:36

## 2019-03-24 RX ADMIN — AMPICILLIN SODIUM AND SULBACTAM SODIUM 200 GRAM(S): 250; 125 INJECTION, POWDER, FOR SUSPENSION INTRAMUSCULAR; INTRAVENOUS at 11:47

## 2019-03-24 RX ADMIN — ENOXAPARIN SODIUM 30 MILLIGRAM(S): 100 INJECTION SUBCUTANEOUS at 11:46

## 2019-03-24 RX ADMIN — AMPICILLIN SODIUM AND SULBACTAM SODIUM 200 GRAM(S): 250; 125 INJECTION, POWDER, FOR SUSPENSION INTRAMUSCULAR; INTRAVENOUS at 00:36

## 2019-03-24 RX ADMIN — Medication 60 MILLIGRAM(S): at 11:47

## 2019-03-24 NOTE — PROGRESS NOTE ADULT - ASSESSMENT
89 yo M p/w confusion and generalized weakness    -> cholestatic hepatitis, likely autoimmune:      - f/u IR liver biopsy results/pathology      - solumedrol, ursodiol       - IR biopsy complicated by a blood clot passing into the right atrium - c/w close monitoring, tele      - assist pt in oral consumption/diet      - all consultants management appreciated      - adjust management accordingly (ie: diet, intervention, rx, etc.)  -> Acute Cystitis:      - abx per ID, attempt to adjust abx that have less potential to cause liver abnormalities  -> Thrombocytopenia:      - 2/2 liver dysfunction, correct gi  -> INR Elevation:      - 2/2 vitamin k deficiency      -  monitor inr and clinical status  -> PHU:      - c/w hydration, treat underlying liver condition, avoid nephrotoxic rx, strict i/o, nephro on board  -> Metabolic Encephalopathy:      - improving, treat underlying gi condition      - neuro checks       - fall precautions   -> Hypernatermia Hyperchloremia:      - improved   -> BPH:     - flomax  -> Need for prophylactic measure:      - dvt/gi ppx  -> Severe Protein Calorie Malnutrition:      - supplement diet

## 2019-03-24 NOTE — PROGRESS NOTE ADULT - ATTENDING COMMENTS
Agree with above assessment and plan as outlined above.    - Moderate AI aon aecho and aneurysmal dilation on CXR, Get noncontrast Chest CT (CKD) to assess aorta caliber    Jeff Galicia MD, [facility]  BEEPER [phone]

## 2019-03-24 NOTE — PROGRESS NOTE ADULT - SUBJECTIVE AND OBJECTIVE BOX
Patient is seen and examined at the bed side, is afebrile. The sensitivity of Enterococcus resulted. The Bilirubin and LFts stay elevated.        REVIEW OF SYSTEMS: All other review systems are negative        ALLERGIES: No Known Allergies      Vital Signs Last 24 Hrs  T(C): 36.2 (24 Mar 2019 14:06), Max: 36.4 (24 Mar 2019 10:05)  T(F): 97.2 (24 Mar 2019 14:06), Max: 97.6 (24 Mar 2019 10:05)  HR: 74 (24 Mar 2019 14:06) (67 - 75)  BP: 122/73 (24 Mar 2019 14:06) (118/71 - 130/80)  BP(mean): --  RR: 18 (24 Mar 2019 14:06) (15 - 18)  SpO2: 99% (24 Mar 2019 14:06) (99% - 100%)        PHYSICAL EXAM:  GENERAL: Not in distress   HEENT: Sclera jaundiced  CHEST/LUNG:  Air entry bilaterally  HEART: s1 and s2 present  ABDOMEN:  Nontender and  Nondistended  : Oshea catheter in placed  EXTREMITIES: No pedal  edema  CNS: Awake and Alert        LABS:                        8.4    5.98  )-----------( 112      ( 24 Mar 2019 06:25 )             23.0                           8.5    5.74  )-----------( 110      ( 23 Mar 2019 05:33 )             23.6       03-24    144  |  115<H>  |  30<H>  ----------------------------<  65<L>  3.7   |  17<L>  |  2.08<H>    Ca    8.9      24 Mar 2019 06:25  Mg     1.8     03-24    TPro  5.6<L>  /  Alb  2.2<L>  /  TBili  19.8<H>  /  DBili  x   /  AST  276<H>  /  ALT  182<H>  /  AlkPhos  255<H>  03-24 03-23    144  |  116<H>  |  27<H>  ----------------------------<  78  3.5   |  17<L>  |  2.07<H>    Ca    8.8      23 Mar 2019 05:33  Phos  3.4     03-22  Mg     1.7     03-23    TPro  5.7<L>  /  Alb  2.2<L>  /  TBili  20.2<H>  /  DBili  x   /  AST  287<H>  /  ALT  194<H>  /  AlkPhos  255<H>  03-23      MEDICATIONS  (STANDING):  ampicillin/sulbactam  IVPB      ampicillin/sulbactam  IVPB 3 Gram(s) IV Intermittent every 12 hours  enoxaparin Injectable 30 milliGRAM(s) SubCutaneous daily  ergocalciferol 07245 Unit(s) Oral every week  levoFLOXacin IVPB      methylPREDNISolone sodium succinate Injectable 60 milliGRAM(s) IV Push daily  sodium chloride 0.45%. 1000 milliLiter(s) (100 mL/Hr) IV Continuous <Continuous>  ursodiol Capsule 300 milliGRAM(s) Oral two times a day    MEDICATIONS  (PRN):          RADIOLOGY & ADDITIONAL TESTS:    3/20/19 : Xray Chest 1 View- PORTABLE-Urgent (03.20.19 @ 09:37) Patchy opacity within the right lower lung may represent pneumonia. Subsegmental atelectasis at the left base. No pleural effusion or  pneumothorax. No pulmonary edema. The cardiac silhouette remains enlarged. Aortic aneurysm. Recommend  further evaluation with aortogram.      3/18/19 : MR MRCP No Cont (03.18.19 @ 14:22) Gallbladder is distended with cholelithiasis.  No choledocholithiasis or biliary ductal dilatation.      3/17/19 : NM Hepatobiliary Imaging (03.17.19 @ 12:21) Abnormal hepatobiliary scan suspicious for common bile duct obstruction. Hepatocellular dysfunction. No evidence of acute cholecystitis.      3/18/19 : US Abdomen Limited (03.18.19 @ 07:48) Gallbladder is distended with sludge and stones.  Mural thickening of the wall of the common bile duct, possibly  cholangitis. No biliary ductal dilatation.      3/15/19 : CT Abdomen and Pelvis No Cont (03.15.19 @ 09:39) Distended gallbladder. Consider further evaluation with ultrasound if  acute cholecystitis is a concern.  No discrete mass identified on this limited noncontrast study.          MICROBIOLOGY DATA:    Culture - Urine (03.20.19 @ 15:21)    -  Vancomycin: S 2 CLOVIS    Culture - Urine:   ENTF^Enterococcus faecalis  COLONY COUNT: > = 100,000 CFU/ML    Culture - Urine:   Susceptibility not performed.    -  Tetra/Doxy: R >8 CLOVIS    -  Nitrofurantoin: S <=32 CLOVIS    -  Ampicillin: S <=2 CLOVIS    -  Ciprofloxacin: S <=1 CLOVIS    Specimen Source: URINE CATHETER    Organism Identification: Enterococcus faecalis  Staphylococcus sp.,coag neg    Organism: Enterococcus faecalis  COLONY COUNT: > = 100,000 CFU/ML    Organism: Staphylococcus sp.,coag neg  COLONY COUNT: > = 100,000 CFU/ML    Method Type: POSITIVE CLOVIS 29      Urinalysis (03.20.19 @ 13:25)    Color: ELSA    Urine Appearance: TURBID    Glucose: 100    Bilirubin: LARGE    Ketone - Urine: NEGATIVE    Specific Gravity: 1.020    Blood: MODERATE    pH - Urine: 6.0    Protein, Urine: 100    Urobilinogen: 4.0    Nitrite: NEGATIVE    Leukocyte Esterase Concentration: LARGE    Red Blood Cell - Urine: 3-5    White Blood Cell - Urine: >50    White Blood Cell Casts: 2-5/LPF    Epithelial Cells: OCC    Bacteria: MANY    Coarse Granular Casts: 0-2/LPF      MRSA Infection Control Screen (PCR) (03.20.19 @ 13:46)    MRSA Infection Control Screen (PCR): NOT DETECTED     REFERENCE RANGE:  MRSA DNA NOT DETECTED    A result of MRSA DNA not detected does not preclude the  possibility of colonization with MRSA.  This result was obtained using the [employer] Xpert  MRSA assay and the [employer] Dx System.      Culture - Blood (03.15.19 @ 14:03)    Culture - Blood:   NO ORGANISMS ISOLATED  NO ORGANISMS ISOLATED AT 24 HOURS    Specimen Source: BLOOD Patient is seen and examined at the bed side, is afebrile. He is tolerating  Unasyn and Levaquin well. The LFTs trending down slowly.         REVIEW OF SYSTEMS: All other review systems are negative        ALLERGIES: No Known Allergies        Vital Signs Last 24 Hrs  T(C): 36.2 (24 Mar 2019 14:06), Max: 36.4 (24 Mar 2019 10:05)  T(F): 97.2 (24 Mar 2019 14:06), Max: 97.6 (24 Mar 2019 10:05)  HR: 74 (24 Mar 2019 14:06) (67 - 75)  BP: 122/73 (24 Mar 2019 14:06) (118/71 - 130/80)  BP(mean): --  RR: 18 (24 Mar 2019 14:06) (15 - 18)  SpO2: 99% (24 Mar 2019 14:06) (99% - 100%)        PHYSICAL EXAM:  GENERAL: Not in distress   HEENT: Sclera jaundiced  CHEST/LUNG:  Air entry bilaterally  HEART: s1 and s2 present  ABDOMEN:  Nontender and  Nondistended  : Oshea catheter in placed  EXTREMITIES: No pedal  edema  CNS: Awake and Alert        LABS:                        8.4    5.98  )-----------( 112      ( 24 Mar 2019 06:25 )             23.0                           8.5    5.74  )-----------( 110      ( 23 Mar 2019 05:33 )             23.6       03-24    144  |  115<H>  |  30<H>  ----------------------------<  65<L>  3.7   |  17<L>  |  2.08<H>    Ca    8.9      24 Mar 2019 06:25  Mg     1.8     03-24    TPro  5.6<L>  /  Alb  2.2<L>  /  TBili  19.8<H>  /  DBili  x   /  AST  276<H>  /  ALT  182<H>  /  AlkPhos  255<H>  03-24 03-23    144  |  116<H>  |  27<H>  ----------------------------<  78  3.5   |  17<L>  |  2.07<H>    Ca    8.8      23 Mar 2019 05:33  Phos  3.4     03-22  Mg     1.7     03-23    TPro  5.7<L>  /  Alb  2.2<L>  /  TBili  20.2<H>  /  DBili  x   /  AST  287<H>  /  ALT  194<H>  /  AlkPhos  255<H>  03-23      MEDICATIONS  (STANDING):  ampicillin/sulbactam  IVPB      ampicillin/sulbactam  IVPB 3 Gram(s) IV Intermittent every 12 hours  enoxaparin Injectable 30 milliGRAM(s) SubCutaneous daily  ergocalciferol 88327 Unit(s) Oral every week  levoFLOXacin IVPB      methylPREDNISolone sodium succinate Injectable 60 milliGRAM(s) IV Push daily  sodium chloride 0.45%. 1000 milliLiter(s) (100 mL/Hr) IV Continuous <Continuous>  ursodiol Capsule 300 milliGRAM(s) Oral two times a day    MEDICATIONS  (PRN):          RADIOLOGY & ADDITIONAL TESTS:    3/20/19 : Xray Chest 1 View- PORTABLE-Urgent (03.20.19 @ 09:37) Patchy opacity within the right lower lung may represent pneumonia. Subsegmental atelectasis at the left base. No pleural effusion or  pneumothorax. No pulmonary edema. The cardiac silhouette remains enlarged. Aortic aneurysm. Recommend  further evaluation with aortogram.      3/18/19 : MR MRCP No Cont (03.18.19 @ 14:22) Gallbladder is distended with cholelithiasis.  No choledocholithiasis or biliary ductal dilatation.      3/17/19 : NM Hepatobiliary Imaging (03.17.19 @ 12:21) Abnormal hepatobiliary scan suspicious for common bile duct obstruction. Hepatocellular dysfunction. No evidence of acute cholecystitis.      3/18/19 : US Abdomen Limited (03.18.19 @ 07:48) Gallbladder is distended with sludge and stones.  Mural thickening of the wall of the common bile duct, possibly  cholangitis. No biliary ductal dilatation.      3/15/19 : CT Abdomen and Pelvis No Cont (03.15.19 @ 09:39) Distended gallbladder. Consider further evaluation with ultrasound if  acute cholecystitis is a concern.  No discrete mass identified on this limited noncontrast study.          MICROBIOLOGY DATA:    Culture - Urine (03.20.19 @ 15:21)    -  Vancomycin: S 2 CLOVIS    Culture - Urine:   ENTF^Enterococcus faecalis  COLONY COUNT: > = 100,000 CFU/ML    Culture - Urine:   Susceptibility not performed.    -  Tetra/Doxy: R >8 CLOVIS    -  Nitrofurantoin: S <=32 CLOVIS    -  Ampicillin: S <=2 CLOVIS    -  Ciprofloxacin: S <=1 CLOVIS    Specimen Source: URINE CATHETER    Organism Identification: Enterococcus faecalis  Staphylococcus sp.,coag neg    Organism: Enterococcus faecalis  COLONY COUNT: > = 100,000 CFU/ML    Organism: Staphylococcus sp.,coag neg  COLONY COUNT: > = 100,000 CFU/ML    Method Type: POSITIVE CLOVIS 29      Urinalysis (03.20.19 @ 13:25)    Color: ELSA    Urine Appearance: TURBID    Glucose: 100    Bilirubin: LARGE    Ketone - Urine: NEGATIVE    Specific Gravity: 1.020    Blood: MODERATE    pH - Urine: 6.0    Protein, Urine: 100    Urobilinogen: 4.0    Nitrite: NEGATIVE    Leukocyte Esterase Concentration: LARGE    Red Blood Cell - Urine: 3-5    White Blood Cell - Urine: >50    White Blood Cell Casts: 2-5/LPF    Epithelial Cells: OCC    Bacteria: MANY    Coarse Granular Casts: 0-2/LPF      MRSA Infection Control Screen (PCR) (03.20.19 @ 13:46)    MRSA Infection Control Screen (PCR): NOT DETECTED     REFERENCE RANGE:  MRSA DNA NOT DETECTED    A result of MRSA DNA not detected does not preclude the  possibility of colonization with MRSA.  This result was obtained using the [employer] Xpert  MRSA assay and the [employer] Dx System.      Culture - Blood (03.15.19 @ 14:03)    Culture - Blood:   NO ORGANISMS ISOLATED  NO ORGANISMS ISOLATED AT 24 HOURS    Specimen Source: BLOOD

## 2019-03-24 NOTE — PROGRESS NOTE ADULT - SUBJECTIVE AND OBJECTIVE BOX
PASCUAL OLSEN:[id number],   89yMale followed for:  No Known Allergies    PAST MEDICAL & SURGICAL HISTORY:  Chronic kidney disease (CKD), stage IV (severe)  Benign prostatic hypertrophy  S/P cataract surgery, left: 3 yrs ago    FAMILY HISTORY:  No pertinent family history in first degree relatives    MEDICATIONS  (STANDING):  ampicillin/sulbactam  IVPB      ampicillin/sulbactam  IVPB 3 Gram(s) IV Intermittent every 12 hours  enoxaparin Injectable 30 milliGRAM(s) SubCutaneous daily  ergocalciferol 67866 Unit(s) Oral every week  levoFLOXacin IVPB      methylPREDNISolone sodium succinate Injectable 60 milliGRAM(s) IV Push daily  sodium chloride 0.45%. 1000 milliLiter(s) (100 mL/Hr) IV Continuous <Continuous>  ursodiol Capsule 300 milliGRAM(s) Oral two times a day    MEDICATIONS  (PRN):      Vital Signs Last 24 Hrs  T(C): 36.3 (24 Mar 2019 06:24), Max: 36.4 (23 Mar 2019 15:37)  T(F): 97.4 (24 Mar 2019 06:24), Max: 97.6 (23 Mar 2019 15:37)  HR: 74 (24 Mar 2019 06:24) (67 - 74)  BP: 118/71 (24 Mar 2019 06:24) (118/62 - 135/78)  BP(mean): --  RR: 16 (24 Mar 2019 06:24) (15 - 18)  SpO2: 100% (24 Mar 2019 06:24) (99% - 100%)  nc/at  s1s2  cta  soft, nt, nd no guarding or rebound  no c/c/e    CBC Full  -  ( 24 Mar 2019 06:25 )  WBC Count : 5.98 K/uL  Hemoglobin : 8.4 g/dL  Hematocrit : 23.0 %  Platelet Count - Automated : 112 K/uL  Mean Cell Volume : 75.2 fL  Mean Cell Hemoglobin : 27.5 pg  Mean Cell Hemoglobin Concentration : 36.5 %  Auto Neutrophil # : x  Auto Lymphocyte # : x  Auto Monocyte # : x  Auto Eosinophil # : x  Auto Basophil # : x  Auto Neutrophil % : x  Auto Lymphocyte % : x  Auto Monocyte % : x  Auto Eosinophil % : x  Auto Basophil % : x    03-24    144  |  115<H>  |  30<H>  ----------------------------<  65<L>  3.7   |  17<L>  |  2.08<H>    Ca    8.9      24 Mar 2019 06:25  Mg     1.8     03-24    TPro  5.6<L>  /  Alb  2.2<L>  /  TBili  19.8<H>  /  DBili  x   /  AST  276<H>  /  ALT  182<H>  /  AlkPhos  255<H>  03-24    PT/INR - ( 24 Mar 2019 06:25 )   PT: 22.4 SEC;   INR: 1.98          PTT - ( 24 Mar 2019 06:25 )  PTT:64.0 SEC

## 2019-03-24 NOTE — PROGRESS NOTE ADULT - ASSESSMENT
89 yo M with PMHx CKD, BPH, Diverticulosis, Anemia 2/2 GI Bleed with previous PRBC transfusions, who p/w 2 days genearlized weakness, urinary retention and confusion, found to have elevated LFTs. Renal following for PHU, CKD management.    PHU on CKD 3 b/l Cr 1.4-1.6 10/2018  PHU most likely 2/2 pre renal. clincially appears dry. C3 77- low, C4 wnl -noted- likely 2/2 dec synthesis 2/2 liver dz   urinary retention on CT w/o hydronephrosis. no e/o acute GN  electrolytes acceptable. Cr stable, likely pre renal vs ATN. u/o good,     Mild hypernatremia  Transaminitis and Bilirubinemia, -acute cholestatic hepatitis of unclear etiology.  s/p liver Bx by IR 3/21  Hepatic encephalopathy- sr NH4 level 91 on admission.  HTN, controlled. bp stable  BPH: c/w flomax    labs, chart reviewed  keep flores for now  continue 1/2NS @100ml/hr for now  encourage po intake  f/u GI and hem  off Norvasc 5mg daily -BP better  avoid ACEi/ARB/NSAIDs/nephrotoxics  dose all meds for eGFR <15ml/min  monitor BMP, LFTs daily  monitor U/O  will f/u

## 2019-03-24 NOTE — PROGRESS NOTE ADULT - ASSESSMENT
amber current rx.  awaith biopsy.  jose. lfts slowly down.  would prefer to avoid unasyn secondary to can have cholestasis as side effect.

## 2019-03-24 NOTE — CHART NOTE - NSCHARTNOTEFT_GEN_A_CORE
Informed by nurse that patient's temperature dropped to 94.1. BP at time was 125/77.HR 67 and RR 15. Patient was warmed with blankets and  heating packs and temperature increased to 97.2. Patient's Vancomycin was discontinued per ID. Will continue to monitor.

## 2019-03-24 NOTE — PROGRESS NOTE ADULT - SUBJECTIVE AND OBJECTIVE BOX
S: no pain or SOB     MEDICATIONS  (STANDING):  ampicillin/sulbactam  IVPB      ampicillin/sulbactam  IVPB 3 Gram(s) IV Intermittent every 12 hours  enoxaparin Injectable 30 milliGRAM(s) SubCutaneous daily  ergocalciferol 12843 Unit(s) Oral every week  levoFLOXacin IVPB      methylPREDNISolone sodium succinate Injectable 60 milliGRAM(s) IV Push daily  sodium chloride 0.45%. 1000 milliLiter(s) (100 mL/Hr) IV Continuous <Continuous>  ursodiol Capsule 300 milliGRAM(s) Oral two times a day      LABS:                        8.4    5.98  )-----------( 112      ( 24 Mar 2019 06:25 )             23.0     144  |  115<H>  |  30<H>  ----------------------------<  65<L>  3.7   |  17<L>  |  2.08<H>    Ca    8.9      24 Mar 2019 06:25  Mg     1.8     03-24    TPro  5.6<L>  /  Alb  2.2<L>  /  TBili  19.8<H>  /  DBili  x   /  AST  276<H>  /  ALT  182<H>  /  AlkPhos  255<H>  03-24    Creatinine Trend: 2.08<--, 2.07<--, 1.91<--, 1.89<--, 1.97<--, 1.88<--   PT/INR - ( 24 Mar 2019 06:25 )   PT: 22.4 SEC;   INR: 1.98     PTT - ( 24 Mar 2019 06:25 )  PTT:64.0 SEC    PHYSICAL EXAM  Vital Signs Last 24 Hrs  T(C): 36.3 (24 Mar 2019 06:24), Max: 36.4 (23 Mar 2019 15:37)  T(F): 97.4 (24 Mar 2019 06:24), Max: 97.6 (23 Mar 2019 15:37)  HR: 74 (24 Mar 2019 06:24) (67 - 74)  BP: 118/71 (24 Mar 2019 06:24) (118/71 - 135/78)  RR: 16 (24 Mar 2019 06:24) (15 - 18)  SpO2: 100% (24 Mar 2019 06:24) (100% - 100%)      Heart: normal S1, S2, RRR, no m/r/g  Lungs: cta b/l  Abd: soft nT  Ext: no edema     TELEMETRY: SR	      EKG: NSR 70    RADIOLOGY:  < from: Xray Chest 1 View- PORTABLE-Urgent (03.20.19 @ 09:37) >  FINDINGS/  IMPRESSION:    Patchy opacity within the right lower lung may represent pneumonia.   Subsegmental atelectasis at the left base. No pleural effusion or   pneumothorax. No pulmonary edema.    The cardiac silhouette remains enlarged. Aortic aneurysm. Recommend   further evaluation with aortogram.    < end of copied text >    < from: Transthoracic Echocardiogram (03.23.19 @ 09:36) >  CONCLUSIONS:  1. Mitral annular calcification, otherwise normal mitral  valve. Mild mitral regurgitation.  2. Aortic valve leaflet morphology not well visualized;  appears calcified with normal opening. Moderate aortic  regurgitation.  3. Normal left ventricular internal dimensions and wall  thicknesses.  4. Hyperdynamic left ventricle.  5. Mild diastolic dysfunction (Stage I).  6. Normal right ventricular size and function.  7. Normal tricuspid valve. Mild tricuspid regurgitation.  8. Estimated pulmonary artery systolic pressure equals 36  mm Hg, assuming right atrial pressure equals 10  mm Hg,  consistent with borderline pulmonary hypertension.  *** Compared with echocardiogram of 6/11/2018,no  significant changes noted.  ------------------------------------------------------------------------  Confirmed on  3/23/2019 - 11:19:58 by Evaristo Castanon MD, FAC,  FASE, RPVI    < end of copied text >          ASSESSMENT/PLAN: 	    89 yo M, poor historian, charts reviewed, with PMHx BPH, Cataracts, Diverticulosis, Anemia 2/2 GI Bleed with previous PRBC transfusions, who p/w 2 days generalized  weakness, urinary retention and confusion. LFTs significant for transaminitis, scleral jaundice.     -pt. tolerated biopsy well in terms of cv perspective  -tele stable  -no clinical heart failure or anginal symptoms  -follow up GI  -TTE and CXR impressions noted, will d/w attending regarding further work up pending GI work up/prognosis

## 2019-03-24 NOTE — PROGRESS NOTE ADULT - ASSESSMENT
A 89 yo Male with BPH, Cataracts, Diverticulosis, Anemia 2/2 GI Bleed with previous PRBC transfusions, who presents to the ER for evaluation of  generalized  weakness, urinary retention and confusion. On admission, he found to have elevated bilirubin, LFTS, transaminitis, scleral jaundice. and urinary retention. Oshea catheter has placed with negative Urine analysis. The CT abdomen  and pelvis shows Distended gallbladder. Consider further evaluation with ultrasound if  acute cholecystitis is a concern. No discrete mass identified on this limited noncontrast study. The ID consult requested to assist with evaluation of Biliary sepsis.    # Encephalopathy  # Urinary retention  # R/O biliary sepsis/ Cholangitis - Most likely acute cholestatic hepatitis as per GI -s/p  transjugular liver biopsy with two passes. and found to have small right atrium blood clot. 3/21/19  # Distended GB with cholelithiasis on MRCP 3/18/19  # Hypothermia- Pneumonia on CXR - MRSA PCR is negative   # UTI- Enterococcus faecalis 3/20/19    would recommend:    1.  Continue Unasyn and Levaquin for now  2.  Follow up Liver biopsy  3.  Monitor Temp. and c/w supportive care  4. Supplemental oxygenation and  bronchodilator as needed  5. Aspiration precaution    d/w patient and Nursing staff    will follow the patient with you A 89 yo Male with BPH, Cataracts, Diverticulosis, Anemia 2/2 GI Bleed with previous PRBC transfusions, who presents to the ER for evaluation of  generalized  weakness, urinary retention and confusion. On admission, he found to have elevated bilirubin, LFTS, transaminitis, scleral jaundice. and urinary retention. Oshea catheter has placed with negative Urine analysis. The CT abdomen  and pelvis shows Distended gallbladder. Consider further evaluation with ultrasound if  acute cholecystitis is a concern. No discrete mass identified on this limited noncontrast study. The ID consult requested to assist with evaluation of Biliary sepsis.    # Encephalopathy  # Urinary retention  # R/O biliary sepsis/ Cholangitis - Most likely acute cholestatic hepatitis as per GI -s/p  transjugular liver biopsy with two passes. and found to have small right atrium blood clot. 3/21/19  # Distended GB with cholelithiasis on MRCP 3/18/19  # Hypothermia- Pneumonia on CXR - MRSA PCR is negative   # UTI- Enterococcus faecalis 3/20/19    would recommend:    1.  Continue Unasyn and Levaquin for now  2.  Follow up Liver biopsy  3.  Trend  LFts and bilirubin  4. Supplemental oxygenation and  bronchodilator as needed  5. Aspiration precaution    d/w patient and Nursing staff    will follow the patient with you

## 2019-03-24 NOTE — PROGRESS NOTE ADULT - SUBJECTIVE AND OBJECTIVE BOX
[facility] - SAUL Rojas / SAUL Denton / EYN Montero/ SAUL Villegas/ SAUL Beard/ KARON Mullen / PINO Felix / SHAKEEL Webster  ---------------------------------------------------------------------------------------------------------------  seen and examined today for PHU  Interval : serum creatinine stable, patient tolerating PO and eating well  VITALS:  T(F): 97.4 (03-24-19 @ 06:24), Max: 97.6 (03-23-19 @ 15:37)  HR: 74 (03-24-19 @ 06:24)  BP: 118/71 (03-24-19 @ 06:24)  RR: 16 (03-24-19 @ 06:24)  SpO2: 100% (03-24-19 @ 06:24)  Wt(kg): --    03-23 @ 07:01  -  03-24 @ 07:00  --------------------------------------------------------  IN: 1200 mL / OUT: 400 mL / NET: 800 mL      Physical Exam :-  Constitutional: NAD  Neck: Supple.  Respiratory: Bilateral equal breath sounds,  Cardiovascular: S1, S2 normal,  Gastrointestinal: Bowel Sounds present, soft, non tender.  Extremities: No edema  Neurological: Alert and Oriented x 3, no focal deficits  Psychiatric: Normal mood, normal affect  Data:-  Allergies :   No Known Allergies    Hospital Medications:   MEDICATIONS  (STANDING):  ampicillin/sulbactam  IVPB      ampicillin/sulbactam  IVPB 3 Gram(s) IV Intermittent every 12 hours  enoxaparin Injectable 30 milliGRAM(s) SubCutaneous daily  ergocalciferol 10332 Unit(s) Oral every week  levoFLOXacin IVPB      methylPREDNISolone sodium succinate Injectable 60 milliGRAM(s) IV Push daily  sodium chloride 0.45%. 1000 milliLiter(s) (100 mL/Hr) IV Continuous <Continuous>  ursodiol Capsule 300 milliGRAM(s) Oral two times a day    03-24    144  |  115<H>  |  30<H>  ----------------------------<  65<L>  3.7   |  17<L>  |  2.08<H>    Ca    8.9      24 Mar 2019 06:25  Mg     1.8     03-24    TPro  5.6<L>  /  Alb  2.2<L>  /  TBili  19.8<H>  /  DBili      /  AST  276<H>  /  ALT  182<H>  /  AlkPhos  255<H>  03-24    Creatinine Trend: 2.08 <--, 2.07 <--, 1.91 <--, 1.89 <--, 1.97 <--, 1.88 <--, 1.64 <--, 1.57 <--                        8.4    5.98  )-----------( 112      ( 24 Mar 2019 06:25 )             23.0

## 2019-03-24 NOTE — PROGRESS NOTE ADULT - SUBJECTIVE AND OBJECTIVE BOX
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  Upon arrival to the ED, pt with scleral icterus and labs significant for transaminitis and TBili > 20 (15 Mar 2019 06:59)      PAST MEDICAL & SURGICAL HISTORY:  Chronic kidney disease (CKD), stage IV (severe)  Benign prostatic hypertrophy  S/P cataract surgery, left: 3 yrs ago      No Known Allergies       MEDICATIONS  (STANDING):  ampicillin/sulbactam  IVPB      ampicillin/sulbactam  IVPB 3 Gram(s) IV Intermittent every 12 hours  enoxaparin Injectable 30 milliGRAM(s) SubCutaneous daily  ergocalciferol 98732 Unit(s) Oral every week  levoFLOXacin IVPB      methylPREDNISolone sodium succinate Injectable 60 milliGRAM(s) IV Push daily  sodium chloride 0.45%. 1000 milliLiter(s) (100 mL/Hr) IV Continuous <Continuous>  ursodiol Capsule 300 milliGRAM(s) Oral two times a day    MEDICATIONS  (PRN):      REVIEW OF SYSTEMS: as above, otherwise negative new pertinent ros    T(C): 36.3 (03-24-19 @ 06:24), Max: 36.4 (03-23-19 @ 15:37)  HR: 74 (03-24-19 @ 06:24) (67 - 74)  BP: 118/71 (03-24-19 @ 06:24) (118/62 - 135/78)  RR: 16 (03-24-19 @ 06:24) (15 - 18)  SpO2: 100% (03-24-19 @ 06:24) (99% - 100%)    PHYSICAL EXAMINATION:   Constitutional: NAD  HEENT: mild scleral icterus. AT  Neck:  Supple  Respiratory:  Adequate airflow b/l. Not using accessory muscles of respiration.  Cardiovascular:  S1 & S2 intact, no R/G, 2+ radial pulses b/l  Gastrointestinal: Soft, NT, ND, normoactive b.s., no organomegaly/RT/rigidity  Extremities: warm, poor skin turgor and elasticity.   Neurological:  awake, disoriented, mild confusion. gen weakness. No focal acute focal motor deficits. Crude sensation intact.     Labs and imaging reviewed    LABS:                        8.4    5.98  )-----------( 112      ( 24 Mar 2019 06:25 )             23.0     03-24    144  |  115<H>  |  30<H>  ----------------------------<  65<L>  3.7   |  17<L>  |  2.08<H>    Ca    8.9      24 Mar 2019 06:25  Mg     1.8     03-24    TPro  5.6<L>  /  Alb  2.2<L>  /  TBili  19.8<H>  /  DBili  x   /  AST  276<H>  /  ALT  182<H>  /  AlkPhos  255<H>  03-24        PT/INR - ( 24 Mar 2019 06:25 )   PT: 22.4 SEC;   INR: 1.98          PTT - ( 24 Mar 2019 06:25 )  PTT:64.0 SEC    CAPILLARY BLOOD GLUCOSE            LIVER FUNCTIONS - ( 24 Mar 2019 06:25 )  Alb: 2.2 g/dL / Pro: 5.6 g/dL / ALK PHOS: 255 u/L / ALT: 182 u/L / AST: 276 u/L / GGT: x               RADIOLOGY & ADDITIONAL STUDIES:

## 2019-03-25 LAB
ALBUMIN SERPL ELPH-MCNC: 2.2 G/DL — LOW (ref 3.3–5)
ALP SERPL-CCNC: 268 U/L — HIGH (ref 40–120)
ALT FLD-CCNC: 174 U/L — HIGH (ref 4–41)
AMMONIA BLD-MCNC: 66 UMOL/L — HIGH (ref 11–55)
ANION GAP SERPL CALC-SCNC: 14 MMO/L — SIGNIFICANT CHANGE UP (ref 7–14)
APTT BLD: 65.5 SEC — HIGH (ref 27.5–36.3)
AST SERPL-CCNC: 286 U/L — HIGH (ref 4–40)
BACTERIA BLD CULT: SIGNIFICANT CHANGE UP
BILIRUB SERPL-MCNC: 20.8 MG/DL — HIGH (ref 0.2–1.2)
BUN SERPL-MCNC: 34 MG/DL — HIGH (ref 7–23)
CALCIUM SERPL-MCNC: 8.9 MG/DL — SIGNIFICANT CHANGE UP (ref 8.4–10.5)
CHLORIDE SERPL-SCNC: 113 MMOL/L — HIGH (ref 98–107)
CO2 SERPL-SCNC: 13 MMOL/L — LOW (ref 22–31)
CREAT SERPL-MCNC: 2.18 MG/DL — HIGH (ref 0.5–1.3)
GLUCOSE SERPL-MCNC: 47 MG/DL — LOW (ref 70–99)
HCT VFR BLD CALC: 24.5 % — LOW (ref 39–50)
HGB BLD-MCNC: 9 G/DL — LOW (ref 13–17)
INR BLD: 2.06 — HIGH (ref 0.88–1.17)
MAGNESIUM SERPL-MCNC: 1.9 MG/DL — SIGNIFICANT CHANGE UP (ref 1.6–2.6)
MCHC RBC-ENTMCNC: 28 PG — SIGNIFICANT CHANGE UP (ref 27–34)
MCHC RBC-ENTMCNC: 36.7 % — HIGH (ref 32–36)
MCV RBC AUTO: 76.3 FL — LOW (ref 80–100)
NRBC # FLD: 0.11 K/UL — LOW (ref 25–125)
NRBC FLD-RTO: 1.7 — SIGNIFICANT CHANGE UP
PLATELET # BLD AUTO: 118 K/UL — LOW (ref 150–400)
PMV BLD: SIGNIFICANT CHANGE UP FL (ref 7–13)
POTASSIUM SERPL-MCNC: 4.1 MMOL/L — SIGNIFICANT CHANGE UP (ref 3.5–5.3)
POTASSIUM SERPL-SCNC: 4.1 MMOL/L — SIGNIFICANT CHANGE UP (ref 3.5–5.3)
PROT SERPL-MCNC: 5.9 G/DL — LOW (ref 6–8.3)
PROTHROM AB SERPL-ACNC: 23.4 SEC — HIGH (ref 9.8–13.1)
RBC # BLD: 3.21 M/UL — LOW (ref 4.2–5.8)
RBC # FLD: 28.2 % — HIGH (ref 10.3–14.5)
SODIUM SERPL-SCNC: 140 MMOL/L — SIGNIFICANT CHANGE UP (ref 135–145)
WBC # BLD: 6.5 K/UL — SIGNIFICANT CHANGE UP (ref 3.8–10.5)
WBC # FLD AUTO: 6.5 K/UL — SIGNIFICANT CHANGE UP (ref 3.8–10.5)

## 2019-03-25 RX ORDER — ONDANSETRON 8 MG/1
4 TABLET, FILM COATED ORAL ONCE
Qty: 0 | Refills: 0 | Status: DISCONTINUED | OUTPATIENT
Start: 2019-03-25 | End: 2019-03-28

## 2019-03-25 RX ORDER — SODIUM CHLORIDE 9 MG/ML
1000 INJECTION, SOLUTION INTRAVENOUS
Qty: 0 | Refills: 0 | Status: DISCONTINUED | OUTPATIENT
Start: 2019-03-25 | End: 2019-03-26

## 2019-03-25 RX ORDER — LACTULOSE 10 G/15ML
10 SOLUTION ORAL
Qty: 0 | Refills: 0 | Status: DISCONTINUED | OUTPATIENT
Start: 2019-03-25 | End: 2019-03-28

## 2019-03-25 RX ORDER — LACTULOSE 10 G/15ML
10 SOLUTION ORAL ONCE
Qty: 0 | Refills: 0 | Status: COMPLETED | OUTPATIENT
Start: 2019-03-25 | End: 2019-03-25

## 2019-03-25 RX ORDER — PHYTONADIONE (VIT K1) 5 MG
2.5 TABLET ORAL ONCE
Qty: 0 | Refills: 0 | Status: COMPLETED | OUTPATIENT
Start: 2019-03-25 | End: 2019-03-25

## 2019-03-25 RX ADMIN — LACTULOSE 10 GRAM(S): 10 SOLUTION ORAL at 17:56

## 2019-03-25 RX ADMIN — SODIUM CHLORIDE 60 MILLILITER(S): 9 INJECTION, SOLUTION INTRAVENOUS at 21:14

## 2019-03-25 RX ADMIN — Medication 60 MILLIGRAM(S): at 12:18

## 2019-03-25 RX ADMIN — SODIUM CHLORIDE 60 MILLILITER(S): 9 INJECTION, SOLUTION INTRAVENOUS at 14:37

## 2019-03-25 RX ADMIN — LACTULOSE 10 GRAM(S): 10 SOLUTION ORAL at 10:39

## 2019-03-25 RX ADMIN — Medication 2.5 MILLIGRAM(S): at 12:19

## 2019-03-25 RX ADMIN — AMPICILLIN SODIUM AND SULBACTAM SODIUM 200 GRAM(S): 250; 125 INJECTION, POWDER, FOR SUSPENSION INTRAMUSCULAR; INTRAVENOUS at 00:05

## 2019-03-25 RX ADMIN — ENOXAPARIN SODIUM 30 MILLIGRAM(S): 100 INJECTION SUBCUTANEOUS at 12:18

## 2019-03-25 RX ADMIN — URSODIOL 300 MILLIGRAM(S): 250 TABLET, FILM COATED ORAL at 00:04

## 2019-03-25 RX ADMIN — AMPICILLIN SODIUM AND SULBACTAM SODIUM 200 GRAM(S): 250; 125 INJECTION, POWDER, FOR SUSPENSION INTRAMUSCULAR; INTRAVENOUS at 12:17

## 2019-03-25 RX ADMIN — URSODIOL 300 MILLIGRAM(S): 250 TABLET, FILM COATED ORAL at 12:17

## 2019-03-25 NOTE — DIETITIAN INITIAL EVALUATION ADULT. - PERTINENT MEDS FT
MEDICATIONS  (STANDING):  ampicillin/sulbactam  IVPB      ampicillin/sulbactam  IVPB 3 Gram(s) IV Intermittent every 12 hours  enoxaparin Injectable 30 milliGRAM(s) SubCutaneous daily  ergocalciferol 67372 Unit(s) Oral every week  lactulose Syrup 10 Gram(s) Oral two times a day  levoFLOXacin IVPB      levoFLOXacin IVPB 250 milliGRAM(s) IV Intermittent every 48 hours  methylPREDNISolone sodium succinate Injectable 60 milliGRAM(s) IV Push daily  ondansetron Injectable 4 milliGRAM(s) IV Push once  sodium chloride 0.45%. 1000 milliLiter(s) (100 mL/Hr) IV Continuous <Continuous>  ursodiol Capsule 300 milliGRAM(s) Oral two times a day

## 2019-03-25 NOTE — PROGRESS NOTE ADULT - ASSESSMENT
A 89 yo Male with BPH, Cataracts, Diverticulosis, Anemia 2/2 GI Bleed with previous PRBC transfusions, who presents to the ER for evaluation of  generalized  weakness, urinary retention and confusion. On admission, he found to have elevated bilirubin, LFTS, transaminitis, scleral jaundice. and urinary retention. Oshea catheter has placed with negative Urine analysis. The CT abdomen  and pelvis shows Distended gallbladder. Consider further evaluation with ultrasound if  acute cholecystitis is a concern. No discrete mass identified on this limited noncontrast study. The ID consult requested to assist with evaluation of Biliary sepsis.    # Encephalopathy  # Urinary retention  # R/O biliary sepsis/ Cholangitis - Most likely acute cholestatic hepatitis as per GI -s/p  transjugular liver biopsy with two passes. and found to have small right atrium blood clot. 3/21/19  # Distended GB with cholelithiasis on MRCP 3/18/19  # Hypothermia- Pneumonia on CXR - MRSA PCR is negative   # UTI- Enterococcus faecalis 3/20/19    would recommend:    1.  Continue Unasyn and Levaquin for now  2.  Follow up Liver biopsy  3.  Trend  LFts and bilirubin  4. Supplemental oxygenation and  bronchodilator as needed  5. Aspiration precaution    d/w patient and Nursing staff    will follow the patient with you A 89 yo Male with BPH, Cataracts, Diverticulosis, Anemia 2/2 GI Bleed with previous PRBC transfusions, who presents to the ER for evaluation of  generalized  weakness, urinary retention and confusion. On admission, he found to have elevated bilirubin, LFTS, transaminitis, scleral jaundice. and urinary retention. Oshea catheter has placed with negative Urine analysis. The CT abdomen  and pelvis shows Distended gallbladder. Consider further evaluation with ultrasound if  acute cholecystitis is a concern. No discrete mass identified on this limited noncontrast study. The ID consult requested to assist with evaluation of Biliary sepsis.    # Encephalopathy  # Urinary retention  # R/O biliary sepsis/ Cholangitis - Most likely acute cholestatic hepatitis as per GI -s/p  transjugular liver biopsy with two passes. and found to have small right atrium blood clot. 3/21/19  # Distended GB with cholelithiasis on MRCP 3/18/19  # Hypothermia- Pneumonia on CXR - MRSA PCR is negative   # UTI- Enterococcus faecalis 3/20/19    would recommend:    1. Encourage to eat and  start on IVF with D5 since not eating   2. Continue Unasyn and Levaquin for now  3.  Follow up Liver biopsy  4.  Trend  LFts and bilirubin, stay elevated  5. Supplemental oxygenation and  bronchodilator as needed  6. Aspiration precaution    d/w patient and Family at the bed side    will follow the patient with you

## 2019-03-25 NOTE — DIETITIAN INITIAL EVALUATION ADULT. - ENERGY NEEDS
Ht: 71 inches Wt: 117.9 pounds  BMI: 16.4 kg/m2 IBW: 172 pounds (+/-10%) %IBW: 68%  Edema: + 1 edema- right and left foot.  Skin: intact, no pressure injuries noted

## 2019-03-25 NOTE — DIETITIAN INITIAL EVALUATION ADULT. - PHYSICAL APPEARANCE
Unable to perform Nutrition focused physical exam, however patient with visible muscle and fat wasting. Subcutaneous fat loss: [severe] Orbital fat pads region. Muscle wasting: [severe]Temples region, [severe]Clavicle region, [severe]Shoulder region.

## 2019-03-25 NOTE — PROGRESS NOTE ADULT - ASSESSMENT
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 Ursodiol       - IR biopsy complicated by a blood clot passing into the right atrium - c/w close monitoring, tele      - assist pt in oral consumption/diet      - all consultants management appreciated      - adjust management accordingly (ie: diet, intervention, rx, etc.)  -> Abnormal Aortic Findings:      - F/u CT  -> Acute Cystitis:      - abx per ID  -> Thrombocytopenia:      - 2/2 liver dysfunction, correct gi  -> INR Elevation:      - 2/2 vitamin k deficiency      -  monitor inr and clinical status  -> PHU:      - c/w hydration, treat underlying liver condition, avoid nephrotoxic rx, strict i/o, nephro on board  -> Metabolic Encephalopathy:      - improving, treat underlying gi condition      - neuro checks       - fall precautions   -> Hypernatermia Hyperchloremia:      - improved   -> BPH:     - flomax  -> Need for prophylactic measure:      - dvt/gi ppx  -> Severe Protein Calorie Malnutrition:      - supplement diet

## 2019-03-25 NOTE — PROGRESS NOTE ADULT - SUBJECTIVE AND OBJECTIVE BOX
[facility] - Crystal / Corrie S /Kylah/ LARON Villegas/ LARON Beadr/ Shiva Mullen / MAGUI Njeru  ---------------------------------------------------------------------------------------------------------------    Patient seen and examined bedside    Subjective and Objective: No overnight events, denied sob. No complaints today. poor po intake    Allergies: No Known Allergies      Hospital Medications:   MEDICATIONS  (STANDING):  ampicillin/sulbactam  IVPB      ampicillin/sulbactam  IVPB 3 Gram(s) IV Intermittent every 12 hours  enoxaparin Injectable 30 milliGRAM(s) SubCutaneous daily  ergocalciferol 43264 Unit(s) Oral every week  lactulose Syrup 10 Gram(s) Oral two times a day  levoFLOXacin IVPB      levoFLOXacin IVPB 250 milliGRAM(s) IV Intermittent every 48 hours  methylPREDNISolone sodium succinate Injectable 60 milliGRAM(s) IV Push daily  ondansetron Injectable 4 milliGRAM(s) IV Push once  sodium chloride 0.45%. 1000 milliLiter(s) (100 mL/Hr) IV Continuous <Continuous>  ursodiol Capsule 300 milliGRAM(s) Oral two times a day      VITALS:  T(F): 97.5 (19 @ 09:44), Max: 97.5 (19 @ 09:44)  HR: 74 (19 @ 09:44)  BP: 118/66 (19 @ 09:44)  RR: 18 (19 @ 09:44)  SpO2: 99% (19 @ 09:44)  Wt(kg): --        PHYSICAL EXAM:  Constitutional: NAD  HEENT: anicteric sclera, oropharynx clear  Neck: No JVD  Respiratory: CTAB, no wheezes, rales or rhonchi  Cardiovascular: S1, S2, RRR  Gastrointestinal: BS+, soft, NT/ND  Extremities: No cyanosis or clubbing. No peripheral edema  Neurological: A/O x , no focal deficits  Psychiatric: Normal mood, normal affect  : +flores.       LABS:      140  |  113<H>  |  34<H>  ----------------------------<  47<L>  4.1   |  13<L>  |  2.18<H>    Ca    8.9      25 Mar 2019 06:15  Mg     1.9         TPro  5.9<L>  /  Alb  2.2<L>  /  TBili  20.8<H>  /  DBili      /  AST  286<H>  /  ALT  174<H>  /  AlkPhos  268<H>      Creatinine Trend: 2.18 <--, 2.08 <--, 2.07 <--, 1.91 <--, 1.89 <--, 1.97 <--, 1.88 <--, 1.64 <--                        9.0    6.50  )-----------( 118      ( 25 Mar 2019 07:30 )             24.5     Urine Studies:  Urinalysis Basic - ( 20 Mar 2019 13:25 )    Color: ELSA / Appearance: TURBID / S.020 / pH: 6.0  Gluc: 100 / Ketone: NEGATIVE  / Bili: LARGE / Urobili: 4.0   Blood: MODERATE / Protein: 100 / Nitrite: NEGATIVE   Leuk Esterase: LARGE / RBC: 3-5 / WBC >50   Sq Epi:  / Non Sq Epi: OCC / Bacteria: MANY          RADIOLOGY & ADDITIONAL STUDIES:

## 2019-03-25 NOTE — PROGRESS NOTE ADULT - SUBJECTIVE AND OBJECTIVE BOX
[facility]        - Patient seen and examined.  - Today, patient is without complaints.         *****MEDICATIONS: Current medication reviewed and documented.    MEDICATIONS  (STANDING):  ampicillin/sulbactam  IVPB      ampicillin/sulbactam  IVPB 3 Gram(s) IV Intermittent every 12 hours  dextrose 5% + sodium chloride 0.45% 1000 milliLiter(s) (60 mL/Hr) IV Continuous <Continuous>  enoxaparin Injectable 30 milliGRAM(s) SubCutaneous daily  ergocalciferol 97114 Unit(s) Oral every week  lactulose Syrup 10 Gram(s) Oral two times a day  levoFLOXacin IVPB      levoFLOXacin IVPB 250 milliGRAM(s) IV Intermittent every 48 hours  ondansetron Injectable 4 milliGRAM(s) IV Push once  ursodiol Capsule 300 milliGRAM(s) Oral two times a day    MEDICATIONS  (PRN):          ***** VITAL SIGNS:  T(F): 97.3 (19 @ 15:32), Max: 97.5 (19 @ 09:44)  HR: 73 (19 @ 15:32) (73 - 84)  BP: 116/73 (19 @ 15:32) (99/55 - 118/66)  RR: 18 (19 @ 15:32) (18 - 18)  SpO2: 100% (19 @ 15:32) (99% - 100%)  Wt(kg): --  ,   I&O's Summary    25 Mar 2019 07:01  -  25 Mar 2019 17:21  --------------------------------------------------------  IN: 0 mL / OUT: 200 mL / NET: -200 mL             *****PHYSICAL EXAM:   lethargic icteric    alert to repeated tactile stimuli    can respond to question.      Gait not assessed.          *****LAB AND IMAGIN.0    6.50  )-----------( 118      ( 25 Mar 2019 07:30 )             24.5               03-    140  |  113<H>  |  34<H>  ----------------------------<  47<L>  4.1   |  13<L>  |  2.18<H>    Ca    8.9      25 Mar 2019 06:15  Mg     1.9         TPro  5.9<L>  /  Alb  2.2<L>  /  TBili  20.8<H>  /  DBili  x   /  AST  286<H>  /  ALT  174<H>  /  AlkPhos  268<H>      PT/INR - ( 25 Mar 2019 07:55 )   PT: 23.4 SEC;   INR: 2.06          PTT - ( 25 Mar 2019 07:55 )  PTT:65.5 SEC             [All pertinent recent Imaging/Reports reviewed]           *****A S S E S S M E N T   A N D   P L A N :  89 yo M, poor historian, charts reviewed, with PMHx BPH, Cataracts, Diverticulosis, Anemia 2/2 GI Bleed with previous PRBC transfusions, who p/w 2 days genearlized weakness, urinary retention and confusion.  Upon arrival to the ED, pt with scleral icterus and labs significant for transaminitis and TBili > 20    called to evaluate pt for ams         Problem/Recommendations 1:  Multifactorial toxic metabolic encephalopathy  , hyperbilirubinemia, hyperammonemia, and worsening renal function  correct metabolic dyscrasias defer to renal/gi   repeat ammonia level 66 on lactulose   will continue to monitor   encourage po intake. staff must assist with meals.      Problem/Recommendations 2: sepsis ID on board   defer to id for management     Problem/Recommendations 3:  Hyperbilirubinemia  suspicious for cholestatic pattern   distended gb with cholelithiasis, mural thickening of the cbd. defer to gi for management.    pending liver biopsy result     prognosis guarded.      Thank you for allowing me to participate in the care of this patient. Please do not hesitate to call me if you have any  questions.        ________________  Ava Schreiber MD  [facility] ([facility]  [phone]      30 minutes spent on total encounter; more than 50 % of the visit was  spent counseling about plan of care, compliance to diet/exercise and medication regimen and or  coordinating care by the attending physician.      It is advised that s stroke patients follow up with DULCE MARIA Rausch @ [phone] in 1- 2 weeks.   Others please follow up with Dr. Michael Nissenbaum [phone]

## 2019-03-25 NOTE — DIETITIAN INITIAL EVALUATION ADULT. - NS AS NUTRI INTERV MEDICAL AND FOOD SUPPLEMENTS
Recommend Ensure Enlive 240mls 3x daily (1050kcal, 60g protein) - thickened to proper consistency to help with caloric and protein intake

## 2019-03-25 NOTE — PROGRESS NOTE ADULT - SUBJECTIVE AND OBJECTIVE BOX
PASCUAL OLSEN:[id number],   89yMale followed for:  No Known Allergies    PAST MEDICAL & SURGICAL HISTORY:  Chronic kidney disease (CKD), stage IV (severe)  Benign prostatic hypertrophy  S/P cataract surgery, left: 3 yrs ago    FAMILY HISTORY:  No pertinent family history in first degree relatives    MEDICATIONS  (STANDING):  ampicillin/sulbactam  IVPB      ampicillin/sulbactam  IVPB 3 Gram(s) IV Intermittent every 12 hours  enoxaparin Injectable 30 milliGRAM(s) SubCutaneous daily  ergocalciferol 69451 Unit(s) Oral every week  lactulose Syrup 10 Gram(s) Oral once  levoFLOXacin IVPB      levoFLOXacin IVPB 250 milliGRAM(s) IV Intermittent every 48 hours  methylPREDNISolone sodium succinate Injectable 60 milliGRAM(s) IV Push daily  sodium chloride 0.45%. 1000 milliLiter(s) (100 mL/Hr) IV Continuous <Continuous>  ursodiol Capsule 300 milliGRAM(s) Oral two times a day    MEDICATIONS  (PRN):      Vital Signs Last 24 Hrs  T(C): 36.2 (25 Mar 2019 05:30), Max: 36.4 (24 Mar 2019 10:05)  T(F): 97.2 (25 Mar 2019 05:30), Max: 97.6 (24 Mar 2019 10:05)  HR: 77 (25 Mar 2019 05:30) (74 - 84)  BP: 113/59 (25 Mar 2019 05:30) (99/55 - 122/73)  BP(mean): --  RR: 18 (25 Mar 2019 05:30) (18 - 18)  SpO2: 99% (25 Mar 2019 05:30) (99% - 99%)  nc/at  s1s2  cta  soft, nt, nd no guarding or rebound  no c/c/e    CBC Full  -  ( 25 Mar 2019 07:30 )  WBC Count : 6.50 K/uL  Hemoglobin : 9.0 g/dL  Hematocrit : 24.5 %  Platelet Count - Automated : 118 K/uL  Mean Cell Volume : 76.3 fL  Mean Cell Hemoglobin : 28.0 pg  Mean Cell Hemoglobin Concentration : 36.7 %  Auto Neutrophil # : x  Auto Lymphocyte # : x  Auto Monocyte # : x  Auto Eosinophil # : x  Auto Basophil # : x  Auto Neutrophil % : x  Auto Lymphocyte % : x  Auto Monocyte % : x  Auto Eosinophil % : x  Auto Basophil % : x    03-25    140  |  113<H>  |  34<H>  ----------------------------<  47<L>  4.1   |  13<L>  |  2.18<H>    Ca    8.9      25 Mar 2019 06:15  Mg     1.9     03-25    TPro  5.9<L>  /  Alb  2.2<L>  /  TBili  20.8<H>  /  DBili  x   /  AST  286<H>  /  ALT  174<H>  /  AlkPhos  268<H>  03-25    PT/INR - ( 25 Mar 2019 07:55 )   PT: 23.4 SEC;   INR: 2.06          PTT - ( 25 Mar 2019 07:55 )  PTT:65.5 SEC

## 2019-03-25 NOTE — DIETITIAN INITIAL EVALUATION ADULT. - OTHER INFO
Patient seen for extended length of stay. Per chart: Patient with GI bleed p/w generalized weakness x 2 days and urinary retention. Patient unable to participate during interview, falling asleep during assessment. Per RN - patient with variable PO intake - for breakfast this morning patient consumed 0% - reported he feels weak. RDN recommend Ensure to help with caloric and protein intake. Per RN no GI distress (nausea/vomiting/diarrhea/constipation). Patient seen by Speech on 3/23- recommend Dysphagia 2 Honey Consistency. Unable to obtain weight history- current weight: 117.9 pounds. Patient also noted with + 1 edema right and left foot.

## 2019-03-25 NOTE — PROGRESS NOTE ADULT - SUBJECTIVE AND OBJECTIVE BOX
Patient seen and examined at bedside  c/o malaise and gen weakness. othewise no new acute events noted overnight  Case discussed with medical team    HPI:  HPI:  89 yo M, poor historian, charts reviewed, with PMHx BPH, Cataracts, Diverticulosis, Anemia 2/2 GI Bleed with previous PRBC transfusions, who p/w 2 days genearlized weakness, urinary retention and confusion.  Upon arrival to the ED, pt with scleral icterus and labs significant for transaminitis and TBili > 20 (15 Mar 2019 06:59)      PAST MEDICAL & SURGICAL HISTORY:  Chronic kidney disease (CKD), stage IV (severe)  Benign prostatic hypertrophy  S/P cataract surgery, left: 3 yrs ago      No Known Allergies       MEDICATIONS  (STANDING):  ampicillin/sulbactam  IVPB      ampicillin/sulbactam  IVPB 3 Gram(s) IV Intermittent every 12 hours  enoxaparin Injectable 30 milliGRAM(s) SubCutaneous daily  ergocalciferol 82769 Unit(s) Oral every week  lactulose Syrup 10 Gram(s) Oral once  lactulose Syrup 10 Gram(s) Oral two times a day  levoFLOXacin IVPB      levoFLOXacin IVPB 250 milliGRAM(s) IV Intermittent every 48 hours  methylPREDNISolone sodium succinate Injectable 60 milliGRAM(s) IV Push daily  sodium chloride 0.45%. 1000 milliLiter(s) (100 mL/Hr) IV Continuous <Continuous>  ursodiol Capsule 300 milliGRAM(s) Oral two times a day    MEDICATIONS  (PRN):      REVIEW OF SYSTEMS:  CONSTITUTIONAL: (+) malaise. gen weakness.   EYES: No acute change in vision   ENT:  No tinnitus  NECK: No stiffness  RESPIRATORY: No hemoptysis  CARDIOVASCULAR: No chest pain, palpitations, syncope  GASTROINTESTINAL: abd discomfort. poor oral intake. No hematemesis, diarrhea, melena, or hematochezia.  GENITOURINARY: No hematuria  NEUROLOGICAL: No headaches  LYMPH Nodes: No enlarged glands  ENDOCRINE: No heat or cold intolerance	    T(C): 36.2 (03-25-19 @ 05:30), Max: 36.4 (03-24-19 @ 10:05)  HR: 77 (03-25-19 @ 05:30) (74 - 84)  BP: 113/59 (03-25-19 @ 05:30) (99/55 - 122/73)  RR: 18 (03-25-19 @ 05:30) (18 - 18)  SpO2: 99% (03-25-19 @ 05:30) (99% - 99%)    PHYSICAL EXAMINATION:   Constitutional: stable ill appearance  HEENT: bitemp wasting, dry oral mucosa. AT  Neck:  Supple  Respiratory:  Adequate airflow b/l. Not using accessory muscles of respiration.  Cardiovascular:  S1 & S2 intact, no R/G, 2+ radial pulses b/l  Gastrointestinal: Soft, mild tenderness, ND, normoactive b.s., no organomegaly/RT/rigidity  Extremities: poor skin elasticity and turgor. WWP  Neurological:  disoriented, awake, arousable. No acute focal motor deficits. Crude sensation intact.     Labs and imaging reviewed    LABS:                        9.0    6.50  )-----------( 118      ( 25 Mar 2019 07:30 )             24.5     03-25    140  |  113<H>  |  34<H>  ----------------------------<  47<L>  4.1   |  13<L>  |  2.18<H>    Ca    8.9      25 Mar 2019 06:15  Mg     1.9     03-25    TPro  5.9<L>  /  Alb  2.2<L>  /  TBili  20.8<H>  /  DBili  x   /  AST  286<H>  /  ALT  174<H>  /  AlkPhos  268<H>  03-25        PT/INR - ( 25 Mar 2019 07:55 )   PT: 23.4 SEC;   INR: 2.06          PTT - ( 25 Mar 2019 07:55 )  PTT:65.5 SEC    CAPILLARY BLOOD GLUCOSE            LIVER FUNCTIONS - ( 25 Mar 2019 06:15 )  Alb: 2.2 g/dL / Pro: 5.9 g/dL / ALK PHOS: 268 u/L / ALT: 174 u/L / AST: 286 u/L / GGT: x               RADIOLOGY & ADDITIONAL STUDIES:

## 2019-03-25 NOTE — PROGRESS NOTE ADULT - ASSESSMENT
89 yo M with PMHx CKD, BPH, Diverticulosis, Anemia 2/2 GI Bleed with previous PRBC transfusions, who p/w 2 days genearlized weakness, urinary retention and confusion, found to have elevated LFTs. Renal following for PHU, CKD management.    PHU on CKD 3 b/l Cr 1.4-1.6 10/2018  PHU most likely 2/2 pre renal. clincially appears dry. C3 77- low, C4 wnl -noted- likely 2/2 dec synthesis 2/2 liver dz   urinary retention on CT w/o hydronephrosis. no e/o acute GN  electrolytes acceptable. Cr stable, likely pre renal vs ATN. u/o good,   M acidosis  Mild hypernatremia- resolved  Transaminitis and Bilirubinemia, -acute cholestatic hepatitis of unclear etiology.  s/p liver Bx by IR 3/21  Hepatic encephalopathy- sr NH4 level 91 on admission.  HTN, controlled. bp stable  BPH: c/w flomax    labs, chart reviewed  keep flores for now  change IVF- 1/2NS to d51/2NS w/75meq Nabicarb @60ml/hr   encourage po intake  f/u GI and hem  off Norvasc 5mg daily -BP better  avoid ACEi/ARB/NSAIDs/nephrotoxics  dose all meds for eGFR <15ml/min  monitor BMP, LFTs daily  monitor U/O  will f/u   overall prognosis guarded

## 2019-03-25 NOTE — PROGRESS NOTE ADULT - SUBJECTIVE AND OBJECTIVE BOX
S: no pain or SOB     ampicillin/sulbactam  IVPB      ampicillin/sulbactam  IVPB 3 Gram(s) IV Intermittent every 12 hours  enoxaparin Injectable 30 milliGRAM(s) SubCutaneous daily  ergocalciferol 74582 Unit(s) Oral every week  lactulose Syrup 10 Gram(s) Oral once  lactulose Syrup 10 Gram(s) Oral two times a day  levoFLOXacin IVPB      levoFLOXacin IVPB 250 milliGRAM(s) IV Intermittent every 48 hours  methylPREDNISolone sodium succinate Injectable 60 milliGRAM(s) IV Push daily  phytonadione   Solution 2.5 milliGRAM(s) Oral once  sodium chloride 0.45%. 1000 milliLiter(s) IV Continuous <Continuous>  ursodiol Capsule 300 milliGRAM(s) Oral two times a day                        9.0    6.50  )-----------( 118      ( 25 Mar 2019 07:30 )             24.5     Hemoglobin: 9.0 g/dL (03-25 @ 07:30)  Hemoglobin: 8.4 g/dL (03-24 @ 06:25)  Hemoglobin: 8.5 g/dL (03-23 @ 05:33)  Hemoglobin: 8.7 g/dL (03-22 @ 06:00)  Hemoglobin: 8.9 g/dL (03-21 @ 07:26)    03-25    140  |  113<H>  |  34<H>  ----------------------------<  47<L>  4.1   |  13<L>  |  2.18<H>    Ca    8.9      25 Mar 2019 06:15  Mg     1.9     03-25    TPro  5.9<L>  /  Alb  2.2<L>  /  TBili  20.8<H>  /  DBili  x   /  AST  286<H>  /  ALT  174<H>  /  AlkPhos  268<H>  03-25    Creatinine Trend: 2.18<--, 2.08<--, 2.07<--, 1.91<--, 1.89<--, 1.97<--    COAGS: PT/INR - ( 25 Mar 2019 07:55 )   PT: 23.4 SEC;   INR: 2.06        PTT - ( 25 Mar 2019 07:55 )  PTT:65.5 SEC        T(C): 36.4 (03-25-19 @ 09:44), Max: 36.4 (03-24-19 @ 10:05)  HR: 74 (03-25-19 @ 09:44) (74 - 84)  BP: 118/66 (03-25-19 @ 09:44) (99/55 - 122/73)  RR: 18 (03-25-19 @ 09:44) (18 - 18)  SpO2: 99% (03-25-19 @ 09:44) (99% - 99%)      Heart: normal, RRR,   Lungs: cta b/l  Abd: soft nT  Ext: no edema     TELEMETRY: SR	84    RADIOLOGY:  < from: Xray Chest 1 View- PORTABLE-Urgent (03.20.19 @ 09:37) >  FINDINGS/  IMPRESSION:    Patchy opacity within the right lower lung may represent pneumonia.   Subsegmental atelectasis at the left base. No pleural effusion or   pneumothorax. No pulmonary edema.    The cardiac silhouette remains enlarged. Aortic aneurysm. Recommend   further evaluation with aortogram.    < end of copied text >    < from: Transthoracic Echocardiogram (03.23.19 @ 09:36) >  CONCLUSIONS:  1. Mitral annular calcification, otherwise normal mitral  valve. Mild mitral regurgitation.  2. Aortic valve leaflet morphology not well visualized;  appears calcified with normal opening. Moderate aortic  regurgitation.  3. Normal left ventricular internal dimensions and wall  thicknesses.  4. Hyperdynamic left ventricle.  5. Mild diastolic dysfunction (Stage I).  6. Normal right ventricular size and function.  7. Normal tricuspid valve. Mild tricuspid regurgitation.  8. Estimated pulmonary artery systolic pressure equals 36  mm Hg, assuming right atrial pressure equals 10  mm Hg,  consistent with borderline pulmonary hypertension.  *** Compared with echocardiogram of 6/11/2018,no  significant changes noted.  ------------------------------------------------------------------------  Confirmed on  3/23/2019 - 11:19:58 by Evaristo Castanon MD, FACC,  FASE, RPVI    < end of copied text >          ASSESSMENT/PLAN: 	    89 yo M, poor historian, charts reviewed, with PMHx BPH, Cataracts, Diverticulosis, Anemia 2/2 GI Bleed with previous PRBC transfusions, who p/w 2 days generalized  weakness, urinary retention and confusion. LFTs significant for transaminitis, scleral jaundice.     -pt. tolerated biopsy well in terms of cv perspective  -tele stable  -no clinical heart failure or anginal symptoms  -GI note appreciated, f/u bx results   -TTE and CXR impressions noted, ct chest performed   - f/u final CT Chest results, prelim Small bilateral pleural effusions. Small pericardial effusion. Upper abdominal ascites.  - continued care per primary team S: no pain or SOB     ampicillin/sulbactam  IVPB      ampicillin/sulbactam  IVPB 3 Gram(s) IV Intermittent every 12 hours  enoxaparin Injectable 30 milliGRAM(s) SubCutaneous daily  ergocalciferol 85911 Unit(s) Oral every week  lactulose Syrup 10 Gram(s) Oral once  lactulose Syrup 10 Gram(s) Oral two times a day  levoFLOXacin IVPB      levoFLOXacin IVPB 250 milliGRAM(s) IV Intermittent every 48 hours  methylPREDNISolone sodium succinate Injectable 60 milliGRAM(s) IV Push daily  phytonadione   Solution 2.5 milliGRAM(s) Oral once  sodium chloride 0.45%. 1000 milliLiter(s) IV Continuous <Continuous>  ursodiol Capsule 300 milliGRAM(s) Oral two times a day                        9.0    6.50  )-----------( 118      ( 25 Mar 2019 07:30 )             24.5     Hemoglobin: 9.0 g/dL (03-25 @ 07:30)  Hemoglobin: 8.4 g/dL (03-24 @ 06:25)  Hemoglobin: 8.5 g/dL (03-23 @ 05:33)  Hemoglobin: 8.7 g/dL (03-22 @ 06:00)  Hemoglobin: 8.9 g/dL (03-21 @ 07:26)    03-25    140  |  113<H>  |  34<H>  ----------------------------<  47<L>  4.1   |  13<L>  |  2.18<H>    Ca    8.9      25 Mar 2019 06:15  Mg     1.9     03-25    TPro  5.9<L>  /  Alb  2.2<L>  /  TBili  20.8<H>  /  DBili  x   /  AST  286<H>  /  ALT  174<H>  /  AlkPhos  268<H>  03-25    Creatinine Trend: 2.18<--, 2.08<--, 2.07<--, 1.91<--, 1.89<--, 1.97<--    COAGS: PT/INR - ( 25 Mar 2019 07:55 )   PT: 23.4 SEC;   INR: 2.06        PTT - ( 25 Mar 2019 07:55 )  PTT:65.5 SEC        T(C): 36.4 (03-25-19 @ 09:44), Max: 36.4 (03-24-19 @ 10:05)  HR: 74 (03-25-19 @ 09:44) (74 - 84)  BP: 118/66 (03-25-19 @ 09:44) (99/55 - 122/73)  RR: 18 (03-25-19 @ 09:44) (18 - 18)  SpO2: 99% (03-25-19 @ 09:44) (99% - 99%)      Heart: normal S1, S2, RRR, no m/r/g  Lungs: cta b/l  Abd: soft nT  Ext: no edema     TELEMETRY: SR	84    RADIOLOGY:  < from: Xray Chest 1 View- PORTABLE-Urgent (03.20.19 @ 09:37) >  FINDINGS/  IMPRESSION:    Patchy opacity within the right lower lung may represent pneumonia.   Subsegmental atelectasis at the left base. No pleural effusion or   pneumothorax. No pulmonary edema.    The cardiac silhouette remains enlarged. Aortic aneurysm. Recommend   further evaluation with aortogram.    < end of copied text >    < from: Transthoracic Echocardiogram (03.23.19 @ 09:36) >  CONCLUSIONS:  1. Mitral annular calcification, otherwise normal mitral  valve. Mild mitral regurgitation.  2. Aortic valve leaflet morphology not well visualized;  appears calcified with normal opening. Moderate aortic  regurgitation.  3. Normal left ventricular internal dimensions and wall  thicknesses.  4. Hyperdynamic left ventricle.  5. Mild diastolic dysfunction (Stage I).  6. Normal right ventricular size and function.  7. Normal tricuspid valve. Mild tricuspid regurgitation.  8. Estimated pulmonary artery systolic pressure equals 36  mm Hg, assuming right atrial pressure equals 10  mm Hg,  consistent with borderline pulmonary hypertension.  *** Compared with echocardiogram of 6/11/2018,no  significant changes noted.  ------------------------------------------------------------------------  Confirmed on  3/23/2019 - 11:19:58 by Evaristo Castanon MD, FACC,  TAMMY, BONI    < end of copied text >          ASSESSMENT/PLAN: 	    89 yo M, poor historian, charts reviewed, with PMHx BPH, Cataracts, Diverticulosis, Anemia 2/2 GI Bleed with previous PRBC transfusions, who p/w 2 days generalized  weakness, urinary retention and confusion. LFTs significant for transaminitis, scleral jaundice.     -pt. tolerated biopsy well in terms of cv perspective  -tele stable  -no clinical heart failure or anginal symptoms  -GI note appreciated, f/u bx results   -TTE and CXR impressions noted, ct chest performed   - f/u final CT Chest results, prelim Small bilateral pleural effusions. Small pericardial effusion. Upper abdominal ascites.  - continued care per primary team

## 2019-03-25 NOTE — PROGRESS NOTE ADULT - ASSESSMENT
steroids by medicine and abx per id. see full note yesterday. cholestatic hepatitis.  continue rx.  will follow up liver biopsy today.

## 2019-03-25 NOTE — PROGRESS NOTE ADULT - ATTENDING COMMENTS
Agree with above.   TTE with normal LV function and moderate AI  follow up CT chest     Rashmi Morrison MD

## 2019-03-25 NOTE — DIETITIAN INITIAL EVALUATION ADULT. - PERTINENT LABORATORY DATA
03-25 Na140 mmol/L Glu 47 mg/dL<L> K+ 4.1 mmol/L Cr  2.18 mg/dL<H> BUN 34 mg/dL<H> 03-22 Phos 3.4 mg/dL 03-25 Alb 2.2 g/dL<L> 03-16 PAB 3 mg/dL<L> 03-16 [id number] 4.6 % 03-16 Chol 89 mg/dL<L> LDL 55 mg/dL HDL 11 mg/dL<L> Trig 76 mg/dL

## 2019-03-25 NOTE — CHART NOTE - NSCHARTNOTEFT_GEN_A_CORE
Pathology result obtained in patients chart, liver bx differential medication/drug induced vs infection, less likely obstruction. Report discussed with Dr Branham, per attending discontinued IV steroid given no evidence of autoimmune hepatitis. Pt with elevated ammonia stared on lactulose. Case discussed with attending.

## 2019-03-25 NOTE — PROGRESS NOTE ADULT - SUBJECTIVE AND OBJECTIVE BOX
Patient is seen and examined at the bed side, is afebrile. He is tolerating  Unasyn and Levaquin well. The LFTs trending down slowly.         REVIEW OF SYSTEMS: All other review systems are negative        ALLERGIES: No Known Allergies        Vital Signs Last 24 Hrs  T(C): 36.2 (25 Mar 2019 17:47), Max: 36.4 (25 Mar 2019 09:44)  T(F): 97.1 (25 Mar 2019 17:47), Max: 97.5 (25 Mar 2019 09:44)  HR: 67 (25 Mar 2019 17:47) (67 - 84)  BP: 122/70 (25 Mar 2019 17:47) (99/55 - 122/70)  BP(mean): --  RR: 18 (25 Mar 2019 17:47) (18 - 18)  SpO2: 100% (25 Mar 2019 17:47) (99% - 100%)      PHYSICAL EXAM:  GENERAL: Not in distress   HEENT: Sclera jaundiced  CHEST/LUNG:  Air entry bilaterally  HEART: s1 and s2 present  ABDOMEN:  Nontender and  Nondistended  : Oshea catheter in placed  EXTREMITIES: No pedal  edema  CNS: Awake and Alert        LABS:                        9.0    6.50  )-----------( 118      ( 25 Mar 2019 07:30 )             24.5                           8.4    5.98  )-----------( 112      ( 24 Mar 2019 06:25 )             23.0             03-25    140  |  113<H>  |  34<H>  ----------------------------<  47<L>  4.1   |  13<L>  |  2.18<H>    Ca    8.9      25 Mar 2019 06:15  Mg     1.9     03-25    TPro  5.9<L>  /  Alb  2.2<L>  /  TBili  20.8<H>  /  DBili  x   /  AST  286<H>  /  ALT  174<H>  /  AlkPhos  268<H>  03-25    03-24    144  |  115<H>  |  30<H>  ----------------------------<  65<L>  3.7   |  17<L>  |  2.08<H>    Ca    8.9      24 Mar 2019 06:25  Mg     1.8     03-24    TPro  5.6<L>  /  Alb  2.2<L>  /  TBili  19.8<H>  /  DBili  x   /  AST  276<H>  /  ALT  182<H>  /  AlkPhos  255<H>  03-24        MEDICATIONS  (STANDING):  ampicillin/sulbactam  IVPB      ampicillin/sulbactam  IVPB 3 Gram(s) IV Intermittent every 12 hours  dextrose 5% + sodium chloride 0.45% 1000 milliLiter(s) (60 mL/Hr) IV Continuous <Continuous>  enoxaparin Injectable 30 milliGRAM(s) SubCutaneous daily  ergocalciferol 56115 Unit(s) Oral every week  lactulose Syrup 10 Gram(s) Oral two times a day  levoFLOXacin IVPB      levoFLOXacin IVPB 250 milliGRAM(s) IV Intermittent every 48 hours  ondansetron Injectable 4 milliGRAM(s) IV Push once  ursodiol Capsule 300 milliGRAM(s) Oral two times a day    MEDICATIONS  (PRN):        RADIOLOGY & ADDITIONAL TESTS:    3/20/19 : Xray Chest 1 View- PORTABLE-Urgent (03.20.19 @ 09:37) Patchy opacity within the right lower lung may represent pneumonia. Subsegmental atelectasis at the left base. No pleural effusion or  pneumothorax. No pulmonary edema. The cardiac silhouette remains enlarged. Aortic aneurysm. Recommend  further evaluation with aortogram.      3/18/19 : MR MRCP No Cont (03.18.19 @ 14:22) Gallbladder is distended with cholelithiasis.  No choledocholithiasis or biliary ductal dilatation.      3/17/19 : NM Hepatobiliary Imaging (03.17.19 @ 12:21) Abnormal hepatobiliary scan suspicious for common bile duct obstruction. Hepatocellular dysfunction. No evidence of acute cholecystitis.      3/18/19 : US Abdomen Limited (03.18.19 @ 07:48) Gallbladder is distended with sludge and stones.  Mural thickening of the wall of the common bile duct, possibly  cholangitis. No biliary ductal dilatation.      3/15/19 : CT Abdomen and Pelvis No Cont (03.15.19 @ 09:39) Distended gallbladder. Consider further evaluation with ultrasound if  acute cholecystitis is a concern.  No discrete mass identified on this limited noncontrast study.          MICROBIOLOGY DATA:    Culture - Urine (03.20.19 @ 15:21)    -  Vancomycin: S 2 CLOVIS    Culture - Urine:   ENTF^Enterococcus faecalis  COLONY COUNT: > = 100,000 CFU/ML    Culture - Urine:   Susceptibility not performed.    -  Tetra/Doxy: R >8 CLOVIS    -  Nitrofurantoin: S <=32 CLOVIS    -  Ampicillin: S <=2 CLOVIS    -  Ciprofloxacin: S <=1 CLOVIS    Specimen Source: URINE CATHETER    Organism Identification: Enterococcus faecalis  Staphylococcus sp.,coag neg    Organism: Enterococcus faecalis  COLONY COUNT: > = 100,000 CFU/ML    Organism: Staphylococcus sp.,coag neg  COLONY COUNT: > = 100,000 CFU/ML    Method Type: POSITIVE CLOVIS 29      Urinalysis (03.20.19 @ 13:25)    Color: ELSA    Urine Appearance: TURBID    Glucose: 100    Bilirubin: LARGE    Ketone - Urine: NEGATIVE    Specific Gravity: 1.020    Blood: MODERATE    pH - Urine: 6.0    Protein, Urine: 100    Urobilinogen: 4.0    Nitrite: NEGATIVE    Leukocyte Esterase Concentration: LARGE    Red Blood Cell - Urine: 3-5    White Blood Cell - Urine: >50    White Blood Cell Casts: 2-5/LPF    Epithelial Cells: OCC    Bacteria: MANY    Coarse Granular Casts: 0-2/LPF      MRSA Infection Control Screen (PCR) (03.20.19 @ 13:46)    MRSA Infection Control Screen (PCR): NOT DETECTED     REFERENCE RANGE:  MRSA DNA NOT DETECTED    A result of MRSA DNA not detected does not preclude the  possibility of colonization with MRSA.  This result was obtained using the [employer] Xpert  MRSA assay and the GeneX[employer] Dx System.      Culture - Blood (03.15.19 @ 14:03)    Culture - Blood:   NO ORGANISMS ISOLATED  NO ORGANISMS ISOLATED AT 24 HOURS    Specimen Source: BLOOD Patient is seen and examined at the bed side, is afebrile. He is c/o feeling very tired and has no appetite. The creatinine is trending up.        REVIEW OF SYSTEMS: All other review systems are negative        ALLERGIES: No Known Allergies        Vital Signs Last 24 Hrs  T(C): 36.2 (25 Mar 2019 17:47), Max: 36.4 (25 Mar 2019 09:44)  T(F): 97.1 (25 Mar 2019 17:47), Max: 97.5 (25 Mar 2019 09:44)  HR: 67 (25 Mar 2019 17:47) (67 - 84)  BP: 122/70 (25 Mar 2019 17:47) (99/55 - 122/70)  BP(mean): --  RR: 18 (25 Mar 2019 17:47) (18 - 18)  SpO2: 100% (25 Mar 2019 17:47) (99% - 100%)        PHYSICAL EXAM:  GENERAL: Appears very tired  HEENT: Sclera jaundiced  CHEST/LUNG:  Air entry bilaterally  HEART: s1 and s2 present  ABDOMEN:  Nontender and  Nondistended  : Oshea catheter in placed  EXTREMITIES: No pedal  edema  CNS: Awake and Alert        LABS:                        9.0    6.50  )-----------( 118      ( 25 Mar 2019 07:30 )             24.5                           8.4    5.98  )-----------( 112      ( 24 Mar 2019 06:25 )             23.0             03-25    140  |  113<H>  |  34<H>  ----------------------------<  47<L>  4.1   |  13<L>  |  2.18<H>    Ca    8.9      25 Mar 2019 06:15  Mg     1.9     03-25    TPro  5.9<L>  /  Alb  2.2<L>  /  TBili  20.8<H>  /  DBili  x   /  AST  286<H>  /  ALT  174<H>  /  AlkPhos  268<H>  03-25    03-24    144  |  115<H>  |  30<H>  ----------------------------<  65<L>  3.7   |  17<L>  |  2.08<H>    Ca    8.9      24 Mar 2019 06:25  Mg     1.8     03-24    TPro  5.6<L>  /  Alb  2.2<L>  /  TBili  19.8<H>  /  DBili  x   /  AST  276<H>  /  ALT  182<H>  /  AlkPhos  255<H>  03-24        MEDICATIONS  (STANDING):  ampicillin/sulbactam  IVPB      ampicillin/sulbactam  IVPB 3 Gram(s) IV Intermittent every 12 hours  dextrose 5% + sodium chloride 0.45% 1000 milliLiter(s) (60 mL/Hr) IV Continuous <Continuous>  enoxaparin Injectable 30 milliGRAM(s) SubCutaneous daily  ergocalciferol 80708 Unit(s) Oral every week  lactulose Syrup 10 Gram(s) Oral two times a day  levoFLOXacin IVPB      levoFLOXacin IVPB 250 milliGRAM(s) IV Intermittent every 48 hours  ondansetron Injectable 4 milliGRAM(s) IV Push once  ursodiol Capsule 300 milliGRAM(s) Oral two times a day    MEDICATIONS  (PRN):        RADIOLOGY & ADDITIONAL TESTS:    3/20/19 : Xray Chest 1 View- PORTABLE-Urgent (03.20.19 @ 09:37) Patchy opacity within the right lower lung may represent pneumonia. Subsegmental atelectasis at the left base. No pleural effusion or  pneumothorax. No pulmonary edema. The cardiac silhouette remains enlarged. Aortic aneurysm. Recommend  further evaluation with aortogram.      3/18/19 : MR MRCP No Cont (03.18.19 @ 14:22) Gallbladder is distended with cholelithiasis.  No choledocholithiasis or biliary ductal dilatation.      3/17/19 : NM Hepatobiliary Imaging (03.17.19 @ 12:21) Abnormal hepatobiliary scan suspicious for common bile duct obstruction. Hepatocellular dysfunction. No evidence of acute cholecystitis.      3/18/19 : US Abdomen Limited (03.18.19 @ 07:48) Gallbladder is distended with sludge and stones.  Mural thickening of the wall of the common bile duct, possibly  cholangitis. No biliary ductal dilatation.      3/15/19 : CT Abdomen and Pelvis No Cont (03.15.19 @ 09:39) Distended gallbladder. Consider further evaluation with ultrasound if  acute cholecystitis is a concern.  No discrete mass identified on this limited noncontrast study.          MICROBIOLOGY DATA:    Culture - Urine (03.20.19 @ 15:21)    -  Vancomycin: S 2 CLOVIS    Culture - Urine:   ENTF^Enterococcus faecalis  COLONY COUNT: > = 100,000 CFU/ML    Culture - Urine:   Susceptibility not performed.    -  Tetra/Doxy: R >8 CLOVIS    -  Nitrofurantoin: S <=32 CLOVIS    -  Ampicillin: S <=2 CLOVIS    -  Ciprofloxacin: S <=1 CLOVIS    Specimen Source: URINE CATHETER    Organism Identification: Enterococcus faecalis  Staphylococcus sp.,coag neg    Organism: Enterococcus faecalis  COLONY COUNT: > = 100,000 CFU/ML    Organism: Staphylococcus sp.,coag neg  COLONY COUNT: > = 100,000 CFU/ML    Method Type: POSITIVE CLOVIS 29      Urinalysis (03.20.19 @ 13:25)    Color: ELSA    Urine Appearance: TURBID    Glucose: 100    Bilirubin: LARGE    Ketone - Urine: NEGATIVE    Specific Gravity: 1.020    Blood: MODERATE    pH - Urine: 6.0    Protein, Urine: 100    Urobilinogen: 4.0    Nitrite: NEGATIVE    Leukocyte Esterase Concentration: LARGE    Red Blood Cell - Urine: 3-5    White Blood Cell - Urine: >50    White Blood Cell Casts: 2-5/LPF    Epithelial Cells: OCC    Bacteria: MANY    Coarse Granular Casts: 0-2/LPF      MRSA Infection Control Screen (PCR) (03.20.19 @ 13:46)    MRSA Infection Control Screen (PCR): NOT DETECTED     REFERENCE RANGE:  MRSA DNA NOT DETECTED    A result of MRSA DNA not detected does not preclude the  possibility of colonization with MRSA.  This result was obtained using the [employer] Xpert  MRSA assay and the Gene[employer] Dx System.      Culture - Blood (03.15.19 @ 14:03)    Culture - Blood:   NO ORGANISMS ISOLATED  NO ORGANISMS ISOLATED AT 24 HOURS    Specimen Source: BLOOD

## 2019-03-26 LAB
ALBUMIN SERPL ELPH-MCNC: 2.2 G/DL — LOW (ref 3.3–5)
ALP SERPL-CCNC: 283 U/L — HIGH (ref 40–120)
ALT FLD-CCNC: 165 U/L — HIGH (ref 4–41)
AMMONIA BLD-MCNC: 100 UMOL/L — HIGH (ref 11–55)
ANION GAP SERPL CALC-SCNC: 14 MMO/L — SIGNIFICANT CHANGE UP (ref 7–14)
AST SERPL-CCNC: 234 U/L — HIGH (ref 4–40)
BILIRUB SERPL-MCNC: 20.8 MG/DL — HIGH (ref 0.2–1.2)
BUN SERPL-MCNC: 44 MG/DL — HIGH (ref 7–23)
CALCIUM SERPL-MCNC: 8.8 MG/DL — SIGNIFICANT CHANGE UP (ref 8.4–10.5)
CHLORIDE SERPL-SCNC: 113 MMOL/L — HIGH (ref 98–107)
CO2 SERPL-SCNC: 16 MMOL/L — LOW (ref 22–31)
CREAT SERPL-MCNC: 2.64 MG/DL — HIGH (ref 0.5–1.3)
GLUCOSE SERPL-MCNC: 128 MG/DL — HIGH (ref 70–99)
HCT VFR BLD CALC: 25.4 % — LOW (ref 39–50)
HGB BLD-MCNC: 8.8 G/DL — LOW (ref 13–17)
MAGNESIUM SERPL-MCNC: 2.1 MG/DL — SIGNIFICANT CHANGE UP (ref 1.6–2.6)
MCHC RBC-ENTMCNC: 26.7 PG — LOW (ref 27–34)
MCHC RBC-ENTMCNC: 34.6 % — SIGNIFICANT CHANGE UP (ref 32–36)
MCV RBC AUTO: 77.2 FL — LOW (ref 80–100)
NRBC # FLD: 0.14 K/UL — SIGNIFICANT CHANGE UP (ref 0–0)
NRBC FLD-RTO: 1.9 — SIGNIFICANT CHANGE UP
PLATELET # BLD AUTO: 122 K/UL — LOW (ref 150–400)
PMV BLD: SIGNIFICANT CHANGE UP FL (ref 7–13)
POTASSIUM SERPL-MCNC: 3.6 MMOL/L — SIGNIFICANT CHANGE UP (ref 3.5–5.3)
POTASSIUM SERPL-SCNC: 3.6 MMOL/L — SIGNIFICANT CHANGE UP (ref 3.5–5.3)
PROT SERPL-MCNC: 6 G/DL — SIGNIFICANT CHANGE UP (ref 6–8.3)
RBC # BLD: 3.29 M/UL — LOW (ref 4.2–5.8)
RBC # FLD: 28.5 % — HIGH (ref 10.3–14.5)
SODIUM SERPL-SCNC: 143 MMOL/L — SIGNIFICANT CHANGE UP (ref 135–145)
WBC # BLD: 7.29 K/UL — SIGNIFICANT CHANGE UP (ref 3.8–10.5)
WBC # FLD AUTO: 7.29 K/UL — SIGNIFICANT CHANGE UP (ref 3.8–10.5)

## 2019-03-26 RX ORDER — SODIUM CHLORIDE 9 MG/ML
1000 INJECTION, SOLUTION INTRAVENOUS
Qty: 0 | Refills: 0 | Status: DISCONTINUED | OUTPATIENT
Start: 2019-03-26 | End: 2019-03-28

## 2019-03-26 RX ORDER — LIDOCAINE 4 G/100G
1 CREAM TOPICAL DAILY
Qty: 0 | Refills: 0 | Status: DISCONTINUED | OUTPATIENT
Start: 2019-03-26 | End: 2019-04-09

## 2019-03-26 RX ORDER — ACETAMINOPHEN 500 MG
650 TABLET ORAL EVERY 6 HOURS
Qty: 0 | Refills: 0 | Status: DISCONTINUED | OUTPATIENT
Start: 2019-03-26 | End: 2019-03-26

## 2019-03-26 RX ADMIN — Medication 650 MILLIGRAM(S): at 05:09

## 2019-03-26 RX ADMIN — LIDOCAINE 1 PATCH: 4 CREAM TOPICAL at 19:22

## 2019-03-26 RX ADMIN — AMPICILLIN SODIUM AND SULBACTAM SODIUM 200 GRAM(S): 250; 125 INJECTION, POWDER, FOR SUSPENSION INTRAMUSCULAR; INTRAVENOUS at 00:35

## 2019-03-26 RX ADMIN — Medication 650 MILLIGRAM(S): at 05:51

## 2019-03-26 RX ADMIN — SODIUM CHLORIDE 50 MILLILITER(S): 9 INJECTION, SOLUTION INTRAVENOUS at 16:52

## 2019-03-26 RX ADMIN — LACTULOSE 10 GRAM(S): 10 SOLUTION ORAL at 05:09

## 2019-03-26 RX ADMIN — AMPICILLIN SODIUM AND SULBACTAM SODIUM 200 GRAM(S): 250; 125 INJECTION, POWDER, FOR SUSPENSION INTRAMUSCULAR; INTRAVENOUS at 12:58

## 2019-03-26 RX ADMIN — ENOXAPARIN SODIUM 30 MILLIGRAM(S): 100 INJECTION SUBCUTANEOUS at 11:54

## 2019-03-26 RX ADMIN — LACTULOSE 10 GRAM(S): 10 SOLUTION ORAL at 16:51

## 2019-03-26 RX ADMIN — Medication 1 TABLET(S): at 22:08

## 2019-03-26 RX ADMIN — ERGOCALCIFEROL 50000 UNIT(S): 1.25 CAPSULE ORAL at 11:54

## 2019-03-26 RX ADMIN — URSODIOL 300 MILLIGRAM(S): 250 TABLET, FILM COATED ORAL at 11:54

## 2019-03-26 RX ADMIN — URSODIOL 300 MILLIGRAM(S): 250 TABLET, FILM COATED ORAL at 00:35

## 2019-03-26 RX ADMIN — LIDOCAINE 1 PATCH: 4 CREAM TOPICAL at 12:10

## 2019-03-26 NOTE — PROGRESS NOTE ADULT - SUBJECTIVE AND OBJECTIVE BOX
[facility]        - Patient seen and examined.  - Today, patient is without complaints.         *****MEDICATIONS: Current medication reviewed and documented.    MEDICATIONS  (STANDING):  amoxicillin  500 milliGRAM(s)/clavulanate 1 Tablet(s) Oral two times a day  dextrose 5% + sodium chloride 0.45%. 1000 milliLiter(s) (50 mL/Hr) IV Continuous <Continuous>  enoxaparin Injectable 30 milliGRAM(s) SubCutaneous daily  ergocalciferol 44678 Unit(s) Oral every week  lactulose Syrup 10 Gram(s) Oral two times a day  levoFLOXacin  Tablet 250 milliGRAM(s) Oral every 48 hours  lidocaine   Patch 1 Patch Transdermal daily  ondansetron Injectable 4 milliGRAM(s) IV Push once  rifaximin 550 milliGRAM(s) Oral two times a day  ursodiol Capsule 300 milliGRAM(s) Oral two times a day    MEDICATIONS  (PRN):          ***** VITAL SIGNS:  T(F): 97.5 (19 @ 21:52), Max: 97.7 (19 @ 15:04)  HR: 70 (19 @ 21:52) (66 - 73)  BP: 111/65 (19 @ 21:52) (101/57 - 121/74)  RR: 16 (19 @ 21:52) (16 - 18)  SpO2: 100% (19 @ 21:52) (98% - 100%)  Wt(kg): --  ,   I&O's Summary    25 Mar 2019 07:01  -  26 Mar 2019 07:00  --------------------------------------------------------  IN: 100 mL / OUT: 300 mL / NET: -200 mL    26 Mar 2019 07:01  -  26 Mar 2019 22:09  --------------------------------------------------------  IN: 0 mL / OUT: 400 mL / NET: -400 mL             *****PHYSICAL EXAM:  lethargic icteric    alert to repeated tactile stimuli    can respond to question.      Gait not assessed.              *****LAB AND IMAGIN.8    7.29  )-----------( 122      ( 26 Mar 2019 06:50 )             25.4                   143  |  113<H>  |  44<H>  ----------------------------<  128<H>  3.6   |  16<L>  |  2.64<H>    Ca    8.8      26 Mar 2019 06:50  Mg     2.1         TPro  6.0  /  Alb  2.2<L>  /  TBili  20.8<H>  /  DBili  x   /  AST  234<H>  /  ALT  165<H>  /  AlkPhos  283<H>      PT/INR - ( 25 Mar 2019 07:55 )   PT: 23.4 SEC;   INR: 2.06          PTT - ( 25 Mar 2019 07:55 )  PTT:65.5 SEC                     [All pertinent recent Imaging/Reports reviewed]           *****A S S E S S M E N T   A N D   P L A N :    89 yo M, poor historian, charts reviewed, with PMHx BPH, Cataracts, Diverticulosis, Anemia 2/2 GI Bleed with previous PRBC transfusions, who p/w 2 days genearlized weakness, urinary retention and confusion.  Upon arrival to the ED, pt with scleral icterus and labs significant for transaminitis and TBili > 20    called to evaluate pt for ams         Problem/Recommendations 1:  Multifactorial toxic metabolic encephalopathy  , hyperbilirubinemia, hyperammonemia, and worsening renal function  correct metabolic dyscrasias defer to renal/gi   repeat ammonia level 100 on lactulose   worsening lethargy      Problem/Recommendations 2: sepsis ID on board   defer to id for management     Problem/Recommendations 3:  Hyperbilirubinemia  suspicious for cholestatic pattern   distended gb with cholelithiasis, mural thickening of the cbd. defer to gi for management.    pending liver biopsy result     prognosis guarded.      Thank you for allowing me to participate in the care of this patient. Please do not hesitate to call me if you have any  questions.        ________________  Ava Schreiber MD  [facility]  [phone]      30 minutes spent on total encounter; more than 50 % of the visit was  spent counseling about plan of care, compliance to diet/exercise and medication regimen and or  coordinating care by the attending physician.      It is advised that s stroke patients follow up with DULCE MARIA Rausch @ [phone] in 1- 2 weeks.   Others please follow up with Dr. Michael Nissenbaum [phone]

## 2019-03-26 NOTE — PROGRESS NOTE ADULT - SUBJECTIVE AND OBJECTIVE BOX
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  Upon arrival to the ED, pt with scleral icterus and labs significant for transaminitis and TBili > 20 (15 Mar 2019 06:59)      PAST MEDICAL & SURGICAL HISTORY:  Chronic kidney disease (CKD), stage IV (severe)  Benign prostatic hypertrophy  S/P cataract surgery, left: 3 yrs ago      No Known Allergies       MEDICATIONS  (STANDING):  ampicillin/sulbactam  IVPB      ampicillin/sulbactam  IVPB 3 Gram(s) IV Intermittent every 12 hours  dextrose 5% + sodium chloride 0.45% 1000 milliLiter(s) (60 mL/Hr) IV Continuous <Continuous>  enoxaparin Injectable 30 milliGRAM(s) SubCutaneous daily  ergocalciferol 20722 Unit(s) Oral every week  lactulose Syrup 10 Gram(s) Oral two times a day  levoFLOXacin IVPB      levoFLOXacin IVPB 250 milliGRAM(s) IV Intermittent every 48 hours  ondansetron Injectable 4 milliGRAM(s) IV Push once  ursodiol Capsule 300 milliGRAM(s) Oral two times a day    MEDICATIONS  (PRN):  acetaminophen   Tablet .. 650 milliGRAM(s) Oral every 6 hours PRN Mild Pain (1 - 3), Moderate Pain (4 - 6), Severe Pain (7 - 10)      REVIEW OF SYSTEMS:  CONSTITUTIONAL: (+) malaise. gen weakness. anorexia  EYES: No acute change in vision   ENT:  No tinnitus  NECK: No stiffness  RESPIRATORY: No hemoptysis  CARDIOVASCULAR: No chest pain, palpitations, syncope  GASTROINTESTINAL: abd discomfort. No hematemesis, diarrhea, melena, or hematochezia.  GENITOURINARY: No hematuria  NEUROLOGICAL: No headaches  LYMPH Nodes: No enlarged glands  ENDOCRINE: No heat or cold intolerance	    T(C): 36.4 (03-26-19 @ 05:14), Max: 36.4 (03-25-19 @ 09:44)  HR: 73 (03-26-19 @ 05:14) (67 - 74)  BP: 121/74 (03-26-19 @ 05:14) (116/73 - 122/70)  RR: 18 (03-26-19 @ 05:14) (18 - 18)  SpO2: 100% (03-26-19 @ 05:14) (99% - 100%)    PHYSICAL EXAMINATION:   Constitutional: ill appearing. NAD  HEENT: bitemp wasting, poor dentition. AT  Neck:  Supple  Respiratory:  Adequate airflow b/l. Not using accessory muscles of respiration.  Cardiovascular:  S1 & S2 intact, no R/G, 2+ radial pulses b/l  Gastrointestinal: Soft, mild tenderness to deep palpation, ND, normoactive b.s., no organomegaly/RT/rigidity  Extremities: poor skine lasticity and turgor  : flores intact  Neurological:  lethargic, arousable, No acute focal motor deficits. Crude sensation intact.     Labs and imaging reviewed    LABS:                        8.8    7.29  )-----------( 122      ( 26 Mar 2019 06:50 )             25.4     03-26    143  |  113<H>  |  44<H>  ----------------------------<  128<H>  3.6   |  16<L>  |  2.64<H>    Ca    8.8      26 Mar 2019 06:50  Mg     2.1     03-26    TPro  6.0  /  Alb  2.2<L>  /  TBili  20.8<H>  /  DBili  x   /  AST  234<H>  /  ALT  165<H>  /  AlkPhos  283<H>  03-26        PT/INR - ( 25 Mar 2019 07:55 )   PT: 23.4 SEC;   INR: 2.06          PTT - ( 25 Mar 2019 07:55 )  PTT:65.5 SEC    CAPILLARY BLOOD GLUCOSE            LIVER FUNCTIONS - ( 26 Mar 2019 06:50 )  Alb: 2.2 g/dL / Pro: 6.0 g/dL / ALK PHOS: 283 u/L / ALT: 165 u/L / AST: 234 u/L / GGT: x               RADIOLOGY & ADDITIONAL STUDIES:

## 2019-03-26 NOTE — PROGRESS NOTE ADULT - SUBJECTIVE AND OBJECTIVE BOX
PASCUAL OLSEN:[id number],   89yMale followed for:  No Known Allergies    PAST MEDICAL & SURGICAL HISTORY:  Chronic kidney disease (CKD), stage IV (severe)  Benign prostatic hypertrophy  S/P cataract surgery, left: 3 yrs ago    FAMILY HISTORY:  No pertinent family history in first degree relatives    MEDICATIONS  (STANDING):  ampicillin/sulbactam  IVPB      ampicillin/sulbactam  IVPB 3 Gram(s) IV Intermittent every 12 hours  dextrose 5% + sodium chloride 0.45% 1000 milliLiter(s) (60 mL/Hr) IV Continuous <Continuous>  enoxaparin Injectable 30 milliGRAM(s) SubCutaneous daily  ergocalciferol 70078 Unit(s) Oral every week  lactulose Syrup 10 Gram(s) Oral two times a day  levoFLOXacin IVPB      levoFLOXacin IVPB 250 milliGRAM(s) IV Intermittent every 48 hours  ondansetron Injectable 4 milliGRAM(s) IV Push once  ursodiol Capsule 300 milliGRAM(s) Oral two times a day    MEDICATIONS  (PRN):  acetaminophen   Tablet .. 650 milliGRAM(s) Oral every 6 hours PRN Mild Pain (1 - 3), Moderate Pain (4 - 6), Severe Pain (7 - 10)      Vital Signs Last 24 Hrs  T(C): 36.4 (26 Mar 2019 05:14), Max: 36.4 (25 Mar 2019 09:44)  T(F): 97.6 (26 Mar 2019 05:14), Max: 97.6 (26 Mar 2019 05:14)  HR: 73 (26 Mar 2019 05:14) (67 - 74)  BP: 121/74 (26 Mar 2019 05:14) (116/73 - 122/70)  BP(mean): --  RR: 18 (26 Mar 2019 05:14) (18 - 18)  SpO2: 100% (26 Mar 2019 05:14) (99% - 100%)  nc/at  s1s2  cta  soft, nt, nd no guarding or rebound  no c/c/e    CBC Full  -  ( 25 Mar 2019 07:30 )  WBC Count : 6.50 K/uL  Hemoglobin : 9.0 g/dL  Hematocrit : 24.5 %  Platelet Count - Automated : 118 K/uL  Mean Cell Volume : 76.3 fL  Mean Cell Hemoglobin : 28.0 pg  Mean Cell Hemoglobin Concentration : 36.7 %  Auto Neutrophil # : x  Auto Lymphocyte # : x  Auto Monocyte # : x  Auto Eosinophil # : x  Auto Basophil # : x  Auto Neutrophil % : x  Auto Lymphocyte % : x  Auto Monocyte % : x  Auto Eosinophil % : x  Auto Basophil % : x    03-25    140  |  113<H>  |  34<H>  ----------------------------<  47<L>  4.1   |  13<L>  |  2.18<H>    Ca    8.9      25 Mar 2019 06:15  Mg     1.9     03-25    TPro  5.9<L>  /  Alb  2.2<L>  /  TBili  20.8<H>  /  DBili  x   /  AST  286<H>  /  ALT  174<H>  /  AlkPhos  268<H>  03-25    PT/INR - ( 25 Mar 2019 07:55 )   PT: 23.4 SEC;   INR: 2.06          PTT - ( 25 Mar 2019 07:55 )  PTT:65.5 SEC

## 2019-03-26 NOTE — PROGRESS NOTE ADULT - SUBJECTIVE AND OBJECTIVE BOX
S: no chest pain or SOB       amoxicillin  875 milliGRAM(s)/clavulanate 1 Tablet(s) Oral two times a day  dextrose 5% + sodium chloride 0.45%. 1000 milliLiter(s) IV Continuous <Continuous>  enoxaparin Injectable 30 milliGRAM(s) SubCutaneous daily  ergocalciferol 51201 Unit(s) Oral every week  lactulose Syrup 10 Gram(s) Oral two times a day  levoFLOXacin  Tablet 250 milliGRAM(s) Oral every 48 hours  lidocaine   Patch 1 Patch Transdermal daily  ondansetron Injectable 4 milliGRAM(s) IV Push once  rifaximin 550 milliGRAM(s) Oral two times a day  ursodiol Capsule 300 milliGRAM(s) Oral two times a day                            8.8    7.29  )-----------( 122      ( 26 Mar 2019 06:50 )             25.4       03-26    143  |  113<H>  |  44<H>  ----------------------------<  128<H>  3.6   |  16<L>  |  2.64<H>    Ca    8.8      26 Mar 2019 06:50  Mg     2.1     03-26    TPro  6.0  /  Alb  2.2<L>  /  TBili  20.8<H>  /  DBili  x   /  AST  234<H>  /  ALT  165<H>  /  AlkPhos  283<H>  03-26            T(C): 36.5 (03-26-19 @ 15:04), Max: 36.5 (03-26-19 @ 15:04)  HR: 66 (03-26-19 @ 15:04) (66 - 73)  BP: 101/57 (03-26-19 @ 15:04) (101/57 - 122/70)  RR: 17 (03-26-19 @ 15:04) (17 - 18)  SpO2: 98% (03-26-19 @ 15:04) (98% - 100%)  Wt(kg): --    I&O's Summary    25 Mar 2019 07:01  -  26 Mar 2019 07:00  --------------------------------------------------------  IN: 100 mL / OUT: 300 mL / NET: -200 mL    26 Mar 2019 07:01  -  26 Mar 2019 15:56  --------------------------------------------------------  IN: 0 mL / OUT: 400 mL / NET: -400 mL          Heart: normal S1, S2, RRR, no m/r/g  Lungs: cta b/l  Abd: soft nT  Ext: no edema     TELEMETRY: SR	84    RADIOLOGY:  < from: Xray Chest 1 View- PORTABLE-Urgent (03.20.19 @ 09:37) >  FINDINGS/  IMPRESSION:    Patchy opacity within the right lower lung may represent pneumonia.   Subsegmental atelectasis at the left base. No pleural effusion or   pneumothorax. No pulmonary edema.    The cardiac silhouette remains enlarged. Aortic aneurysm. Recommend   further evaluation with aortogram.    < end of copied text >    < from: Transthoracic Echocardiogram (03.23.19 @ 09:36) >  CONCLUSIONS:  1. Mitral annular calcification, otherwise normal mitral  valve. Mild mitral regurgitation.  2. Aortic valve leaflet morphology not well visualized;  appears calcified with normal opening. Moderate aortic  regurgitation.  3. Normal left ventricular internal dimensions and wall  thicknesses.  4. Hyperdynamic left ventricle.  5. Mild diastolic dysfunction (Stage I).  6. Normal right ventricular size and function.  7. Normal tricuspid valve. Mild tricuspid regurgitation.  8. Estimated pulmonary artery systolic pressure equals 36  mm Hg, assuming right atrial pressure equals 10  mm Hg,  consistent with borderline pulmonary hypertension.  *** Compared with echocardiogram of 6/11/2018,no  significant changes noted.  ------------------------------------------------------------------------  Confirmed on  3/23/2019 - 11:19:58 by Evaristo Castanon MD, FACC,  TAMMY, BONI    < end of copied text >          ASSESSMENT/PLAN: 	    89 yo M, poor historian, charts reviewed, with PMHx BPH, Cataracts, Diverticulosis, Anemia 2/2 GI Bleed with previous PRBC transfusions, who p/w 2 days generalized  weakness, urinary retention and confusion. LFTs significant for transaminitis, scleral jaundice.     -pt. tolerated biopsy well in terms of cv perspective  -tele stable  -no clinical heart failure or anginal symptoms  -TTE with moderate AI and normal LV function - medical management of valvular disease recommended  -CT chest with 4.5cm ascending aortic aneurysm - would start low dose beta blockers when BP can tolerate  - follow up CECILIA Morrison MD

## 2019-03-26 NOTE — PROGRESS NOTE ADULT - SUBJECTIVE AND OBJECTIVE BOX
Pt seen and examined at bedside  'not well '  appears confused  moaning  unable to give any History      Allergies:  No Known Allergies    Hospital Medications:   MEDICATIONS  (STANDING):  ampicillin/sulbactam  IVPB      ampicillin/sulbactam  IVPB 3 Gram(s) IV Intermittent every 12 hours  dextrose 5% + sodium chloride 0.45% 1000 milliLiter(s) (60 mL/Hr) IV Continuous <Continuous>  enoxaparin Injectable 30 milliGRAM(s) SubCutaneous daily  ergocalciferol 25642 Unit(s) Oral every week  lactulose Syrup 10 Gram(s) Oral two times a day  levoFLOXacin IVPB      levoFLOXacin IVPB 250 milliGRAM(s) IV Intermittent every 48 hours  ondansetron Injectable 4 milliGRAM(s) IV Push once  rifaximin 550 milliGRAM(s) Oral two times a day  ursodiol Capsule 300 milliGRAM(s) Oral two times a day    REVIEW OF SYSTEMS:  unable to obtain    VITALS:  T(F): 97.6 (19 @ 05:14), Max: 97.6 (19 @ 05:14)  HR: 73 (19 @ 05:14)  BP: 121/74 (19 @ 05:14)  RR: 18 (19 @ 05:14)  SpO2: 100% (19 @ 05:14)  Wt(kg): --     @ 07:01  -   @ 07:00  --------------------------------------------------------  IN: 100 mL / OUT: 300 mL / NET: -200 mL        PHYSICAL EXAM:  Constitutional: moaning.  deeply jaundiced  HEENT: Icteric, sclera, oropharynx clear, MMM  Neck: No JVD  Respiratory: CTAB, no wheezes, rales or rhonchi, dec breath sounds--prro respiratory effort  Cardiovascular: S1, S2, RRR  Gastrointestinal: BS+, soft, NT/ND  Extremities: No cyanosis or clubbing. No peripheral edema        : No CVA tenderness.  flores. in place  Skin: No rashes       LABS:      143  |  113<H>  |  44<H>  ----------------------------<  128<H>  3.6   |  16<L>  |  2.64<H>    Ca    8.8      26 Mar 2019 06:50  Mg     2.1         TPro  6.0  /  Alb  2.2<L>  /  TBili  20.8<H>  /  DBili      /  AST  234<H>  /  ALT  165<H>  /  AlkPhos  283<H>      Creatinine Trend: 2.64 <--, 2.18 <--, 2.08 <--, 2.07 <--, 1.91 <--, 1.89 <--, 1.97 <--, 1.88 <--                        8.8    7.29  )-----------( 122      ( 26 Mar 2019 06:50 )             25.4     Urine Studies:  Urinalysis Basic - ( 20 Mar 2019 13:25 )    Color: ELSA / Appearance: TURBID / S.020 / pH: 6.0  Gluc: 100 / Ketone: NEGATIVE  / Bili: LARGE / Urobili: 4.0   Blood: MODERATE / Protein: 100 / Nitrite: NEGATIVE   Leuk Esterase: LARGE / RBC: 3-5 / WBC >50   Sq Epi:  / Non Sq Epi: OCC / Bacteria: MANY        RADIOLOGY & ADDITIONAL STUDIES:

## 2019-03-26 NOTE — PROGRESS NOTE ADULT - PROBLEM SELECTOR PLAN 1
Cr slowly increasing. oliguric, no evidence of Fluid overload, K ok, slight metabollic acidosis  Jaundice worsening, Cholestatic Jaundice  inc NH3  100  increasing  poor prognosis  cont present iv fuids, 50  cc hrly

## 2019-03-26 NOTE — PROGRESS NOTE ADULT - SUBJECTIVE AND OBJECTIVE BOX
Patient is seen and examined at the bed side, is afebrile. He is c/o feeling very tired and has no appetite. The creatinine is trending up.        REVIEW OF SYSTEMS: All other review systems are negative        ALLERGIES: No Known Allergies        Vital Signs Last 24 Hrs  T(C): 36.5 (26 Mar 2019 15:04), Max: 36.5 (26 Mar 2019 15:04)  T(F): 97.7 (26 Mar 2019 15:04), Max: 97.7 (26 Mar 2019 15:04)  HR: 66 (26 Mar 2019 15:04) (66 - 73)  BP: 101/57 (26 Mar 2019 15:04) (101/57 - 121/74)  BP(mean): --  RR: 17 (26 Mar 2019 15:04) (17 - 18)  SpO2: 98% (26 Mar 2019 15:04) (98% - 100%)      PHYSICAL EXAM:  GENERAL: Appears very tired  HEENT: Sclera jaundiced  CHEST/LUNG:  Air entry bilaterally  HEART: s1 and s2 present  ABDOMEN:  Nontender and  Nondistended  : Oshea catheter in placed  EXTREMITIES: No pedal  edema  CNS: Awake and Alert        LABS:                        8.8    7.29  )-----------( 122      ( 26 Mar 2019 06:50 )             25.4                           9.0    6.50  )-----------( 118      ( 25 Mar 2019 07:30 )             24.5         03-26    143  |  113<H>  |  44<H>  ----------------------------<  128<H>  3.6   |  16<L>  |  2.64<H>    Ca    8.8      26 Mar 2019 06:50  Mg     2.1     03-26    TPro  6.0  /  Alb  2.2<L>  /  TBili  20.8<H>  /  DBili  x   /  AST  234<H>  /  ALT  165<H>  /  AlkPhos  283<H>  03-26 03-25    140  |  113<H>  |  34<H>  ----------------------------<  47<L>  4.1   |  13<L>  |  2.18<H>    Ca    8.9      25 Mar 2019 06:15  Mg     1.9     03-25    TPro  5.9<L>  /  Alb  2.2<L>  /  TBili  20.8<H>  /  DBili  x   /  AST  286<H>  /  ALT  174<H>  /  AlkPhos  268<H>  03-25          MEDICATIONS  (STANDING):  amoxicillin  500 milliGRAM(s)/clavulanate 1 Tablet(s) Oral two times a day  dextrose 5% + sodium chloride 0.45%. 1000 milliLiter(s) (50 mL/Hr) IV Continuous <Continuous>  enoxaparin Injectable 30 milliGRAM(s) SubCutaneous daily  ergocalciferol 02936 Unit(s) Oral every week  lactulose Syrup 10 Gram(s) Oral two times a day  levoFLOXacin  Tablet 250 milliGRAM(s) Oral every 48 hours  lidocaine   Patch 1 Patch Transdermal daily  ondansetron Injectable 4 milliGRAM(s) IV Push once  rifaximin 550 milliGRAM(s) Oral two times a day  ursodiol Capsule 300 milliGRAM(s) Oral two times a day    MEDICATIONS  (PRN):          RADIOLOGY & ADDITIONAL TESTS:    3/20/19 : Xray Chest 1 View- PORTABLE-Urgent (03.20.19 @ 09:37) Patchy opacity within the right lower lung may represent pneumonia. Subsegmental atelectasis at the left base. No pleural effusion or  pneumothorax. No pulmonary edema. The cardiac silhouette remains enlarged. Aortic aneurysm. Recommend  further evaluation with aortogram.      3/18/19 : MR MRCP No Cont (03.18.19 @ 14:22) Gallbladder is distended with cholelithiasis.  No choledocholithiasis or biliary ductal dilatation.      3/17/19 : NM Hepatobiliary Imaging (03.17.19 @ 12:21) Abnormal hepatobiliary scan suspicious for common bile duct obstruction. Hepatocellular dysfunction. No evidence of acute cholecystitis.      3/18/19 : US Abdomen Limited (03.18.19 @ 07:48) Gallbladder is distended with sludge and stones.  Mural thickening of the wall of the common bile duct, possibly  cholangitis. No biliary ductal dilatation.      3/15/19 : CT Abdomen and Pelvis No Cont (03.15.19 @ 09:39) Distended gallbladder. Consider further evaluation with ultrasound if  acute cholecystitis is a concern.  No discrete mass identified on this limited noncontrast study.          MICROBIOLOGY DATA:      Culture - Urine (03.20.19 @ 15:21)    -  Vancomycin: S 2 CLOVIS    Culture - Urine:   ENTF^Enterococcus faecalis  COLONY COUNT: > = 100,000 CFU/ML    Culture - Urine:   Susceptibility not performed.    -  Tetra/Doxy: R >8 CLOVIS    -  Nitrofurantoin: S <=32 CLOVIS    -  Ampicillin: S <=2 CLOVIS    -  Ciprofloxacin: S <=1 CLOVIS    Specimen Source: URINE CATHETER    Organism Identification: Enterococcus faecalis  Staphylococcus sp.,coag neg    Organism: Enterococcus faecalis  COLONY COUNT: > = 100,000 CFU/ML    Organism: Staphylococcus sp.,coag neg  COLONY COUNT: > = 100,000 CFU/ML    Method Type: POSITIVE CLOVIS 29      Urinalysis (03.20.19 @ 13:25)    Color: ELSA    Urine Appearance: TURBID    Glucose: 100    Bilirubin: LARGE    Ketone - Urine: NEGATIVE    Specific Gravity: 1.020    Blood: MODERATE    pH - Urine: 6.0    Protein, Urine: 100    Urobilinogen: 4.0    Nitrite: NEGATIVE    Leukocyte Esterase Concentration: LARGE    Red Blood Cell - Urine: 3-5    White Blood Cell - Urine: >50    White Blood Cell Casts: 2-5/LPF    Epithelial Cells: OCC    Bacteria: MANY    Coarse Granular Casts: 0-2/LPF      MRSA Infection Control Screen (PCR) (03.20.19 @ 13:46)    MRSA Infection Control Screen (PCR): NOT DETECTED     REFERENCE RANGE:  MRSA DNA NOT DETECTED    A result of MRSA DNA not detected does not preclude the  possibility of colonization with MRSA.  This result was obtained using the [employer] Xpert  MRSA assay and the GeneXpert Dx System.      Culture - Blood (03.15.19 @ 14:03)    Culture - Blood:   NO ORGANISMS ISOLATED  NO ORGANISMS ISOLATED AT 24 HOURS    Specimen Source: BLOOD            Assessment and Plan:   · Assessment		  A 89 yo Male with BPH, Cataracts, Diverticulosis, Anemia 2/2 GI Bleed with previous PRBC transfusions, who presents to the ER for evaluation of  generalized  weakness, urinary retention and confusion. On admission, he found to have elevated bilirubin, LFTS, transaminitis, scleral jaundice. and urinary retention. Oshea catheter has placed with negative Urine analysis. The CT abdomen  and pelvis shows Distended gallbladder. Consider further evaluation with ultrasound if  acute cholecystitis is a concern. No discrete mass identified on this limited noncontrast study. The ID consult requested to assist with evaluation of Biliary sepsis.    # Encephalopathy  # Urinary retention  # R/O biliary sepsis/ Cholangitis - Most likely acute cholestatic hepatitis as per GI -s/p  transjugular liver biopsy with two passes. and found to have small right atrium blood clot. 3/21/19  # Distended GB with cholelithiasis on MRCP 3/18/19  # Hypothermia- Pneumonia on CXR - MRSA PCR is negative   # UTI- Enterococcus faecalis 3/20/19    would recommend:    1. Encourage to eat and  start on IVF with D5 since not eating   2. Continue Unasyn and Levaquin for now  3.  Follow up Liver biopsy  4.  Trend  LFts and bilirubin, stay elevated  5. Supplemental oxygenation and  bronchodilator as needed  6. Aspiration precaution    d/w patient and Family at the bed side    will follow the patient with you Patient is seen and examined at the bed side, is afebrile. He is becoming more weaker and refuse to eat. The creatinine is trending up. As per GI, Liver biopsy suggestive of drug induced injury, The injury Less likely due to Antimicrobials, since most of them clear by Kidney except very few which not use commonly.        REVIEW OF SYSTEMS: All other review systems are negative        ALLERGIES: No Known Allergies        Vital Signs Last 24 Hrs  T(C): 36.5 (26 Mar 2019 15:04), Max: 36.5 (26 Mar 2019 15:04)  T(F): 97.7 (26 Mar 2019 15:04), Max: 97.7 (26 Mar 2019 15:04)  HR: 66 (26 Mar 2019 15:04) (66 - 73)  BP: 101/57 (26 Mar 2019 15:04) (101/57 - 121/74)  BP(mean): --  RR: 17 (26 Mar 2019 15:04) (17 - 18)  SpO2: 98% (26 Mar 2019 15:04) (98% - 100%)        PHYSICAL EXAM:  GENERAL: Appears very tired  HEENT: Sclera jaundiced  CHEST/LUNG:  Air entry bilaterally  HEART: s1 and s2 present  ABDOMEN:  Nontender and  Nondistended  : Oshea catheter in placed  EXTREMITIES: No pedal  edema  CNS: Lethargic but Alert          LABS:                        8.8    7.29  )-----------( 122      ( 26 Mar 2019 06:50 )             25.4                           9.0    6.50  )-----------( 118      ( 25 Mar 2019 07:30 )             24.5           03-26    143  |  113<H>  |  44<H>  ----------------------------<  128<H>  3.6   |  16<L>  |  2.64<H>    Ca    8.8      26 Mar 2019 06:50  Mg     2.1     03-26    TPro  6.0  /  Alb  2.2<L>  /  TBili  20.8<H>  /  DBili  x   /  AST  234<H>  /  ALT  165<H>  /  AlkPhos  283<H>  03-26 03-25    140  |  113<H>  |  34<H>  ----------------------------<  47<L>  4.1   |  13<L>  |  2.18<H>    Ca    8.9      25 Mar 2019 06:15  Mg     1.9     03-25    TPro  5.9<L>  /  Alb  2.2<L>  /  TBili  20.8<H>  /  DBili  x   /  AST  286<H>  /  ALT  174<H>  /  AlkPhos  268<H>  03-25          MEDICATIONS  (STANDING):  amoxicillin  500 milliGRAM(s)/clavulanate 1 Tablet(s) Oral two times a day  dextrose 5% + sodium chloride 0.45%. 1000 milliLiter(s) (50 mL/Hr) IV Continuous <Continuous>  enoxaparin Injectable 30 milliGRAM(s) SubCutaneous daily  ergocalciferol 60762 Unit(s) Oral every week  lactulose Syrup 10 Gram(s) Oral two times a day  levoFLOXacin  Tablet 250 milliGRAM(s) Oral every 48 hours  lidocaine   Patch 1 Patch Transdermal daily  ondansetron Injectable 4 milliGRAM(s) IV Push once  rifaximin 550 milliGRAM(s) Oral two times a day  ursodiol Capsule 300 milliGRAM(s) Oral two times a day    MEDICATIONS  (PRN):          RADIOLOGY & ADDITIONAL TESTS:    3/20/19 : Xray Chest 1 View- PORTABLE-Urgent (03.20.19 @ 09:37) Patchy opacity within the right lower lung may represent pneumonia. Subsegmental atelectasis at the left base. No pleural effusion or  pneumothorax. No pulmonary edema. The cardiac silhouette remains enlarged. Aortic aneurysm. Recommend  further evaluation with aortogram.      3/18/19 : MR MRCP No Cont (03.18.19 @ 14:22) Gallbladder is distended with cholelithiasis.  No choledocholithiasis or biliary ductal dilatation.      3/17/19 : NM Hepatobiliary Imaging (03.17.19 @ 12:21) Abnormal hepatobiliary scan suspicious for common bile duct obstruction. Hepatocellular dysfunction. No evidence of acute cholecystitis.      3/18/19 : US Abdomen Limited (03.18.19 @ 07:48) Gallbladder is distended with sludge and stones.  Mural thickening of the wall of the common bile duct, possibly  cholangitis. No biliary ductal dilatation.      3/15/19 : CT Abdomen and Pelvis No Cont (03.15.19 @ 09:39) Distended gallbladder. Consider further evaluation with ultrasound if  acute cholecystitis is a concern.  No discrete mass identified on this limited noncontrast study.          MICROBIOLOGY DATA:      Culture - Urine (03.20.19 @ 15:21)    -  Vancomycin: S 2 CLOVIS    Culture - Urine:   ENTF^Enterococcus faecalis  COLONY COUNT: > = 100,000 CFU/ML    Culture - Urine:   Susceptibility not performed.    -  Tetra/Doxy: R >8 CLOVIS    -  Nitrofurantoin: S <=32 CLOVIS    -  Ampicillin: S <=2 CLOVIS    -  Ciprofloxacin: S <=1 CLOVIS    Specimen Source: URINE CATHETER    Organism Identification: Enterococcus faecalis  Staphylococcus sp.,coag neg    Organism: Enterococcus faecalis  COLONY COUNT: > = 100,000 CFU/ML    Organism: Staphylococcus sp.,coag neg  COLONY COUNT: > = 100,000 CFU/ML    Method Type: POSITIVE CLOVIS 29      Urinalysis (03.20.19 @ 13:25)    Color: ELSA    Urine Appearance: TURBID    Glucose: 100    Bilirubin: LARGE    Ketone - Urine: NEGATIVE    Specific Gravity: 1.020    Blood: MODERATE    pH - Urine: 6.0    Protein, Urine: 100    Urobilinogen: 4.0    Nitrite: NEGATIVE    Leukocyte Esterase Concentration: LARGE    Red Blood Cell - Urine: 3-5    White Blood Cell - Urine: >50    White Blood Cell Casts: 2-5/LPF    Epithelial Cells: OCC    Bacteria: MANY    Coarse Granular Casts: 0-2/LPF      MRSA Infection Control Screen (PCR) (03.20.19 @ 13:46)    MRSA Infection Control Screen (PCR): NOT DETECTED     REFERENCE RANGE:  MRSA DNA NOT DETECTED      Culture - Blood (03.15.19 @ 14:03)    Culture - Blood:   NO ORGANISMS ISOLATED  NO ORGANISMS ISOLATED AT 24 HOURS    Specimen Source: BLOOD

## 2019-03-26 NOTE — PROGRESS NOTE ADULT - ASSESSMENT
pt with biopsy suggestive of drug induced injury.   Pt with increased lfts and bili, likely cholestasis.  would not be tranplant cadidate, spoke to family who would not want.  I have recommended jose.  likely drug injury.  prognossi based on age tenuous.  continue rx per other service, avoid hepatotxic meds

## 2019-03-26 NOTE — PROGRESS NOTE ADULT - ASSESSMENT
A 89 yo Male with BPH, Cataracts, Diverticulosis, Anemia 2/2 GI Bleed with previous PRBC transfusions, who presents to the ER for evaluation of  generalized  weakness, urinary retention and confusion. On admission, he found to have elevated bilirubin, LFTS, transaminitis, scleral jaundice. and urinary retention. Oshea catheter has placed with negative Urine analysis. The CT abdomen  and pelvis shows Distended gallbladder. Consider further evaluation with ultrasound if  acute cholecystitis is a concern. No discrete mass identified on this limited noncontrast study. The ID consult requested to assist with evaluation of Biliary sepsis.    # Encephalopathy  # Urinary retention  # R/O biliary sepsis/ Cholangitis - Most likely acute cholestatic hepatitis as per GI -s/p  transjugular liver biopsy with two passes. and found to have small right atrium blood clot. 3/21/19  # Distended GB with cholelithiasis on MRCP 3/18/19  # Hypothermia- Pneumonia on CXR - MRSA PCR is negative   # UTI- Enterococcus faecalis 3/20/19  # Liver biopsy result - not in the  system yet    would recommend:    1. Change Unasyn to oral  Augmentin  and IV Levaquin to oral since creatinine is trending up, Most of the Antimicrobials excrete by kidney   2.  Follow up  official  Liver biopsy result  3.  IF clinically become progressively worse then change Abx to IV Meropenem, renally doses  4. Supplemental oxygenation and  bronchodilator as needed  5. Aspiration precaution  6. Obtain  Procalcitonin level and Monitor kidney function     d/w Dr. Branham AND Covering NP      will follow the patient with you

## 2019-03-26 NOTE — PROGRESS NOTE ADULT - ASSESSMENT
89 yo M with PMHx CKD, BPH, Diverticulosis, Anemia 2/2 GI Bleed with previous PRBC transfusions, who p/w 2 days genearlized weakness, urinary retention and confusion, found to have elevated LFTs. Renal following for PHU, CKD management.    PHU on CKD 3 b/l Cr 1.4-1.6 10/2018  PHU most likely 2/2 pre renal. clincially appears dry. C3 77- low, C4 wnl -noted- likely 2/2 dec synthesis 2/2 liver dz   urinary retention on CT w/o hydronephrosis. no e/o acute GN  electrolytes acceptable. Cr stable, likely pre renal vs ATN. u/o good,   M acidosis  Mild hypernatremia- resolved  Transaminitis and Bilirubinemia, -acute cholestatic hepatitis of unclear etiology.  s/p liver Bx by IR 3/21  Hepatic encephalopathy- sr NH4 level 91 on admission.  HTN, controlled. bp stable  BPH: c/w flomax    labs, chart reviewed  keep flores for now  change IVF- 1/2NS to d51/2NS w/75meq Nabicarb @60ml/hr   encourage po intake  f/u GI and hem  off Norvasc 5mg daily -BP better  avoid ACEi/ARB/NSAIDs/nephrotoxics  dose all meds for eGFR <15ml/min  monitor BMP, LFTs daily  monitor U/O  will f/u   overall prognosis guarded

## 2019-03-26 NOTE — PROGRESS NOTE ADULT - ASSESSMENT
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 c/w supportive care. rx per gi. trend labs.        - assist pt in oral consumption/diet      - all consultants management appreciated      - adjust management accordingly   -> ARF:      - worsening, ivf adjusted, adjust management per nephro. Avoid nephrotoxic rx, strict i/o  -> Acute Cystitis:      - abx per ID  -> Thrombocytopenia:      - 2/2 liver dysfunction, correct gi  -> INR Elevation:      - 2/2 vitamin k deficiency      -  monitor inr and clinical status  -> Metabolic Encephalopathy:      - improving, treat underlying gi condition      - neuro checks       - fall precautions    -> Need for prophylactic measure:      - dvt/gi ppx  -> Severe Protein Calorie Malnutrition:      - supplement diet

## 2019-03-27 LAB
ALBUMIN SERPL ELPH-MCNC: 2.3 G/DL — LOW (ref 3.3–5)
ALP SERPL-CCNC: 278 U/L — HIGH (ref 40–120)
ALT FLD-CCNC: 154 U/L — HIGH (ref 4–41)
ANION GAP SERPL CALC-SCNC: 14 MMO/L — SIGNIFICANT CHANGE UP (ref 7–14)
AST SERPL-CCNC: 198 U/L — HIGH (ref 4–40)
BILIRUB DIRECT SERPL-MCNC: 16.7 MG/DL — HIGH (ref 0.1–0.2)
BILIRUB SERPL-MCNC: 21.1 MG/DL — HIGH (ref 0.2–1.2)
BUN SERPL-MCNC: 48 MG/DL — HIGH (ref 7–23)
CALCIUM SERPL-MCNC: 8.8 MG/DL — SIGNIFICANT CHANGE UP (ref 8.4–10.5)
CHLORIDE SERPL-SCNC: 114 MMOL/L — HIGH (ref 98–107)
CO2 SERPL-SCNC: 17 MMOL/L — LOW (ref 22–31)
CREAT SERPL-MCNC: 2.98 MG/DL — HIGH (ref 0.5–1.3)
GLUCOSE SERPL-MCNC: 120 MG/DL — HIGH (ref 70–99)
HCT VFR BLD CALC: 24.9 % — LOW (ref 39–50)
HGB BLD-MCNC: 9 G/DL — LOW (ref 13–17)
INR BLD: 2.16 — HIGH (ref 0.88–1.17)
MCHC RBC-ENTMCNC: 27.3 PG — SIGNIFICANT CHANGE UP (ref 27–34)
MCHC RBC-ENTMCNC: 36.1 % — HIGH (ref 32–36)
MCV RBC AUTO: 75.5 FL — LOW (ref 80–100)
NRBC # FLD: 0.34 K/UL — SIGNIFICANT CHANGE UP (ref 0–0)
NRBC FLD-RTO: 3.6 — SIGNIFICANT CHANGE UP
PLATELET # BLD AUTO: 117 K/UL — LOW (ref 150–400)
PMV BLD: SIGNIFICANT CHANGE UP FL (ref 7–13)
POTASSIUM SERPL-MCNC: 3.7 MMOL/L — SIGNIFICANT CHANGE UP (ref 3.5–5.3)
POTASSIUM SERPL-SCNC: 3.7 MMOL/L — SIGNIFICANT CHANGE UP (ref 3.5–5.3)
PROT SERPL-MCNC: 6.2 G/DL — SIGNIFICANT CHANGE UP (ref 6–8.3)
PROTHROM AB SERPL-ACNC: 24.5 SEC — HIGH (ref 9.8–13.1)
RBC # BLD: 3.3 M/UL — LOW (ref 4.2–5.8)
RBC # FLD: 28.6 % — HIGH (ref 10.3–14.5)
SODIUM SERPL-SCNC: 145 MMOL/L — SIGNIFICANT CHANGE UP (ref 135–145)
WBC # BLD: 9.48 K/UL — SIGNIFICANT CHANGE UP (ref 3.8–10.5)
WBC # FLD AUTO: 9.48 K/UL — SIGNIFICANT CHANGE UP (ref 3.8–10.5)

## 2019-03-27 RX ORDER — MEROPENEM 1 G/30ML
500 INJECTION INTRAVENOUS EVERY 12 HOURS
Qty: 0 | Refills: 0 | Status: COMPLETED | OUTPATIENT
Start: 2019-03-27 | End: 2019-04-06

## 2019-03-27 RX ORDER — PHYTONADIONE (VIT K1) 5 MG
2.5 TABLET ORAL ONCE
Qty: 0 | Refills: 0 | Status: COMPLETED | OUTPATIENT
Start: 2019-03-27 | End: 2019-03-27

## 2019-03-27 RX ORDER — SODIUM CHLORIDE 9 MG/ML
1000 INJECTION, SOLUTION INTRAVENOUS
Qty: 0 | Refills: 0 | Status: DISCONTINUED | OUTPATIENT
Start: 2019-03-27 | End: 2019-03-28

## 2019-03-27 RX ADMIN — Medication 1 TABLET(S): at 07:50

## 2019-03-27 RX ADMIN — ENOXAPARIN SODIUM 30 MILLIGRAM(S): 100 INJECTION SUBCUTANEOUS at 11:11

## 2019-03-27 RX ADMIN — LACTULOSE 10 GRAM(S): 10 SOLUTION ORAL at 06:35

## 2019-03-27 RX ADMIN — Medication 1 TABLET(S): at 21:43

## 2019-03-27 RX ADMIN — URSODIOL 300 MILLIGRAM(S): 250 TABLET, FILM COATED ORAL at 11:11

## 2019-03-27 RX ADMIN — SODIUM CHLORIDE 50 MILLILITER(S): 9 INJECTION, SOLUTION INTRAVENOUS at 20:44

## 2019-03-27 RX ADMIN — URSODIOL 300 MILLIGRAM(S): 250 TABLET, FILM COATED ORAL at 00:43

## 2019-03-27 RX ADMIN — Medication 2.5 MILLIGRAM(S): at 09:18

## 2019-03-27 RX ADMIN — LACTULOSE 10 GRAM(S): 10 SOLUTION ORAL at 17:17

## 2019-03-27 RX ADMIN — LIDOCAINE 1 PATCH: 4 CREAM TOPICAL at 11:12

## 2019-03-27 RX ADMIN — LIDOCAINE 1 PATCH: 4 CREAM TOPICAL at 18:56

## 2019-03-27 RX ADMIN — LIDOCAINE 1 PATCH: 4 CREAM TOPICAL at 00:30

## 2019-03-27 RX ADMIN — MEROPENEM 100 MILLIGRAM(S): 1 INJECTION INTRAVENOUS at 21:44

## 2019-03-27 NOTE — CONSULT NOTE ADULT - SUBJECTIVE AND OBJECTIVE BOX
Chief Complaint:  Patient is a 89y old  Male who presents with a chief complaint of generalized weakness, urinary retention, and confusion X 2 days (27 Mar 2019 12:06)      HPI: 89M BPH, Diverticular bleed, CKD presented with worsening malaise and confusion. He was found to have acute liver injury with cholestatic pattern with biliruvin 20, alk phos 290 and AST//331. His liver enzymes were normal in October 2018. CT a/p showed distended gallbladder. HIDA was concerning for possible CBD obstruction, but on MRCP only showed cholelithiasis without ductal dilation. Patient total bilirubin has remained around 20 with only minimal improvement of alk phos and AST/ALT. Patient has remained confused during this time despite treatment with rifaximin and lactulose. He was unable to provide history. According to daughter in law he is far from baseline mental status. His only home medications appear to be Tamsulosin and Pantoprazole. She states he only occasionally takes ibuprofen,  acetaminophen and laxatives. He rarely drinks. He does take some vitamins/supplements, but she was unable to tell me which ones.     He eventually had IR guided liver biopsy which showed cholestatic hepatitis with patchy perivenular hepatocyte dropout suggestive of drug induced liver injury vs infection. Hepatitis panel for A, b and c negative. LUCERO initially negative, but repeat showing 1:320 with speckled pattern. Anti-mitochondrial and smooth muscle antibody negative.     Allergies:  No Known Allergies      Home Medications:    Hospital Medications:  amoxicillin  500 milliGRAM(s)/clavulanate 1 Tablet(s) Oral two times a day  dextrose 5% + sodium chloride 0.45%. 1000 milliLiter(s) IV Continuous <Continuous>  enoxaparin Injectable 30 milliGRAM(s) SubCutaneous daily  ergocalciferol 62867 Unit(s) Oral every week  lactulose Syrup 10 Gram(s) Oral two times a day  levoFLOXacin  Tablet 250 milliGRAM(s) Oral every 48 hours  lidocaine   Patch 1 Patch Transdermal daily  ondansetron Injectable 4 milliGRAM(s) IV Push once  rifaximin 550 milliGRAM(s) Oral two times a day  ursodiol Capsule 300 milliGRAM(s) Oral two times a day      PMHX/PSHX:  Chronic kidney disease (CKD), stage IV (severe)  Benign prostatic hypertrophy  History of BPH  No pertinent past medical history  S/P cataract surgery, left  No significant past surgical history      Family history:  No pertinent family history in first degree relatives      Social History:     ROS:     Unable to obtain as poor historian    PHYSICAL EXAM:     GENERAL:  Appears stated age, Frail appearing  HEENT:  NC/AT,  scleral icterus  CHEST:  Full & symmetric excursion, no increased effort, breath sounds clear  HEART:  Regular rhythm, S1, S2, no murmur/rub/S3/S4, no abdominal bruit, no edema  ABDOMEN:  Soft, non-tender, non-distended, normoactive bowel sounds,  no masses ,  EXTREMITIES:  no cyanosis,clubbing or edema  SKIN:  No rash/erythema/ecchymoses/petechiae/wounds/abscess/warm/dry, +Juandic  NEURO:  AAOx2, but unable to give specific complaints although persistently moaning.     Vital Signs:  Vital Signs Last 24 Hrs  T(C): 34.8 (27 Mar 2019 13:25), Max: 36.7 (27 Mar 2019 06:32)  T(F): 94.6 (27 Mar 2019 13:25), Max: 98 (27 Mar 2019 06:32)  HR: 67 (27 Mar 2019 13:25) (65 - 70)  BP: 137/79 (27 Mar 2019 13:25) (101/57 - 137/79)  BP(mean): --  RR: 17 (27 Mar 2019 13:25) (16 - 17)  SpO2: 100% (27 Mar 2019 13:25) (98% - 100%)  Daily     Daily     LABS:                        9.0    9.48  )-----------( 117      ( 27 Mar 2019 06:55 )             24.9     03-27    145  |  114<H>  |  48<H>  ----------------------------<  120<H>  3.7   |  17<L>  |  2.98<H>    Ca    8.8      27 Mar 2019 06:55  Mg     2.1     03-26    TPro  6.2  /  Alb  2.3<L>  /  TBili  21.1<H>  /  DBili  16.7<H>  /  AST  198<H>  /  ALT  154<H>  /  AlkPhos  278<H>  03-27    LIVER FUNCTIONS - ( 27 Mar 2019 06:55 )  Alb: 2.3 g/dL / Pro: 6.2 g/dL / ALK PHOS: 278 u/L / ALT: 154 u/L / AST: 198 u/L / GGT: x           PT/INR - ( 27 Mar 2019 06:55 )   PT: 24.5 SEC;   INR: 2.16       IMAGING:     < from: CT Abdomen and Pelvis No Cont (03.15.19 @ 09:39) >  IMPRESSION:   Distended gallbladder. Consider further evaluation with ultrasound if   acute cholecystitis is a concern.    No discrete mass identified on this limited noncontrast study.    < end of copied text >    < from: [facility] Hepatobiliary Imaging (03.17.19 @ 12:21) >  IMPRESSION: Abnormal hepatobiliary scan suspicious for common bile duct   obstruction.    Hepatocellular dysfunction.    No evidence of acute cholecystitis.    These results with discussedwith DULCE MARIA Smith by Dr. ZINA Zapata via   telephone with read back at about 1:05 p.m. on 3/17/2019.    < end of copied text >    < from: MR MRCP No Cont (03.18.19 @ 14:22) >  IMPRESSION:     Gallbladder is distended with cholelithiasis.     No choledocholithiasis or biliary ductal dilatation.      < end of copied text >             Imaging:

## 2019-03-27 NOTE — PROGRESS NOTE ADULT - SUBJECTIVE AND OBJECTIVE BOX
Patient seen and examined at bedside  flores removedc yesterday, no tov to date, pt c/o malaise  Case discussed with medical team    HPI:  89 yo M, poor historian, charts reviewed, with PMHx BPH, Cataracts, Diverticulosis, Anemia 2/2 GI Bleed with previous PRBC transfusions, who p/w 2 days genearlized weakness, urinary retention and confusion.  Upon arrival to the ED, pt with scleral icterus and labs significant for transaminitis and TBili > 20 (15 Mar 2019 06:59)      PAST MEDICAL & SURGICAL HISTORY:  Chronic kidney disease (CKD), stage IV (severe)  Benign prostatic hypertrophy  S/P cataract surgery, left: 3 yrs ago      No Known Allergies       MEDICATIONS  (STANDING):  amoxicillin  500 milliGRAM(s)/clavulanate 1 Tablet(s) Oral two times a day  dextrose 5% + sodium chloride 0.45%. 1000 milliLiter(s) (50 mL/Hr) IV Continuous <Continuous>  enoxaparin Injectable 30 milliGRAM(s) SubCutaneous daily  ergocalciferol 97687 Unit(s) Oral every week  lactulose Syrup 10 Gram(s) Oral two times a day  levoFLOXacin  Tablet 250 milliGRAM(s) Oral every 48 hours  lidocaine   Patch 1 Patch Transdermal daily  ondansetron Injectable 4 milliGRAM(s) IV Push once  rifaximin 550 milliGRAM(s) Oral two times a day  ursodiol Capsule 300 milliGRAM(s) Oral two times a day    MEDICATIONS  (PRN):      REVIEW OF SYSTEMS:  CONSTITUTIONAL: (+) malaise. weakness generalized. poor oral intake.  EYES: No acute change in vision   ENT:  No tinnitus  NECK: No stiffness  RESPIRATORY: No hemoptysis  CARDIOVASCULAR: No chest pain, palpitations, syncope  GASTROINTESTINAL: pain/discomfort. No hematemesis, diarrhea, melena, or hematochezia.  GENITOURINARY: No hematuria  NEUROLOGICAL: No headaches  LYMPH Nodes: No enlarged glands  ENDOCRINE: No heat or cold intolerance	    T(C): 36.7 (03-27-19 @ 06:32), Max: 36.7 (03-27-19 @ 06:32)  HR: 65 (03-27-19 @ 06:32) (65 - 70)  BP: 133/72 (03-27-19 @ 06:32) (101/57 - 133/72)  RR: 16 (03-27-19 @ 06:32) (16 - 17)  SpO2: 100% (03-27-19 @ 06:32) (98% - 100%)    PHYSICAL EXAMINATION:   Constitutional: ill appearing, mild distress  HEENT: bitemp wasting. AT  Neck:  Supple  Respiratory:  Adequate airflow b/l. Not using accessory muscles of respiration.  Cardiovascular:  S1 & S2 intact, no R/G, 2+ radial pulses b/l  Gastrointestinal: Soft, NT, ND, normoactive b.s., no organomegaly/RT/rigidity  Extremities: WWP  : suprapubic tenderness to palpation.   Neurological: awake, arousable, lethargic, mild stable confusion. Crude sensation intact.     Labs and imaging reviewed    LABS:                      Complete Blood Count in AM (03.27.19 @ 06:55)    WBC Count: 9.48 K/uL    RBC Count: 3.30 M/uL    Hemoglobin: 9.0 g/dL    Hematocrit: 24.9 %    Mean Cell Volume: 75.5 fL    Mean Cell Hemoglobin: 27.3 pg    Mean Cell Hemoglobin Conc: 36.1 %    Red Cell Distrib Width: 28.6 %    Platelet Count - Automated: 117 K/uL    MPV: Test not performed fl    Nucleated RBC%: 3.6: NEW CBC INSTRUMENTATION AT THE [facility] WILL REPORT AN  AUTOMATED NRBC COUNT BASED ON FLUORESCENCE NUCLEAR STAIN AND  AUTOMATICALLY CORRECT THE WBC IN THE PRESENCE OF NRBC.    Nucleated RBC #: 0.34 K/uL

## 2019-03-27 NOTE — CONSULT NOTE ADULT - ASSESSMENT
89M pmh BPH, Diverticular bleed, CKD presented with worsening malaise and confusion found to have acute liver injury with cholestatic pattern.       #Acute Liver Injury  -likely drug-induced vs infectious  -Hep a, B, C negative, but would check for hepatitis E  -only home medications prior to hospital seem to be tamsulosin and pantoprazole. Also used acetaminophen, ibuprofen and laxatives as per daughter in law.   -no antibiotics prior to coming in to hospital  -patient currently on levafloxacin and ampicillin which can cause DILI, not likely initialy culprit, but would d/c if patient infection treated.   -will speak to daughter in law to see if can find any other supplements/medications patient has been taking.   -continue treatment with rifaximin and lactulose, titrating to 3-5 BM's daily. 89M pmh BPH, Diverticular bleed, CKD presented with worsening malaise and confusion found to have acute liver injury with cholestatic pattern.       #Acute Liver Injury  -likely drug-induced vs infectious  -given synthetic dysfunction (elevated INR, thrombocytopenia) likely some component of chronic liver injury or cirrhosis  -Hep a, B, C negative.  -only home medications prior to hospital seem to be tamsulosin and pantoprazole. Also used acetaminophen, ibuprofen and laxatives as per daughter in law. None have been shown to induce DILI (one case report for tamsulosin).  -no antibiotics prior to coming in to hospital  -patient currently on levafloxacin and ampicillin which can cause DILI, not likely initialy culprit as started prior to   -will speak to daughter in law to see if can find any other supplements/medications patient has been taking.     Recommendations:  -check for Hepatitis E (paper form filled and given to nurse)  -check CMV, EBV and HSV  -check Igg subsets, ANCA's, Anti-liver-kidney microsomal-1, Anti-liver-kidney microsomal-3 antibodies, Anti-liver cytosol antibody-1  -discontinue all unnecessary medications (antibiotics)  -c/w rifaximin, lactulose (titrate to 3-5 bm's daily)  -unfortunately with liver failure at this age patient is poor prognosis and not candidate for liver transplantation  -liver team will continue to follow. 89M pmh BPH, Diverticular bleed, CKD presented with worsening malaise and confusion found to have acute liver injury with cholestatic pattern.       #Acute Liver Injury  -likely drug-induced vs infectious  -given synthetic dysfunction (elevated INR, thrombocytopenia) of unclear cause  -Hep a, B, C negative.  -only home medications prior to hospital seem to be tamsulosin and pantoprazole. Also used acetaminophen, ibuprofen and laxatives as per daughter in law. None have been shown to induce DILI (one case report for tamsulosin).  -no antibiotics prior to coming in to hospital  -patient currently on levafloxacin and ampicillin which can cause DILI, not likely initialy culprit as started prior to   -will speak to daughter in law to see if can find any other supplements/medications patient has been taking.     Recommendations:  -check for Hepatitis E (paper form filled and given to nurse)  -check CMV, EBV and HSV  -check Igg subsets, ANCA's, Anti-liver-kidney microsomal-1, Anti-liver-kidney microsomal-3 antibodies, Anti-liver cytosol antibody-1  -discontinue all unnecessary medications (antibiotics)  -c/w rifaximin, lactulose (titrate to 3-5 bm's daily)  -unfortunately with liver failure at this age patient is poor prognosis and not candidate for liver transplantation  -liver team will continue to follow.

## 2019-03-27 NOTE — PROGRESS NOTE ADULT - SUBJECTIVE AND OBJECTIVE BOX
[facility] - Crystal / Corrie LARRY /Kylah/ LARON Villegas/ LARON Beard/ Shiva Mullen / MAGUI Njeru  ---------------------------------------------------------------------------------------------------------------    Patient seen and examined bedside, earlier today    Subjective and Objective: No overnight events. lethargic but responding to questions. poor po intake    Allergies: No Known Allergies      Hospital Medications:   MEDICATIONS  (STANDING):  amoxicillin  500 milliGRAM(s)/clavulanate 1 Tablet(s) Oral two times a day  dextrose 5% + sodium chloride 0.45%. 1000 milliLiter(s) (50 mL/Hr) IV Continuous <Continuous>  enoxaparin Injectable 30 milliGRAM(s) SubCutaneous daily  ergocalciferol 94522 Unit(s) Oral every week  lactulose Syrup 10 Gram(s) Oral two times a day  lidocaine   Patch 1 Patch Transdermal daily  meropenem  IVPB 500 milliGRAM(s) IV Intermittent every 12 hours  ondansetron Injectable 4 milliGRAM(s) IV Push once  rifaximin 550 milliGRAM(s) Oral two times a day  ursodiol Capsule 300 milliGRAM(s) Oral two times a day    VITALS:  T(F): 94.6 (03-27-19 @ 13:25), Max: 98 (03-27-19 @ 06:32)  HR: 67 (03-27-19 @ 13:25)  BP: 137/79 (03-27-19 @ 13:25)  RR: 17 (03-27-19 @ 13:25)  SpO2: 100% (03-27-19 @ 13:25)  Wt(kg): --    03-26 @ 07:01  -  03-27 @ 07:00  --------------------------------------------------------  IN: 0 mL / OUT: 400 mL / NET: -400 mL    03-27 @ 07:01  -  03-27 @ 19:11  --------------------------------------------------------  IN: 0 mL / OUT: 325 mL / NET: -325 mL    PHYSICAL EXAM:  Constitutional: NAD  HEENT: icteric sclera+  Neck: No JVD  Respiratory: CTAB, no wheezes, rales or rhonchi  Cardiovascular: S1, S2, RRR  Gastrointestinal: BS+, soft  Extremities: No cyanosis or clubbing. No peripheral edema  Neurological: lethrgic  : no flores   Skin: No rashes      LABS:  03-27    145  |  114<H>  |  48<H>  ----------------------------<  120<H>  3.7   |  17<L>  |  2.98<H>    Ca    8.8      27 Mar 2019 06:55  Mg     2.1     03-26    TPro  6.2  /  Alb  2.3<L>  /  TBili  21.1<H>  /  DBili  16.7<H>  /  AST  198<H>  /  ALT  154<H>  /  AlkPhos  278<H>  03-27    Creatinine Trend: 2.98 <--, 2.64 <--, 2.18 <--, 2.08 <--, 2.07 <--, 1.91 <--, 1.89 <--                        9.0    9.48  )-----------( 117      ( 27 Mar 2019 06:55 )             24.9     Urine Studies:        RADIOLOGY & ADDITIONAL STUDIES:

## 2019-03-27 NOTE — PROGRESS NOTE ADULT - ASSESSMENT
A 89 yo Male with BPH, Cataracts, Diverticulosis, Anemia 2/2 GI Bleed with previous PRBC transfusions, who presents to the ER for evaluation of  generalized  weakness, urinary retention and confusion. On admission, he found to have elevated bilirubin, LFTS, transaminitis, scleral jaundice. and urinary retention. Oshea catheter has placed with negative Urine analysis. The CT abdomen  and pelvis shows Distended gallbladder. Consider further evaluation with ultrasound if  acute cholecystitis is a concern. No discrete mass identified on this limited noncontrast study. The ID consult requested to assist with evaluation of Biliary sepsis.    # Encephalopathy  # Urinary retention  # R/O biliary sepsis/ Cholangitis - Most likely acute cholestatic hepatitis as per GI -s/p  transjugular liver biopsy with two passes. and found to have small right atrium blood clot. 3/21/19  # Distended GB with cholelithiasis on MRCP 3/18/19  # Hypothermia- Pneumonia on CXR - MRSA PCR is negative   # UTI- Enterococcus faecalis 3/20/19  # Liver biopsy result - not in the  system yet    would recommend:    1. Monitor  Temp. and c/w supportive care, hypothermic   2. Start on IV Meropenem, renally doses, since became hypothermic agagin  3. Supplemental oxygenation and  bronchodilator as needed  4. Aspiration precaution  5. Obtain  Procalcitonin level and Monitor kidney function     d/w    will follow the patient with you A 89 yo Male with BPH, Cataracts, Diverticulosis, Anemia 2/2 GI Bleed with previous PRBC transfusions, who presents to the ER for evaluation of  generalized  weakness, urinary retention and confusion. On admission, he found to have elevated bilirubin, LFTS, transaminitis, scleral jaundice. and urinary retention. Oshea catheter has placed with negative Urine analysis. The CT abdomen  and pelvis shows Distended gallbladder. Consider further evaluation with ultrasound if  acute cholecystitis is a concern. No discrete mass identified on this limited noncontrast study. The ID consult requested to assist with evaluation of Biliary sepsis.    # Encephalopathy  # Urinary retention  # R/O biliary sepsis/ Cholangitis - Most likely acute cholestatic hepatitis as per GI -s/p  transjugular liver biopsy with two passes. and found to have small right atrium blood clot. 3/21/19  # Distended GB with cholelithiasis on MRCP 3/18/19  # Hypothermia- Pneumonia on CXR - MRSA PCR is negative   # UTI- Enterococcus faecalis 3/20/19  # Liver biopsy result - not in the  system yet  # Encephalopathy- elevated ammonia    would recommend:    1. Monitor  Temp. and c/w supportive care, hypothermic, place ROSALINO Hugger on  2. Start on IV Meropenem, renally doses, since became hypothermic again   3. Supplemental oxygenation and  bronchodilator as needed  4. Aspiration precaution  5. Obtain  Procalcitonin level and Monitor kidney function     d/w Nursing staff    -Prognosis guarded A 89 yo Male with BPH, Cataracts, Diverticulosis, Anemia 2/2 GI Bleed with previous PRBC transfusions, who presents to the ER for evaluation of  generalized  weakness, urinary retention and confusion. On admission, he found to have elevated bilirubin, LFTS, transaminitis, scleral jaundice. and urinary retention. Flores catheter has placed with negative Urine analysis. The CT abdomen  and pelvis shows Distended gallbladder. Consider further evaluation with ultrasound if  acute cholecystitis is a concern. No discrete mass identified on this limited noncontrast study. The ID consult requested to assist with evaluation of Biliary sepsis.    # Encephalopathy  # Urinary retention  # R/O biliary sepsis/ Cholangitis - Most likely acute cholestatic hepatitis as per GI -s/p  transjugular liver biopsy with two passes. and found to have small right atrium blood clot. 3/21/19  # Distended GB with cholelithiasis on MRCP 3/18/19  # Hypothermia- Pneumonia on CXR - MRSA PCR is negative   # UTI- Enterococcus faecalis 3/20/19  # Liver biopsy result - not in the  system yet  # Encephalopathy- elevated ammonia    would recommend:    1. Monitor  Temp. and c/w supportive care, hypothermic, place ROSALINO Hugger on  2. Start on IV Meropenem, renally doses, since became hypothermic again   3. Supplemental oxygenation and  bronchodilator as needed  4. Aspiration precaution  5. Obtain  Procalcitonin level and Monitor kidney function   6. Discontinue flores catheter and TOV    d/w Nursing staff    -Prognosis guarded

## 2019-03-27 NOTE — PROGRESS NOTE ADULT - SUBJECTIVE AND OBJECTIVE BOX
PASCUAL OLSEN:[id number],   89yMale followed for:  No Known Allergies    PAST MEDICAL & SURGICAL HISTORY:  Chronic kidney disease (CKD), stage IV (severe)  Benign prostatic hypertrophy  S/P cataract surgery, left: 3 yrs ago    FAMILY HISTORY:  No pertinent family history in first degree relatives    MEDICATIONS  (STANDING):  amoxicillin  500 milliGRAM(s)/clavulanate 1 Tablet(s) Oral two times a day  dextrose 5% + sodium chloride 0.45%. 1000 milliLiter(s) (50 mL/Hr) IV Continuous <Continuous>  enoxaparin Injectable 30 milliGRAM(s) SubCutaneous daily  ergocalciferol 58745 Unit(s) Oral every week  lactulose Syrup 10 Gram(s) Oral two times a day  levoFLOXacin  Tablet 250 milliGRAM(s) Oral every 48 hours  lidocaine   Patch 1 Patch Transdermal daily  ondansetron Injectable 4 milliGRAM(s) IV Push once  rifaximin 550 milliGRAM(s) Oral two times a day  ursodiol Capsule 300 milliGRAM(s) Oral two times a day    MEDICATIONS  (PRN):      Vital Signs Last 24 Hrs  T(C): 36.7 (27 Mar 2019 06:32), Max: 36.7 (27 Mar 2019 06:32)  T(F): 98 (27 Mar 2019 06:32), Max: 98 (27 Mar 2019 06:32)  HR: 65 (27 Mar 2019 06:32) (65 - 70)  BP: 133/72 (27 Mar 2019 06:32) (101/57 - 133/72)  BP(mean): --  RR: 16 (27 Mar 2019 06:32) (16 - 17)  SpO2: 100% (27 Mar 2019 06:32) (98% - 100%)  nc/at  s1s2  cta  soft, nt, nd no guarding or rebound  no c/c/e    CBC Full  -  ( 26 Mar 2019 06:50 )  WBC Count : 7.29 K/uL  RBC Count : 3.29 M/uL  Hemoglobin : 8.8 g/dL  Hematocrit : 25.4 %  Platelet Count - Automated : 122 K/uL  Mean Cell Volume : 77.2 fL  Mean Cell Hemoglobin : 26.7 pg  Mean Cell Hemoglobin Concentration : 34.6 %  Auto Neutrophil # : x  Auto Lymphocyte # : x  Auto Monocyte # : x  Auto Eosinophil # : x  Auto Basophil # : x  Auto Neutrophil % : x  Auto Lymphocyte % : x  Auto Monocyte % : x  Auto Eosinophil % : x  Auto Basophil % : x    03-26    143  |  113<H>  |  44<H>  ----------------------------<  128<H>  3.6   |  16<L>  |  2.64<H>    Ca    8.8      26 Mar 2019 06:50  Mg     2.1     03-26    TPro  6.0  /  Alb  2.2<L>  /  TBili  20.8<H>  /  DBili  x   /  AST  234<H>  /  ALT  165<H>  /  AlkPhos  283<H>  03-26    PT/INR - ( 25 Mar 2019 07:55 )   PT: 23.4 SEC;   INR: 2.06          PTT - ( 25 Mar 2019 07:55 )  PTT:65.5 SEC

## 2019-03-27 NOTE — PROGRESS NOTE ADULT - ASSESSMENT
1. Hyperbilirubinemia    -- Bili unchanged at ~ 20  -- s/p TJ Liver Bx on 3/21.   -- GI following - acute cholestatic hepatitis of unclear etiology. ? Drug related ? Autoimmune   -- MRI/MRCP distended GB w Cholelithiasis. No biliary ductal dilatation  -- no evidence of malignancy on CT A/P  -- tumor markers unremarkable   -- not a transplant candidate    2. 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 Thrombocytopenia    -- platelet count stable at 110-120K  -- sec to acute liver injury  --  no bleeding  -- monitor for now    Carlos Gloria MD  [phone]

## 2019-03-27 NOTE — PROGRESS NOTE ADULT - SUBJECTIVE AND OBJECTIVE BOX
Pt is seen and examined  pt is awake and lying in bed   comfortable  no acute events  minimally interactive      PAST MEDICAL & SURGICAL HISTORY:  Chronic kidney disease (CKD), stage IV (severe)  Benign prostatic hypertrophy  S/P cataract surgery, left: 3 yrs ago      ROS:  Negative except for:    MEDICATIONS  (STANDING):  amoxicillin  500 milliGRAM(s)/clavulanate 1 Tablet(s) Oral two times a day  dextrose 5% + sodium chloride 0.45%. 1000 milliLiter(s) (50 mL/Hr) IV Continuous <Continuous>  enoxaparin Injectable 30 milliGRAM(s) SubCutaneous daily  ergocalciferol 05686 Unit(s) Oral every week  lactulose Syrup 10 Gram(s) Oral two times a day  levoFLOXacin  Tablet 250 milliGRAM(s) Oral every 48 hours  lidocaine   Patch 1 Patch Transdermal daily  ondansetron Injectable 4 milliGRAM(s) IV Push once  rifaximin 550 milliGRAM(s) Oral two times a day  ursodiol Capsule 300 milliGRAM(s) Oral two times a day    MEDICATIONS  (PRN):      Allergies    No Known Allergies    Intolerances        Vital Signs Last 24 Hrs  T(C): 36.7 (27 Mar 2019 06:32), Max: 36.7 (27 Mar 2019 06:32)  T(F): 98 (27 Mar 2019 06:32), Max: 98 (27 Mar 2019 06:32)  HR: 65 (27 Mar 2019 06:32) (65 - 70)  BP: 133/72 (27 Mar 2019 06:32) (101/57 - 133/72)  BP(mean): --  RR: 16 (27 Mar 2019 06:32) (16 - 17)  SpO2: 100% (27 Mar 2019 06:32) (98% - 100%)    PHYSICAL EXAM    General: elderly male in NAD  HEENT: + Icterus  Neck: supple  CV: normal S1/S2 with no murmur rubs or gallops  Lungs: positive air movement b/l ant lungs,clear to auscultation, no wheezes, no rales  Abdomen: soft non-tender non-distended, no hepatosplenomegaly  Ext: no clubbing cyanosis or edema    LABS:                          8.8    7.29  )-----------( 122      ( 26 Mar 2019 06:50 )             25.4     Serial CBC's  03-26 @ 06:50  Hct-25.4 / Hgb-8.8 / Plat-122 / RBC-3.29 / WBC-7.29          Serial CBC's  03-25 @ 07:30  Hct-24.5 / Hgb-9.0 / Plat-118 / RBC-3.21 / WBC-6.50            03-26    143  |  113<H>  |  44<H>  ----------------------------<  128<H>  3.6   |  16<L>  |  2.64<H>    Ca    8.8      26 Mar 2019 06:50  Mg     2.1     03-26    TPro  6.0  /  Alb  2.2<L>  /  TBili  20.8<H>  /  DBili  x   /  AST  234<H>  /  ALT  165<H>  /  AlkPhos  283<H>  03-26      PT/INR - ( 25 Mar 2019 07:55 )   PT: 23.4 SEC;   INR: 2.06          PTT - ( 25 Mar 2019 07:55 )  PTT:65.5 SEC    WBC Count: 7.29 K/uL (03-26 @ 06:50)  Hemoglobin: 8.8 g/dL (03-26 @ 06:50)            RADIOLOGY & ADDITIONAL STUDIES:

## 2019-03-27 NOTE — PROGRESS NOTE ADULT - SUBJECTIVE AND OBJECTIVE BOX
Patient is seen and examined at the bed side, is afebrile. He is becoming more weaker and refuse to eat. The creatinine is trending up. As per GI, Liver biopsy suggestive of drug induced injury, The injury Less likely due to Antimicrobials, since most of them clear by Kidney except very few which not use commonly.        REVIEW OF SYSTEMS: All other review systems are negative        ALLERGIES: No Known Allergies        Vital Signs Last 24 Hrs  T(C): 36.5 (26 Mar 2019 15:04), Max: 36.5 (26 Mar 2019 15:04)  T(F): 97.7 (26 Mar 2019 15:04), Max: 97.7 (26 Mar 2019 15:04)  HR: 66 (26 Mar 2019 15:04) (66 - 73)  BP: 101/57 (26 Mar 2019 15:04) (101/57 - 121/74)  BP(mean): --  RR: 17 (26 Mar 2019 15:04) (17 - 18)  SpO2: 98% (26 Mar 2019 15:04) (98% - 100%)        PHYSICAL EXAM:  GENERAL: Appears very tired  HEENT: Sclera jaundiced  CHEST/LUNG:  Air entry bilaterally  HEART: s1 and s2 present  ABDOMEN:  Nontender and  Nondistended  : Oshea catheter in placed  EXTREMITIES: No pedal  edema  CNS: Lethargic but Alert          LABS:                        8.8    7.29  )-----------( 122      ( 26 Mar 2019 06:50 )             25.4                           9.0    6.50  )-----------( 118      ( 25 Mar 2019 07:30 )             24.5           03-26    143  |  113<H>  |  44<H>  ----------------------------<  128<H>  3.6   |  16<L>  |  2.64<H>    Ca    8.8      26 Mar 2019 06:50  Mg     2.1     03-26    TPro  6.0  /  Alb  2.2<L>  /  TBili  20.8<H>  /  DBili  x   /  AST  234<H>  /  ALT  165<H>  /  AlkPhos  283<H>  03-26 03-25    140  |  113<H>  |  34<H>  ----------------------------<  47<L>  4.1   |  13<L>  |  2.18<H>    Ca    8.9      25 Mar 2019 06:15  Mg     1.9     03-25    TPro  5.9<L>  /  Alb  2.2<L>  /  TBili  20.8<H>  /  DBili  x   /  AST  286<H>  /  ALT  174<H>  /  AlkPhos  268<H>  03-25          MEDICATIONS  (STANDING):  amoxicillin  500 milliGRAM(s)/clavulanate 1 Tablet(s) Oral two times a day  dextrose 5% + sodium chloride 0.45%. 1000 milliLiter(s) (50 mL/Hr) IV Continuous <Continuous>  enoxaparin Injectable 30 milliGRAM(s) SubCutaneous daily  ergocalciferol 58330 Unit(s) Oral every week  lactulose Syrup 10 Gram(s) Oral two times a day  levoFLOXacin  Tablet 250 milliGRAM(s) Oral every 48 hours  lidocaine   Patch 1 Patch Transdermal daily  ondansetron Injectable 4 milliGRAM(s) IV Push once  rifaximin 550 milliGRAM(s) Oral two times a day  ursodiol Capsule 300 milliGRAM(s) Oral two times a day    MEDICATIONS  (PRN):          RADIOLOGY & ADDITIONAL TESTS:    3/20/19 : Xray Chest 1 View- PORTABLE-Urgent (03.20.19 @ 09:37) Patchy opacity within the right lower lung may represent pneumonia. Subsegmental atelectasis at the left base. No pleural effusion or  pneumothorax. No pulmonary edema. The cardiac silhouette remains enlarged. Aortic aneurysm. Recommend  further evaluation with aortogram.      3/18/19 : MR MRCP No Cont (03.18.19 @ 14:22) Gallbladder is distended with cholelithiasis.  No choledocholithiasis or biliary ductal dilatation.      3/17/19 : NM Hepatobiliary Imaging (03.17.19 @ 12:21) Abnormal hepatobiliary scan suspicious for common bile duct obstruction. Hepatocellular dysfunction. No evidence of acute cholecystitis.      3/18/19 : US Abdomen Limited (03.18.19 @ 07:48) Gallbladder is distended with sludge and stones.  Mural thickening of the wall of the common bile duct, possibly  cholangitis. No biliary ductal dilatation.      3/15/19 : CT Abdomen and Pelvis No Cont (03.15.19 @ 09:39) Distended gallbladder. Consider further evaluation with ultrasound if  acute cholecystitis is a concern.  No discrete mass identified on this limited noncontrast study.          MICROBIOLOGY DATA:      Culture - Urine (03.20.19 @ 15:21)    -  Vancomycin: S 2 CLOVIS    Culture - Urine:   ENTF^Enterococcus faecalis  COLONY COUNT: > = 100,000 CFU/ML    Culture - Urine:   Susceptibility not performed.    -  Tetra/Doxy: R >8 CLOVIS    -  Nitrofurantoin: S <=32 CLOVIS    -  Ampicillin: S <=2 CLOVIS    -  Ciprofloxacin: S <=1 CLOVIS    Specimen Source: URINE CATHETER    Organism Identification: Enterococcus faecalis  Staphylococcus sp.,coag neg    Organism: Enterococcus faecalis  COLONY COUNT: > = 100,000 CFU/ML    Organism: Staphylococcus sp.,coag neg  COLONY COUNT: > = 100,000 CFU/ML    Method Type: POSITIVE CLOVIS 29      Urinalysis (03.20.19 @ 13:25)    Color: ELSA    Urine Appearance: TURBID    Glucose: 100    Bilirubin: LARGE    Ketone - Urine: NEGATIVE    Specific Gravity: 1.020    Blood: MODERATE    pH - Urine: 6.0    Protein, Urine: 100    Urobilinogen: 4.0    Nitrite: NEGATIVE    Leukocyte Esterase Concentration: LARGE    Red Blood Cell - Urine: 3-5    White Blood Cell - Urine: >50    White Blood Cell Casts: 2-5/LPF    Epithelial Cells: OCC    Bacteria: MANY    Coarse Granular Casts: 0-2/LPF      MRSA Infection Control Screen (PCR) (03.20.19 @ 13:46)    MRSA Infection Control Screen (PCR): NOT DETECTED     REFERENCE RANGE:  MRSA DNA NOT DETECTED      Culture - Blood (03.15.19 @ 14:03)    Culture - Blood:   NO ORGANISMS ISOLATED  NO ORGANISMS ISOLATED AT 24 HOURS    Specimen Source: BLOOD Patient is seen and examined at the bed side, became hyp[othermic again. He  appears more lethargic and confused today. The Ammonia is elevated, on Lactulose.        REVIEW OF SYSTEMS: Unable to obtain due to mental status      ALLERGIES: No Known Allergies        Vital Signs Last 24 Hrs  T(C): 36.5 (26 Mar 2019 15:04), Max: 36.5 (26 Mar 2019 15:04)  T(F): 97.7 (26 Mar 2019 15:04), Max: 97.7 (26 Mar 2019 15:04)  HR: 66 (26 Mar 2019 15:04) (66 - 73)  BP: 101/57 (26 Mar 2019 15:04) (101/57 - 121/74)  BP(mean): --  RR: 17 (26 Mar 2019 15:04) (17 - 18)  SpO2: 98% (26 Mar 2019 15:04) (98% - 100%)        PHYSICAL EXAM:  GENERAL: Moaning  HEENT: Sclera jaundiced  CHEST/LUNG:  Air entry bilaterally  HEART: s1 and s2 present  ABDOMEN:  Nontender and  Nondistended  : Oshea catheter in placed  EXTREMITIES: No pedal  edema  CNS: Lethargic , confused          LABS:                        8.8    7.29  )-----------( 122      ( 26 Mar 2019 06:50 )             25.4                           9.0    6.50  )-----------( 118      ( 25 Mar 2019 07:30 )             24.5           03-26    143  |  113<H>  |  44<H>  ----------------------------<  128<H>  3.6   |  16<L>  |  2.64<H>    Ca    8.8      26 Mar 2019 06:50  Mg     2.1     03-26    TPro  6.0  /  Alb  2.2<L>  /  TBili  20.8<H>  /  DBili  x   /  AST  234<H>  /  ALT  165<H>  /  AlkPhos  283<H>  03-26 03-25    140  |  113<H>  |  34<H>  ----------------------------<  47<L>  4.1   |  13<L>  |  2.18<H>    Ca    8.9      25 Mar 2019 06:15  Mg     1.9     03-25    TPro  5.9<L>  /  Alb  2.2<L>  /  TBili  20.8<H>  /  DBili  x   /  AST  286<H>  /  ALT  174<H>  /  AlkPhos  268<H>  03-25          MEDICATIONS  (STANDING):  amoxicillin  500 milliGRAM(s)/clavulanate 1 Tablet(s) Oral two times a day  dextrose 5% + sodium chloride 0.45%. 1000 milliLiter(s) (50 mL/Hr) IV Continuous <Continuous>  enoxaparin Injectable 30 milliGRAM(s) SubCutaneous daily  ergocalciferol 48702 Unit(s) Oral every week  lactulose Syrup 10 Gram(s) Oral two times a day  levoFLOXacin  Tablet 250 milliGRAM(s) Oral every 48 hours  lidocaine   Patch 1 Patch Transdermal daily  ondansetron Injectable 4 milliGRAM(s) IV Push once  rifaximin 550 milliGRAM(s) Oral two times a day  ursodiol Capsule 300 milliGRAM(s) Oral two times a day    MEDICATIONS  (PRN):          RADIOLOGY & ADDITIONAL TESTS:    3/20/19 : Xray Chest 1 View- PORTABLE-Urgent (03.20.19 @ 09:37) Patchy opacity within the right lower lung may represent pneumonia. Subsegmental atelectasis at the left base. No pleural effusion or  pneumothorax. No pulmonary edema. The cardiac silhouette remains enlarged. Aortic aneurysm. Recommend  further evaluation with aortogram.      3/18/19 : MR MRCP No Cont (03.18.19 @ 14:22) Gallbladder is distended with cholelithiasis.  No choledocholithiasis or biliary ductal dilatation.      3/17/19 : NM Hepatobiliary Imaging (03.17.19 @ 12:21) Abnormal hepatobiliary scan suspicious for common bile duct obstruction. Hepatocellular dysfunction. No evidence of acute cholecystitis.      3/18/19 : US Abdomen Limited (03.18.19 @ 07:48) Gallbladder is distended with sludge and stones.  Mural thickening of the wall of the common bile duct, possibly  cholangitis. No biliary ductal dilatation.      3/15/19 : CT Abdomen and Pelvis No Cont (03.15.19 @ 09:39) Distended gallbladder. Consider further evaluation with ultrasound if  acute cholecystitis is a concern.  No discrete mass identified on this limited noncontrast study.          MICROBIOLOGY DATA:      Culture - Urine (03.20.19 @ 15:21)    -  Vancomycin: S 2 CLOVIS    Culture - Urine:   ENTF^Enterococcus faecalis  COLONY COUNT: > = 100,000 CFU/ML    Culture - Urine:   Susceptibility not performed.    -  Tetra/Doxy: R >8 CLOVIS    -  Nitrofurantoin: S <=32 CLOVIS    -  Ampicillin: S <=2 CLOVIS    -  Ciprofloxacin: S <=1 CLOVIS    Specimen Source: URINE CATHETER    Organism Identification: Enterococcus faecalis  Staphylococcus sp.,coag neg    Organism: Enterococcus faecalis  COLONY COUNT: > = 100,000 CFU/ML    Organism: Staphylococcus sp.,coag neg  COLONY COUNT: > = 100,000 CFU/ML    Method Type: POSITIVE CLOVIS 29      Urinalysis (03.20.19 @ 13:25)    Color: ELSA    Urine Appearance: TURBID    Glucose: 100    Bilirubin: LARGE    Ketone - Urine: NEGATIVE    Specific Gravity: 1.020    Blood: MODERATE    pH - Urine: 6.0    Protein, Urine: 100    Urobilinogen: 4.0    Nitrite: NEGATIVE    Leukocyte Esterase Concentration: LARGE    Red Blood Cell - Urine: 3-5    White Blood Cell - Urine: >50    White Blood Cell Casts: 2-5/LPF    Epithelial Cells: OCC    Bacteria: MANY    Coarse Granular Casts: 0-2/LPF      MRSA Infection Control Screen (PCR) (03.20.19 @ 13:46)    MRSA Infection Control Screen (PCR): NOT DETECTED     REFERENCE RANGE:  MRSA DNA NOT DETECTED      Culture - Blood (03.15.19 @ 14:03)    Culture - Blood:   NO ORGANISMS ISOLATED  NO ORGANISMS ISOLATED AT 24 HOURS    Specimen Source: BLOOD

## 2019-03-27 NOTE — PROGRESS NOTE ADULT - PROBLEM SELECTOR PLAN 1
Cr worsening 2/2 ATN. no evidence of Fluid overload, K ok. biarb better  u/o 325ml so far today, appears better. noted. 400ml yesterday 24hrs  poor prognosis  no indication for dialysis at this time. Pt would be a poor candidate if indicated   flores removed for TOV today. monitor U/O.   resume IVF -d51/2NS w/75meq Nabicarb @50ml/hr  avoid ACEi/ARB/NSAIDs/nephrotoxics  dose all meds for eGFR <15ml/min  monitor BMP, LFTs daily

## 2019-03-27 NOTE — PROGRESS NOTE ADULT - ASSESSMENT
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 rx per gi. trend labs.        - not a candidate for liver transplantation, will confirm with hepatology      - assist pt in oral consumption/diet      - all consultants management appreciated      - adjust management accordingly   -> ARF:      - worsening, ?Hemodialysis      - ivf, adjust management per nephro. Avoid nephrotoxic rx, strict i/o  -> Acute Cystitis:      - abx per ID  -> Thrombocytopenia:      - 2/2 liver dysfunction, correct gi  -> INR Elevation:      -  2/2 vitamin k deficiency      -  vit k       - monitor inr and clinical status  -> Metabolic Encephalopathy:      - stable, treat underlying gi condition      - neuro checks       - fall precautions    -> Need for prophylactic measure:      - dvt/gi ppx  -> Severe Protein Calorie Malnutrition:      - supplement diet

## 2019-03-27 NOTE — PROGRESS NOTE ADULT - ASSESSMENT
89 yo M with PMHx CKD, BPH, Diverticulosis, Anemia 2/2 GI Bleed with previous PRBC transfusions, who p/w 2 days genearlized weakness, urinary retention and confusion, found to have elevated LFTs. Renal following for PHU, CKD management.    PHU on CKD 3 b/l Cr 1.4-1.6 10/2018  PHU intial thought to be pre renal, now w/worsening RFT-most likely 2/2 ATN likely from hyperbilirubinemia.    Cr 2.2>2.6>3   C3 77- low, C4 wnl -noted- likely 2/2 dec synthesis 2/2 liver dz. no e/o acute GN  Urinary retention on CT w/o hydronephrosis. w/flores.   M acidosis- 2/2 PHU- better    Transaminitis and Bilirubinemia s/p liver Bx by IR 3/21 likely drug induced per GI  AMS most liekly 2/2 Hepatic encephalopathy not from uremia- sr NH4 level 100 today. on lactulose -per GI. rec inc dose  HTN, controlled. bp stable. off Norvasc  BPH: c/w flomax    labs, chart reviewed  encourage po intake  f/u GI and hem  d/w neurology over the phone earlier today  overall prognosis poor. consider palliative eval  will f/u

## 2019-03-27 NOTE — PROGRESS NOTE ADULT - SUBJECTIVE AND OBJECTIVE BOX
S: no chest pain or SOB         amoxicillin  500 milliGRAM(s)/clavulanate 1 Tablet(s) Oral two times a day  dextrose 5% + sodium chloride 0.45%. 1000 milliLiter(s) IV Continuous <Continuous>  enoxaparin Injectable 30 milliGRAM(s) SubCutaneous daily  ergocalciferol 98274 Unit(s) Oral every week  lactulose Syrup 10 Gram(s) Oral two times a day  levoFLOXacin  Tablet 250 milliGRAM(s) Oral every 48 hours  lidocaine   Patch 1 Patch Transdermal daily  ondansetron Injectable 4 milliGRAM(s) IV Push once  rifaximin 550 milliGRAM(s) Oral two times a day  ursodiol Capsule 300 milliGRAM(s) Oral two times a day                            9.0    9.48  )-----------( 117      ( 27 Mar 2019 06:55 )             24.9       03-27    145  |  114<H>  |  48<H>  ----------------------------<  120<H>  3.7   |  17<L>  |  2.98<H>    Ca    8.8      27 Mar 2019 06:55  Mg     2.1     03-26    TPro  6.2  /  Alb  2.3<L>  /  TBili  21.1<H>  /  DBili  16.7<H>  /  AST  198<H>  /  ALT  154<H>  /  AlkPhos  278<H>  03-27            T(C): 36.7 (03-27-19 @ 06:32), Max: 36.7 (03-27-19 @ 06:32)  HR: 65 (03-27-19 @ 06:32) (65 - 70)  BP: 133/72 (03-27-19 @ 06:32) (101/57 - 133/72)  RR: 16 (03-27-19 @ 06:32) (16 - 17)  SpO2: 100% (03-27-19 @ 06:32) (98% - 100%)  Wt(kg): --    I&O's Summary    26 Mar 2019 07:01  -  27 Mar 2019 07:00  --------------------------------------------------------  IN: 0 mL / OUT: 400 mL / NET: -400 mL            Heart: normal S1, S2, RRR, no m/r/g  Lungs: cta b/l  Abd: soft nT  Ext: no edema     TELEMETRY: SR	60's    RADIOLOGY:  < from: Xray Chest 1 View- PORTABLE-Urgent (03.20.19 @ 09:37) >  FINDINGS/  IMPRESSION:    Patchy opacity within the right lower lung may represent pneumonia.   Subsegmental atelectasis at the left base. No pleural effusion or   pneumothorax. No pulmonary edema.    The cardiac silhouette remains enlarged. Aortic aneurysm. Recommend   further evaluation with aortogram.    < end of copied text >    < from: Transthoracic Echocardiogram (03.23.19 @ 09:36) >  CONCLUSIONS:  1. Mitral annular calcification, otherwise normal mitral  valve. Mild mitral regurgitation.  2. Aortic valve leaflet morphology not well visualized;  appears calcified with normal opening. Moderate aortic  regurgitation.  3. Normal left ventricular internal dimensions and wall  thicknesses.  4. Hyperdynamic left ventricle.  5. Mild diastolic dysfunction (Stage I).  6. Normal right ventricular size and function.  7. Normal tricuspid valve. Mild tricuspid regurgitation.  8. Estimated pulmonary artery systolic pressure equals 36  mm Hg, assuming right atrial pressure equals 10  mm Hg,  consistent with borderline pulmonary hypertension.  *** Compared with echocardiogram of 6/11/2018,no  significant changes noted.  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 LFTs significant for transaminitis, scleral jaundice.     -pt. tolerated biopsy well in terms of cv perspective  -no clinical heart failure or anginal symptoms  -TTE with moderate AI and normal LV function - medical management of valvular disease recommended  -CT chest with 4.5cm ascending aortic aneurysm - can start metoprolol 12.5 mg PO bid   -follow up CECILIA Morrison MD

## 2019-03-27 NOTE — PROGRESS NOTE ADULT - SUBJECTIVE AND OBJECTIVE BOX
[facility]      Seen earlier today, and examined.  - Today, patient is without complaints.           *****MEDICATIONS: Current medication reviewed and documented.    MEDICATIONS  (STANDING):  amoxicillin  500 milliGRAM(s)/clavulanate 1 Tablet(s) Oral two times a day  dextrose 5% + sodium chloride 0.45%. 1000 milliLiter(s) (50 mL/Hr) IV Continuous <Continuous>  enoxaparin Injectable 30 milliGRAM(s) SubCutaneous daily  ergocalciferol 57177 Unit(s) Oral every week  lactulose Syrup 10 Gram(s) Oral two times a day  levoFLOXacin  Tablet 250 milliGRAM(s) Oral every 48 hours  lidocaine   Patch 1 Patch Transdermal daily  ondansetron Injectable 4 milliGRAM(s) IV Push once  rifaximin 550 milliGRAM(s) Oral two times a day  ursodiol Capsule 300 milliGRAM(s) Oral two times a day    MEDICATIONS  (PRN):          ***** VITAL SIGNS:  T(F): 94.6 (19 @ 13:25), Max: 98 (19 @ 06:32)  HR: 67 (19 @ 13:25) (65 - 70)  BP: 137/79 (19 @ 13:25) (111/65 - 137/79)  RR: 17 (19 @ 13:25) (16 - 17)  SpO2: 100% (19 @ 13:25) (100% - 100%)  Wt(kg): --  ,   I&O's Summary    26 Mar 2019 07:  -  27 Mar 2019 07:00  --------------------------------------------------------  IN: 0 mL / OUT: 400 mL / NET: -400 mL    27 Mar 2019 07:  -  27 Mar 2019 16:27  --------------------------------------------------------  IN: 0 mL / OUT: 325 mL / NET: -325 mL             *****PHYSICAL EXAM:    lethargic icteric    alert to repeated tactile stimuli    can respond to questions briefly     Gait not assessed.                  *****LAB AND IMAGIN.0    9.48  )-----------( 117      ( 27 Mar 2019 06:55 )             24.9               03-    145  |  114<H>  |  48<H>  ----------------------------<  120<H>  3.7   |  17<L>  |  2.98<H>    Ca    8.8      27 Mar 2019 06:55  Mg     2.1         TPro  6.2  /  Alb  2.3<L>  /  TBili  21.1<H>  /  DBili  16.7<H>  /  AST  198<H>  /  ALT  154<H>  /  AlkPhos  278<H>      PT/INR - ( 27 Mar 2019 06:55 )   PT: 24.5 SEC;   INR: 2.16                               [All pertinent recent Imaging/Reports reviewed]           *****A S S E S S M E N T   A N D   P L A N :        89 yo M, poor historian, charts reviewed, with PMHx BPH, Cataracts, Diverticulosis, Anemia 2/2 GI Bleed with previous PRBC transfusions, who p/w 2 days genearlized weakness, urinary retention and confusion.  Upon arrival to the ED, pt with scleral icterus and labs significant for transaminitis and TBili > 20    called to evaluate pt for ams         Problem/Recommendations 1:  Multifactorial toxic metabolic encephalopathy  , hyperbilirubinemia, hyperammonemia, and worsening renal function  correct metabolic dyscrasias defer to renal/gi   repeat ammonia level 100 on lactulose   worsening lethargy /encephalopathy           Problem/Recommendations 2 Hyperbilirubinemia  suspicious for cholestatic pattern   distended gb with cholelithiasis, mural thickening of the cbd. defer to gi for management.    pending liver biopsy      prognosis guarded.  Thank you for allowing me to participate in the care of this patient. Please do not hesitate to call me if you have any  questions.        ________________  Ava Schreiber MD  [facility] ([facility]  [phone]      30 minutes spent on total encounter; more than 50 % of the visit was  spent counseling about plan of care, compliance to diet/exercise and medication regimen and or  coordinating care by the attending physician.      It is advised that s stroke patients follow up with DULCE MARIA Rausch @ [phone] in 1- 2 weeks.   Others please follow up with Dr. Michael Nissenbaum [phone]

## 2019-03-27 NOTE — PROGRESS NOTE ADULT - ASSESSMENT
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  no peicemeal necrosis on ct, can use budesonide 9 mg with ? utility.  can be from alpha blocer but atypica.  avoid hepatitoxic meds

## 2019-03-28 DIAGNOSIS — K75.9 INFLAMMATORY LIVER DISEASE, UNSPECIFIED: ICD-10-CM

## 2019-03-28 DIAGNOSIS — G93.40 ENCEPHALOPATHY, UNSPECIFIED: ICD-10-CM

## 2019-03-28 DIAGNOSIS — Z71.89 OTHER SPECIFIED COUNSELING: ICD-10-CM

## 2019-03-28 DIAGNOSIS — Z51.5 ENCOUNTER FOR PALLIATIVE CARE: ICD-10-CM

## 2019-03-28 LAB
ALBUMIN SERPL ELPH-MCNC: 2.2 G/DL — LOW (ref 3.3–5)
ALP SERPL-CCNC: 261 U/L — HIGH (ref 40–120)
ALT FLD-CCNC: 146 U/L — HIGH (ref 4–41)
AMMONIA BLD-MCNC: 130 UMOL/L — HIGH (ref 11–55)
ANION GAP SERPL CALC-SCNC: 13 MMO/L — SIGNIFICANT CHANGE UP (ref 7–14)
ANION GAP SERPL CALC-SCNC: 13 MMO/L — SIGNIFICANT CHANGE UP (ref 7–14)
APTT BLD: 68.5 SEC — HIGH (ref 27.5–36.3)
AST SERPL-CCNC: 187 U/L — HIGH (ref 4–40)
BASE EXCESS BLDA CALC-SCNC: -4.1 MMOL/L — SIGNIFICANT CHANGE UP
BILIRUB SERPL-MCNC: 19.8 MG/DL — HIGH (ref 0.2–1.2)
BUN SERPL-MCNC: 51 MG/DL — HIGH (ref 7–23)
BUN SERPL-MCNC: 51 MG/DL — HIGH (ref 7–23)
CALCIUM SERPL-MCNC: 8.5 MG/DL — SIGNIFICANT CHANGE UP (ref 8.4–10.5)
CALCIUM SERPL-MCNC: 8.5 MG/DL — SIGNIFICANT CHANGE UP (ref 8.4–10.5)
CHLORIDE SERPL-SCNC: 115 MMOL/L — HIGH (ref 98–107)
CHLORIDE SERPL-SCNC: 115 MMOL/L — HIGH (ref 98–107)
CO2 SERPL-SCNC: 20 MMOL/L — LOW (ref 22–31)
CO2 SERPL-SCNC: 20 MMOL/L — LOW (ref 22–31)
CREAT SERPL-MCNC: 3.17 MG/DL — HIGH (ref 0.5–1.3)
CREAT SERPL-MCNC: 3.17 MG/DL — HIGH (ref 0.5–1.3)
EBV EA AB TITR SER IF: POSITIVE — SIGNIFICANT CHANGE UP
EBV EA IGG SER-ACNC: POSITIVE — SIGNIFICANT CHANGE UP
EBV PATRN SPEC IB-IMP: SIGNIFICANT CHANGE UP
EBV VCA IGG AVIDITY SER QL IA: POSITIVE — SIGNIFICANT CHANGE UP
EBV VCA IGM TITR FLD: NEGATIVE — SIGNIFICANT CHANGE UP
GLUCOSE SERPL-MCNC: 100 MG/DL — HIGH (ref 70–99)
GLUCOSE SERPL-MCNC: 100 MG/DL — HIGH (ref 70–99)
HCO3 BLDA-SCNC: 22 MMOL/L — SIGNIFICANT CHANGE UP (ref 22–26)
HCT VFR BLD CALC: 23.4 % — LOW (ref 39–50)
HGB BLD-MCNC: 8.4 G/DL — LOW (ref 13–17)
INR BLD: 2.15 — HIGH (ref 0.88–1.17)
MAGNESIUM SERPL-MCNC: 2.2 MG/DL — SIGNIFICANT CHANGE UP (ref 1.6–2.6)
MAGNESIUM SERPL-MCNC: 2.2 MG/DL — SIGNIFICANT CHANGE UP (ref 1.6–2.6)
MCHC RBC-ENTMCNC: 27.1 PG — SIGNIFICANT CHANGE UP (ref 27–34)
MCHC RBC-ENTMCNC: 35.9 % — SIGNIFICANT CHANGE UP (ref 32–36)
MCV RBC AUTO: 75.5 FL — LOW (ref 80–100)
NRBC # FLD: 0.62 K/UL — SIGNIFICANT CHANGE UP (ref 0–0)
NRBC FLD-RTO: 8.1 — SIGNIFICANT CHANGE UP
PCO2 BLDA: 26 MMHG — LOW (ref 35–48)
PH BLDA: 7.47 PH — HIGH (ref 7.35–7.45)
PHOSPHATE SERPL-MCNC: 3 MG/DL — SIGNIFICANT CHANGE UP (ref 2.5–4.5)
PHOSPHATE SERPL-MCNC: 3 MG/DL — SIGNIFICANT CHANGE UP (ref 2.5–4.5)
PLATELET # BLD AUTO: 137 K/UL — LOW (ref 150–400)
PMV BLD: SIGNIFICANT CHANGE UP FL (ref 7–13)
PO2 BLDA: 161 MMHG — HIGH (ref 83–108)
POTASSIUM SERPL-MCNC: 3.5 MMOL/L — SIGNIFICANT CHANGE UP (ref 3.5–5.3)
POTASSIUM SERPL-MCNC: 3.5 MMOL/L — SIGNIFICANT CHANGE UP (ref 3.5–5.3)
POTASSIUM SERPL-SCNC: 3.5 MMOL/L — SIGNIFICANT CHANGE UP (ref 3.5–5.3)
POTASSIUM SERPL-SCNC: 3.5 MMOL/L — SIGNIFICANT CHANGE UP (ref 3.5–5.3)
PROT SERPL-MCNC: 5.7 G/DL — LOW (ref 6–8.3)
PROTHROM AB SERPL-ACNC: 25.1 SEC — HIGH (ref 9.8–13.1)
RBC # BLD: 3.1 M/UL — LOW (ref 4.2–5.8)
RBC # FLD: 28.1 % — HIGH (ref 10.3–14.5)
SAO2 % BLDA: 99.3 % — HIGH (ref 95–99)
SODIUM SERPL-SCNC: 148 MMOL/L — HIGH (ref 135–145)
SODIUM SERPL-SCNC: 148 MMOL/L — HIGH (ref 135–145)
WBC # BLD: 7.7 K/UL — SIGNIFICANT CHANGE UP (ref 3.8–10.5)
WBC # FLD AUTO: 7.7 K/UL — SIGNIFICANT CHANGE UP (ref 3.8–10.5)

## 2019-03-28 PROCEDURE — 71045 X-RAY EXAM CHEST 1 VIEW: CPT | Mod: 26

## 2019-03-28 PROCEDURE — 99223 1ST HOSP IP/OBS HIGH 75: CPT | Mod: GC

## 2019-03-28 PROCEDURE — 99223 1ST HOSP IP/OBS HIGH 75: CPT

## 2019-03-28 PROCEDURE — 99497 ADVNCD CARE PLAN 30 MIN: CPT | Mod: 25

## 2019-03-28 PROCEDURE — 99498 ADVNCD CARE PLAN ADDL 30 MIN: CPT | Mod: 25

## 2019-03-28 RX ORDER — ACETYLCYSTEINE 200 MG/ML
2.7 VIAL (ML) MISCELLANEOUS ONCE
Qty: 0 | Refills: 0 | Status: COMPLETED | OUTPATIENT
Start: 2019-03-28 | End: 2019-03-28

## 2019-03-28 RX ORDER — LACTULOSE 10 G/15ML
10 SOLUTION ORAL
Qty: 0 | Refills: 0 | Status: DISCONTINUED | OUTPATIENT
Start: 2019-03-28 | End: 2019-04-09

## 2019-03-28 RX ORDER — SODIUM CHLORIDE 9 MG/ML
1000 INJECTION, SOLUTION INTRAVENOUS
Qty: 0 | Refills: 0 | Status: DISCONTINUED | OUTPATIENT
Start: 2019-03-28 | End: 2019-03-29

## 2019-03-28 RX ORDER — URSODIOL 250 MG/1
300 TABLET, FILM COATED ORAL
Qty: 0 | Refills: 0 | Status: DISCONTINUED | OUTPATIENT
Start: 2019-03-28 | End: 2019-04-09

## 2019-03-28 RX ORDER — PHYTONADIONE (VIT K1) 5 MG
5 TABLET ORAL ONCE
Qty: 0 | Refills: 0 | Status: COMPLETED | OUTPATIENT
Start: 2019-03-28 | End: 2019-03-28

## 2019-03-28 RX ORDER — ACETYLCYSTEINE 200 MG/ML
5 VIAL (ML) MISCELLANEOUS ONCE
Qty: 0 | Refills: 0 | Status: COMPLETED | OUTPATIENT
Start: 2019-03-28 | End: 2019-03-29

## 2019-03-28 RX ORDER — ERGOCALCIFEROL 1.25 MG/1
50000 CAPSULE ORAL
Qty: 0 | Refills: 0 | Status: DISCONTINUED | OUTPATIENT
Start: 2019-04-02 | End: 2019-04-09

## 2019-03-28 RX ORDER — ACETYLCYSTEINE 200 MG/ML
8 VIAL (ML) MISCELLANEOUS ONCE
Qty: 0 | Refills: 0 | Status: COMPLETED | OUTPATIENT
Start: 2019-03-28 | End: 2019-03-28

## 2019-03-28 RX ADMIN — Medication 128.38 GRAM(S): at 22:49

## 2019-03-28 RX ADMIN — MEROPENEM 100 MILLIGRAM(S): 1 INJECTION INTRAVENOUS at 05:59

## 2019-03-28 RX ADMIN — URSODIOL 300 MILLIGRAM(S): 250 TABLET, FILM COATED ORAL at 00:06

## 2019-03-28 RX ADMIN — Medication 5 MILLIGRAM(S): at 12:35

## 2019-03-28 RX ADMIN — LACTULOSE 10 GRAM(S): 10 SOLUTION ORAL at 22:06

## 2019-03-28 RX ADMIN — LIDOCAINE 1 PATCH: 4 CREAM TOPICAL at 17:25

## 2019-03-28 RX ADMIN — SODIUM CHLORIDE 50 MILLILITER(S): 9 INJECTION, SOLUTION INTRAVENOUS at 12:33

## 2019-03-28 RX ADMIN — LACTULOSE 10 GRAM(S): 10 SOLUTION ORAL at 05:58

## 2019-03-28 RX ADMIN — LIDOCAINE 1 PATCH: 4 CREAM TOPICAL at 12:33

## 2019-03-28 RX ADMIN — MEROPENEM 100 MILLIGRAM(S): 1 INJECTION INTRAVENOUS at 17:44

## 2019-03-28 RX ADMIN — Medication 240 GRAM(S): at 20:12

## 2019-03-28 RX ADMIN — LIDOCAINE 1 PATCH: 4 CREAM TOPICAL at 00:00

## 2019-03-28 RX ADMIN — Medication 1 TABLET(S): at 22:06

## 2019-03-28 NOTE — CONSULT NOTE ADULT - SUBJECTIVE AND OBJECTIVE BOX
HPI:  89 yo M, poor historian, charts reviewed, with PMHx BPH, Cataracts, Diverticulosis, Anemia 2/2 GI Bleed with previous PRBC transfusions, who p/w 2 days genearlized weakness, urinary retention and confusion.  Upon arrival to the ED, pt with scleral icterus and labs significant for transaminitis and TBili > 20 (15 Mar 2019 06:59)    PERTINENT PM/SXH:   Chronic kidney disease (CKD), stage IV (severe)  Benign prostatic hypertrophy  History of BPH  No pertinent past medical history    S/P cataract surgery, left  No significant past surgical history    FAMILY HISTORY:  No pertinent family history in first degree relatives    ITEMS NOT CHECKED ARE NOT PRESENT    SOCIAL HISTORY:   Significant other/partner:  [ ]  Children:  [x ]  Jain/Spirituality:  Substance hx:  [ ]   Tobacco hx:  [ ]   Alcohol hx: [ ]   Home Opioid hx:  [ ] I-Stop Reference No:  Living Situation: [ x]Home  [ ]Long term care  [ ]Rehab [ ]Other    ADVANCE DIRECTIVES:    Full code MOLST  [ ]  Living Will  [ ]   DECISION MAKER(s):  [x ] Health Care Proxy(s)  [ ] Surrogate(s)  [ ] Guardian           Name(s): Phone Number(s):  1. Benitez Cottrell [phone]  2. Portillo Cottrell [phone]  3. Sarah Joyce.     BASELINE (I)ADL(s) (prior to admission):  [place]: [x ]Total  [ ] Moderate [ ]Dependent    Allergies    No Known Allergies    Intolerances    MEDICATIONS  (STANDING):  amoxicillin  500 milliGRAM(s)/clavulanate 1 Tablet(s) Oral two times a day  dextrose 5% 1000 milliLiter(s) (50 mL/Hr) IV Continuous <Continuous>  enoxaparin Injectable 30 milliGRAM(s) SubCutaneous daily  ergocalciferol 97210 Unit(s) Oral every week  lactulose Syrup 10 Gram(s) Oral two times a day  lidocaine   Patch 1 Patch Transdermal daily  meropenem  IVPB 500 milliGRAM(s) IV Intermittent every 12 hours  ondansetron Injectable 4 milliGRAM(s) IV Push once  rifaximin 550 milliGRAM(s) Oral two times a day  ursodiol Capsule 300 milliGRAM(s) Oral two times a day    MEDICATIONS  (PRN):    PRESENT SYMPTOMS: [x ]Unable to obtain due to poor mentation   Source if other than patient:  [ ]Family   [ ]Team     Pain (Impact on QOL):    Location -         Minimal acceptable level (0-10 scale):                    Aggravating factors -  Quality -  Radiation -  Severity (0-10 scale) -    Timing -    PAIN AD Score:     http://[email or web address]/painad.pdf (press ctrl +  left click to view)    Dyspnea:                           [ ]Mild [ ]Moderate [ ]Severe  Anxiety:                             [ ]Mild [ ]Moderate [ ]Severe  Fatigue:                             [ ]Mild [ ]Moderate [ ]Severe  Nausea:                             [ ]Mild [ ]Moderate [ ]Severe  Loss of appetite:              [ ]Mild [ ]Moderate [ ]Severe  Constipation:                    [ ]Mild [ ]Moderate [ ]Severe    Other Symptoms:  [ ]All other review of systems negative     Karnofsky Performance Score/Palliative Performance Status Version 2: 30  %    http://palliative.info/resource_material/PPSv2.pdf    PHYSICAL EXAM:  Vital Signs Last 24 Hrs  T(C): 36.6 (28 Mar 2019 11:10), Max: 36.6 (28 Mar 2019 11:10)  T(F): 97.9 (28 Mar 2019 11:10), Max: 97.9 (28 Mar 2019 11:10)  HR: 76 (28 Mar 2019 11:10) (70 - 81)  BP: 129/80 (28 Mar 2019 11:10) (110/68 - 135/82)  BP(mean): --  RR: 17 (28 Mar 2019 11:10) (16 - 17)  SpO2: 100% (28 Mar 2019 11:10) (98% - 100%) I&O's Summary    27 Mar 2019 07:01  -  28 Mar 2019 07:00  --------------------------------------------------------  IN: 0 mL / OUT: 325 mL / NET: -325 mL    GENERAL:  [ ]Alert  [ ]Oriented x   [ x]Lethargic  [ ]Cachexia  [ ]Unarousable  [ ]Verbal  [x ]Non-Verbal  Behavioral:   [ ] Anxiety  [x ] Delirium [ ] Agitation [ ] Other  HEENT:  [ ]Normal   [x ]Dry mouth   [ ]ET Tube/Trach  [ ]Oral lesions  PULMONARY:   [x ]Clear [ ]Tachypnea  [ ]Audible excessive secretions   [ ]Rhonchi        [ ]Right [ ]Left [ ]Bilateral  [ ]Crackles        [ ]Right [ ]Left [ ]Bilateral  [ ]Wheezing     [ ]Right [ ]Left [ ]Bilateral  CARDIOVASCULAR:    [x ]Regular [ ]Irregular [ ]Tachy  [ ]Yemi [ ]Murmur [ ]Other  GASTROINTESTINAL:  [ x]Soft  [ ]Distended   [x ]+BS  [x ]Non tender [ ]Tender  [ ]PEG [ ]OGT/ NGT  Last BM:   GENITOURINARY:  [ ]Normal [ ] Incontinent   [ ]Oliguria/Anuria   [x ]Oshea  MUSCULOSKELETAL:   [ ]Normal   [ x]Weakness  [ ]Bed/Wheelchair bound [ ]Edema  NEUROLOGIC:   [ ]No focal deficits  [x] Cognitive impairment  [ ] Dysphagia [ ]Dysarthria [ ] Paresis [ ]Other   SKIN:   [x ]Normal   [ ]Pressure ulcer(s)  [ ]Rash    CRITICAL CARE:  [ ] Shock Present  [ ]Septic [ ]Cardiogenic [ ]Neurologic [ ]Hypovolemic  [ ]  Vasopressors [ ]  Inotropes   [ ] Respiratory failure present  [ ] Acute  [ ] Chronic [ ] Hypoxic  [ ] Hypercarbic [ ] Other  [ ] Other organ failure     GRIEF  [ x] Yes  [ ] No    LABS:                        8.4    7.70  )-----------( 137      ( 28 Mar 2019 05:34 )             23.4   03-28    148<H>  |  115<H>  |  51<H>  ----------------------------<  100<H>  3.5   |  20<L>  |  3.17<H>    Ca    8.5      28 Mar 2019 05:34  Phos  3.0     03-28  Mg     2.2     03-28    TPro  5.7<L>  /  Alb  2.2<L>  /  TBili  19.8<H>  /  DBili  x   /  AST  187<H>  /  ALT  146<H>  /  AlkPhos  261<H>  03-28  PT/INR - ( 28 Mar 2019 05:34 )   PT: 25.1 SEC;   INR: 2.15        PTT - ( 28 Mar 2019 05:34 )  PTT:68.5 SEC    RADIOLOGY & ADDITIONAL STUDIES:     < from: NM Hepatobiliary Imaging (03.17.19 @ 12:21) >  IMPRESSION: Abnormal hepatobiliary scan suspicious for common bile duct   obstruction.    Hepatocellular dysfunction.    No evidence of acute cholecystitis.    < from: US Abdomen Limited (03.18.19 @ 07:48) >  IMPRESSION:     Gallbladder is distended with sludgeand stones.     Mural thickening of the wall of the common bile duct, possibly   cholangitis. No biliary ductal dilatation.    < from: CT Abdomen and Pelvis No Cont (03.15.19 @ 09:39) >  MPRESSION:   Distended gallbladder. Consider further evaluation with ultrasound if   acute cholecystitis is a concern.    No discrete mass identified on this limited noncontrast study.    PROTEIN CALORIE MALNUTRITION PRESENT: [ ] Yes [ ] No  [x ] PPSV2 < or = to 30% [ ] significant weight loss  [ ] poor nutritional intake [ ] catabolic state [ ] anasarca     Albumin, Serum: 2.2 g/dL (03-28-19 @ 05:34)  Artificial Nutrition [ ]     REFERRALS:   [x ]Chaplaincy  [ ] Hospice  [ ]Child Life  [ ]Social Work  [ ]Case management [ ]Holistic Therapy   Goals of Care Discussion Document: HPI:  89 yo M, poor historian, charts reviewed, with PMHx BPH, Cataracts, Diverticulosis, Anemia 2/2 GI Bleed with previous PRBC transfusions, who p/w 2 days genearlized weakness, urinary retention and confusion.  Upon arrival to the ED, pt with scleral icterus and labs significant for transaminitis and TBili > 20 (15 Mar 2019 06:59)    PERTINENT PM/SXH:   Chronic kidney disease (CKD), stage IV (severe)  Benign prostatic hypertrophy  History of BPH  No pertinent past medical history    S/P cataract surgery, left  No significant past surgical history    FAMILY HISTORY:  No pertinent family history in first degree relatives    ITEMS NOT CHECKED ARE NOT PRESENT    SOCIAL HISTORY:   Significant other/partner:  [ ]  Children:  [x ]  Hoahaoism/Spirituality:  Substance hx:  [ ]   Tobacco hx:  [ ]   Alcohol hx: [ ]   Home Opioid hx:  [ ] I-Stop Reference No:  Living Situation: [ x]Home  [ ]Long term care  [ ]Rehab [ ]Other    ADVANCE DIRECTIVES:    Full code MOLST  [ ]  Living Will  [ ]   DECISION MAKER(s):  [x ] Health Care Proxy(s)  [ ] Surrogate(s)  [ ] Guardian           Name(s): Phone Number(s):  1. Benitez Cottrell [phone]  2. Portillo Cottrell [phone]  3. Sarah Joyce.     BASELINE (I)ADL(s) (prior to admission):  [place]: [x ]Total  [ ] Moderate [ ]Dependent    Allergies    No Known Allergies    Intolerances    MEDICATIONS  (STANDING):  amoxicillin  500 milliGRAM(s)/clavulanate 1 Tablet(s) Oral two times a day  dextrose 5% 1000 milliLiter(s) (50 mL/Hr) IV Continuous <Continuous>  enoxaparin Injectable 30 milliGRAM(s) SubCutaneous daily  ergocalciferol 33054 Unit(s) Oral every week  lactulose Syrup 10 Gram(s) Oral two times a day  lidocaine   Patch 1 Patch Transdermal daily  meropenem  IVPB 500 milliGRAM(s) IV Intermittent every 12 hours  ondansetron Injectable 4 milliGRAM(s) IV Push once  rifaximin 550 milliGRAM(s) Oral two times a day  ursodiol Capsule 300 milliGRAM(s) Oral two times a day    MEDICATIONS  (PRN):    PRESENT SYMPTOMS: [x ]Unable to obtain due to poor mentation   Source if other than patient:  [ ]Family   [ ]Team     Pain (Impact on QOL):    Location -         Minimal acceptable level (0-10 scale):                    Aggravating factors -  Quality -  Radiation -  Severity (0-10 scale) -    Timing -    PAIN AD Score:     http://geriatrictoolkit.[email or web address]/painad.pdf (press ctrl +  left click to view)    Dyspnea:                           [ ]Mild [ ]Moderate [ ]Severe  Anxiety:                             [ ]Mild [ ]Moderate [ ]Severe  Fatigue:                             [ ]Mild [ ]Moderate [ ]Severe  Nausea:                             [ ]Mild [ ]Moderate [ ]Severe  Loss of appetite:              [ ]Mild [ ]Moderate [ ]Severe  Constipation:                    [ ]Mild [ ]Moderate [ ]Severe    Other Symptoms:  [ ]All other review of systems negative     Karnofsky Performance Score/Palliative Performance Status Version 2: 30  %    http://palliative.info/resource_material/PPSv2.pdf    PHYSICAL EXAM:  Vital Signs Last 24 Hrs  T(C): 36.6 (28 Mar 2019 11:10), Max: 36.6 (28 Mar 2019 11:10)  T(F): 97.9 (28 Mar 2019 11:10), Max: 97.9 (28 Mar 2019 11:10)  HR: 76 (28 Mar 2019 11:10) (70 - 81)  BP: 129/80 (28 Mar 2019 11:10) (110/68 - 135/82)  BP(mean): --  RR: 17 (28 Mar 2019 11:10) (16 - 17)  SpO2: 100% (28 Mar 2019 11:10) (98% - 100%) I&O's Summary    27 Mar 2019 07:01  -  28 Mar 2019 07:00  --------------------------------------------------------  IN: 0 mL / OUT: 325 mL / NET: -325 mL    GENERAL:  [ ]Alert  [ ]Oriented x   [ x]Lethargic  [ ]Cachexia  [ ]Unarousable  [ ]Verbal  [x ]Non-Verbal  Behavioral:   [ ] Anxiety  [x ] Delirium [ ] Agitation [ ] Other  HEENT:  [ ]Normal   [x ]Dry mouth   [ ]ET Tube/Trach  [ ]Oral lesions  PULMONARY:   [x ]Clear [ ]Tachypnea  [ ]Audible excessive secretions   [ ]Rhonchi        [ ]Right [ ]Left [ ]Bilateral  [ ]Crackles        [ ]Right [ ]Left [ ]Bilateral  [ ]Wheezing     [ ]Right [ ]Left [ ]Bilateral  CARDIOVASCULAR:    [x ]Regular [ ]Irregular [ ]Tachy  [ ]Yemi [ ]Murmur [ ]Other  GASTROINTESTINAL:  [ x]Soft  [ ]Distended   [x ]+BS  [x ]Non tender [ ]Tender  [ ]PEG [ ]OGT/ NGT  Last BM:   GENITOURINARY:  [ ]Normal [ ] Incontinent   [ ]Oliguria/Anuria   [x ]Oshea  MUSCULOSKELETAL:   [ ]Normal   [ x]Weakness  [ ]Bed/Wheelchair bound [ ]Edema  NEUROLOGIC:   [ ]No focal deficits  [x] Cognitive impairment  [ ] Dysphagia [ ]Dysarthria [ ] Paresis [ ]Other   SKIN:   [x ]Normal   [ ]Pressure ulcer(s)  [ ]Rash    CRITICAL CARE:  [ ] Shock Present  [ ]Septic [ ]Cardiogenic [ ]Neurologic [ ]Hypovolemic  [ ]  Vasopressors [ ]  Inotropes   [ ] Respiratory failure present  [ ] Acute  [ ] Chronic [ ] Hypoxic  [ ] Hypercarbic [ ] Other  [ ] Other organ failure     GRIEF  [ x] Yes  [ ] No    LABS:                        8.4    7.70  )-----------( 137      ( 28 Mar 2019 05:34 )             23.4   03-28    148<H>  |  115<H>  |  51<H>  ----------------------------<  100<H>  3.5   |  20<L>  |  3.17<H>    Ca    8.5      28 Mar 2019 05:34  Phos  3.0     03-28  Mg     2.2     03-28    TPro  5.7<L>  /  Alb  2.2<L>  /  TBili  19.8<H>  /  DBili  x   /  AST  187<H>  /  ALT  146<H>  /  AlkPhos  261<H>  03-28  PT/INR - ( 28 Mar 2019 05:34 )   PT: 25.1 SEC;   INR: 2.15        PTT - ( 28 Mar 2019 05:34 )  PTT:68.5 SEC    RADIOLOGY & ADDITIONAL STUDIES:     < from: NM Hepatobiliary Imaging (03.17.19 @ 12:21) >  IMPRESSION: Abnormal hepatobiliary scan suspicious for common bile duct   obstruction.    Hepatocellular dysfunction.    No evidence of acute cholecystitis.    < from: US Abdomen Limited (03.18.19 @ 07:48) >  IMPRESSION:     Gallbladder is distended with sludgeand stones.     Mural thickening of the wall of the common bile duct, possibly   cholangitis. No biliary ductal dilatation.    < from: CT Abdomen and Pelvis No Cont (03.15.19 @ 09:39) >    MPRESSION:   Distended gallbladder. Consider further evaluation with ultrasound if   acute cholecystitis is a concern.    No discrete mass identified on this limited noncontrast study.    PROTEIN CALORIE MALNUTRITION PRESENT: [ ] Yes [ ] No  [x ] PPSV2 < or = to 30% [ ] significant weight loss  [ ] poor nutritional intake [ ] catabolic state [ ] anasarca     Albumin, Serum: 2.2 g/dL (03-28-19 @ 05:34)  Artificial Nutrition [ ]     REFERRALS:   [x ]Chaplaincy  [ ] Hospice  [ ]Child Life  [ ]Social Work  [ ]Case management [ ]Holistic Therapy   Goals of Care Discussion Document:

## 2019-03-28 NOTE — CONSULT NOTE ADULT - ASSESSMENT
89 yo M, poor historian, charts reviewed, with PMHx BPH, Cataracts, Diverticulosis, Anemia 2/2 GI Bleed with previous PRBC transfusions, who p/w 2 days genearlized weakness, urinary retention and confusion.  Upon arrival to the ED, pt with scleral icterus and labs significant for transaminitis and TBili > 20. Palliative Care consulted for complex decision making in the setting of liver failure and renal failure.

## 2019-03-28 NOTE — CHART NOTE - NSCHARTNOTEFT_GEN_A_CORE
Recommend:  -empiric NAC  -rule out fungal infection (fungal culture), repeat urine culture  -suggest trial of fluids for HPU  -check urine lytes Recommend:  -empiric NAC  -rule out fungal infection (fungal culture), repeat urine culture (oredered)  -suggest trial of fluids for PHU  -check urine lytes  -check leptosprosis (ordered)  -check HEV IgM Recommend:  -empiric NAC  -rule out fungal infection (fungal culture), repeat urine culture (oredered)  -suggest trial of fluids for PHU  -check urine lytes  -check leptosprosis (ordered)  -check HEV IgM  -consider an NG tube for lactulose

## 2019-03-28 NOTE — PROGRESS NOTE ADULT - ASSESSMENT
89 yo M with PMHx CKD, BPH, Diverticulosis, Anemia 2/2 GI Bleed with previous PRBC transfusions, who p/w 2 days genearlized weakness, urinary retention and confusion, found to have elevated LFTs. Renal following for PHU, CKD management.    PHU on CKD 3 b/l Cr 1.4-1.6 10/2018  PHU intial thought to be pre renal, now w/worsening RFT-most likely 2/2 ATN likely from hyperbilirubinemia.    Cr 2.2>2.6>3   C3 77- low, C4 wnl -noted- likely 2/2 dec synthesis 2/2 liver dz. no e/o acute GN  Urinary retention on CT w/o hydronephrosis. w/flores.   M acidosis- 2/2 PHU- better    Transaminitis and Bilirubinemia s/p liver Bx by IR 3/21 likely drug induced per GI  AMS most liekly 2/2 Hepatic encephalopathy not from uremia- sr NH4 level 100 today. on lactulose -per GI. rec inc dose  HTN, controlled. bp stable. off Norvasc  BPH: c/w flomax    labs, chart reviewed  encourage po intake  f/u GI and hem  d/w neurology over the phone earlier today  overall prognosis poor. consider palliative eval  will f/u

## 2019-03-28 NOTE — CONSULT NOTE ADULT - PROBLEM SELECTOR RECOMMENDATION 2
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 Per GI pt not a candidate for liver transplant. Family would like to discuss this further with hepatology team, primary team made aware.

## 2019-03-28 NOTE — CONSULT NOTE ADULT - SUBJECTIVE AND OBJECTIVE BOX
CHIEF COMPLAINT: worsening encephalopathy and hypothermia     HPI:  89 yo M, poor historian, charts reviewed, with PMHx BPH, Cataracts, Diverticulosis, Anemia 2/2 GI Bleed with previous PRBC transfusions, who p/w 2 days genearlized weakness, urinary retention and confusion.  Upon arrival to the ED, pt with scleral icterus and labs significant for transaminitis and TBili > 20 (15 Mar 2019 06:59)      FAMILY HISTORY:  No pertinent family history in first degree relatives      SOCIAL HISTORY: negative as per H&P, unable to confirm at this time  Smoking: __ packs x ___ years  EtOH Use:  Marital Status:  Occupation:  Recent Travel:  Country of Birth:  Advance Directives:    Allergies    No Known Allergies    Intolerances        HOME MEDICATIONS:    REVIEW OF SYSTEMS:  Constitutional:   Eyes:  ENT:  CV:  Resp:  GI:  :  MSK:  Integumentary:  Neurological:  Psychiatric:  Endocrine:  Hematologic/Lymphatic:  Allergic/Immunologic:  [x ] All other systems negative  [ ] Unable to assess ROS because ________    OBJECTIVE:  ICU Vital Signs Last 24 Hrs  T(C): 35.3 (28 Mar 2019 14:45), Max: 36.6 (28 Mar 2019 11:10)  T(F): 95.6 (28 Mar 2019 14:45), Max: 97.9 (28 Mar 2019 11:10)  HR: 78 (28 Mar 2019 14:45) (70 - 81)  BP: 134/84 (28 Mar 2019 14:45) (110/68 - 135/82)  RR: 16 (28 Mar 2019 14:45) (16 - 17)  SpO2: 97% (28 Mar 2019 14:45) (97% - 100%)        03-27 @ 07:01  -  03-28 @ 07:00  --------------------------------------------------------  IN: 0 mL / OUT: 325 mL / NET: -325 mL      CAPILLARY BLOOD GLUCOSE          PHYSICAL EXAM:  GENERAL: cachectic old male lying comfortably in bed   HEAD:  Atraumatic, Normocephalic  EYES: EOMI, PERRLA, conjunctiva and sclera clear  NECK: Supple, No JVD  CHEST/LUNG: Clear to auscultation bilaterally; No wheeze  HEART: Regular rate and rhythm; No murmurs, rubs, or gallops  ABDOMEN: Soft, Nontender, Nondistended; Bowel sounds present  EXTREMITIES:  2+ Peripheral Pulses, No clubbing, cyanosis, or edema  PSYCH: arousble to verbal stimuli, not able to follow commands   NEUROLOGY: spontaneous movement noted   SKIN: No rashes or lesions      HOSPITAL MEDICATIONS:  MEDICATIONS  (STANDING):  acetylcysteine IVPB 8 Gram(s) IV Intermittent once  acetylcysteine IVPB 2.7 Gram(s) IV Intermittent once  acetylcysteine IVPB 5 Gram(s) IV Intermittent once  amoxicillin  500 milliGRAM(s)/clavulanate 1 Tablet(s) Oral two times a day  dextrose 5% 1000 milliLiter(s) (50 mL/Hr) IV Continuous <Continuous>  ergocalciferol 59304 Unit(s) Oral every week  lactulose Syrup 10 Gram(s) Oral two times a day  lidocaine   Patch 1 Patch Transdermal daily  meropenem  IVPB 500 milliGRAM(s) IV Intermittent every 12 hours  rifaximin 550 milliGRAM(s) Oral two times a day  ursodiol Capsule 300 milliGRAM(s) Oral two times a day    MEDICATIONS  (PRN):      LABS:                        8.4    7.70  )-----------( 137      ( 28 Mar 2019 05:34 )             23.4     03-28    148<H>  |  115<H>  |  51<H>  ----------------------------<  100<H>  3.5   |  20<L>  |  3.17<H>    Ca    8.5      28 Mar 2019 05:34  Phos  3.0     03-28  Mg     2.2     03-28    TPro  5.7<L>  /  Alb  2.2<L>  /  TBili  19.8<H>  /  DBili  x   /  AST  187<H>  /  ALT  146<H>  /  AlkPhos  261<H>  03-28    PT/INR - ( 28 Mar 2019 05:34 )   PT: 25.1 SEC;   INR: 2.15          PTT - ( 28 Mar 2019 05:34 )  PTT:68.5 SEC          MICROBIOLOGY:     RADIOLOGY:  [ ] Reviewed and interpreted by me    EKG:

## 2019-03-28 NOTE — CONSULT NOTE ADULT - ATTENDING COMMENTS
Patient seen and examined.  Agree with above.   -no anginal symptoms  -follow up GI  -further cardiac workup pending GI and clinical course    Rashmi Morrison MD
Thanks for consulting. will closely follow up
Patient was seen and examined with Hepatology team at rounds. Agree with above.   A 89 year-old man  with history of BPH, Diverticular bleed, CKD presented with worsening malaise and confusion found to have acute liver injury with hyperbilirubinemia.   He is s/p liver biopsy which reported DILI vs viral hepatitis such as HEV? (liver biopsy was reviewed with pathologist). He was placed on a short course of steroid without improvement, INR rising, and renal status deteriorating.   Family denies history of use of herbal products or illicit drugs. Outpatient meds of tamsulosin and pantoprazole are less likely the cause of acute cholestatic liver injury.  He is very lethargic.   Will recommend  - IV NAC, continue jose, rifaximin,   -hold steroids, and discontinue non-essential medications,  -lactulose (titrate to 3-5 bm's daily)  -workup for opportunistic infection, viral infection and fungal infection  -workup for PHU (pre-renal vs ATN?)   -trend liver tests and INR  -patient is not a liver transplant candidate if liver fails given advanced age
cachectic elderly frail man with worsening liver failure and hepatitis who presented jaundiced and encephalopathic.  Likely both hepatic and uremic encephalopathy at play here.  He is lying in bed in NAD breathing comfortably; arousable but not able to follow commands; Vital signs have been largely stable with T now 95.6    ABG reportedly on RA (?) 7.47/26/161   c/w respiratory alkalosis and adequate oxygenation although suspect pt was on low flow O2 at the time.  O2 sat on RA is 97%    Encephalopathy, multiple causes incl hepatic and uremic; on lactulose (NGT placed), NAC, not a tpl candidate, not candidate for HD  Oxygenation is adequate; primary respiratory alkalosis on ABG; no additional intervention indicated at this time  If his MS or respiratory status should deteriorate, intubation  would NOT provide a benefit to this patient as it would not alter his prognosis  Chart reviewed; palliative care note appreciated; ongoing GOC discussion with HCP whose wishes appear to contradict pt's living will filled out last year  MICU transfer is not indicated at this time, nor would ICU intervention alter prognosis

## 2019-03-28 NOTE — PROGRESS NOTE ADULT - ASSESSMENT
A 89 yo Male with BPH, Cataracts, Diverticulosis, Anemia 2/2 GI Bleed with previous PRBC transfusions, who presents to the ER for evaluation of  generalized  weakness, urinary retention and confusion. On admission, he found to have elevated bilirubin, LFTS, transaminitis, scleral jaundice. and urinary retention. Oshea catheter has placed with negative Urine analysis. The CT abdomen  and pelvis shows Distended gallbladder. Consider further evaluation with ultrasound if  acute cholecystitis is a concern. No discrete mass identified on this limited noncontrast study. The ID consult requested to assist with evaluation of Biliary sepsis.    # Encephalopathy  # Urinary retention  # R/O biliary sepsis/ Cholangitis - Most likely acute cholestatic hepatitis as per GI -s/p  transjugular liver biopsy with two passes. and found to have small right atrium blood clot. 3/21/19  # Distended GB with cholelithiasis on MRCP 3/18/19  # Hypothermia- Pneumonia on CXR - MRSA PCR is negative   # UTI- Enterococcus faecalis 3/20/19  # Liver biopsy result -   # Encephalopathy- elevated ammonia    would recommend:    1. Monitor  Temp. and c/w supportive care  2. Continue IV Meropenem and discontinue Augmentin  3. Supplemental oxygenation and  bronchodilator as needed  4. Aspiration precaution  5. Monitor kidney function   6. Management of cholestatic hepatitis as per GI        -Prognosis guarded

## 2019-03-28 NOTE — CHART NOTE - NSCHARTNOTEFT_GEN_A_CORE
Pt noted with worsening lethargy today, arousable and alert, not answering any verbal questioning. Per RN, Pt has been given lactulose and had 4 BM overnight and 3 BM today.     VS: T (F): 95.6 (28 Mar 2019 14:45), HR: 78  (70 - 81) BP: 134/84 (110/68 - 135/82) RR: 16 (16 - 17) SpO2: 97% (97% - 100%)   Gen: Lethargic, alert, opens eyes, not answering verbal questioning  RRR +S1S2  CTA b/l, no w/r/r  Abd: soft, NT/ND  LE no edema or cyanosis    TELE: NSR HR 70s, burst PAT up to HR 150bpm~7am this morning lasting 2 minutes                          8.4    7.70  )-----------( 137      ( 28 Mar 2019 05:34 )             23.4   03-28    148<H>  |  115<H>  |  51<H>  ----------------------------<  100<H>  3.5   |  20<L>  |  3.17<H>    Ca    8.5      28 Mar 2019 05:34  Phos  3.0     03-28  Mg     2.2     03-28    TPro  5.7<L>  /  Alb  2.2<L>  /  TBili  19.8<H>  /  DBili  x   /  AST  187<H>  /  ALT  146<H>  /  AlkPhos  261<H>  03-28    89yo male with PMHx GI bleed, CKD p/w generalized weakness and urinary retention, found to have PHU on CKD (renal following), acute liver injury with cholestatic pattern suspected drug induced etiology (hepatology following), hypothermia secondary to UTI/concern for PNA on chest imaging on IV Meropenem (ID following), metabolic encephalopathy in setting of infection, acute liver injury, acute renal failure, now with worsening lethargy.  1) Lethargy, multifactorial: Case discussed with Dr. Branham, palliative care consult called as Pt overall prognosis is poor. Following palliative care discussion with HCP at bedside, Benitez, requested that Pt remain full code. Renal input appreciated, case discussed with Dr. Beard, recommended continue with D5W with sodium bicarbonate, check BMP daily. Hepatology follow up appreciated and discussion had with family as well, recommend empiric NAC and NGT to administer lactulose as Pt unable to tolerate PO at this time. Case discussed with HCP Benitez, who agrees to NGT placement to administer lactulose. Will keep NPO for now pending mental status improvement. NGT placed without complication, CXR ordered to confirm NGT in place. ID following, continue with meropenem in setting of hypothermia secondary to UTI/PNA. Following discussion with palliative care, renal and hepatology, I again discussed case with Dr. Branham who recommended MICU consult at this time, called. MICU resident recommended ABG which HCP also agreed to, will follow up results. Will continue to monitor closely, f/u further recommendations.

## 2019-03-28 NOTE — PROCEDURE NOTE - NSURITECHNIQUE_GU_A_CORE
The urinary drainage system is closed at the end of the procedure/Proper hand hygiene was performed/The site was cleaned with soap/water and sterile solution (betadine)/The catheter was secured with a securement device (e.g. StatLock)/The collection bag is below the level of the patient and urinary bladder/All applicable medical record documentation is completed/Sterile gloves were worn for the duration of the procedure/A sterile drape was used to cover all adjacent areas/The catheter was appropriately lubricated

## 2019-03-28 NOTE — PROGRESS NOTE ADULT - SUBJECTIVE AND OBJECTIVE BOX
S: no chest pain or SOB     MEDICATIONS  (STANDING):  amoxicillin  500 milliGRAM(s)/clavulanate 1 Tablet(s) Oral two times a day  dextrose 5% 1000 milliLiter(s) (50 mL/Hr) IV Continuous <Continuous>  enoxaparin Injectable 30 milliGRAM(s) SubCutaneous daily  ergocalciferol 50233 Unit(s) Oral every week  lactulose Syrup 10 Gram(s) Oral two times a day  lidocaine   Patch 1 Patch Transdermal daily  meropenem  IVPB 500 milliGRAM(s) IV Intermittent every 12 hours  ondansetron Injectable 4 milliGRAM(s) IV Push once  rifaximin 550 milliGRAM(s) Oral two times a day  ursodiol Capsule 300 milliGRAM(s) Oral two times a day    LABS:                    8.4    7.70  )-----------( 137      ( 28 Mar 2019 05:34 )             23.4     148<H>  |  115<H>  |  51<H>  ----------------------------<  100<H>  3.5   |  20<L>  |  3.17<H>    Ca    8.5      28 Mar 2019 05:34  Phos  3.0     03-28  Mg     2.2     03-28    TPro  5.7<L>  /  Alb  2.2<L>  /  TBili  19.8<H>  /  DBili  x   /  AST  187<H>  /  ALT  146<H>  /  AlkPhos  261<H>  03-28    Creatinine Trend: 3.17<--, 2.98<--, 2.64<--, 2.18<--, 2.08<--, 2.07<--   PT/INR - ( 28 Mar 2019 05:34 )   PT: 25.1 SEC;   INR: 2.15     PTT - ( 28 Mar 2019 05:34 )  PTT:68.5 SEC    PHYSICAL EXAM  Vital Signs Last 24 Hrs  T(C): 36.6 (28 Mar 2019 11:10), Max: 36.6 (28 Mar 2019 11:10)  T(F): 97.9 (28 Mar 2019 11:10), Max: 97.9 (28 Mar 2019 11:10)  HR: 76 (28 Mar 2019 11:10) (70 - 81)  BP: 129/80 (28 Mar 2019 11:10) (110/68 - 135/82)  RR: 17 (28 Mar 2019 11:10) (16 - 17)  SpO2: 100% (28 Mar 2019 11:10) (98% - 100%)    Heart: normal S1, S2, RRR, no m/r/g  Lungs: cta b/l  Abd: soft nT  Ext: no edema     TELEMETRY: SR	60's    RADIOLOGY:  < from: Xray Chest 1 View- PORTABLE-Urgent (03.20.19 @ 09:37) >  FINDINGS/  IMPRESSION:    Patchy opacity within the right lower lung may represent pneumonia.   Subsegmental atelectasis at the left base. No pleural effusion or   pneumothorax. No pulmonary edema.    The cardiac silhouette remains enlarged. Aortic aneurysm. Recommend   further evaluation with aortogram.    < end of copied text >    < from: Transthoracic Echocardiogram (03.23.19 @ 09:36) >  CONCLUSIONS:  1. Mitral annular calcification, otherwise normal mitral  valve. Mild mitral regurgitation.  2. Aortic valve leaflet morphology not well visualized;  appears calcified with normal opening. Moderate aortic  regurgitation.  3. Normal left ventricular internal dimensions and wall  thicknesses.  4. Hyperdynamic left ventricle.  5. Mild diastolic dysfunction (Stage I).  6. Normal right ventricular size and function.  7. Normal tricuspid valve. Mild tricuspid regurgitation.  8. Estimated pulmonary artery systolic pressure equals 36  mm Hg, assuming right atrial pressure equals 10  mm Hg,  consistent with borderline pulmonary hypertension.  *** Compared with echocardiogram of 6/11/2018,no  significant changes noted.  ------------------------------------------------------------------------  Confirmed on  3/23/2019 - 11:19:58 by Evaristo Castanon MD, FAC,  TAMMY, RPVI    < end of copied text >      ASSESSMENT/PLAN: 	    89 yo M, poor historian, charts reviewed, with PMHx BPH, Cataracts, Diverticulosis, Anemia 2/2 GI Bleed with previous PRBC transfusions, who p/w 2 days generalized  weakness, urinary retention and confusion. LFTs significant for transaminitis, scleral jaundice.     -pt. tolerated biopsy well in terms of cv perspective  -no clinical heart failure or anginal symptoms  -TTE with moderate AI and normal LV function - medical management of valvular disease recommended  -CT chest with 4.5cm ascending aortic aneurysm - can start metoprolol 12.5 mg PO bid   -follow up GI

## 2019-03-28 NOTE — PROCEDURE NOTE - ADDITIONAL PROCEDURE DETAILS
Urology called for assistance with difficult flores placement for patient in urinary retention.  18F Coude placed without difficulty.  Dark yellow urine present, no hematuria.  Please note a 30cc balloon is in place  Monitor for postobstructive diuresis

## 2019-03-28 NOTE — CONSULT NOTE ADULT - PROBLEM SELECTOR RECOMMENDATION 4
Pt is full code. Has HCP and living will done on 8/2018. In living will pt states that if he is unable to make his own decisions, has a terminal or irreversible illness that he would like to be made comfortable and DNR/DNI. However pts HCP who we met with today are not ready to make any decisions, for now pt continues to be full code, until HCPs are able to have more information from specialists and primary team.

## 2019-03-28 NOTE — PROGRESS NOTE ADULT - SUBJECTIVE AND OBJECTIVE BOX
Patient seen and examined at bedside  hypothermic overnight  Case discussed with medical team    HPI:  HPI:  89 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  Upon arrival to the ED, pt with scleral icterus and labs significant for transaminitis and TBili > 20 (15 Mar 2019 06:59)      PAST MEDICAL & SURGICAL HISTORY:  Chronic kidney disease (CKD), stage IV (severe)  Benign prostatic hypertrophy  S/P cataract surgery, left: 3 yrs ago      No Known Allergies       MEDICATIONS  (STANDING):  amoxicillin  500 milliGRAM(s)/clavulanate 1 Tablet(s) Oral two times a day  dextrose 5% + sodium chloride 0.45% 1000 milliLiter(s) (50 mL/Hr) IV Continuous <Continuous>  dextrose 5% + sodium chloride 0.45%. 1000 milliLiter(s) (50 mL/Hr) IV Continuous <Continuous>  enoxaparin Injectable 30 milliGRAM(s) SubCutaneous daily  ergocalciferol 68049 Unit(s) Oral every week  lactulose Syrup 10 Gram(s) Oral two times a day  lidocaine   Patch 1 Patch Transdermal daily  meropenem  IVPB 500 milliGRAM(s) IV Intermittent every 12 hours  ondansetron Injectable 4 milliGRAM(s) IV Push once  phytonadione   Solution 5 milliGRAM(s) Oral once  rifaximin 550 milliGRAM(s) Oral two times a day  ursodiol Capsule 300 milliGRAM(s) Oral two times a day    MEDICATIONS  (PRN):      REVIEW OF SYSTEMS: limited 2/2 mental status of pt    T(C): 36.3 (03-28-19 @ 07:10), Max: 36.4 (03-27-19 @ 20:00)  HR: 81 (03-28-19 @ 07:10) (67 - 81)  BP: 125/77 (03-28-19 @ 07:10) (110/68 - 137/79)  RR: 16 (03-28-19 @ 07:10) (16 - 17)  SpO2: 99% (03-28-19 @ 07:10) (98% - 100%)    PHYSICAL EXAMINATION:   Constitutional: ill appearing  HEENT: bitemp wsting, dry oral mcuosa  Neck:  Supple  Respiratory:  Adequate airflow b/l. Not using accessory muscles of respiration.  Cardiovascular:  S1 & S2 intact, no R/G, 2+ radial pulses b/l  Gastrointestinal: Soft, ND, normoactive b.s., no organomegaly/RT/rigidity  Extremities: poor skin elasticity and turgor.   : flores intact.   Neurological:  confused, lethargic, disoriented.      Labs and imaging reviewed    LABS:                        8.4    7.70  )-----------( 137      ( 28 Mar 2019 05:34 )             23.4     03-28    148<H>  |  115<H>  |  51<H>  ----------------------------<  100<H>  3.5   |  20<L>  |  3.17<H>    Ca    8.5      28 Mar 2019 05:34  Phos  3.0     03-28  Mg     2.2     03-28    TPro  5.7<L>  /  Alb  2.2<L>  /  TBili  19.8<H>  /  DBili  x   /  AST  187<H>  /  ALT  146<H>  /  AlkPhos  261<H>  03-28        PT/INR - ( 28 Mar 2019 05:34 )   PT: 25.1 SEC;   INR: 2.15          PTT - ( 28 Mar 2019 05:34 )  PTT:68.5 SEC    CAPILLARY BLOOD GLUCOSE            LIVER FUNCTIONS - ( 28 Mar 2019 05:34 )  Alb: 2.2 g/dL / Pro: 5.7 g/dL / ALK PHOS: 261 u/L / ALT: 146 u/L / AST: 187 u/L / GGT: x               RADIOLOGY & ADDITIONAL STUDIES:

## 2019-03-28 NOTE — PROGRESS NOTE ADULT - PROBLEM SELECTOR PLAN 1
Cr very slightly inc, 2.98 to 3.17, Hco3 betterSlight hypernatremia.  urine output low  lungs clear  slight ascites, sacral edema related to low Alb  change IV fluids to D5/w with 75 meq Nahco3, cont at 75 cc hrly  no indication for  Urgent Dialysis  poor prognosis, multi-organ failure,advanced liver Dis, cachexia, old age  will strongly recommend Palliative care   check urine foe Na,Osmo  no indication for dialysis at this time. Pt would be a poor candidate if indicated   flores removed for TOV today. monitor U/O.   resume IVF -d51/2NS w/75meq Nabicarb @50ml/hr  avoid ACEi/ARB/NSAIDs/nephrotoxics  dose all meds for eGFR <15ml/min  monitor BMP, LFTs daily

## 2019-03-28 NOTE — PROGRESS NOTE ADULT - SUBJECTIVE AND OBJECTIVE BOX
PASCUAL OLSEN:[id number],   89yMale followed for:  No Known Allergies    PAST MEDICAL & SURGICAL HISTORY:  Chronic kidney disease (CKD), stage IV (severe)  Benign prostatic hypertrophy  S/P cataract surgery, left: 3 yrs ago    FAMILY HISTORY:  No pertinent family history in first degree relatives    MEDICATIONS  (STANDING):  amoxicillin  500 milliGRAM(s)/clavulanate 1 Tablet(s) Oral two times a day  dextrose 5% + sodium chloride 0.45% 1000 milliLiter(s) (50 mL/Hr) IV Continuous <Continuous>  dextrose 5% + sodium chloride 0.45%. 1000 milliLiter(s) (50 mL/Hr) IV Continuous <Continuous>  enoxaparin Injectable 30 milliGRAM(s) SubCutaneous daily  ergocalciferol 82112 Unit(s) Oral every week  lactulose Syrup 10 Gram(s) Oral two times a day  lidocaine   Patch 1 Patch Transdermal daily  meropenem  IVPB 500 milliGRAM(s) IV Intermittent every 12 hours  ondansetron Injectable 4 milliGRAM(s) IV Push once  phytonadione   Solution 5 milliGRAM(s) Oral once  rifaximin 550 milliGRAM(s) Oral two times a day  ursodiol Capsule 300 milliGRAM(s) Oral two times a day    MEDICATIONS  (PRN):      Vital Signs Last 24 Hrs  T(C): 36.3 (28 Mar 2019 07:10), Max: 36.4 (27 Mar 2019 20:00)  T(F): 97.3 (28 Mar 2019 07:10), Max: 97.5 (27 Mar 2019 20:00)  HR: 81 (28 Mar 2019 07:10) (67 - 81)  BP: 125/77 (28 Mar 2019 07:10) (110/68 - 137/79)  BP(mean): --  RR: 16 (28 Mar 2019 07:10) (16 - 17)  SpO2: 99% (28 Mar 2019 07:10) (98% - 100%)  nc/at  s1s2  cta  soft, nt, nd no guarding or rebound  no c/c/e    CBC Full  -  ( 28 Mar 2019 05:34 )  WBC Count : 7.70 K/uL  RBC Count : 3.10 M/uL  Hemoglobin : 8.4 g/dL  Hematocrit : 23.4 %  Platelet Count - Automated : 137 K/uL  Mean Cell Volume : 75.5 fL  Mean Cell Hemoglobin : 27.1 pg  Mean Cell Hemoglobin Concentration : 35.9 %  Auto Neutrophil # : x  Auto Lymphocyte # : x  Auto Monocyte # : x  Auto Eosinophil # : x  Auto Basophil # : x  Auto Neutrophil % : x  Auto Lymphocyte % : x  Auto Monocyte % : x  Auto Eosinophil % : x  Auto Basophil % : x    03-28    148<H>  |  115<H>  |  51<H>  ----------------------------<  100<H>  3.5   |  20<L>  |  3.17<H>    Ca    8.5      28 Mar 2019 05:34  Phos  3.0     03-28  Mg     2.2     03-28    TPro  5.7<L>  /  Alb  2.2<L>  /  TBili  19.8<H>  /  DBili  x   /  AST  187<H>  /  ALT  146<H>  /  AlkPhos  261<H>  03-28    PT/INR - ( 28 Mar 2019 05:34 )   PT: 25.1 SEC;   INR: 2.15          PTT - ( 28 Mar 2019 05:34 )  PTT:68.5 SEC

## 2019-03-28 NOTE — PROGRESS NOTE ADULT - SUBJECTIVE AND OBJECTIVE BOX
Pt seen and examined at bedside  ' please leave me alone'  not answering any questions  Had Flores cath placed by Urology  as per Nurses, not eating, drinking      Allergies:  No Known Allergies    Hospital Medications:   MEDICATIONS  (STANDING):  amoxicillin  500 milliGRAM(s)/clavulanate 1 Tablet(s) Oral two times a day  dextrose 5% + sodium chloride 0.45% 1000 milliLiter(s) (50 mL/Hr) IV Continuous <Continuous>  dextrose 5% + sodium chloride 0.45%. 1000 milliLiter(s) (50 mL/Hr) IV Continuous <Continuous>  enoxaparin Injectable 30 milliGRAM(s) SubCutaneous daily  ergocalciferol 10402 Unit(s) Oral every week  lactulose Syrup 10 Gram(s) Oral two times a day  lidocaine   Patch 1 Patch Transdermal daily  meropenem  IVPB 500 milliGRAM(s) IV Intermittent every 12 hours  ondansetron Injectable 4 milliGRAM(s) IV Push once  phytonadione   Solution 5 milliGRAM(s) Oral once  rifaximin 550 milliGRAM(s) Oral two times a day  ursodiol Capsule 300 milliGRAM(s) Oral two times a day    REVIEW OF SYSTEMS:  unable to obtain    VITALS:  T(F): 97.3 (03-28-19 @ 07:10), Max: 97.5 (03-27-19 @ 20:00)  HR: 81 (03-28-19 @ 07:10)  BP: 125/77 (03-28-19 @ 07:10)  RR: 16 (03-28-19 @ 07:10)  SpO2: 99% (03-28-19 @ 07:10)  Wt(kg): --    03-27 @ 07:01  -  03-28 @ 07:00  --------------------------------------------------------  IN: 0 mL / OUT: 325 mL / NET: -325 mL        PHYSICAL EXAM:  Constitutional: NAD, cachectic, jaundiced  HEENT:icteric  sclera, oropharynx clear, MMM  Neck: No JVD  Respiratory: CTAB, no wheezes, rales or rhonchi. poor respiratory effort  Cardiovascular: S1, S2, RRR  Gastrointestinal: BS+, soft, NT/ND, +ve ascites  Extremities: No cyanosis or clubbing. trace  peripheral edema, +ve sacral edema  Neurological: A/O x 3, no focal deficits  Psychiatric: Normal mood, normal affect  : No CVA tenderness. No flores.   Skin: No rashes, Jaundiced       LABS:  03-28    148<H>  |  115<H>  |  51<H>  ----------------------------<  100<H>  3.5   |  20<L>  |  3.17<H>    Ca    8.5      28 Mar 2019 05:34  Phos  3.0     03-28  Mg     2.2     03-28    TPro  5.7<L>  /  Alb  2.2<L>  /  TBili  19.8<H>  /  DBili      /  AST  187<H>  /  ALT  146<H>  /  AlkPhos  261<H>  03-28    Creatinine Trend: 3.17 <--, 2.98 <--, 2.64 <--, 2.18 <--, 2.08 <--, 2.07 <--, 1.91 <--                        8.4    7.70  )-----------( 137      ( 28 Mar 2019 05:34 )             23.4     Urine Studies:      RADIOLOGY & ADDITIONAL STUDIES:

## 2019-03-28 NOTE — PROGRESS NOTE ADULT - ASSESSMENT
pt somulent, but arousable. prognosis poor. pt with acute infection, advanced age, limited mobility. no indication for transfer to tranplant center.  spoke with family who understands and agree with conservative managmetn.  urosdiol.  abx per id.

## 2019-03-28 NOTE — PROGRESS NOTE ADULT - ASSESSMENT
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 rx per gi/hepatology. trend labs.        - not a candidate for liver transplantation      - safely assist pt in oral consumption/diet      - all consultants management appreciated      - adjust management accordingly   -> ARF:      - worsening, ?Hemodialysis      - ivf, adjust management per nephro. Avoid nephrotoxic rx, strict i/o  -> Acute Cystitis:      - abx per ID  -> Thrombocytopenia:      - 2/2 liver dysfunction, correct gi  -> INR Elevation:      -  2/2 livery dysfunction and vitamin k deficiency      -  vit k       - monitor inr and clinical status  -> Metabolic Encephalopathy:      - stable, treat underlying gi condition      - neuro checks       - fall precautions    -> Need for prophylactic measure:      - dvt/gi ppx  -> Severe Protein Calorie Malnutrition:      - supplement diet

## 2019-03-28 NOTE — CONSULT NOTE ADULT - ASSESSMENT
89 yo M, poor historian, charts reviewed, with PMHx BPH, Cataracts, Diverticulosis, Anemia 2/2 GI Bleed with previous PRBC transfusions, who p/w 2 days genearlized weakness, urinary retention and confusion.  admitted for UTI and pneumonia. Also noted to have liver failure s/p liver biopsy suggestive of drug toxicity - not candidate for transplant with poor prognosis - on lactulose noted to have worsening mental status since yesterday with hypothermia. Patient was on levoflox for UTI and ?pneumonia which was switched to meropenum after ID recommendations. 89 yo M, poor historian, charts reviewed, with PMHx BPH, Cataracts, Diverticulosis, Anemia 2/2 GI Bleed with previous PRBC transfusions, who p/w 2 days genearlized weakness, urinary retention and confusion.  admitted for UTI and pneumonia. Also noted to have liver failure s/p liver biopsy suggestive of drug toxicity - not candidate for transplant with poor prognosis - on lactulose noted to have worsening mental status since yesterday with hypothermia. Patient was on levoflox for UTI and ?pneumonia which was switched to meropenum after ID recommendations.     #Neuro:  encephalopathic - likely toxic metabolic form worsening liver failure vs infectious   with liver failure from drug toxicity as per biopsy     #Resp  - intubated after pt desaturated to 70s in the ED  - CXR bilateral airspace opacities may represent pneumonia in the appropriate clinical context. Small left pleural effusion.  - c/w Zosyn+VCM for healthcare associated pneumonia  - RVP negative  - f/u Ux, Bx, Sputum Cx  - Vent settings 16/450/5. Cont MV    #CV:  -HR 61, /57 on Levo 0.4 mcg/kg/min  - BNP 10,648, respiratory distress mainly due to PNA but also complicated with underlying/worsening CHF  - lasix 40 mg BID, may consider starting inotropic agents  - chronic Afib, on Coumadin 2mg, INR 6.51 at admission  - d/c home dose amiodalone at this time per CCU suggestion  - hold for Coumadin, f/u INR will re-start coumadin  - hypotensive, 2/2 sepsis, on Levo, lactate up trend from 2.8 to 3.4, f/u lactate  - c/w digoxyn  - f/u EKG.   - will TTE. F/u probnp  - f/u cardiac enzyme q8      #GI  - NPO for now  - s/p intubation, will OGT and start tube feeding    #Renal:   - hypernatrium 153, possibly 2/2 to dehydration in the setting of PNA/sepsis.  - D5, modest fluid resuscitation given CHF and low EF  - f/u urine output monitoring  - Cr 1.73, baseline 1.5-1.6, likely PHU and underlying CKD    #ID:    UTI - urine culture suggestive of enterococcus fecalis was on levoflox   - continue on antibiotics as per ID     #Heme:   - will re-start Coumadin, f/u INR  - DVT prophylaxis with heparin SQ TID, given PHU    #Endo:   - no acute issues  - on sliding scale    #Prophylaxis  DVT prophylaxis: SQH  Code status: Full Code 89 yo M, poor historian, charts reviewed, with PMHx BPH, Cataracts, Diverticulosis, Anemia 2/2 GI Bleed with previous PRBC transfusions, who p/w 2 days genearlized weakness, urinary retention and confusion.  admitted for UTI and pneumonia. Also noted to have liver failure s/p liver biopsy suggestive of drug toxicity - not candidate for transplant with poor prognosis - on lactulose noted to have worsening mental status since yesterday with hypothermia. Patient was on levoflox for UTI and ?pneumonia which was switched to meropenum after ID recommendations.     #Neuro:  encephalopathic - likely toxic metabolic form worsening liver failure vs infectious   with liver failure from drug toxicity as per biopsy   continue lactulose to 2-3 soft BM   - last ammonia level yesterday was 100- repeat ammonia level   - poor prognosis     #Resp  no active issue   saturating >96% on room air with out any distress     #CV:  no active issue       #GI  drug induced cholestatic hepaitis as per liver biopsy   - increased INR to 2.15- on vitamin K   - started on empiric NAC as per hepatolgy recommendations   - not candidate for transplant  - poor prognosis - family understands and aggress with conservative management     #Renal:   PHU 2/2 ATN   Cr slowly trending up   electrolytes stable - no need for dialysis at this time   Nephro follow up       #ID:    UTI - urine culture suggestive of enterococcus fecalis was on levoflox   - continue on antibiotics as per ID     #Heme:   - anemia 2/2 GI bleeding - Hb now stable   - thrombocytopenia 2/2 liver dysfunction     #Endo:   - no acute issues    #Prophylaxis  DVT prophylaxis: not on chemical anticoagulation 2/2 GI bleed   Code status: Full Code 89 yo M, poor historian, charts reviewed, with PMHx BPH, Cataracts, Diverticulosis, Anemia 2/2 GI Bleed with previous PRBC transfusions, who p/w 2 days genearlized weakness, urinary retention and confusion.  admitted for UTI and pneumonia. Also noted to have liver failure s/p liver biopsy suggestive of drug toxicity - not candidate for transplant with poor prognosis - on lactulose noted to have worsening mental status since yesterday with hypothermia. Patient was on levoflox for UTI and ?pneumonia which was switched to meropenum after ID recommendations.     Patient is stable at this time and does not need ICU level of care at this time.     #Neuro:  encephalopathic - likely toxic metabolic form worsening liver failure vs infectious   with liver failure from drug toxicity as per biopsy (as per documents no results updated )   continue lactulose to 2-3 soft BM   - last ammonia level yesterday was 100- repeat ammonia level   - poor prognosis given endstage liver disease   - continue lactulose  - ABG - WNL    #Resp  no active issue   saturating >96% on room air with out any distress     #CV:  no active issue       #GI  drug induced cholestatic hepaitis as per liver biopsy   - increased INR to 2.15- on vitamin K   - started on empiric NAC as per hepatolgy recommendations   - not candidate for transplant  - poor prognosis - family understands and aggress with conservative management     #Renal:   PHU 2/2 ATN   Cr slowly trending up   electrolytes stable - no need for dialysis at this time   Nephro follow up       #ID:    UTI - urine culture suggestive of enterococcus fecalis was on levoflox   - continue on antibiotics as per ID     #Heme:   - anemia 2/2 GI bleeding - Hb now stable   - thrombocytopenia 2/2 liver dysfunction     #Endo:   - no acute issues    #Prophylaxis  DVT prophylaxis: not on chemical anticoagulation 2/2 GI bleed   Code status: Full Code

## 2019-03-28 NOTE — PROGRESS NOTE ADULT - ATTENDING COMMENTS
Patient seen and examined.  Agree with above.   No further inpatient cardiac workup needed at this time.   Can start low dose beta blockers for thoracic aneurysm if bp allows  follow up palliative care    Rashmi Morrison MD

## 2019-03-28 NOTE — CONSULT NOTE ADULT - PROBLEM SELECTOR RECOMMENDATION 5
Spent 45 minutes of face to face discussion addressing advanced directives and advanced care planning.

## 2019-03-29 DIAGNOSIS — Z71.89 OTHER SPECIFIED COUNSELING: ICD-10-CM

## 2019-03-29 DIAGNOSIS — G93.41 METABOLIC ENCEPHALOPATHY: ICD-10-CM

## 2019-03-29 LAB
ALBUMIN SERPL ELPH-MCNC: 2.1 G/DL — LOW (ref 3.3–5)
ALP SERPL-CCNC: 266 U/L — HIGH (ref 40–120)
ALT FLD-CCNC: 146 U/L — HIGH (ref 4–41)
AMMONIA BLD-MCNC: 76 UMOL/L — HIGH (ref 11–55)
ANION GAP SERPL CALC-SCNC: 20 MMO/L — HIGH (ref 7–14)
APTT BLD: 64 SEC — HIGH (ref 27.5–36.3)
AST SERPL-CCNC: 170 U/L — HIGH (ref 4–40)
BASE EXCESS BLDA CALC-SCNC: -4.2 MMOL/L — SIGNIFICANT CHANGE UP
BILIRUB SERPL-MCNC: 20.4 MG/DL — HIGH (ref 0.2–1.2)
BLD GP AB SCN SERPL QL: NEGATIVE — SIGNIFICANT CHANGE UP
BLOOD GAS ARTERIAL - FIO2: 21 — SIGNIFICANT CHANGE UP
BUN SERPL-MCNC: 53 MG/DL — HIGH (ref 7–23)
C-ANCA SER-ACNC: NEGATIVE — SIGNIFICANT CHANGE UP
CALCIUM SERPL-MCNC: 8.6 MG/DL — SIGNIFICANT CHANGE UP (ref 8.4–10.5)
CHLORIDE SERPL-SCNC: 111 MMOL/L — HIGH (ref 98–107)
CMV DNA CSF QL NAA+PROBE: 165 IU/ML — HIGH
CMV DNA SPEC NAA+PROBE-LOG#: 2.22 LOGIU/ML — HIGH
CO2 SERPL-SCNC: 16 MMOL/L — LOW (ref 22–31)
CREAT SERPL-MCNC: 3.27 MG/DL — HIGH (ref 0.5–1.3)
GLUCOSE SERPL-MCNC: 113 MG/DL — HIGH (ref 70–99)
HCO3 BLDA-SCNC: 21 MMOL/L — LOW (ref 22–26)
HCT VFR BLD CALC: 23.7 % — LOW (ref 39–50)
HGB BLD-MCNC: 8.3 G/DL — LOW (ref 13–17)
INR BLD: 2.46 — HIGH (ref 0.88–1.17)
MAGNESIUM SERPL-MCNC: 2.2 MG/DL — SIGNIFICANT CHANGE UP (ref 1.6–2.6)
MCHC RBC-ENTMCNC: 27.3 PG — SIGNIFICANT CHANGE UP (ref 27–34)
MCHC RBC-ENTMCNC: 35 % — SIGNIFICANT CHANGE UP (ref 32–36)
MCV RBC AUTO: 78 FL — LOW (ref 80–100)
NRBC # FLD: 1.81 K/UL — SIGNIFICANT CHANGE UP (ref 0–0)
NRBC FLD-RTO: 19.8 — SIGNIFICANT CHANGE UP
P-ANCA SER-ACNC: NEGATIVE — SIGNIFICANT CHANGE UP
PCO2 BLDA: 29 MMHG — LOW (ref 35–48)
PH BLDA: 7.44 PH — SIGNIFICANT CHANGE UP (ref 7.35–7.45)
PHOSPHATE SERPL-MCNC: 2.6 MG/DL — SIGNIFICANT CHANGE UP (ref 2.5–4.5)
PLATELET # BLD AUTO: 111 K/UL — LOW (ref 150–400)
PMV BLD: SIGNIFICANT CHANGE UP FL (ref 7–13)
PO2 BLDA: 156 MMHG — HIGH (ref 83–108)
POTASSIUM SERPL-MCNC: 3 MMOL/L — LOW (ref 3.5–5.3)
POTASSIUM SERPL-SCNC: 3 MMOL/L — LOW (ref 3.5–5.3)
PROT SERPL-MCNC: 5.7 G/DL — LOW (ref 6–8.3)
PROTHROM AB SERPL-ACNC: 28.8 SEC — HIGH (ref 9.8–13.1)
RBC # BLD: 3.04 M/UL — LOW (ref 4.2–5.8)
RBC # FLD: 29.4 % — HIGH (ref 10.3–14.5)
RH IG SCN BLD-IMP: POSITIVE — SIGNIFICANT CHANGE UP
SAO2 % BLDA: 99.2 % — HIGH (ref 95–99)
SODIUM SERPL-SCNC: 147 MMOL/L — HIGH (ref 135–145)
WBC # BLD: 9.13 K/UL — SIGNIFICANT CHANGE UP (ref 3.8–10.5)
WBC # FLD AUTO: 9.13 K/UL — SIGNIFICANT CHANGE UP (ref 3.8–10.5)

## 2019-03-29 PROCEDURE — 99233 SBSQ HOSP IP/OBS HIGH 50: CPT | Mod: GC

## 2019-03-29 RX ORDER — POTASSIUM CHLORIDE 20 MEQ
40 PACKET (EA) ORAL EVERY 4 HOURS
Qty: 0 | Refills: 0 | Status: COMPLETED | OUTPATIENT
Start: 2019-03-29 | End: 2019-03-29

## 2019-03-29 RX ORDER — SODIUM CHLORIDE 9 MG/ML
1000 INJECTION, SOLUTION INTRAVENOUS
Qty: 0 | Refills: 0 | Status: DISCONTINUED | OUTPATIENT
Start: 2019-03-29 | End: 2019-04-03

## 2019-03-29 RX ADMIN — LIDOCAINE 1 PATCH: 4 CREAM TOPICAL at 17:14

## 2019-03-29 RX ADMIN — Medication 64.06 GRAM(S): at 04:50

## 2019-03-29 RX ADMIN — LACTULOSE 10 GRAM(S): 10 SOLUTION ORAL at 17:09

## 2019-03-29 RX ADMIN — MEROPENEM 100 MILLIGRAM(S): 1 INJECTION INTRAVENOUS at 05:36

## 2019-03-29 RX ADMIN — SODIUM CHLORIDE 50 MILLILITER(S): 9 INJECTION, SOLUTION INTRAVENOUS at 13:43

## 2019-03-29 RX ADMIN — Medication 40 MILLIEQUIVALENT(S): at 17:08

## 2019-03-29 RX ADMIN — Medication 40 MILLIEQUIVALENT(S): at 12:44

## 2019-03-29 RX ADMIN — SODIUM CHLORIDE 50 MILLILITER(S): 9 INJECTION, SOLUTION INTRAVENOUS at 08:53

## 2019-03-29 RX ADMIN — LIDOCAINE 1 PATCH: 4 CREAM TOPICAL at 12:44

## 2019-03-29 RX ADMIN — URSODIOL 300 MILLIGRAM(S): 250 TABLET, FILM COATED ORAL at 05:36

## 2019-03-29 RX ADMIN — MEROPENEM 100 MILLIGRAM(S): 1 INJECTION INTRAVENOUS at 17:09

## 2019-03-29 RX ADMIN — URSODIOL 300 MILLIGRAM(S): 250 TABLET, FILM COATED ORAL at 17:09

## 2019-03-29 RX ADMIN — LIDOCAINE 1 PATCH: 4 CREAM TOPICAL at 00:55

## 2019-03-29 RX ADMIN — LACTULOSE 10 GRAM(S): 10 SOLUTION ORAL at 05:36

## 2019-03-29 NOTE — CHART NOTE - NSCHARTNOTEFT_GEN_A_CORE
I had a phone conversation w Portillo today. Explained ongoing workup, treatment efforts, overall prognosis. Encouraged GOC planning.

## 2019-03-29 NOTE — CONSULT NOTE ADULT - SUBJECTIVE AND OBJECTIVE BOX
Clinical summary   This is a 89 yo M, encephalopathic male  with PMHx BPH, dementia, Cataracts, CKD Stage IV, Diverticulosis, Anemia 2/2 GI Bleed with previous PRBC transfusions, who presented to ED with 2 days generalized weakness, urinary retention and confusion.  Upon arrival to the ED, labs significant for transaminases and TBili > 20. He was admitted with a cholestatic hepatitis however imaging including a MRCP did not show tumor, bile duct obstruction or intrahepatic/ extrahepatic dilatation. Infectious disease and GI on consult and a Transjugular Liver biopsy was performed. As per consultation notes biopsy only saw severe bile duct injury.  There was no hepatocellular inflammation, eosinophilia or necrosis.  Differential diagnosis is presumed to be severe drug injury possibly with acute cholestatic hepatitis versus biliary sepsis. Patient with infectious disease on consult for pneumonia and Enterococcus faecalis 3/20/19. Cardiology on consult indicating patient without clinical heart failure or anginal symptoms. TTE revealed moderate AI and normal LV function and CT chest demonstrated 4.5cm ascending aortic aneurysm. Cardiology suggesting medical management.     Despite use of lactulose, rifaximin and discontinuance of hepatic medications, patient remains with elevated transaminases and ammonia levels. Prognosis is extremely poor. Palliative consultation, medicine and GI (known to family in past hospitalizations) have had multiple goals of care conversations with the patient’s family and they have indicated understanding the diagnosis and prognosis.  The clinical team indicates the family hopes for transplant that is not medically feasible due to his baseline dementia and limited functional status.  Today ICU consult and hepatology consult indicate poor prognosis. ICU consult deemed as the patient’s living will indicates he would desire DNR/DNI at end of life, intubation not clinically indicated. Patient deemed not a candidate for hemodialysis and liver transplant. Ethics consult initiated as health care proxy desires cardiopulmonary resuscitation despite patient’s expressed living will.    Prognosis : poor				  Patient Decision-Making Capacity:  Has capacity    Lacks capacity due to medical condition  Patient Aware of:  Diagnosis:   Yes    No   Unknown    Prognosis:   Yes    No   Unknown         Name of medical decision-maker should patient lack capacity:  Relationship: Mikie nephmeg Cottrell     Role:   Health Care Agent       Surrogate   Contact #(s):	[phone]  	  Other ‘stakeholders’:Neice in law 								  Evidence of Patient’s Preference of Life-Sustaining Treatment (Written or Oral): Living Will examined indicating nephew as proxy with alternate and indicating in the face of irreversible condition not to receive cardiopulmonary resuscitation   Resuscitation status:   DNR:  Yes   No      DNI:  Yes   No      Discussion 03/29/19. Discussed case with EDYTA Cai reviewed  03/29/19 16:30- 16:45; Contacted health care proxy Benitez. Benitez took this provider’s number to discuss further at a later moment  BIOETHICS ANALYSIS: The central ethical issue is the conflict between (A) provider Beneficence, on one hand, (B) patient Autonomy and (C) provider Non-Maleficence, on the other hand. Beneficence relates to the health care provider’s responsibility to promote the patient’s "best interest". This case illustrates how terminally ill conditions impacts proportionality (the discussion of beneficence and non-maleficence of a patient with end stage liver disease. In terms of Beneficence, it is the clinician’s duty to preserve the patient’s sense of dignity and personhood.     The patient’s current condition is gravely poor in the face of advanced age and worsening multi-.organ failure. As patient is not a candidate for liver transplant given his current state of sepsis, no aggressive medical intervention is feasible to reverse his present condition. Hepatology suggest repeat workup of infectious causes, all consultant suggest medical management as there is no surgical cure. Crucial to this case is empathetic honest communication to build trust and provide closure in the event of his death. The ethical conflict between patient autonomy and physician non-maleficence is multifactorial and best mediated by addressing communication and trust within the patient/surrogate-physician relationship. In this specific case, the patient has a terminal liver diseaser with devastating neurologic function. While it appears the family understands the grim prognosis, they continue to remain hopeful as this devastating news for them. In this instance where Mr. Joyce is incapacitated his surrogates must use substituted judgment where his wishes were known, and best interest decision making where his wishes are not known or unclear.  The perception of "doing good" for the patient manifests in two different ways—the family sees the "good" to be a positive good—"saving" the patient, doing all possible for any improvement; the medical team envisions the "good" to be negative—preventing sources of possible worsening of the patient’s health, limiting unnecessary and maybe fruitless tests/interventions. At present communication can clearly delineate the medical decisions at hand. As renal replacement and liver transplantation are not medically feasible and in regards to transplant impossible; the medical choice is singular; palliate with comfort care.   This case also represents one of social justice though presently in the [place] health system issues of distributive justice are not factored into an ICU stay. In this case the technically possible must be balanced by the medical reasonable. The question that is inescapable in the world of social justice, especially in medical ethics, haunts this case: who has the right to healthcare? Who should receive heroic interventions beyond the medically feasible? What grounds do we have for offering them, even when the literature and the data say that we should not? The principle of justice is linked to the idea of fairness. There is no agreement as to a fair formula to use for allocating benefits and costs. As a society, we attempt to mitigate these questions by creating some kind of juxtaposition of them, with heavier weight on some then others in different lomax and situations. (Benji and Cash, 2001; Todd et al, 1998). In this case, it may be morally challenging to watch this patient receive variable life sustaining treatments yet suffering and quality of life decisions are best made by the patient and his surrogates.  The health team is commended for their virtue in providing intensive care treatment that has allowed this patient to survive to this point. In this specific case, in accordance with the PALLIATIVE CARE ACCESS ACT ([facility] SECTION 2997-D) The attending health care practitioner is required to provide patients with a terminal illness not only prognosis, risks and benefits of treatment options but also patient’s legal rights to symptom management at the end of life.  Fundamental to a palliative approach is the aim to reduce suffering and improve the quality of life for dying patients and their families through identification and management of pain and physical, social, psychological, spiritual, Confucianism and cultural needs. The family desire all prognostication and life sustaining treatments in hope of discovering whether the patient is actively dying.  Another haunting issue in this case is what appears to be the family not acknowledging Mr. Palacio’s living will. A living will is a document which indicates a patient’s advanced wishes. [facility] Public health law indicates that in order to not proceed with Mr. Joyce’s prior articulated wishes, clear and convincing evidence must exist. Yet, Mr. Joyce chose his great nephew to become his health care proxy. A proxy has all authority to direct his care as the patient. On the health care proxy there are two options;  1.	To to make health care decisions according to the patient’s wishes and limitations, as he or she knows or as stated on the form.   2.	To make health care decisions in accordance with limitations and/or instructions.  In this case Benitez believes his great uncle still has opportunities for improvement in the face of an infection. Clarity in communication outlining next steps is flores to obtaining assent from the family as medical treatment plan are offered and conceived by the attending physician in concert with best medical practice. Moreover the ethically acceptable goal of care for this patient is palliation. No curative treatment exists.  This case meets the criteria of Do Not Escalate in Treatment as suggested by Roni & Evie (2015). By offering patients and families an alternative to immediate withdrawal of treatment, a DNET order may relieve some of the stress and burden of decision making by diffusing decision making responsibility. The guiding principle of no escalation is established by the surrogate (or patient), in terminal cases where physicians know that forgoing treatments are necessary because they are impossible to perform. Families are protected from making detailed medical decisions about treatments that offer no medical benefit. Compared to the decision to withdraw a specific treatment, a decision not to escalate a future treatment that might (or might not) be needed is somewhat vague. The balance of medical action (continued treatment) and non action (no new treatments) softens the impact of recognizing or causing the ultimate outcome, tempering the sense of personal responsibility for decisions. Still, surrogates should also be given flexibility to accommodate personal, Confucianism, or ethical beliefs that make certain options to withhold treatment unacceptable. It is morally and ethically reasonable to inform the family that the patient will receive comfort care due to the goal of sustaining comfort without inducing harm.

## 2019-03-29 NOTE — CHART NOTE - NSCHARTNOTEFT_GEN_A_CORE
MICU re-consulted by EDYTA Ngo 2/2 pt's continued encephalopathy. There have been extensive discussions with the family over the last 24 hours, in which the patient's poor prognosis has been discussed. The patient is not being offered renal replacement therapy or liver transplant for his likely drug induced liver injury. Given the patient's advance age and overall prognosis, resuscitative efforts would be of no medical benefit, and in fact be futile in this patient. If his MS or respiratory status should deteriorate, intubation would NOT provide a benefit to this patient as it would not alter his prognosis. The patient's living will also states he would not want aggressive intervention if his prognosis was poor. MICU transfer is not indicated at this time, nor would ICU intervention alter prognosis. Our MICU team attempted to reach out to the patient's HCP at the time of this note, but was unable to connect after several calls. A message was left. This MICU provider discussed the case with EDYTA Ngo, Dr. Branham, and Dr. Lauro Bach (from Palliative Care), as well as the MICU team regarding this patient. Thank you for involving us in the care of this patient.    ICU Vital Signs Last 24 Hrs  T(C): 36.3 (29 Mar 2019 09:30), Max: 36.7 (29 Mar 2019 04:15)  T(F): 97.3 (29 Mar 2019 09:30), Max: 98 (29 Mar 2019 04:15)  HR: 82 (29 Mar 2019 09:30) (75 - 90)  BP: 142/84 (29 Mar 2019 09:30) (104/65 - 142/84)  RR: 17 (29 Mar 2019 09:30) (16 - 19)  SpO2: 98% (29 Mar 2019 09:30) (97% - 100%)    General: WN/WD, somnolent, NAD  HEENT: Scleral icterus, PERRLA, EOMI, moist mucous membranes  Neurology: A&Ox0, somnolent, responds to noxious stimuli, nonfocal, CHILDS x 4  Respiratory: CTA B/L, normal respiratory effort, no wheezes, crackles, rales  CV: RRR, S1S2, no murmurs, rubs or gallops  Abdominal: Soft, NT, ND +BS, NGT in place  Extremities: No edema, + peripheral pulses  Incisions:   Tubes: PIV, NGT, flores    Case discussed with Dr. Esparza, MICU attending    Miguel Newell MD  Resident Physician  Emergency Medicine &  Internal Medicine  PGY-3  MICU Spectra: [phone]

## 2019-03-29 NOTE — PROGRESS NOTE ADULT - SUBJECTIVE AND OBJECTIVE BOX
[facility]      Seen earlier today, and examined.  - Today, patient is without complaints.           *****MEDICATIONS: Current medication reviewed and documented.    MEDICATIONS  (STANDING):  dextrose 5% 1000 milliLiter(s) (50 mL/Hr) IV Continuous <Continuous>  lactulose Syrup 10 Gram(s) Enteral Tube two times a day  lidocaine   Patch 1 Patch Transdermal daily  meropenem  IVPB 500 milliGRAM(s) IV Intermittent every 12 hours  potassium chloride   Powder 40 milliEquivalent(s) Enteral Tube every 4 hours  rifaximin 550 milliGRAM(s) Oral two times a day  ursodiol Capsule 300 milliGRAM(s) Oral two times a day    MEDICATIONS  (PRN):          ***** VITAL SIGNS:  T(F): 97.3 (19 @ 12:51), Max: 98 (19 @ 04:15)  HR: 77 (19 @ 12:51) (75 - 90)  BP: 127/76 (19 @ 12:51) (104/65 - 142/84)  RR: 18 (19 @ 12:51) (17 - 19)  SpO2: 99% (19 @ 12:51) (97% - 99%)  Wt(kg): --  ,   I&O's Summary    28 Mar 2019 07:01  -  29 Mar 2019 07:00  --------------------------------------------------------  IN: 0 mL / OUT: 1055 mL / NET: -1055 mL             *****PHYSICAL EXAM: lethargic icteric    alert to repeated tactile stimuli   non verbal.     Gait not assessed.              *****LAB AND IMAGIN.3    9.13  )-----------( 111      ( 29 Mar 2019 07:20 )             23.7                   147<H>  |  111<H>  |  53<H>  ----------------------------<  113<H>  3.0<L>   |  16<L>  |  3.27<H>    Ca    8.6      29 Mar 2019 07:20  Phos  2.6       Mg     2.2         TPro  5.7<L>  /  Alb  2.1<L>  /  TBili  20.4<H>  /  DBili  x   /  AST  170<H>  /  ALT  146<H>  /  AlkPhos  266<H>      PT/INR - ( 29 Mar 2019 07:20 )   PT: 28.8 SEC;   INR: 2.46          PTT - ( 29 Mar 2019 07:20 )  PTT:64.0 SEC                 ABG - ( 29 Mar 2019 08:50 )  pH, Arterial: 7.44  pH, Blood: x     /  pCO2: 29    /  pO2: 156   / HCO3: 21    / Base Excess: -4.2  /  SaO2: 99.2      Ammonia, Serum: 76: Ammonia levels will be falsely elevated if specimen is NOT  collected, processed and maintained at 4-8 C. umol/L (19 @ 09:45)              [All pertinent recent Imaging/Reports reviewed]           *****A S S E S S M E N T   A N D   P L A N :      89 yo M, poor historian, charts reviewed, with PMHx BPH, Cataracts, Diverticulosis, Anemia 2/2 GI Bleed with previous PRBC transfusions, who p/w 2 days genearlized weakness, urinary retention and confusion.  Upon arrival to the ED, pt with scleral icterus and labs significant for transaminitis and TBili > 20    called to evaluate pt for ams         Problem/Recommendations 1:  Multifactorial toxic metabolic encephalopathy  , hyperbilirubinemia, hyperammonemia, and worsening renal function  correct metabolic dyscrasias defer to renal/gi   repeat ammonia level 76 on lactulose   worsening lethargy /encephalopathy   mICU consultation appreciated.             Problem/Recommendations 2 Hyperbilirubinemia  suspicious for cholestatic pattern   distended gb with cholelithiasis, mural thickening of the cbd. defer to gi for management.    pending liver biopsy        Thank you for allowing me to participate in the care of this patient. Please do not hesitate to call me if you have any  questions.        ________________  Ava Schreiber MD  [facility] ([facility]  [phone]      30 minutes spent on total encounter; more than 50 % of the visit was  spent counseling about plan of care, compliance to diet/exercise and medication regimen and or  coordinating care by the attending physician.      It is advised that s stroke patients follow up with DULCE MARIA Rausch @ [phone] in 1- 2 weeks.   Others please follow up with Dr. Michael Nissenbaum [phone]

## 2019-03-29 NOTE — CONSULT NOTE ADULT - CONSULT REASON
Complex decision making in the setting of liver failure.
Encephalopathy
Encephalopathy
PHU, CKD
To assist in a case of a 89 year old male with end stage liver disease whose health care proxy desires aggressive life sustaining measures despite living will suggesting comfort measures
acute liver failure
altered
liver biopsy
Weakness

## 2019-03-29 NOTE — PROGRESS NOTE ADULT - PROBLEM SELECTOR PLAN 1
Uptrending cr, with acidosis and mild hypernatremia.  Nonoliguric. No evidence of respiratory distress.   Third spacing from hypoalbuminemia.   COntinue IV fluids to D5/w with 75 meq Nahco3, cont at 75 cc hrly  poor prognosis, multi-organ failure, advanced liver Dis, cachexia, advanced age with dementia at baseline.  will strongly recommend Palliative care   no indication for dialysis at this time. Would not recommend HD initiation, if indicated. monitor U/O.   avoid ACEi/ARB/NSAIDs/nephrotoxics  dose all meds for eGFR <15ml/min  monitor BMP, LFTs daily

## 2019-03-29 NOTE — PROGRESS NOTE ADULT - ATTENDING COMMENTS
Patient was seen and examined with Hepatology team at rounds. Agree with above.   Patient has remained very somnolent at rounds. His general condition deteriorating, with PHU.   Will recommend  -repeat workup for infection, metabolic encephalopathy, and acute brain injury on rifaximin and lactulose for possible PSE,   -workup for PHU    Patient has poor prognosis.

## 2019-03-29 NOTE — PROGRESS NOTE ADULT - ASSESSMENT
pt somnolent but arrousable.  In summary, this is an elderly and frail patient who I know from gi bleed, prior hospitalization and office.  He was admitted with a cholestatic hepatitis with a bilirubin of 20.  He had imaging which did not show tumor or intrahepatic/ extrahepatic dilitation.  Although ID suggested he had cholangitis and started abx, there was no sign of infection.   It is difficult to get a history but he was taking Tamulosin at home which was new  other meds difficult to discern.   He had a hida ordered by id which was positive, however, there was no indication for this test and in severe cholestasis, tracer can not leave  the liver (false positive result).   The patient had an MRCP which did not show bile duct obstruction.  Serolgic evaluation showed an elevated courtney, however the lft picture was  more cholestatic than hepatocellular.   Transjugular Liver biopsy was done to rule out autoimmune/ pbc overlap.  Biopsy was discussed with pathology as soon as slides available  The pathologist (although report not yet in computer) only saw severe bile duct injury.  There was no hepatocellular inflammation, eosinophilia or piecemal necrosis.  The ddx  reported was bile duct obstruction vs severe drug injury.  All hepatotoxic meds are now d/hoang.  The patient is on jose. I hase had multiple discussions with the family and they  understand the diagnosis/ prognosis.  Clearly transplant evaluate in a 89 year old with dementia at baseline and limited functinoal capacity is not indicated.      Continue conservative managment.  I will again reach out to family.

## 2019-03-29 NOTE — CHART NOTE - NSCHARTNOTEFT_GEN_A_CORE
yesterday afternoon I had an extensive phone discussion with the patients HCP and his wife.  All questions and concerns appropriately answered and addressed in detail.

## 2019-03-29 NOTE — PROGRESS NOTE ADULT - ASSESSMENT
A 89 yo Male with BPH, Cataracts, Diverticulosis, Anemia 2/2 GI Bleed with previous PRBC transfusions, who presents to the ER for evaluation of  generalized  weakness, urinary retention and confusion. On admission, he found to have elevated bilirubin, LFTS, transaminitis, scleral jaundice. and urinary retention. Oshea catheter has placed with negative Urine analysis. The CT abdomen  and pelvis shows Distended gallbladder. Consider further evaluation with ultrasound if  acute cholecystitis is a concern. No discrete mass identified on this limited noncontrast study. The ID consult requested to assist with evaluation of Biliary sepsis.    # Encephalopathy  # Urinary retention  # R/O biliary sepsis/ Cholangitis - Most likely acute cholestatic hepatitis as per GI -s/p  transjugular liver biopsy with two passes. and found to have small right atrium blood clot. 3/21/19  # Distended GB with cholelithiasis on MRCP 3/18/19  # Hypothermia- Pneumonia on CXR - MRSA PCR is negative   # UTI- Enterococcus faecalis 3/20/19  # Liver biopsy result - Not in the system yet  # Encephalopathy- elevated ammonia    would recommend:    1. Aspiration precaution and follow up repeat cultures  2. Continue IV Meropenem, renally adjusted doses  3. Supplemental oxygenation and  bronchodilator as needed  4. Monitor kidney function   5. Management of cholestatic hepatitis as per GI    d/w primary team    -Prognosis is poor in the setting of Liver failure , HCAP and UTI

## 2019-03-29 NOTE — PROGRESS NOTE ADULT - SUBJECTIVE AND OBJECTIVE BOX
S: no chest pain or SOB     dextrose 5% 1000 milliLiter(s) IV Continuous <Continuous>  lactulose Syrup 10 Gram(s) Enteral Tube two times a day  lidocaine   Patch 1 Patch Transdermal daily  meropenem  IVPB 500 milliGRAM(s) IV Intermittent every 12 hours  potassium chloride   Powder 40 milliEquivalent(s) Enteral Tube every 4 hours  rifaximin 550 milliGRAM(s) Oral two times a day  ursodiol Capsule 300 milliGRAM(s) Oral two times a day                            8.3    9.13  )-----------( 111      ( 29 Mar 2019 07:20 )             23.7       Hemoglobin: 8.3 g/dL (03-29 @ 07:20)  Hemoglobin: 8.4 g/dL (03-28 @ 05:34)  Hemoglobin: 9.0 g/dL (03-27 @ 06:55)  Hemoglobin: 8.8 g/dL (03-26 @ 06:50)  Hemoglobin: 9.0 g/dL (03-25 @ 07:30)      03-29    147<H>  |  111<H>  |  53<H>  ----------------------------<  113<H>  3.0<L>   |  16<L>  |  3.27<H>    Ca    8.6      29 Mar 2019 07:20  Phos  2.6     03-29  Mg     2.2     03-29    TPro  5.7<L>  /  Alb  2.1<L>  /  TBili  20.4<H>  /  DBili  x   /  AST  170<H>  /  ALT  146<H>  /  AlkPhos  266<H>  03-29    Creatinine Trend: 3.27<--, 3.17<--, 2.98<--, 2.64<--, 2.18<--, 2.08<--    COAGS: PT/INR - ( 29 Mar 2019 07:20 )   PT: 28.8 SEC;   INR: 2.46          PTT - ( 29 Mar 2019 07:20 )  PTT:64.0 SEC          T(C): 36.3 (03-29-19 @ 12:51), Max: 36.7 (03-29-19 @ 04:15)  HR: 77 (03-29-19 @ 12:51) (75 - 90)  BP: 127/76 (03-29-19 @ 12:51) (104/65 - 142/84)  RR: 18 (03-29-19 @ 12:51) (17 - 19)  SpO2: 99% (03-29-19 @ 12:51) (97% - 99%)  Wt(kg): --    I&O's Summary    28 Mar 2019 07:01  -  29 Mar 2019 07:00  --------------------------------------------------------  IN: 0 mL / OUT: 1055 mL / NET: -1055 mL      Heart: normal S1, S2, RRR, no m/r/g  Lungs: cta b/l  Abd: soft nT  Ext: no edema     TELEMETRY: SR	60's    RADIOLOGY:  < from: Xray Chest 1 View- PORTABLE-Urgent (03.20.19 @ 09:37) >  FINDINGS/  IMPRESSION:    Patchy opacity within the right lower lung may represent pneumonia.   Subsegmental atelectasis at the left base. No pleural effusion or   pneumothorax. No pulmonary edema.    The cardiac silhouette remains enlarged. Aortic aneurysm. Recommend   further evaluation with aortogram.    < end of copied text >    < from: Transthoracic Echocardiogram (03.23.19 @ 09:36) >  CONCLUSIONS:  1. Mitral annular calcification, otherwise normal mitral  valve. Mild mitral regurgitation.  2. Aortic valve leaflet morphology not well visualized;  appears calcified with normal opening. Moderate aortic  regurgitation.  3. Normal left ventricular internal dimensions and wall  thicknesses.  4. Hyperdynamic left ventricle.  5. Mild diastolic dysfunction (Stage I).  6. Normal right ventricular size and function.  7. Normal tricuspid valve. Mild tricuspid regurgitation.  8. Estimated pulmonary artery systolic pressure equals 36  mm Hg, assuming right atrial pressure equals 10  mm Hg,  consistent with borderline pulmonary hypertension.  *** Compared with echocardiogram of 6/11/2018,no  significant changes noted.  ------------------------------------------------------------------------  Confirmed on  3/23/2019 - 11:19:58 by Evaristo Castanon MD, [facility],  TAMMY, BONI    < end of copied text >      ASSESSMENT/PLAN: 	    89 yo M, poor historian, charts reviewed, with PMHx BPH, Cataracts, Diverticulosis, Anemia 2/2 GI Bleed with previous PRBC transfusions, who p/w 2 days generalized  weakness, urinary retention and confusion. LFTs significant for transaminitis, scleral jaundice.     -pt. tolerated biopsy well in terms of cv perspective  -no clinical heart failure or anginal symptoms  -TTE with moderate AI and normal LV function - medical management of valvular disease recommended  -CT chest with 4.5cm ascending aortic aneurysm - can start metoprolol 12.5 mg PO bid   -follow up GI  - Palliative f/u    Jeff Galicia MD, [facility]  BEEPER [phone]

## 2019-03-29 NOTE — PROGRESS NOTE ADULT - SUBJECTIVE AND OBJECTIVE BOX
Patient is a 89y old  Male who presents with a chief complaint of generalized weakness, urinary retention, and confusion X 2 days (29 Mar 2019 11:10)      SUBJECTIVE / OVERNIGHT EVENTS:  several bowel movements  MEDICATIONS  (STANDING):  dextrose 5% 1000 milliLiter(s) (50 mL/Hr) IV Continuous <Continuous>  lactulose Syrup 10 Gram(s) Enteral Tube two times a day  lidocaine   Patch 1 Patch Transdermal daily  meropenem  IVPB 500 milliGRAM(s) IV Intermittent every 12 hours  rifaximin 550 milliGRAM(s) Oral two times a day  ursodiol Capsule 300 milliGRAM(s) Oral two times a day    MEDICATIONS  (PRN):              PHYSICAL EXAM:  GENERAL: NAD, well-developed  HEAD:  Atraumatic, Normocephalic  EYES: EOMI, PERRLA, conjunctiva and sclera icteric  NECK: Supple, No JVD  CHEST/LUNG: Clear to auscultation bilaterally; No wheeze  HEART: Regular rate and rhythm; No murmurs, rubs, or gallops  ABDOMEN: Soft, Nontender, Nondistended; Bowel sounds present, no hepatosplenomegaly, no rebound or guarding  EXTREMITIES:  2+ Peripheral Pulses, No clubbing, cyanosis, or edema  NEUROLOGY: obtunded  SKIN: No rashes or lesion    LABS:                        8.3    9.13  )-----------( 111      ( 29 Mar 2019 07:20 )             23.7     03-29    147<H>  |  111<H>  |  53<H>  ----------------------------<  113<H>  3.0<L>   |  16<L>  |  3.27<H>    Ca    8.6      29 Mar 2019 07:20  Phos  2.6     03-29  Mg     2.2     03-29    TPro  5.7<L>  /  Alb  2.1<L>  /  TBili  20.4<H>  /  DBili  x   /  AST  170<H>  /  ALT  146<H>  /  AlkPhos  266<H>  03-29    LIVER FUNCTIONS - ( 29 Mar 2019 07:20 )  Alb: 2.1 g/dL / Pro: 5.7 g/dL / ALK PHOS: 266 u/L / ALT: 146 u/L / AST: 170 u/L / GGT: x           PT/INR - ( 29 Mar 2019 07:20 )   PT: 28.8 SEC;   INR: 2.46          PTT - ( 29 Mar 2019 07:20 )  PTT:64.0 SEC          RADIOLOGY & ADDITIONAL TESTS:

## 2019-03-29 NOTE — CONSULT NOTE ADULT - REASON FOR ADMISSION
generalized weakness, urinary retention, and confusion X 2 days

## 2019-03-29 NOTE — PROGRESS NOTE ADULT - ASSESSMENT
Impression:     1. Liver failure: DILI vs. viral. MELD Na 40. Rule out leptospirosis, Hepatitis E, Drug induced autoimmune. No clear culprits.    2. Kidney failure    3. Encephalopathy ? multifactorial, rule out sepsis    Recommendation:  -would suggest extensive infectious workup including repeat blood cultures  -consider brain CT Impression:     1. Liver failure: DILI vs. viral. MELD Na 40. Rule out leptospirosis, Hepatitis E, Drug induced autoimmune. No clear culprits.    2. Kidney failure    3. Encephalopathy ? multifactorial, rule out sepsis    Recommendation:  -would suggest extensive infectious workup including repeat blood cultures  -consider brain CT  -check FeNa  -check fibrinogen to rule out DIC  -continue lactulose xifaxan  -check hepatitis E IgM Impression:     1. Acute liver injury with marked  hyperbilirubinemia: DILI vs. viral. MELD Na 40. Rule out leptospirosis, Hepatitis E, Drug induced autoimmune.     2. Kidney failure    3. Encephalopathy ? multifactorial, rule out sepsis    Recommendation:  -would suggest extensive infectious workup including repeat blood cultures  -consider brain CT  -check FeNa  -check fibrinogen, factor V and X,  to rule out DIC  -continue lactulose xifaxan  -check hepatitis E IgM

## 2019-03-29 NOTE — PROGRESS NOTE ADULT - SUBJECTIVE AND OBJECTIVE BOX
PASCUAL OLSEN:[id number],   89yMale followed for: increased lfts  No Known Allergies    PAST MEDICAL & SURGICAL HISTORY:  Chronic kidney disease (CKD), stage IV (severe)  Benign prostatic hypertrophy  S/P cataract surgery, left: 3 yrs ago    FAMILY HISTORY:  No pertinent family history in first degree relatives    MEDICATIONS  (STANDING):  dextrose 5% 1000 milliLiter(s) (50 mL/Hr) IV Continuous <Continuous>  lactulose Syrup 10 Gram(s) Enteral Tube two times a day  lidocaine   Patch 1 Patch Transdermal daily  meropenem  IVPB 500 milliGRAM(s) IV Intermittent every 12 hours  rifaximin 550 milliGRAM(s) Oral two times a day  ursodiol Capsule 300 milliGRAM(s) Oral two times a day    MEDICATIONS  (PRN):      Vital Signs Last 24 Hrs  T(C): 36.5 (29 Mar 2019 05:34), Max: 36.7 (29 Mar 2019 04:15)  T(F): 97.7 (29 Mar 2019 05:34), Max: 98 (29 Mar 2019 04:15)  HR: 82 (29 Mar 2019 05:34) (75 - 90)  BP: 133/77 (29 Mar 2019 05:34) (104/65 - 134/84)  BP(mean): --  RR: 18 (29 Mar 2019 05:34) (16 - 19)  SpO2: 98% (29 Mar 2019 05:34) (97% - 100%)  nc/at  s1s2  cta  soft, nt, nd no guarding or rebound  no c/c/e    CBC Full  -  ( 28 Mar 2019 05:34 )  WBC Count : 7.70 K/uL  RBC Count : 3.10 M/uL  Hemoglobin : 8.4 g/dL  Hematocrit : 23.4 %  Platelet Count - Automated : 137 K/uL  Mean Cell Volume : 75.5 fL  Mean Cell Hemoglobin : 27.1 pg  Mean Cell Hemoglobin Concentration : 35.9 %  Auto Neutrophil # : x  Auto Lymphocyte # : x  Auto Monocyte # : x  Auto Eosinophil # : x  Auto Basophil # : x  Auto Neutrophil % : x  Auto Lymphocyte % : x  Auto Monocyte % : x  Auto Eosinophil % : x  Auto Basophil % : x    03-28    148<H>  |  115<H>  |  51<H>  ----------------------------<  100<H>  3.5   |  20<L>  |  3.17<H>    Ca    8.5      28 Mar 2019 05:34  Phos  3.0     03-28  Mg     2.2     03-28    TPro  5.7<L>  /  Alb  2.2<L>  /  TBili  19.8<H>  /  DBili  x   /  AST  187<H>  /  ALT  146<H>  /  AlkPhos  261<H>  03-28    PT/INR - ( 28 Mar 2019 05:34 )   PT: 25.1 SEC;   INR: 2.15          PTT - ( 28 Mar 2019 05:34 )  PTT:68.5 SEC

## 2019-03-29 NOTE — PROGRESS NOTE ADULT - ASSESSMENT
89 yo M p/w confusion and generalized weakness      -> cholestatic hepatitis, acute liver failure:      - very poor prognosis, not responding adequately to appropriate medical treatment       - palliative/hospice appropriate      - all consultants recs/management appreciated       - will c/w medical management, however, treatment options maximized w/ no real improvement, f/u ethics        - not a candidate for liver transplantation      - adjust management accordingly   -> ARF:      - persistent      - ivf, adjust management per nephro. Avoid nephrotoxic rx, strict i/o  -> Acute Cystitis:      - abx per ID  -> Thrombocytopenia:      - 2/2 liver dysfunction  -> Urinary Retention:      - flores intact, strict i/o   -> Coagulopathy:      -  2/2 liver failure       - vitamin k         - monitor inr and clinical status  -> Metabolic Encephalopathy:      - stable, treat underlying gi condition      - neuro checks       - fall precautions    -> Need for prophylactic measure:      - dvt/gi ppx  -> Severe Protein Calorie Malnutrition:      - supplement diet

## 2019-03-29 NOTE — CONSULT NOTE ADULT - ASSESSMENT
Recommendation:     1.	Physicians are not obligated to perform procedures that are not in accordance with acceptable practice and where harm exceeds benefit, which in this case is clear. The patient may not receive long term benefit from renal replacement therapy and liver transplantation is not a possibility. Comfort care is consistent with patient’s prior wishes and allowing the family to journey along the trajectory of his end of life.  2.	It is ethically acceptable to obtain assent for a Do Not Escalate in Treatment Order  3.	It is not necessary to become entrenched in obtaining a Do Not Resuscitate Order as the ICU consult has indicated if assessment of the patient reveals that cardiopulmonary resuscitation procedures will cause more harm than benefit the clinical team has a duty to appropriately offer measures that promote comfort.  The team is commended for providing care for this vulnerable male. Thank you for this complex case    Ethics will remain available to the patient, family and team along this trajectory of hospitalization   More than 50% of this consult involved in coordination of care

## 2019-03-29 NOTE — PROGRESS NOTE ADULT - SUBJECTIVE AND OBJECTIVE BOX
Nephrology Followup Note - [phone] - Dr Denton / Dr Rojas / Dr Beard / Dr Montero / Dr Villegas / Dr Webster / Dr Mullen / Dr Felix  Pt seen and examined at bedside  Pt lethargic, difficult to arouse, moaning at times.     Allergies:  No Known Allergies    Hospital Medications:   MEDICATIONS  (STANDING):  dextrose 5% 1000 milliLiter(s) (50 mL/Hr) IV Continuous <Continuous>  lactulose Syrup 10 Gram(s) Enteral Tube two times a day  lidocaine   Patch 1 Patch Transdermal daily  meropenem  IVPB 500 milliGRAM(s) IV Intermittent every 12 hours  potassium chloride   Powder 40 milliEquivalent(s) Enteral Tube every 4 hours  rifaximin 550 milliGRAM(s) Oral two times a day  ursodiol Capsule 300 milliGRAM(s) Oral two times a day      VITALS:  T(F): 97.3 (03-29-19 @ 12:51), Max: 98 (03-29-19 @ 04:15)  HR: 77 (03-29-19 @ 12:51)  BP: 127/76 (03-29-19 @ 12:51)  RR: 18 (03-29-19 @ 12:51)  SpO2: 99% (03-29-19 @ 12:51)  Wt(kg): --    03-28 @ 07:01  -  03-29 @ 07:00  --------------------------------------------------------  IN: 0 mL / OUT: 1055 mL / NET: -1055 mL      PHYSICAL EXAM:  Constitutional: NAD  HEENT: anicteric sclera, oropharynx clear, MMM  Neck: No JVD  Respiratory: Poor inspiratory effort, no wheeze   Cardiovascular: S1, S2, RRR  Gastrointestinal: BS+, soft, NT/ND  Extremities: No cyanosis or clubbing. No peripheral edema  Neurological: Lethargic   : No CVA tenderness. +flores.   Skin: No rashes    LABS:  03-29    147<H>  |  111<H>  |  53<H>  ----------------------------<  113<H>  3.0<L>   |  16<L>  |  3.27<H>    Ca    8.6      29 Mar 2019 07:20  Phos  2.6     03-29  Mg     2.2     03-29    TPro  5.7<L>  /  Alb  2.1<L>  /  TBili  20.4<H>  /  DBili      /  AST  170<H>  /  ALT  146<H>  /  AlkPhos  266<H>  03-29    Creatinine Trend: 3.27 <--, 3.17 <--, 2.98 <--, 2.64 <--, 2.18 <--, 2.08 <--, 2.07 <--                        8.3    9.13  )-----------( 111      ( 29 Mar 2019 07:20 )             23.7     Urine Studies:      RADIOLOGY & ADDITIONAL STUDIES:

## 2019-03-29 NOTE — PROGRESS NOTE ADULT - SUBJECTIVE AND OBJECTIVE BOX
Patient is seen and examined at the bed side, is normothermic. The mental status has worsens, obtunded.  The WBC count stay normal. The LFTS are trending down but Bilirubin stay elevated.       REVIEW OF SYSTEMS: Unable to obtain due to mental status        ALLERGIES: No Known Allergies      Vital Signs Last 24 Hrs  T(C): 36.5 (29 Mar 2019 05:34), Max: 36.7 (29 Mar 2019 04:15)  T(F): 97.7 (29 Mar 2019 05:34), Max: 98 (29 Mar 2019 04:15)  HR: 82 (29 Mar 2019 05:34) (75 - 90)  BP: 133/77 (29 Mar 2019 05:34) (104/65 - 134/84)  BP(mean): --  RR: 18 (29 Mar 2019 05:34) (16 - 19)  SpO2: 98% (29 Mar 2019 05:34) (97% - 100%)        PHYSICAL EXAM:  GENERAL: Obtunded  HEENT: NGT in placed  CHEST/LUNG:  Air entry bilaterally  HEART: s1 and s2 present  ABDOMEN:  Nontender and  Nondistended  : Oshea catheter in placed  EXTREMITIES: No pedal  edema  CNS: obtunded          LABS:                        8.3    9.13  )-----------( 111      ( 29 Mar 2019 07:20 )             23.7                           8.4    7.70  )-----------( 137      ( 28 Mar 2019 05:34 )             23.4           03-29    147<H>  |  111<H>  |  53<H>  ----------------------------<  113<H>  3.0<L>   |  16<L>  |  3.27<H>    Ca    8.6      29 Mar 2019 07:20  Phos  2.6     03-29  Mg     2.2     03-29    TPro  5.7<L>  /  Alb  2.1<L>  /  TBili  20.4<H>  /  DBili  x   /  AST  170<H>  /  ALT  146<H>  /  AlkPhos  266<H>  03-29 03-28    148<H>  |  115<H>  |  51<H>  ----------------------------<  100<H>  3.5   |  20<L>  |  3.17<H>    Ca    8.5      28 Mar 2019 05:34  Phos  3.0     03-28  Mg     2.2     03-28    TPro  5.7<L>  /  Alb  2.2<L>  /  TBili  19.8<H>  /  DBili  x   /  AST  187<H>  /  ALT  146<H>  /  AlkPhos  261<H>  03-28        MEDICATIONS  (STANDING):  dextrose 5% 1000 milliLiter(s) (50 mL/Hr) IV Continuous <Continuous>  lactulose Syrup 10 Gram(s) Enteral Tube two times a day  lidocaine   Patch 1 Patch Transdermal daily  meropenem  IVPB 500 milliGRAM(s) IV Intermittent every 12 hours  rifaximin 550 milliGRAM(s) Oral two times a day  ursodiol Capsule 300 milliGRAM(s) Oral two times a day    MEDICATIONS  (PRN):        RADIOLOGY & ADDITIONAL TESTS:      3/24/19 : CT Chest No Cont (03.24.19 @ 18:32) : 4.4 cm enlarged ascending thoracic aorta. Small bilateral pleural effusions.      3/20/19 : Xray Chest 1 View- PORTABLE-Urgent (03.20.19 @ 09:37) Patchy opacity within the right lower lung may represent pneumonia. Subsegmental atelectasis at the left base. No pleural effusion or  pneumothorax. No pulmonary edema. The cardiac silhouette remains enlarged. Aortic aneurysm. Recommend  further evaluation with aortogram.      3/18/19 : MR MRCP No Cont (03.18.19 @ 14:22) Gallbladder is distended with cholelithiasis.  No choledocholithiasis or biliary ductal dilatation.      3/17/19 : NM Hepatobiliary Imaging (03.17.19 @ 12:21) Abnormal hepatobiliary scan suspicious for common bile duct obstruction. Hepatocellular dysfunction. No evidence of acute cholecystitis.      3/18/19 : US Abdomen Limited (03.18.19 @ 07:48) Gallbladder is distended with sludge and stones.  Mural thickening of the wall of the common bile duct, possibly  cholangitis. No biliary ductal dilatation.      3/15/19 : CT Abdomen and Pelvis No Cont (03.15.19 @ 09:39) Distended gallbladder. Consider further evaluation with ultrasound if  acute cholecystitis is a concern.  No discrete mass identified on this limited noncontrast study.          MICROBIOLOGY DATA:      Culture - Urine (03.20.19 @ 15:21)    -  Vancomycin: S 2 CLOVIS    Culture - Urine:   ENTF^Enterococcus faecalis  COLONY COUNT: > = 100,000 CFU/ML    Culture - Urine:   Susceptibility not performed.    -  Tetra/Doxy: R >8 CLOVIS    -  Nitrofurantoin: S <=32 CLOVIS    -  Ampicillin: S <=2 CLOVIS    -  Ciprofloxacin: S <=1 CLOVIS    Specimen Source: URINE CATHETER    Organism Identification: Enterococcus faecalis  Staphylococcus sp.,coag neg    Organism: Enterococcus faecalis  COLONY COUNT: > = 100,000 CFU/ML    Organism: Staphylococcus sp.,coag neg  COLONY COUNT: > = 100,000 CFU/ML    Method Type: POSITIVE CLOVIS 29      Urinalysis (03.20.19 @ 13:25)    Color: ELSA    Urine Appearance: TURBID    Glucose: 100    Bilirubin: LARGE    Ketone - Urine: NEGATIVE    Specific Gravity: 1.020    Blood: MODERATE    pH - Urine: 6.0    Protein, Urine: 100    Urobilinogen: 4.0    Nitrite: NEGATIVE    Leukocyte Esterase Concentration: LARGE    Red Blood Cell - Urine: 3-5    White Blood Cell - Urine: >50    White Blood Cell Casts: 2-5/LPF    Epithelial Cells: OCC    Bacteria: MANY    Coarse Granular Casts: 0-2/LPF      MRSA Infection Control Screen (PCR) (03.20.19 @ 13:46)    MRSA Infection Control Screen (PCR): NOT DETECTED     REFERENCE RANGE:  MRSA DNA NOT DETECTED      Culture - Blood (03.15.19 @ 14:03)    Culture - Blood:   NO ORGANISMS ISOLATED  NO ORGANISMS ISOLATED AT 24 HOURS    Specimen Source: BLOOD

## 2019-03-29 NOTE — PROGRESS NOTE ADULT - ASSESSMENT
89 yo M with PMHx CKD, BPH, Diverticulosis, Anemia 2/2 GI Bleed with previous PRBC transfusions, who p/w 2 days genearlized weakness, urinary retention and confusion, found to have elevated LFTs. Renal following for PHU, CKD management.    PHU on CKD 3 b/l Cr 1.4-1.6 10/2018  PHU intial thought to be pre renal, now w/worsening RFT-most likely 2/2 ATN likely from hyperbilirubinemia.   Cr continues to uptrend  Low C3 2/2 liver dz. no e/o acute GN  Urinary retention on CT w/o hydronephrosis. w/flores.   M acidosis- 2/2 PHU-    Transaminitis and Bilirubinemia s/p liver Bx by IR 3/21 likely drug induced per GI  AMS most liekly 2/2 Hepatic encephalopathy not from uremia- sr NH4 level 100. on lactulose -per GI. rec inc dose  HTN, controlled. bp stable. off Norvasc  BPH: c/w flomax    labs, chart reviewed  overall prognosis poor. palliative eval  will f/u

## 2019-03-29 NOTE — PROGRESS NOTE ADULT - SUBJECTIVE AND OBJECTIVE BOX
Patient seen and examined at bedside  ngt intact overnight, stable ill condition  Case discussed with medical team and consultants    HPI:  89 yo M, poor historian, charts reviewed, with PMHx BPH, Cataracts, Diverticulosis, Anemia 2/2 GI Bleed with previous PRBC transfusions, who p/w 2 days genearlized weakness, urinary retention and confusion.  Upon arrival to the ED, pt with scleral icterus and labs significant for transaminitis and TBili > 20 (15 Mar 2019 06:59)      PAST MEDICAL & SURGICAL HISTORY:  Chronic kidney disease (CKD), stage IV (severe)  Benign prostatic hypertrophy  S/P cataract surgery, left: 3 yrs ago      No Known Allergies       MEDICATIONS  (STANDING):  amoxicillin  500 milliGRAM(s)/clavulanate 1 Tablet(s) Oral two times a day  dextrose 5% + sodium chloride 0.45% 1000 milliLiter(s) (50 mL/Hr) IV Continuous <Continuous>  dextrose 5% + sodium chloride 0.45%. 1000 milliLiter(s) (50 mL/Hr) IV Continuous <Continuous>  enoxaparin Injectable 30 milliGRAM(s) SubCutaneous daily  ergocalciferol 29278 Unit(s) Oral every week  lactulose Syrup 10 Gram(s) Oral two times a day  lidocaine   Patch 1 Patch Transdermal daily  meropenem  IVPB 500 milliGRAM(s) IV Intermittent every 12 hours  ondansetron Injectable 4 milliGRAM(s) IV Push once  phytonadione   Solution 5 milliGRAM(s) Oral once  rifaximin 550 milliGRAM(s) Oral two times a day  ursodiol Capsule 300 milliGRAM(s) Oral two times a day    MEDICATIONS  (PRN):      REVIEW OF SYSTEMS: limited 2/2 medical/mental state of pt    Vital Signs Last 24 Hrs  T(C): 36.3 (29 Mar 2019 09:30), Max: 36.7 (29 Mar 2019 04:15)  T(F): 97.3 (29 Mar 2019 09:30), Max: 98 (29 Mar 2019 04:15)  HR: 82 (29 Mar 2019 09:30) (75 - 90)  BP: 142/84 (29 Mar 2019 09:30) (104/65 - 142/84)  BP(mean): --  RR: 17 (29 Mar 2019 09:30) (16 - 19)  SpO2: 98% (29 Mar 2019 09:30) (97% - 99%)    PHYSICAL EXAMINATION:   Constitutional: stable ill appearance  HEENT: ngt intact, bitemp wsting, dry oral mcuosa  Neck:  Supple  Respiratory:  Adequate airflow b/l. Not using accessory muscles of respiration.  Cardiovascular:  S1 & S2 intact, systolic murmur, no R/G, 2+ radial pulses b/l  Gastrointestinal: Soft, ND, normoactive b.s., no organomegaly/RT/rigidity  Extremities: poor skin elasticity and turgor.   : flores intact.   Neurological: lethargic, decreased responsiveness, disoriented, relatively stable compared to yesterdays state    Labs and imaging reviewed    Blood Gas Profile - Arterial (03.29.19 @ 08:50)    pH, Arterial: 7.44 pH    pCO2, Arterial: 29 mmHg    pO2, Arterial: 156 mmHg    HCO3, Arterial: 21 mmol/L    Base Excess, Arterial: -4.2: BASE EXCESS: REFERENCE RANGE = 0 (+/-) 2 mmol/l mmol/L    Oxygen Saturation, Arterial: 99.2 %    Blood Gas Arterial - FIO2: 21    Comprehensive Metabolic, Mg + Phosphorus (03.29.19 @ 07:20)    Phosphorus Level, Serum: 2.6 mg/dL    eGFR if : 18 mL/min    eGFR if Non : 16: The units for eGFR are ml/min/1.73m2 (normalized body  surface area). The eGFR is calculated from a serum  creatinine using the CKD-EPI equation. Other variables  required for calculation are race, age and sex. Among  patients with chronic kidney disease (CKD), the eGFR is  useful in determining the stage of disease according to  KDOQI CKD classification. All eGFR results are reported  numerically with the following interpretation.    GFR  (ml/min/1.73 m2)          W/KIDNEY DAMAGE    W/O KIDNEY DMG  ==========================================================  >= 90.......................Stage 1..............Normal  60-89.......................Stage 2...........Decreased GFR  30-59.......................Stage 3..............Stage 3  15-29.......................Stage 4..............Stage 4  < 15........................Stage 5..............Stage 5    Each stage of CKD assumes that the associated GFR level  has been in effect for at least 3 months. Determination of  stages one and two (with eGFR > 59ml/min/m2) requires  estimation of kidney damage for at least 3 months as  defined by structural or functional abnormalities.    Limitations: All estimates of GFR will be less accurate  for patients at extremes of muscle mass (including but  not limited to frail elderly, critically ill, or cancer  patients), those with unusual diets, and those with  conditions associated with reduced secretion or  extrarenal elimination of creatinine. The eGFR equation  is not recommended for use in patients with unstable  creatinine levels. mL/min    Sodium, Serum: 147 mmol/L    Potassium, Serum: 3.0 mmol/L    Chloride, Serum: 111 mmol/L    Carbon Dioxide, Serum: 16 mmol/L    Anion Gap, Serum: 20 mmo/L    Blood Urea Nitrogen, Serum: 53 mg/dL    Creatinine, Serum: 3.27 mg/dL    Glucose, Serum: 113 mg/dL    Calcium, Total Serum: 8.6 mg/dL    Protein Total, Serum: 5.7 g/dL    Albumin, Serum: 2.1 g/dL    Bilirubin Total, Serum: 20.4 mg/dL    Alkaline Phosphatase, Serum: 266 u/L    Aspartate Aminotransferase (AST/SGOT): 170 u/L    Alanine Aminotransferase (ALT/SGPT): 146 u/L    Magnesium, Serum: 2.2 mg/dL

## 2019-03-29 NOTE — CHART NOTE - NSCHARTNOTEFT_GEN_A_CORE
Called by RN to evaluate patient for lethargy. Patient seen and examined at bedside. Patient completely unresponsive, even to sternal rub. Patient has been receiving Lactulose via NGT and has had multiple BMs overnight. VSS. General: patient lying in bed, unresponsive. Lungs: CTAB. Heart: RRR, normal S1/S2. Abdomen: soft, nontender, nondistended, +BS. Extremities: RUE 1+ edema. Good pulses throughout. Patient with hepatic and uremic encephalopathy. Will send Blood cultures, ammonia level, ABG. MICU consult called for re-evaluation.

## 2019-03-30 LAB
ALBUMIN SERPL ELPH-MCNC: 2 G/DL — LOW (ref 3.3–5)
ALP SERPL-CCNC: 280 U/L — HIGH (ref 40–120)
ALT FLD-CCNC: 135 U/L — HIGH (ref 4–41)
ANION GAP SERPL CALC-SCNC: 16 MMO/L — HIGH (ref 7–14)
APTT BLD: 65 SEC — HIGH (ref 27.5–36.3)
AST SERPL-CCNC: 170 U/L — HIGH (ref 4–40)
BILIRUB SERPL-MCNC: 19.7 MG/DL — HIGH (ref 0.2–1.2)
BUN SERPL-MCNC: 50 MG/DL — HIGH (ref 7–23)
CALCIUM SERPL-MCNC: 8.6 MG/DL — SIGNIFICANT CHANGE UP (ref 8.4–10.5)
CHLORIDE SERPL-SCNC: 109 MMOL/L — HIGH (ref 98–107)
CO2 SERPL-SCNC: 19 MMOL/L — LOW (ref 22–31)
CREAT SERPL-MCNC: 3.18 MG/DL — HIGH (ref 0.5–1.3)
GLUCOSE SERPL-MCNC: 92 MG/DL — SIGNIFICANT CHANGE UP (ref 70–99)
HCT VFR BLD CALC: 22.4 % — LOW (ref 39–50)
HGB BLD-MCNC: 8.2 G/DL — LOW (ref 13–17)
INR BLD: 2.43 — HIGH (ref 0.88–1.17)
MAGNESIUM SERPL-MCNC: 2.2 MG/DL — SIGNIFICANT CHANGE UP (ref 1.6–2.6)
MCHC RBC-ENTMCNC: 27.4 PG — SIGNIFICANT CHANGE UP (ref 27–34)
MCHC RBC-ENTMCNC: 36.6 % — HIGH (ref 32–36)
MCV RBC AUTO: 74.9 FL — LOW (ref 80–100)
NRBC # FLD: 1.79 K/UL — SIGNIFICANT CHANGE UP (ref 0–0)
NRBC FLD-RTO: 16.7 — SIGNIFICANT CHANGE UP
PLATELET # BLD AUTO: 97 K/UL — LOW (ref 150–400)
PMV BLD: SIGNIFICANT CHANGE UP FL (ref 7–13)
POTASSIUM SERPL-MCNC: 3.3 MMOL/L — LOW (ref 3.5–5.3)
POTASSIUM SERPL-SCNC: 3.3 MMOL/L — LOW (ref 3.5–5.3)
PROT SERPL-MCNC: 5.5 G/DL — LOW (ref 6–8.3)
PROTHROM AB SERPL-ACNC: 28.5 SEC — HIGH (ref 9.8–13.1)
RBC # BLD: 2.99 M/UL — LOW (ref 4.2–5.8)
RBC # FLD: 27.4 % — HIGH (ref 10.3–14.5)
SODIUM SERPL-SCNC: 144 MMOL/L — SIGNIFICANT CHANGE UP (ref 135–145)
SPECIMEN SOURCE: SIGNIFICANT CHANGE UP
SPECIMEN SOURCE: SIGNIFICANT CHANGE UP
WBC # BLD: 10.69 K/UL — HIGH (ref 3.8–10.5)
WBC # FLD AUTO: 10.69 K/UL — HIGH (ref 3.8–10.5)

## 2019-03-30 PROCEDURE — 70450 CT HEAD/BRAIN W/O DYE: CPT | Mod: 26

## 2019-03-30 RX ORDER — METOPROLOL TARTRATE 50 MG
12.5 TABLET ORAL
Qty: 0 | Refills: 0 | Status: DISCONTINUED | OUTPATIENT
Start: 2019-03-30 | End: 2019-04-08

## 2019-03-30 RX ORDER — POTASSIUM CHLORIDE 20 MEQ
20 PACKET (EA) ORAL ONCE
Qty: 0 | Refills: 0 | Status: COMPLETED | OUTPATIENT
Start: 2019-03-30 | End: 2019-03-30

## 2019-03-30 RX ADMIN — Medication 12.5 MILLIGRAM(S): at 17:23

## 2019-03-30 RX ADMIN — LIDOCAINE 1 PATCH: 4 CREAM TOPICAL at 00:15

## 2019-03-30 RX ADMIN — LIDOCAINE 1 PATCH: 4 CREAM TOPICAL at 12:34

## 2019-03-30 RX ADMIN — SODIUM CHLORIDE 75 MILLILITER(S): 9 INJECTION, SOLUTION INTRAVENOUS at 17:22

## 2019-03-30 RX ADMIN — SODIUM CHLORIDE 75 MILLILITER(S): 9 INJECTION, SOLUTION INTRAVENOUS at 05:47

## 2019-03-30 RX ADMIN — Medication 20 MILLIEQUIVALENT(S): at 10:01

## 2019-03-30 RX ADMIN — LACTULOSE 10 GRAM(S): 10 SOLUTION ORAL at 17:22

## 2019-03-30 RX ADMIN — MEROPENEM 100 MILLIGRAM(S): 1 INJECTION INTRAVENOUS at 05:47

## 2019-03-30 RX ADMIN — LIDOCAINE 1 PATCH: 4 CREAM TOPICAL at 20:20

## 2019-03-30 RX ADMIN — URSODIOL 300 MILLIGRAM(S): 250 TABLET, FILM COATED ORAL at 17:22

## 2019-03-30 RX ADMIN — URSODIOL 300 MILLIGRAM(S): 250 TABLET, FILM COATED ORAL at 05:47

## 2019-03-30 RX ADMIN — MEROPENEM 100 MILLIGRAM(S): 1 INJECTION INTRAVENOUS at 17:21

## 2019-03-30 RX ADMIN — LACTULOSE 10 GRAM(S): 10 SOLUTION ORAL at 05:46

## 2019-03-30 NOTE — PROGRESS NOTE ADULT - SUBJECTIVE AND OBJECTIVE BOX
Patient seen and examined  sleeping/lethargic    No Known Allergies    Hospital Medications:   MEDICATIONS  (STANDING):  dextrose 5% 1000 milliLiter(s) (75 mL/Hr) IV Continuous <Continuous>  lactulose Syrup 10 Gram(s) Enteral Tube two times a day  lidocaine   Patch 1 Patch Transdermal daily  meropenem  IVPB 500 milliGRAM(s) IV Intermittent every 12 hours  metoprolol tartrate 12.5 milliGRAM(s) Oral two times a day  rifaximin 550 milliGRAM(s) Oral two times a day  ursodiol Capsule 300 milliGRAM(s) Oral two times a day      VITALS:  T(F): 97.4 (03-30-19 @ 09:55), Max: 98 (03-29-19 @ 22:50)  HR: 74 (03-30-19 @ 09:55)  BP: 101/59 (03-30-19 @ 09:55)  RR: 18 (03-30-19 @ 09:55)  SpO2: 98% (03-30-19 @ 09:55)  Wt(kg): --    03-29 @ 07:01  -  03-30 @ 07:00  --------------------------------------------------------  IN: 0 mL / OUT: 825 mL / NET: -825 mL        PHYSICAL EXAM:  Constitutional: NAD  HEENT: anicteric sclera, oropharynx clear, MMM  Neck: No JVD  Respiratory: Poor inspiratory effort, no wheeze   Cardiovascular: S1, S2, RRR  Gastrointestinal: BS+, soft, NT/ND  Extremities: No cyanosis or clubbing. No peripheral edema  Neurological: Lethargic   : No CVA tenderness. +flores.   Skin: No rashes    LABS:  03-30    144  |  109<H>  |  50<H>  ----------------------------<  92  3.3<L>   |  19<L>  |  3.18<H>    Ca    8.6      30 Mar 2019 06:45  Phos  2.6     03-29  Mg     2.2     03-30    TPro  5.5<L>  /  Alb  2.0<L>  /  TBili  19.7<H>  /  DBili      /  AST  170<H>  /  ALT  135<H>  /  AlkPhos  280<H>  03-30    Creatinine Trend: 3.18 <--, 3.27 <--, 3.17 <--, 2.98 <--, 2.64 <--, 2.18 <--, 2.08 <--                        8.2    10.69 )-----------( 97       ( 30 Mar 2019 06:45 )             22.4     Urine Studies:      RADIOLOGY & ADDITIONAL STUDIES:

## 2019-03-30 NOTE — PROGRESS NOTE ADULT - ASSESSMENT
89 yo M p/w confusion and generalized weakness      -> cholestatic hepatitis, acute liver failure:      - very poor prognosis, not responding adequately to appropriate medical treatment       - palliative/hospice appropriate      - ethics consult appreciated, I'm in agreement, please view consult note for further recs      - all consultants recs/management appreciated        - not a candidate for liver transplantation      - adjust management accordingly, supportive care  -> ARF:      - persistent      - ivf, adjust management per nephro. Avoid nephrotoxic rx, strict i/o  -> Acute Cystitis:      - abx per ID  -> Thrombocytopenia:      - 2/2 liver dysfunction  -> Urinary Retention:      - flores intact, strict i/o   -> Coagulopathy:      -  2/2 liver failure       - vitamin k         - monitor inr and clinical status  -> Metabolic Encephalopathy:      - stable, treat underlying gi condition      - neuro checks       - fall precautions    -> Need for prophylactic measure:      - dvt/gi ppx  -> Severe Protein Calorie Malnutrition:      - supplement diet

## 2019-03-30 NOTE — PROGRESS NOTE ADULT - PROBLEM SELECTOR PLAN 1
Uptrending cr ( stable today), with acidosis and mild hypernatremia.  Nonoliguric. No evidence of respiratory distress.   Third spacing from hypoalbuminemia.   COntinue IV fluids to D5/w with 75 meq Nahco3, cont at 75 cc hrly  poor prognosis, multi-organ failure, advanced liver Dis, cachexia, advanced age with dementia at baseline.  will strongly recommend Palliative care   no indication for dialysis at this time. Would not recommend HD initiation, if indicated. monitor U/O.   avoid ACEi/ARB/NSAIDs/nephrotoxics  dose all meds for eGFR <15ml/min  monitor BMP, LFTs daily

## 2019-03-30 NOTE — PROGRESS NOTE ADULT - ASSESSMENT
appreciate hepatology follow up. conservative mangment, goals of care.  continue conservative management.  prognosis poor

## 2019-03-30 NOTE — PROGRESS NOTE ADULT - SUBJECTIVE AND OBJECTIVE BOX
Patient seen and examined at bedside  No new acute events noted overnight  Case discussed with medical team    HPI:  89 yo M, poor historian, charts reviewed, with PMHx BPH, Cataracts, Diverticulosis, Anemia 2/2 GI Bleed with previous PRBC transfusions, who p/w 2 days genearlized weakness, urinary retention and confusion.  Upon arrival to the ED, pt with scleral icterus and labs significant for transaminitis and TBili > 20 (15 Mar 2019 06:59)      PAST MEDICAL & SURGICAL HISTORY:  Chronic kidney disease (CKD), stage IV (severe)  Benign prostatic hypertrophy  S/P cataract surgery, left: 3 yrs ago      No Known Allergies       MEDICATIONS  (STANDING):  dextrose 5% 1000 milliLiter(s) (75 mL/Hr) IV Continuous <Continuous>  lactulose Syrup 10 Gram(s) Enteral Tube two times a day  lidocaine   Patch 1 Patch Transdermal daily  meropenem  IVPB 500 milliGRAM(s) IV Intermittent every 12 hours  rifaximin 550 milliGRAM(s) Oral two times a day  ursodiol Capsule 300 milliGRAM(s) Oral two times a day    MEDICATIONS  (PRN):      REVIEW OF SYSTEMS: limited from pt 2/2 medical state    T(C): 36.4 (03-30-19 @ 05:44), Max: 36.7 (03-29-19 @ 22:50)  HR: 85 (03-30-19 @ 05:44) (77 - 85)  BP: 122/78 (03-30-19 @ 05:44) (122/78 - 142/84)  RR: 18 (03-30-19 @ 05:44) (17 - 19)  SpO2: 99% (03-30-19 @ 05:44) (96% - 99%)    PHYSICAL EXAMINATION:   Constitutional: stable ill appearance  HEENT: bitemp wasting  Neck:  Supple  Respiratory:  Adequate airflow b/l. Not using accessory muscles of respiration.  Cardiovascular:  systolic murmur, S1 & S2 intact, no R/G, 2+ radial pulses b/l  Gastrointestinal: Soft, normoactive b.s.,  Extremities: frail, muscle wasting diffuse, poor skin elasticity and turgor  Neurological:  Arousable, lethargic, stable neuro status otherwise.     Labs and imaging reviewed    LABS:                        8.3    9.13  )-----------( 111      ( 29 Mar 2019 07:20 )             23.7     03-29    147<H>  |  111<H>  |  53<H>  ----------------------------<  113<H>  3.0<L>   |  16<L>  |  3.27<H>    Ca    8.6      29 Mar 2019 07:20  Phos  2.6     03-29  Mg     2.2     03-29    TPro  5.7<L>  /  Alb  2.1<L>  /  TBili  20.4<H>  /  DBili  x   /  AST  170<H>  /  ALT  146<H>  /  AlkPhos  266<H>  03-29        PT/INR - ( 29 Mar 2019 07:20 )   PT: 28.8 SEC;   INR: 2.46          PTT - ( 29 Mar 2019 07:20 )  PTT:64.0 SEC    CAPILLARY BLOOD GLUCOSE            LIVER FUNCTIONS - ( 29 Mar 2019 07:20 )  Alb: 2.1 g/dL / Pro: 5.7 g/dL / ALK PHOS: 266 u/L / ALT: 146 u/L / AST: 170 u/L / GGT: x           ABG - ( 29 Mar 2019 08:50 )  pH, Arterial: 7.44  pH, Blood: x     /  pCO2: 29    /  pO2: 156   / HCO3: 21    / Base Excess: -4.2  /  SaO2: 99.2                RADIOLOGY & ADDITIONAL STUDIES:

## 2019-03-30 NOTE — PROGRESS NOTE ADULT - ATTENDING COMMENTS
CARDIOLOGY ATTENDING    Agree with above. No further inpatient cardiac workup needed. F/U palliative care

## 2019-03-30 NOTE — PROGRESS NOTE ADULT - SUBJECTIVE AND OBJECTIVE BOX
Patient is seen and examined at the bed side, is normothermic. The mental status has worsens, obtunded.  The WBC count stay normal. The LFTS are trending down but Bilirubin stay elevated.       REVIEW OF SYSTEMS: Unable to obtain due to mental status        ALLERGIES: No Known Allergies      Vital Signs Last 24 Hrs  T(C): 36.9 (30 Mar 2019 17:20), Max: 36.9 (30 Mar 2019 17:20)  T(F): 98.5 (30 Mar 2019 17:20), Max: 98.5 (30 Mar 2019 17:20)  HR: 89 (30 Mar 2019 17:20) (74 - 89)  BP: 104/55 (30 Mar 2019 17:20) (101/59 - 129/74)  BP(mean): --  RR: 18 (30 Mar 2019 17:20) (18 - 19)  SpO2: 98% (30 Mar 2019 17:20) (96% - 99%)      PHYSICAL EXAM:  GENERAL: Obtunded  HEENT: NGT in placed  CHEST/LUNG:  Air entry bilaterally  HEART: s1 and s2 present  ABDOMEN:  Nontender and  Nondistended  : Oshea catheter in placed  EXTREMITIES: No pedal  edema  CNS: obtunded          LABS:                        8.2    10.69 )-----------( 97       ( 30 Mar 2019 06:45 )             22.4                           8.3    9.13  )-----------( 111      ( 29 Mar 2019 07:20 )             23.7         03-30    144  |  109<H>  |  50<H>  ----------------------------<  92  3.3<L>   |  19<L>  |  3.18<H>    Ca    8.6      30 Mar 2019 06:45  Phos  2.6       Mg     2.2     30    TPro  5.5<L>  /  Alb  2.0<L>  /  TBili  19.7<H>  /  DBili  x   /  AST  170<H>  /  ALT  135<H>  /  AlkPhos  280<H>        03    147<H>  |  111<H>  |  53<H>  ----------------------------<  113<H>  3.0<L>   |  16<L>  |  3.27<H>    Ca    8.6      29 Mar 2019 07:20  Phos  2.6       Mg     2.2         TPro  5.7<L>  /  Alb  2.1<L>  /  TBili  20.4<H>  /  DBili  x   /  AST  170<H>  /  ALT  146<H>  /  AlkPhos  266<H>          MEDICATIONS  (STANDING):  dextrose 5% 1000 milliLiter(s) (75 mL/Hr) IV Continuous <Continuous>  lactulose Syrup 10 Gram(s) Enteral Tube two times a day  lidocaine   Patch 1 Patch Transdermal daily  meropenem  IVPB 500 milliGRAM(s) IV Intermittent every 12 hours  metoprolol tartrate 12.5 milliGRAM(s) Oral two times a day  rifaximin 550 milliGRAM(s) Oral two times a day  ursodiol Capsule 300 milliGRAM(s) Oral two times a day    MEDICATIONS  (PRN):      RADIOLOGY & ADDITIONAL TESTS:      3/24/19 : CT Chest No Cont (19 @ 18:32) : 4.4 cm enlarged ascending thoracic aorta. Small bilateral pleural effusions.      3/20/19 : Xray Chest 1 View- PORTABLE-Urgent (19 @ 09:37) Patchy opacity within the right lower lung may represent pneumonia. Subsegmental atelectasis at the left base. No pleural effusion or  pneumothorax. No pulmonary edema. The cardiac silhouette remains enlarged. Aortic aneurysm. Recommend  further evaluation with aortogram.      3/18/19 : MR MRCP No Cont (19 @ 14:22) Gallbladder is distended with cholelithiasis.  No choledocholithiasis or biliary ductal dilatation.      3/17/19 : NM Hepatobiliary Imaging (19 @ 12:21) Abnormal hepatobiliary scan suspicious for common bile duct obstruction. Hepatocellular dysfunction. No evidence of acute cholecystitis.      3/18/19 : US Abdomen Limited (19 @ 07:48) Gallbladder is distended with sludge and stones.  Mural thickening of the wall of the common bile duct, possibly  cholangitis. No biliary ductal dilatation.      3/15/19 : CT Abdomen and Pelvis No Cont (03.15.19 @ 09:39) Distended gallbladder. Consider further evaluation with ultrasound if  acute cholecystitis is a concern.  No discrete mass identified on this limited noncontrast study.          MICROBIOLOGY DATA:      Culture - Blood (19 @ 11:11)    Culture - Blood:   NO ORGANISMS ISOLATED  NO ORGANISMS ISOLATED AT 24 HOURS    Specimen Source: BLOOD PERIPHERAL        Culture - Fungal, Blood (19 @ 08:41)    Culture - Fungal, Blood:   CULTURE NEGATIVE FOR YEASTS AND MOLDS-PRELIMINARY RESULT  AFTER 24 HOURS INCUBATION    Specimen Source: BLOOD        Cytomegalovirus By PCR (19 @ 05:34)    Cytomegalovirus By PCR: 165 IU/mL    CMVPCR Lo.22: Assay lower limit of detection (LOD):  31.2 IU/mL (1.49 log10/mL)  Assay dynamic range:  50 to 156,000,000 (156M) CMV DNA IU/mL (1.70 log10/mL –  8.19 log10/mL)  Plasma CMV DNA Quantification by PCR using Abbott m2000:  The results of this test should be interpreted with  consideration of all clinical and laboratory findings. In  particular, caution should be used when interpreting low  level positive results when the test is used for  diagnostic purposes. Repeat testing on an additional  sample is recommended to confirm low level positive  results. A result of "Not Detected" does not preclude the  possibility of infection with CMV.  CMV DNA may be present  below the detection limit of assay. LogIU/mL        Culture - Urine (19 @ 15:21)    -  Vancomycin: S 2 CLOVIS    Culture - Urine:   ENTF^Enterococcus faecalis  COLONY COUNT: > = 100,000 CFU/ML    Culture - Urine:   Susceptibility not performed.    -  Tetra/Doxy: R >8 CLOVIS    -  Nitrofurantoin: S <=32 CLOVIS    -  Ampicillin: S <=2 CLOVIS    -  Ciprofloxacin: S <=1 CLOVIS    Specimen Source: URINE CATHETER    Organism Identification: Enterococcus faecalis  Staphylococcus sp.,coag neg    Organism: Enterococcus faecalis  COLONY COUNT: > = 100,000 CFU/ML    Organism: Staphylococcus sp.,coag neg  COLONY COUNT: > = 100,000 CFU/ML    Method Type: POSITIVE CLOVIS 29      Urinalysis (19 @ 13:25)    Color: ELSA    Urine Appearance: TURBID    Glucose: 100    Bilirubin: LARGE    Ketone - Urine: NEGATIVE    Specific Gravity: 1.020    Blood: MODERATE    pH - Urine: 6.0    Protein, Urine: 100    Urobilinogen: 4.0    Nitrite: NEGATIVE    Leukocyte Esterase Concentration: LARGE    Red Blood Cell - Urine: 3-5    White Blood Cell - Urine: >50    White Blood Cell Casts: 2-5/LPF    Epithelial Cells: OCC    Bacteria: MANY    Coarse Granular Casts: 0-2/LPF      MRSA Infection Control Screen (PCR) (19 @ 13:46)    MRSA Infection Control Screen (PCR): NOT DETECTED     REFERENCE RANGE:  MRSA DNA NOT DETECTED      Culture - Blood (03.15.19 @ 14:03)    Culture - Blood:   NO ORGANISMS ISOLATED  NO ORGANISMS ISOLATED AT 24 HOURS    Specimen Source: BLOOD Patient is seen and examined at the bed side, is normothermic. No change  in condition. The Family at the bed side. The Ammonia level is trending down. The repeat blood cultures are negative to date.        REVIEW OF SYSTEMS: Unable to obtain due to mental status        ALLERGIES: No Known Allergies        Vital Signs Last 24 Hrs  T(C): 36.9 (30 Mar 2019 17:20), Max: 36.9 (30 Mar 2019 17:20)  T(F): 98.5 (30 Mar 2019 17:20), Max: 98.5 (30 Mar 2019 17:20)  HR: 89 (30 Mar 2019 17:20) (74 - 89)  BP: 104/55 (30 Mar 2019 17:20) (101/59 - 129/74)  BP(mean): --  RR: 18 (30 Mar 2019 17:20) (18 - 19)  SpO2: 98% (30 Mar 2019 17:20) (96% - 99%)        PHYSICAL EXAM:  GENERAL: moaning intermittently   HEENT: NGT in placed  CHEST/LUNG:  Air entry bilaterally  HEART: s1 and s2 present  ABDOMEN:  Nontender and  Nondistended  : Oshea catheter in placed  EXTREMITIES: No pedal  edema  CNS: moaning intermittently           LABS:                        8.2    10.69 )-----------( 97       ( 30 Mar 2019 06:45 )             22.4                           8.3    9.13  )-----------( 111      ( 29 Mar 2019 07:20 )             23.7         03-30    144  |  109<H>  |  50<H>  ----------------------------<  92  3.3<L>   |  19<L>  |  3.18<H>    Ca    8.6      30 Mar 2019 06:45  Phos  2.6     29  Mg     2.2     30    TPro  5.5<L>  /  Alb  2.0<L>  /  TBili  19.7<H>  /  DBili  x   /  AST  170<H>  /  ALT  135<H>  /  AlkPhos  280<H>        03-29    147<H>  |  111<H>  |  53<H>  ----------------------------<  113<H>  3.0<L>   |  16<L>  |  3.27<H>    Ca    8.6      29 Mar 2019 07:20  Phos  2.6       Mg     2.2         TPro  5.7<L>  /  Alb  2.1<L>  /  TBili  20.4<H>  /  DBili  x   /  AST  170<H>  /  ALT  146<H>  /  AlkPhos  266<H>          Ammonia, Serum (19 @ 09:45)    Ammonia, Serum: 76: Ammonia levels will be falsely elevated if specimen is NOT  collected, processed and maintained at 4-8 C. umol/L        Ammonia, Serum (19 @ 17:30)    Ammonia, Serum: 130: Ammonia levels will be falsely elevated if specimen is NOT  collected, processed and maintained at 4-8 C. umol/L        MEDICATIONS  (STANDING):  dextrose 5% 1000 milliLiter(s) (75 mL/Hr) IV Continuous <Continuous>  lactulose Syrup 10 Gram(s) Enteral Tube two times a day  lidocaine   Patch 1 Patch Transdermal daily  meropenem  IVPB 500 milliGRAM(s) IV Intermittent every 12 hours  metoprolol tartrate 12.5 milliGRAM(s) Oral two times a day  rifaximin 550 milliGRAM(s) Oral two times a day  ursodiol Capsule 300 milliGRAM(s) Oral two times a day    MEDICATIONS  (PRN):      RADIOLOGY & ADDITIONAL TESTS:      3/24/19 : CT Chest No Cont (19 @ 18:32) : 4.4 cm enlarged ascending thoracic aorta. Small bilateral pleural effusions.      3/20/19 : Xray Chest 1 View- PORTABLE-Urgent (19 @ 09:37) Patchy opacity within the right lower lung may represent pneumonia. Subsegmental atelectasis at the left base. No pleural effusion or  pneumothorax. No pulmonary edema. The cardiac silhouette remains enlarged. Aortic aneurysm. Recommend  further evaluation with aortogram.      3/18/19 : MR MRCP No Cont (19 @ 14:22) Gallbladder is distended with cholelithiasis.  No choledocholithiasis or biliary ductal dilatation.      3/17/19 : NM Hepatobiliary Imaging (19 @ 12:21) Abnormal hepatobiliary scan suspicious for common bile duct obstruction. Hepatocellular dysfunction. No evidence of acute cholecystitis.      3/18/19 : US Abdomen Limited (19 @ 07:48) Gallbladder is distended with sludge and stones.  Mural thickening of the wall of the common bile duct, possibly  cholangitis. No biliary ductal dilatation.      3/15/19 : CT Abdomen and Pelvis No Cont (03.15.19 @ 09:39) Distended gallbladder. Consider further evaluation with ultrasound if  acute cholecystitis is a concern.  No discrete mass identified on this limited noncontrast study.          MICROBIOLOGY DATA:      Culture - Blood (19 @ 11:11)    Culture - Blood:   NO ORGANISMS ISOLATED  NO ORGANISMS ISOLATED AT 24 HOURS    Specimen Source: BLOOD PERIPHERAL        Culture - Fungal, Blood (19 @ 08:41)    Culture - Fungal, Blood:   CULTURE NEGATIVE FOR YEASTS AND MOLDS-PRELIMINARY RESULT  AFTER 24 HOURS INCUBATION    Specimen Source: BLOOD        Cytomegalovirus By PCR (19 @ 05:34)    Cytomegalovirus By PCR: 165 IU/mL    CMVPCR Lo.22: Assay lower limit of detection (LOD):  31.2 IU/mL (1.49 log10/mL)  Assay dynamic range:  50 to 156,000,000 (156M) CMV DNA IU/mL (1.70 log10/mL –  8.19 log10/mL)  Plasma CMV DNA Quantification by PCR using Abbott m2000:  The results of this test should be interpreted with  consideration of all clinical and laboratory findings. In  particular, caution should be used when interpreting low  level positive results when the test is used for  diagnostic purposes. Repeat testing on an additional  sample is recommended to confirm low level positive  results. A result of "Not Detected" does not preclude the  possibility of infection with CMV.  CMV DNA may be present  below the detection limit of assay. LogIU/mL        Culture - Urine (19 @ 15:21)    -  Vancomycin: S 2 CLOVIS    Culture - Urine:   ENTF^Enterococcus faecalis  COLONY COUNT: > = 100,000 CFU/ML    Culture - Urine:   Susceptibility not performed.    -  Tetra/Doxy: R >8 CLOVIS    -  Nitrofurantoin: S <=32 CLOVIS    -  Ampicillin: S <=2 CLOVIS    -  Ciprofloxacin: S <=1 CLOVIS    Specimen Source: URINE CATHETER    Organism Identification: Enterococcus faecalis  Staphylococcus sp.,coag neg    Organism: Enterococcus faecalis  COLONY COUNT: > = 100,000 CFU/ML    Organism: Staphylococcus sp.,coag neg  COLONY COUNT: > = 100,000 CFU/ML    Method Type: POSITIVE CLOVIS 29      Urinalysis (19 @ 13:25)    Color: ELSA    Urine Appearance: TURBID    Glucose: 100    Bilirubin: LARGE    Ketone - Urine: NEGATIVE    Specific Gravity: 1.020    Blood: MODERATE    pH - Urine: 6.0    Protein, Urine: 100    Urobilinogen: 4.0    Nitrite: NEGATIVE    Leukocyte Esterase Concentration: LARGE    Red Blood Cell - Urine: 3-5    White Blood Cell - Urine: >50    White Blood Cell Casts: 2-5/LPF    Epithelial Cells: OCC    Bacteria: MANY    Coarse Granular Casts: 0-2/LPF      MRSA Infection Control Screen (PCR) (19 @ 13:46)    MRSA Infection Control Screen (PCR): NOT DETECTED     REFERENCE RANGE:  MRSA DNA NOT DETECTED      Culture - Blood (03.15.19 @ 14:03)    Culture - Blood:   NO ORGANISMS ISOLATED  NO ORGANISMS ISOLATED AT 24 HOURS    Specimen Source: BLOOD

## 2019-03-30 NOTE — PROGRESS NOTE ADULT - SUBJECTIVE AND OBJECTIVE BOX
pt seen and examined, no complaints, ROS - .     dextrose 5% 1000 milliLiter(s) IV Continuous <Continuous>  lactulose Syrup 10 Gram(s) Enteral Tube two times a day  lidocaine   Patch 1 Patch Transdermal daily  meropenem  IVPB 500 milliGRAM(s) IV Intermittent every 12 hours  rifaximin 550 milliGRAM(s) Oral two times a day  ursodiol Capsule 300 milliGRAM(s) Oral two times a day                            8.3    9.13  )-----------( 111      ( 29 Mar 2019 07:20 )             23.7       Hemoglobin: 8.3 g/dL (03-29 @ 07:20)  Hemoglobin: 8.4 g/dL (03-28 @ 05:34)  Hemoglobin: 9.0 g/dL (03-27 @ 06:55)  Hemoglobin: 8.8 g/dL (03-26 @ 06:50)  Hemoglobin: 9.0 g/dL (03-25 @ 07:30)      03-29    147<H>  |  111<H>  |  53<H>  ----------------------------<  113<H>  3.0<L>   |  16<L>  |  3.27<H>    Ca    8.6      29 Mar 2019 07:20  Phos  2.6     03-29  Mg     2.2     03-29    TPro  5.7<L>  /  Alb  2.1<L>  /  TBili  20.4<H>  /  DBili  x   /  AST  170<H>  /  ALT  146<H>  /  AlkPhos  266<H>  03-29    Creatinine Trend: 3.27<--, 3.17<--, 2.98<--, 2.64<--, 2.18<--, 2.08<--    COAGS:           T(C): 36.4 (03-30-19 @ 05:44), Max: 36.7 (03-29-19 @ 22:50)  HR: 85 (03-30-19 @ 05:44) (77 - 85)  BP: 122/78 (03-30-19 @ 05:44) (122/78 - 142/84)  RR: 18 (03-30-19 @ 05:44) (17 - 19)  SpO2: 99% (03-30-19 @ 05:44) (96% - 99%)  Wt(kg): --    I&O's Summary    28 Mar 2019 07:01  -  29 Mar 2019 07:00  --------------------------------------------------------  IN: 0 mL / OUT: 1055 mL / NET: -1055 mL    29 Mar 2019 07:01  -  30 Mar 2019 06:15  --------------------------------------------------------  IN: 0 mL / OUT: 400 mL / NET: -400 mL      Heart: normal S1, S2, RRR, no m/r/g  Lungs: cta b/l  Abd: soft nT  Ext: no edema     TELEMETRY: SR	60's    RADIOLOGY:  < from: Xray Chest 1 View- PORTABLE-Urgent (03.20.19 @ 09:37) >  FINDINGS/  IMPRESSION:    Patchy opacity within the right lower lung may represent pneumonia.   Subsegmental atelectasis at the left base. No pleural effusion or   pneumothorax. No pulmonary edema.    The cardiac silhouette remains enlarged. Aortic aneurysm. Recommend   further evaluation with aortogram.    < end of copied text >    < from: Transthoracic Echocardiogram (03.23.19 @ 09:36) >  CONCLUSIONS:  1. Mitral annular calcification, otherwise normal mitral  valve. Mild mitral regurgitation.  2. Aortic valve leaflet morphology not well visualized;  appears calcified with normal opening. Moderate aortic  regurgitation.  3. Normal left ventricular internal dimensions and wall  thicknesses.  4. Hyperdynamic left ventricle.  5. Mild diastolic dysfunction (Stage I).  6. Normal right ventricular size and function.  7. Normal tricuspid valve. Mild tricuspid regurgitation.  8. Estimated pulmonary artery systolic pressure equals 36  mm Hg, assuming right atrial pressure equals 10  mm Hg,  consistent with borderline pulmonary hypertension.  *** Compared with echocardiogram of 6/11/2018,no  significant changes noted.  ------------------------------------------------------------------------  Confirmed on  3/23/2019 - 11:19:58 by Evaristo Castanon MD, [facility],  FASE, RPVI    < end of copied text >      ASSESSMENT/PLAN: 	    89 yo M, poor historian, charts reviewed, with PMHx BPH, Cataracts, Diverticulosis, Anemia 2/2 GI Bleed with previous PRBC transfusions, who p/w 2 days generalized  weakness, urinary retention and confusion. LFTs significant for transaminitis, scleral jaundice.     -pt. tolerated biopsy well in terms of cv perspective  -no clinical heart failure or anginal symptoms  -TTE with moderate AI and normal LV function - medical management of valvular disease recommended  -CT chest with 4.5cm ascending aortic aneurysm - can start metoprolol 12.5 mg PO bid   - Palliative follow up,   D/W Dr Poole

## 2019-03-30 NOTE — PROGRESS NOTE ADULT - SUBJECTIVE AND OBJECTIVE BOX
PASCUAL OLSEN:[id number],   89yMale followed for:  No Known Allergies    PAST MEDICAL & SURGICAL HISTORY:  Chronic kidney disease (CKD), stage IV (severe)  Benign prostatic hypertrophy  S/P cataract surgery, left: 3 yrs ago    FAMILY HISTORY:  No pertinent family history in first degree relatives    MEDICATIONS  (STANDING):  dextrose 5% 1000 milliLiter(s) (75 mL/Hr) IV Continuous <Continuous>  lactulose Syrup 10 Gram(s) Enteral Tube two times a day  lidocaine   Patch 1 Patch Transdermal daily  meropenem  IVPB 500 milliGRAM(s) IV Intermittent every 12 hours  rifaximin 550 milliGRAM(s) Oral two times a day  ursodiol Capsule 300 milliGRAM(s) Oral two times a day    MEDICATIONS  (PRN):      Vital Signs Last 24 Hrs  T(C): 36.3 (30 Mar 2019 09:55), Max: 36.7 (29 Mar 2019 22:50)  T(F): 97.4 (30 Mar 2019 09:55), Max: 98 (29 Mar 2019 22:50)  HR: 74 (30 Mar 2019 09:55) (74 - 85)  BP: 101/59 (30 Mar 2019 09:55) (101/59 - 133/65)  BP(mean): --  RR: 18 (30 Mar 2019 09:55) (17 - 19)  SpO2: 98% (30 Mar 2019 09:55) (96% - 99%)  nc/at  s1s2  cta  soft, nt, nd no guarding or rebound  no c/c/e    CBC Full  -  ( 30 Mar 2019 06:45 )  WBC Count : 10.69 K/uL  RBC Count : 2.99 M/uL  Hemoglobin : 8.2 g/dL  Hematocrit : 22.4 %  Platelet Count - Automated : 97 K/uL  Mean Cell Volume : 74.9 fL  Mean Cell Hemoglobin : 27.4 pg  Mean Cell Hemoglobin Concentration : 36.6 %  Auto Neutrophil # : x  Auto Lymphocyte # : x  Auto Monocyte # : x  Auto Eosinophil # : x  Auto Basophil # : x  Auto Neutrophil % : x  Auto Lymphocyte % : x  Auto Monocyte % : x  Auto Eosinophil % : x  Auto Basophil % : x    03-30    144  |  109<H>  |  50<H>  ----------------------------<  92  3.3<L>   |  19<L>  |  3.18<H>    Ca    8.6      30 Mar 2019 06:45  Phos  2.6     03-29  Mg     2.2     03-30    TPro  5.5<L>  /  Alb  2.0<L>  /  TBili  19.7<H>  /  DBili  x   /  AST  170<H>  /  ALT  135<H>  /  AlkPhos  280<H>  03-30    PT/INR - ( 30 Mar 2019 06:45 )   PT: 28.5 SEC;   INR: 2.43          PTT - ( 30 Mar 2019 06:45 )  PTT:65.0 SEC

## 2019-03-30 NOTE — PROGRESS NOTE ADULT - ASSESSMENT
A 89 yo Male with BPH, Cataracts, Diverticulosis, Anemia 2/2 GI Bleed with previous PRBC transfusions, who presents to the ER for evaluation of  generalized  weakness, urinary retention and confusion. On admission, he found to have elevated bilirubin, LFTS, transaminitis, scleral jaundice. and urinary retention. Oshea catheter has placed with negative Urine analysis. The CT abdomen  and pelvis shows Distended gallbladder. Consider further evaluation with ultrasound if  acute cholecystitis is a concern. No discrete mass identified on this limited noncontrast study. The ID consult requested to assist with evaluation of Biliary sepsis.    # Encephalopathy  # Urinary retention  # R/O biliary sepsis/ Cholangitis - Most likely acute cholestatic hepatitis as per GI -s/p  transjugular liver biopsy with two passes. and found to have small right atrium blood clot. 3/21/19  # Distended GB with cholelithiasis on MRCP 3/18/19  # Hypothermia- Pneumonia on CXR - MRSA PCR is negative   # UTI- Enterococcus faecalis 3/20/19  # Liver biopsy result - Not in the system yet  # Encephalopathy- elevated ammonia    would recommend:    1. Aspiration precaution and follow up repeat cultures  2. Continue IV Meropenem, renally adjusted doses  3. Supplemental oxygenation and  bronchodilator as needed  4. Monitor kidney function   5. Management of cholestatic hepatitis as per GI    d/w primary team    -Prognosis is poor in the setting of Liver failure , HCAP and UTI A 89 yo Male with BPH, Cataracts, Diverticulosis, Anemia 2/2 GI Bleed with previous PRBC transfusions, who presents to the ER for evaluation of  generalized  weakness, urinary retention and confusion. On admission, he found to have elevated bilirubin, LFTS, transaminitis, scleral jaundice. and urinary retention. Flores catheter has placed with negative Urine analysis. The CT abdomen  and pelvis shows Distended gallbladder. Consider further evaluation with ultrasound if  acute cholecystitis is a concern. No discrete mass identified on this limited noncontrast study. The ID consult requested to assist with evaluation of Biliary sepsis.    # Encephalopathy  # Urinary retention - s/p flores catheter   # R/O biliary sepsis/ Cholangitis - Most likely acute cholestatic hepatitis as per GI -s/p  transjugular liver biopsy with two passes. and found to have small right atrium blood clot. 3/21/19  # Distended GB with cholelithiasis on MRCP 3/18/19  # Hypothermia- Pneumonia on CXR - MRSA PCR is negative   # UTI- Enterococcus faecalis 3/20/19  # Liver biopsy result - Not in the system yet  # Hepatic Encephalopathy- elevated Ammonia, most    would recommend:    1. Aspiration precaution   2. Continue IV Meropenem, renally adjusted doses to complete the course  3. Supplemental oxygenation and  bronchodilator as needed  4. Monitor kidney function   5. Management of cholestatic hepatitis as per GI    -Prognosis guarded for a meaningful recovery, in the setting of End stage Liver disease , elevated Ammonia. May benefit from Palliative care evaluation    d/w Family at the bed side

## 2019-03-30 NOTE — PROGRESS NOTE ADULT - ASSESSMENT
89 yo M with PMHx CKD, BPH, Diverticulosis, Anemia 2/2 GI Bleed with previous PRBC transfusions, who p/w 2 days genearlized weakness, urinary retention and confusion, found to have elevated LFTs. Renal following for PHU, CKD management.    PHU on CKD 3 b/l Cr 1.4-1.6 10/2018  PHU intial thought to be pre renal, now w/worsening RFT-most likely 2/2 ATN    Cr high but stable today  Low C3 2/2 liver dz. no e/o acute GN  Urinary retention on CT w/o hydronephrosis. w/flores.   M acidosis- 2/2 PHU-    Transaminitis and Bilirubinemia s/p liver Bx by IR 3/21 likely drug induced per GI  AMS most liekly 2/2 Hepatic encephalopathy not from uremia- sr NH4 level 100. on lactulose -per GI  HTN, controlled. bp stable. off Norvasc  BPH: c/w flomax    labs, chart reviewed  overall prognosis poor. palliative eval  will f/u

## 2019-03-31 DIAGNOSIS — E87.6 HYPOKALEMIA: ICD-10-CM

## 2019-03-31 LAB
ALBUMIN SERPL ELPH-MCNC: 1.7 G/DL — LOW (ref 3.3–5)
ALBUMIN SERPL ELPH-MCNC: 2 G/DL — LOW (ref 3.3–5)
ALP SERPL-CCNC: 236 U/L — HIGH (ref 40–120)
ALP SERPL-CCNC: 280 U/L — HIGH (ref 40–120)
ALT FLD-CCNC: 118 U/L — HIGH (ref 4–41)
ALT FLD-CCNC: 98 U/L — HIGH (ref 4–41)
AMMONIA BLD-MCNC: 66 UMOL/L — HIGH (ref 11–55)
ANION GAP SERPL CALC-SCNC: 16 MMO/L — HIGH (ref 7–14)
ANION GAP SERPL CALC-SCNC: 17 MMO/L — HIGH (ref 7–14)
APTT BLD: 88 SEC — HIGH (ref 27.5–36.3)
AST SERPL-CCNC: 140 U/L — HIGH (ref 4–40)
AST SERPL-CCNC: 176 U/L — HIGH (ref 4–40)
BILIRUB SERPL-MCNC: 16.9 MG/DL — HIGH (ref 0.2–1.2)
BILIRUB SERPL-MCNC: 20.1 MG/DL — HIGH (ref 0.2–1.2)
BLD GP AB SCN SERPL QL: NEGATIVE — SIGNIFICANT CHANGE UP
BUN SERPL-MCNC: 51 MG/DL — HIGH (ref 7–23)
BUN SERPL-MCNC: 54 MG/DL — HIGH (ref 7–23)
CALCIUM SERPL-MCNC: 7.7 MG/DL — LOW (ref 8.4–10.5)
CALCIUM SERPL-MCNC: 8.6 MG/DL — SIGNIFICANT CHANGE UP (ref 8.4–10.5)
CHLORIDE SERPL-SCNC: 108 MMOL/L — HIGH (ref 98–107)
CHLORIDE SERPL-SCNC: 112 MMOL/L — HIGH (ref 98–107)
CLOSURE TME COLL+EPINEP BLD: 55 K/UL — LOW (ref 150–400)
CO2 SERPL-SCNC: 20 MMOL/L — LOW (ref 22–31)
CO2 SERPL-SCNC: 20 MMOL/L — LOW (ref 22–31)
CREAT SERPL-MCNC: 3.2 MG/DL — HIGH (ref 0.5–1.3)
CREAT SERPL-MCNC: 3.39 MG/DL — HIGH (ref 0.5–1.3)
FIBRINOGEN PPP-MCNC: 131 MG/DL — LOW (ref 350–510)
GLUCOSE SERPL-MCNC: 96 MG/DL — SIGNIFICANT CHANGE UP (ref 70–99)
GLUCOSE SERPL-MCNC: 99 MG/DL — SIGNIFICANT CHANGE UP (ref 70–99)
HCT VFR BLD CALC: 17.6 % — CRITICAL LOW (ref 39–50)
HCT VFR BLD CALC: 24.2 % — LOW (ref 39–50)
HGB BLD-MCNC: 6.4 G/DL — CRITICAL LOW (ref 13–17)
HGB BLD-MCNC: 8.8 G/DL — LOW (ref 13–17)
INR BLD: 2.7 — HIGH (ref 0.88–1.17)
MAGNESIUM SERPL-MCNC: 2.1 MG/DL — SIGNIFICANT CHANGE UP (ref 1.6–2.6)
MAGNESIUM SERPL-MCNC: 2.3 MG/DL — SIGNIFICANT CHANGE UP (ref 1.6–2.6)
MCHC RBC-ENTMCNC: 27.8 PG — SIGNIFICANT CHANGE UP (ref 27–34)
MCHC RBC-ENTMCNC: 27.8 PG — SIGNIFICANT CHANGE UP (ref 27–34)
MCHC RBC-ENTMCNC: 36.4 % — HIGH (ref 32–36)
MCHC RBC-ENTMCNC: 36.4 % — HIGH (ref 32–36)
MCV RBC AUTO: 76.3 FL — LOW (ref 80–100)
MCV RBC AUTO: 76.5 FL — LOW (ref 80–100)
NRBC # FLD: 1.65 K/UL — SIGNIFICANT CHANGE UP (ref 0–0)
NRBC # FLD: 2.09 K/UL — SIGNIFICANT CHANGE UP (ref 0–0)
NRBC FLD-RTO: 18 — SIGNIFICANT CHANGE UP
NRBC FLD-RTO: 20.2 — SIGNIFICANT CHANGE UP
PHOSPHATE SERPL-MCNC: 3.5 MG/DL — SIGNIFICANT CHANGE UP (ref 2.5–4.5)
PLATELET # BLD AUTO: 61 K/UL — LOW (ref 150–400)
PLATELET # BLD AUTO: 74 K/UL — LOW (ref 150–400)
PMV BLD: SIGNIFICANT CHANGE UP FL (ref 7–13)
PMV BLD: SIGNIFICANT CHANGE UP FL (ref 7–13)
POTASSIUM SERPL-MCNC: 2.9 MMOL/L — CRITICAL LOW (ref 3.5–5.3)
POTASSIUM SERPL-MCNC: 3.6 MMOL/L — SIGNIFICANT CHANGE UP (ref 3.5–5.3)
POTASSIUM SERPL-SCNC: 2.9 MMOL/L — CRITICAL LOW (ref 3.5–5.3)
POTASSIUM SERPL-SCNC: 3.6 MMOL/L — SIGNIFICANT CHANGE UP (ref 3.5–5.3)
PROT SERPL-MCNC: 4.5 G/DL — LOW (ref 6–8.3)
PROT SERPL-MCNC: 5.4 G/DL — LOW (ref 6–8.3)
PROTHROM AB SERPL-ACNC: 31.8 SEC — HIGH (ref 9.8–13.1)
RBC # BLD: 2.3 M/UL — LOW (ref 4.2–5.8)
RBC # BLD: 3.17 M/UL — LOW (ref 4.2–5.8)
RBC # FLD: 27.6 % — HIGH (ref 10.3–14.5)
RBC # FLD: 28.1 % — HIGH (ref 10.3–14.5)
RH IG SCN BLD-IMP: POSITIVE — SIGNIFICANT CHANGE UP
SODIUM SERPL-SCNC: 144 MMOL/L — SIGNIFICANT CHANGE UP (ref 135–145)
SODIUM SERPL-SCNC: 149 MMOL/L — HIGH (ref 135–145)
SPECIMEN SOURCE: SIGNIFICANT CHANGE UP
WBC # BLD: 11.6 K/UL — HIGH (ref 3.8–10.5)
WBC # BLD: 8.18 K/UL — SIGNIFICANT CHANGE UP (ref 3.8–10.5)
WBC # FLD AUTO: 11.6 K/UL — HIGH (ref 3.8–10.5)
WBC # FLD AUTO: 8.18 K/UL — SIGNIFICANT CHANGE UP (ref 3.8–10.5)

## 2019-03-31 PROCEDURE — 99232 SBSQ HOSP IP/OBS MODERATE 35: CPT | Mod: GC

## 2019-03-31 RX ORDER — POTASSIUM CHLORIDE 20 MEQ
10 PACKET (EA) ORAL
Qty: 0 | Refills: 0 | Status: COMPLETED | OUTPATIENT
Start: 2019-03-31 | End: 2019-03-31

## 2019-03-31 RX ORDER — POTASSIUM CHLORIDE 20 MEQ
20 PACKET (EA) ORAL ONCE
Qty: 0 | Refills: 0 | Status: DISCONTINUED | OUTPATIENT
Start: 2019-03-31 | End: 2019-03-31

## 2019-03-31 RX ADMIN — SODIUM CHLORIDE 75 MILLILITER(S): 9 INJECTION, SOLUTION INTRAVENOUS at 21:50

## 2019-03-31 RX ADMIN — Medication 100 MILLIEQUIVALENT(S): at 12:05

## 2019-03-31 RX ADMIN — MEROPENEM 100 MILLIGRAM(S): 1 INJECTION INTRAVENOUS at 17:10

## 2019-03-31 RX ADMIN — Medication 100 MILLIEQUIVALENT(S): at 10:21

## 2019-03-31 RX ADMIN — Medication 100 MILLIEQUIVALENT(S): at 09:31

## 2019-03-31 RX ADMIN — URSODIOL 300 MILLIGRAM(S): 250 TABLET, FILM COATED ORAL at 17:10

## 2019-03-31 RX ADMIN — LIDOCAINE 1 PATCH: 4 CREAM TOPICAL at 01:25

## 2019-03-31 RX ADMIN — Medication 110 MILLIGRAM(S): at 13:27

## 2019-03-31 RX ADMIN — MEROPENEM 100 MILLIGRAM(S): 1 INJECTION INTRAVENOUS at 06:51

## 2019-03-31 RX ADMIN — LACTULOSE 10 GRAM(S): 10 SOLUTION ORAL at 17:09

## 2019-03-31 RX ADMIN — LACTULOSE 10 GRAM(S): 10 SOLUTION ORAL at 06:50

## 2019-03-31 RX ADMIN — URSODIOL 300 MILLIGRAM(S): 250 TABLET, FILM COATED ORAL at 06:51

## 2019-03-31 NOTE — PROGRESS NOTE ADULT - ASSESSMENT
89 yo M with PMHx CKD, BPH, Diverticulosis, Anemia 2/2 GI Bleed with previous PRBC transfusions, who p/w 2 days genearlized weakness, urinary retention and confusion, found to have elevated LFTs. Renal following for PHU, CKD management.    PHU on CKD 3 b/l Cr 1.4-1.6 10/2018  PHU intial thought to be pre renal, now w/worsening RFT-most likely 2/2 ATN    Cr high but stable today  Low C3 2/2 liver dz. no e/o acute GN  Urinary retention on CT w/o hydronephrosis. w/flores.   check urine lytes  M acidosis- 2/2 PHU-  Hypokalemia    Transaminitis and Bilirubinemia s/p liver Bx by IR 3/21 likely drug induced per GI  AMS most liekly 2/2 Hepatic encephalopathy not from uremia- sr NH4 level 100. on lactulose -per GI  HTN, controlled. bp stable. off Norvasc  BPH: c/w flomax    labs, chart reviewed  overall prognosis poor. palliative eval  will f/u

## 2019-03-31 NOTE — PROGRESS NOTE ADULT - ASSESSMENT
Impression:     1. Acute liver injury with marked  hyperbilirubinemia: DILI vs. viral. Rule out leptospirosis, Hepatitis E, Drug induced autoimmune.     2. Kidney failure    3. Encephalopathy ? multifactorial, rule out sepsis    Recommendation:  -would suggest extensive infectious workup including repeat blood cultures  -check FeNa  -check fibrinogen, factor V and X,  to rule out DIC  -continue lactulose xifaxan  -f/u hepatitis E IgM Impression:     1. Acute liver injury with marked  hyperbilirubinemia: DILI vs. viral. Rule out leptospirosis, Hepatitis E, Drug induced autoimmune.     2. Kidney failure    3. Encephalopathy ? multifactorial, rule out sepsis    Recommendation:  -would suggest extensive infectious workup including repeat blood cultures  -check FeNa  -check fibrinogen, factor V and X,  to rule out DIC  -continue lactulose xifaxan  -f/u hepatitis E IgM  - send CMV IGM   - start empiric treatment for leptospirosis with doxycycline

## 2019-03-31 NOTE — PROGRESS NOTE ADULT - SUBJECTIVE AND OBJECTIVE BOX
PASCUAL OLSEN:[id number],   89yMale followed for:  No Known Allergies    PAST MEDICAL & SURGICAL HISTORY:  Chronic kidney disease (CKD), stage IV (severe)  Benign prostatic hypertrophy  S/P cataract surgery, left: 3 yrs ago    FAMILY HISTORY:  No pertinent family history in first degree relatives    MEDICATIONS  (STANDING):  dextrose 5% 1000 milliLiter(s) (75 mL/Hr) IV Continuous <Continuous>  lactulose Syrup 10 Gram(s) Enteral Tube two times a day  lidocaine   Patch 1 Patch Transdermal daily  meropenem  IVPB 500 milliGRAM(s) IV Intermittent every 12 hours  metoprolol tartrate 12.5 milliGRAM(s) Oral two times a day  potassium chloride   Powder 20 milliEquivalent(s) Oral once  potassium chloride  10 mEq/100 mL IVPB 10 milliEquivalent(s) IV Intermittent every 1 hour  rifaximin 550 milliGRAM(s) Oral two times a day  ursodiol Capsule 300 milliGRAM(s) Oral two times a day    MEDICATIONS  (PRN):      Vital Signs Last 24 Hrs  T(C): 36.7 (31 Mar 2019 06:15), Max: 37.1 (30 Mar 2019 20:18)  T(F): 98.1 (31 Mar 2019 06:15), Max: 98.8 (30 Mar 2019 20:18)  HR: 68 (31 Mar 2019 06:15) (68 - 89)  BP: 95/56 (31 Mar 2019 06:15) (95/56 - 116/70)  BP(mean): --  RR: 16 (31 Mar 2019 06:15) (16 - 19)  SpO2: 97% (31 Mar 2019 06:15) (97% - 99%)  nc/at  s1s2  cta  soft, nt, nd no guarding or rebound  no c/c/e    CBC Full  -  ( 31 Mar 2019 07:07 )  WBC Count : 8.18 K/uL  RBC Count : 2.30 M/uL  Hemoglobin : 6.4 g/dL  Hematocrit : 17.6 %  Platelet Count - Automated : 61 K/uL  Mean Cell Volume : 76.5 fL  Mean Cell Hemoglobin : 27.8 pg  Mean Cell Hemoglobin Concentration : 36.4 %  Auto Neutrophil # : x  Auto Lymphocyte # : x  Auto Monocyte # : x  Auto Eosinophil # : x  Auto Basophil # : x  Auto Neutrophil % : x  Auto Lymphocyte % : x  Auto Monocyte % : x  Auto Eosinophil % : x  Auto Basophil % : x    03-31    149<H>  |  112<H>  |  51<H>  ----------------------------<  96  2.9<LL>   |  20<L>  |  3.20<H>    Ca    7.7<L>      31 Mar 2019 07:07  Mg     2.1     03-31    TPro  4.5<L>  /  Alb  1.7<L>  /  TBili  16.9<H>  /  DBili  x   /  AST  140<H>  /  ALT  98<H>  /  AlkPhos  236<H>  03-31    PT/INR - ( 31 Mar 2019 07:07 )   PT: 31.8 SEC;   INR: 2.70          PTT - ( 31 Mar 2019 07:07 )  PTT:88.0 SEC

## 2019-03-31 NOTE — PROGRESS NOTE ADULT - SUBJECTIVE AND OBJECTIVE BOX
Patient is seen and examined at the bed side, is normothermic.  The multiple blood cultures remains  negative. The Ammonia level is trending down. the creatinine stay elevated.        REVIEW OF SYSTEMS: Unable to obtain due to mental status        ALLERGIES: No Known Allergies        Vital Signs Last 24 Hrs  T(C): 36.5 (31 Mar 2019 14:12), Max: 37.1 (30 Mar 2019 20:18)  T(F): 97.7 (31 Mar 2019 14:12), Max: 98.8 (30 Mar 2019 20:18)  HR: 61 (31 Mar 2019 14:12) (61 - 78)  BP: 118/72 (31 Mar 2019 14:12) (95/56 - 121/76)  BP(mean): --  RR: 16 (31 Mar 2019 14:12) (15 - 19)  SpO2: 96% (31 Mar 2019 14:12) (96% - 99%)      PHYSICAL EXAM:  GENERAL: moaning intermittently   HEENT: NGT in placed  CHEST/LUNG:  Air entry bilaterally  HEART: s1 and s2 present  ABDOMEN:  Nontender and  Nondistended  : Oshea catheter in placed  EXTREMITIES: No pedal  edema  CNS: moaning intermittently           LABS:                        8.8    11.60 )-----------( 74       ( 31 Mar 2019 10:41 )             24.2                           8.2    10.69 )-----------( 97       ( 30 Mar 2019 06:45 )             22.4                      144  |  108<H>  |  54<H>  ----------------------------<  99  3.6   |  20<L>  |  3.39<H>    Ca    8.6      31 Mar 2019 11:38  Phos  3.5       Mg     2.3         TPro  5.4<L>  /  Alb  2.0<L>  /  TBili  20.1<H>  /  DBili  x   /  AST  176<H>  /  ALT  118<H>  /  AlkPhos  280<H>      144  |  109<H>  |  50<H>  ----------------------------<  92  3.3<L>   |  19<L>  |  3.18<H>    Ca    8.6      30 Mar 2019 06:45  Phos  2.6       Mg     2.2         TPro  5.5<L>  /  Alb  2.0<L>  /  TBili  19.7<H>  /  DBili  x   /  AST  170<H>  /  ALT  135<H>  /  AlkPhos  280<H>        Ammonia, Serum (19 @ 07:07)    Ammonia, Serum: 66: Ammonia levels will be falsely elevated if specimen is NOT  collected, processed and maintained at 4-8 C. umol/L        Ammonia, Serum (19 @ 09:45)    Ammonia, Serum: 76: Ammonia levels will be falsely elevated if specimen is NOT  collected, processed and maintained at 4-8 C. umol/L        Ammonia, Serum (19 @ 17:30)    Ammonia, Serum: 130: Ammonia levels will be falsely elevated if specimen is NOT  collected, processed and maintained at 4-8 C. umol/L        MEDICATIONS  (STANDING):  dextrose 5% 1000 milliLiter(s) (75 mL/Hr) IV Continuous <Continuous>  lactulose Syrup 10 Gram(s) Enteral Tube two times a day  lidocaine   Patch 1 Patch Transdermal daily  meropenem  IVPB 500 milliGRAM(s) IV Intermittent every 12 hours  metoprolol tartrate 12.5 milliGRAM(s) Oral two times a day  rifaximin 550 milliGRAM(s) Oral two times a day  ursodiol Capsule 300 milliGRAM(s) Oral two times a day    MEDICATIONS  (PRN):      RADIOLOGY & ADDITIONAL TESTS:      3/24/19 : CT Chest No Cont (19 @ 18:32) : 4.4 cm enlarged ascending thoracic aorta. Small bilateral pleural effusions.      3/20/19 : Xray Chest 1 View- PORTABLE-Urgent (19 @ 09:37) Patchy opacity within the right lower lung may represent pneumonia. Subsegmental atelectasis at the left base. No pleural effusion or  pneumothorax. No pulmonary edema. The cardiac silhouette remains enlarged. Aortic aneurysm. Recommend  further evaluation with aortogram.      3/18/19 : MR MRCP No Cont (19 @ 14:22) Gallbladder is distended with cholelithiasis.  No choledocholithiasis or biliary ductal dilatation.      3/17/19 : NM Hepatobiliary Imaging (19 @ 12:21) Abnormal hepatobiliary scan suspicious for common bile duct obstruction. Hepatocellular dysfunction. No evidence of acute cholecystitis.      3/18/19 : US Abdomen Limited (19 @ 07:48) Gallbladder is distended with sludge and stones.  Mural thickening of the wall of the common bile duct, possibly  cholangitis. No biliary ductal dilatation.      3/15/19 : CT Abdomen and Pelvis No Cont (03.15.19 @ 09:39) Distended gallbladder. Consider further evaluation with ultrasound if  acute cholecystitis is a concern.  No discrete mass identified on this limited noncontrast study.          MICROBIOLOGY DATA:      Culture - Blood (19 @ 11:11)    Culture - Blood:   NO ORGANISMS ISOLATED  NO ORGANISMS ISOLATED AT 24 HOURS    Specimen Source: BLOOD PERIPHERAL        Culture - Fungal, Blood (19 @ 08:41)    Culture - Fungal, Blood:   CULTURE NEGATIVE FOR YEASTS AND MOLDS-PRELIMINARY RESULT  AFTER 24 HOURS INCUBATION    Specimen Source: BLOOD        Cytomegalovirus By PCR (19 @ 05:34)    Cytomegalovirus By PCR: 165 IU/mL    CMVPCR Lo.22: Assay lower limit of detection (LOD):  31.2 IU/mL (1.49 log10/mL)  Assay dynamic range:  50 to 156,000,000 (156M) CMV DNA IU/mL (1.70 log10/mL –  8.19 log10/mL)  Plasma CMV DNA Quantification by PCR using Abbott m2000:  The results of this test should be interpreted with  consideration of all clinical and laboratory findings. In  particular, caution should be used when interpreting low  level positive results when the test is used for  diagnostic purposes. Repeat testing on an additional  sample is recommended to confirm low level positive  results. A result of "Not Detected" does not preclude the  possibility of infection with CMV.  CMV DNA may be present  below the detection limit of assay. LogIU/mL        Culture - Urine (19 @ 15:21)    -  Vancomycin: S 2 CLOVIS    Culture - Urine:   ENTF^Enterococcus faecalis  COLONY COUNT: > = 100,000 CFU/ML    Culture - Urine:   Susceptibility not performed.    -  Tetra/Doxy: R >8 CLOVIS    -  Nitrofurantoin: S <=32 CLOVIS    -  Ampicillin: S <=2 CLOVIS    -  Ciprofloxacin: S <=1 CLOVIS    Specimen Source: URINE CATHETER    Organism Identification: Enterococcus faecalis  Staphylococcus sp.,coag neg    Organism: Enterococcus faecalis  COLONY COUNT: > = 100,000 CFU/ML    Organism: Staphylococcus sp.,coag neg  COLONY COUNT: > = 100,000 CFU/ML    Method Type: POSITIVE CLOVIS 29      Urinalysis (19 @ 13:25)    Color: ELSA    Urine Appearance: TURBID    Glucose: 100    Bilirubin: LARGE    Ketone - Urine: NEGATIVE    Specific Gravity: 1.020    Blood: MODERATE    pH - Urine: 6.0    Protein, Urine: 100    Urobilinogen: 4.0    Nitrite: NEGATIVE    Leukocyte Esterase Concentration: LARGE    Red Blood Cell - Urine: 3-5    White Blood Cell - Urine: >50    White Blood Cell Casts: 2-5/LPF    Epithelial Cells: OCC    Bacteria: MANY    Coarse Granular Casts: 0-2/LPF      MRSA Infection Control Screen (PCR) (19 @ 13:46)    MRSA Infection Control Screen (PCR): NOT DETECTED     REFERENCE RANGE:  MRSA DNA NOT DETECTED      Culture - Blood (03.15.19 @ 14:03)    Culture - Blood:   NO ORGANISMS ISOLATED  NO ORGANISMS ISOLATED AT 24 HOURS    Specimen Source: BLOOD Patient is seen and examined at the bed side, is normothermic.  He is more alert  today since Ammonia level has trended down. The GI/ Hepatologist follow up appreciated regarding Leptospirosis work up.  Patient was on Unasyn/Augmentin from 3/21/19 to 3/29/19,  which is the treatment of choice for Moderate to severe Leptospirosis. If patient has Leptospirosis, then it should have been treated, AND also The multiple blood cultures remains negative.         REVIEW OF SYSTEMS: Unable to obtain due to mental status        ALLERGIES: No Known Allergies        Vital Signs Last 24 Hrs  T(C): 36.5 (31 Mar 2019 14:12), Max: 37.1 (30 Mar 2019 20:18)  T(F): 97.7 (31 Mar 2019 14:12), Max: 98.8 (30 Mar 2019 20:18)  HR: 61 (31 Mar 2019 14:12) (61 - 78)  BP: 118/72 (31 Mar 2019 14:12) (95/56 - 121/76)  BP(mean): --  RR: 16 (31 Mar 2019 14:12) (15 - 19)  SpO2: 96% (31 Mar 2019 14:12) (96% - 99%)        PHYSICAL EXAM:  GENERAL: More alert today  HEENT: NGT in placed  CHEST/LUNG:  Air entry bilaterally  HEART: s1 and s2 present  ABDOMEN:  Nontender and  Non distended  : Oshea catheter in placed  EXTREMITIES: No pedal  edema  CNS: More alert            LABS:                        8.8    11.60 )-----------( 74       ( 31 Mar 2019 10:41 )             24.2                           8.2    10.69 )-----------( 97       ( 30 Mar 2019 06:45 )             22.4                      144  |  108<H>  |  54<H>  ----------------------------<  99  3.6   |  20<L>  |  3.39<H>    Ca    8.6      31 Mar 2019 11:38  Phos  3.5       Mg     2.3         TPro  5.4<L>  /  Alb  2.0<L>  /  TBili  20.1<H>  /  DBili  x   /  AST  176<H>  /  ALT  118<H>  /  AlkPhos  280<H>      144  |  109<H>  |  50<H>  ----------------------------<  92  3.3<L>   |  19<L>  |  3.18<H>    Ca    8.6      30 Mar 2019 06:45  Phos  2.6       Mg     2.2         TPro  5.5<L>  /  Alb  2.0<L>  /  TBili  19.7<H>  /  DBili  x   /  AST  170<H>  /  ALT  135<H>  /  AlkPhos  280<H>        Ammonia, Serum (19 @ 07:07)    Ammonia, Serum: 66: Ammonia levels will be falsely elevated if specimen is NOT  collected, processed and maintained at 4-8 C. umol/L        Ammonia, Serum (19 @ 09:45)    Ammonia, Serum: 76: Ammonia levels will be falsely elevated if specimen is NOT  collected, processed and maintained at 4-8 C. umol/L        Ammonia, Serum (19 @ 17:30)    Ammonia, Serum: 130: Ammonia levels will be falsely elevated if specimen is NOT  collected, processed and maintained at 4-8 C. umol/L        MEDICATIONS  (STANDING):  dextrose 5% 1000 milliLiter(s) (75 mL/Hr) IV Continuous <Continuous>  lactulose Syrup 10 Gram(s) Enteral Tube two times a day  lidocaine   Patch 1 Patch Transdermal daily  meropenem  IVPB 500 milliGRAM(s) IV Intermittent every 12 hours  metoprolol tartrate 12.5 milliGRAM(s) Oral two times a day  rifaximin 550 milliGRAM(s) Oral two times a day  ursodiol Capsule 300 milliGRAM(s) Oral two times a day    MEDICATIONS  (PRN):      RADIOLOGY & ADDITIONAL TESTS:      3/24/19 : CT Chest No Cont (19 @ 18:32) : 4.4 cm enlarged ascending thoracic aorta. Small bilateral pleural effusions.      3/20/19 : Xray Chest 1 View- PORTABLE-Urgent (19 @ 09:37) Patchy opacity within the right lower lung may represent pneumonia. Subsegmental atelectasis at the left base. No pleural effusion or  pneumothorax. No pulmonary edema. The cardiac silhouette remains enlarged. Aortic aneurysm. Recommend  further evaluation with aortogram.      3/18/19 : MR MRCP No Cont (19 @ 14:22) Gallbladder is distended with cholelithiasis.  No choledocholithiasis or biliary ductal dilatation.      3/17/19 : NM Hepatobiliary Imaging (19 @ 12:21) Abnormal hepatobiliary scan suspicious for common bile duct obstruction. Hepatocellular dysfunction. No evidence of acute cholecystitis.      3/18/19 : US Abdomen Limited (19 @ 07:48) Gallbladder is distended with sludge and stones.  Mural thickening of the wall of the common bile duct, possibly  cholangitis. No biliary ductal dilatation.      3/15/19 : CT Abdomen and Pelvis No Cont (03.15.19 @ 09:39) Distended gallbladder. Consider further evaluation with ultrasound if  acute cholecystitis is a concern.  No discrete mass identified on this limited noncontrast study.          MICROBIOLOGY DATA:      Culture - Blood (19 @ 11:11)    Culture - Blood:   NO ORGANISMS ISOLATED  NO ORGANISMS ISOLATED AT 24 HOURS    Specimen Source: BLOOD PERIPHERAL        Culture - Fungal, Blood (19 @ 08:41)    Culture - Fungal, Blood:   CULTURE NEGATIVE FOR YEASTS AND MOLDS-PRELIMINARY RESULT  AFTER 24 HOURS INCUBATION    Specimen Source: BLOOD        Cytomegalovirus By PCR (19 @ 05:34)    Cytomegalovirus By PCR: 165 IU/mL    CMVPCR Lo.22: Assay lower limit of detection (LOD):  31.2 IU/mL (1.49 log10/mL)  Assay dynamic range:  50 to 156,000,000 (156M) CMV DNA IU/mL (1.70 log10/mL –  8.19 log10/mL)  Plasma CMV DNA Quantification by PCR using Abbott m2000:  The results of this test should be interpreted with  consideration of all clinical and laboratory findings. In  particular, caution should be used when interpreting low  level positive results when the test is used for  diagnostic purposes. Repeat testing on an additional  sample is recommended to confirm low level positive  results. A result of "Not Detected" does not preclude the  possibility of infection with CMV.  CMV DNA may be present  below the detection limit of assay. LogIU/mL        Culture - Urine (19 @ 15:21)    -  Vancomycin: S 2 CLOVIS    Culture - Urine:   ENTF^Enterococcus faecalis  COLONY COUNT: > = 100,000 CFU/ML    Culture - Urine:   Susceptibility not performed.    -  Tetra/Doxy: R >8 CLOVIS    -  Nitrofurantoin: S <=32 CLOVIS    -  Ampicillin: S <=2 CLOVIS    -  Ciprofloxacin: S <=1 CLOVIS    Specimen Source: URINE CATHETER    Organism Identification: Enterococcus faecalis  Staphylococcus sp.,coag neg    Organism: Enterococcus faecalis  COLONY COUNT: > = 100,000 CFU/ML    Organism: Staphylococcus sp.,coag neg  COLONY COUNT: > = 100,000 CFU/ML    Method Type: POSITIVE CLOVIS 29      Urinalysis (19 @ 13:25)    Color: ELSA    Urine Appearance: TURBID    Glucose: 100    Bilirubin: LARGE    Ketone - Urine: NEGATIVE    Specific Gravity: 1.020    Blood: MODERATE    pH - Urine: 6.0    Protein, Urine: 100    Urobilinogen: 4.0    Nitrite: NEGATIVE    Leukocyte Esterase Concentration: LARGE    Red Blood Cell - Urine: 3-5    White Blood Cell - Urine: >50    White Blood Cell Casts: 2-5/LPF    Epithelial Cells: OCC    Bacteria: MANY    Coarse Granular Casts: 0-2/LPF      MRSA Infection Control Screen (PCR) (19 @ 13:46)    MRSA Infection Control Screen (PCR): NOT DETECTED     REFERENCE RANGE:  MRSA DNA NOT DETECTED      Culture - Blood (03.15.19 @ 14:03)    Culture - Blood:   NO ORGANISMS ISOLATED  NO ORGANISMS ISOLATED AT 24 HOURS    Specimen Source: BLOOD

## 2019-03-31 NOTE — PROGRESS NOTE ADULT - SUBJECTIVE AND OBJECTIVE BOX
Chief Complaint:  Patient is a 89y old  Male who presents with a chief complaint of generalized weakness, urinary retention, and confusion X 2 days (31 Mar 2019 10:21)      Interval Events:   Patient with increasing MELD today and decreasing hemoglobin.    Allergies:  No Known Allergies      Hospital Medications:  dextrose 5% 1000 milliLiter(s) IV Continuous <Continuous>  lactulose Syrup 10 Gram(s) Enteral Tube two times a day  lidocaine   Patch 1 Patch Transdermal daily  meropenem  IVPB 500 milliGRAM(s) IV Intermittent every 12 hours  metoprolol tartrate 12.5 milliGRAM(s) Oral two times a day  potassium chloride   Powder 20 milliEquivalent(s) Oral once  potassium chloride  10 mEq/100 mL IVPB 10 milliEquivalent(s) IV Intermittent every 1 hour  rifaximin 550 milliGRAM(s) Oral two times a day  ursodiol Capsule 300 milliGRAM(s) Oral two times a day      PMHX/PSHX:  Chronic kidney disease (CKD), stage IV (severe)  Benign prostatic hypertrophy  History of BPH  No pertinent past medical history  S/P cataract surgery, left  No significant past surgical history      Family history:  No pertinent family history in first degree relatives          PHYSICAL EXAM:     GENERAL:  Appears stated age, well-groomed, well-nourished, no distress  HEENT:  NC/AT,  conjunctivae clear, sclera -anicteric  CHEST:  Full & symmetric excursion, no increased  HEART:  Regular rhythm  ABDOMEN:  Soft, non-tender, non-distended, normoactive bowel sounds,  no masses ,no hepato-splenomegaly,   EXTREMITIES:  no cyanosis,clubbing or edema  SKIN:  No rash/erythema/ecchymoses/petechiae/wounds/abscess/warm/dry  NEURO:  Alert, oriented    Vital Signs:  Vital Signs Last 24 Hrs  T(C): 36.6 (31 Mar 2019 10:15), Max: 37.1 (30 Mar 2019 20:18)  T(F): 97.8 (31 Mar 2019 10:15), Max: 98.8 (30 Mar 2019 20:18)  HR: 64 (31 Mar 2019 10:15) (64 - 89)  BP: 121/76 (31 Mar 2019 10:15) (95/56 - 121/76)  BP(mean): --  RR: 15 (31 Mar 2019 10:15) (15 - 19)  SpO2: 98% (31 Mar 2019 10:15) (97% - 99%)  Daily     Daily     LABS:                        8.8    11.60 )-----------( 74       ( 31 Mar 2019 10:41 )             24.2     03-31    149<H>  |  112<H>  |  51<H>  ----------------------------<  96  2.9<LL>   |  20<L>  |  3.20<H>    Ca    7.7<L>      31 Mar 2019 07:07  Mg     2.1     03-31    TPro  4.5<L>  /  Alb  1.7<L>  /  TBili  16.9<H>  /  DBili  x   /  AST  140<H>  /  ALT  98<H>  /  AlkPhos  236<H>  03-31    LIVER FUNCTIONS - ( 31 Mar 2019 07:07 )  Alb: 1.7 g/dL / Pro: 4.5 g/dL / ALK PHOS: 236 u/L / ALT: 98 u/L / AST: 140 u/L / GGT: x           PT/INR - ( 31 Mar 2019 07:07 )   PT: 31.8 SEC;   INR: 2.70          PTT - ( 31 Mar 2019 07:07 )  PTT:88.0 SEC    Amylase Serum--      Lipase serum--       [id number]      Imaging: Chief Complaint:  Patient is a 89y old  Male who presents with a chief complaint of generalized weakness, urinary retention, and confusion X 2 days (31 Mar 2019 10:21)      Interval Events:   Patient with increasing MELD today but awaiting repeat labs.  Per primary team improved mental status today.    Allergies:  No Known Allergies      Hospital Medications:  dextrose 5% 1000 milliLiter(s) IV Continuous <Continuous>  lactulose Syrup 10 Gram(s) Enteral Tube two times a day  lidocaine   Patch 1 Patch Transdermal daily  meropenem  IVPB 500 milliGRAM(s) IV Intermittent every 12 hours  metoprolol tartrate 12.5 milliGRAM(s) Oral two times a day  potassium chloride   Powder 20 milliEquivalent(s) Oral once  potassium chloride  10 mEq/100 mL IVPB 10 milliEquivalent(s) IV Intermittent every 1 hour  rifaximin 550 milliGRAM(s) Oral two times a day  ursodiol Capsule 300 milliGRAM(s) Oral two times a day      PMHX/PSHX:  Chronic kidney disease (CKD), stage IV (severe)  Benign prostatic hypertrophy  History of BPH  No pertinent past medical history  S/P cataract surgery, left  No significant past surgical history      Family history:  No pertinent family history in first degree relatives          PHYSICAL EXAM:     GENERAL:  Appears stated age, well-groomed, well-nourished, no distress  HEENT:  NC/AT,  conjunctivae clear, sclera -anicteric  CHEST:  Full & symmetric excursion, no increased  HEART:  Regular rhythm  ABDOMEN:  Soft, non-tender, non-distended, normoactive bowel sounds,  no masses ,no hepato-splenomegaly,   EXTREMITIES:  no cyanosis,clubbing or edema  SKIN:  No rash/erythema/ecchymoses/petechiae/wounds/abscess/warm/dry  NEURO:  Alert, oriented    Vital Signs:  Vital Signs Last 24 Hrs  T(C): 36.6 (31 Mar 2019 10:15), Max: 37.1 (30 Mar 2019 20:18)  T(F): 97.8 (31 Mar 2019 10:15), Max: 98.8 (30 Mar 2019 20:18)  HR: 64 (31 Mar 2019 10:15) (64 - 89)  BP: 121/76 (31 Mar 2019 10:15) (95/56 - 121/76)  BP(mean): --  RR: 15 (31 Mar 2019 10:15) (15 - 19)  SpO2: 98% (31 Mar 2019 10:15) (97% - 99%)  Daily     Daily     LABS:                        8.8    11.60 )-----------( 74       ( 31 Mar 2019 10:41 )             24.2     03-31    149<H>  |  112<H>  |  51<H>  ----------------------------<  96  2.9<LL>   |  20<L>  |  3.20<H>    Ca    7.7<L>      31 Mar 2019 07:07  Mg     2.1     03-31    TPro  4.5<L>  /  Alb  1.7<L>  /  TBili  16.9<H>  /  DBili  x   /  AST  140<H>  /  ALT  98<H>  /  AlkPhos  236<H>  03-31    LIVER FUNCTIONS - ( 31 Mar 2019 07:07 )  Alb: 1.7 g/dL / Pro: 4.5 g/dL / ALK PHOS: 236 u/L / ALT: 98 u/L / AST: 140 u/L / GGT: x           PT/INR - ( 31 Mar 2019 07:07 )   PT: 31.8 SEC;   INR: 2.70          PTT - ( 31 Mar 2019 07:07 )  PTT:88.0 SEC    Amylase Serum--      Lipase serum--       [id number]      Imaging:

## 2019-03-31 NOTE — PROGRESS NOTE ADULT - ASSESSMENT
A 89 yo Male with BPH, Cataracts, Diverticulosis, Anemia 2/2 GI Bleed with previous PRBC transfusions, who presents to the ER for evaluation of  generalized  weakness, urinary retention and confusion. On admission, he found to have elevated bilirubin, LFTS, transaminitis, scleral jaundice. and urinary retention. Flores catheter has placed with negative Urine analysis. The CT abdomen  and pelvis shows Distended gallbladder. Consider further evaluation with ultrasound if  acute cholecystitis is a concern. No discrete mass identified on this limited noncontrast study. The ID consult requested to assist with evaluation of Biliary sepsis.    # Encephalopathy  # Urinary retention - s/p flores catheter   # R/O biliary sepsis/ Cholangitis - Most likely acute cholestatic hepatitis as per GI -s/p  transjugular liver biopsy with two passes. and found to have small right atrium blood clot. 3/21/19  # Distended GB with cholelithiasis on MRCP 3/18/19  # Hypothermia- Pneumonia on CXR - MRSA PCR is negative   # UTI- Enterococcus faecalis 3/20/19  # Liver biopsy result - Not in the system yet  # Hepatic Encephalopathy- elevated Ammonia  # R/O Leptospirosis    would recommend:    1. Follow up Leptospirosis serology and continue Doxycycline, The Hepatologist/GI team input greatly appreciated   2. Aspiration precaution   3. Continue IV Meropenem, renally adjusted doses to complete the course  4. Supplemental oxygenation and  bronchodilator as needed  5. Monitor kidney function       -Prognosis guarded A 89 yo Male with BPH, Cataracts, Diverticulosis, Anemia 2/2 GI Bleed with previous PRBC transfusions, who presents to the ER for evaluation of  generalized  weakness, urinary retention and confusion. On admission, he found to have elevated bilirubin, LFTS, transaminitis, scleral jaundice. and urinary retention. Flores catheter has placed with negative Urine analysis. The CT abdomen  and pelvis shows Distended gallbladder. Consider further evaluation with ultrasound if  acute cholecystitis is a concern. No discrete mass identified on this limited noncontrast study. The ID consult requested to assist with evaluation of Biliary sepsis.    # Encephalopathy  # Urinary retention - s/p flores catheter   # R/O biliary sepsis/ Cholangitis - Most likely acute cholestatic hepatitis as per GI -s/p  transjugular liver biopsy with two passes. and found to have small right atrium blood clot. 3/21/19  # Distended GB with cholelithiasis on MRCP 3/18/19  # Hypothermia- Pneumonia on CXR - MRSA PCR is negative   # UTI- Enterococcus faecalis 3/20/19  # Liver biopsy result - Not in the system yet  # Hepatic Encephalopathy- elevated Ammonia  # R/O Leptospirosis  - s/p Unasyn/Augmentin  ( MICHELINE for Moderate to severe Leptospirosis)  from 3/21/19 to 3/29/19     would recommend:    1. Follow up Leptospirosis serology   2. Aspiration precaution   3. Continue IV Meropenem, renally adjusted doses to complete the course  4. Supplemental oxygenation and  bronchodilator as needed  5. Monitor kidney function     d/w Nursing staff

## 2019-03-31 NOTE — PROGRESS NOTE ADULT - SUBJECTIVE AND OBJECTIVE BOX
S: no chest pain or SOB       MEDICATIONS  (STANDING):  dextrose 5% 1000 milliLiter(s) (75 mL/Hr) IV Continuous <Continuous>  doxycycline IVPB      doxycycline IVPB 100 milliGRAM(s) IV Intermittent once  lactulose Syrup 10 Gram(s) Enteral Tube two times a day  lidocaine   Patch 1 Patch Transdermal daily  meropenem  IVPB 500 milliGRAM(s) IV Intermittent every 12 hours  metoprolol tartrate 12.5 milliGRAM(s) Oral two times a day  rifaximin 550 milliGRAM(s) Oral two times a day  ursodiol Capsule 300 milliGRAM(s) Oral two times a day      LABS:                       8.8    11.60 )-----------( 74       ( 31 Mar 2019 10:41 )             24.2     144  |  108<H>  |  54<H>  ----------------------------<  99  3.6   |  20<L>  |  3.39<H>    Ca    8.6      31 Mar 2019 11:38  Phos  3.5     03-31  Mg     2.1     03-31    TPro  5.4<L>  /  Alb  2.0<L>  /  TBili  20.1<H>  /  DBili  x   /  AST  176<H>  /  ALT  118<H>  /  AlkPhos  280<H>  03-31    Creatinine Trend: 3.39<--, 3.20<--, 3.18<--, 3.27<--, 3.17<--, 2.98<--   PT/INR - ( 31 Mar 2019 07:07 )   PT: 31.8 SEC;   INR: 2.70     PTT - ( 31 Mar 2019 07:07 )  PTT:88.0 SEC      PHYSICAL EXAM  Vital Signs Last 24 Hrs  T(C): 36.6 (31 Mar 2019 10:15), Max: 37.1 (30 Mar 2019 20:18)  T(F): 97.8 (31 Mar 2019 10:15), Max: 98.8 (30 Mar 2019 20:18)  HR: 64 (31 Mar 2019 10:15) (64 - 89)  BP: 121/76 (31 Mar 2019 10:15) (95/56 - 121/76)  RR: 15 (31 Mar 2019 10:15) (15 - 19)  SpO2: 98% (31 Mar 2019 10:15) (97% - 99%)      Heart: normal S1, S2, RRR, no m/r/g  Lungs: cta b/l  Abd: soft nT  Ext: no edema     TELEMETRY: SR	60's    RADIOLOGY:  < from: Xray Chest 1 View- PORTABLE-Urgent (03.20.19 @ 09:37) >  FINDINGS/  IMPRESSION:    Patchy opacity within the right lower lung may represent pneumonia.   Subsegmental atelectasis at the left base. No pleural effusion or   pneumothorax. No pulmonary edema.    The cardiac silhouette remains enlarged. Aortic aneurysm. Recommend   further evaluation with aortogram.    < end of copied text >    < from: Transthoracic Echocardiogram (03.23.19 @ 09:36) >  CONCLUSIONS:  1. Mitral annular calcification, otherwise normal mitral  valve. Mild mitral regurgitation.  2. Aortic valve leaflet morphology not well visualized;  appears calcified with normal opening. Moderate aortic  regurgitation.  3. Normal left ventricular internal dimensions and wall  thicknesses.  4. Hyperdynamic left ventricle.  5. Mild diastolic dysfunction (Stage I).  6. Normal right ventricular size and function.  7. Normal tricuspid valve. Mild tricuspid regurgitation.  8. Estimated pulmonary artery systolic pressure equals 36  mm Hg, assuming right atrial pressure equals 10  mm Hg,  consistent with borderline pulmonary hypertension.  *** Compared with echocardiogram of 6/11/2018,no  significant changes noted.  ------------------------------------------------------------------------  Confirmed on  3/23/2019 - 11:19:58 by Evaristo Castanon MD, FACC,  FASE, RPVI    < end of copied text >      ASSESSMENT/PLAN: 	    89 yo M, poor historian, charts reviewed, with PMHx BPH, Cataracts, Diverticulosis, Anemia 2/2 GI Bleed with previous PRBC transfusions, who p/w 2 days generalized  weakness, urinary retention and confusion. LFTs significant for transaminitis, scleral jaundice.     -pt. tolerated biopsy well in terms of cv perspective  -no clinical heart failure or anginal symptoms  -TTE with moderate AI and normal LV function - medical management of valvular disease recommended  -CT chest with 4.5cm ascending aortic aneurysm - can start metoprolol 12.5 mg PO bid   -follow up GI  - Palliative f/u

## 2019-03-31 NOTE — PROGRESS NOTE ADULT - SUBJECTIVE AND OBJECTIVE BOX
Patient seen and examined  no complaints  lethargic    No Known Allergies    Hospital Medications:   MEDICATIONS  (STANDING):  dextrose 5% 1000 milliLiter(s) (75 mL/Hr) IV Continuous <Continuous>  lactulose Syrup 10 Gram(s) Enteral Tube two times a day  lidocaine   Patch 1 Patch Transdermal daily  meropenem  IVPB 500 milliGRAM(s) IV Intermittent every 12 hours  metoprolol tartrate 12.5 milliGRAM(s) Oral two times a day  potassium chloride   Powder 20 milliEquivalent(s) Oral once  rifaximin 550 milliGRAM(s) Oral two times a day  ursodiol Capsule 300 milliGRAM(s) Oral two times a day    VITALS:  T(F): 97.8 (03-31-19 @ 10:15), Max: 98.8 (03-30-19 @ 20:18)  HR: 64 (03-31-19 @ 10:15)  BP: 121/76 (03-31-19 @ 10:15)  RR: 15 (03-31-19 @ 10:15)  SpO2: 98% (03-31-19 @ 10:15)  Wt(kg): --    03-30 @ 07:01  -  03-31 @ 07:00  --------------------------------------------------------  IN: 0 mL / OUT: 925 mL / NET: -925 mL    PHYSICAL EXAM:  Constitutional: NAD  HEENT: anicteric sclera, oropharynx clear, MMM  Neck: No JVD  Respiratory: Poor inspiratory effort, no wheeze   Cardiovascular: S1, S2, RRR  Gastrointestinal: BS+, soft, NT/ND  Extremities: No cyanosis or clubbing. No peripheral edema  Neurological: Lethargic   : No CVA tenderness. +flores.   Skin: No rashes    LABS:  03-31    149<H>  |  112<H>  |  51<H>  ----------------------------<  96  2.9<LL>   |  20<L>  |  3.20<H>    Ca    7.7<L>      31 Mar 2019 07:07  Mg     2.1     03-31    TPro  4.5<L>  /  Alb  1.7<L>  /  TBili  16.9<H>  /  DBili      /  AST  140<H>  /  ALT  98<H>  /  AlkPhos  236<H>  03-31    Creatinine Trend: 3.20 <--, 3.18 <--, 3.27 <--, 3.17 <--, 2.98 <--, 2.64 <--, 2.18 <--                        8.8    11.60 )-----------( 74       ( 31 Mar 2019 10:41 )             24.2     Urine Studies:      RADIOLOGY & ADDITIONAL STUDIES:

## 2019-03-31 NOTE — PROGRESS NOTE ADULT - ATTENDING COMMENTS
CARDIOLOGY ATTENDING    Patient seen and examined. Agree with above. No further inpatient cardiac workup needed. F/U palliative care .

## 2019-04-01 LAB
ALBUMIN SERPL ELPH-MCNC: 1.9 G/DL — LOW (ref 3.3–5)
ALP SERPL-CCNC: 258 U/L — HIGH (ref 40–120)
ALT FLD-CCNC: 99 U/L — HIGH (ref 4–41)
ANION GAP SERPL CALC-SCNC: 15 MMO/L — HIGH (ref 7–14)
ANISOCYTOSIS BLD QL: SLIGHT — SIGNIFICANT CHANGE UP
APTT BLD: 67.9 SEC — HIGH (ref 27.5–36.3)
AST SERPL-CCNC: 157 U/L — HIGH (ref 4–40)
BILIRUB SERPL-MCNC: 19.2 MG/DL — HIGH (ref 0.2–1.2)
BUN SERPL-MCNC: 54 MG/DL — HIGH (ref 7–23)
BURR CELLS BLD QL SMEAR: PRESENT — SIGNIFICANT CHANGE UP
CALCIUM SERPL-MCNC: 8.5 MG/DL — SIGNIFICANT CHANGE UP (ref 8.4–10.5)
CHLORIDE SERPL-SCNC: 105 MMOL/L — SIGNIFICANT CHANGE UP (ref 98–107)
CMV IGM FLD-ACNC: <8 AU/ML — SIGNIFICANT CHANGE UP
CMV IGM SERPL QL: NEGATIVE — SIGNIFICANT CHANGE UP
CO2 SERPL-SCNC: 21 MMOL/L — LOW (ref 22–31)
CREAT SERPL-MCNC: 3.33 MG/DL — HIGH (ref 0.5–1.3)
FACT V ACT/NOR PPP: 32.6 % — LOW (ref 75–150)
FACT X ACT/NOR PPP: 40.4 % — LOW (ref 70–150)
GLUCOSE SERPL-MCNC: 93 MG/DL — SIGNIFICANT CHANGE UP (ref 70–99)
HCT VFR BLD CALC: 21.4 % — LOW (ref 39–50)
HGB BLD-MCNC: 7.6 G/DL — LOW (ref 13–17)
HYPOCHROMIA BLD QL: SLIGHT — SIGNIFICANT CHANGE UP
INR BLD: 2.18 — HIGH (ref 0.88–1.17)
LEPTOSPIRA AB TITR SER: NEGATIVE — SIGNIFICANT CHANGE UP
MAGNESIUM SERPL-MCNC: 2.2 MG/DL — SIGNIFICANT CHANGE UP (ref 1.6–2.6)
MANUAL SMEAR VERIFICATION: SIGNIFICANT CHANGE UP
MCHC RBC-ENTMCNC: 28 PG — SIGNIFICANT CHANGE UP (ref 27–34)
MCHC RBC-ENTMCNC: 35.5 % — SIGNIFICANT CHANGE UP (ref 32–36)
MCV RBC AUTO: 79 FL — LOW (ref 80–100)
NRBC # FLD: 1.26 K/UL — SIGNIFICANT CHANGE UP (ref 0–0)
NRBC FLD-RTO: 12.5 — SIGNIFICANT CHANGE UP
OVALOCYTES BLD QL SMEAR: SLIGHT — SIGNIFICANT CHANGE UP
PLATELET # BLD AUTO: 87 K/UL — LOW (ref 150–400)
PLATELET COUNT - ESTIMATE: SIGNIFICANT CHANGE UP
PMV BLD: SIGNIFICANT CHANGE UP FL (ref 7–13)
POLYCHROMASIA BLD QL SMEAR: SLIGHT — SIGNIFICANT CHANGE UP
POTASSIUM SERPL-MCNC: 3.5 MMOL/L — SIGNIFICANT CHANGE UP (ref 3.5–5.3)
POTASSIUM SERPL-SCNC: 3.5 MMOL/L — SIGNIFICANT CHANGE UP (ref 3.5–5.3)
PROT SERPL-MCNC: 4.8 G/DL — LOW (ref 6–8.3)
PROTHROM AB SERPL-ACNC: 25.5 SEC — HIGH (ref 9.8–13.1)
RBC # BLD: 2.71 M/UL — LOW (ref 4.2–5.8)
RBC # FLD: 28.6 % — HIGH (ref 10.3–14.5)
SODIUM SERPL-SCNC: 141 MMOL/L — SIGNIFICANT CHANGE UP (ref 135–145)
TARGETS BLD QL SMEAR: SLIGHT — SIGNIFICANT CHANGE UP
WBC # BLD: 10.1 K/UL — SIGNIFICANT CHANGE UP (ref 3.8–10.5)
WBC # FLD AUTO: 10.1 K/UL — SIGNIFICANT CHANGE UP (ref 3.8–10.5)

## 2019-04-01 PROCEDURE — 99233 SBSQ HOSP IP/OBS HIGH 50: CPT

## 2019-04-01 RX ADMIN — Medication 110 MILLIGRAM(S): at 17:27

## 2019-04-01 RX ADMIN — LACTULOSE 10 GRAM(S): 10 SOLUTION ORAL at 05:18

## 2019-04-01 RX ADMIN — MEROPENEM 100 MILLIGRAM(S): 1 INJECTION INTRAVENOUS at 17:27

## 2019-04-01 RX ADMIN — LACTULOSE 10 GRAM(S): 10 SOLUTION ORAL at 17:27

## 2019-04-01 RX ADMIN — SODIUM CHLORIDE 75 MILLILITER(S): 9 INJECTION, SOLUTION INTRAVENOUS at 17:05

## 2019-04-01 RX ADMIN — URSODIOL 300 MILLIGRAM(S): 250 TABLET, FILM COATED ORAL at 17:27

## 2019-04-01 RX ADMIN — Medication 110 MILLIGRAM(S): at 05:46

## 2019-04-01 RX ADMIN — Medication 12.5 MILLIGRAM(S): at 05:16

## 2019-04-01 RX ADMIN — MEROPENEM 100 MILLIGRAM(S): 1 INJECTION INTRAVENOUS at 05:06

## 2019-04-01 RX ADMIN — URSODIOL 300 MILLIGRAM(S): 250 TABLET, FILM COATED ORAL at 05:18

## 2019-04-01 NOTE — PROGRESS NOTE ADULT - SUBJECTIVE AND OBJECTIVE BOX
[facility]      Seen earlier today, and examined.  - Today, patient is without complaints.           *****MEDICATIONS: Current medication reviewed and documented.    MEDICATIONS  (STANDING):  dextrose 5% 1000 milliLiter(s) (75 mL/Hr) IV Continuous <Continuous>  doxycycline IVPB      doxycycline IVPB 100 milliGRAM(s) IV Intermittent every 12 hours  lactulose Syrup 10 Gram(s) Enteral Tube two times a day  lidocaine   Patch 1 Patch Transdermal daily  meropenem  IVPB 500 milliGRAM(s) IV Intermittent every 12 hours  metoprolol tartrate 12.5 milliGRAM(s) Oral two times a day  rifaximin 550 milliGRAM(s) Oral two times a day  ursodiol Capsule 300 milliGRAM(s) Oral two times a day    MEDICATIONS  (PRN):          ***** VITAL SIGNS:  T(F): 97.3 (19 @ 17:24), Max: 97.6 (19 @ 14:30)  HR: 61 (19 @ 17:24) (61 - 68)  BP: 95/58 (19 @ 17:24) (92/52 - 125/68)  RR: 12 (19 @ 17:24) (10 - 16)  SpO2: 99% (19 @ 17:24) (97% - 99%)  Wt(kg): --  ,   I&O's Summary    31 Mar 2019 07:  -  2019 07:00  --------------------------------------------------------  IN: 0 mL / OUT: 1202 mL / NET: -1202 mL    2019 07:  -  2019 18:18  --------------------------------------------------------  IN: 0 mL / OUT: 0 mL / NET: 0 mL             *****PHYSICAL EXAM:  lethargic icteric    alert to repeated tactile stimuli   non verbal.              *****LAB AND IMAGIN.6    10.10 )-----------( 87       ( 2019 05:30 )             21.4               04-    141  |  105  |  54<H>  ----------------------------<  93  3.5   |  21<L>  |  3.33<H>    Ca    8.5      2019 05:30  Phos  3.5     03-31  Mg     2.2         TPro  4.8<L>  /  Alb  1.9<L>  /  TBili  19.2<H>  /  DBili  x   /  AST  157<H>  /  ALT  99<H>  /  AlkPhos  258<H>      PT/INR - ( 2019 05:30 )   PT: 25.5 SEC;   INR: 2.18          PTT - ( 2019 05:30 )  PTT:67.9 SEC                     [All pertinent recent Imaging/Reports reviewed]           *****A S S E S S M E N T   A N D   P L A N :      89 yo M, poor historian, charts reviewed, with PMHx BPH, Cataracts, Diverticulosis, Anemia 2/2 GI Bleed with previous PRBC transfusions, who p/w 2 days genearlized weakness, urinary retention and confusion.  Upon arrival to the ED, pt with scleral icterus and labs significant for transaminitis and TBili > 20    called to evaluate pt for ams         Problem/Recommendations 1:  Multifactorial toxic metabolic encephalopathy  , hyperbilirubinemia, hyperammonemia, and worsening renal function  correct metabolic dyscrasias defer to renal/gi    on lactulose   worsening lethargy /encephalopathy    ethics consult noted.   continue supportive care.             Problem/Recommendations 2 Hyperbilirubinemia  suspicious for cholestatic pattern   distended gb with cholelithiasis, mural thickening of the cbd. defer to gi for management.    pending liver biopsy        Thank you for allowing me to participate in the care of this patient. Please do not hesitate to call me if you have any  questions.        ________________  Ava Schreiber MD  [facility]  [phone]      30 minutes spent on total encounter; more than 50 % of the visit was  spent counseling about plan of care, compliance to diet/exercise and medication regimen and or  coordinating care by the attending physician.      It is advised that s stroke patients follow up with NP Dennise Rausch @ [phone] in 1- 2 weeks.   Others please follow up with Dr. Michael Nissenbaum [phone]

## 2019-04-01 NOTE — PROGRESS NOTE ADULT - PROBLEM SELECTOR PLAN 1
Likely multifactorial uremic and hepatic encephalopathy.  Already on lactulose.  Management per primary.

## 2019-04-01 NOTE — PROGRESS NOTE ADULT - ASSESSMENT
89 yo M p/w confusion and generalized weakness      -> cholestatic hepatitis, acute liver failure, coagulopathy, thrombocytopenia:      - very poor prognosis, not responding adequately to appropriate medical treatment       - palliative/hospice appropriate, f/u management      - HCP Benitez and his wife continue to want treatment and full code      - ethics consult appreciated, I'm in agreement, please view consult note for further recs      - all consultants recs/management appreciated        - not a candidate for liver transplantation      - adjust management accordingly, supportive comfort care  -> ARF:      - persistent      - ivf, adjust management per nephro. Avoid nephrotoxic rx, strict i/o  -> Acute Cystitis, Leptospirosis:      - abx per ID  -> Thrombocytopenia:      - 2/2 liver dysfunction, as above, hem/onc on board  -> Urinary Retention:      - flores intact, strict i/o   -> Coagulopathy:      -  2/2 liver failure       - vitamin k         - monitor inr and clinical status  -> Metabolic Encephalopathy:      - stable, treat underlying gi condition      - neuro checks       - fall precautions    -> Need for prophylactic measure:      - dvt/gi ppx  -> Severe Protein Calorie Malnutrition:      - supplement diet

## 2019-04-01 NOTE — PROGRESS NOTE ADULT - ASSESSMENT
89 yo M with PMHx CKD, BPH, Diverticulosis, Anemia 2/2 GI Bleed with previous PRBC transfusions, who p/w 2 days genearlized weakness, urinary retention and confusion, found to have elevated LFTs. Renal following for PHU, CKD management.    PHU on CKD 3 b/l Cr 1.4-1.6 10/2018  PHU intial thought to be pre renal, now w/worsening RFT-most likely 2/2 ATN    Cr high but stable today  Low C3 2/2 liver dz. no e/o acute GN  Urinary retention on CT w/o hydronephrosis. w/flores.   M acidosis- 2/2 PHU-better, on bicarb drip  Hypokalemia  Transaminitis and Bilirubinemia s/p liver Bx by IR 3/21 likely drug induced per GI  AMS most liekly 2/2 Hepatic encephalopathy not from uremia- sr NH4 level high. on lactulose -per GI  h/o HTN- bp low. been off Norvasc  BPH: c/w flomax    labs, chart reviewed  overall prognosis poor. f/u w/palliative care, ethics committe  will f/u

## 2019-04-01 NOTE — PROGRESS NOTE ADULT - SUBJECTIVE AND OBJECTIVE BOX
S: no chest pain or SOB     MEDICATIONS  (STANDING):  dextrose 5% 1000 milliLiter(s) (75 mL/Hr) IV Continuous <Continuous>  doxycycline IVPB      doxycycline IVPB 100 milliGRAM(s) IV Intermittent every 12 hours  lactulose Syrup 10 Gram(s) Enteral Tube two times a day  lidocaine   Patch 1 Patch Transdermal daily  meropenem  IVPB 500 milliGRAM(s) IV Intermittent every 12 hours  metoprolol tartrate 12.5 milliGRAM(s) Oral two times a day  rifaximin 550 milliGRAM(s) Oral two times a day  ursodiol Capsule 300 milliGRAM(s) Oral two times a day    LABS:                       7.6    10.10 )-----------( 87       ( 01 Apr 2019 05:30 )             21.4     141  |  105  |  54<H>  ----------------------------<  93  3.5   |  21<L>  |  3.33<H>    Ca    8.5      01 Apr 2019 05:30  Phos  3.5     03-31  Mg     2.2     04-01    TPro  4.8<L>  /  Alb  1.9<L>  /  TBili  19.2<H>  /  DBili  x   /  AST  157<H>  /  ALT  99<H>  /  AlkPhos  258<H>  04-01    Creatinine Trend: 3.33<--, 3.39<--, 3.20<--, 3.18<--, 3.27<--, 3.17<--   PT/INR - ( 01 Apr 2019 05:30 )   PT: 25.5 SEC;   INR: 2.18     PTT - ( 01 Apr 2019 05:30 )  PTT:67.9 SEC      PHYSICAL EXAM  Vital Signs Last 24 Hrs  T(C): 36.2 (01 Apr 2019 11:21), Max: 36.4 (31 Mar 2019 21:45)  T(F): 97.2 (01 Apr 2019 11:21), Max: 97.5 (31 Mar 2019 21:45)  HR: 62 (01 Apr 2019 11:21) (62 - 68)  BP: 97/56 (01 Apr 2019 11:21) (97/56 - 125/68)  RR: 10 (01 Apr 2019 11:21) (10 - 16)  SpO2: 97% (01 Apr 2019 11:21) (97% - 97%)    Heart: normal S1, S2, RRR, no m/r/g  Lungs: cta b/l  Abd: soft nT  Ext: no edema     TELEMETRY: SR	60's    RADIOLOGY:  < from: Xray Chest 1 View- PORTABLE-Urgent (03.20.19 @ 09:37) >  FINDINGS/  IMPRESSION:    Patchy opacity within the right lower lung may represent pneumonia.   Subsegmental atelectasis at the left base. No pleural effusion or   pneumothorax. No pulmonary edema.    The cardiac silhouette remains enlarged. Aortic aneurysm. Recommend   further evaluation with aortogram.    < end of copied text >    < from: Transthoracic Echocardiogram (03.23.19 @ 09:36) >  CONCLUSIONS:  1. Mitral annular calcification, otherwise normal mitral  valve. Mild mitral regurgitation.  2. Aortic valve leaflet morphology not well visualized;  appears calcified with normal opening. Moderate aortic  regurgitation.  3. Normal left ventricular internal dimensions and wall  thicknesses.  4. Hyperdynamic left ventricle.  5. Mild diastolic dysfunction (Stage I).  6. Normal right ventricular size and function.  7. Normal tricuspid valve. Mild tricuspid regurgitation.  8. Estimated pulmonary artery systolic pressure equals 36  mm Hg, assuming right atrial pressure equals 10  mm Hg,  consistent with borderline pulmonary hypertension.  *** Compared with echocardiogram of 6/11/2018,no  significant changes noted.  ------------------------------------------------------------------------  Confirmed on  3/23/2019 - 11:19:58 by Evaristo Castanon MD, FACC,  FASE, RPVI    < end of copied text >      ASSESSMENT/PLAN: 	    89 yo M, poor historian, charts reviewed, with PMHx BPH, Cataracts, Diverticulosis, Anemia 2/2 GI Bleed with previous PRBC transfusions, who p/w 2 days generalized  weakness, urinary retention and confusion. LFTs significant for transaminitis, scleral jaundice.     -pt. tolerated biopsy well in terms of cv perspective  -no clinical heart failure or anginal symptoms  -TTE with moderate AI and normal LV function - medical management of valvular disease recommended  -CT chest with 4.5cm ascending aortic aneurysm - continue metoprolol 12.5 mg PO bid   -palliative and ethics f/u noted

## 2019-04-01 NOTE — PROGRESS NOTE ADULT - SUBJECTIVE AND OBJECTIVE BOX
[facility] - [place] / Corrie S /Kylah/ LARON Villegas/ LARON Beard/ Shiva Mullen / MAGUI Njeru  ---------------------------------------------------------------------------------------------------------------    Patient seen and examined bedside    Subjective and Objective: No overnight events. sleeping, arousable, "I'm good"    Allergies: No Known Allergies      Hospital Medications:   MEDICATIONS  (STANDING):  dextrose 5% 1000 milliLiter(s) (75 mL/Hr) IV Continuous <Continuous>  doxycycline IVPB      doxycycline IVPB 100 milliGRAM(s) IV Intermittent every 12 hours  lactulose Syrup 10 Gram(s) Enteral Tube two times a day  lidocaine   Patch 1 Patch Transdermal daily  meropenem  IVPB 500 milliGRAM(s) IV Intermittent every 12 hours  metoprolol tartrate 12.5 milliGRAM(s) Oral two times a day  rifaximin 550 milliGRAM(s) Oral two times a day  ursodiol Capsule 300 milliGRAM(s) Oral two times a day    VITALS:  T(F): 97.2 (04-01-19 @ 11:21), Max: 97.5 (03-31-19 @ 21:45)  HR: 62 (04-01-19 @ 11:21)  BP: 97/56 (04-01-19 @ 11:21)  RR: 10 (04-01-19 @ 11:21)  SpO2: 97% (04-01-19 @ 11:21)  Wt(kg): --    03-31 @ 07:01  -  04-01 @ 07:00  --------------------------------------------------------  IN: 0 mL / OUT: 1202 mL / NET: -1202 mL    04-01 @ 07:01  -  04-01 @ 14:38  --------------------------------------------------------  IN: 0 mL / OUT: 0 mL / NET: 0 mL      PHYSICAL EXAM:  Constitutional: NAD  HEENT: icteric sclera, +NGT  Neck: No JVD  Respiratory: CTAB, no wheezes, rales or rhonchi  Cardiovascular: S1, S2, RRR  Gastrointestinal: BS+, soft, NT/ND  Extremities: No cyanosis or clubbing. No peripheral edema  Neurological: sleeping, arousable, appropriately responding  Psychiatric: Normal mood, normal affect  : + flores w/200ml dark yellow urine      LABS:  04-01    141  |  105  |  54<H>  ----------------------------<  93  3.5   |  21<L>  |  3.33<H>    Ca    8.5      01 Apr 2019 05:30  Phos  3.5     03-31  Mg     2.2     04-01    TPro  4.8<L>  /  Alb  1.9<L>  /  TBili  19.2<H>  /  DBili      /  AST  157<H>  /  ALT  99<H>  /  AlkPhos  258<H>  04-01    Creatinine Trend: 3.33 <--, 3.39 <--, 3.20 <--, 3.18 <--, 3.27 <--, 3.17 <--, 2.98 <--, 2.64 <--                        7.6    10.10 )-----------( 87       ( 01 Apr 2019 05:30 )             21.4     Urine Studies:        RADIOLOGY & ADDITIONAL STUDIES:

## 2019-04-01 NOTE — PROGRESS NOTE ADULT - PROBLEM SELECTOR PLAN 4
Pt is full code. HCP and living will done on 8/2018. In living will pt states that if he is unable to make his own decisions, has a terminal or irreversible illness that he would like to be made comfortable and DNR/DNI. HCP has wanted to continues pt to be full code, ethics consult placed. Input appreciated.

## 2019-04-01 NOTE — PROGRESS NOTE ADULT - ATTENDING COMMENTS
Patient seen and examined.  Agree with above.   No further inpatient cardiac workup needed at this time.     Rashmi Morrison MD

## 2019-04-01 NOTE — PROGRESS NOTE ADULT - ASSESSMENT
A 89 yo Male with BPH, Cataracts, Diverticulosis, Anemia 2/2 GI Bleed with previous PRBC transfusions, who presents to the ER for evaluation of  generalized  weakness, urinary retention and confusion. On admission, he found to have elevated bilirubin, LFTS, transaminitis, scleral jaundice. and urinary retention. Flores catheter has placed with negative Urine analysis. The CT abdomen  and pelvis shows Distended gallbladder. Consider further evaluation with ultrasound if  acute cholecystitis is a concern. No discrete mass identified on this limited noncontrast study. The ID consult requested to assist with evaluation of Biliary sepsis.    # Encephalopathy  # Urinary retention - s/p flores catheter   # R/O biliary sepsis/ Cholangitis - Most likely acute cholestatic hepatitis as per GI -s/p  transjugular liver biopsy with two passes. and found to have small right atrium blood clot. 3/21/19  # Distended GB with cholelithiasis on MRCP 3/18/19  # Hypothermia- Pneumonia on CXR - MRSA PCR is negative   # UTI- Enterococcus faecalis 3/20/19  # Liver biopsy result - Not in the system yet  # Hepatic Encephalopathy- elevated Ammonia  # R/O Leptospirosis  - s/p Unasyn/Augmentin  ( MICHELINE for Moderate to severe Leptospirosis)  from 3/21/19 to 3/29/19     would recommend:    1. Follow up Leptospirosis serology   2. Aspiration precaution   3. Continue IV Meropenem, renally adjusted doses to complete the course  4. Supplemental oxygenation and  bronchodilator as needed  5. Monitor kidney function     d/w Nursing staff A 89 yo Male with BPH, Cataracts, Diverticulosis, Anemia 2/2 GI Bleed with previous PRBC transfusions, who presents to the ER for evaluation of  generalized  weakness, urinary retention and confusion. On admission, he found to have elevated bilirubin, LFTS, transaminitis, scleral jaundice. and urinary retention. Flores catheter has placed with negative Urine analysis. The CT abdomen  and pelvis shows Distended gallbladder. Consider further evaluation with ultrasound if  acute cholecystitis is a concern. No discrete mass identified on this limited noncontrast study. The ID consult requested to assist with evaluation of Biliary sepsis.    # Encephalopathy  # Urinary retention - s/p flores catheter   # R/O biliary sepsis/ Cholangitis - Most likely acute cholestatic hepatitis as per GI -s/p  transjugular liver biopsy with two passes. and found to have small right atrium blood clot. 3/21/19  # Distended GB with cholelithiasis on MRCP 3/18/19  # Hypothermia- Pneumonia on CXR - MRSA PCR is negative   # UTI- Enterococcus faecalis 3/20/19  # Liver biopsy result - Not in the system yet  # Hepatic Encephalopathy- elevated Ammonia  # R/O Leptospirosis  - s/p Unasyn/Augmentin  ( MICHELINE for Moderate to severe Leptospirosis)  from 3/21/19 to 3/29/19 ,  which is the treatment of choice for Moderate to severe Leptospirosis. If patient has Leptospirosis, then it should have been treated,    would recommend:    1. Follow up Leptospirosis serology  2. Discontinue  Doxycycline since its less likely Leptospirosis  3. Aspiration precaution   4. Continue IV Meropenem to complete the course  5. Supplemental oxygenation and  bronchodilator as needed  6. Monitor kidney function     d/w Family at the bed side

## 2019-04-01 NOTE — PROGRESS NOTE ADULT - PROBLEM SELECTOR PLAN 2
low k s/p kcl supp -K better today. mag 2.2 adequate  monitor and trend k daily  now has NGT, consider starting TF

## 2019-04-01 NOTE — PROGRESS NOTE ADULT - ATTENDING COMMENTS
Hepatology Staff: Mala Case MD  I saw and examined the patient along with Dr. White on 4/1/2019 . I agree with the above outlined history, physical examination, assessment and plan.    Patient with cholestatic hepatitis of unclear etiology ? DILI vs Viral etiology vs underlying infections.  No significant fibrosis on liver biopsy.  Await Hepatitis E serology.  Keep on Ursodiol.  Infectious w/u with repeat cultures.

## 2019-04-01 NOTE — PROGRESS NOTE ADULT - PROBLEM SELECTOR PLAN 1
w/worsening RFT, Cr now appears plauteued  Nonoliguric, u/o 1200ml past 24hrs noted  No evidence of respiratory distress.   Third spacing from hypoalbuminemia.   COntinue IV fluids to D5/w with 75 meq Nahco3, cont at 75 cc hrly  poor prognosis, multi-organ failure, advanced liver Dis, cachexia, advanced age with dementia at baseline. will strongly recommend comfort care   no indication for dialysis at this time. Would not recommend HD initiation, if indicated as pt is high risk and dialysis would not change prognosis   monitor U/O.   avoid ACEi/ARB/NSAIDs/nephrotoxics  dose all meds for eGFR <15ml/min  monitor BMP, LFTs daily

## 2019-04-01 NOTE — PROGRESS NOTE ADULT - SUBJECTIVE AND OBJECTIVE BOX
Patient is seen and examined at the bed side, is normothermic.  He is more alert  today since Ammonia level has trended down. The GI/ Hepatologist follow up appreciated regarding Leptospirosis work up.  Patient was on Unasyn/Augmentin from 3/21/19 to 3/29/19,  which is the treatment of choice for Moderate to severe Leptospirosis. If patient has Leptospirosis, then it should have been treated, AND also The multiple blood cultures remains negative.         REVIEW OF SYSTEMS: Unable to obtain due to mental status        ALLERGIES: No Known Allergies        Vital Signs Last 24 Hrs  T(C): 36.3 (2019 17:24), Max: 36.4 (31 Mar 2019 21:45)  T(F): 97.3 (2019 17:24), Max: 97.6 (2019 14:30)  HR: 61 (2019 17:24) (61 - 68)  BP: 95/58 (2019 17:24) (92/52 - 125/68)  BP(mean): --  RR: 12 (2019 17:24) (10 - 16)  SpO2: 99% (2019 17:24) (97% - 99%)      PHYSICAL EXAM:  GENERAL: More alert today  HEENT: NGT in placed  CHEST/LUNG:  Air entry bilaterally  HEART: s1 and s2 present  ABDOMEN:  Nontender and  Non distended  : Oshea catheter in placed  EXTREMITIES: No pedal  edema  CNS: More alert            LABS:                                   7.6    10.10 )-----------( 87       ( 2019 05:30 )             21.4                8.8    11.60 )-----------( 74       ( 31 Mar 2019 10:41 )             24.2       04-    141  |  105  |  54<H>  ----------------------------<  93  3.5   |  21<L>  |  3.33<H>    Ca    8.5      2019 05:30  Phos  3.5     03-31  Mg     2.2     04-    TPro  4.8<L>  /  Alb  1.9<L>  /  TBili  19.2<H>  /  DBili  x   /  AST  157<H>  /  ALT  99<H>  /  AlkPhos  258<H>  04-01      03-31    144  |  108<H>  |  54<H>  ----------------------------<  99  3.6   |  20<L>  |  3.39<H>    Ca    8.6      31 Mar 2019 11:38  Phos  3.5       Mg     2.3         TPro  5.4<L>  /  Alb  2.0<L>  /  TBili  20.1<H>  /  DBili  x   /  AST  176<H>  /  ALT  118<H>  /  AlkPhos  280<H>        Ammonia, Serum (19 @ 07:07)    Ammonia, Serum: 66: Ammonia levels will be falsely elevated if specimen is NOT  collected, processed and maintained at 4-8 C. umol/L        Ammonia, Serum (19 @ 09:45)    Ammonia, Serum: 76: Ammonia levels will be falsely elevated if specimen is NOT  collected, processed and maintained at 4-8 C. umol/L        Ammonia, Serum (19 @ 17:30)    Ammonia, Serum: 130: Ammonia levels will be falsely elevated if specimen is NOT  collected, processed and maintained at 4-8 C. umol/L        MEDICATIONS  (STANDING):  dextrose 5% 1000 milliLiter(s) (75 mL/Hr) IV Continuous <Continuous>  doxycycline IVPB      doxycycline IVPB 100 milliGRAM(s) IV Intermittent every 12 hours  lactulose Syrup 10 Gram(s) Enteral Tube two times a day  lidocaine   Patch 1 Patch Transdermal daily  meropenem  IVPB 500 milliGRAM(s) IV Intermittent every 12 hours  metoprolol tartrate 12.5 milliGRAM(s) Oral two times a day  rifaximin 550 milliGRAM(s) Oral two times a day  ursodiol Capsule 300 milliGRAM(s) Oral two times a day    MEDICATIONS  (PRN):        RADIOLOGY & ADDITIONAL TESTS:      3/24/19 : CT Chest No Cont (19 @ 18:32) : 4.4 cm enlarged ascending thoracic aorta. Small bilateral pleural effusions.      3/20/19 : Xray Chest 1 View- PORTABLE-Urgent (19 @ 09:37) Patchy opacity within the right lower lung may represent pneumonia. Subsegmental atelectasis at the left base. No pleural effusion or  pneumothorax. No pulmonary edema. The cardiac silhouette remains enlarged. Aortic aneurysm. Recommend  further evaluation with aortogram.      3/18/19 : MR MRCP No Cont (19 @ 14:22) Gallbladder is distended with cholelithiasis.  No choledocholithiasis or biliary ductal dilatation.      3/17/19 : NM Hepatobiliary Imaging (19 @ 12:21) Abnormal hepatobiliary scan suspicious for common bile duct obstruction. Hepatocellular dysfunction. No evidence of acute cholecystitis.      3/18/19 : US Abdomen Limited (19 @ 07:48) Gallbladder is distended with sludge and stones.  Mural thickening of the wall of the common bile duct, possibly  cholangitis. No biliary ductal dilatation.      3/15/19 : CT Abdomen and Pelvis No Cont (03.15.19 @ 09:39) Distended gallbladder. Consider further evaluation with ultrasound if  acute cholecystitis is a concern.  No discrete mass identified on this limited noncontrast study.          MICROBIOLOGY DATA:      Culture - Blood (19 @ 11:11)    Culture - Blood:   NO ORGANISMS ISOLATED  NO ORGANISMS ISOLATED AT 24 HOURS    Specimen Source: BLOOD PERIPHERAL        Culture - Fungal, Blood (19 @ 08:41)    Culture - Fungal, Blood:   CULTURE NEGATIVE FOR YEASTS AND MOLDS-PRELIMINARY RESULT  AFTER 24 HOURS INCUBATION    Specimen Source: BLOOD        Cytomegalovirus By PCR (19 @ 05:34)    Cytomegalovirus By PCR: 165 IU/mL    CMVPCR Lo.22: Assay lower limit of detection (LOD):  31.2 IU/mL (1.49 log10/mL)  Assay dynamic range:  50 to 156,000,000 (156M) CMV DNA IU/mL (1.70 log10/mL –  8.19 log10/mL)  Plasma CMV DNA Quantification by PCR using Abbott m2000:  The results of this test should be interpreted with  consideration of all clinical and laboratory findings. In  particular, caution should be used when interpreting low  level positive results when the test is used for  diagnostic purposes. Repeat testing on an additional  sample is recommended to confirm low level positive  results. A result of "Not Detected" does not preclude the  possibility of infection with CMV.  CMV DNA may be present  below the detection limit of assay. LogIU/mL        Culture - Urine (19 @ 15:21)    -  Vancomycin: S 2 CLOVIS    Culture - Urine:   ENTF^Enterococcus faecalis  COLONY COUNT: > = 100,000 CFU/ML    Culture - Urine:   Susceptibility not performed.    -  Tetra/Doxy: R >8 CLOVIS    -  Nitrofurantoin: S <=32 CLOVIS    -  Ampicillin: S <=2 CLOVIS    -  Ciprofloxacin: S <=1 CLOVIS    Specimen Source: URINE CATHETER    Organism Identification: Enterococcus faecalis  Staphylococcus sp.,coag neg    Organism: Enterococcus faecalis  COLONY COUNT: > = 100,000 CFU/ML    Organism: Staphylococcus sp.,coag neg  COLONY COUNT: > = 100,000 CFU/ML    Method Type: POSITIVE CLOVIS 29      Urinalysis (19 @ 13:25)    Color: ELSA    Urine Appearance: TURBID    Glucose: 100    Bilirubin: LARGE    Ketone - Urine: NEGATIVE    Specific Gravity: 1.020    Blood: MODERATE    pH - Urine: 6.0    Protein, Urine: 100    Urobilinogen: 4.0    Nitrite: NEGATIVE    Leukocyte Esterase Concentration: LARGE    Red Blood Cell - Urine: 3-5    White Blood Cell - Urine: >50    White Blood Cell Casts: 2-5/LPF    Epithelial Cells: OCC    Bacteria: MANY    Coarse Granular Casts: 0-2/LPF      MRSA Infection Control Screen (PCR) (19 @ 13:46)    MRSA Infection Control Screen (PCR): NOT DETECTED     REFERENCE RANGE:  MRSA DNA NOT DETECTED      Culture - Blood (03.15.19 @ 14:03)    Culture - Blood:   NO ORGANISMS ISOLATED  NO ORGANISMS ISOLATED AT 24 HOURS    Specimen Source: BLOOD Patient is seen and examined at the bed side, is normothermic.  He remains awake and alert.          REVIEW OF SYSTEMS: All other review systems are negative        ALLERGIES: No Known Allergies        Vital Signs Last 24 Hrs  T(C): 36.3 (2019 17:24), Max: 36.4 (31 Mar 2019 21:45)  T(F): 97.3 (2019 17:24), Max: 97.6 (2019 14:30)  HR: 61 (2019 17:24) (61 - 68)  BP: 95/58 (2019 17:24) (92/52 - 125/68)  BP(mean): --  RR: 12 (2019 17:24) (10 - 16)  SpO2: 99% (2019 17:) (97% - 99%)      PHYSICAL EXAM:  GENERAL: More  awake and alert   HEENT: NGT in placed  CHEST/LUNG:  Air entry bilaterally  HEART: s1 and s2 present  ABDOMEN:  Nontender and  Non distended  : Oshea catheter in placed  EXTREMITIES: No pedal  edema  CNS: More awake and  alert            LABS:                                   7.6    10.10 )-----------( 87       ( 2019 05:30 )             21.4                8.8    11.60 )-----------( 74       ( 31 Mar 2019 10:41 )             24.2           04    141  |  105  |  54<H>  ----------------------------<  93  3.5   |  21<L>  |  3.33<H>    Ca    8.5      2019 05:30  Phos  3.5       Mg     2.2         TPro  4.8<L>  /  Alb  1.9<L>  /  TBili  19.2<H>  /  DBili  x   /  AST  157<H>  /  ALT  99<H>  /  AlkPhos  258<H>      144  |  108<H>  |  54<H>  ----------------------------<  99  3.6   |  20<L>  |  3.39<H>    Ca    8.6      31 Mar 2019 11:38  Phos  3.5       Mg     2.3         TPro  5.4<L>  /  Alb  2.0<L>  /  TBili  20.1<H>  /  DBili  x   /  AST  176<H>  /  ALT  118<H>  /  AlkPhos  280<H>        Ammonia, Serum (19 @ 07:07)    Ammonia, Serum: 66: Ammonia levels will be falsely elevated if specimen is NOT  collected, processed and maintained at 4-8 C. umol/L        Ammonia, Serum (19 @ 09:45)    Ammonia, Serum: 76: Ammonia levels will be falsely elevated if specimen is NOT  collected, processed and maintained at 4-8 C. umol/L        Ammonia, Serum (19 @ 17:30)    Ammonia, Serum: 130: Ammonia levels will be falsely elevated if specimen is NOT  collected, processed and maintained at 4-8 C. umol/L        MEDICATIONS  (STANDING):  dextrose 5% 1000 milliLiter(s) (75 mL/Hr) IV Continuous <Continuous>  doxycycline IVPB      doxycycline IVPB 100 milliGRAM(s) IV Intermittent every 12 hours  lactulose Syrup 10 Gram(s) Enteral Tube two times a day  lidocaine   Patch 1 Patch Transdermal daily  meropenem  IVPB 500 milliGRAM(s) IV Intermittent every 12 hours  metoprolol tartrate 12.5 milliGRAM(s) Oral two times a day  rifaximin 550 milliGRAM(s) Oral two times a day  ursodiol Capsule 300 milliGRAM(s) Oral two times a day    MEDICATIONS  (PRN):        RADIOLOGY & ADDITIONAL TESTS:      3/24/19 : CT Chest No Cont (19 @ 18:32) : 4.4 cm enlarged ascending thoracic aorta. Small bilateral pleural effusions.      3/20/19 : Xray Chest 1 View- PORTABLE-Urgent (19 @ 09:37) Patchy opacity within the right lower lung may represent pneumonia. Subsegmental atelectasis at the left base. No pleural effusion or  pneumothorax. No pulmonary edema. The cardiac silhouette remains enlarged. Aortic aneurysm. Recommend  further evaluation with aortogram.      3/18/19 : MR MRCP No Cont (19 @ 14:22) Gallbladder is distended with cholelithiasis.  No choledocholithiasis or biliary ductal dilatation.      3/17/19 : NM Hepatobiliary Imaging (19 @ 12:21) Abnormal hepatobiliary scan suspicious for common bile duct obstruction. Hepatocellular dysfunction. No evidence of acute cholecystitis.      3/18/19 : US Abdomen Limited (19 @ 07:48) Gallbladder is distended with sludge and stones.  Mural thickening of the wall of the common bile duct, possibly  cholangitis. No biliary ductal dilatation.      3/15/19 : CT Abdomen and Pelvis No Cont (03.15.19 @ 09:39) Distended gallbladder. Consider further evaluation with ultrasound if  acute cholecystitis is a concern.  No discrete mass identified on this limited noncontrast study.          MICROBIOLOGY DATA:      Culture - Blood (19 @ 11:11)    Culture - Blood:   NO ORGANISMS ISOLATED  NO ORGANISMS ISOLATED AT 24 HOURS    Specimen Source: BLOOD PERIPHERAL        Culture - Fungal, Blood (19 @ 08:41)    Culture - Fungal, Blood:   CULTURE NEGATIVE FOR YEASTS AND MOLDS-PRELIMINARY RESULT  AFTER 24 HOURS INCUBATION    Specimen Source: BLOOD        Cytomegalovirus By PCR (19 @ 05:34)    Cytomegalovirus By PCR: 165 IU/mL    CMVPCR Lo.22: Assay lower limit of detection (LOD):  31.2 IU/mL (1.49 log10/mL)  Assay dynamic range:  50 to 156,000,000 (156M) CMV DNA IU/mL (1.70 log10/mL –  8.19 log10/mL)  Plasma CMV DNA Quantification by PCR using Abbott m2000:  The results of this test should be interpreted with  consideration of all clinical and laboratory findings. In  particular, caution should be used when interpreting low  level positive results when the test is used for  diagnostic purposes. Repeat testing on an additional  sample is recommended to confirm low level positive  results. A result of "Not Detected" does not preclude the  possibility of infection with CMV.  CMV DNA may be present  below the detection limit of assay. LogIU/mL        Culture - Urine (19 @ 15:21)    -  Vancomycin: S 2 CLOVIS    Culture - Urine:   ENTF^Enterococcus faecalis  COLONY COUNT: > = 100,000 CFU/ML    Culture - Urine:   Susceptibility not performed.    -  Tetra/Doxy: R >8 CLOVIS    -  Nitrofurantoin: S <=32 CLOVIS    -  Ampicillin: S <=2 CLOVIS    -  Ciprofloxacin: S <=1 CLOVIS    Specimen Source: URINE CATHETER    Organism Identification: Enterococcus faecalis  Staphylococcus sp.,coag neg    Organism: Enterococcus faecalis  COLONY COUNT: > = 100,000 CFU/ML    Organism: Staphylococcus sp.,coag neg  COLONY COUNT: > = 100,000 CFU/ML    Method Type: POSITIVE CLOVIS 29      Urinalysis (19 @ 13:25)    Color: ELSA    Urine Appearance: TURBID    Glucose: 100    Bilirubin: LARGE    Ketone - Urine: NEGATIVE    Specific Gravity: 1.020    Blood: MODERATE    pH - Urine: 6.0    Protein, Urine: 100    Urobilinogen: 4.0    Nitrite: NEGATIVE    Leukocyte Esterase Concentration: LARGE    Red Blood Cell - Urine: 3-5    White Blood Cell - Urine: >50    White Blood Cell Casts: 2-5/LPF    Epithelial Cells: OCC    Bacteria: MANY    Coarse Granular Casts: 0-2/LPF      MRSA Infection Control Screen (PCR) (19 @ 13:46)    MRSA Infection Control Screen (PCR): NOT DETECTED     REFERENCE RANGE:  MRSA DNA NOT DETECTED      Culture - Blood (03.15.19 @ 14:03)    Culture - Blood:   NO ORGANISMS ISOLATED  NO ORGANISMS ISOLATED AT 24 HOURS    Specimen Source: BLOOD

## 2019-04-01 NOTE — CHART NOTE - NSCHARTNOTEFT_GEN_A_CORE
Patient seen for nutrition follow up/Malnutrition follow up + verbal consult     Source: Patient [ ]    Family [ ]     other [x]: RN- patient is confused at time of interview     Current Diet : NPO since 3/28. Patient previously on Dysphagia 2- Mechanical Soft + Honey consistency + Ensure Enlive 240mls 3x daily (1050kcal, 60g protein).     Reported: Per RN- no GI distress at this time (no nausea/vomiting/diarrhea/constipation) or difficulty chewing and swallowing. No allergies or intolerances noted in chart.     PO intake:  Patient with history of poor PO intake.       Current Weight: 3/15: 117.9 pounds --> no new weight in chart. RDN tried to obtained weight via bed scale 4/1- however bed scale not calibrated.   Patient noted with +3 edema- generalized, left wrist, right arm, left hand.    __________________ Pertinent Medications__________________   MEDICATIONS  (STANDING):  dextrose 5% 1000 milliLiter(s) (75 mL/Hr) IV Continuous <Continuous>  doxycycline IVPB      doxycycline IVPB 100 milliGRAM(s) IV Intermittent every 12 hours  lactulose Syrup 10 Gram(s) Enteral Tube two times a day  lidocaine   Patch 1 Patch Transdermal daily  meropenem  IVPB 500 milliGRAM(s) IV Intermittent every 12 hours  metoprolol tartrate 12.5 milliGRAM(s) Oral two times a day  rifaximin 550 milliGRAM(s) Oral two times a day  ursodiol Capsule 300 milliGRAM(s) Oral two times a day    MEDICATIONS  (PRN):      __________________ Pertinent Labs__________________   04-01 Na141 mmol/L Glu 93 mg/dL K+ 3.5 mmol/L Cr  3.33 mg/dL<H> BUN 54 mg/dL<H> 03-31 Phos 3.5 mg/dL 04-01 Alb 1.9 g/dL<L> 03-16 PAB 3 mg/dL<L> 03-16 [id number] 4.6 % 03-16 Chol 89 mg/dL<L> LDL 55 mg/dL HDL 11 mg/dL<L> Trig 76 mg/dL        Skin: intact, no pressure injuries noted     Estimated Needs:   [x] no change since previous assessment  [ ] recalculated:       Previous Nutrition Diagnosis:  [x] Malnutrition - Severe    Nutrition Diagnosis is [x] ongoing  [ ] resolved [ ] not applicable     New Nutrition Diagnosis: [x] not applicable        Nutrition Recommendations:  1. Jevity 1.2 via NGT to goal rate of 60ml/hr x24 hrs. Provides 1728 kcal, 80g protein. Consider starting feeds at 20ml/hr and increase by 5-10ml/hr every 4-6 hrs until goal rate is reached. Maintain aspiration precautions - HOB >45 degrees during feeds.   2. Monitor weights, labs, BM's, skin integrity, and tolerance to tube feeding   3. Obtain current weight.  4. RD to remain available for further nutritional interventions as indicated.-Mitzy Rhoades, MENDOZAN, CDN

## 2019-04-01 NOTE — PROGRESS NOTE ADULT - SUBJECTIVE AND OBJECTIVE BOX
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  Upon arrival to the ED, pt with scleral icterus and labs significant for transaminitis and TBili > 20 (15 Mar 2019 06:59)      PAST MEDICAL & SURGICAL HISTORY:  Chronic kidney disease (CKD), stage IV (severe)  Benign prostatic hypertrophy  S/P cataract surgery, left: 3 yrs ago      No Known Allergies       MEDICATIONS  (STANDING):  dextrose 5% 1000 milliLiter(s) (75 mL/Hr) IV Continuous <Continuous>  doxycycline IVPB      doxycycline IVPB 100 milliGRAM(s) IV Intermittent every 12 hours  lactulose Syrup 10 Gram(s) Enteral Tube two times a day  lidocaine   Patch 1 Patch Transdermal daily  meropenem  IVPB 500 milliGRAM(s) IV Intermittent every 12 hours  metoprolol tartrate 12.5 milliGRAM(s) Oral two times a day  rifaximin 550 milliGRAM(s) Oral two times a day  ursodiol Capsule 300 milliGRAM(s) Oral two times a day    MEDICATIONS  (PRN):      REVIEW OF SYSTEMS: limited from pt 2/2 medical state     T(C): 36.3 (04-01-19 @ 05:03), Max: 36.6 (03-31-19 @ 10:15)  HR: 68 (04-01-19 @ 05:03) (61 - 68)  BP: 125/68 (04-01-19 @ 05:03) (117/65 - 125/68)  RR: 14 (04-01-19 @ 05:03) (14 - 16)  SpO2: 97% (04-01-19 @ 05:03) (96% - 98%)    PHYSICAL EXAMINATION:   Constitutional: stable ill appearance  HEENT: bitemp wastingk, ngt intact  Neck:  Supple  Respiratory:  Adequate airflow b/l. Not using accessory muscles of respiration.  Cardiovascular:  S1 & S2 intact, systolic murmur, no R/G, 2+ radial pulses b/l  Gastrointestinal: Soft, ND, normoactive b.s.,  Extremities: poor skin elasticity and turgor, edema in extremities  Neurological:  stable neuro status - stable confusion/disorientation/lethargy/awake/arousable.     Labs and imaging reviewed    LABS:                        7.6    10.10 )-----------( 87       ( 01 Apr 2019 05:30 )             21.4     04-01    141  |  105  |  54<H>  ----------------------------<  93  3.5   |  21<L>  |  3.33<H>    Ca    8.5      01 Apr 2019 05:30  Phos  3.5     03-31  Mg     2.2     04-01    TPro  4.8<L>  /  Alb  1.9<L>  /  TBili  19.2<H>  /  DBili  x   /  AST  157<H>  /  ALT  99<H>  /  AlkPhos  258<H>  04-01        PT/INR - ( 01 Apr 2019 05:30 )   PT: 25.5 SEC;   INR: 2.18          PTT - ( 01 Apr 2019 05:30 )  PTT:67.9 SEC    CAPILLARY BLOOD GLUCOSE            LIVER FUNCTIONS - ( 01 Apr 2019 05:30 )  Alb: 1.9 g/dL / Pro: 4.8 g/dL / ALK PHOS: 258 u/L / ALT: 99 u/L / AST: 157 u/L / GGT: x               RADIOLOGY & ADDITIONAL STUDIES:

## 2019-04-01 NOTE — PROGRESS NOTE ADULT - ASSESSMENT
Impression:     1. Acute liver injury with marked  hyperbilirubinemia: DILI vs. viral. MELD Na 40. Rule out leptospirosis, Hepatitis E, Drug induced autoimmune.     2. Kidney failure    3. Encephalopathy ? multifactorial, rule out sepsis    Recommendation:  -f/u repeat infectious w/u  -check fibrinogen, factor V and X,  to rule out DIC  -continue lactulose/ xifaxan  -f/u hepatitis E IgM

## 2019-04-01 NOTE — PROGRESS NOTE ADULT - ASSESSMENT
mental status with significant clinical improvemtn today.  start feeds through ngt.  appreciate hep, emeric treatment for weil's disease

## 2019-04-01 NOTE — PROGRESS NOTE ADULT - ASSESSMENT
89 yo M, poor historian, charts reviewed, with PMHx BPH, Cataracts, Diverticulosis, Anemia 2/2 GI Bleed with previous PRBC transfusions, who p/w 2 days genearlized weakness, urinary retention and confusion.  Upon arrival to the ED, pt with scleral icterus and labs significant for transaminitis and TBili > 20. Palliative Care consulted for complex decision making in the setting of liver failure and renal failure.

## 2019-04-01 NOTE — PROGRESS NOTE ADULT - PROBLEM SELECTOR PLAN 2
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 Per GI pt not a candidate for liver transplant. Poor prognosis, MELD 40.

## 2019-04-01 NOTE — PROGRESS NOTE ADULT - SUBJECTIVE AND OBJECTIVE BOX
General Medicine Discharge Summary     Patient ID:  Gema Ochoa  80year old  [id number]  10/3/1932    Admit date: 12/13/2017    Discharge date and time: 12/16/17    Attending Physician: Luisito Arevalo, *     Primary Care Physician: None Pcp SUBJECTIVE AND OBJECTIVE: Pt more alert than last week, remains very confused, minimally verbal.   INTERVAL HPI/OVERNIGHT EVENTS: Ethics reviewed case on Friday.    DNR on chart:   Allergies    No Known Allergies    Intolerances    MEDICATIONS  (STANDING):  dextrose 5% 1000 milliLiter(s) (75 mL/Hr) IV Continuous <Continuous>  doxycycline IVPB      doxycycline IVPB 100 milliGRAM(s) IV Intermittent every 12 hours  lactulose Syrup 10 Gram(s) Enteral Tube two times a day  lidocaine   Patch 1 Patch Transdermal daily  meropenem  IVPB 500 milliGRAM(s) IV Intermittent every 12 hours  metoprolol tartrate 12.5 milliGRAM(s) Oral two times a day  rifaximin 550 milliGRAM(s) Oral two times a day  ursodiol Capsule 300 milliGRAM(s) Oral two times a day    MEDICATIONS  (PRN):      ITEMS UNCHECKED ARE NOT PRESENT    PRESENT SYMPTOMS: [x ]Unable to obtain due to poor mentation   Source if other than patient:  [ ]Family   [ ]Team     Pain (Impact on QOL):    Location:  Minimal acceptable level (0-10 scale):                   Aggravating factors:  Quality:  Radiation:  Severity (0-10 scale):    Timing:    Dyspnea:                           [ ]Mild [ ]Moderate [ ]Severe  Anxiety:                             [ ]Mild [ ]Moderate [ ]Severe  Fatigue:                             [ ]Mild [ ]Moderate [ ]Severe  Nausea:                             [ ]Mild [ ]Moderate [ ]Severe  Loss of appetite:              [ ]Mild [ ]Moderate [ ]Severe  Constipation:                    [ ]Mild [ ]Moderate [ ]Severe    PAIN AD Score:	  [email or web address] (Ctrl + left click to view)    Other Symptoms:  [ ]All other review of systems negative     Karnofsky Performance Score/Palliative Performance Status Version 2: 20%    http://palliative.info/resource_material/[email or web address].pdf    PHYSICAL EXAM:  Vital Signs Last 24 Hrs  T(C): 36.2 (01 Apr 2019 11:21), Max: 36.5 (31 Mar 2019 14:12)  T(F): 97.2 (01 Apr 2019 11:21), Max: 97.7 (31 Mar 2019 14:12)  HR: 62 (01 Apr 2019 11:21) (61 - 68)  BP: 97/56 (01 Apr 2019 11:21) (97/56 - 125/68)  BP(mean): --  RR: 10 (01 Apr 2019 11:21) (10 - 16)  SpO2: 97% (01 Apr 2019 11:21) (96% - 97%) I&O's Summary    31 Mar 2019 07:01  -  01 Apr 2019 07:00  --------------------------------------------------------  IN: 0 mL / OUT: 1202 mL / NET: -1202 mL    01 Apr 2019 07:01  -  01 Apr 2019 13:07  --------------------------------------------------------  IN: 0 mL / OUT: 0 mL / NET: 0 mL     GENERAL:  [ ]Alert  [ ]Oriented x   [x ]Lethargic  [ ]Cachexia  [ ]Unarousable  [x ]Verbal: minimally  [ ]Non-Verbal  Behavioral:   [ ] Anxiety  [x ] Delirium [ ] Agitation [ ] Other  HEENT:  [ ]Normal   [ x]Dry mouth   [ ]ET Tube/Trach  [ ]Oral lesions  PULMONARY:   [x ]Clear [ ]Tachypnea  [ ]Audible excessive secretions   [ ]Rhonchi        [ ]Right [ ]Left [ ]Bilateral  [ ]Crackles        [ ]Right [ ]Left [ ]Bilateral  [ ]Wheezing     [ ]Right [ ]Left [ ]Bilateral  CARDIOVASCULAR:    [x ]Regular [ ]Irregular [ ]Tachy  [ ]Yemi [ ]Murmur [ ]Other  GASTROINTESTINAL:  [x]Soft  [ ]Distended   [x ]+BS  [ x]Non tender [ ]Tender  [ ]PEG [ ]OGT/ NGT   Last BM:     03-28-19 @ 07:01  -  03-29-19 @ 07:00  --------------------------------------------------------  OUT: 5 mL    03-29-19 @ 07:01  -  03-30-19 @ 07:00  --------------------------------------------------------  OUT: 400 mL    03-31-19 @ 07:01  -  04-01-19 @ 07:00  --------------------------------------------------------  OUT: 2 mL    GENITOURINARY:  [ ]Normal [x ] Incontinent   [ ]Oliguria/Anuria   [ ]Oshea  MUSCULOSKELETAL:   [ ]Normal   [x ]Weakness  [ ]Bed/Wheelchair bound [ ]Edema  NEUROLOGIC:   [ ]No focal deficits  [x ] Cognitive impairment  [ x] Dysphagia [ ]Dysarthria [ ] Paresis [ ]Other   SKIN: icteric  [ ]Normal   [ ]Pressure ulcer(s)  [ ]Rash    CRITICAL CARE:  [ ] Shock Present  [ ]Septic [ ]Cardiogenic [ ]Neurologic [ ]Hypovolemic  [ ]  Vasopressors [ ]  Inotropes   [ ] Respiratory failure present  [ ] Acute  [ ] Chronic [ ] Hypoxic  [ ] Hypercarbic [ ] Other  [ ] Other organ failure     GRIEF   [ ] Yes  [ x] No    LABS:                        7.6    10.10 )-----------( 87       ( 01 Apr 2019 05:30 )             21.4   04-01    141  |  105  |  54<H>  ----------------------------<  93  3.5   |  21<L>  |  3.33<H>    Ca    8.5      01 Apr 2019 05:30  Phos  3.5     03-31  Mg     2.2     04-01    TPro  4.8<L>  /  Alb  1.9<L>  /  TBili  19.2<H>  /  DBili  x   /  AST  157<H>  /  ALT  99<H>  /  AlkPhos  258<H>  04-01  PT/INR - ( 01 Apr 2019 05:30 )   PT: 25.5 SEC;   INR: 2.18        PTT - ( 01 Apr 2019 05:30 )  PTT:67.9 SEC    RADIOLOGY & ADDITIONAL STUDIES: reviewed.     Protein Calorie Malnutrition Present: [ ] yes [ ] no  [x ] PPSV2 < or = 30%  [ ] significant weight loss [ ] poor nutritional intake [ ] anasarca [ ] catabolic state Albumin, Serum: 1.9 g/dL (04-01-19 @ 05:30)  Artificial Nutrition [ ]     REFERRALS:   [ ]Chaplaincy  [ ] Hospice  [ ]Child Life  [ ]Social Work  [ ]Case management [ ]Holistic Therapy   Goals of Care Document: Goals of Care Conversation - Personal Advance Directive [YEN Torres] (03-29-19 @ 14:02) consult, appreciate     **chronic anemia-suspect secondary to CKD- HDS, monitor     Consults: IP CONSULT TO CARDIOLOGY  IP CONSULT TO PULMONOLOGY  IP CONSULT TO HOSPITALIST  IP CONSULT TO SPIRITUAL CARE  IP CONSULT TO SOCIAL WORK    Radiology:  Us Venous D (CPT=71010)  TECHNIQUE:  AP chest radiograph was obtained. COMPARISON:  DAIJA , CANDY CHEST AP PORTABLE  (CPT=71010), 12/13/2017, 15:56. INDICATIONS:  dyspnea  PATIENT STATED HISTORY: (As transcribed by Technologist)  Shortness of breath.     FINDINGS:  Viri Clark 12/13/2017, 15:56. INDICATIONS:  eval for PE  TECHNIQUE:  The patient was ventilated with 50 mCi Tc-99m DTPA in the nebulizer, and supine images of the lungs were obtained in the 6 standard projections.   This was followed by IV injection of 5.5 mCi Tc-99m daily.    Omeprazole 40 MG Oral Capsule Delayed Release  Take 1 capsule (40 mg total) by mouth daily. Multiple Vitamins-Minerals (MULTI-VITAMIN/MINERALS) Oral Tab  Take 1 tablet by mouth daily.     Misc Natural Products (GLUCOS-CHONDROIT-MSM COMPLEX OR)

## 2019-04-01 NOTE — PROGRESS NOTE ADULT - SUBJECTIVE AND OBJECTIVE BOX
PASCUAL OLSEN:[id number],   89yMale followed for:  No Known Allergies    PAST MEDICAL & SURGICAL HISTORY:  Chronic kidney disease (CKD), stage IV (severe)  Benign prostatic hypertrophy  S/P cataract surgery, left: 3 yrs ago    FAMILY HISTORY:  No pertinent family history in first degree relatives    MEDICATIONS  (STANDING):  dextrose 5% 1000 milliLiter(s) (75 mL/Hr) IV Continuous <Continuous>  doxycycline IVPB      doxycycline IVPB 100 milliGRAM(s) IV Intermittent every 12 hours  lactulose Syrup 10 Gram(s) Enteral Tube two times a day  lidocaine   Patch 1 Patch Transdermal daily  meropenem  IVPB 500 milliGRAM(s) IV Intermittent every 12 hours  metoprolol tartrate 12.5 milliGRAM(s) Oral two times a day  rifaximin 550 milliGRAM(s) Oral two times a day  ursodiol Capsule 300 milliGRAM(s) Oral two times a day    MEDICATIONS  (PRN):      Vital Signs Last 24 Hrs  T(C): 36.3 (01 Apr 2019 05:03), Max: 36.6 (31 Mar 2019 10:15)  T(F): 97.4 (01 Apr 2019 05:03), Max: 97.8 (31 Mar 2019 10:15)  HR: 68 (01 Apr 2019 05:03) (61 - 68)  BP: 125/68 (01 Apr 2019 05:03) (117/65 - 125/68)  BP(mean): --  RR: 14 (01 Apr 2019 05:03) (14 - 16)  SpO2: 97% (01 Apr 2019 05:03) (96% - 98%)  nc/at  s1s2  cta  soft, nt, nd no guarding or rebound  no c/c/e    CBC Full  -  ( 01 Apr 2019 05:30 )  WBC Count : 10.10 K/uL  RBC Count : 2.71 M/uL  Hemoglobin : 7.6 g/dL  Hematocrit : 21.4 %  Platelet Count - Automated : 87 K/uL  Mean Cell Volume : 79.0 fL  Mean Cell Hemoglobin : 28.0 pg  Mean Cell Hemoglobin Concentration : 35.5 %  Auto Neutrophil # : x  Auto Lymphocyte # : x  Auto Monocyte # : x  Auto Eosinophil # : x  Auto Basophil # : x  Auto Neutrophil % : x  Auto Lymphocyte % : x  Auto Monocyte % : x  Auto Eosinophil % : x  Auto Basophil % : x    04-01    141  |  105  |  54<H>  ----------------------------<  93  3.5   |  21<L>  |  3.33<H>    Ca    8.5      01 Apr 2019 05:30  Phos  3.5     03-31  Mg     2.2     04-01    TPro  4.8<L>  /  Alb  1.9<L>  /  TBili  19.2<H>  /  DBili  x   /  AST  157<H>  /  ALT  99<H>  /  AlkPhos  258<H>  04-01    PT/INR - ( 01 Apr 2019 05:30 )   PT: 25.5 SEC;   INR: 2.18          PTT - ( 01 Apr 2019 05:30 )  PTT:67.9 SEC

## 2019-04-02 LAB
ALBUMIN SERPL ELPH-MCNC: 1.8 G/DL — LOW (ref 3.3–5)
ALP SERPL-CCNC: 274 U/L — HIGH (ref 40–120)
ALT FLD-CCNC: 91 U/L — HIGH (ref 4–41)
ANION GAP SERPL CALC-SCNC: 15 MMO/L — HIGH (ref 7–14)
ANISOCYTOSIS BLD QL: SIGNIFICANT CHANGE UP
AST SERPL-CCNC: 174 U/L — HIGH (ref 4–40)
BASOPHILS # BLD AUTO: 0.07 K/UL — SIGNIFICANT CHANGE UP (ref 0–0.2)
BASOPHILS NFR BLD AUTO: 0.6 % — SIGNIFICANT CHANGE UP (ref 0–2)
BASOPHILS NFR SPEC: 0 % — SIGNIFICANT CHANGE UP (ref 0–2)
BILIRUB DIRECT SERPL-MCNC: 15.3 MG/DL — HIGH (ref 0.1–0.2)
BILIRUB SERPL-MCNC: 20.6 MG/DL — HIGH (ref 0.2–1.2)
BUN SERPL-MCNC: 61 MG/DL — HIGH (ref 7–23)
CALCIUM SERPL-MCNC: 8.6 MG/DL — SIGNIFICANT CHANGE UP (ref 8.4–10.5)
CHLORIDE SERPL-SCNC: 101 MMOL/L — SIGNIFICANT CHANGE UP (ref 98–107)
CO2 SERPL-SCNC: 25 MMOL/L — SIGNIFICANT CHANGE UP (ref 22–31)
CREAT SERPL-MCNC: 4.04 MG/DL — HIGH (ref 0.5–1.3)
DACRYOCYTES BLD QL SMEAR: SIGNIFICANT CHANGE UP
EOSINOPHIL # BLD AUTO: 0.27 K/UL — SIGNIFICANT CHANGE UP (ref 0–0.5)
EOSINOPHIL NFR BLD AUTO: 2.4 % — SIGNIFICANT CHANGE UP (ref 0–6)
EOSINOPHIL NFR FLD: 4 % — SIGNIFICANT CHANGE UP (ref 0–6)
GLUCOSE SERPL-MCNC: 117 MG/DL — HIGH (ref 70–99)
HCT VFR BLD CALC: 22.5 % — LOW (ref 39–50)
HGB BLD-MCNC: 8 G/DL — LOW (ref 13–17)
IMM GRANULOCYTES NFR BLD AUTO: 6.5 % — HIGH (ref 0–1.5)
LYMPHOCYTES # BLD AUTO: 0.46 K/UL — LOW (ref 1–3.3)
LYMPHOCYTES # BLD AUTO: 4.1 % — LOW (ref 13–44)
LYMPHOCYTES NFR SPEC AUTO: 10 % — LOW (ref 13–44)
MACROCYTES BLD QL: SIGNIFICANT CHANGE UP
MAGNESIUM SERPL-MCNC: 2.2 MG/DL — SIGNIFICANT CHANGE UP (ref 1.6–2.6)
MANUAL SMEAR VERIFICATION: SIGNIFICANT CHANGE UP
MCHC RBC-ENTMCNC: 27.8 PG — SIGNIFICANT CHANGE UP (ref 27–34)
MCHC RBC-ENTMCNC: 35.6 % — SIGNIFICANT CHANGE UP (ref 32–36)
MCV RBC AUTO: 78.1 FL — LOW (ref 80–100)
METAMYELOCYTES # FLD: 3 % — HIGH (ref 0–1)
MICROCYTES BLD QL: SLIGHT — SIGNIFICANT CHANGE UP
MONOCYTES # BLD AUTO: 1.23 K/UL — HIGH (ref 0–0.9)
MONOCYTES NFR BLD AUTO: 11 % — SIGNIFICANT CHANGE UP (ref 2–14)
MONOCYTES NFR BLD: 15 % — HIGH (ref 2–9)
MYELOCYTES NFR BLD: 1 % — HIGH (ref 0–0)
NEUTROPHIL AB SER-ACNC: 66 % — SIGNIFICANT CHANGE UP (ref 43–77)
NEUTROPHILS # BLD AUTO: 8.45 K/UL — HIGH (ref 1.8–7.4)
NEUTROPHILS NFR BLD AUTO: 75.4 % — SIGNIFICANT CHANGE UP (ref 43–77)
NRBC # BLD: 8 /100WBC — SIGNIFICANT CHANGE UP
NRBC # FLD: 0.89 K/UL — SIGNIFICANT CHANGE UP (ref 0–0)
NRBC FLD-RTO: 7.9 — SIGNIFICANT CHANGE UP
PLATELET # BLD AUTO: 74 K/UL — LOW (ref 150–400)
PLATELET COUNT - ESTIMATE: SIGNIFICANT CHANGE UP
PMV BLD: SIGNIFICANT CHANGE UP FL (ref 7–13)
POIKILOCYTOSIS BLD QL AUTO: SIGNIFICANT CHANGE UP
POLYCHROMASIA BLD QL SMEAR: SLIGHT — SIGNIFICANT CHANGE UP
POTASSIUM SERPL-MCNC: 3.5 MMOL/L — SIGNIFICANT CHANGE UP (ref 3.5–5.3)
POTASSIUM SERPL-SCNC: 3.5 MMOL/L — SIGNIFICANT CHANGE UP (ref 3.5–5.3)
PROT SERPL-MCNC: 5 G/DL — LOW (ref 6–8.3)
RBC # BLD: 2.88 M/UL — LOW (ref 4.2–5.8)
RBC # FLD: 28.6 % — HIGH (ref 10.3–14.5)
REVIEW TO FOLLOW: YES — SIGNIFICANT CHANGE UP
SCHISTOCYTES BLD QL AUTO: SLIGHT — SIGNIFICANT CHANGE UP
SODIUM SERPL-SCNC: 141 MMOL/L — SIGNIFICANT CHANGE UP (ref 135–145)
TARGETS BLD QL SMEAR: SIGNIFICANT CHANGE UP
VARIANT LYMPHS # BLD: 1 % — SIGNIFICANT CHANGE UP
WBC # BLD: 11.21 K/UL — HIGH (ref 3.8–10.5)
WBC # FLD AUTO: 11.21 K/UL — HIGH (ref 3.8–10.5)

## 2019-04-02 PROCEDURE — 99232 SBSQ HOSP IP/OBS MODERATE 35: CPT

## 2019-04-02 RX ORDER — MIDODRINE HYDROCHLORIDE 2.5 MG/1
2.5 TABLET ORAL
Qty: 0 | Refills: 0 | Status: DISCONTINUED | OUTPATIENT
Start: 2019-04-03 | End: 2019-04-09

## 2019-04-02 RX ORDER — MIDODRINE HYDROCHLORIDE 2.5 MG/1
2.5 TABLET ORAL ONCE
Qty: 0 | Refills: 0 | Status: COMPLETED | OUTPATIENT
Start: 2019-04-02 | End: 2019-04-02

## 2019-04-02 RX ADMIN — LIDOCAINE 1 PATCH: 4 CREAM TOPICAL at 19:49

## 2019-04-02 RX ADMIN — LACTULOSE 10 GRAM(S): 10 SOLUTION ORAL at 07:36

## 2019-04-02 RX ADMIN — SODIUM CHLORIDE 75 MILLILITER(S): 9 INJECTION, SOLUTION INTRAVENOUS at 12:09

## 2019-04-02 RX ADMIN — MIDODRINE HYDROCHLORIDE 2.5 MILLIGRAM(S): 2.5 TABLET ORAL at 22:48

## 2019-04-02 RX ADMIN — LIDOCAINE 1 PATCH: 4 CREAM TOPICAL at 12:09

## 2019-04-02 RX ADMIN — URSODIOL 300 MILLIGRAM(S): 250 TABLET, FILM COATED ORAL at 17:33

## 2019-04-02 RX ADMIN — ERGOCALCIFEROL 50000 UNIT(S): 1.25 CAPSULE ORAL at 12:10

## 2019-04-02 RX ADMIN — LACTULOSE 10 GRAM(S): 10 SOLUTION ORAL at 17:33

## 2019-04-02 RX ADMIN — MEROPENEM 100 MILLIGRAM(S): 1 INJECTION INTRAVENOUS at 17:33

## 2019-04-02 RX ADMIN — MEROPENEM 100 MILLIGRAM(S): 1 INJECTION INTRAVENOUS at 07:36

## 2019-04-02 RX ADMIN — Medication 110 MILLIGRAM(S): at 17:33

## 2019-04-02 RX ADMIN — URSODIOL 300 MILLIGRAM(S): 250 TABLET, FILM COATED ORAL at 07:36

## 2019-04-02 RX ADMIN — Medication 110 MILLIGRAM(S): at 06:45

## 2019-04-02 RX ADMIN — Medication 12.5 MILLIGRAM(S): at 07:36

## 2019-04-02 NOTE — CHART NOTE - NSCHARTNOTEFT_GEN_A_CORE
Pt noted to be hypotensive. Case discussed with Dr. Branham and will try Midodrine 2.5mg daily to see if BP improves. Will hold off on pressors at this time.

## 2019-04-02 NOTE — PROGRESS NOTE ADULT - ASSESSMENT
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 conservaitve managmetn. ngt feeds. consideratble improvment in mental status.  pt is verbal

## 2019-04-02 NOTE — PROGRESS NOTE ADULT - SUBJECTIVE AND OBJECTIVE BOX
Patient seen and examined at bedside    more verbal today, pt states "He is not good.  Prentis is not good to me.  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  Upon arrival to the ED, pt with scleral icterus and labs significant for transaminitis and TBili > 20 (15 Mar 2019 06:59)      PAST MEDICAL & SURGICAL HISTORY:  Chronic kidney disease (CKD), stage IV (severe)  Benign prostatic hypertrophy  S/P cataract surgery, left: 3 yrs ago      No Known Allergies       MEDICATIONS  (STANDING):  dextrose 5% 1000 milliLiter(s) (75 mL/Hr) IV Continuous <Continuous>  doxycycline IVPB      doxycycline IVPB 100 milliGRAM(s) IV Intermittent every 12 hours  lactulose Syrup 10 Gram(s) Enteral Tube two times a day  lidocaine   Patch 1 Patch Transdermal daily  meropenem  IVPB 500 milliGRAM(s) IV Intermittent every 12 hours  metoprolol tartrate 12.5 milliGRAM(s) Oral two times a day  rifaximin 550 milliGRAM(s) Oral two times a day  ursodiol Capsule 300 milliGRAM(s) Oral two times a day    MEDICATIONS  (PRN):      REVIEW OF SYSTEMS: limited from pt 2/2 medical state    however Patient states "He is not good.  Prentis is not good to me.  He is bipolar"     Vital Signs Last 24 Hrs  T(C): 36.3 (02 Apr 2019 06:31), Max: 36.5 (01 Apr 2019 21:48)  T(F): 97.4 (02 Apr 2019 06:31), Max: 97.7 (01 Apr 2019 21:48)  HR: 86 (02 Apr 2019 06:31) (61 - 86)  BP: 102/56 (02 Apr 2019 06:31) (89/53 - 102/56)  BP(mean): --  RR: 15 (02 Apr 2019 06:31) (10 - 15)  SpO2: 100% (02 Apr 2019 06:31) (97% - 100%)    PHYSICAL EXAMINATION:   Constitutional: stable ill appearance  HEENT: bitemp wasting, ngt intact  Neck:  Supple  Respiratory:  Adequate airflow b/l. Not using accessory muscles of respiration.  Cardiovascular:  S1 & S2 intact, systolic murmur, no R/G, 2+ radial pulses b/l  Gastrointestinal: Soft, ND, normoactive b.s.,  Extremities: poor skin elasticity and turgor, edema in extremities  Neurological: more awake and verbal, however mild intermittent confusion/disorientation and still lethargic.     Labs and imaging reviewed    LABS:           Comprehensive Metabolic w/Magnesium (04.01.19 @ 05:30)    eGFR if Non : 15: The units for eGFR are ml/min/1.73m2 (normalized body  surface area). The eGFR is calculated from a serum  creatinine using the CKD-EPI equation. Other variables  required for calculation are race, age and sex. Among  patients with chronic kidney disease (CKD), the eGFR is  useful in determining the stage of disease according to  KDOQI CKD classification. All eGFR results are reported  numerically with the following interpretation.    GFR  (ml/min/1.73 m2)          W/KIDNEY DAMAGE    W/O KIDNEY DMG  ==========================================================  >= 90.......................Stage 1..............Normal  60-89.......................Stage 2...........Decreased GFR  30-59.......................Stage 3..............Stage 3  15-29.......................Stage 4..............Stage 4  < 15........................Stage 5..............Stage 5    Each stage of CKD assumes that the associated GFR level  has been in effect for at least 3 months. Determination of  stages one and two (with eGFR > 59ml/min/m2) requires  estimation of kidney damage for at least 3 months as  defined by structural or functional abnormalities.    Limitations: All estimates of GFR will be less accurate  for patients at extremes of muscle mass (including but  not limited to frail elderly, critically ill, or cancer  patients), those with unusual diets, and those with  conditions associated with reduced secretion or  extrarenal elimination of creatinine. The eGFR equation  is not recommended for use in patients with unstable  creatinine levels. mL/min    eGFR if : 18 mL/min    Sodium, Serum: 141 mmol/L    Potassium, Serum: 3.5: SPECIMEN MILDLY HEMOLYZED mmol/L    Chloride, Serum: 105 mmol/L    Carbon Dioxide, Serum: 21 mmol/L    Anion Gap, Serum: 15 mmo/L    Blood Urea Nitrogen, Serum: 54 mg/dL    Creatinine, Serum: 3.33 mg/dL    Glucose, Serum: 93 mg/dL    Calcium, Total Serum: 8.5 mg/dL    Protein Total, Serum: 4.8: SPECIMEN MILDLY HEMOLYZED g/dL    Albumin, Serum: 1.9 g/dL    Bilirubin Total, Serum: 19.2 mg/dL    Alkaline Phosphatase, Serum: 258 u/L    Aspartate Aminotransferase (AST/SGOT): 157: SPECIMEN MILDLY HEMOLYZED u/L    Alanine Aminotransferase (ALT/SGPT): 99: SPECIMEN MILDLY HEMOLYZED u/L    Magnesium, Serum: 2.2 mg/dL

## 2019-04-02 NOTE — PROGRESS NOTE ADULT - SUBJECTIVE AND OBJECTIVE BOX
[facility]      Seen earlier today, and examined.  - Today, patient is without complaints.           *****MEDICATIONS: Current medication reviewed and documented.    MEDICATIONS  (STANDING):  dextrose 5% 1000 milliLiter(s) (75 mL/Hr) IV Continuous <Continuous>  ergocalciferol 86027 Unit(s) Oral every week  lactulose Syrup 10 Gram(s) Enteral Tube two times a day  lidocaine   Patch 1 Patch Transdermal daily  meropenem  IVPB 500 milliGRAM(s) IV Intermittent every 12 hours  metoprolol tartrate 12.5 milliGRAM(s) Oral two times a day  midodrine 2.5 milliGRAM(s) Oral once  rifaximin 550 milliGRAM(s) Oral two times a day  ursodiol Capsule 300 milliGRAM(s) Oral two times a day    MEDICATIONS  (PRN):          ***** VITAL SIGNS:  T(F): 97.6 (19 @ 17:31), Max: 98.1 (19 @ 10:52)  HR: 68 (19 @ 17:31) (57 - 86)  BP: 91/53 (19 @ 17:31) (79/45 - 102/56)  RR: 18 (19 @ 17:31) (14 - 18)  SpO2: 97% (19 @ 17:31) (97% - 100%)  Wt(kg): --  ,   I&O's Summary    2019 07:  -  2019 07:00  --------------------------------------------------------  IN: 0 mL / OUT: 250 mL / NET: -250 mL    :  -  2019 21:31  --------------------------------------------------------  IN: 0 mL / OUT: 320 mL / NET: -320 mL             *****PHYSICAL EXAM:  lethargic icteric    alert to repeated tactile stimuli   non verbal.              *****LAB AND IMAGIN.0    11.21 )-----------( 74       ( 2019 11:13 )             22.5               04-02    141  |  101  |  61<H>  ----------------------------<  117<H>  3.5   |  25  |  4.04<H>    Ca    8.6      2019 11:13  Mg     2.2     -    TPro  5.0<L>  /  Alb  1.8<L>  /  TBili  20.6<H>  /  DBili  15.3<H>  /  AST  174<H>  /  ALT  91<H>  /  AlkPhos  274<H>  04-    PT/INR - ( 2019 05:30 )   PT: 25.5 SEC;   INR: 2.18          PTT - ( 2019 05:30 )  PTT:67.9 SEC                     [All pertinent recent Imaging/Reports reviewed]           *****A S S E S S M E N T   A N D   P L A N :  89 yo M, poor historian, charts reviewed, with PMHx BPH, Cataracts, Diverticulosis, Anemia 2/2 GI Bleed with previous PRBC transfusions, who p/w 2 days genearlized weakness, urinary retention and confusion.  Upon arrival to the ED, pt with scleral icterus and labs significant for transaminitis and TBili > 20    called to evaluate pt for ams         Problem/Recommendations 1:  Multifactorial toxic metabolic encephalopathy  , hyperbilirubinemia, hyperammonemia, and worsening renal function  correct metabolic dyscrasias defer to renal/gi    on lactulose   worsening lethargy /encephalopathy    ethics consult noted.   continue supportive care.     spent a lot of time with pt and neice at bedside.             Problem/Recommendations 2 Hyperbilirubinemia  suspicious for cholestatic pattern   distended gb with cholelithiasis, mural thickening of the cbd. defer to gi for management.    pending liver biopsy          hypotensive, will watch closely   Thank you for allowing me to participate in the care of this patient. Please do not hesitate to call me if you have any  questions.        ________________  Ava Schreiber MD  [facility] (PN)[facility]  [phone]      30 minutes spent on total encounter; more than 50 % of the visit was  spent counseling about plan of care, compliance to diet/exercise and medication regimen and or  coordinating care by the attending physician.      It is advised that s stroke patients follow up with NP Dennise Rausch @ [phone] in 1- 2 weeks.   Others please follow up with Dr. Michael Nissenbaum [phone]

## 2019-04-02 NOTE — PROGRESS NOTE ADULT - SUBJECTIVE AND OBJECTIVE BOX
PASCUAL OLSEN:[id number],   89yMale followed for:  No Known Allergies    PAST MEDICAL & SURGICAL HISTORY:  Chronic kidney disease (CKD), stage IV (severe)  Benign prostatic hypertrophy  S/P cataract surgery, left: 3 yrs ago    FAMILY HISTORY:  No pertinent family history in first degree relatives    MEDICATIONS  (STANDING):  dextrose 5% 1000 milliLiter(s) (75 mL/Hr) IV Continuous <Continuous>  doxycycline IVPB      doxycycline IVPB 100 milliGRAM(s) IV Intermittent every 12 hours  ergocalciferol 59000 Unit(s) Oral every week  lactulose Syrup 10 Gram(s) Enteral Tube two times a day  lidocaine   Patch 1 Patch Transdermal daily  meropenem  IVPB 500 milliGRAM(s) IV Intermittent every 12 hours  metoprolol tartrate 12.5 milliGRAM(s) Oral two times a day  rifaximin 550 milliGRAM(s) Oral two times a day  ursodiol Capsule 300 milliGRAM(s) Oral two times a day    MEDICATIONS  (PRN):      Vital Signs Last 24 Hrs  T(C): 36.3 (02 Apr 2019 06:31), Max: 36.5 (01 Apr 2019 21:48)  T(F): 97.4 (02 Apr 2019 06:31), Max: 97.7 (01 Apr 2019 21:48)  HR: 86 (02 Apr 2019 06:31) (61 - 86)  BP: 102/56 (02 Apr 2019 06:31) (89/53 - 102/56)  BP(mean): --  RR: 15 (02 Apr 2019 06:31) (10 - 15)  SpO2: 100% (02 Apr 2019 06:31) (97% - 100%)  nc/at  s1s2  cta  soft, nt, nd no guarding or rebound  no c/c/e    CBC Full  -  ( 01 Apr 2019 05:30 )  WBC Count : 10.10 K/uL  RBC Count : 2.71 M/uL  Hemoglobin : 7.6 g/dL  Hematocrit : 21.4 %  Platelet Count - Automated : 87 K/uL  Mean Cell Volume : 79.0 fL  Mean Cell Hemoglobin : 28.0 pg  Mean Cell Hemoglobin Concentration : 35.5 %  Auto Neutrophil # : x  Auto Lymphocyte # : x  Auto Monocyte # : x  Auto Eosinophil # : x  Auto Basophil # : x  Auto Neutrophil % : x  Auto Lymphocyte % : x  Auto Monocyte % : x  Auto Eosinophil % : x  Auto Basophil % : x    04-01    141  |  105  |  54<H>  ----------------------------<  93  3.5   |  21<L>  |  3.33<H>    Ca    8.5      01 Apr 2019 05:30  Phos  3.5     03-31  Mg     2.2     04-01    TPro  4.8<L>  /  Alb  1.9<L>  /  TBili  19.2<H>  /  DBili  x   /  AST  157<H>  /  ALT  99<H>  /  AlkPhos  258<H>  04-01    PT/INR - ( 01 Apr 2019 05:30 )   PT: 25.5 SEC;   INR: 2.18          PTT - ( 01 Apr 2019 05:30 )  PTT:67.9 SEC

## 2019-04-02 NOTE — PROGRESS NOTE ADULT - SUBJECTIVE AND OBJECTIVE BOX
Patient is seen and examined at the bed side, is normothermic.  He remains awake and alert.  The Leptospirosis Antibodies: Negative: No IgM antibodies to Leptospira detected.        REVIEW OF SYSTEMS: All other review systems are negative        ALLERGIES: No Known Allergies        Vital Signs Last 24 Hrs  T(C): 36.4 (2019 17:31), Max: 36.7 (2019 10:52)  T(F): 97.6 (2019 17:), Max: 98.1 (2019 10:52)  HR: 68 (2019 17:31) (57 - 86)  BP: 91/53 (2019 17:) (79/45 - 102/56)  BP(mean): --  RR: 18 (2019 17:) (14 - 18)  SpO2: 97% (2019 17:) (97% - 100%)        PHYSICAL EXAM:  GENERAL: More  awake and alert   HEENT: NGT in placed  CHEST/LUNG:  Air entry bilaterally  HEART: s1 and s2 present  ABDOMEN:  Nontender and  Non distended  : Oshea catheter in placed  EXTREMITIES: No pedal  edema  CNS: More awake and  alert            LABS:                                    8.0    11.21 )-----------( 74       ( 2019 11:13 )             22.5                        7.6    10.10 )-----------( 87       ( 2019 05:30 )             21.4           141  |  101  |  61<H>  ----------------------------<  117<H>  3.5   |  25  |  4.04<H>    Ca    8.6      2019 11:13  Mg     2.2         TPro  5.0<L>  /  Alb  1.8<L>  /  TBili  20.6<H>  /  DBili  15.3<H>  /  AST  174<H>  /  ALT  91<H>  /  AlkPhos  274<H>      141  |  105  |  54<H>  ----------------------------<  93  3.5   |  21<L>  |  3.33<H>    Ca    8.5      2019 05:30  Phos  3.5       Mg     2.2         TPro  4.8<L>  /  Alb  1.9<L>  /  TBili  19.2<H>  /  DBili  x   /  AST  157<H>  /  ALT  99<H>  /  AlkPhos  258<H>          Ammonia, Serum (19 @ 07:07)    Ammonia, Serum: 66: Ammonia levels will be falsely elevated if specimen is NOT  collected, processed and maintained at 4-8 C. umol/L        Ammonia, Serum (19 @ 09:45)    Ammonia, Serum: 76: Ammonia levels will be falsely elevated if specimen is NOT  collected, processed and maintained at 4-8 C. umol/L        Ammonia, Serum (19 @ 17:30)    Ammonia, Serum: 130: Ammonia levels will be falsely elevated if specimen is NOT  collected, processed and maintained at 4-8 C. umol/L        Leptospirosis Antibodies (19 @ 07:30)    Leptospirosis Antibodies: Negative: No IgM antibodies to Leptospira detected.  Since antibodies  may not be present or may be present at undetectable levels  during early disease, repeat testing of a convalescent  sample collected in 2-3 weeks is recommended.  Test Performed by:  [facility]  [address] [place] [address]          MEDICATIONS  (STANDING):  dextrose 5% 1000 milliLiter(s) (75 mL/Hr) IV Continuous <Continuous>  ergocalciferol 11098 Unit(s) Oral every week  lactulose Syrup 10 Gram(s) Enteral Tube two times a day  lidocaine   Patch 1 Patch Transdermal daily  meropenem  IVPB 500 milliGRAM(s) IV Intermittent every 12 hours  metoprolol tartrate 12.5 milliGRAM(s) Oral two times a day  midodrine 2.5 milliGRAM(s) Oral once  rifaximin 550 milliGRAM(s) Oral two times a day  ursodiol Capsule 300 milliGRAM(s) Oral two times a day    MEDICATIONS  (PRN):          RADIOLOGY & ADDITIONAL TESTS:      3/24/19 : CT Chest No Cont (19 @ 18:32) : 4.4 cm enlarged ascending thoracic aorta. Small bilateral pleural effusions.      3/20/19 : Xray Chest 1 View- PORTABLE-Urgent (19 @ 09:37) Patchy opacity within the right lower lung may represent pneumonia. Subsegmental atelectasis at the left base. No pleural effusion or  pneumothorax. No pulmonary edema. The cardiac silhouette remains enlarged. Aortic aneurysm. Recommend  further evaluation with aortogram.      3/18/19 : MR MRCP No Cont (19 @ 14:22) Gallbladder is distended with cholelithiasis.  No choledocholithiasis or biliary ductal dilatation.      3/17/19 : NM Hepatobiliary Imaging (19 @ 12:21) Abnormal hepatobiliary scan suspicious for common bile duct obstruction. Hepatocellular dysfunction. No evidence of acute cholecystitis.      3/18/19 : US Abdomen Limited (19 @ 07:48) Gallbladder is distended with sludge and stones.  Mural thickening of the wall of the common bile duct, possibly  cholangitis. No biliary ductal dilatation.      3/15/19 : CT Abdomen and Pelvis No Cont (03.15.19 @ 09:39) Distended gallbladder. Consider further evaluation with ultrasound if  acute cholecystitis is a concern.  No discrete mass identified on this limited noncontrast study.          MICROBIOLOGY DATA:      Culture - Blood (19 @ 11:11)    Culture - Blood:   NO ORGANISMS ISOLATED  NO ORGANISMS ISOLATED AT 24 HOURS    Specimen Source: BLOOD PERIPHERAL        Culture - Fungal, Blood (19 @ 08:41)    Culture - Fungal, Blood:   CULTURE NEGATIVE FOR YEASTS AND MOLDS-PRELIMINARY RESULT  AFTER 24 HOURS INCUBATION    Specimen Source: BLOOD        Cytomegalovirus By PCR (19 @ 05:34)    Cytomegalovirus By PCR: 165 IU/mL    CMVPCR Lo.22: Assay lower limit of detection (LOD):  31.2 IU/mL (1.49 log10/mL)  Assay dynamic range:  50 to 156,000,000 (156M) CMV DNA IU/mL (1.70 log10/mL –  8.19 log10/mL)  Plasma CMV DNA Quantification by PCR using Abbott m2000:  The results of this test should be interpreted with  consideration of all clinical and laboratory findings. In  particular, caution should be used when interpreting low  level positive results when the test is used for  diagnostic purposes. Repeat testing on an additional  sample is recommended to confirm low level positive  results. A result of "Not Detected" does not preclude the  possibility of infection with CMV.  CMV DNA may be present  below the detection limit of assay. LogIU/mL        Culture - Urine (19 @ 15:21)    -  Vancomycin: S 2 CLOVIS    Culture - Urine:   ENTF^Enterococcus faecalis  COLONY COUNT: > = 100,000 CFU/ML    Culture - Urine:   Susceptibility not performed.    -  Tetra/Doxy: R >8 CLOVIS    -  Nitrofurantoin: S <=32 CLOVIS    -  Ampicillin: S <=2 CLOVIS    -  Ciprofloxacin: S <=1 CLOVIS    Specimen Source: URINE CATHETER    Organism Identification: Enterococcus faecalis  Staphylococcus sp.,coag neg    Organism: Enterococcus faecalis  COLONY COUNT: > = 100,000 CFU/ML    Organism: Staphylococcus sp.,coag neg  COLONY COUNT: > = 100,000 CFU/ML    Method Type: POSITIVE CLOVIS 29      Urinalysis (19 @ 13:25)    Color: ELSA    Urine Appearance: TURBID    Glucose: 100    Bilirubin: LARGE    Ketone - Urine: NEGATIVE    Specific Gravity: 1.020    Blood: MODERATE    pH - Urine: 6.0    Protein, Urine: 100    Urobilinogen: 4.0    Nitrite: NEGATIVE    Leukocyte Esterase Concentration: LARGE    Red Blood Cell - Urine: 3-5    White Blood Cell - Urine: >50    White Blood Cell Casts: 2-5/LPF    Epithelial Cells: OCC    Bacteria: MANY    Coarse Granular Casts: 0-2/LPF      MRSA Infection Control Screen (PCR) (19 @ 13:46)    MRSA Infection Control Screen (PCR): NOT DETECTED     REFERENCE RANGE:  MRSA DNA NOT DETECTED      Culture - Blood (03.15.19 @ 14:03)    Culture - Blood:   NO ORGANISMS ISOLATED  NO ORGANISMS ISOLATED AT 24 HOURS    Specimen Source: BLOOD Patient is seen and examined at the bed side, is normothermic.  He remains awake and alert.  The Leptospirosis Antibodies: Negative: No IgM antibodies to Leptospira is detected. The Family at the bed side.         REVIEW OF SYSTEMS: All other review systems are negative        ALLERGIES: No Known Allergies        Vital Signs Last 24 Hrs  T(C): 36.4 (2019 17:31), Max: 36.7 (2019 10:52)  T(F): 97.6 (2019 17:31), Max: 98.1 (2019 10:52)  HR: 68 (2019 17:) (57 - 86)  BP: 91/53 (2019 17:) (79/45 - 102/56)  BP(mean): --  RR: 18 (:) (14 - 18)  SpO2: 97% (2019 17:) (97% - 100%)        PHYSICAL EXAM:  GENERAL: More  awake and alert   HEENT: NGT in placed  CHEST/LUNG:  Air entry bilaterally  HEART: s1 and s2 present  ABDOMEN:  Nontender and  Non distended  : Oshea catheter in placed  EXTREMITIES: No pedal  edema  CNS: More awake and  alert            LABS:                                    8.0    11.21 )-----------( 74       ( 2019 11:13 )             22.5                        7.6    10.10 )-----------( 87       ( 2019 05:30 )             21.4           141  |  101  |  61<H>  ----------------------------<  117<H>  3.5   |  25  |  4.04<H>    Ca    8.6      2019 11:13  Mg     2.2         TPro  5.0<L>  /  Alb  1.8<L>  /  TBili  20.6<H>  /  DBili  15.3<H>  /  AST  174<H>  /  ALT  91<H>  /  AlkPhos  274<H>      141  |  105  |  54<H>  ----------------------------<  93  3.5   |  21<L>  |  3.33<H>    Ca    8.5      2019 05:30  Phos  3.5       Mg     2.2         TPro  4.8<L>  /  Alb  1.9<L>  /  TBili  19.2<H>  /  DBili  x   /  AST  157<H>  /  ALT  99<H>  /  AlkPhos  258<H>            Ammonia, Serum (19 @ 07:07)    Ammonia, Serum: 66: Ammonia levels will be falsely elevated if specimen is NOT  collected, processed and maintained at 4-8 C. umol/L        Ammonia, Serum (19 @ 09:45)    Ammonia, Serum: 76: Ammonia levels will be falsely elevated if specimen is NOT  collected, processed and maintained at 4-8 C. umol/L        Ammonia, Serum (19 @ 17:30)    Ammonia, Serum: 130: Ammonia levels will be falsely elevated if specimen is NOT  collected, processed and maintained at 4-8 C. umol/L        Leptospirosis Antibodies (19 @ 07:30)    Leptospirosis Antibodies: Negative: No IgM antibodies to Leptospira detected.  Since antibodies  may not be present or may be present at undetectable levels  during early disease, repeat testing of a convalescent  sample collected in 2-3 weeks is recommended.  Test Performed by:  [facility]  [address] [place] [address]          MEDICATIONS  (STANDING):  dextrose 5% 1000 milliLiter(s) (75 mL/Hr) IV Continuous <Continuous>  ergocalciferol 67586 Unit(s) Oral every week  lactulose Syrup 10 Gram(s) Enteral Tube two times a day  lidocaine   Patch 1 Patch Transdermal daily  meropenem  IVPB 500 milliGRAM(s) IV Intermittent every 12 hours  metoprolol tartrate 12.5 milliGRAM(s) Oral two times a day  midodrine 2.5 milliGRAM(s) Oral once  rifaximin 550 milliGRAM(s) Oral two times a day  ursodiol Capsule 300 milliGRAM(s) Oral two times a day    MEDICATIONS  (PRN):          RADIOLOGY & ADDITIONAL TESTS:      3/24/19 : CT Chest No Cont (19 @ 18:32) : 4.4 cm enlarged ascending thoracic aorta. Small bilateral pleural effusions.      3/20/19 : Xray Chest 1 View- PORTABLE-Urgent (19 @ 09:37) Patchy opacity within the right lower lung may represent pneumonia. Subsegmental atelectasis at the left base. No pleural effusion or  pneumothorax. No pulmonary edema. The cardiac silhouette remains enlarged. Aortic aneurysm. Recommend  further evaluation with aortogram.      3/18/19 : MR MRCP No Cont (19 @ 14:22) Gallbladder is distended with cholelithiasis.  No choledocholithiasis or biliary ductal dilatation.      3/17/19 : NM Hepatobiliary Imaging (19 @ 12:21) Abnormal hepatobiliary scan suspicious for common bile duct obstruction. Hepatocellular dysfunction. No evidence of acute cholecystitis.      3/18/19 : US Abdomen Limited (19 @ 07:48) Gallbladder is distended with sludge and stones.  Mural thickening of the wall of the common bile duct, possibly  cholangitis. No biliary ductal dilatation.      3/15/19 : CT Abdomen and Pelvis No Cont (03.15.19 @ 09:39) Distended gallbladder. Consider further evaluation with ultrasound if  acute cholecystitis is a concern.  No discrete mass identified on this limited noncontrast study.          MICROBIOLOGY DATA:      Culture - Blood (19 @ 11:11)    Culture - Blood:   NO ORGANISMS ISOLATED  NO ORGANISMS ISOLATED AT 24 HOURS    Specimen Source: BLOOD PERIPHERAL        Culture - Fungal, Blood (19 @ 08:41)    Culture - Fungal, Blood:   CULTURE NEGATIVE FOR YEASTS AND MOLDS-PRELIMINARY RESULT  AFTER 24 HOURS INCUBATION    Specimen Source: BLOOD        Cytomegalovirus By PCR (19 @ 05:34)    Cytomegalovirus By PCR: 165 IU/mL    CMVPCR Lo.22: Assay lower limit of detection (LOD):  31.2 IU/mL (1.49 log10/mL)  Assay dynamic range:  50 to 156,000,000 (156M) CMV DNA IU/mL (1.70 log10/mL –  8.19 log10/mL)  Plasma CMV DNA Quantification by PCR using Abbott m2000:  The results of this test should be interpreted with  consideration of all clinical and laboratory findings. In  particular, caution should be used when interpreting low  level positive results when the test is used for  diagnostic purposes. Repeat testing on an additional  sample is recommended to confirm low level positive  results. A result of "Not Detected" does not preclude the  possibility of infection with CMV.  CMV DNA may be present  below the detection limit of assay. LogIU/mL        Culture - Urine (19 @ 15:21)    -  Vancomycin: S 2 CLOVIS    Culture - Urine:   ENTF^Enterococcus faecalis  COLONY COUNT: > = 100,000 CFU/ML    Culture - Urine:   Susceptibility not performed.    -  Tetra/Doxy: R >8 CLOVIS    -  Nitrofurantoin: S <=32 CLOVIS    -  Ampicillin: S <=2 CLOVIS    -  Ciprofloxacin: S <=1 CLOVIS    Specimen Source: URINE CATHETER    Organism Identification: Enterococcus faecalis  Staphylococcus sp.,coag neg    Organism: Enterococcus faecalis  COLONY COUNT: > = 100,000 CFU/ML    Organism: Staphylococcus sp.,coag neg  COLONY COUNT: > = 100,000 CFU/ML    Method Type: POSITIVE CLOVIS 29      Urinalysis (19 @ 13:25)    Color: ELSA    Urine Appearance: TURBID    Glucose: 100    Bilirubin: LARGE    Ketone - Urine: NEGATIVE    Specific Gravity: 1.020    Blood: MODERATE    pH - Urine: 6.0    Protein, Urine: 100    Urobilinogen: 4.0    Nitrite: NEGATIVE    Leukocyte Esterase Concentration: LARGE    Red Blood Cell - Urine: 3-5    White Blood Cell - Urine: >50    White Blood Cell Casts: 2-5/LPF    Epithelial Cells: OCC    Bacteria: MANY    Coarse Granular Casts: 0-2/LPF      MRSA Infection Control Screen (PCR) (19 @ 13:46)    MRSA Infection Control Screen (PCR): NOT DETECTED     REFERENCE RANGE:  MRSA DNA NOT DETECTED      Culture - Blood (03.15.19 @ 14:03)    Culture - Blood:   NO ORGANISMS ISOLATED  NO ORGANISMS ISOLATED AT 24 HOURS    Specimen Source: BLOOD

## 2019-04-02 NOTE — PROGRESS NOTE ADULT - SUBJECTIVE AND OBJECTIVE BOX
S: no events       dextrose 5% 1000 milliLiter(s) IV Continuous <Continuous>  doxycycline IVPB      doxycycline IVPB 100 milliGRAM(s) IV Intermittent every 12 hours  ergocalciferol 62607 Unit(s) Oral every week  lactulose Syrup 10 Gram(s) Enteral Tube two times a day  lidocaine   Patch 1 Patch Transdermal daily  meropenem  IVPB 500 milliGRAM(s) IV Intermittent every 12 hours  metoprolol tartrate 12.5 milliGRAM(s) Oral two times a day  rifaximin 550 milliGRAM(s) Oral two times a day  ursodiol Capsule 300 milliGRAM(s) Oral two times a day                            7.6    10.10 )-----------( 87       ( 01 Apr 2019 05:30 )             21.4       04-01    141  |  105  |  54<H>  ----------------------------<  93  3.5   |  21<L>  |  3.33<H>    Ca    8.5      01 Apr 2019 05:30  Phos  3.5     03-31  Mg     2.2     04-01    TPro  4.8<L>  /  Alb  1.9<L>  /  TBili  19.2<H>  /  DBili  x   /  AST  157<H>  /  ALT  99<H>  /  AlkPhos  258<H>  04-01            T(C): 36.3 (04-02-19 @ 06:31), Max: 36.5 (04-01-19 @ 21:48)  HR: 86 (04-02-19 @ 06:31) (61 - 86)  BP: 102/56 (04-02-19 @ 06:31) (89/53 - 102/56)  RR: 15 (04-02-19 @ 06:31) (10 - 15)  SpO2: 100% (04-02-19 @ 06:31) (97% - 100%)  Wt(kg): --    I&O's Summary    01 Apr 2019 07:01  -  02 Apr 2019 07:00  --------------------------------------------------------  IN: 0 mL / OUT: 250 mL / NET: -250 mL        Heart: normal S1, S2, RRR, no m/r/g  Lungs: cta b/l anteriorly   Abd: soft nT  Ext: + pitting edema in LE bilaterally     TELEMETRY: SR     RADIOLOGY:  < from: Xray Chest 1 View- PORTABLE-Urgent (03.20.19 @ 09:37) >  FINDINGS/  IMPRESSION:    Patchy opacity within the right lower lung may represent pneumonia.   Subsegmental atelectasis at the left base. No pleural effusion or   pneumothorax. No pulmonary edema.    The cardiac silhouette remains enlarged. Aortic aneurysm. Recommend   further evaluation with aortogram.    < end of copied text >    < from: Transthoracic Echocardiogram (03.23.19 @ 09:36) >  CONCLUSIONS:  1. Mitral annular calcification, otherwise normal mitral  valve. Mild mitral regurgitation.  2. Aortic valve leaflet morphology not well visualized;  appears calcified with normal opening. Moderate aortic  regurgitation.  3. Normal left ventricular internal dimensions and wall  thicknesses.  4. Hyperdynamic left ventricle.  5. Mild diastolic dysfunction (Stage I).  6. Normal right ventricular size and function.  7. Normal tricuspid valve. Mild tricuspid regurgitation.  8. Estimated pulmonary artery systolic pressure equals 36  mm Hg, assuming right atrial pressure equals 10  mm Hg,  consistent with borderline pulmonary hypertension.  *** Compared with echocardiogram of 6/11/2018,no  significant changes noted.  ------------------------------------------------------------------------  Confirmed on  3/23/2019 - 11:19:58 by Evaristo Castanon MD, FACC,  FASE, RPVI    < end of copied text >      ASSESSMENT/PLAN: 	    89 yo M, poor historian, charts reviewed, with PMHx BPH, Cataracts, Diverticulosis, Anemia 2/2 GI Bleed with previous PRBC transfusions, who p/w 2 days generalized  weakness, urinary retention and confusion. LFTs significant for transaminitis, scleral jaundice.     -pt. tolerated biopsy well in terms of cv perspective  -TTE with moderate AI and normal LV function - medical management of valvular disease recommended  -CT chest with 4.5cm ascending aortic aneurysm - continue metoprolol 12.5 mg PO bid   -no further cardiac workup needed at this time  -follow up palliative care  -follow up ethics    Rahsmi Morrison MD

## 2019-04-02 NOTE — CHART NOTE - NSCHARTNOTEFT_GEN_A_CORE
At this time goals are addressed on multiple occasions with HCPs.  Hepatology and Ethics input appreciated.  Medical Plan per primary team.  Will sign off, reconsult as needed.

## 2019-04-02 NOTE — PROGRESS NOTE ADULT - ASSESSMENT
89 yo M p/w confusion and generalized weakness    -> Social Work:       - Patient is more awake and verbal today.  Patient states "He is not good.  Prentis is not good to me.  He is bipolar"       - Appreciate social work and case management to look into this and his home situation more closely  -> cholestatic hepatitis, acute liver failure, coagulopathy, thrombocytopenia:      - very poor prognosis      - palliative/hospice appropriate, f/u management      - HCP want full code      - ethics input appreciated, I'm in agreement      - all consultants recs/management appreciated        - not a candidate for liver transplantation      - adjust management accordingly, supportive comfort care  -> ARF:      - persistent      - ivf, adjust management per nephro. Avoid nephrotoxic rx, strict i/o  -> Acute Cystitis, Leptospirosis:      - abx per ID  -> Thrombocytopenia:      - 2/2 liver dysfunction, as above, hem/onc on board  -> Urinary Retention:      - flores intact, strict i/o   -> Coagulopathy:      -  2/2 liver failure       - vitamin k         - monitor inr and clinical status  -> Metabolic Encephalopathy:      - stable, treat underlying gi condition      - neuro checks       - fall precautions    -> Need for prophylactic measure:      - dvt/gi ppx  -> Severe Protein Calorie Malnutrition:      - supplement diet

## 2019-04-02 NOTE — PROGRESS NOTE ADULT - ASSESSMENT
1. Hyperbilirubinemia    -- Bili not sig changed at ~ 20  -- s/p TJ Liver Bx on 3/21.   -- GI following - acute cholestatic hepatitis of unclear etiology. ? Drug related ? Autoimmune   -- MRI/MRCP distended GB w Cholelithiasis. No biliary ductal dilatation  -- no evidence of malignancy on CT A/P  -- tumor markers unremarkable   -- not a transplant candidate    2. coagulopathy secondary to liver disease   -- low fibrinogen w drop in platelets c/w DIC  -- will give 2u FFP    3 Anemia    -- Hgb slightly lower  -- Ferritin elevated   -- hx of GI Bleed sec to divertic  -- monitor for now  -- Transfuse PRN Hgb < 7    4. Thrombocytopenia    -- platelet count lower. Low Fibrinogen Likely DIC  -- sec to acute liver injury  --  no bleeding  -- will give 2 u FFP    Carlos Gloria MD  [phone]

## 2019-04-02 NOTE — PROGRESS NOTE ADULT - ATTENDING COMMENTS
Hepatology Staff: Mala Case MD  I saw and examined the patient along with Dr. White on 4/2/2019 . I agree with the above outlined history, physical examination, assessment and plan.    Awaiting follow up LFTs from this AM.   DILI vs viral  ( ? awaiting HEV serology).  Cont supportive care.  Repeat cultures negative.  Keep on Ursodiol.  More awake this am.

## 2019-04-02 NOTE — PROGRESS NOTE ADULT - ASSESSMENT
Impression:     1. Acute liver injury with marked  hyperbilirubinemia: DILI vs. viral. MELD Na 40. Rule out leptospirosis, Hepatitis E, Drug induced autoimmune.     2. Kidney failure    3. Encephalopathy ? multifactorial, rule out sepsis    Recommendation:  -continue lactulose/ xifaxan  -f/u hepatitis E IgM  -on empiric leptospirosis therapy Impression:     1. Acute liver injury with marked  hyperbilirubinemia: DILI vs. viral. MELD Na 40. Rule out leptospirosis, Hepatitis E, Drug induced autoimmune.     2. Kidney failure    3. Encephalopathy ? multifactorial, rule out sepsis    Recommendation:  -continue lactulose/ xifaxan  -f/u hepatitis E IgM  -d/c doxycycline as lepto ab negative

## 2019-04-02 NOTE — PROGRESS NOTE ADULT - SUBJECTIVE AND OBJECTIVE BOX
Patient is a 89y old  Male who presents with a chief complaint of generalized weakness, urinary retention, and confusion X 2 days (02 Apr 2019 08:26)      SUBJECTIVE / OVERNIGHT EVENTS:  patient is verbal  MEDICATIONS  (STANDING):  dextrose 5% 1000 milliLiter(s) (75 mL/Hr) IV Continuous <Continuous>  doxycycline IVPB      doxycycline IVPB 100 milliGRAM(s) IV Intermittent every 12 hours  ergocalciferol 30349 Unit(s) Oral every week  lactulose Syrup 10 Gram(s) Enteral Tube two times a day  lidocaine   Patch 1 Patch Transdermal daily  meropenem  IVPB 500 milliGRAM(s) IV Intermittent every 12 hours  metoprolol tartrate 12.5 milliGRAM(s) Oral two times a day  rifaximin 550 milliGRAM(s) Oral two times a day  ursodiol Capsule 300 milliGRAM(s) Oral two times a day    MEDICATIONS  (PRN):              PHYSICAL EXAM:  GENERAL: NAD, well-developed  HEAD:  Atraumatic, Normocephalic  EYES: EOMI, PERRLA, conjunctiva and sclera icteric  NECK: Supple, No JVD  CHEST/LUNG: Clear to auscultation bilaterally; No wheeze  HEART: Regular rate and rhythm; No murmurs, rubs, or gallops  ABDOMEN: Soft, Nontender, Nondistended; Bowel sounds present, no hepatosplenomegaly, no rebound or guarding  EXTREMITIES:  2+ Peripheral Pulses, No clubbing, cyanosis, or edema    NEUROLOGY: responsive  SKIN: No rashes or lesion    LABS:                        7.6    10.10 )-----------( 87       ( 01 Apr 2019 05:30 )             21.4     04-01    141  |  105  |  54<H>  ----------------------------<  93  3.5   |  21<L>  |  3.33<H>    Ca    8.5      01 Apr 2019 05:30  Phos  3.5     03-31  Mg     2.2     04-01    TPro  4.8<L>  /  Alb  1.9<L>  /  TBili  19.2<H>  /  DBili  x   /  AST  157<H>  /  ALT  99<H>  /  AlkPhos  258<H>  04-01    LIVER FUNCTIONS - ( 01 Apr 2019 05:30 )  Alb: 1.9 g/dL / Pro: 4.8 g/dL / ALK PHOS: 258 u/L / ALT: 99 u/L / AST: 157 u/L / GGT: x           PT/INR - ( 01 Apr 2019 05:30 )   PT: 25.5 SEC;   INR: 2.18          PTT - ( 01 Apr 2019 05:30 )  PTT:67.9 SEC          RADIOLOGY & ADDITIONAL TESTS:

## 2019-04-02 NOTE — PROGRESS NOTE ADULT - ASSESSMENT
A 89 yo Male with BPH, Cataracts, Diverticulosis, Anemia 2/2 GI Bleed with previous PRBC transfusions, who presents to the ER for evaluation of  generalized  weakness, urinary retention and confusion. On admission, he found to have elevated bilirubin, LFTS, transaminitis, scleral jaundice. and urinary retention. Flores catheter has placed with negative Urine analysis. The CT abdomen  and pelvis shows Distended gallbladder. Consider further evaluation with ultrasound if  acute cholecystitis is a concern. No discrete mass identified on this limited noncontrast study. The ID consult requested to assist with evaluation of Biliary sepsis.    # Encephalopathy  # Urinary retention - s/p flores catheter   # R/O biliary sepsis/ Cholangitis - Most likely acute cholestatic hepatitis as per GI -s/p  transjugular liver biopsy with two passes. and found to have small right atrium blood clot. 3/21/19  # Distended GB with cholelithiasis on MRCP 3/18/19  # Hypothermia- Pneumonia on CXR - MRSA PCR is negative   # UTI- Enterococcus faecalis 3/20/19  # Liver biopsy result - Not in the system yet  # Hepatic Encephalopathy- elevated Ammonia  # R/O Leptospirosis  - s/p Unasyn/Augmentin  ( MICHELINE for Moderate to severe Leptospirosis)  from 3/21/19 to 3/29/19 ,  which is the treatment of choice for Moderate to severe Leptospirosis. If patient has Leptospirosis, then it should have been treated,  Leptospirosis Antibodies: Negative: No IgM antibodies to Leptospira detected.    would recommend:    1.  Aspiration precaution   2. Continue IV Meropenem until 4/6/19, to complete the course  3. Supplemental oxygenation and  bronchodilator as needed  4. Monitor kidney function and Lfts    d/w Family at the bed side A 89 yo Male with BPH, Cataracts, Diverticulosis, Anemia 2/2 GI Bleed with previous PRBC transfusions, who presents to the ER for evaluation of  generalized  weakness, urinary retention and confusion. On admission, he found to have elevated bilirubin, LFTS, transaminitis, scleral jaundice. and urinary retention. Flores catheter has placed with negative Urine analysis. The CT abdomen  and pelvis shows Distended gallbladder. Consider further evaluation with ultrasound if  acute cholecystitis is a concern. No discrete mass identified on this limited noncontrast study. The ID consult requested to assist with evaluation of Biliary sepsis.    # Encephalopathy  # Urinary retention - s/p flores catheter   # R/O biliary sepsis/ Cholangitis - Most likely acute cholestatic hepatitis as per GI -s/p  transjugular liver biopsy with two passes. and found to have small right atrium blood clot. 3/21/19  # Distended GB with cholelithiasis on MRCP 3/18/19  # Hypothermia- Pneumonia on CXR - MRSA PCR is negative   # UTI- Enterococcus faecalis 3/20/19  # Liver biopsy result - Not in the system yet  # Hepatic Encephalopathy- elevated Ammonia  # R/O Leptospirosis  - s/p Unasyn/Augmentin  ( MICHELINE for Moderate to severe Leptospirosis)  from 3/21/19 to 3/29/19 ,  which is the treatment of choice for Moderate to severe Leptospirosis. If patient has Leptospirosis, then it should have been treated,  Leptospirosis Antibodies: Negative: No IgM antibodies to Leptospira detected.    would recommend:    1. Obtain Ammonia level and continue lactulose   2.  Aspiration precaution   3. Continue IV Meropenem until 4/6/19, to complete the course  4. Supplemental oxygenation and  bronchodilator as needed  5. Monitor kidney function and LFTs    d/w Family at the bed side and Nursing staff

## 2019-04-03 LAB
ALBUMIN SERPL ELPH-MCNC: 2 G/DL — LOW (ref 3.3–5)
ALP SERPL-CCNC: 241 U/L — HIGH (ref 40–120)
ALT FLD-CCNC: 76 U/L — HIGH (ref 4–41)
ANION GAP SERPL CALC-SCNC: 13 MMO/L — SIGNIFICANT CHANGE UP (ref 7–14)
AST SERPL-CCNC: 158 U/L — HIGH (ref 4–40)
BACTERIA BLD CULT: SIGNIFICANT CHANGE UP
BASOPHILS # BLD AUTO: 0.02 K/UL — SIGNIFICANT CHANGE UP (ref 0–0.2)
BASOPHILS NFR BLD AUTO: 0.2 % — SIGNIFICANT CHANGE UP (ref 0–2)
BILIRUB DIRECT SERPL-MCNC: 17.9 MG/DL — HIGH (ref 0.1–0.2)
BILIRUB SERPL-MCNC: 23.3 MG/DL — HIGH (ref 0.2–1.2)
BUN SERPL-MCNC: 63 MG/DL — HIGH (ref 7–23)
CALCIUM SERPL-MCNC: 8.6 MG/DL — SIGNIFICANT CHANGE UP (ref 8.4–10.5)
CHLORIDE SERPL-SCNC: 96 MMOL/L — LOW (ref 98–107)
CO2 SERPL-SCNC: 28 MMOL/L — SIGNIFICANT CHANGE UP (ref 22–31)
CREAT SERPL-MCNC: 4.25 MG/DL — HIGH (ref 0.5–1.3)
EOSINOPHIL # BLD AUTO: 0.26 K/UL — SIGNIFICANT CHANGE UP (ref 0–0.5)
EOSINOPHIL NFR BLD AUTO: 2.4 % — SIGNIFICANT CHANGE UP (ref 0–6)
GLUCOSE SERPL-MCNC: 222 MG/DL — HIGH (ref 70–99)
HCT VFR BLD CALC: 18.7 % — CRITICAL LOW (ref 39–50)
HGB BLD-MCNC: 6.7 G/DL — CRITICAL LOW (ref 13–17)
IMM GRANULOCYTES NFR BLD AUTO: 10.5 % — HIGH (ref 0–1.5)
LYMPHOCYTES # BLD AUTO: 0.58 K/UL — LOW (ref 1–3.3)
LYMPHOCYTES # BLD AUTO: 5.4 % — LOW (ref 13–44)
MAGNESIUM SERPL-MCNC: 2.2 MG/DL — SIGNIFICANT CHANGE UP (ref 1.6–2.6)
MANUAL SMEAR VERIFICATION: SIGNIFICANT CHANGE UP
MCHC RBC-ENTMCNC: 28.4 PG — SIGNIFICANT CHANGE UP (ref 27–34)
MCHC RBC-ENTMCNC: 35.8 % — SIGNIFICANT CHANGE UP (ref 32–36)
MCV RBC AUTO: 79.2 FL — LOW (ref 80–100)
MONOCYTES # BLD AUTO: 1.03 K/UL — HIGH (ref 0–0.9)
MONOCYTES NFR BLD AUTO: 9.6 % — SIGNIFICANT CHANGE UP (ref 2–14)
NEUTROPHILS # BLD AUTO: 7.76 K/UL — HIGH (ref 1.8–7.4)
NEUTROPHILS NFR BLD AUTO: 71.9 % — SIGNIFICANT CHANGE UP (ref 43–77)
NRBC # FLD: 0.65 K/UL — SIGNIFICANT CHANGE UP (ref 0–0)
NRBC FLD-RTO: 6 — SIGNIFICANT CHANGE UP
PLATELET # BLD AUTO: 88 K/UL — LOW (ref 150–400)
PMV BLD: SIGNIFICANT CHANGE UP FL (ref 7–13)
POTASSIUM SERPL-MCNC: 2.9 MMOL/L — CRITICAL LOW (ref 3.5–5.3)
POTASSIUM SERPL-SCNC: 2.9 MMOL/L — CRITICAL LOW (ref 3.5–5.3)
PROT SERPL-MCNC: 5 G/DL — LOW (ref 6–8.3)
RBC # BLD: 2.36 M/UL — LOW (ref 4.2–5.8)
RBC # FLD: 28 % — HIGH (ref 10.3–14.5)
SODIUM SERPL-SCNC: 137 MMOL/L — SIGNIFICANT CHANGE UP (ref 135–145)
WBC # BLD: 10.78 K/UL — HIGH (ref 3.8–10.5)
WBC # FLD AUTO: 10.78 K/UL — HIGH (ref 3.8–10.5)

## 2019-04-03 PROCEDURE — 99232 SBSQ HOSP IP/OBS MODERATE 35: CPT

## 2019-04-03 RX ORDER — POTASSIUM CHLORIDE 20 MEQ
40 PACKET (EA) ORAL ONCE
Qty: 0 | Refills: 0 | Status: DISCONTINUED | OUTPATIENT
Start: 2019-04-03 | End: 2019-04-03

## 2019-04-03 RX ORDER — POTASSIUM CHLORIDE 20 MEQ
10 PACKET (EA) ORAL
Qty: 0 | Refills: 0 | Status: COMPLETED | OUTPATIENT
Start: 2019-04-03 | End: 2019-04-03

## 2019-04-03 RX ORDER — SODIUM CHLORIDE 9 MG/ML
1000 INJECTION INTRAMUSCULAR; INTRAVENOUS; SUBCUTANEOUS
Qty: 0 | Refills: 0 | Status: DISCONTINUED | OUTPATIENT
Start: 2019-04-03 | End: 2019-04-05

## 2019-04-03 RX ADMIN — SODIUM CHLORIDE 100 MILLILITER(S): 9 INJECTION INTRAMUSCULAR; INTRAVENOUS; SUBCUTANEOUS at 22:50

## 2019-04-03 RX ADMIN — MEROPENEM 100 MILLIGRAM(S): 1 INJECTION INTRAVENOUS at 05:05

## 2019-04-03 RX ADMIN — Medication 100 MILLIEQUIVALENT(S): at 16:18

## 2019-04-03 RX ADMIN — LIDOCAINE 1 PATCH: 4 CREAM TOPICAL at 00:53

## 2019-04-03 RX ADMIN — LACTULOSE 10 GRAM(S): 10 SOLUTION ORAL at 05:04

## 2019-04-03 RX ADMIN — Medication 100 MILLIEQUIVALENT(S): at 15:13

## 2019-04-03 RX ADMIN — MEROPENEM 100 MILLIGRAM(S): 1 INJECTION INTRAVENOUS at 18:38

## 2019-04-03 RX ADMIN — URSODIOL 300 MILLIGRAM(S): 250 TABLET, FILM COATED ORAL at 05:05

## 2019-04-03 RX ADMIN — SODIUM CHLORIDE 75 MILLILITER(S): 9 INJECTION INTRAMUSCULAR; INTRAVENOUS; SUBCUTANEOUS at 12:25

## 2019-04-03 NOTE — PROGRESS NOTE ADULT - SUBJECTIVE AND OBJECTIVE BOX
Pt seen, lethargic, confused      MEDICATIONS  (STANDING):  dextrose 5% 1000 milliLiter(s) (75 mL/Hr) IV Continuous <Continuous>  ergocalciferol 80114 Unit(s) Oral every week  lactulose Syrup 10 Gram(s) Enteral Tube two times a day  lidocaine   Patch 1 Patch Transdermal daily  meropenem  IVPB 500 milliGRAM(s) IV Intermittent every 12 hours  metoprolol tartrate 12.5 milliGRAM(s) Oral two times a day  midodrine 2.5 milliGRAM(s) Oral <User Schedule>  rifaximin 550 milliGRAM(s) Oral two times a day  ursodiol Capsule 300 milliGRAM(s) Oral two times a day    MEDICATIONS  (PRN):      ROS  + pain "here"  limited 2/2 participation    Vital Signs Last 24 Hrs  T(C): 36.7 (03 Apr 2019 05:06), Max: 36.7 (02 Apr 2019 10:52)  T(F): 98 (03 Apr 2019 05:06), Max: 98.1 (02 Apr 2019 10:52)  HR: 60 (03 Apr 2019 05:06) (57 - 68)  BP: 94/53 (03 Apr 2019 05:06) (79/45 - 100/54)  BP(mean): --  RR: 18 (03 Apr 2019 05:06) (16 - 19)  SpO2: 99% (03 Apr 2019 05:06) (97% - 99%)    PE  NAD, lethargic but arousable, not very interactive, slightly confused, RRR, CTAB ant chest, abd distended, NT, + edema, remainder of exam limited                           6.7    10.78 )-----------( 88       ( 03 Apr 2019 07:50 )             18.7       04-03    137  |  96<L>  |  63<H>  ----------------------------<  222<H>  2.9<LL>   |  28  |  4.25<H>    Ca    8.6      03 Apr 2019 07:50  Mg     2.2     04-03    TPro  5.0<L>  /  Alb  2.0<L>  /  TBili  23.3<H>  /  DBili  17.9<H>  /  AST  158<H>  /  ALT  76<H>  /  AlkPhos  241<H>  04-03

## 2019-04-03 NOTE — PROGRESS NOTE ADULT - ASSESSMENT
1. Hyperbilirubinemia    -- Bili at 23  -- s/p TJ Liver Bx on 3/21.   -- GI following - acute cholestatic hepatitis of unclear etiology. ? Drug related ? Autoimmune   -- MRI/MRCP distended GB w Cholelithiasis. No biliary ductal dilatation  -- no evidence of malignancy on CT A/P  -- tumor markers unremarkable   -- not a transplant candidate    2. 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 Thrombocytopenia    -- platelet count stable and adequate. Low Fibrinogen Likely DIC  -- sec to acute liver injury  --  no bleeding  -- s/p 2 u FFP    Prognosis poor. Will follow, d/w PA. [phone]

## 2019-04-03 NOTE — PROGRESS NOTE ADULT - ASSESSMENT
mental status improved. continue conservaitve regimen.  pt bili increased. on treatment for weil and hep e sent.  ngt feeds.  pt alert and oriented x 3!  speech and swallow evaluaiton

## 2019-04-03 NOTE — PROGRESS NOTE ADULT - SUBJECTIVE AND OBJECTIVE BOX
Patient is a 89y old  Male who presents with a chief complaint of generalized weakness, urinary retention, and confusion X 2 days (03 Apr 2019 08:14)      SUBJECTIVE / OVERNIGHT EVENTS:    MEDICATIONS  (STANDING):  dextrose 5% 1000 milliLiter(s) (75 mL/Hr) IV Continuous <Continuous>  ergocalciferol 98266 Unit(s) Oral every week  lactulose Syrup 10 Gram(s) Enteral Tube two times a day  lidocaine   Patch 1 Patch Transdermal daily  meropenem  IVPB 500 milliGRAM(s) IV Intermittent every 12 hours  metoprolol tartrate 12.5 milliGRAM(s) Oral two times a day  midodrine 2.5 milliGRAM(s) Oral <User Schedule>  rifaximin 550 milliGRAM(s) Oral two times a day  ursodiol Capsule 300 milliGRAM(s) Oral two times a day    MEDICATIONS  (PRN):              PHYSICAL EXAM:  GENERAL: NAD, well-developed  HEAD:  Atraumatic, Normocephalic  EYES: EOMI, PERRLA, conjunctiva and sclera anicteric  NECK: Supple, No JVD  CHEST/LUNG: Clear to auscultation bilaterally; No wheeze  HEART: Regular rate and rhythm; No murmurs, rubs, or gallops  ABDOMEN: Soft, Nontender, Nondistended; Bowel sounds present, no hepatosplenomegaly, no rebound or guarding  EXTREMITIES:  2+ Peripheral Pulses, No clubbing, cyanosis, or edema  PSYCH: AAOx3  NEUROLOGY: non-focal, no asterixis  SKIN: No rashes or lesion    LABS:                        8.0    11.21 )-----------( 74       ( 02 Apr 2019 11:13 )             22.5     04-02    141  |  101  |  61<H>  ----------------------------<  117<H>  3.5   |  25  |  4.04<H>    Ca    8.6      02 Apr 2019 11:13  Mg     2.2     04-02    TPro  5.0<L>  /  Alb  1.8<L>  /  TBili  20.6<H>  /  DBili  15.3<H>  /  AST  174<H>  /  ALT  91<H>  /  AlkPhos  274<H>  04-02    LIVER FUNCTIONS - ( 02 Apr 2019 11:13 )  Alb: 1.8 g/dL / Pro: 5.0 g/dL / ALK PHOS: 274 u/L / ALT: 91 u/L / AST: 174 u/L / GGT: x                     RADIOLOGY & ADDITIONAL TESTS: Patient is a 89y old  Male who presents with a chief complaint of generalized weakness, urinary retention, and confusion X 2 days (03 Apr 2019 08:14)      SUBJECTIVE / OVERNIGHT EVENTS:  patient was hypotensive, started on midodrine  MEDICATIONS  (STANDING):  dextrose 5% 1000 milliLiter(s) (75 mL/Hr) IV Continuous <Continuous>  ergocalciferol 61963 Unit(s) Oral every week  lactulose Syrup 10 Gram(s) Enteral Tube two times a day  lidocaine   Patch 1 Patch Transdermal daily  meropenem  IVPB 500 milliGRAM(s) IV Intermittent every 12 hours  metoprolol tartrate 12.5 milliGRAM(s) Oral two times a day  midodrine 2.5 milliGRAM(s) Oral <User Schedule>  rifaximin 550 milliGRAM(s) Oral two times a day  ursodiol Capsule 300 milliGRAM(s) Oral two times a day    MEDICATIONS  (PRN):              PHYSICAL EXAM:  GENERAL: NAD, cachexic  HEAD:  Atraumatic, Normocephalic  EYES: EOMI, PERRLA, conjunctiva and sclera icteric  NECK: Supple, No JVD  CHEST/LUNG: Clear to auscultation bilaterally; No wheeze  HEART: Regular rate and rhythm; No murmurs, rubs, or gallops  ABDOMEN: Soft, Nontender, Nondistended; Bowel sounds present, no hepatosplenomegaly, no rebound or guarding  EXTREMITIES:  2+ Peripheral Pulses, No clubbing, cyanosis, or edema  NEUROLOGY: arousable  SKIN: jaundice    LABS:                        8.0    11.21 )-----------( 74       ( 02 Apr 2019 11:13 )             22.5     04-02    141  |  101  |  61<H>  ----------------------------<  117<H>  3.5   |  25  |  4.04<H>    Ca    8.6      02 Apr 2019 11:13  Mg     2.2     04-02    TPro  5.0<L>  /  Alb  1.8<L>  /  TBili  20.6<H>  /  DBili  15.3<H>  /  AST  174<H>  /  ALT  91<H>  /  AlkPhos  274<H>  04-02    LIVER FUNCTIONS - ( 02 Apr 2019 11:13 )  Alb: 1.8 g/dL / Pro: 5.0 g/dL / ALK PHOS: 274 u/L / ALT: 91 u/L / AST: 174 u/L / GGT: x                     RADIOLOGY & ADDITIONAL TESTS:

## 2019-04-03 NOTE — PROGRESS NOTE ADULT - ASSESSMENT
89 yo M with PMHx CKD, BPH, Diverticulosis, Anemia 2/2 GI Bleed with previous PRBC transfusions, who p/w 2 days genearlized weakness, urinary retention and confusion, found to have elevated LFTs. Renal following for PHU, CKD management.    PHU on CKD 3 b/l Cr 1.4-1.6 10/2018  PHU intial thought to be pre renal, now w/worsening RFT-most likely 2/2 ATN    Cr continues to rise. oliguric.   Low C3 2/2 liver dz. no e/o acute GN  Urinary retention on CT w/o hydronephrosis. w/flores.   M acidosis- 2/2 PHU-better.   Hypokalemia  Transaminitis and Bilirubinemia s/p liver Bx by IR 3/21 likely drug induced per GI  AMS most liekly 2/2 Hepatic encephalopathy not from uremia- sr NH4 level high. on lactulose -per GI  h/o HTN- bp low. been off Norvasc  BPH: c/w flomax    labs, chart reviewed  overall prognosis poor. f/u w/palliative care, ethics committee   will f/u

## 2019-04-03 NOTE — PROGRESS NOTE ADULT - SUBJECTIVE AND OBJECTIVE BOX
Nephrology Followup Note - [phone] - Dr Denton / Dr Rojas / Dr Beard / Dr Montero / Dr Villegas / Dr Webster / Dr Mullen / Dr Felix  Pt seen and examined at bedside  No acute events overnight. Pt opens eyes when called, but otherwise lethargic. Not in distress.     Allergies:  No Known Allergies    Hospital Medications:   MEDICATIONS  (STANDING):  dextrose 5% 1000 milliLiter(s) (75 mL/Hr) IV Continuous <Continuous>  ergocalciferol 48843 Unit(s) Oral every week  lactulose Syrup 10 Gram(s) Enteral Tube two times a day  lidocaine   Patch 1 Patch Transdermal daily  meropenem  IVPB 500 milliGRAM(s) IV Intermittent every 12 hours  metoprolol tartrate 12.5 milliGRAM(s) Oral two times a day  midodrine 2.5 milliGRAM(s) Oral <User Schedule>  rifaximin 550 milliGRAM(s) Oral two times a day  ursodiol Capsule 300 milliGRAM(s) Oral two times a day      VITALS:  T(F): 98 (04-03-19 @ 05:06), Max: 98 (04-02-19 @ 21:30)  HR: 60 (04-03-19 @ 05:06)  BP: 94/53 (04-03-19 @ 05:06)  RR: 18 (04-03-19 @ 05:06)  SpO2: 99% (04-03-19 @ 05:06)  Wt(kg): --    04-02 @ 07:01  -  04-03 @ 07:00  --------------------------------------------------------  IN: 0 mL / OUT: 320 mL / NET: -320 mL    PHYSICAL EXAM:  Constitutional: NAD  HEENT: anicteric sclera, oropharynx clear, MMM  Neck: No JVD  Respiratory: CTAB, no wheezes, rales or rhonchi  Cardiovascular: S1, S2, RRR  Gastrointestinal: BS+, soft, NT/ND, PEG   Extremities: No cyanosis or clubbing. +peripheral edema, courtney anasarca   Neurological: lethargic, difficult to assess   : No CVA tenderness. +flores.   Skin: No rashes    LABS:  04-03    137  |  96<L>  |  63<H>  ----------------------------<  222<H>  2.9<LL>   |  28  |  4.25<H>    Ca    8.6      03 Apr 2019 07:50  Mg     2.2     04-03    TPro  5.0<L>  /  Alb  2.0<L>  /  TBili  23.3<H>  /  DBili  17.9<H>  /  AST  158<H>  /  ALT  76<H>  /  AlkPhos  241<H>  04-03    Creatinine Trend: 4.25 <--, 4.04 <--, 3.33 <--, 3.39 <--, 3.20 <--, 3.18 <--, 3.27 <--, 3.17 <--                        6.7    10.78 )-----------( 88       ( 03 Apr 2019 07:50 )             18.7     Urine Studies:      RADIOLOGY & ADDITIONAL STUDIES: Nephrology Followup Note - [phone] - Dr Denton / Dr Rojas / Dr Beard / Dr Montero / Dr Villegas / Dr Webster / Dr Mullen / Dr Felix  Pt seen and examined at bedside  No acute events overnight. Pt opens eyes when called, but otherwise lethargic. Not in distress.     Allergies:  No Known Allergies    Hospital Medications:   MEDICATIONS  (STANDING):  dextrose 5% 1000 milliLiter(s) (75 mL/Hr) IV Continuous <Continuous>  ergocalciferol 94854 Unit(s) Oral every week  lactulose Syrup 10 Gram(s) Enteral Tube two times a day  lidocaine   Patch 1 Patch Transdermal daily  meropenem  IVPB 500 milliGRAM(s) IV Intermittent every 12 hours  metoprolol tartrate 12.5 milliGRAM(s) Oral two times a day  midodrine 2.5 milliGRAM(s) Oral <User Schedule>  rifaximin 550 milliGRAM(s) Oral two times a day  ursodiol Capsule 300 milliGRAM(s) Oral two times a day      VITALS:  T(F): 98 (04-03-19 @ 05:06), Max: 98 (04-02-19 @ 21:30)  HR: 60 (04-03-19 @ 05:06)  BP: 94/53 (04-03-19 @ 05:06)  RR: 18 (04-03-19 @ 05:06)  SpO2: 99% (04-03-19 @ 05:06)  Wt(kg): --    04-02 @ 07:01  -  04-03 @ 07:00  --------------------------------------------------------  IN: 0 mL / OUT: 320 mL / NET: -320 mL    PHYSICAL EXAM:  Constitutional: NAD  HEENT: anicteric sclera, oropharynx clear, MMM  Neck: No JVD  Respiratory: CTAB, no wheezes, rales or rhonchi  Cardiovascular: S1, S2, RRR  Gastrointestinal: BS+, soft, NT/ND  Extremities: No cyanosis or clubbing. +peripheral edema, courtney anasarca   Neurological: lethargic, difficult to assess   : No CVA tenderness. +flores.   Skin: No rashes    LABS:  04-03    137  |  96<L>  |  63<H>  ----------------------------<  222<H>  2.9<LL>   |  28  |  4.25<H>    Ca    8.6      03 Apr 2019 07:50  Mg     2.2     04-03    TPro  5.0<L>  /  Alb  2.0<L>  /  TBili  23.3<H>  /  DBili  17.9<H>  /  AST  158<H>  /  ALT  76<H>  /  AlkPhos  241<H>  04-03    Creatinine Trend: 4.25 <--, 4.04 <--, 3.33 <--, 3.39 <--, 3.20 <--, 3.18 <--, 3.27 <--, 3.17 <--                        6.7    10.78 )-----------( 88       ( 03 Apr 2019 07:50 )             18.7     Urine Studies:      RADIOLOGY & ADDITIONAL STUDIES:

## 2019-04-03 NOTE — PROGRESS NOTE ADULT - SUBJECTIVE AND OBJECTIVE BOX
Patient seen and examined at bedside  s/p FFP for DIC  Case discussed with medical team    HPI:  HPI:  89 yo M, poor historian, charts reviewed, with PMHx BPH, Cataracts, Diverticulosis, Anemia 2/2 GI Bleed with previous PRBC transfusions, who p/w 2 days genearlized weakness, urinary retention and confusion.  Upon arrival to the ED, pt with scleral icterus and labs significant for transaminitis and TBili > 20 (15 Mar 2019 06:59)      PAST MEDICAL & SURGICAL HISTORY:  Chronic kidney disease (CKD), stage IV (severe)  Benign prostatic hypertrophy  S/P cataract surgery, left: 3 yrs ago      No Known Allergies       MEDICATIONS  (STANDING):  dextrose 5% 1000 milliLiter(s) (75 mL/Hr) IV Continuous <Continuous>  ergocalciferol 30809 Unit(s) Oral every week  lactulose Syrup 10 Gram(s) Enteral Tube two times a day  lidocaine   Patch 1 Patch Transdermal daily  meropenem  IVPB 500 milliGRAM(s) IV Intermittent every 12 hours  metoprolol tartrate 12.5 milliGRAM(s) Oral two times a day  midodrine 2.5 milliGRAM(s) Oral <User Schedule>  rifaximin 550 milliGRAM(s) Oral two times a day  ursodiol Capsule 300 milliGRAM(s) Oral two times a day    MEDICATIONS  (PRN):      REVIEW OF SYSTEMS: limited 2/2 mental status and medical state    T(C): 36.7 (04-03-19 @ 05:06), Max: 36.7 (04-02-19 @ 10:52)  HR: 60 (04-03-19 @ 05:06) (57 - 68)  BP: 94/53 (04-03-19 @ 05:06) (79/45 - 100/54)  RR: 18 (04-03-19 @ 05:06) (16 - 19)  SpO2: 99% (04-03-19 @ 05:06) (97% - 99%)    PHYSICAL EXAMINATION:   Constitutional: stable ill appearance  HEENT: bitemp wasting, poor dentition  Neck:  Supple  Respiratory:  Adequate airflow b/l. Not using accessory muscles of respiration.  Cardiovascular:  S1 & S2 intact, no R/G, 2+ radial pulses b/l  Gastrointestinal: Soft, NT, ND, normoactive b.s., no organomegaly/RT/rigidity  Extremities: anasarca. warm.   Neurological:  stable confusion/disorientation/lethargy. arousable. Crude sensation intact.     Labs and imaging reviewed    LABS:                        8.0    11.21 )-----------( 74       ( 02 Apr 2019 11:13 )             22.5     04-02    141  |  101  |  61<H>  ----------------------------<  117<H>  3.5   |  25  |  4.04<H>    Ca    8.6      02 Apr 2019 11:13  Mg     2.2     04-02    TPro  5.0<L>  /  Alb  1.8<L>  /  TBili  20.6<H>  /  DBili  15.3<H>  /  AST  174<H>  /  ALT  91<H>  /  AlkPhos  274<H>  04-02            CAPILLARY BLOOD GLUCOSE            LIVER FUNCTIONS - ( 02 Apr 2019 11:13 )  Alb: 1.8 g/dL / Pro: 5.0 g/dL / ALK PHOS: 274 u/L / ALT: 91 u/L / AST: 174 u/L / GGT: x               RADIOLOGY & ADDITIONAL STUDIES:

## 2019-04-03 NOTE — PROGRESS NOTE ADULT - ASSESSMENT
89 yo M p/w confusion and generalized weakness    -> D.I.C.:      - s/p FFP X 2      - F/u am labs, additional management appreciated per hem/onc and all consultants       - Treat underlying etiology (ie: cholestatic hepatitis).        - Very poor prognosis with very high mortality rate      - Supportive care   -> Hypotension:       - multifactorial, midodrine added for additional support    -> cholestatic hepatitis, acute liver failure, coagulopathy, thrombocytopenia:      - very poor prognosis      - ethics input appreciated, I'm in agreement      - all consultants recs/management appreciated        - not a candidate for liver transplantation      - c/w medical treatment      - adjust management accordingly, supportive comfort care  -> ARF:      - worsening      - ivf, adjust management per nephro. Avoid nephrotoxic rx, strict i/o  -> Acute Cystitis:      - abx per ID - Meropenem til 4/6/19       - no leptospirosis   -> Urinary Retention:      - flores intact, strict i/o   -> Metabolic Encephalopathy:      - stable, treat underlying gi condition and dic       - neuro checks       - fall precautions    -> Need for prophylactic measure:      - dvt/gi ppx  -> Severe Protein Calorie Malnutrition:      - supplement diet

## 2019-04-03 NOTE — PROGRESS NOTE ADULT - ATTENDING COMMENTS
Hepatology Staff: Mala Case MD  I saw and examined the patient along with Dr. White on 4/2/2019.    Patient had an hypotensive episode overnight.   Concern for worsening sepsis.   Recommend broadening antibiotic coverage to IV Vanc in addition to IV meropenem.  Reculture blood, urine, and considering chest Xray.  Goals of care should be reviewed with the family by primary team.  Noted consultation from ethics team.  Cont with Ursodiol. Hepatology Staff: Mala Case MD  I saw and examined the patient along with Dr. White on 4/3/2019.    Patient had an hypotensive episode overnight.   Concern for worsening sepsis.   Recommend broadening antibiotic coverage to IV Vanc in addition to IV meropenem.  Reculture blood, urine, and considering chest Xray.  Goals of care should be reviewed with the family by primary team.  Noted consultation from ethics team.  Cont with Ursodiol.

## 2019-04-03 NOTE — PROGRESS NOTE ADULT - SUBJECTIVE AND OBJECTIVE BOX
S: no pain or SOB      MEDICATIONS  (STANDING):  ergocalciferol 32536 Unit(s) Oral every week  lactulose Syrup 10 Gram(s) Enteral Tube two times a day  lidocaine   Patch 1 Patch Transdermal daily  meropenem  IVPB 500 milliGRAM(s) IV Intermittent every 12 hours  metoprolol tartrate 12.5 milliGRAM(s) Oral two times a day  midodrine 2.5 milliGRAM(s) Oral <User Schedule>  potassium chloride   Powder 40 milliEquivalent(s) Oral once  rifaximin 550 milliGRAM(s) Oral two times a day  sodium chloride 0.9%. 1000 milliLiter(s) (75 mL/Hr) IV Continuous <Continuous>  ursodiol Capsule 300 milliGRAM(s) Oral two times a day    MEDICATIONS  (PRN):      LABS:                            6.7    10.78 )-----------( 88       ( 03 Apr 2019 07:50 )             18.7    137  |  96<L>  |  63<H>  ----------------------------<  222<H>  2.9<LL>   |  28  |  4.25<H>    Ca    8.6      03 Apr 2019 07:50  Mg     2.2     04-03    TPro  5.0<L>  /  Alb  2.0<L>  /  TBili  23.3<H>  /  DBili  17.9<H>  /  AST  158<H>  /  ALT  76<H>  /  AlkPhos  241<H>  04-03    Creatinine Trend: 4.25<--, 4.04<--, 3.33<--, 3.39<--, 3.20<--, 3.18<--      PHYSICAL EXAM  Vital Signs Last 24 Hrs  T(C): 36.3 (03 Apr 2019 12:23), Max: 36.7 (02 Apr 2019 21:30)  T(F): 97.4 (03 Apr 2019 12:23), Max: 98 (02 Apr 2019 21:30)  HR: 60 (03 Apr 2019 05:06) (60 - 68)  BP: 101/52 (03 Apr 2019 12:23) (84/48 - 101/52)  RR: 18 (03 Apr 2019 12:23) (17 - 19)  SpO2: 99% (03 Apr 2019 12:23) (97% - 99%)    Heart: normal S1, S2, RRR, no m/r/g  Lungs: cta b/l anteriorly   Abd: soft nT  Ext: + pitting edema in LE bilaterally     TELEMETRY: SR     RADIOLOGY:  < from: Xray Chest 1 View- PORTABLE-Urgent (03.20.19 @ 09:37) >  FINDINGS/  IMPRESSION:    Patchy opacity within the right lower lung may represent pneumonia.   Subsegmental atelectasis at the left base. No pleural effusion or   pneumothorax. No pulmonary edema.    The cardiac silhouette remains enlarged. Aortic aneurysm. Recommend   further evaluation with aortogram.    < end of copied text >    < from: Transthoracic Echocardiogram (03.23.19 @ 09:36) >  CONCLUSIONS:  1. Mitral annular calcification, otherwise normal mitral  valve. Mild mitral regurgitation.  2. Aortic valve leaflet morphology not well visualized;  appears calcified with normal opening. Moderate aortic  regurgitation.  3. Normal left ventricular internal dimensions and wall  thicknesses.  4. Hyperdynamic left ventricle.  5. Mild diastolic dysfunction (Stage I).  6. Normal right ventricular size and function.  7. Normal tricuspid valve. Mild tricuspid regurgitation.  8. Estimated pulmonary artery systolic pressure equals 36  mm Hg, assuming right atrial pressure equals 10  mm Hg,  consistent with borderline pulmonary hypertension.  *** Compared with echocardiogram of 6/11/2018,no  significant changes noted.  ------------------------------------------------------------------------  Confirmed on  3/23/2019 - 11:19:58 by Evaristo Castanon MD, FACC,  FASE, RPVI    < end of copied text >      ASSESSMENT/PLAN: 	    89 yo M, poor historian, charts reviewed, with PMHx BPH, Cataracts, Diverticulosis, Anemia 2/2 GI Bleed with previous PRBC transfusions, who p/w 2 days generalized  weakness, urinary retention and confusion. LFTs significant for transaminitis, scleral jaundice.     -pt. tolerated biopsy well in terms of cv perspective  -TTE with moderate AI and normal LV function - medical management of valvular disease recommended  -CT chest with 4.5cm ascending aortic aneurysm - continue metoprolol 12.5 mg PO bid   -no further cardiac workup needed at this time  -follow up ethics

## 2019-04-03 NOTE — PROGRESS NOTE ADULT - SUBJECTIVE AND OBJECTIVE BOX
PASCUAL OLSEN:[id number],   89yMale followed for:  No Known Allergies    PAST MEDICAL & SURGICAL HISTORY:  Chronic kidney disease (CKD), stage IV (severe)  Benign prostatic hypertrophy  S/P cataract surgery, left: 3 yrs ago    FAMILY HISTORY:  No pertinent family history in first degree relatives    MEDICATIONS  (STANDING):  dextrose 5% 1000 milliLiter(s) (75 mL/Hr) IV Continuous <Continuous>  ergocalciferol 64517 Unit(s) Oral every week  lactulose Syrup 10 Gram(s) Enteral Tube two times a day  lidocaine   Patch 1 Patch Transdermal daily  meropenem  IVPB 500 milliGRAM(s) IV Intermittent every 12 hours  metoprolol tartrate 12.5 milliGRAM(s) Oral two times a day  midodrine 2.5 milliGRAM(s) Oral <User Schedule>  rifaximin 550 milliGRAM(s) Oral two times a day  ursodiol Capsule 300 milliGRAM(s) Oral two times a day    MEDICATIONS  (PRN):      Vital Signs Last 24 Hrs  T(C): 36.7 (03 Apr 2019 05:06), Max: 36.7 (02 Apr 2019 10:52)  T(F): 98 (03 Apr 2019 05:06), Max: 98.1 (02 Apr 2019 10:52)  HR: 60 (03 Apr 2019 05:06) (57 - 68)  BP: 94/53 (03 Apr 2019 05:06) (79/45 - 100/54)  BP(mean): --  RR: 18 (03 Apr 2019 05:06) (16 - 19)  SpO2: 99% (03 Apr 2019 05:06) (97% - 99%)  nc/at  s1s2  cta  soft, nt, nd no guarding or rebound  no c/c/e    CBC Full  -  ( 03 Apr 2019 07:50 )  WBC Count : 10.78 K/uL  RBC Count : 2.36 M/uL  Hemoglobin : 6.7 g/dL  Hematocrit : 18.7 %  Platelet Count - Automated : 88 K/uL  Mean Cell Volume : 79.2 fL  Mean Cell Hemoglobin : 28.4 pg  Mean Cell Hemoglobin Concentration : 35.8 %  Auto Neutrophil # : 7.76 K/uL  Auto Lymphocyte # : 0.58 K/uL  Auto Monocyte # : 1.03 K/uL  Auto Eosinophil # : 0.26 K/uL  Auto Basophil # : 0.02 K/uL  Auto Neutrophil % : 71.9 %  Auto Lymphocyte % : 5.4 %  Auto Monocyte % : 9.6 %  Auto Eosinophil % : 2.4 %  Auto Basophil % : 0.2 %    04-03    137  |  96<L>  |  63<H>  ----------------------------<  222<H>  2.9<LL>   |  28  |  4.25<H>    Ca    8.6      03 Apr 2019 07:50  Mg     2.2     04-03    TPro  5.0<L>  /  Alb  2.0<L>  /  TBili  23.3<H>  /  DBili  17.9<H>  /  AST  158<H>  /  ALT  76<H>  /  AlkPhos  241<H>  04-03

## 2019-04-03 NOTE — PROGRESS NOTE ADULT - PROBLEM SELECTOR PLAN 1
w/worsening RFT, Cr rising, oliguric.   No evidence of respiratory distress.   Third spacing from hypoalbuminemia.   Can change IVF to NS, as acidosis resolved.   poor prognosis, multi-organ failure, advanced liver Dis, cachexia, advanced age with dementia at baseline. will strongly recommend comfort care   no indication for dialysis at this time. Would not recommend HD initiation, if indicated, as pt is high risk and dialysis would not change prognosis   monitor U/O.   avoid ACEi/ARB/NSAIDs/nephrotoxics  dose all meds for eGFR <15ml/min  monitor BMP, LFTs daily

## 2019-04-03 NOTE — PROGRESS NOTE ADULT - ASSESSMENT
Impression:     1. Acute liver injury with marked  hyperbilirubinemia: DILI vs. viral. MELD Na 40. Rule out leptospirosis, Hepatitis E, Drug induced autoimmune.     2. Kidney failure    3. Encephalopathy ? multifactorial, rule out sepsis    Recommendation:  -continue lactulose/ xifaxan  -f/u hepatitis E IgM

## 2019-04-04 LAB
ALBUMIN SERPL ELPH-MCNC: 2.1 G/DL — LOW (ref 3.3–5)
ALP SERPL-CCNC: 242 U/L — HIGH (ref 40–120)
ALT FLD-CCNC: 72 U/L — HIGH (ref 4–41)
ANION GAP SERPL CALC-SCNC: 19 MMO/L — HIGH (ref 7–14)
AST SERPL-CCNC: 181 U/L — HIGH (ref 4–40)
BACTERIA BLD CULT: SIGNIFICANT CHANGE UP
BILIRUB DIRECT SERPL-MCNC: 13.5 MG/DL — HIGH (ref 0.1–0.2)
BILIRUB SERPL-MCNC: 20.4 MG/DL — HIGH (ref 0.2–1.2)
BUN SERPL-MCNC: 66 MG/DL — HIGH (ref 7–23)
CALCIUM SERPL-MCNC: 8.7 MG/DL — SIGNIFICANT CHANGE UP (ref 8.4–10.5)
CHLORIDE SERPL-SCNC: 98 MMOL/L — SIGNIFICANT CHANGE UP (ref 98–107)
CO2 SERPL-SCNC: 22 MMOL/L — SIGNIFICANT CHANGE UP (ref 22–31)
CREAT SERPL-MCNC: 4.6 MG/DL — HIGH (ref 0.5–1.3)
GLUCOSE SERPL-MCNC: 50 MG/DL — LOW (ref 70–99)
HCT VFR BLD CALC: 25.1 % — LOW (ref 39–50)
HGB BLD-MCNC: 8.7 G/DL — LOW (ref 13–17)
MAGNESIUM SERPL-MCNC: 2.2 MG/DL — SIGNIFICANT CHANGE UP (ref 1.6–2.6)
MCHC RBC-ENTMCNC: 28.2 PG — SIGNIFICANT CHANGE UP (ref 27–34)
MCHC RBC-ENTMCNC: 34.7 % — SIGNIFICANT CHANGE UP (ref 32–36)
MCV RBC AUTO: 81.5 FL — SIGNIFICANT CHANGE UP (ref 80–100)
NRBC # FLD: 0.93 K/UL — SIGNIFICANT CHANGE UP (ref 0–0)
NRBC FLD-RTO: 8.1 — SIGNIFICANT CHANGE UP
PLATELET # BLD AUTO: 85 K/UL — LOW (ref 150–400)
PMV BLD: SIGNIFICANT CHANGE UP FL (ref 7–13)
POTASSIUM SERPL-MCNC: 4.1 MMOL/L — SIGNIFICANT CHANGE UP (ref 3.5–5.3)
POTASSIUM SERPL-SCNC: 4.1 MMOL/L — SIGNIFICANT CHANGE UP (ref 3.5–5.3)
PROT SERPL-MCNC: 5.1 G/DL — LOW (ref 6–8.3)
RBC # BLD: 3.08 M/UL — LOW (ref 4.2–5.8)
RBC # FLD: 25.5 % — HIGH (ref 10.3–14.5)
SODIUM SERPL-SCNC: 139 MMOL/L — SIGNIFICANT CHANGE UP (ref 135–145)
WBC # BLD: 11.55 K/UL — HIGH (ref 3.8–10.5)
WBC # FLD AUTO: 11.55 K/UL — HIGH (ref 3.8–10.5)

## 2019-04-04 PROCEDURE — 99232 SBSQ HOSP IP/OBS MODERATE 35: CPT

## 2019-04-04 PROCEDURE — 71045 X-RAY EXAM CHEST 1 VIEW: CPT | Mod: 26

## 2019-04-04 RX ORDER — LACTULOSE 10 G/15ML
200 SOLUTION ORAL ONCE
Qty: 0 | Refills: 0 | Status: DISCONTINUED | OUTPATIENT
Start: 2019-04-04 | End: 2019-04-04

## 2019-04-04 RX ORDER — DEXTROSE 50 % IN WATER 50 %
12.5 SYRINGE (ML) INTRAVENOUS ONCE
Qty: 0 | Refills: 0 | Status: COMPLETED | OUTPATIENT
Start: 2019-04-04 | End: 2019-04-04

## 2019-04-04 RX ORDER — DEXTROSE 50 % IN WATER 50 %
25 SYRINGE (ML) INTRAVENOUS ONCE
Qty: 0 | Refills: 0 | Status: DISCONTINUED | OUTPATIENT
Start: 2019-04-04 | End: 2019-04-09

## 2019-04-04 RX ORDER — DEXTROSE 50 % IN WATER 50 %
15 SYRINGE (ML) INTRAVENOUS ONCE
Qty: 0 | Refills: 0 | Status: DISCONTINUED | OUTPATIENT
Start: 2019-04-04 | End: 2019-04-09

## 2019-04-04 RX ORDER — DEXTROSE 50 % IN WATER 50 %
12.5 SYRINGE (ML) INTRAVENOUS ONCE
Qty: 0 | Refills: 0 | Status: DISCONTINUED | OUTPATIENT
Start: 2019-04-04 | End: 2019-04-09

## 2019-04-04 RX ORDER — GLUCAGON INJECTION, SOLUTION 0.5 MG/.1ML
1 INJECTION, SOLUTION SUBCUTANEOUS ONCE
Qty: 0 | Refills: 0 | Status: DISCONTINUED | OUTPATIENT
Start: 2019-04-04 | End: 2019-04-09

## 2019-04-04 RX ORDER — SIMETHICONE 80 MG/1
80 TABLET, CHEWABLE ORAL ONCE
Qty: 0 | Refills: 0 | Status: DISCONTINUED | OUTPATIENT
Start: 2019-04-04 | End: 2019-04-09

## 2019-04-04 RX ORDER — DEXTROSE 50 % IN WATER 50 %
25 SYRINGE (ML) INTRAVENOUS ONCE
Qty: 0 | Refills: 0 | Status: COMPLETED | OUTPATIENT
Start: 2019-04-04 | End: 2019-04-04

## 2019-04-04 RX ORDER — SODIUM CHLORIDE 9 MG/ML
1000 INJECTION, SOLUTION INTRAVENOUS
Qty: 0 | Refills: 0 | Status: DISCONTINUED | OUTPATIENT
Start: 2019-04-04 | End: 2019-04-09

## 2019-04-04 RX ADMIN — LACTULOSE 10 GRAM(S): 10 SOLUTION ORAL at 09:09

## 2019-04-04 RX ADMIN — Medication 12.5 GRAM(S): at 10:46

## 2019-04-04 RX ADMIN — LACTULOSE 10 GRAM(S): 10 SOLUTION ORAL at 18:33

## 2019-04-04 RX ADMIN — MIDODRINE HYDROCHLORIDE 2.5 MILLIGRAM(S): 2.5 TABLET ORAL at 11:20

## 2019-04-04 RX ADMIN — Medication 25 GRAM(S): at 11:59

## 2019-04-04 RX ADMIN — SODIUM CHLORIDE 100 MILLILITER(S): 9 INJECTION INTRAMUSCULAR; INTRAVENOUS; SUBCUTANEOUS at 10:49

## 2019-04-04 RX ADMIN — MEROPENEM 100 MILLIGRAM(S): 1 INJECTION INTRAVENOUS at 18:33

## 2019-04-04 RX ADMIN — MEROPENEM 100 MILLIGRAM(S): 1 INJECTION INTRAVENOUS at 06:08

## 2019-04-04 RX ADMIN — URSODIOL 300 MILLIGRAM(S): 250 TABLET, FILM COATED ORAL at 18:34

## 2019-04-04 NOTE — PROGRESS NOTE ADULT - ASSESSMENT
89 yo M with PMHx CKD, BPH, Diverticulosis, Anemia 2/2 GI Bleed with previous PRBC transfusions, who p/w 2 days genearlized weakness, urinary retention and confusion, found to have elevated LFTs. Renal following for PHU, CKD management.    PHU on CKD 3 b/l Cr 1.4-1.6 10/2018  PHU intial thought to be pre renal, now w/worsening RFT-most likely 2/2 ATN    Cr continues to rise. oliguric, decreasing urine output and markedly swollen.   Low C3 2/2 liver dz. no e/o acute GN  Urinary retention on CT w/o hydronephrosis. w/flores.   M acidosis- 2/2 PHU-better.   Electrolytes within normal limits.  Transaminitis and Bilirubinemia s/p liver Bx by IR 3/21 likely drug induced per GI  AMS most liekly 2/2 Hepatic encephalopathy not from uremia- sr NH4 level high. on lactulose -per GI  h/o HTN- bp low. been off Norvasc  BPH: c/w flomax    labs, chart reviewed  overall prognosis grave. f/u w/palliative care, ethics committee   will f/u

## 2019-04-04 NOTE — CHART NOTE - NSCHARTNOTEFT_GEN_A_CORE
Notified by RN that patient's hypoglycemic to 57.  Hypoglycemia protocol ordered.  Half an amp of IV dextrose given with improvement in fingerstick.  Will resume NG tube feeds now that NG tube back in place & continue to monitor fingersticks.

## 2019-04-04 NOTE — PROGRESS NOTE ADULT - SUBJECTIVE AND OBJECTIVE BOX
Patient is a 89y old  Male who presents with a chief complaint of generalized weakness, urinary retention, and confusion X 2 days (04 Apr 2019 08:18)      SUBJECTIVE / OVERNIGHT EVENTS:  poor mentation. 2 BM overnight.  MEDICATIONS  (STANDING):  dextrose 5%. 1000 milliLiter(s) (50 mL/Hr) IV Continuous <Continuous>  dextrose 50% Injectable 12.5 Gram(s) IV Push once  dextrose 50% Injectable 12.5 Gram(s) IV Push once  dextrose 50% Injectable 25 Gram(s) IV Push once  dextrose 50% Injectable 25 Gram(s) IV Push once  ergocalciferol 15891 Unit(s) Oral every week  lactulose Syrup 10 Gram(s) Enteral Tube two times a day  lidocaine   Patch 1 Patch Transdermal daily  meropenem  IVPB 500 milliGRAM(s) IV Intermittent every 12 hours  metoprolol tartrate 12.5 milliGRAM(s) Oral two times a day  midodrine 2.5 milliGRAM(s) Oral <User Schedule>  rifaximin 550 milliGRAM(s) Oral two times a day  sodium chloride 0.9%. 1000 milliLiter(s) (100 mL/Hr) IV Continuous <Continuous>  ursodiol Capsule 300 milliGRAM(s) Oral two times a day    MEDICATIONS  (PRN):  dextrose 40% Gel 15 Gram(s) Oral once PRN Blood Glucose LESS THAN 70 milliGRAM(s)/deciLiter  glucagon  Injectable 1 milliGRAM(s) IntraMuscular once PRN Glucose <70 milliGRAM(s)/deciLiter              PHYSICAL EXAM:  GENERAL: Cachexia  HEAD:  Atraumatic, Normocephalic  EYES: EOMI, PERRLA, conjunctiva and sclera anicteric  NECK: Supple, No JVD  CHEST/LUNG: Clear to auscultation bilaterally; No wheeze  HEART: Regular rate and rhythm; No murmurs, rubs, or gallops  ABDOMEN: Soft, Nontender, Nondistended; Bowel sounds present, no hepatosplenomegaly, no rebound or guarding  EXTREMITIES:  2+ Peripheral Pulses, No clubbing, cyanosis, or edema    NEUROLOGY: nonarousable  SKIN: No rashes or lesion    LABS:                        8.7    11.55 )-----------( 85       ( 04 Apr 2019 02:40 )             25.1     04-04    139  |  98  |  66<H>  ----------------------------<  50<L>  4.1   |  22  |  4.60<H>    Ca    8.7      04 Apr 2019 02:40  Mg     2.2     04-04    TPro  5.0<L>  /  Alb  2.0<L>  /  TBili  23.3<H>  /  DBili  17.9<H>  /  AST  158<H>  /  ALT  76<H>  /  AlkPhos  241<H>  04-03    LIVER FUNCTIONS - ( 03 Apr 2019 07:50 )  Alb: 2.0 g/dL / Pro: 5.0 g/dL / ALK PHOS: 241 u/L / ALT: 76 u/L / AST: 158 u/L / GGT: x                     RADIOLOGY & ADDITIONAL TESTS:

## 2019-04-04 NOTE — PROGRESS NOTE ADULT - ATTENDING COMMENTS
Hepatology Staff: Mala Case MD  I saw and examined the patient along with Dr. White on 4/4/2019 .     Overall no significant progress.  Worsening mental status changes.  Given his age, frailty, comorbidities, and worsening liver injury, multiorgan failure prognosis is poor.  Goals of acre should be discussed with the patient with realistic expectation.

## 2019-04-04 NOTE — CHART NOTE - NSCHARTNOTEFT_GEN_A_CORE
Discussed case with GI Dr. Norton.  Patient more lethargic this AM.  Recommended to replace NG tube STAT & give lactulose and rifaximin as soon as possible.  NGT placed.  STAT CXR done confirming placement of NGT in stomach as per radiology (#[id number]).  Will give lactulose & rifaximin now.   Will continue to monitor mental status closely.  Attending MD Dr. Branham made aware who states he will call HCP Prentis. Discussed case with GI Dr. Norton.  Patient more lethargic this AM.  Recommended to replace NG tube STAT & give lactulose and rifaximin as soon as possible.  NGT placed.  STAT CXR done confirming placement of NGT in stomach as per radiology (#[id number]).  Will give lactulose & rifaximin now.   Will continue to monitor mental status closely.  Attending MD Dr. Branham made aware who states he will call HCP Prentis.  I also updated HCP Prentis.

## 2019-04-04 NOTE — PROGRESS NOTE ADULT - ASSESSMENT
Impression:     1. Acute liver injury with marked  hyperbilirubinemia: DILI vs. viral. MELD Na 40. Rule out Hepatitis E. No improvement in liver function.    2. Kidney failure, worsening    3. Encephalopathy ? multifactorial, rule out sepsis    4. Drop in hemoglobin with appropriate response to transfusion, no overt bleeding    Recommendation:  -continue lactulose/ xifaxan  -f/u hepatitis E IgM, I called the lab, it will probably not come back until next week  -I had a conversation with Portillo and she understands the overall poor prognosis and is inclined not to pursue more invasive measures but wants to have a formal GOC meeting tomorrow

## 2019-04-04 NOTE — PROGRESS NOTE ADULT - SUBJECTIVE AND OBJECTIVE BOX
PASCUAL OLSEN:[id number],   89yMale followed for:  No Known Allergies    PAST MEDICAL & SURGICAL HISTORY:  Chronic kidney disease (CKD), stage IV (severe)  Benign prostatic hypertrophy  S/P cataract surgery, left: 3 yrs ago    FAMILY HISTORY:  No pertinent family history in first degree relatives    MEDICATIONS  (STANDING):  ergocalciferol 98240 Unit(s) Oral every week  lactulose Syrup 10 Gram(s) Enteral Tube two times a day  lidocaine   Patch 1 Patch Transdermal daily  meropenem  IVPB 500 milliGRAM(s) IV Intermittent every 12 hours  metoprolol tartrate 12.5 milliGRAM(s) Oral two times a day  midodrine 2.5 milliGRAM(s) Oral <User Schedule>  rifaximin 550 milliGRAM(s) Oral two times a day  sodium chloride 0.9%. 1000 milliLiter(s) (100 mL/Hr) IV Continuous <Continuous>  ursodiol Capsule 300 milliGRAM(s) Oral two times a day    MEDICATIONS  (PRN):      Vital Signs Last 24 Hrs  T(C): 36.3 (04 Apr 2019 05:30), Max: 36.3 (03 Apr 2019 12:23)  T(F): 97.3 (04 Apr 2019 05:30), Max: 97.4 (03 Apr 2019 12:23)  HR: 65 (04 Apr 2019 05:30) (55 - 67)  BP: 91/57 (04 Apr 2019 05:30) (84/50 - 101/52)  BP(mean): --  RR: 12 (04 Apr 2019 05:30) (12 - 18)  SpO2: 96% (04 Apr 2019 05:30) (96% - 99%)  nc/at  s1s2  cta  soft, nt, nd no guarding or rebound  no c/c/e    CBC Full  -  ( 04 Apr 2019 02:40 )  WBC Count : 11.55 K/uL  RBC Count : 3.08 M/uL  Hemoglobin : 8.7 g/dL  Hematocrit : 25.1 %  Platelet Count - Automated : 85 K/uL  Mean Cell Volume : 81.5 fL  Mean Cell Hemoglobin : 28.2 pg  Mean Cell Hemoglobin Concentration : 34.7 %  Auto Neutrophil # : x  Auto Lymphocyte # : x  Auto Monocyte # : x  Auto Eosinophil # : x  Auto Basophil # : x  Auto Neutrophil % : x  Auto Lymphocyte % : x  Auto Monocyte % : x  Auto Eosinophil % : x  Auto Basophil % : x    04-04    139  |  98  |  66<H>  ----------------------------<  50<L>  4.1   |  22  |  4.60<H>    Ca    8.7      04 Apr 2019 02:40  Mg     2.2     04-04    TPro  5.0<L>  /  Alb  2.0<L>  /  TBili  23.3<H>  /  DBili  17.9<H>  /  AST  158<H>  /  ALT  76<H>  /  AlkPhos  241<H>  04-03

## 2019-04-04 NOTE — PROGRESS NOTE ADULT - SUBJECTIVE AND OBJECTIVE BOX
[facility]      Seen earlier today, and examined.  - Today, patient is without complaints.           *****MEDICATIONS: Current medication reviewed and documented.    MEDICATIONS  (STANDING):  dextrose 5%. 1000 milliLiter(s) (50 mL/Hr) IV Continuous <Continuous>  dextrose 50% Injectable 12.5 Gram(s) IV Push once  dextrose 50% Injectable 25 Gram(s) IV Push once  dextrose 50% Injectable 25 Gram(s) IV Push once  ergocalciferol 99519 Unit(s) Oral every week  lactulose Syrup 10 Gram(s) Enteral Tube two times a day  lidocaine   Patch 1 Patch Transdermal daily  meropenem  IVPB 500 milliGRAM(s) IV Intermittent every 12 hours  metoprolol tartrate 12.5 milliGRAM(s) Oral two times a day  midodrine 2.5 milliGRAM(s) Oral <User Schedule>  rifaximin 550 milliGRAM(s) Oral two times a day  sodium chloride 0.9%. 1000 milliLiter(s) (100 mL/Hr) IV Continuous <Continuous>  ursodiol Capsule 300 milliGRAM(s) Oral two times a day    MEDICATIONS  (PRN):  dextrose 40% Gel 15 Gram(s) Oral once PRN Blood Glucose LESS THAN 70 milliGRAM(s)/deciLiter  glucagon  Injectable 1 milliGRAM(s) IntraMuscular once PRN Glucose <70 milliGRAM(s)/deciLiter  simethicone 80 milliGRAM(s) Chew once PRN Gas          ***** VITAL SIGNS:  T(F): 97.8 (19 @ 12:06), Max: 97.8 (19 @ 12:06)  HR: 69 (19 @ 12:06) (55 - 69)  BP: 94/55 (19 @ 12:06) (84/50 - 97/66)  RR: 16 (19 @ 12:06) (12 - 18)  SpO2: 96% (19 @ 05:30) (96% - 98%)  Wt(kg): --  ,   I&O's Summary    2019 07:01  -  2019 07:00  --------------------------------------------------------  IN: 0 mL / OUT: 130 mL / NET: -130 mL             *****PHYSICAL EXAM:  lethargic icteric    alert to repeated tactile stimuli    able to mutter a few words.              *****LAB AND IMAGIN.7    11.55 )-----------( 85       ( 2019 02:40 )             25.1               -    139  |  98  |  66<H>  ----------------------------<  50<L>  4.1   |  22  |  4.60<H>    Ca    8.7      2019 02:40  Mg     2.2     -    TPro  5.1<L>  /  Alb  2.1<L>  /  TBili  20.4<H>  /  DBili  13.5<H>  /  AST  181<H>  /  ALT  72<H>  /  AlkPhos  242<H>                           [All pertinent recent Imaging/Reports reviewed]           *****A S S E S S M E N T   A N D   P L A N :    89 yo M, poor historian, charts reviewed, with PMHx BPH, Cataracts, Diverticulosis, Anemia 2/2 GI Bleed with previous PRBC transfusions, who p/w 2 days genearlized weakness, urinary retention and confusion.  Upon arrival to the ED, pt with scleral icterus and labs significant for transaminitis and TBili > 20    called to evaluate pt for ams         Problem/Recommendations 1:  Multifactorial toxic metabolic encephalopathy  , hyperbilirubinemia, hyperammonemia, and worsening renal function  correct metabolic dyscrasias defer to renal/gi    on lactulose   worsening lethargy /encephalopathy   pt not a candidate for dialysis per renal.    ethics consult noted.   continue supportive care.     spent a lot of time with pt and family  at bedside.         Thank you for allowing me to participate in the care of this patient. Please do not hesitate to call me if you have any  questions.        ________________  Ava Schreiber MD  [facility]  [phone]      30 minutes spent on total encounter; more than 50 % of the visit was  spent counseling about plan of care, compliance to diet/exercise and medication regimen and or  coordinating care by the attending physician.      It is advised that s stroke patients follow up with DULCE MARIA Rausch @ [phone] in 1- 2 weeks.   Others please follow up with Dr. Michael Nissenbaum [phone]

## 2019-04-04 NOTE — PROGRESS NOTE ADULT - PROBLEM SELECTOR PLAN 1
w/worsening RFT, Cr rising, oliguric, worsening urine output.   No evidence of respiratory distress.   Worsening third spacing from hypoalbuminemia.   Can change IVF to NS, as acidosis resolved.   poor prognosis, multi-organ failure, advanced liver Dis, cachexia, advanced age with dementia at baseline. will strongly recommend comfort care   no indication for dialysis at this time. Would not recommend HD initiation, if indicated, as pt is high risk and dialysis would not change prognosis   monitor U/O.   avoid ACEi/ARB/NSAIDs/nephrotoxics  dose all meds for eGFR <15ml/min  monitor BMP, LFTs daily  Grave prognosis

## 2019-04-04 NOTE — PROGRESS NOTE ADULT - SUBJECTIVE AND OBJECTIVE BOX
Nephrology Followup Note - [phone] - Dr Denton / Dr Rojas / Dr Beard / Dr Montero / Dr Villegas / Dr Webster / Dr Mullen / Dr Felix  Pt seen and examined at bedside  Pt lethargic, obtunded, difficult to arouse. His son at bedside.     Allergies:  No Known Allergies    Hospital Medications:   MEDICATIONS  (STANDING):  dextrose 5%. 1000 milliLiter(s) (50 mL/Hr) IV Continuous <Continuous>  dextrose 50% Injectable 12.5 Gram(s) IV Push once  dextrose 50% Injectable 25 Gram(s) IV Push once  dextrose 50% Injectable 25 Gram(s) IV Push once  ergocalciferol 09096 Unit(s) Oral every week  lactulose Syrup 10 Gram(s) Enteral Tube two times a day  lidocaine   Patch 1 Patch Transdermal daily  meropenem  IVPB 500 milliGRAM(s) IV Intermittent every 12 hours  metoprolol tartrate 12.5 milliGRAM(s) Oral two times a day  midodrine 2.5 milliGRAM(s) Oral <User Schedule>  rifaximin 550 milliGRAM(s) Oral two times a day  sodium chloride 0.9%. 1000 milliLiter(s) (100 mL/Hr) IV Continuous <Continuous>  ursodiol Capsule 300 milliGRAM(s) Oral two times a day    VITALS:  T(F): 97.3 (04-04-19 @ 05:30), Max: 97.4 (04-03-19 @ 12:23)  HR: 65 (04-04-19 @ 05:30)  BP: 91/57 (04-04-19 @ 05:30)  RR: 12 (04-04-19 @ 05:30)  SpO2: 96% (04-04-19 @ 05:30)  Wt(kg): --    04-03 @ 07:01  -  04-04 @ 07:00  --------------------------------------------------------  IN: 0 mL / OUT: 130 mL / NET: -130 mL    PHYSICAL EXAM:  Constitutional: NAD  HEENT: NGT  Neck: No JVD  Respiratory: CTAB, poor inspiratory effort   Cardiovascular: S1, S2, RRR  Gastrointestinal: BS+, soft, NT. Distended  Extremities: No cyanosis or clubbing. Anasarca   Neurological: Obtunded  : No CVA tenderness. +flores.   Skin: No rashes    LABS:  04-04    139  |  98  |  66<H>  ----------------------------<  50<L>  4.1   |  22  |  4.60<H>    Ca    8.7      04 Apr 2019 02:40  Mg     2.2     04-04    TPro  5.1<L>  /  Alb  2.1<L>  /  TBili  20.4<H>  /  DBili  13.5<H>  /  AST  181<H>  /  ALT  72<H>  /  AlkPhos  242<H>  04-04    Creatinine Trend: 4.60 <--, 4.25 <--, 4.04 <--, 3.33 <--, 3.39 <--, 3.20 <--, 3.18 <--, 3.27 <--                        8.7    11.55 )-----------( 85       ( 04 Apr 2019 02:40 )             25.1     Urine Studies:      RADIOLOGY & ADDITIONAL STUDIES:

## 2019-04-04 NOTE — PROGRESS NOTE ADULT - SUBJECTIVE AND OBJECTIVE BOX
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  Upon arrival to the ED, pt with scleral icterus and labs significant for transaminitis and TBili > 20 (15 Mar 2019 06:59)      PAST MEDICAL & SURGICAL HISTORY:  Chronic kidney disease (CKD), stage IV (severe)  Benign prostatic hypertrophy  S/P cataract surgery, left: 3 yrs ago      No Known Allergies       MEDICATIONS  (STANDING):  ergocalciferol 79388 Unit(s) Oral every week  lactulose Syrup 10 Gram(s) Enteral Tube two times a day  lidocaine   Patch 1 Patch Transdermal daily  meropenem  IVPB 500 milliGRAM(s) IV Intermittent every 12 hours  metoprolol tartrate 12.5 milliGRAM(s) Oral two times a day  midodrine 2.5 milliGRAM(s) Oral <User Schedule>  rifaximin 550 milliGRAM(s) Oral two times a day  sodium chloride 0.9%. 1000 milliLiter(s) (100 mL/Hr) IV Continuous <Continuous>  ursodiol Capsule 300 milliGRAM(s) Oral two times a day    MEDICATIONS  (PRN):      REVIEW OF SYSTEMS: limited 2/2 pt's medical/mental status    T(C): 36.3 (04-04-19 @ 05:30), Max: 36.3 (04-03-19 @ 12:23)  HR: 65 (04-04-19 @ 05:30) (55 - 67)  BP: 91/57 (04-04-19 @ 05:30) (84/50 - 101/52)  RR: 12 (04-04-19 @ 05:30) (12 - 18)  SpO2: 96% (04-04-19 @ 05:30) (96% - 99%)    PHYSICAL EXAMINATION:   Constitutional: ill appearing  HEENT: bitemp wasting, poor dentition, dry oral mucosa  Neck:  Supple  Respiratory:  Adequate airflow b/l. Not using accessory muscles of respiration.  Cardiovascular:  S1 & S2 intact, systolic murmur, no R/G, 2+ radial pulses b/l  Gastrointestinal: Soft, ND, normoactive b.s., no organomegaly/RT/rigidity  Extremities: anasarca, warm  Neurological:  more lethargic, less responsive to verbal stimuli, awakens to noxious stimuli, crude sensation intact.     Labs and imaging reviewed    LABS:                        8.7    11.55 )-----------( 85       ( 04 Apr 2019 02:40 )             25.1     04-04    139  |  98  |  66<H>  ----------------------------<  50<L>  4.1   |  22  |  4.60<H>    Ca    8.7      04 Apr 2019 02:40  Mg     2.2     04-04    TPro  5.0<L>  /  Alb  2.0<L>  /  TBili  23.3<H>  /  DBili  17.9<H>  /  AST  158<H>  /  ALT  76<H>  /  AlkPhos  241<H>  04-03            CAPILLARY BLOOD GLUCOSE            LIVER FUNCTIONS - ( 03 Apr 2019 07:50 )  Alb: 2.0 g/dL / Pro: 5.0 g/dL / ALK PHOS: 241 u/L / ALT: 76 u/L / AST: 158 u/L / GGT: x               RADIOLOGY & ADDITIONAL STUDIES:

## 2019-04-04 NOTE — PROGRESS NOTE ADULT - ASSESSMENT
89 yo M p/w confusion and generalized weakness    -> D.I.C.:      - F/u am labs, additional management appreciated per hem/onc and all consultants       - Treat underlying etiology (ie: cholestatic hepatitis).        - Very poor prognosis with high mortality rate      - Supportive care   -> Nutrition:      - pulled out ngt      - failed bedside swallow eval 2/2 altered mental status      - palliative f/u for goc including nutrition/feeding (ie: ngt today, long term feeding, peg, etc)  -> ARF:      - persistent      - ivf, adjust management per nephro. Avoid nephrotoxic rx, strict i/o-> Hypotension:       - midodrine renally adjusted    -> cholestatic hepatitis, acute liver failure, coagulopathy, thrombocytopenia:      - very poor prognosis      - ethics input appreciated, I'm in agreement      - all consultants recs/management appreciated        - not a candidate for liver transplantation      - c/w medical treatment      - adjust management accordingly, supportive comfort care  -> Acute Cystitis:      - abx per ID - Meropenem til 4/6/19       - no leptospirosis   -> Urinary Retention:      - flores intact, strict i/o   -> Metabolic Encephalopathy:      - stable, treat underlying gi condition and dic       - neuro checks       - fall precautions    -> Need for prophylactic measure:      - dvt/gi ppx  -> Severe Protein Calorie Malnutrition:      - supplement diet

## 2019-04-04 NOTE — PROGRESS NOTE ADULT - SUBJECTIVE AND OBJECTIVE BOX
Patient is seen and examined at the bed side, is normothermic.  He become lethargic again.         REVIEW OF SYSTEMS: All other review systems are negative        ALLERGIES: No Known Allergies        Vital Signs Last 24 Hrs  T(C): 36.3 (2019 20:49), Max: 36.6 (2019 12:06)  T(F): 97.3 (2019 20:49), Max: 97.8 (2019 12:06)  HR: 71 (2019 20:49) (58 - 71)  BP: 121/68 (2019 20:49) (84/50 - 121/68)  BP(mean): --  RR: 16 (2019 20:49) (12 - 18)  SpO2: 100% (2019 20:49) (96% - 100%)      PHYSICAL EXAM:  GENERAL: More  awake and alert   HEENT: NGT in placed  CHEST/LUNG:  Air entry bilaterally  HEART: s1 and s2 present  ABDOMEN:  Nontender and  Non distended  : Oshea catheter in placed  EXTREMITIES: No pedal  edema  CNS: More awake and  alert            LABS:                        8.7    11.55 )-----------( 85       ( 2019 02:40 )             25.1                                              7.6    10.10 )-----------( 87       ( 2019 05:30 )             21.4         -04    139  |  98  |  66<H>  ----------------------------<  50<L>  4.1   |  22  |  4.60<H>    Ca    8.7      2019 02:40  Mg     2.2     -    TPro  5.1<L>  /  Alb  2.1<L>  /  TBili  20.4<H>  /  DBili  13.5<H>  /  AST  181<H>  /  ALT  72<H>  /  AlkPhos  242<H>      141  |  101  |  61<H>  ----------------------------<  117<H>  3.5   |  25  |  4.04<H>    Ca    8.6      2019 11:13  Mg     2.2         TPro  5.0<L>  /  Alb  1.8<L>  /  TBili  20.6<H>  /  DBili  15.3<H>  /  AST  174<H>  /  ALT  91<H>  /  AlkPhos  274<H>            Ammonia, Serum (19 @ 07:07)    Ammonia, Serum: 66: Ammonia levels will be falsely elevated if specimen is NOT  collected, processed and maintained at 4-8 C. umol/L        Ammonia, Serum (19 @ 09:45)    Ammonia, Serum: 76: Ammonia levels will be falsely elevated if specimen is NOT  collected, processed and maintained at 4-8 C. umol/L        Ammonia, Serum (19 @ 17:30)    Ammonia, Serum: 130: Ammonia levels will be falsely elevated if specimen is NOT  collected, processed and maintained at 4-8 C. umol/L        Leptospirosis Antibodies (19 @ 07:30)    Leptospirosis Antibodies: Negative: No IgM antibodies to Leptospira detected.  Since antibodies  may not be present or may be present at undetectable levels  during early disease, repeat testing of a convalescent  sample collected in 2-3 weeks is recommended.  Test Performed by:  [facility]  [address] [place] [address]          MEDICATIONS  (STANDING):  dextrose 5% 1000 milliLiter(s) (75 mL/Hr) IV Continuous <Continuous>  ergocalciferol 71543 Unit(s) Oral every week  lactulose Syrup 10 Gram(s) Enteral Tube two times a day  lidocaine   Patch 1 Patch Transdermal daily  meropenem  IVPB 500 milliGRAM(s) IV Intermittent every 12 hours  metoprolol tartrate 12.5 milliGRAM(s) Oral two times a day  midodrine 2.5 milliGRAM(s) Oral once  rifaximin 550 milliGRAM(s) Oral two times a day  ursodiol Capsule 300 milliGRAM(s) Oral two times a day    MEDICATIONS  (PRN):          RADIOLOGY & ADDITIONAL TESTS:      3/24/19 : CT Chest No Cont (19 @ 18:32) : 4.4 cm enlarged ascending thoracic aorta. Small bilateral pleural effusions.      3/20/19 : Xray Chest 1 View- PORTABLE-Urgent (19 @ 09:37) Patchy opacity within the right lower lung may represent pneumonia. Subsegmental atelectasis at the left base. No pleural effusion or  pneumothorax. No pulmonary edema. The cardiac silhouette remains enlarged. Aortic aneurysm. Recommend  further evaluation with aortogram.      3/18/19 : MR MRCP No Cont (19 @ 14:22) Gallbladder is distended with cholelithiasis.  No choledocholithiasis or biliary ductal dilatation.      3/17/19 : NM Hepatobiliary Imaging (19 @ 12:21) Abnormal hepatobiliary scan suspicious for common bile duct obstruction. Hepatocellular dysfunction. No evidence of acute cholecystitis.      3/18/19 : US Abdomen Limited (19 @ 07:48) Gallbladder is distended with sludge and stones.  Mural thickening of the wall of the common bile duct, possibly  cholangitis. No biliary ductal dilatation.      3/15/19 : CT Abdomen and Pelvis No Cont (03.15.19 @ 09:39) Distended gallbladder. Consider further evaluation with ultrasound if  acute cholecystitis is a concern.  No discrete mass identified on this limited noncontrast study.          MICROBIOLOGY DATA:      Culture - Blood (19 @ 11:11)    Culture - Blood:   NO ORGANISMS ISOLATED  NO ORGANISMS ISOLATED AT 24 HOURS    Specimen Source: BLOOD PERIPHERAL        Culture - Fungal, Blood (19 @ 08:41)    Culture - Fungal, Blood:   CULTURE NEGATIVE FOR YEASTS AND MOLDS-PRELIMINARY RESULT  AFTER 24 HOURS INCUBATION    Specimen Source: BLOOD        Cytomegalovirus By PCR (19 @ 05:34)    Cytomegalovirus By PCR: 165 IU/mL    CMVPCR Lo.22: Assay lower limit of detection (LOD):  31.2 IU/mL (1.49 log10/mL)  Assay dynamic range:  50 to 156,000,000 (156M) CMV DNA IU/mL (1.70 log10/mL –  8.19 log10/mL)  Plasma CMV DNA Quantification by PCR using Abbott m2000:  The results of this test should be interpreted with  consideration of all clinical and laboratory findings. In  particular, caution should be used when interpreting low  level positive results when the test is used for  diagnostic purposes. Repeat testing on an additional  sample is recommended to confirm low level positive  results. A result of "Not Detected" does not preclude the  possibility of infection with CMV.  CMV DNA may be present  below the detection limit of assay. LogIU/mL        Culture - Urine (19 @ 15:21)    -  Vancomycin: S 2 CLOVIS    Culture - Urine:   ENTF^Enterococcus faecalis  COLONY COUNT: > = 100,000 CFU/ML    Culture - Urine:   Susceptibility not performed.    -  Tetra/Doxy: R >8 CLOVIS    -  Nitrofurantoin: S <=32 CLOVIS    -  Ampicillin: S <=2 CLOVIS    -  Ciprofloxacin: S <=1 CLOVIS    Specimen Source: URINE CATHETER    Organism Identification: Enterococcus faecalis  Staphylococcus sp.,coag neg    Organism: Enterococcus faecalis  COLONY COUNT: > = 100,000 CFU/ML    Organism: Staphylococcus sp.,coag neg  COLONY COUNT: > = 100,000 CFU/ML    Method Type: POSITIVE CLOVIS 29      Urinalysis (19 @ 13:25)    Color: ELSA    Urine Appearance: TURBID    Glucose: 100    Bilirubin: LARGE    Ketone - Urine: NEGATIVE    Specific Gravity: 1.020    Blood: MODERATE    pH - Urine: 6.0    Protein, Urine: 100    Urobilinogen: 4.0    Nitrite: NEGATIVE    Leukocyte Esterase Concentration: LARGE    Red Blood Cell - Urine: 3-5    White Blood Cell - Urine: >50    White Blood Cell Casts: 2-5/LPF    Epithelial Cells: OCC    Bacteria: MANY    Coarse Granular Casts: 0-2/LPF      MRSA Infection Control Screen (PCR) (19 @ 13:46)    MRSA Infection Control Screen (PCR): NOT DETECTED     REFERENCE RANGE:  MRSA DNA NOT DETECTED      Culture - Blood (03.15.19 @ 14:03)    Culture - Blood:   NO ORGANISMS ISOLATED  NO ORGANISMS ISOLATED AT 24 HOURS    Specimen Source: BLOOD Patient is seen and examined at the bed side, is normothermic.  He become lethargic again, most likely due to Ammonia . The NGT was out last night, missed Lactulose dose and now back in placed.        REVIEW OF SYSTEMS: All other review systems are negative        ALLERGIES: No Known Allergies        Vital Signs Last 24 Hrs  T(C): 36.3 (2019 20:49), Max: 36.6 (2019 12:06)  T(F): 97.3 (2019 20:49), Max: 97.8 (2019 12:06)  HR: 71 (2019 20:49) (58 - 71)  BP: 121/68 (2019 20:49) (84/50 - 121/68)  BP(mean): --  RR: 16 (2019 20:49) (12 - 18)  SpO2: 100% (2019 20:49) (96% - 100%)      PHYSICAL EXAM:  GENERAL: More  awake and alert   HEENT: NGT in placed  CHEST/LUNG:  Air entry bilaterally  HEART: s1 and s2 present  ABDOMEN:  Nontender and  Non distended  : Oshea catheter in placed  EXTREMITIES: No pedal  edema  CNS: More awake and  alert            LABS:                        8.7    11.55 )-----------( 85       ( 2019 02:40 )             25.1                                              7.6    10.10 )-----------( 87       ( 2019 05:30 )             21.4             139  |  98  |  66<H>  ----------------------------<  50<L>  4.1   |  22  |  4.60<H>    Ca    8.7      2019 02:40  Mg     2.2         TPro  5.1<L>  /  Alb  2.1<L>  /  TBili  20.4<H>  /  DBili  13.5<H>  /  AST  181<H>  /  ALT  72<H>  /  AlkPhos  242<H>      141  |  101  |  61<H>  ----------------------------<  117<H>  3.5   |  25  |  4.04<H>    Ca    8.6      2019 11:13  Mg     2.2     -    TPro  5.0<L>  /  Alb  1.8<L>  /  TBili  20.6<H>  /  DBili  15.3<H>  /  AST  174<H>  /  ALT  91<H>  /  AlkPhos  274<H>            Ammonia, Serum (19 @ 07:07)    Ammonia, Serum: 66: Ammonia levels will be falsely elevated if specimen is NOT  collected, processed and maintained at 4-8 C. umol/L        Ammonia, Serum (19 @ 09:45)    Ammonia, Serum: 76: Ammonia levels will be falsely elevated if specimen is NOT  collected, processed and maintained at 4-8 C. umol/L        Ammonia, Serum (19 @ 17:30)    Ammonia, Serum: 130: Ammonia levels will be falsely elevated if specimen is NOT  collected, processed and maintained at 4-8 C. umol/L        Leptospirosis Antibodies (19 @ 07:30)    Leptospirosis Antibodies: Negative: No IgM antibodies to Leptospira detected.  Since antibodies  may not be present or may be present at undetectable levels  during early disease, repeat testing of a convalescent  sample collected in 2-3 weeks is recommended.  Test Performed by:  [facility] [place]  [address] [place] [address]          MEDICATIONS  (STANDING):  dextrose 5% 1000 milliLiter(s) (75 mL/Hr) IV Continuous <Continuous>  ergocalciferol 05669 Unit(s) Oral every week  lactulose Syrup 10 Gram(s) Enteral Tube two times a day  lidocaine   Patch 1 Patch Transdermal daily  meropenem  IVPB 500 milliGRAM(s) IV Intermittent every 12 hours  metoprolol tartrate 12.5 milliGRAM(s) Oral two times a day  midodrine 2.5 milliGRAM(s) Oral once  rifaximin 550 milliGRAM(s) Oral two times a day  ursodiol Capsule 300 milliGRAM(s) Oral two times a day    MEDICATIONS  (PRN):          RADIOLOGY & ADDITIONAL TESTS:      3/24/19 : CT Chest No Cont (19 @ 18:32) : 4.4 cm enlarged ascending thoracic aorta. Small bilateral pleural effusions.      3/20/19 : Xray Chest 1 View- PORTABLE-Urgent (19 @ 09:37) Patchy opacity within the right lower lung may represent pneumonia. Subsegmental atelectasis at the left base. No pleural effusion or  pneumothorax. No pulmonary edema. The cardiac silhouette remains enlarged. Aortic aneurysm. Recommend  further evaluation with aortogram.      3/18/19 : MR MRCP No Cont (19 @ 14:22) Gallbladder is distended with cholelithiasis.  No choledocholithiasis or biliary ductal dilatation.      3/17/19 : NM Hepatobiliary Imaging (19 @ 12:21) Abnormal hepatobiliary scan suspicious for common bile duct obstruction. Hepatocellular dysfunction. No evidence of acute cholecystitis.      3/18/19 : US Abdomen Limited (19 @ 07:48) Gallbladder is distended with sludge and stones.  Mural thickening of the wall of the common bile duct, possibly  cholangitis. No biliary ductal dilatation.      3/15/19 : CT Abdomen and Pelvis No Cont (03.15.19 @ 09:39) Distended gallbladder. Consider further evaluation with ultrasound if  acute cholecystitis is a concern.  No discrete mass identified on this limited noncontrast study.          MICROBIOLOGY DATA:      Culture - Blood (19 @ 11:11)    Culture - Blood:   NO ORGANISMS ISOLATED  NO ORGANISMS ISOLATED AT 24 HOURS    Specimen Source: BLOOD PERIPHERAL        Culture - Fungal, Blood (19 @ 08:41)    Culture - Fungal, Blood:   CULTURE NEGATIVE FOR YEASTS AND MOLDS-PRELIMINARY RESULT  AFTER 24 HOURS INCUBATION    Specimen Source: BLOOD        Cytomegalovirus By PCR (19 @ 05:34)    Cytomegalovirus By PCR: 165 IU/mL    CMVPCR Lo.22: Assay lower limit of detection (LOD):  31.2 IU/mL (1.49 log10/mL)  Assay dynamic range:  50 to 156,000,000 (156M) CMV DNA IU/mL (1.70 log10/mL –  8.19 log10/mL)  Plasma CMV DNA Quantification by PCR using Abbott m2000:  The results of this test should be interpreted with  consideration of all clinical and laboratory findings. In  particular, caution should be used when interpreting low  level positive results when the test is used for  diagnostic purposes. Repeat testing on an additional  sample is recommended to confirm low level positive  results. A result of "Not Detected" does not preclude the  possibility of infection with CMV.  CMV DNA may be present  below the detection limit of assay. LogIU/mL        Culture - Urine (19 @ 15:21)    -  Vancomycin: S 2 CLOVIS    Culture - Urine:   ENTF^Enterococcus faecalis  COLONY COUNT: > = 100,000 CFU/ML    Culture - Urine:   Susceptibility not performed.    -  Tetra/Doxy: R >8 CLOVIS    -  Nitrofurantoin: S <=32 CLOVIS    -  Ampicillin: S <=2 CLOVIS    -  Ciprofloxacin: S <=1 CLOVIS    Specimen Source: URINE CATHETER    Organism Identification: Enterococcus faecalis  Staphylococcus sp.,coag neg    Organism: Enterococcus faecalis  COLONY COUNT: > = 100,000 CFU/ML    Organism: Staphylococcus sp.,coag neg  COLONY COUNT: > = 100,000 CFU/ML    Method Type: POSITIVE CLOVIS 29      Urinalysis (19 @ 13:25)    Color: ELSA    Urine Appearance: TURBID    Glucose: 100    Bilirubin: LARGE    Ketone - Urine: NEGATIVE    Specific Gravity: 1.020    Blood: MODERATE    pH - Urine: 6.0    Protein, Urine: 100    Urobilinogen: 4.0    Nitrite: NEGATIVE    Leukocyte Esterase Concentration: LARGE    Red Blood Cell - Urine: 3-5    White Blood Cell - Urine: >50    White Blood Cell Casts: 2-5/LPF    Epithelial Cells: OCC    Bacteria: MANY    Coarse Granular Casts: 0-2/LPF      MRSA Infection Control Screen (PCR) (19 @ 13:46)    MRSA Infection Control Screen (PCR): NOT DETECTED     REFERENCE RANGE:  MRSA DNA NOT DETECTED      Culture - Blood (03.15.19 @ 14:03)    Culture - Blood:   NO ORGANISMS ISOLATED  NO ORGANISMS ISOLATED AT 24 HOURS    Specimen Source: BLOOD

## 2019-04-04 NOTE — PROGRESS NOTE ADULT - ASSESSMENT
pt with unarousable.  ngt dislodged last night, spoke with staff, awaiting swallow eval, but now unarousable.  will need stat ngt with lactulose/ xifaxan vs lactulose enema retention.

## 2019-04-04 NOTE — PROGRESS NOTE ADULT - ASSESSMENT
A 89 yo Male with BPH, Cataracts, Diverticulosis, Anemia 2/2 GI Bleed with previous PRBC transfusions, who presents to the ER for evaluation of  generalized  weakness, urinary retention and confusion. On admission, he found to have elevated bilirubin, LFTS, transaminitis, scleral jaundice. and urinary retention. Flores catheter has placed with negative Urine analysis. The CT abdomen  and pelvis shows Distended gallbladder. Consider further evaluation with ultrasound if  acute cholecystitis is a concern. No discrete mass identified on this limited noncontrast study. The ID consult requested to assist with evaluation of Biliary sepsis.    # Encephalopathy  # Urinary retention - s/p flores catheter   # R/O biliary sepsis/ Cholangitis - Most likely acute cholestatic hepatitis as per GI -s/p  transjugular liver biopsy with two passes. and found to have small right atrium blood clot. 3/21/19  # Distended GB with cholelithiasis on MRCP 3/18/19  # Hypothermia- Pneumonia on CXR - MRSA PCR is negative   # UTI- Enterococcus faecalis 3/20/19  # Liver biopsy result - Not in the system yet  # Hepatic Encephalopathy- elevated Ammonia  # R/O Leptospirosis  - s/p Unasyn/Augmentin  ( MICHELINE for Moderate to severe Leptospirosis)  from 3/21/19 to 3/29/19 ,  which is the treatment of choice for Moderate to severe Leptospirosis. If patient has Leptospirosis, then it should have been treated,  Leptospirosis Antibodies: Negative: No IgM antibodies to Leptospira detected.    would recommend:    1. Obtain Ammonia level and continue lactulose   2.  Aspiration precaution   3. Continue IV Meropenem until 4/6/19, to complete the course  4. Supplemental oxygenation and  bronchodilator as needed  5. Monitor kidney function and LFTs    d/w Family at the bed side and Nursing staff A 89 yo Male with BPH, Cataracts, Diverticulosis, Anemia 2/2 GI Bleed with previous PRBC transfusions, who presents to the ER for evaluation of  generalized  weakness, urinary retention and confusion. On admission, he found to have elevated bilirubin, LFTS, transaminitis, scleral jaundice. and urinary retention. Flores catheter has placed with negative Urine analysis. The CT abdomen  and pelvis shows Distended gallbladder. Consider further evaluation with ultrasound if  acute cholecystitis is a concern. No discrete mass identified on this limited noncontrast study. The ID consult requested to assist with evaluation of Biliary sepsis.    # Encephalopathy  # Urinary retention - s/p flores catheter   # R/O biliary sepsis/ Cholangitis - Most likely acute cholestatic hepatitis as per GI -s/p  transjugular liver biopsy with two passes. and found to have small right atrium blood clot. 3/21/19  # Distended GB with cholelithiasis on MRCP 3/18/19  # Hypothermia- Pneumonia on CXR - MRSA PCR is negative   # UTI- Enterococcus faecalis 3/20/19  # Liver biopsy result - Not in the system yet  # Hepatic Encephalopathy- elevated Ammonia  # R/O Leptospirosis  - s/p Unasyn/Augmentin  ( MICHELINE for Moderate to severe Leptospirosis)  from 3/21/19 to 3/29/19 ,  which is the treatment of choice for Moderate to severe Leptospirosis. If patient has Leptospirosis, then it should have been treated,  Leptospirosis Antibodies: Negative: No IgM antibodies to Leptospira detected.    would recommend:    1. Obtain Ammonia level and continue lactulose   2.  Aspiration precaution   3. Continue IV Meropenem until 4/6/19, to complete the course X 2 more days  4. Supplemental oxygenation and  bronchodilator as needed  5. Monitor kidney function and LFTs    d/w Family at the bed side and Nursing staff

## 2019-04-04 NOTE — PROGRESS NOTE ADULT - SUBJECTIVE AND OBJECTIVE BOX
S: Patient denies chest pain and SOB      MEDICATIONS  (STANDING):  dextrose 5%. 1000 milliLiter(s) (50 mL/Hr) IV Continuous <Continuous>  dextrose 50% Injectable 12.5 Gram(s) IV Push once  dextrose 50% Injectable 25 Gram(s) IV Push once  dextrose 50% Injectable 25 Gram(s) IV Push once  ergocalciferol 94379 Unit(s) Oral every week  lactulose Syrup 10 Gram(s) Enteral Tube two times a day  lidocaine   Patch 1 Patch Transdermal daily  meropenem  IVPB 500 milliGRAM(s) IV Intermittent every 12 hours  metoprolol tartrate 12.5 milliGRAM(s) Oral two times a day  midodrine 2.5 milliGRAM(s) Oral <User Schedule>  rifaximin 550 milliGRAM(s) Oral two times a day  sodium chloride 0.9%. 1000 milliLiter(s) (100 mL/Hr) IV Continuous <Continuous>  ursodiol Capsule 300 milliGRAM(s) Oral two times a day    MEDICATIONS  (PRN):  dextrose 40% Gel 15 Gram(s) Oral once PRN Blood Glucose LESS THAN 70 milliGRAM(s)/deciLiter  glucagon  Injectable 1 milliGRAM(s) IntraMuscular once PRN Glucose <70 milliGRAM(s)/deciLiter  simethicone 80 milliGRAM(s) Chew once PRN Gas      LABS:                            8.7    11.55 )-----------( 85       ( 04 Apr 2019 02:40 )             25.1     Hemoglobin: 8.7 g/dL (04-04 @ 02:40)  Hemoglobin: 6.7 g/dL (04-03 @ 07:50)  Hemoglobin: 8.0 g/dL (04-02 @ 11:13)  Hemoglobin: 7.6 g/dL (04-01 @ 05:30)  Hemoglobin: 8.8 g/dL (03-31 @ 10:41)    04-04    139  |  98  |  66<H>  ----------------------------<  50<L>  4.1   |  22  |  4.60<H>    Ca    8.7      04 Apr 2019 02:40  Mg     2.2     04-04    TPro  5.1<L>  /  Alb  2.1<L>  /  TBili  20.4<H>  /  DBili  13.5<H>  /  AST  181<H>  /  ALT  72<H>  /  AlkPhos  242<H>  04-04    Creatinine Trend: 4.60<--, 4.25<--, 4.04<--, 3.33<--, 3.39<--, 3.20<--       PHYSICAL EXAM  Vital Signs Last 24 Hrs  T(C): 36.6 (04 Apr 2019 12:06), Max: 36.6 (04 Apr 2019 12:06)  T(F): 97.8 (04 Apr 2019 12:06), Max: 97.8 (04 Apr 2019 12:06)  HR: 69 (04 Apr 2019 12:06) (55 - 69)  BP: 94/55 (04 Apr 2019 12:06) (84/50 - 97/66)  BP(mean): --  RR: 16 (04 Apr 2019 12:06) (12 - 18)  SpO2: 96% (04 Apr 2019 05:30) (96% - 98%)      Heart: normal S1, S2, RRR, no m/r/g  Lungs: cta b/l anteriorly   Abd: soft nT  Ext: + pitting edema in LE bilaterally     TELEMETRY: SR     RADIOLOGY:  < from: Xray Chest 1 View- PORTABLE-Urgent (03.20.19 @ 09:37) >  FINDINGS/  IMPRESSION:    Patchy opacity within the right lower lung may represent pneumonia.   Subsegmental atelectasis at the left base. No pleural effusion or   pneumothorax. No pulmonary edema.    The cardiac silhouette remains enlarged. Aortic aneurysm. Recommend   further evaluation with aortogram.    < end of copied text >    < from: Transthoracic Echocardiogram (03.23.19 @ 09:36) >  CONCLUSIONS:  1. Mitral annular calcification, otherwise normal mitral  valve. Mild mitral regurgitation.  2. Aortic valve leaflet morphology not well visualized;  appears calcified with normal opening. Moderate aortic  regurgitation.  3. Normal left ventricular internal dimensions and wall  thicknesses.  4. Hyperdynamic left ventricle.  5. Mild diastolic dysfunction (Stage I).  6. Normal right ventricular size and function.  7. Normal tricuspid valve. Mild tricuspid regurgitation.  8. Estimated pulmonary artery systolic pressure equals 36  mm Hg, assuming right atrial pressure equals 10  mm Hg,  consistent with borderline pulmonary hypertension.  *** Compared with echocardiogram of 6/11/2018,no  significant changes noted.  ------------------------------------------------------------------------  Confirmed on  3/23/2019 - 11:19:58 by Evaristo Castanon MD, FACC,  FASE, RPVI    < end of copied text >      ASSESSMENT/PLAN: 	    89 yo M, poor historian, charts reviewed, with PMHx BPH, Cataracts, Diverticulosis, Anemia 2/2 GI Bleed with previous PRBC transfusions, who p/w 2 days generalized  weakness, urinary retention and confusion. LFTs significant for transaminitis, scleral jaundice.     -TTE with moderate AI and normal LV function - medical management of valvular disease recommended  -CT chest with 4.5cm ascending aortic aneurysm - continue metoprolol 12.5 mg PO bid with parameters  -no further cardiac workup needed at this time  -follow up ethics and further GOC

## 2019-04-05 LAB
ALBUMIN SERPL ELPH-MCNC: 1.9 G/DL — LOW (ref 3.3–5)
ALP SERPL-CCNC: 292 U/L — HIGH (ref 40–120)
ALT FLD-CCNC: 71 U/L — HIGH (ref 4–41)
AMMONIA BLD-MCNC: 51 UMOL/L — SIGNIFICANT CHANGE UP (ref 11–55)
ANION GAP SERPL CALC-SCNC: 15 MMO/L — HIGH (ref 7–14)
APTT BLD: 63.5 SEC — HIGH (ref 27.5–36.3)
AST SERPL-CCNC: 192 U/L — HIGH (ref 4–40)
BASOPHILS # BLD AUTO: 0.04 K/UL — SIGNIFICANT CHANGE UP (ref 0–0.2)
BASOPHILS NFR BLD AUTO: 0.4 % — SIGNIFICANT CHANGE UP (ref 0–2)
BILIRUB SERPL-MCNC: 21.2 MG/DL — HIGH (ref 0.2–1.2)
BUN SERPL-MCNC: 72 MG/DL — HIGH (ref 7–23)
CALCIUM SERPL-MCNC: 8.7 MG/DL — SIGNIFICANT CHANGE UP (ref 8.4–10.5)
CHLORIDE SERPL-SCNC: 103 MMOL/L — SIGNIFICANT CHANGE UP (ref 98–107)
CO2 SERPL-SCNC: 23 MMOL/L — SIGNIFICANT CHANGE UP (ref 22–31)
CREAT SERPL-MCNC: 5.11 MG/DL — HIGH (ref 0.5–1.3)
EOSINOPHIL # BLD AUTO: 0.2 K/UL — SIGNIFICANT CHANGE UP (ref 0–0.5)
EOSINOPHIL NFR BLD AUTO: 2.1 % — SIGNIFICANT CHANGE UP (ref 0–6)
GLUCOSE SERPL-MCNC: 118 MG/DL — HIGH (ref 70–99)
HCT VFR BLD CALC: 22.6 % — LOW (ref 39–50)
HGB BLD-MCNC: 8.1 G/DL — LOW (ref 13–17)
IMM GRANULOCYTES NFR BLD AUTO: 6.7 % — HIGH (ref 0–1.5)
INR BLD: 2.22 — HIGH (ref 0.88–1.17)
LYMPHOCYTES # BLD AUTO: 0.42 K/UL — LOW (ref 1–3.3)
LYMPHOCYTES # BLD AUTO: 4.4 % — LOW (ref 13–44)
MAGNESIUM SERPL-MCNC: 2.4 MG/DL — SIGNIFICANT CHANGE UP (ref 1.6–2.6)
MCHC RBC-ENTMCNC: 29 PG — SIGNIFICANT CHANGE UP (ref 27–34)
MCHC RBC-ENTMCNC: 35.8 % — SIGNIFICANT CHANGE UP (ref 32–36)
MCV RBC AUTO: 81 FL — SIGNIFICANT CHANGE UP (ref 80–100)
MONOCYTES # BLD AUTO: 0.59 K/UL — SIGNIFICANT CHANGE UP (ref 0–0.9)
MONOCYTES NFR BLD AUTO: 6.2 % — SIGNIFICANT CHANGE UP (ref 2–14)
NEUTROPHILS # BLD AUTO: 7.62 K/UL — HIGH (ref 1.8–7.4)
NEUTROPHILS NFR BLD AUTO: 80.2 % — HIGH (ref 43–77)
NRBC # FLD: 0.59 K/UL — SIGNIFICANT CHANGE UP (ref 0–0)
NRBC FLD-RTO: 6.2 — SIGNIFICANT CHANGE UP
PHOSPHATE SERPL-MCNC: 4.3 MG/DL — SIGNIFICANT CHANGE UP (ref 2.5–4.5)
PLATELET # BLD AUTO: 75 K/UL — LOW (ref 150–400)
PMV BLD: SIGNIFICANT CHANGE UP FL (ref 7–13)
POTASSIUM SERPL-MCNC: 3.4 MMOL/L — LOW (ref 3.5–5.3)
POTASSIUM SERPL-SCNC: 3.4 MMOL/L — LOW (ref 3.5–5.3)
PROT SERPL-MCNC: 4.8 G/DL — LOW (ref 6–8.3)
PROTHROM AB SERPL-ACNC: 25.9 SEC — HIGH (ref 9.8–13.1)
RBC # BLD: 2.79 M/UL — LOW (ref 4.2–5.8)
RBC # FLD: 26.1 % — HIGH (ref 10.3–14.5)
SODIUM SERPL-SCNC: 141 MMOL/L — SIGNIFICANT CHANGE UP (ref 135–145)
WBC # BLD: 9.51 K/UL — SIGNIFICANT CHANGE UP (ref 3.8–10.5)
WBC # FLD AUTO: 9.51 K/UL — SIGNIFICANT CHANGE UP (ref 3.8–10.5)

## 2019-04-05 PROCEDURE — 99232 SBSQ HOSP IP/OBS MODERATE 35: CPT

## 2019-04-05 RX ORDER — SODIUM CHLORIDE 9 MG/ML
1000 INJECTION INTRAMUSCULAR; INTRAVENOUS; SUBCUTANEOUS
Qty: 0 | Refills: 0 | Status: DISCONTINUED | OUTPATIENT
Start: 2019-04-05 | End: 2019-04-06

## 2019-04-05 RX ADMIN — URSODIOL 300 MILLIGRAM(S): 250 TABLET, FILM COATED ORAL at 06:17

## 2019-04-05 RX ADMIN — LIDOCAINE 1 PATCH: 4 CREAM TOPICAL at 22:22

## 2019-04-05 RX ADMIN — MEROPENEM 100 MILLIGRAM(S): 1 INJECTION INTRAVENOUS at 18:49

## 2019-04-05 RX ADMIN — LACTULOSE 10 GRAM(S): 10 SOLUTION ORAL at 06:18

## 2019-04-05 RX ADMIN — SODIUM CHLORIDE 75 MILLILITER(S): 9 INJECTION INTRAMUSCULAR; INTRAVENOUS; SUBCUTANEOUS at 19:40

## 2019-04-05 RX ADMIN — LIDOCAINE 1 PATCH: 4 CREAM TOPICAL at 13:04

## 2019-04-05 RX ADMIN — Medication 12.5 MILLIGRAM(S): at 06:17

## 2019-04-05 RX ADMIN — MEROPENEM 100 MILLIGRAM(S): 1 INJECTION INTRAVENOUS at 06:18

## 2019-04-05 NOTE — CHART NOTE - NSCHARTNOTEFT_GEN_A_CORE
I had a discussion w patient's nephew and neice.  They are interested in inpatient hospice.  They are considering stopping blood draws and witholding the NG tube. They want the patient to take lactulose and Xifaxan by mouth if possible.  I request that the team make every effort over the weekend to connect them with the in house SW/ to see if inpatient hospice is an option.  Additionally, the patient is having lower abdominal/suprapubic pain.  I would recommend an abdominal XR to evaluate for bowel and bladder distention.   I attempt to reach the PA on floor but no response, will call back later.

## 2019-04-05 NOTE — PROGRESS NOTE ADULT - SUBJECTIVE AND OBJECTIVE BOX
Nephrology Followup Note - [phone] - Dr Denton / Dr Rojas / Dr Beard / Dr Montero / Dr Villegas / Dr Webster / Dr Mullen / Dr Felix  Pt seen and examined at bedside  No acute events overnight. Pt much more awake today, AAOx2. Pulled out NGT and refusing to have it replaced.     Allergies:  No Known Allergies    Hospital Medications:   MEDICATIONS  (STANDING):  dextrose 5%. 1000 milliLiter(s) (50 mL/Hr) IV Continuous <Continuous>  dextrose 50% Injectable 12.5 Gram(s) IV Push once  dextrose 50% Injectable 25 Gram(s) IV Push once  dextrose 50% Injectable 25 Gram(s) IV Push once  ergocalciferol 50460 Unit(s) Oral every week  lactulose Syrup 10 Gram(s) Enteral Tube two times a day  lidocaine   Patch 1 Patch Transdermal daily  meropenem  IVPB 500 milliGRAM(s) IV Intermittent every 12 hours  metoprolol tartrate 12.5 milliGRAM(s) Oral two times a day  midodrine 2.5 milliGRAM(s) Oral <User Schedule>  rifaximin 550 milliGRAM(s) Oral two times a day  ursodiol Capsule 300 milliGRAM(s) Oral two times a day    VITALS:  T(F): 97.9 (04-05-19 @ 13:38), Max: 97.9 (04-05-19 @ 13:38)  HR: 58 (04-05-19 @ 13:38)  BP: 98/60 (04-05-19 @ 13:38)  RR: 16 (04-05-19 @ 13:38)  SpO2: 97% (04-05-19 @ 13:38)  Wt(kg): --    04-04 @ 07:01  -  04-05 @ 07:00  --------------------------------------------------------  IN: 0 mL / OUT: 150 mL / NET: -150 mL        PHYSICAL EXAM:  Constitutional: NAD  HEENT: anicteric sclera, oropharynx clear, MMM  Neck: No JVD  Respiratory: CTAB, no wheezes, rales or rhonchi  Cardiovascular: S1, S2, RRR  Gastrointestinal: BS+, soft, NT. Distended  Extremities: No cyanosis or clubbing. Anasarca   Neurological: A/O x 2, no focal deficits  Psychiatric: Normal mood, normal affect  : No CVA tenderness. No flores.   Skin: No rashes    LABS:  04-05    141  |  103  |  72<H>  ----------------------------<  118<H>  3.4<L>   |  23  |  5.11<H>    Ca    8.7      05 Apr 2019 07:15  Phos  4.3     04-05  Mg     2.4     04-05    TPro  4.8<L>  /  Alb  1.9<L>  /  TBili  21.2<H>  /  DBili      /  AST  192<H>  /  ALT  71<H>  /  AlkPhos  292<H>  04-05    Creatinine Trend: 5.11 <--, 4.60 <--, 4.25 <--, 4.04 <--, 3.33 <--, 3.39 <--, 3.20 <--, 3.18 <--                        8.1    9.51  )-----------( 75       ( 05 Apr 2019 07:15 )             22.6     Urine Studies:      RADIOLOGY & ADDITIONAL STUDIES:

## 2019-04-05 NOTE — PROGRESS NOTE ADULT - SUBJECTIVE AND OBJECTIVE BOX
[facility]      Seen earlier today, and examined.  - Today, patient is without complaints.           *****MEDICATIONS: Current medication reviewed and documented.    MEDICATIONS  (STANDING):  dextrose 5%. 1000 milliLiter(s) (50 mL/Hr) IV Continuous <Continuous>  dextrose 50% Injectable 12.5 Gram(s) IV Push once  dextrose 50% Injectable 25 Gram(s) IV Push once  dextrose 50% Injectable 25 Gram(s) IV Push once  ergocalciferol 29601 Unit(s) Oral every week  lactulose Syrup 10 Gram(s) Enteral Tube two times a day  lidocaine   Patch 1 Patch Transdermal daily  meropenem  IVPB 500 milliGRAM(s) IV Intermittent every 12 hours  metoprolol tartrate 12.5 milliGRAM(s) Oral two times a day  midodrine 2.5 milliGRAM(s) Oral <User Schedule>  rifaximin 550 milliGRAM(s) Oral two times a day  ursodiol Capsule 300 milliGRAM(s) Oral two times a day    MEDICATIONS  (PRN):  dextrose 40% Gel 15 Gram(s) Oral once PRN Blood Glucose LESS THAN 70 milliGRAM(s)/deciLiter  glucagon  Injectable 1 milliGRAM(s) IntraMuscular once PRN Glucose <70 milliGRAM(s)/deciLiter  simethicone 80 milliGRAM(s) Chew once PRN Gas          ***** VITAL SIGNS:  T(F): 97.2 (19 @ 09:00), Max: 97.6 (19 @ 18:37)  HR: 56 (19 @ 09:00) (56 - 71)  BP: 114/70 (19 @ 09:00) (98/66 - 124/72)  RR: 16 (19 @ 09:00) (12 - 16)  SpO2: 100% (19 @ 09:00) (96% - 100%)  Wt(kg): --  ,   I&O's Summary    2019 07:01  -  2019 07:00  --------------------------------------------------------  IN: 0 mL / OUT: 150 mL / NET: -150 mL             *****PHYSICAL EXAM:  lethargic icteric    alert to repeated tactile stimuli    able to mutter a few words.            *****LAB AND IMAGIN.1    9.51  )-----------( 75       ( 2019 07:15 )             22.6               04-05    141  |  103  |  72<H>  ----------------------------<  118<H>  3.4<L>   |  23  |  5.11<H>    Ca    8.7      2019 07:15  Phos  4.3     04-05  Mg     2.4     04-05    TPro  4.8<L>  /  Alb  1.9<L>  /  TBili  21.2<H>  /  DBili  x   /  AST  192<H>  /  ALT  71<H>  /  AlkPhos  292<H>  04-05    PT/INR - ( 2019 07:15 )   PT: 25.9 SEC;   INR: 2.22          PTT - ( 2019 07:15 )  PTT:63.5 SEC                     [All pertinent recent Imaging/Reports reviewed]           *****A S S E S S M E N T   A N D   P L A N :        89 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  Upon arrival to the ED, pt with scleral icterus and labs significant for transaminitis and TBili > 20    called to evaluate pt for ams         Problem/Recommendations 1:  Multifactorial toxic metabolic encephalopathy  , hyperbilirubinemia, hyperammonemia, and worsening renal function  correct metabolic dyscrasias defer to renal/gi    on lactulose   worsening lethargy /encephalopathy   pt not a candidate for dialysis per renal.    ethics consult noted.   continue supportive care.     spent a lot of time with pt and family  at bedside.     Thank you for allowing me to participate in the care of this patient. Please do not hesitate to call me if you have any  questions.        ________________  Ava Schreiber MD  [facility] (PN)[facility]  [phone]      30 minutes spent on total encounter; more than 50 % of the visit was  spent counseling about plan of care, compliance to diet/exercise and medication regimen and or  coordinating care by the attending physician.      It is advised that s stroke patients follow up with NP Dennise Rausch @ [phone] in 1- 2 weeks.   Others please follow up with Dr. Michael Nissenbaum [phone]

## 2019-04-05 NOTE — PROGRESS NOTE ADULT - SUBJECTIVE AND OBJECTIVE BOX
Patient is a 89y old  Male who presents with a chief complaint of generalized weakness, urinary retention, and confusion X 2 days (05 Apr 2019 07:11)      SUBJECTIVE / OVERNIGHT EVENTS:  no events  MEDICATIONS  (STANDING):  dextrose 5%. 1000 milliLiter(s) (50 mL/Hr) IV Continuous <Continuous>  dextrose 50% Injectable 12.5 Gram(s) IV Push once  dextrose 50% Injectable 25 Gram(s) IV Push once  dextrose 50% Injectable 25 Gram(s) IV Push once  ergocalciferol 21140 Unit(s) Oral every week  lactulose Syrup 10 Gram(s) Enteral Tube two times a day  lidocaine   Patch 1 Patch Transdermal daily  meropenem  IVPB 500 milliGRAM(s) IV Intermittent every 12 hours  metoprolol tartrate 12.5 milliGRAM(s) Oral two times a day  midodrine 2.5 milliGRAM(s) Oral <User Schedule>  rifaximin 550 milliGRAM(s) Oral two times a day  sodium chloride 0.9%. 1000 milliLiter(s) (100 mL/Hr) IV Continuous <Continuous>  ursodiol Capsule 300 milliGRAM(s) Oral two times a day    MEDICATIONS  (PRN):  dextrose 40% Gel 15 Gram(s) Oral once PRN Blood Glucose LESS THAN 70 milliGRAM(s)/deciLiter  glucagon  Injectable 1 milliGRAM(s) IntraMuscular once PRN Glucose <70 milliGRAM(s)/deciLiter  simethicone 80 milliGRAM(s) Chew once PRN Gas              PHYSICAL EXAM:  GENERAL: NAD, well-developed  HEAD:  Atraumatic, Normocephalic  EYES: EOMI, PERRLA, conjunctiva and sclera icteric  NECK: Supple, No JVD  CHEST/LUNG: Clear to auscultation bilaterally; No wheeze  HEART: Regular rate and rhythm; No murmurs, rubs, or gallops  ABDOMEN: Soft, Nontender, Nondistended; Bowel sounds present, no hepatosplenomegaly, no rebound or guarding  EXTREMITIES:  2+ Peripheral Pulses, No clubbing, cyanosis, or edema    NEUROLOGY: lethargic  SKIN: No rashes or lesion    LABS:                        8.1    9.51  )-----------( 75       ( 05 Apr 2019 07:15 )             22.6     04-05    141  |  103  |  72<H>  ----------------------------<  118<H>  3.4<L>   |  23  |  5.11<H>    Ca    8.7      05 Apr 2019 07:15  Phos  4.3     04-05  Mg     2.4     04-05    TPro  4.8<L>  /  Alb  1.9<L>  /  TBili  21.2<H>  /  DBili  x   /  AST  192<H>  /  ALT  71<H>  /  AlkPhos  292<H>  04-05    LIVER FUNCTIONS - ( 05 Apr 2019 07:15 )  Alb: 1.9 g/dL / Pro: 4.8 g/dL / ALK PHOS: 292 u/L / ALT: 71 u/L / AST: 192 u/L / GGT: x           PT/INR - ( 05 Apr 2019 07:15 )   PT: 25.9 SEC;   INR: 2.22          PTT - ( 05 Apr 2019 07:15 )  PTT:63.5 SEC          RADIOLOGY & ADDITIONAL TESTS:

## 2019-04-05 NOTE — PROGRESS NOTE ADULT - ASSESSMENT
Impression:     1. Acute liver injury with marked  hyperbilirubinemia: DILI vs. viral. Rule out Hepatitis E. No improvement in liver function.    2. Kidney failure, worsening    3. Encephalopathy ? multifactorial, rule out sepsis    4. Drop in hemoglobin with appropriate response to transfusion, no overt bleeding    Recommendation:  -continue lactulose/ xifaxan  -f/u hepatitis E IgM, I called the lab, it will probably not come back until next week  -GOC conversations needed

## 2019-04-05 NOTE — PROGRESS NOTE ADULT - ATTENDING COMMENTS
Patient seen and examined, agree with above assessment and plan as transcribed above.    - No need for further inpatient cardiac work up.    Jeff Galicia MD, [facility]  BEEPER [phone]

## 2019-04-05 NOTE — PROGRESS NOTE ADULT - ASSESSMENT
acut cholestatic hepaitis of unknown etiolgy despite liver biopsy.  appreciaet hepatology input.  pt wiht somulence yesterday improved with lactulose (ngt dislodge)  await [facility] discussion.  difficult case.

## 2019-04-05 NOTE — PROGRESS NOTE ADULT - ASSESSMENT
89 yo M p/w confusion and generalized weakness    -> Nutrition:      - ngt intact      - F/u palliative for goc  including nutrition/feeding (ie:, long term feeding, peg, etc)      - Case management and social work for discharge planning location   -> D.I.C.:      - additional management appreciated per hem/onc and all consultants       - Very poor prognosis with high mortality rate      - Supportive care prn  -> ARF:      - persistent, not a candidate for HD      - adjust management per nephro. Avoid nephrotoxic rx      - midodrine renally adjusted    -> cholestatic hepatitis, acute liver failure, coagulopathy, thrombocytopenia:      - very poor prognosis      - ethics input appreciated, I'm in agreement      - all consultants recs/management appreciated        - not a candidate for liver transplantation      - c/w medical treatment      - adjust management accordingly, supportive comfort care  -> Urinary Retention:      - flores intact, strict i/o   -> Metabolic Encephalopathy:      - stable, treat underlying gi condition and dic       - neuro checks       - fall precautions    -> Need for prophylactic measure:      - dvt/gi ppx  -> Severe Protein Calorie Malnutrition:      - supplement diet

## 2019-04-05 NOTE — PROGRESS NOTE ADULT - SUBJECTIVE AND OBJECTIVE BOX
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  Upon arrival to the ED, pt with scleral icterus and labs significant for transaminitis and TBili > 20 (15 Mar 2019 06:59)      PAST MEDICAL & SURGICAL HISTORY:  Chronic kidney disease (CKD), stage IV (severe)  Benign prostatic hypertrophy  S/P cataract surgery, left: 3 yrs ago      No Known Allergies       MEDICATIONS  (STANDING):  dextrose 5%. 1000 milliLiter(s) (50 mL/Hr) IV Continuous <Continuous>  dextrose 50% Injectable 12.5 Gram(s) IV Push once  dextrose 50% Injectable 25 Gram(s) IV Push once  dextrose 50% Injectable 25 Gram(s) IV Push once  ergocalciferol 34078 Unit(s) Oral every week  lactulose Syrup 10 Gram(s) Enteral Tube two times a day  lidocaine   Patch 1 Patch Transdermal daily  meropenem  IVPB 500 milliGRAM(s) IV Intermittent every 12 hours  metoprolol tartrate 12.5 milliGRAM(s) Oral two times a day  midodrine 2.5 milliGRAM(s) Oral <User Schedule>  rifaximin 550 milliGRAM(s) Oral two times a day  sodium chloride 0.9%. 1000 milliLiter(s) (100 mL/Hr) IV Continuous <Continuous>  ursodiol Capsule 300 milliGRAM(s) Oral two times a day    MEDICATIONS  (PRN):  dextrose 40% Gel 15 Gram(s) Oral once PRN Blood Glucose LESS THAN 70 milliGRAM(s)/deciLiter  glucagon  Injectable 1 milliGRAM(s) IntraMuscular once PRN Glucose <70 milliGRAM(s)/deciLiter  simethicone 80 milliGRAM(s) Chew once PRN Gas      REVIEW OF SYSTEMS: limited from pt    T(C): 36.2 (04-05-19 @ 09:00), Max: 36.6 (04-04-19 @ 12:06)  HR: 56 (04-05-19 @ 09:00) (56 - 71)  BP: 114/70 (04-05-19 @ 09:00) (94/55 - 124/72)  RR: 16 (04-05-19 @ 09:00) (12 - 16)  SpO2: 100% (04-05-19 @ 09:00) (96% - 100%)    PHYSICAL EXAMINATION:   Constitutional: stabel ill appearance. NAD  HEENT: bitemp wasting, ngt intact  Neck:  Supple  Respiratory:  Adequate airflow b/l. Not using accessory muscles of respiration.  Cardiovascular:  S1 & S2 intact, no R/G, 2+ radial pulses b/l  Gastrointestinal: Soft, NT, ND, normoactive b.s., no organomegaly/RT/rigidity  Extremities: anasarca  Neurological:  awake, lethargic, arousable. Crude sensation intact.     Labs and imaging reviewed    LABS:                        8.1    9.51  )-----------( 75       ( 05 Apr 2019 07:15 )             22.6     04-05    141  |  103  |  72<H>  ----------------------------<  118<H>  3.4<L>   |  23  |  5.11<H>    Ca    8.7      05 Apr 2019 07:15  Phos  4.3     04-05  Mg     2.4     04-05    TPro  4.8<L>  /  Alb  1.9<L>  /  TBili  21.2<H>  /  DBili  x   /  AST  192<H>  /  ALT  71<H>  /  AlkPhos  292<H>  04-05        PT/INR - ( 05 Apr 2019 07:15 )   PT: 25.9 SEC;   INR: 2.22          PTT - ( 05 Apr 2019 07:15 )  PTT:63.5 SEC    CAPILLARY BLOOD GLUCOSE      POCT Blood Glucose.: 114 mg/dL (05 Apr 2019 08:39)  POCT Blood Glucose.: 113 mg/dL (05 Apr 2019 02:29)  POCT Blood Glucose.: 117 mg/dL (04 Apr 2019 20:31)  POCT Blood Glucose.: 145 mg/dL (04 Apr 2019 12:39)  POCT Blood Glucose.: 80 mg/dL (04 Apr 2019 11:54)  POCT Blood Glucose.: 85 mg/dL (04 Apr 2019 11:28)  POCT Blood Glucose.: 94 mg/dL (04 Apr 2019 11:09)  POCT Blood Glucose.: 57 mg/dL (04 Apr 2019 10:34)        LIVER FUNCTIONS - ( 05 Apr 2019 07:15 )  Alb: 1.9 g/dL / Pro: 4.8 g/dL / ALK PHOS: 292 u/L / ALT: 71 u/L / AST: 192 u/L / GGT: x               RADIOLOGY & ADDITIONAL STUDIES:

## 2019-04-05 NOTE — PROGRESS NOTE ADULT - SUBJECTIVE AND OBJECTIVE BOX
PASCUAL OLSEN:[id number],   89yMale followed for:  No Known Allergies    PAST MEDICAL & SURGICAL HISTORY:  Chronic kidney disease (CKD), stage IV (severe)  Benign prostatic hypertrophy  S/P cataract surgery, left: 3 yrs ago    FAMILY HISTORY:  No pertinent family history in first degree relatives    MEDICATIONS  (STANDING):  dextrose 5%. 1000 milliLiter(s) (50 mL/Hr) IV Continuous <Continuous>  dextrose 50% Injectable 12.5 Gram(s) IV Push once  dextrose 50% Injectable 25 Gram(s) IV Push once  dextrose 50% Injectable 25 Gram(s) IV Push once  ergocalciferol 07020 Unit(s) Oral every week  lactulose Syrup 10 Gram(s) Enteral Tube two times a day  lidocaine   Patch 1 Patch Transdermal daily  meropenem  IVPB 500 milliGRAM(s) IV Intermittent every 12 hours  metoprolol tartrate 12.5 milliGRAM(s) Oral two times a day  midodrine 2.5 milliGRAM(s) Oral <User Schedule>  rifaximin 550 milliGRAM(s) Oral two times a day  sodium chloride 0.9%. 1000 milliLiter(s) (100 mL/Hr) IV Continuous <Continuous>  ursodiol Capsule 300 milliGRAM(s) Oral two times a day    MEDICATIONS  (PRN):  dextrose 40% Gel 15 Gram(s) Oral once PRN Blood Glucose LESS THAN 70 milliGRAM(s)/deciLiter  glucagon  Injectable 1 milliGRAM(s) IntraMuscular once PRN Glucose <70 milliGRAM(s)/deciLiter  simethicone 80 milliGRAM(s) Chew once PRN Gas      Vital Signs Last 24 Hrs  T(C): 36.4 (05 Apr 2019 06:10), Max: 36.6 (04 Apr 2019 12:06)  T(F): 97.6 (05 Apr 2019 06:10), Max: 97.8 (04 Apr 2019 12:06)  HR: 59 (05 Apr 2019 06:10) (59 - 71)  BP: 118/73 (05 Apr 2019 06:10) (94/55 - 124/72)  BP(mean): --  RR: 12 (05 Apr 2019 06:10) (12 - 16)  SpO2: 98% (05 Apr 2019 06:10) (96% - 100%)  nc/at  s1s2  cta  soft, nt, nd no guarding or rebound  no c/c/e    CBC Full  -  ( 04 Apr 2019 02:40 )  WBC Count : 11.55 K/uL  RBC Count : 3.08 M/uL  Hemoglobin : 8.7 g/dL  Hematocrit : 25.1 %  Platelet Count - Automated : 85 K/uL  Mean Cell Volume : 81.5 fL  Mean Cell Hemoglobin : 28.2 pg  Mean Cell Hemoglobin Concentration : 34.7 %  Auto Neutrophil # : x  Auto Lymphocyte # : x  Auto Monocyte # : x  Auto Eosinophil # : x  Auto Basophil # : x  Auto Neutrophil % : x  Auto Lymphocyte % : x  Auto Monocyte % : x  Auto Eosinophil % : x  Auto Basophil % : x    04-04    139  |  98  |  66<H>  ----------------------------<  50<L>  4.1   |  22  |  4.60<H>    Ca    8.7      04 Apr 2019 02:40  Mg     2.2     04-04    TPro  5.1<L>  /  Alb  2.1<L>  /  TBili  20.4<H>  /  DBili  13.5<H>  /  AST  181<H>  /  ALT  72<H>  /  AlkPhos  242<H>  04-04

## 2019-04-05 NOTE — PROGRESS NOTE ADULT - SUBJECTIVE AND OBJECTIVE BOX
Patient is seen and examined at the bed side, is normothermic.  He is little more alert today, The Ammonia level is trending down. The Leukocytosis trended down to normal.         REVIEW OF SYSTEMS: All other review systems are negative        ALLERGIES: No Known Allergies        Vital Signs Last 24 Hrs  T(C): 36.2 (2019 09:00), Max: 36.6 (2019 12:06)  T(F): 97.2 (2019 09:00), Max: 97.8 (2019 12:06)  HR: 56 (2019 09:00) (56 - 71)  BP: 114/70 (2019 09:00) (94/55 - 124/72)  BP(mean): --  RR: 16 (2019 09:00) (12 - 16)  SpO2: 100% (2019 09:00) (96% - 100%)        PHYSICAL EXAM:  GENERAL: Lethargic but little more alert  HEENT: NGT in placed  CHEST/LUNG:  Air entry bilaterally  HEART: s1 and s2 present  ABDOMEN:  Nontender and  Non distended  : Oshea catheter in placed  EXTREMITIES: B/L pedal  edema  CNS: Lethargic but little more alert          LABS:                        8.1    9.51  )-----------( 75       ( 2019 07:15 )             22.6                           8.7    11.55 )-----------( 85       ( 2019 02:40 )             25.1                           04-05    141  |  103  |  72<H>  ----------------------------<  118<H>  3.4<L>   |  23  |  5.11<H>    Ca    8.7      2019 07:15  Phos  4.3     -05  Mg     2.4     -05    TPro  4.8<L>  /  Alb  1.9<L>  /  TBili  21.2<H>  /  DBili  x   /  AST  192<H>  /  ALT  71<H>  /  AlkPhos  292<H>        04-04    139  |  98  |  66<H>  ----------------------------<  50<L>  4.1   |  22  |  4.60<H>    Ca    8.7      2019 02:40  Mg     2.2     -    TPro  5.1<L>  /  Alb  2.1<L>  /  TBili  20.4<H>  /  DBili  13.5<H>  /  AST  181<H>  /  ALT  72<H>  /  AlkPhos  242<H>  -        DRUG LEVEL:     Ammonia, Serum (19 @ 07:45)    Ammonia, Serum: 51: Ammonia levels will be falsely elevated if specimen is NOT  collected, processed and maintained at 4-8 C. umol/L        Ammonia, Serum (19 @ 07:07)    Ammonia, Serum: 66: Ammonia levels will be falsely elevated if specimen is NOT  collected, processed and maintained at 4-8 C. umol/L        Ammonia, Serum (19 @ 09:45)    Ammonia, Serum: 76: Ammonia levels will be falsely elevated if specimen is NOT  collected, processed and maintained at 4-8 C. umol/L        Ammonia, Serum (19 @ 17:30)    Ammonia, Serum: 130: Ammonia levels will be falsely elevated if specimen is NOT  collected, processed and maintained at 4-8 C. umol/L        Leptospirosis Antibodies (19 @ 07:30)    Leptospirosis Antibodies: Negative: No IgM antibodies to Leptospira detected.  Since antibodies  may not be present or may be present at undetectable levels  during early disease, repeat testing of a convalescent  sample collected in 2-3 weeks is recommended.  Test Performed by:  [facility]– [place]  [address] [place] [address]          MEDICATIONS  (STANDING):  dextrose 5%. 1000 milliLiter(s) (50 mL/Hr) IV Continuous <Continuous>  dextrose 50% Injectable 12.5 Gram(s) IV Push once  dextrose 50% Injectable 25 Gram(s) IV Push once  dextrose 50% Injectable 25 Gram(s) IV Push once  ergocalciferol 63769 Unit(s) Oral every week  lactulose Syrup 10 Gram(s) Enteral Tube two times a day  lidocaine   Patch 1 Patch Transdermal daily  meropenem  IVPB 500 milliGRAM(s) IV Intermittent every 12 hours  metoprolol tartrate 12.5 milliGRAM(s) Oral two times a day  midodrine 2.5 milliGRAM(s) Oral <User Schedule>  rifaximin 550 milliGRAM(s) Oral two times a day  ursodiol Capsule 300 milliGRAM(s) Oral two times a day    MEDICATIONS  (PRN):  dextrose 40% Gel 15 Gram(s) Oral once PRN Blood Glucose LESS THAN 70 milliGRAM(s)/deciLiter  glucagon  Injectable 1 milliGRAM(s) IntraMuscular once PRN Glucose <70 milliGRAM(s)/deciLiter  simethicone 80 milliGRAM(s) Chew once PRN Gas        RADIOLOGY & ADDITIONAL TESTS:      3/24/19 : CT Chest No Cont (19 @ 18:32) : 4.4 cm enlarged ascending thoracic aorta. Small bilateral pleural effusions.      3/20/19 : Xray Chest 1 View- PORTABLE-Urgent (19 @ 09:37) Patchy opacity within the right lower lung may represent pneumonia. Subsegmental atelectasis at the left base. No pleural effusion or  pneumothorax. No pulmonary edema. The cardiac silhouette remains enlarged. Aortic aneurysm. Recommend  further evaluation with aortogram.      3/18/19 : MR MRCP No Cont (19 @ 14:22) Gallbladder is distended with cholelithiasis.  No choledocholithiasis or biliary ductal dilatation.      3/17/19 : NM Hepatobiliary Imaging (19 @ 12:21) Abnormal hepatobiliary scan suspicious for common bile duct obstruction. Hepatocellular dysfunction. No evidence of acute cholecystitis.      3/18/19 : US Abdomen Limited (19 @ 07:48) Gallbladder is distended with sludge and stones.  Mural thickening of the wall of the common bile duct, possibly  cholangitis. No biliary ductal dilatation.      3/15/19 : CT Abdomen and Pelvis No Cont (03.15.19 @ 09:39) Distended gallbladder. Consider further evaluation with ultrasound if  acute cholecystitis is a concern. No discrete mass identified on this limited noncontrast study.          MICROBIOLOGY DATA:      Culture - Blood (19 @ 11:11)    Culture - Blood:   NO ORGANISMS ISOLATED  NO ORGANISMS ISOLATED AT 24 HOURS    Specimen Source: BLOOD PERIPHERAL        Culture - Fungal, Blood (19 @ 08:41)    Culture - Fungal, Blood:   CULTURE NEGATIVE FOR YEASTS AND MOLDS-PRELIMINARY RESULT  AFTER 24 HOURS INCUBATION    Specimen Source: BLOOD        Cytomegalovirus By PCR (19 @ 05:34)    Cytomegalovirus By PCR: 165 IU/mL    CMVPCR Lo.22: Assay lower limit of detection (LOD):  31.2 IU/mL (1.49 log10/mL)  Assay dynamic range:  50 to 156,000,000 (156M) CMV DNA IU/mL (1.70 log10/mL –  8.19 log10/mL)  Plasma CMV DNA Quantification by PCR using Abbott m2000:  The results of this test should be interpreted with  consideration of all clinical and laboratory findings. In  particular, caution should be used when interpreting low  level positive results when the test is used for  diagnostic purposes. Repeat testing on an additional  sample is recommended to confirm low level positive  results. A result of "Not Detected" does not preclude the  possibility of infection with CMV.  CMV DNA may be present  below the detection limit of assay. LogIU/mL        Culture - Urine (19 @ 15:21)    -  Vancomycin: S 2 CLOVIS    Culture - Urine:   ENTF^Enterococcus faecalis  COLONY COUNT: > = 100,000 CFU/ML    Culture - Urine:   Susceptibility not performed.    -  Tetra/Doxy: R >8 CLOVIS    -  Nitrofurantoin: S <=32 CLOVIS    -  Ampicillin: S <=2 CLOVIS    -  Ciprofloxacin: S <=1 CLOVIS    Specimen Source: URINE CATHETER    Organism Identification: Enterococcus faecalis  Staphylococcus sp.,coag neg    Organism: Enterococcus faecalis  COLONY COUNT: > = 100,000 CFU/ML    Organism: Staphylococcus sp.,coag neg  COLONY COUNT: > = 100,000 CFU/ML    Method Type: POSITIVE CLOVIS 29      Urinalysis (19 @ 13:25)    Color: ELSA    Urine Appearance: TURBID    Glucose: 100    Bilirubin: LARGE    Ketone - Urine: NEGATIVE    Specific Gravity: 1.020    Blood: MODERATE    pH - Urine: 6.0    Protein, Urine: 100    Urobilinogen: 4.0    Nitrite: NEGATIVE    Leukocyte Esterase Concentration: LARGE    Red Blood Cell - Urine: 3-5    White Blood Cell - Urine: >50    White Blood Cell Casts: 2-5/LPF    Epithelial Cells: OCC    Bacteria: MANY    Coarse Granular Casts: 0-2/LPF      MRSA Infection Control Screen (PCR) (19 @ 13:46)    MRSA Infection Control Screen (PCR): NOT DETECTED     REFERENCE RANGE:  MRSA DNA NOT DETECTED      Culture - Blood (03.15.19 @ 14:03)    Culture - Blood:   NO ORGANISMS ISOLATED  NO ORGANISMS ISOLATED AT 24 HOURS    Specimen Source: BLOOD

## 2019-04-05 NOTE — PROGRESS NOTE ADULT - ASSESSMENT
89 yo M with PMHx CKD, BPH, Diverticulosis, Anemia 2/2 GI Bleed with previous PRBC transfusions, who p/w 2 days genearlized weakness, urinary retention and confusion, found to have elevated LFTs. Renal following for PHU, CKD management.    PHU on CKD 3 b/l Cr 1.4-1.6 10/2018  PHU intial thought to be pre renal, now w/worsening RFT-most likely 2/2 ATN    Cr continues to rise. Oligoanuric, decreasing urine output and markedly swollen.   Low C3 2/2 liver dz. no e/o acute GN  Urinary retention on CT w/o hydronephrosis. w/flores.   M acidosis- 2/2 PHU-better.   Electrolytes within normal limits.  Transaminitis and Bilirubinemia s/p liver Bx by IR 3/21 likely drug induced per GI  AMS most liekly 2/2 Hepatic encephalopathy not from uremia- sr NH4 level high. on lactulose -per GI  h/o HTN- bp low. been off Norvasc  BPH: c/w flomax    labs, chart reviewed  overall prognosis grave. f/u w/palliative care, ethics committee   Can d/c IVF, as its not improving renal status, and only worsening anasarca.   Pt would not be a candidate for renal replacement therapy, given liver failure, and would not change ultimate outcome.

## 2019-04-05 NOTE — PROGRESS NOTE ADULT - ASSESSMENT
A 89 yo Male with BPH, Cataracts, Diverticulosis, Anemia 2/2 GI Bleed with previous PRBC transfusions, who presents to the ER for evaluation of  generalized  weakness, urinary retention and confusion. On admission, he found to have elevated bilirubin, LFTS, transaminitis, scleral jaundice. and urinary retention. Flores catheter has placed with negative Urine analysis. The CT abdomen  and pelvis shows Distended gallbladder. Consider further evaluation with ultrasound if  acute cholecystitis is a concern. No discrete mass identified on this limited noncontrast study. The ID consult requested to assist with evaluation of Biliary sepsis.    # Encephalopathy  # Urinary retention - s/p flores catheter   # R/O biliary sepsis/ Cholangitis - Most likely acute cholestatic hepatitis as per GI -s/p  transjugular liver biopsy with two passes. and found to have small right atrium blood clot. 3/21/19  # Distended GB with cholelithiasis on MRCP 3/18/19  # Hypothermia- Pneumonia on CXR - MRSA PCR is negative   # UTI- Enterococcus faecalis 3/20/19  # Liver biopsy result - Not in the system yet  # Hepatic Encephalopathy- elevated Ammonia  # R/O Leptospirosis  - s/p Unasyn/Augmentin  ( MICHELINE for Moderate to severe Leptospirosis)  from 3/21/19 to 3/29/19 ,  which is the treatment of choice for Moderate to severe Leptospirosis. If patient has Leptospirosis, then it should have been treated,  Leptospirosis Antibodies: Negative: No IgM antibodies to Leptospira detected.    would recommend:    1. Continue lactulose and oral care  2.  Aspiration precaution   3. Continue IV Meropenem until 4/6/19, to complete the course X 1 more day  4. Supplemental oxygenation and  bronchodilator as needed  5. Monitor kidney function and LFTs, management as per Primary/GI team    d/w  Nursing staff

## 2019-04-05 NOTE — PROGRESS NOTE ADULT - SUBJECTIVE AND OBJECTIVE BOX
S: Patient denies chest pain and SOB      MEDICATIONS  (STANDING):  dextrose 5%. 1000 milliLiter(s) (50 mL/Hr) IV Continuous <Continuous>  dextrose 50% Injectable 12.5 Gram(s) IV Push once  dextrose 50% Injectable 25 Gram(s) IV Push once  dextrose 50% Injectable 25 Gram(s) IV Push once  ergocalciferol 54869 Unit(s) Oral every week  lactulose Syrup 10 Gram(s) Enteral Tube two times a day  lidocaine   Patch 1 Patch Transdermal daily  meropenem  IVPB 500 milliGRAM(s) IV Intermittent every 12 hours  metoprolol tartrate 12.5 milliGRAM(s) Oral two times a day  midodrine 2.5 milliGRAM(s) Oral <User Schedule>  rifaximin 550 milliGRAM(s) Oral two times a day  ursodiol Capsule 300 milliGRAM(s) Oral two times a day    MEDICATIONS  (PRN):  dextrose 40% Gel 15 Gram(s) Oral once PRN Blood Glucose LESS THAN 70 milliGRAM(s)/deciLiter  glucagon  Injectable 1 milliGRAM(s) IntraMuscular once PRN Glucose <70 milliGRAM(s)/deciLiter  simethicone 80 milliGRAM(s) Chew once PRN Gas      LABS:                            8.1    9.51  )-----------( 75       ( 05 Apr 2019 07:15 )             22.6     Hemoglobin: 8.1 g/dL (04-05 @ 07:15)  Hemoglobin: 8.7 g/dL (04-04 @ 02:40)  Hemoglobin: 6.7 g/dL (04-03 @ 07:50)  Hemoglobin: 8.0 g/dL (04-02 @ 11:13)  Hemoglobin: 7.6 g/dL (04-01 @ 05:30)    04-05    141  |  103  |  72<H>  ----------------------------<  118<H>  3.4<L>   |  23  |  5.11<H>    Ca    8.7      05 Apr 2019 07:15  Phos  4.3     04-05  Mg     2.4     04-05    TPro  4.8<L>  /  Alb  1.9<L>  /  TBili  21.2<H>  /  DBili  x   /  AST  192<H>  /  ALT  71<H>  /  AlkPhos  292<H>  04-05    Creatinine Trend: 5.11<--, 4.60<--, 4.25<--, 4.04<--, 3.33<--, 3.39<--   PT/INR - ( 05 Apr 2019 07:15 )   PT: 25.9 SEC;   INR: 2.22          PTT - ( 05 Apr 2019 07:15 )  PTT:63.5 SEC        PHYSICAL EXAM  Vital Signs Last 24 Hrs  T(C): 36.2 (05 Apr 2019 09:00), Max: 36.4 (04 Apr 2019 18:37)  T(F): 97.2 (05 Apr 2019 09:00), Max: 97.6 (04 Apr 2019 18:37)  HR: 56 (05 Apr 2019 09:00) (56 - 71)  BP: 114/70 (05 Apr 2019 09:00) (98/66 - 124/72)  BP(mean): --  RR: 16 (05 Apr 2019 09:00) (12 - 16)  SpO2: 100% (05 Apr 2019 09:00) (96% - 100%)      Heart: normal S1, S2, RRR, no m/r/g  Lungs: cta b/l anteriorly   Abd: soft nT  Ext: + pitting edema in LE bilaterally     TELEMETRY: SR 55-60s    RADIOLOGY:  < from: Xray Chest 1 View- PORTABLE-Urgent (03.20.19 @ 09:37) >  FINDINGS/  IMPRESSION:    Patchy opacity within the right lower lung may represent pneumonia.   Subsegmental atelectasis at the left base. No pleural effusion or   pneumothorax. No pulmonary edema.    The cardiac silhouette remains enlarged. Aortic aneurysm. Recommend   further evaluation with aortogram.    < end of copied text >    < from: Transthoracic Echocardiogram (03.23.19 @ 09:36) >  CONCLUSIONS:  1. Mitral annular calcification, otherwise normal mitral  valve. Mild mitral regurgitation.  2. Aortic valve leaflet morphology not well visualized;  appears calcified with normal opening. Moderate aortic  regurgitation.  3. Normal left ventricular internal dimensions and wall  thicknesses.  4. Hyperdynamic left ventricle.  5. Mild diastolic dysfunction (Stage I).  6. Normal right ventricular size and function.  7. Normal tricuspid valve. Mild tricuspid regurgitation.  8. Estimated pulmonary artery systolic pressure equals 36  mm Hg, assuming right atrial pressure equals 10  mm Hg,  consistent with borderline pulmonary hypertension.  *** Compared with echocardiogram of 6/11/2018,no  significant changes noted.  ------------------------------------------------------------------------  Confirmed on  3/23/2019 - 11:19:58 by Evaristo Castanon MD, [facility],  FASE, RPVI    < end of copied text >      ASSESSMENT/PLAN: 	    89 yo M, poor historian, charts reviewed, with PMHx BPH, Cataracts, Diverticulosis, Anemia 2/2 GI Bleed with previous PRBC transfusions, who p/w 2 days generalized  weakness, urinary retention and confusion. LFTs significant for transaminitis, scleral jaundice.     -TTE with moderate AI and normal LV function - Recommend medical management of valvular disease  -CT chest with 4.5cm ascending aortic aneurysm - continue metoprolol 12.5 mg PO bid with parameters  -F/u ethics and further GOC  -no further cardiac workup needed at this time

## 2019-04-05 NOTE — PROGRESS NOTE ADULT - ATTENDING COMMENTS
Hepatology Staff: Mala Case MD  I saw and examined the patient along with Dr. White on 4/5/2019 .     Overall worsening PHU.  No improvement in LFTs.  Continue supportive care.  Cont with Ursdiol.  Keep on broad spectrum IV antibiotics.   Goals of care need to be discussed.  Noted consultation from ethics team/palliative care.

## 2019-04-06 LAB
ANION GAP SERPL CALC-SCNC: 22 MMO/L — HIGH (ref 7–14)
BUN SERPL-MCNC: 80 MG/DL — HIGH (ref 7–23)
CALCIUM SERPL-MCNC: 9.1 MG/DL — SIGNIFICANT CHANGE UP (ref 8.4–10.5)
CHLORIDE SERPL-SCNC: 105 MMOL/L — SIGNIFICANT CHANGE UP (ref 98–107)
CO2 SERPL-SCNC: 13 MMOL/L — LOW (ref 22–31)
CREAT SERPL-MCNC: 5.73 MG/DL — HIGH (ref 0.5–1.3)
GLUCOSE SERPL-MCNC: 41 MG/DL — CRITICAL LOW (ref 70–99)
HCT VFR BLD CALC: 27 % — LOW (ref 39–50)
HGB BLD-MCNC: 9.7 G/DL — LOW (ref 13–17)
INR BLD: 2.73 — HIGH (ref 0.88–1.17)
MAGNESIUM SERPL-MCNC: 2.4 MG/DL — SIGNIFICANT CHANGE UP (ref 1.6–2.6)
MCHC RBC-ENTMCNC: 29.6 PG — SIGNIFICANT CHANGE UP (ref 27–34)
MCHC RBC-ENTMCNC: 35.9 % — SIGNIFICANT CHANGE UP (ref 32–36)
MCV RBC AUTO: 82.3 FL — SIGNIFICANT CHANGE UP (ref 80–100)
NRBC # FLD: 0.76 K/UL — SIGNIFICANT CHANGE UP (ref 0–0)
NRBC FLD-RTO: 8 — SIGNIFICANT CHANGE UP
PLATELET # BLD AUTO: 96 K/UL — LOW (ref 150–400)
PMV BLD: SIGNIFICANT CHANGE UP FL (ref 7–13)
POTASSIUM SERPL-MCNC: 4.3 MMOL/L — SIGNIFICANT CHANGE UP (ref 3.5–5.3)
POTASSIUM SERPL-SCNC: 4.3 MMOL/L — SIGNIFICANT CHANGE UP (ref 3.5–5.3)
PROTHROM AB SERPL-ACNC: 31.3 SEC — HIGH (ref 9.8–13.1)
RBC # BLD: 3.28 M/UL — LOW (ref 4.2–5.8)
RBC # FLD: 27.4 % — HIGH (ref 10.3–14.5)
SODIUM SERPL-SCNC: 140 MMOL/L — SIGNIFICANT CHANGE UP (ref 135–145)
WBC # BLD: 9.47 K/UL — SIGNIFICANT CHANGE UP (ref 3.8–10.5)
WBC # FLD AUTO: 9.47 K/UL — SIGNIFICANT CHANGE UP (ref 3.8–10.5)

## 2019-04-06 PROCEDURE — 74019 RADEX ABDOMEN 2 VIEWS: CPT | Mod: 26

## 2019-04-06 RX ORDER — LACTULOSE 10 G/15ML
200 SOLUTION ORAL
Qty: 0 | Refills: 0 | Status: DISCONTINUED | OUTPATIENT
Start: 2019-04-06 | End: 2019-04-06

## 2019-04-06 RX ORDER — DEXTROSE 50 % IN WATER 50 %
12.5 SYRINGE (ML) INTRAVENOUS ONCE
Qty: 0 | Refills: 0 | Status: COMPLETED | OUTPATIENT
Start: 2019-04-06 | End: 2019-04-06

## 2019-04-06 RX ORDER — SODIUM CHLORIDE 9 MG/ML
1000 INJECTION, SOLUTION INTRAVENOUS
Qty: 0 | Refills: 0 | Status: DISCONTINUED | OUTPATIENT
Start: 2019-04-06 | End: 2019-04-08

## 2019-04-06 RX ORDER — SODIUM CHLORIDE 9 MG/ML
1000 INJECTION, SOLUTION INTRAVENOUS
Qty: 0 | Refills: 0 | Status: DISCONTINUED | OUTPATIENT
Start: 2019-04-06 | End: 2019-04-06

## 2019-04-06 RX ADMIN — LIDOCAINE 1 PATCH: 4 CREAM TOPICAL at 01:53

## 2019-04-06 RX ADMIN — SODIUM CHLORIDE 50 MILLILITER(S): 9 INJECTION, SOLUTION INTRAVENOUS at 17:00

## 2019-04-06 RX ADMIN — LIDOCAINE 1 PATCH: 4 CREAM TOPICAL at 19:05

## 2019-04-06 RX ADMIN — SODIUM CHLORIDE 50 MILLILITER(S): 9 INJECTION, SOLUTION INTRAVENOUS at 15:21

## 2019-04-06 RX ADMIN — LIDOCAINE 1 PATCH: 4 CREAM TOPICAL at 15:21

## 2019-04-06 RX ADMIN — Medication 12.5 GRAM(S): at 09:45

## 2019-04-06 RX ADMIN — MEROPENEM 100 MILLIGRAM(S): 1 INJECTION INTRAVENOUS at 18:38

## 2019-04-06 RX ADMIN — Medication 12.5 GRAM(S): at 08:51

## 2019-04-06 RX ADMIN — MEROPENEM 100 MILLIGRAM(S): 1 INJECTION INTRAVENOUS at 05:49

## 2019-04-06 NOTE — PROGRESS NOTE ADULT - ASSESSMENT
89 yo M p/w confusion and generalized weakness    -> Nutrition:      - ngt pulled out again by the patient      - hcp/family interested in inpatient hospice.  F/u case management and social work.  F/u hospice/palliative.  -> D.I.C.:      - additional management appreciated per hem/onc and all consultants       - Very poor prognosis with high mortality rate      - Supportive care prn.       - Limit escalation of care 2/2 very limited life expectancy  -> ARF:      - persistent, not a candidate for HD      - adjust management per nephro. Avoid nephrotoxic rx      - midodrine renally adjusted    -> cholestatic hepatitis, acute liver failure, coagulopathy, thrombocytopenia:      - very poor prognosis      - ethics input appreciated, I'm in agreement      - all consultants recs/management appreciated        - not a candidate for liver transplantation      - c/w medical treatment      - adjust management accordingly, supportive comfort care  -> Urinary Retention:      - flores intact, strict i/o   -> Metabolic Encephalopathy:      - stable, treat underlying gi condition and dic       - fall precautions    -> Need for prophylactic measure:      - dvt/gi ppx

## 2019-04-06 NOTE — PROGRESS NOTE ADULT - SUBJECTIVE AND OBJECTIVE BOX
S: denies chest pain, sob       dextrose 40% Gel 15 Gram(s) Oral once PRN  dextrose 5%. 1000 milliLiter(s) IV Continuous <Continuous>  dextrose 50% Injectable 12.5 Gram(s) IV Push once  dextrose 50% Injectable 12.5 Gram(s) IV Push once  dextrose 50% Injectable 25 Gram(s) IV Push once  dextrose 50% Injectable 25 Gram(s) IV Push once  ergocalciferol 22473 Unit(s) Oral every week  glucagon  Injectable 1 milliGRAM(s) IntraMuscular once PRN  lactulose Syrup 10 Gram(s) Enteral Tube two times a day  lidocaine   Patch 1 Patch Transdermal daily  meropenem  IVPB 500 milliGRAM(s) IV Intermittent every 12 hours  metoprolol tartrate 12.5 milliGRAM(s) Oral two times a day  midodrine 2.5 milliGRAM(s) Oral <User Schedule>  rifaximin 550 milliGRAM(s) Oral two times a day  simethicone 80 milliGRAM(s) Chew once PRN  sodium chloride 0.9%. 1000 milliLiter(s) IV Continuous <Continuous>  ursodiol Capsule 300 milliGRAM(s) Oral two times a day                        9.7    9.47  )-----------( 96       ( 06 Apr 2019 06:30 )             27.0     Hemoglobin: 9.7 g/dL (04-06 @ 06:30)  Hemoglobin: 8.1 g/dL (04-05 @ 07:15)  Hemoglobin: 8.7 g/dL (04-04 @ 02:40)  Hemoglobin: 6.7 g/dL (04-03 @ 07:50)  Hemoglobin: 8.0 g/dL (04-02 @ 11:13)    04-05    141  |  103  |  72<H>  ----------------------------<  118<H>  3.4<L>   |  23  |  5.11<H>    Ca    8.7      05 Apr 2019 07:15  Phos  4.3     04-05  Mg     2.4     04-05    TPro  4.8<L>  /  Alb  1.9<L>  /  TBili  21.2<H>  /  DBili  x   /  AST  192<H>  /  ALT  71<H>  /  AlkPhos  292<H>  04-05    Creatinine Trend: 5.11<--, 4.60<--, 4.25<--, 4.04<--, 3.33<--, 3.39<--    COAGS: PT/INR - ( 06 Apr 2019 06:30 )   PT: 31.3 SEC;   INR: 2.73       T(C): 36.3 (04-06-19 @ 09:01), Max: 36.6 (04-05-19 @ 13:38)  HR: 55 (04-06-19 @ 09:01) (53 - 59)  BP: 90/50 (04-06-19 @ 09:01) (90/50 - 114/70)  RR: 16 (04-06-19 @ 09:01) (16 - 16)  SpO2: 97% (04-06-19 @ 09:01) (96% - 98%)  Wt(kg): -- 53.5    I&O's Summary    05 Apr 2019 07:01  -  06 Apr 2019 07:00  --------------------------------------------------------  IN: 0 mL / OUT: 200 mL / NET: -200 mL      HEENT:  (-)icterus (-)pallor  CV: Normal,  S1 S2 1/6 DEBBIE (+)2 Pulses B/l  Resp:  Clear to auscultation B/L, normal effort  GI: (+) BS Soft, NT, ND  Lymph:  (+) Pitting Edema B/L  Skin: Warm to touch      TELEMETRY: SB 56    RADIOLOGY:  < from: Xray Chest 1 View- PORTABLE-Urgent (03.20.19 @ 09:37) >  FINDINGS/  IMPRESSION:    Patchy opacity within the right lower lung may represent pneumonia.   Subsegmental atelectasis at the left base. No pleural effusion or   pneumothorax. No pulmonary edema.    The cardiac silhouette remains enlarged. Aortic aneurysm. Recommend   further evaluation with aortogram.    < end of copied text >    < from: Transthoracic Echocardiogram (03.23.19 @ 09:36) >  CONCLUSIONS:  1. Mitral annular calcification, otherwise normal mitral  valve. Mild mitral regurgitation.  2. Aortic valve leaflet morphology not well visualized;  appears calcified with normal opening. Moderate aortic  regurgitation.  3. Normal left ventricular internal dimensions and wall  thicknesses.  4. Hyperdynamic left ventricle.  5. Mild diastolic dysfunction (Stage I).  6. Normal right ventricular size and function.  7. Normal tricuspid valve. Mild tricuspid regurgitation.  8. Estimated pulmonary artery systolic pressure equals 36  mm Hg, assuming right atrial pressure equals 10  mm Hg,  consistent with borderline pulmonary hypertension.  *** Compared with echocardiogram of 6/11/2018,no  significant changes noted.  ------------------------------------------------------------------------  Confirmed on  3/23/2019 - 11:19:58 by Evaristo Castanon MD, FACC,  FASE, RPVI    < end of copied text >      ASSESSMENT/PLAN: 	    89 yo M, poor historian, charts reviewed, with PMHx BPH, Cataracts, Diverticulosis, Anemia 2/2 GI Bleed with previous PRBC transfusions, who p/w 2 days generalized  weakness, urinary retention and confusion. LFTs significant for transaminitis, scleral jaundice.     -TTE with moderate AI and normal LV function , medical management of valvular disease recommended   -CT chest with 4.5cm ascending aortic aneurysm, medical management   -continue metoprolol 12.5 mg PO bid with parameters  -nephrology note appreciated  -hepatology note appreciated, f/u ethics, palliative, family considering possible inpatient hospice  -no further cardiac workup needed at this time

## 2019-04-06 NOTE — PROGRESS NOTE ADULT - ATTENDING COMMENTS
Agree with above.   beta blockers for thoracic aneurysm as bp tolerated  no further inpatient cardiac workup needed at this time.     Rashmi Morrison MD

## 2019-04-06 NOTE — PROGRESS NOTE ADULT - ASSESSMENT
89 yo M with PMHx CKD, BPH, Diverticulosis, Anemia 2/2 GI Bleed with previous PRBC transfusions, who p/w 2 days genearlized weakness, urinary retention and confusion, found to have elevated LFTs. Renal following for PHU, CKD management.    PHU on CKD 3 b/l Cr 1.4-1.6 10/2018  PHU intial thought to be pre renal, now w/worsening RFT-most likely 2/2 ATN    Cr continues to rise. Oligoanuric, decreasing urine output and markedly swollen. K ok  Low C3 2/2 liver dz. no e/o acute GN  Urinary retention on CT w/o hydronephrosis. w/flores.   M acidosis- 2/2 PHU-worse.   Transaminitis and Bilirubinemia s/p liver Bx by IR 3/21 likely drug induced per GI  AMS most liekly 2/2 Hepatic encephalopathy not from uremia- sr NH4 level high. on lactulose -per GI  h/o HTN- bp low. been off Norvasc      labs, chart reviewed  overall prognosis grave. f/u w/palliative care, ethics committee   IVF -NS changed to d5w, add 1/2NS w/75 meq Na bicarb in IVF, for maintainance  d/c BB as bp low. no po meds now as NGT out  Pt would not be a candidate for renal replacement therapy, given liver failure, and would not change ultimate outcome.   will f/u

## 2019-04-06 NOTE — PROVIDER CONTACT NOTE (OTHER) - BACKGROUND
89yM admitted with UTI and AMS. Metabolic encephalopathy, PNA. NPO with Tube Feed -> S/p NGT, as patient removed tube. Palliative following

## 2019-04-06 NOTE — PROGRESS NOTE ADULT - SUBJECTIVE AND OBJECTIVE BOX
Pt is seen and examined  pt is awake and lying in bed   Answers some questions  reports no pain  no complaints      PAST MEDICAL & SURGICAL HISTORY:  Chronic kidney disease (CKD), stage IV (severe)  Benign prostatic hypertrophy  S/P cataract surgery, left: 3 yrs ago      ROS:  Negative except for:    MEDICATIONS  (STANDING):  dextrose 5%. 1000 milliLiter(s) (50 mL/Hr) IV Continuous <Continuous>  dextrose 50% Injectable 12.5 Gram(s) IV Push once  dextrose 50% Injectable 12.5 Gram(s) IV Push once  dextrose 50% Injectable 25 Gram(s) IV Push once  dextrose 50% Injectable 25 Gram(s) IV Push once  ergocalciferol 47335 Unit(s) Oral every week  lactulose Syrup 10 Gram(s) Enteral Tube two times a day  lidocaine   Patch 1 Patch Transdermal daily  meropenem  IVPB 500 milliGRAM(s) IV Intermittent every 12 hours  metoprolol tartrate 12.5 milliGRAM(s) Oral two times a day  midodrine 2.5 milliGRAM(s) Oral <User Schedule>  rifaximin 550 milliGRAM(s) Oral two times a day  sodium chloride 0.9%. 1000 milliLiter(s) (75 mL/Hr) IV Continuous <Continuous>  ursodiol Capsule 300 milliGRAM(s) Oral two times a day    MEDICATIONS  (PRN):  dextrose 40% Gel 15 Gram(s) Oral once PRN Blood Glucose LESS THAN 70 milliGRAM(s)/deciLiter  glucagon  Injectable 1 milliGRAM(s) IntraMuscular once PRN Glucose <70 milliGRAM(s)/deciLiter  simethicone 80 milliGRAM(s) Chew once PRN Gas      Allergies    No Known Allergies    Intolerances        Vital Signs Last 24 Hrs  T(C): 36.3 (06 Apr 2019 09:01), Max: 36.6 (05 Apr 2019 13:38)  T(F): 97.4 (06 Apr 2019 09:01), Max: 97.9 (05 Apr 2019 13:38)  HR: 55 (06 Apr 2019 09:01) (53 - 59)  BP: 90/50 (06 Apr 2019 09:01) (90/50 - 114/70)  BP(mean): --  RR: 16 (06 Apr 2019 09:01) (16 - 16)  SpO2: 97% (06 Apr 2019 09:01) (96% - 98%)    PHYSICAL EXAM    Thin, Cachectic Male  + jaundice  Chest clear Bilat  CVS S1,S2+ RRR  Abd Soft, NT/ND, + BS  Ext No edema      LABS:                          9.7    9.47  )-----------( 96       ( 06 Apr 2019 06:30 )             27.0     Serial CBC's  04-06 @ 06:30  Hct-27.0 / Hgb-9.7 / Plat-96 / RBC-3.28 / WBC-9.47          Serial CBC's  04-05 @ 07:15  Hct-22.6 / Hgb-8.1 / Plat-75 / RBC-2.79 / WBC-9.51            04-06    140  |  105  |  80<H>  ----------------------------<  41<LL>  4.3   |  13<L>  |  5.73<H>    Ca    9.1      06 Apr 2019 06:30  Phos  4.3     04-05  Mg     2.4     04-06    TPro  4.8<L>  /  Alb  1.9<L>  /  TBili  21.2<H>  /  DBili  x   /  AST  192<H>  /  ALT  71<H>  /  AlkPhos  292<H>  04-05      PT/INR - ( 06 Apr 2019 06:30 )   PT: 31.3 SEC;   INR: 2.73          PTT - ( 05 Apr 2019 07:15 )  PTT:63.5 SEC    WBC Count: 9.47 K/uL (04-06 @ 06:30)  Hemoglobin: 9.7 g/dL (04-06 @ 06:30)            RADIOLOGY & ADDITIONAL STUDIES:

## 2019-04-06 NOTE — PROVIDER CONTACT NOTE (OTHER) - RECOMMENDATIONS
Sent ABG, ammonia & blood cultures
Initiate hypoglycemic protocol. 12.5g D50 Injectable given. Recheck FS in 15 minutes. Continue to monitor.
continue to monitor
recommended to come see patient
recommended to come see patient, will continue to monitor
vilma ABRAHAM

## 2019-04-06 NOTE — PROGRESS NOTE ADULT - SUBJECTIVE AND OBJECTIVE BOX
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  Upon arrival to the ED, pt with scleral icterus and labs significant for transaminitis and TBili > 20 (15 Mar 2019 06:59)      PAST MEDICAL & SURGICAL HISTORY:  Chronic kidney disease (CKD), stage IV (severe)  Benign prostatic hypertrophy  S/P cataract surgery, left: 3 yrs ago      No Known Allergies       MEDICATIONS  (STANDING):  dextrose 5%. 1000 milliLiter(s) (50 mL/Hr) IV Continuous <Continuous>  dextrose 50% Injectable 12.5 Gram(s) IV Push once  dextrose 50% Injectable 12.5 Gram(s) IV Push once  dextrose 50% Injectable 25 Gram(s) IV Push once  dextrose 50% Injectable 25 Gram(s) IV Push once  ergocalciferol 60199 Unit(s) Oral every week  lactulose Syrup 10 Gram(s) Enteral Tube two times a day  lidocaine   Patch 1 Patch Transdermal daily  meropenem  IVPB 500 milliGRAM(s) IV Intermittent every 12 hours  metoprolol tartrate 12.5 milliGRAM(s) Oral two times a day  midodrine 2.5 milliGRAM(s) Oral <User Schedule>  rifaximin 550 milliGRAM(s) Oral two times a day  sodium chloride 0.9%. 1000 milliLiter(s) (75 mL/Hr) IV Continuous <Continuous>  ursodiol Capsule 300 milliGRAM(s) Oral two times a day    MEDICATIONS  (PRN):  dextrose 40% Gel 15 Gram(s) Oral once PRN Blood Glucose LESS THAN 70 milliGRAM(s)/deciLiter  glucagon  Injectable 1 milliGRAM(s) IntraMuscular once PRN Glucose <70 milliGRAM(s)/deciLiter  simethicone 80 milliGRAM(s) Chew once PRN Gas      REVIEW OF SYSTEMS: limited from pt 2/2 mental status	    T(C): 36.3 (04-06-19 @ 09:01), Max: 36.6 (04-05-19 @ 13:38)  HR: 55 (04-06-19 @ 09:01) (53 - 59)  BP: 90/50 (04-06-19 @ 09:01) (90/50 - 114/70)  RR: 16 (04-06-19 @ 09:01) (16 - 16)  SpO2: 97% (04-06-19 @ 09:01) (96% - 98%)    PHYSICAL EXAMINATION:   Constitutional: ill appearing  HEENT: bitemp wsating, poor dentition. AT  Neck:  Supple  Respiratory:  Adequate airflow b/l. Not using accessory muscles of respiration.  Cardiovascular:  S1 & S2 intact, no R/G, 2+ radial pulses b/l  Gastrointestinal: Soft, normoactive b.s.  Extremities: warm  Neurological: increasing lethargy and confusion, arousable to noxious stimuli     Labs and imaging reviewed    LABS:                        9.7    9.47  )-----------( 96       ( 06 Apr 2019 06:30 )             27.0     04-06    140  |  105  |  80<H>  ----------------------------<  x   4.3   |  13<L>  |  5.73<H>    Ca    9.1      06 Apr 2019 06:30  Phos  4.3     04-05  Mg     2.4     04-06    TPro  4.8<L>  /  Alb  1.9<L>  /  TBili  21.2<H>  /  DBili  x   /  AST  192<H>  /  ALT  71<H>  /  AlkPhos  292<H>  04-05        PT/INR - ( 06 Apr 2019 06:30 )   PT: 31.3 SEC;   INR: 2.73          PTT - ( 05 Apr 2019 07:15 )  PTT:63.5 SEC    CAPILLARY BLOOD GLUCOSE      POCT Blood Glucose.: 96 mg/dL (06 Apr 2019 09:10)  POCT Blood Glucose.: 50 mg/dL (06 Apr 2019 08:45)  POCT Blood Glucose.: 53 mg/dL (06 Apr 2019 08:43)  POCT Blood Glucose.: 74 mg/dL (05 Apr 2019 17:58)  POCT Blood Glucose.: 104 mg/dL (05 Apr 2019 13:20)        LIVER FUNCTIONS - ( 05 Apr 2019 07:15 )  Alb: 1.9 g/dL / Pro: 4.8 g/dL / ALK PHOS: 292 u/L / ALT: 71 u/L / AST: 192 u/L / GGT: x               RADIOLOGY & ADDITIONAL STUDIES:

## 2019-04-06 NOTE — PROGRESS NOTE ADULT - SUBJECTIVE AND OBJECTIVE BOX
PASCUAL OLSEN:[id number],   89yMale followed for:  No Known Allergies    PAST MEDICAL & SURGICAL HISTORY:  Chronic kidney disease (CKD), stage IV (severe)  Benign prostatic hypertrophy  S/P cataract surgery, left: 3 yrs ago    FAMILY HISTORY:  No pertinent family history in first degree relatives    MEDICATIONS  (STANDING):  dextrose 5%. 1000 milliLiter(s) (50 mL/Hr) IV Continuous <Continuous>  dextrose 50% Injectable 12.5 Gram(s) IV Push once  dextrose 50% Injectable 12.5 Gram(s) IV Push once  dextrose 50% Injectable 25 Gram(s) IV Push once  dextrose 50% Injectable 25 Gram(s) IV Push once  ergocalciferol 88853 Unit(s) Oral every week  lactulose Syrup 10 Gram(s) Enteral Tube two times a day  lidocaine   Patch 1 Patch Transdermal daily  meropenem  IVPB 500 milliGRAM(s) IV Intermittent every 12 hours  metoprolol tartrate 12.5 milliGRAM(s) Oral two times a day  midodrine 2.5 milliGRAM(s) Oral <User Schedule>  rifaximin 550 milliGRAM(s) Oral two times a day  sodium chloride 0.9%. 1000 milliLiter(s) (75 mL/Hr) IV Continuous <Continuous>  ursodiol Capsule 300 milliGRAM(s) Oral two times a day    MEDICATIONS  (PRN):  dextrose 40% Gel 15 Gram(s) Oral once PRN Blood Glucose LESS THAN 70 milliGRAM(s)/deciLiter  glucagon  Injectable 1 milliGRAM(s) IntraMuscular once PRN Glucose <70 milliGRAM(s)/deciLiter  simethicone 80 milliGRAM(s) Chew once PRN Gas      Vital Signs Last 24 Hrs  T(C): 36.3 (06 Apr 2019 09:01), Max: 36.6 (05 Apr 2019 13:38)  T(F): 97.4 (06 Apr 2019 09:01), Max: 97.9 (05 Apr 2019 13:38)  HR: 55 (06 Apr 2019 09:01) (53 - 59)  BP: 90/50 (06 Apr 2019 09:01) (90/50 - 114/70)  BP(mean): --  RR: 16 (06 Apr 2019 09:01) (16 - 16)  SpO2: 97% (06 Apr 2019 09:01) (96% - 98%)  nc/at  s1s2  cta  soft, nt, nd no guarding or rebound  no c/c/e    CBC Full  -  ( 06 Apr 2019 06:30 )  WBC Count : 9.47 K/uL  RBC Count : 3.28 M/uL  Hemoglobin : 9.7 g/dL  Hematocrit : 27.0 %  Platelet Count - Automated : 96 K/uL  Mean Cell Volume : 82.3 fL  Mean Cell Hemoglobin : 29.6 pg  Mean Cell Hemoglobin Concentration : 35.9 %  Auto Neutrophil # : x  Auto Lymphocyte # : x  Auto Monocyte # : x  Auto Eosinophil # : x  Auto Basophil # : x  Auto Neutrophil % : x  Auto Lymphocyte % : x  Auto Monocyte % : x  Auto Eosinophil % : x  Auto Basophil % : x    04-06    140  |  105  |  80<H>  ----------------------------<  41<LL>  4.3   |  13<L>  |  5.73<H>    Ca    9.1      06 Apr 2019 06:30  Phos  4.3     04-05  Mg     2.4     04-06    TPro  4.8<L>  /  Alb  1.9<L>  /  TBili  21.2<H>  /  DBili  x   /  AST  192<H>  /  ALT  71<H>  /  AlkPhos  292<H>  04-05    PT/INR - ( 06 Apr 2019 06:30 )   PT: 31.3 SEC;   INR: 2.73          PTT - ( 05 Apr 2019 07:15 )  PTT:63.5 SEC

## 2019-04-06 NOTE — PROGRESS NOTE ADULT - ASSESSMENT
1. Hyperbilirubinemia    -- Bili at 23  -- s/p TJ Liver Bx on 3/21.   -- GI following - acute cholestatic hepatitis of unclear etiology. ? Drug related ? Autoimmune   -- MRI/MRCP distended GB w Cholelithiasis. No biliary ductal dilatation  -- no evidence of malignancy on CT A/P  -- tumor markers unremarkable   -- not a transplant candidate    2. Oliguric PHU    -- management per renal      3. 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 Anemia    -- Hgb stable s/p PRBCs on 4/3  -- Ferritin elevated   -- hx of GI Bleed sec to divertic, monitor for bleeding  -- monitor for now  -- Transfuse PRN Hgb < 7, give 1U PRBC today       5. Thrombocytopenia    -- platelet count stable and adequate. Low Fibrinogen Likely DIC  -- sec to acute liver injury  --  no bleeding       Prognosis poor. GOC discussion ongoing    Carlos Gloria MD  [phone]

## 2019-04-06 NOTE — PROVIDER CONTACT NOTE (OTHER) - ASSESSMENT
PT lethargic, A&Ox1- oriented to name and . Lethargic- BP 90/50 auscultated with stethoscope, HR 55, Axillary temp 97.4, O2 96% room air.

## 2019-04-06 NOTE — PROGRESS NOTE ADULT - ASSESSMENT
A 89 yo Male with BPH, Cataracts, Diverticulosis, Anemia 2/2 GI Bleed with previous PRBC transfusions, who presents to the ER for evaluation of  generalized  weakness, urinary retention and confusion. On admission, he found to have elevated bilirubin, LFTS, transaminitis, scleral jaundice. and urinary retention. Flores catheter has placed with negative Urine analysis. The CT abdomen  and pelvis shows Distended gallbladder. Consider further evaluation with ultrasound if  acute cholecystitis is a concern. No discrete mass identified on this limited noncontrast study. The ID consult requested to assist with evaluation of Biliary sepsis.    # Encephalopathy  # Urinary retention - s/p flores catheter   # R/O biliary sepsis/ Cholangitis - Most likely acute cholestatic hepatitis as per GI -s/p  transjugular liver biopsy with two passes. and found to have small right atrium blood clot. 3/21/19  # Distended GB with cholelithiasis on MRCP 3/18/19  # Hypothermia- Pneumonia on CXR - MRSA PCR is negative   # UTI- Enterococcus faecalis 3/20/19  # Liver biopsy result - Not in the system yet  # Hepatic Encephalopathy- elevated Ammonia  # R/O Leptospirosis  - s/p Unasyn/Augmentin  ( MICHELINE for Moderate to severe Leptospirosis)  from 3/21/19 to 3/29/19 ,  which is the treatment of choice for Moderate to severe Leptospirosis. If patient has Leptospirosis, then it should have been treated,  Leptospirosis Antibodies: Negative: No IgM antibodies to Leptospira detected.    would recommend:    1. Continue lactulose and oral care  2.  Aspiration precaution   3. Continue IV Meropenem until 4/6/19, to complete the course X 1 more day  4. Supplemental oxygenation and  bronchodilator as needed  5. Monitor kidney function and LFTs, management as per Primary/GI team    d/w  Nursing staff A 89 yo Male with BPH, Cataracts, Diverticulosis, Anemia 2/2 GI Bleed with previous PRBC transfusions, who presents to the ER for evaluation of  generalized  weakness, urinary retention and confusion. On admission, he found to have elevated bilirubin, LFTS, transaminitis, scleral jaundice. and urinary retention. Flores catheter has placed with negative Urine analysis. The CT abdomen  and pelvis shows Distended gallbladder. Consider further evaluation with ultrasound if  acute cholecystitis is a concern. No discrete mass identified on this limited noncontrast study. The ID consult requested to assist with evaluation of Biliary sepsis.    # Encephalopathy  # Urinary retention - s/p flores catheter   # R/O biliary sepsis/ Cholangitis - Most likely acute cholestatic hepatitis as per GI -s/p  transjugular liver biopsy with two passes. and found to have small right atrium blood clot. 3/21/19  # Distended GB with cholelithiasis on MRCP 3/18/19  # Hypothermia- Pneumonia on CXR - MRSA PCR is negative   # UTI- Enterococcus faecalis 3/20/19  # Liver biopsy result - Not in the system yet  # Hepatic Encephalopathy- elevated Ammonia  # R/O Leptospirosis  - s/p Unasyn/Augmentin  ( MICHELINE for Moderate to severe Leptospirosis)  from 3/21/19 to 3/29/19 ,  which is the treatment of choice for Moderate to severe Leptospirosis. If patient has Leptospirosis, then it should have been treated,  Leptospirosis Antibodies: Negative: No IgM antibodies to Leptospira detected.    would recommend:    1. Consider lactulose rectally  if possible since patient pulled the NGT out  2.  Aspiration precaution   3. Discontinue IV Meropenem after today's doses  4. Supplemental oxygenation and  bronchodilator as needed  5. Monitor kidney function and LFTs, management as per Primary/GI team    d/w  Nursing staff

## 2019-04-06 NOTE — PROGRESS NOTE ADULT - SUBJECTIVE AND OBJECTIVE BOX
Patient is seen and examined at the bed side, is normothermic.  He is little more alert today, The Ammonia level is trending down. The Leukocytosis trended down to normal.         REVIEW OF SYSTEMS: All other review systems are negative        ALLERGIES: No Known Allergies      Vital Signs Last 24 Hrs  T(C): 36.3 (2019 17:01), Max: 36.4 (2019 13:01)  T(F): 97.4 (2019 17:), Max: 97.5 (2019 13:01)  HR: 53 (2019 17:) (53 - 59)  BP: 105/59 (2019 17:) (90/50 - 114/70)  BP(mean): --  RR: 16 (2019 17:) (16 - 16)  SpO2: 96% (2019 17:) (96% - 98%)        PHYSICAL EXAM:  GENERAL: Lethargic but little more alert  HEENT: NGT in placed  CHEST/LUNG:  Air entry bilaterally  HEART: s1 and s2 present  ABDOMEN:  Nontender and  Non distended  : Oshea catheter in placed  EXTREMITIES: B/L pedal  edema  CNS: Lethargic but little more alert          LABS:                        9.7    9.47  )-----------( 96       ( 2019 06:30 )             27.0                           8.1    9.51  )-----------( 75       ( 2019 07:15 )             22.6                           8.7    11.55 )-----------( 85       ( 2019 02:40 )             25.1                           04-06    140  |  105  |  80<H>  ----------------------------<  41<LL>  4.3   |  13<L>  |  5.73<H>    Ca    9.1      2019 06:30  Phos  4.3     -  Mg     2.4     -    TPro  4.8<L>  /  Alb  1.9<L>  /  TBili  21.2<H>  /  DBili  x   /  AST  192<H>  /  ALT  71<H>  /  AlkPhos  292<H>  -    141  |  103  |  72<H>  ----------------------------<  118<H>  3.4<L>   |  23  |  5.11<H>    Ca    8.7      2019 07:15  Phos  4.3       Mg     2.4         TPro  4.8<L>  /  Alb  1.9<L>  /  TBili  21.2<H>  /  DBili  x   /  AST  192<H>  /  ALT  71<H>  /  AlkPhos  292<H>            DRUG LEVEL:     Ammonia, Serum (19 @ 07:45)    Ammonia, Serum: 51: Ammonia levels will be falsely elevated if specimen is NOT  collected, processed and maintained at 4-8 C. umol/L        Ammonia, Serum (19 @ 07:07)    Ammonia, Serum: 66: Ammonia levels will be falsely elevated if specimen is NOT  collected, processed and maintained at 4-8 C. umol/L        Ammonia, Serum (19 @ 09:45)    Ammonia, Serum: 76: Ammonia levels will be falsely elevated if specimen is NOT  collected, processed and maintained at 4-8 C. umol/L        Ammonia, Serum (19 @ 17:30)    Ammonia, Serum: 130: Ammonia levels will be falsely elevated if specimen is NOT  collected, processed and maintained at 4-8 C. umol/L        Leptospirosis Antibodies (19 @ 07:30)    Leptospirosis Antibodies: Negative: No IgM antibodies to Leptospira detected.  Since antibodies  may not be present or may be present at undetectable levels  during early disease, repeat testing of a convalescent  sample collected in 2-3 weeks is recommended.  Test Performed by:  [facility]  [address] [place] [address]          MEDICATIONS  (STANDING):  dextrose 5%. 1000 milliLiter(s) (50 mL/Hr) IV Continuous <Continuous>  dextrose 50% Injectable 12.5 Gram(s) IV Push once  dextrose 50% Injectable 25 Gram(s) IV Push once  dextrose 50% Injectable 25 Gram(s) IV Push once  ergocalciferol 30538 Unit(s) Oral every week  lactulose Syrup 10 Gram(s) Enteral Tube two times a day  lidocaine   Patch 1 Patch Transdermal daily  meropenem  IVPB 500 milliGRAM(s) IV Intermittent every 12 hours  metoprolol tartrate 12.5 milliGRAM(s) Oral two times a day  midodrine 2.5 milliGRAM(s) Oral <User Schedule>  rifaximin 550 milliGRAM(s) Oral two times a day  ursodiol Capsule 300 milliGRAM(s) Oral two times a day    MEDICATIONS  (PRN):  dextrose 40% Gel 15 Gram(s) Oral once PRN Blood Glucose LESS THAN 70 milliGRAM(s)/deciLiter  glucagon  Injectable 1 milliGRAM(s) IntraMuscular once PRN Glucose <70 milliGRAM(s)/deciLiter  simethicone 80 milliGRAM(s) Chew once PRN Gas        RADIOLOGY & ADDITIONAL TESTS:      3/24/19 : CT Chest No Cont (19 @ 18:32) : 4.4 cm enlarged ascending thoracic aorta. Small bilateral pleural effusions.      3/20/19 : Xray Chest 1 View- PORTABLE-Urgent (19 @ 09:37) Patchy opacity within the right lower lung may represent pneumonia. Subsegmental atelectasis at the left base. No pleural effusion or  pneumothorax. No pulmonary edema. The cardiac silhouette remains enlarged. Aortic aneurysm. Recommend  further evaluation with aortogram.      3/18/19 : MR MRCP No Cont (19 @ 14:22) Gallbladder is distended with cholelithiasis.  No choledocholithiasis or biliary ductal dilatation.      3/17/19 : NM Hepatobiliary Imaging (19 @ 12:21) Abnormal hepatobiliary scan suspicious for common bile duct obstruction. Hepatocellular dysfunction. No evidence of acute cholecystitis.      3/18/19 : US Abdomen Limited (19 @ 07:48) Gallbladder is distended with sludge and stones.  Mural thickening of the wall of the common bile duct, possibly  cholangitis. No biliary ductal dilatation.      3/15/19 : CT Abdomen and Pelvis No Cont (03.15.19 @ 09:39) Distended gallbladder. Consider further evaluation with ultrasound if  acute cholecystitis is a concern. No discrete mass identified on this limited noncontrast study.          MICROBIOLOGY DATA:      Culture - Blood (19 @ 11:11)    Culture - Blood:   NO ORGANISMS ISOLATED  NO ORGANISMS ISOLATED AT 24 HOURS    Specimen Source: BLOOD PERIPHERAL        Culture - Fungal, Blood (19 @ 08:41)    Culture - Fungal, Blood:   CULTURE NEGATIVE FOR YEASTS AND MOLDS-PRELIMINARY RESULT  AFTER 24 HOURS INCUBATION    Specimen Source: BLOOD        Cytomegalovirus By PCR (19 @ 05:34)    Cytomegalovirus By PCR: 165 IU/mL    CMVPCR Lo.22: Assay lower limit of detection (LOD):  31.2 IU/mL (1.49 log10/mL)  Assay dynamic range:  50 to 156,000,000 (156M) CMV DNA IU/mL (1.70 log10/mL –  8.19 log10/mL)  Plasma CMV DNA Quantification by PCR using Abbott m2000:  The results of this test should be interpreted with  consideration of all clinical and laboratory findings. In  particular, caution should be used when interpreting low  level positive results when the test is used for  diagnostic purposes. Repeat testing on an additional  sample is recommended to confirm low level positive  results. A result of "Not Detected" does not preclude the  possibility of infection with CMV.  CMV DNA may be present  below the detection limit of assay. LogIU/mL        Culture - Urine (19 @ 15:21)    -  Vancomycin: S 2 CLOVIS    Culture - Urine:   ENTF^Enterococcus faecalis  COLONY COUNT: > = 100,000 CFU/ML    Culture - Urine:   Susceptibility not performed.    -  Tetra/Doxy: R >8 CLOVIS    -  Nitrofurantoin: S <=32 CLOVIS    -  Ampicillin: S <=2 CLOVIS    -  Ciprofloxacin: S <=1 CLOVIS    Specimen Source: URINE CATHETER    Organism Identification: Enterococcus faecalis  Staphylococcus sp.,coag neg    Organism: Enterococcus faecalis  COLONY COUNT: > = 100,000 CFU/ML    Organism: Staphylococcus sp.,coag neg  COLONY COUNT: > = 100,000 CFU/ML    Method Type: POSITIVE CLOVIS 29      Urinalysis (19 @ 13:25)    Color: ELSA    Urine Appearance: TURBID    Glucose: 100    Bilirubin: LARGE    Ketone - Urine: NEGATIVE    Specific Gravity: 1.020    Blood: MODERATE    pH - Urine: 6.0    Protein, Urine: 100    Urobilinogen: 4.0    Nitrite: NEGATIVE    Leukocyte Esterase Concentration: LARGE    Red Blood Cell - Urine: 3-5    White Blood Cell - Urine: >50    White Blood Cell Casts: 2-5/LPF    Epithelial Cells: OCC    Bacteria: MANY    Coarse Granular Casts: 0-2/LPF      MRSA Infection Control Screen (PCR) (19 @ 13:46)    MRSA Infection Control Screen (PCR): NOT DETECTED     REFERENCE RANGE:  MRSA DNA NOT DETECTED      Culture - Blood (03.15.19 @ 14:03)    Culture - Blood:   NO ORGANISMS ISOLATED  NO ORGANISMS ISOLATED AT 24 HOURS    Specimen Source: BLOOD Patient is seen and examined at the bed side, is normothermic.  He is alert and pulled his NGT  out again snd Family wants  swallow evaluation. The WBC count stay normal.         REVIEW OF SYSTEMS: All other review systems are negative        ALLERGIES: No Known Allergies      Vital Signs Last 24 Hrs  T(C): 36.3 (2019 17:01), Max: 36.4 (2019 13:01)  T(F): 97.4 (2019 17:), Max: 97.5 (2019 13:01)  HR: 53 (2019 17:) (53 - 59)  BP: 105/59 (2019 17:) (90/50 - 114/70)  BP(mean): --  RR: 16 (2019 17:) (16 - 16)  SpO2: 96% (2019 17:) (96% - 98%)        PHYSICAL EXAM:  GENERAL: Lethargic but little more alert  HEENT: NGT pulled out by patient   CHEST/LUNG:  Air entry bilaterally  HEART: s1 and s2 present  ABDOMEN:  Nontender and  Non distended  : Oshea catheter in placed  EXTREMITIES: B/L pedal  edema  CNS: Lethargic but little more alert          LABS:                        9.7    9.47  )-----------( 96       ( 2019 06:30 )             27.0                           8.1    9.51  )-----------( 75       ( 2019 07:15 )             22.6                           8.7    11.55 )-----------( 85       ( 2019 02:40 )             25.1                           04-06    140  |  105  |  80<H>  ----------------------------<  41<LL>  4.3   |  13<L>  |  5.73<H>    Ca    9.1      2019 06:30  Phos  4.3     -  Mg     2.4         TPro  4.8<L>  /  Alb  1.9<L>  /  TBili  21.2<H>  /  DBili  x   /  AST  192<H>  /  ALT  71<H>  /  AlkPhos  292<H>  -    141  |  103  |  72<H>  ----------------------------<  118<H>  3.4<L>   |  23  |  5.11<H>    Ca    8.7      2019 07:15  Phos  4.3     04-  Mg     2.4     -    TPro  4.8<L>  /  Alb  1.9<L>  /  TBili  21.2<H>  /  DBili  x   /  AST  192<H>  /  ALT  71<H>  /  AlkPhos  292<H>  -          DRUG LEVEL:     Ammonia, Serum (19 @ 07:45)    Ammonia, Serum: 51: Ammonia levels will be falsely elevated if specimen is NOT  collected, processed and maintained at 4-8 C. umol/L      Ammonia, Serum (19 @ 07:07)    Ammonia, Serum: 66: Ammonia levels will be falsely elevated if specimen is NOT  collected, processed and maintained at 4-8 C. umol/L        Ammonia, Serum (19 @ 09:45)    Ammonia, Serum: 76: Ammonia levels will be falsely elevated if specimen is NOT  collected, processed and maintained at 4-8 C. umol/L        Ammonia, Serum (19 @ 17:30)    Ammonia, Serum: 130: Ammonia levels will be falsely elevated if specimen is NOT  collected, processed and maintained at 4-8 C. umol/L        Leptospirosis Antibodies (19 @ 07:30)    Leptospirosis Antibodies: Negative: No IgM antibodies to Leptospira detected.  Since antibodies  may not be present or may be present at undetectable levels  during early disease, repeat testing of a convalescent  sample collected in 2-3 weeks is recommended.  Test Performed by:  [facility]  [address] [place] [address]          MEDICATIONS  (STANDING):  dextrose 5%. 1000 milliLiter(s) (50 mL/Hr) IV Continuous <Continuous>  dextrose 50% Injectable 12.5 Gram(s) IV Push once  dextrose 50% Injectable 25 Gram(s) IV Push once  dextrose 50% Injectable 25 Gram(s) IV Push once  ergocalciferol 17973 Unit(s) Oral every week  lactulose Syrup 10 Gram(s) Enteral Tube two times a day  lidocaine   Patch 1 Patch Transdermal daily  meropenem  IVPB 500 milliGRAM(s) IV Intermittent every 12 hours  metoprolol tartrate 12.5 milliGRAM(s) Oral two times a day  midodrine 2.5 milliGRAM(s) Oral <User Schedule>  rifaximin 550 milliGRAM(s) Oral two times a day  ursodiol Capsule 300 milliGRAM(s) Oral two times a day    MEDICATIONS  (PRN):  dextrose 40% Gel 15 Gram(s) Oral once PRN Blood Glucose LESS THAN 70 milliGRAM(s)/deciLiter  glucagon  Injectable 1 milliGRAM(s) IntraMuscular once PRN Glucose <70 milliGRAM(s)/deciLiter  simethicone 80 milliGRAM(s) Chew once PRN Gas        RADIOLOGY & ADDITIONAL TESTS:      3/24/19 : CT Chest No Cont (19 @ 18:32) : 4.4 cm enlarged ascending thoracic aorta. Small bilateral pleural effusions.      3/20/19 : Xray Chest 1 View- PORTABLE-Urgent (19 @ 09:37) Patchy opacity within the right lower lung may represent pneumonia. Subsegmental atelectasis at the left base. No pleural effusion or  pneumothorax. No pulmonary edema. The cardiac silhouette remains enlarged. Aortic aneurysm. Recommend  further evaluation with aortogram.      3/18/19 : MR MRCP No Cont (19 @ 14:22) Gallbladder is distended with cholelithiasis.  No choledocholithiasis or biliary ductal dilatation.      3/17/19 : NM Hepatobiliary Imaging (19 @ 12:21) Abnormal hepatobiliary scan suspicious for common bile duct obstruction. Hepatocellular dysfunction. No evidence of acute cholecystitis.      3/18/19 : US Abdomen Limited (19 @ 07:48) Gallbladder is distended with sludge and stones.  Mural thickening of the wall of the common bile duct, possibly  cholangitis. No biliary ductal dilatation.      3/15/19 : CT Abdomen and Pelvis No Cont (03.15.19 @ 09:39) Distended gallbladder. Consider further evaluation with ultrasound if  acute cholecystitis is a concern. No discrete mass identified on this limited noncontrast study.          MICROBIOLOGY DATA:      Culture - Blood (19 @ 11:11)    Culture - Blood:   NO ORGANISMS ISOLATED  NO ORGANISMS ISOLATED AT 24 HOURS    Specimen Source: BLOOD PERIPHERAL        Culture - Fungal, Blood (19 @ 08:41)    Culture - Fungal, Blood:   CULTURE NEGATIVE FOR YEASTS AND MOLDS-PRELIMINARY RESULT  AFTER 24 HOURS INCUBATION    Specimen Source: BLOOD        Cytomegalovirus By PCR (19 @ 05:34)    Cytomegalovirus By PCR: 165 IU/mL    CMVPCR Lo.22: Assay lower limit of detection (LOD):  31.2 IU/mL (1.49 log10/mL)  Assay dynamic range:  50 to 156,000,000 (156M) CMV DNA IU/mL (1.70 log10/mL –  8.19 log10/mL)  Plasma CMV DNA Quantification by PCR using Abbott m2000:  The results of this test should be interpreted with  consideration of all clinical and laboratory findings. In  particular, caution should be used when interpreting low  level positive results when the test is used for  diagnostic purposes. Repeat testing on an additional  sample is recommended to confirm low level positive  results. A result of "Not Detected" does not preclude the  possibility of infection with CMV.  CMV DNA may be present  below the detection limit of assay. LogIU/mL        Culture - Urine (19 @ 15:21)    -  Vancomycin: S 2 CLOVIS    Culture - Urine:   ENTF^Enterococcus faecalis  COLONY COUNT: > = 100,000 CFU/ML    Culture - Urine:   Susceptibility not performed.    -  Tetra/Doxy: R >8 CLOVIS    -  Nitrofurantoin: S <=32 CLOVIS    -  Ampicillin: S <=2 CLOVIS    -  Ciprofloxacin: S <=1 CLOVIS    Specimen Source: URINE CATHETER    Organism Identification: Enterococcus faecalis  Staphylococcus sp.,coag neg    Organism: Enterococcus faecalis  COLONY COUNT: > = 100,000 CFU/ML    Organism: Staphylococcus sp.,coag neg  COLONY COUNT: > = 100,000 CFU/ML    Method Type: POSITIVE CLOVIS 29      Urinalysis (19 @ 13:25)    Color: ELSA    Urine Appearance: TURBID    Glucose: 100    Bilirubin: LARGE    Ketone - Urine: NEGATIVE    Specific Gravity: 1.020    Blood: MODERATE    pH - Urine: 6.0    Protein, Urine: 100    Urobilinogen: 4.0    Nitrite: NEGATIVE    Leukocyte Esterase Concentration: LARGE    Red Blood Cell - Urine: 3-5    White Blood Cell - Urine: >50    White Blood Cell Casts: 2-5/LPF    Epithelial Cells: OCC    Bacteria: MANY    Coarse Granular Casts: 0-2/LPF      MRSA Infection Control Screen (PCR) (19 @ 13:46)    MRSA Infection Control Screen (PCR): NOT DETECTED     REFERENCE RANGE:  MRSA DNA NOT DETECTED      Culture - Blood (03.15.19 @ 14:03)    Culture - Blood:   NO ORGANISMS ISOLATED  NO ORGANISMS ISOLATED AT 24 HOURS    Specimen Source: BLOOD

## 2019-04-06 NOTE — PROGRESS NOTE ADULT - SUBJECTIVE AND OBJECTIVE BOX
[facility] - Crystal / Corrie S /Kylah/ LARON Vlilegas/ LARON Beard/ Shiva Mullen / MAGUI Njeru  ---------------------------------------------------------------------------------------------------------------    Patient seen and examined bedside, earlier this afternoon    Subjective and Objective: awake, no verbal responses. RN bedside- pulled out NGT yesterday. FS low earlier today, d50x2 given, now 84    Allergies: No Known Allergies      Hospital Medications:   MEDICATIONS  (STANDING):  dextrose 5%. 1000 milliLiter(s) (50 mL/Hr) IV Continuous <Continuous>  dextrose 5%. 1000 milliLiter(s) (50 mL/Hr) IV Continuous <Continuous>  dextrose 50% Injectable 12.5 Gram(s) IV Push once  dextrose 50% Injectable 25 Gram(s) IV Push once  dextrose 50% Injectable 25 Gram(s) IV Push once  ergocalciferol 94552 Unit(s) Oral every week  lactulose Syrup 10 Gram(s) Enteral Tube two times a day  lidocaine   Patch 1 Patch Transdermal daily  meropenem  IVPB 500 milliGRAM(s) IV Intermittent every 12 hours  metoprolol tartrate 12.5 milliGRAM(s) Oral two times a day  midodrine 2.5 milliGRAM(s) Oral <User Schedule>  rifaximin 550 milliGRAM(s) Oral two times a day  ursodiol Capsule 300 milliGRAM(s) Oral two times a day      VITALS:  T(F): 97.5 (04-06-19 @ 13:01), Max: 97.5 (04-06-19 @ 13:01)  HR: 55 (04-06-19 @ 13:01)  BP: 95/62 (04-06-19 @ 13:01)  RR: 16 (04-06-19 @ 13:01)  SpO2: 96% (04-06-19 @ 13:01)  Wt(kg): --    04-05 @ 07:01  -  04-06 @ 07:00  --------------------------------------------------------  IN: 0 mL / OUT: 200 mL / NET: -200 mL    PHYSICAL EXAM:  Constitutional: NAD  HEENT: anicteric sclera, oropharynx clear  Neck: No JVD  Respiratory: CTAB, no wheezes, rales or rhonchi  Cardiovascular: S1, S2, RRR  Gastrointestinal: BS+, soft  Extremities: No cyanosis or clubbing. +peripheral edema  Neurological: awake, no verbal responses  : +flores w/dark yellow urine   Skin: No rashes      LABS:  04-06    140  |  105  |  80<H>  ----------------------------<  41<LL>  4.3   |  13<L>  |  5.73<H>    Ca    9.1      06 Apr 2019 06:30  Phos  4.3     04-05  Mg     2.4     04-06    TPro  4.8<L>  /  Alb  1.9<L>  /  TBili  21.2<H>  /  DBili      /  AST  192<H>  /  ALT  71<H>  /  AlkPhos  292<H>  04-05    Creatinine Trend: 5.73 <--, 5.11 <--, 4.60 <--, 4.25 <--, 4.04 <--, 3.33 <--, 3.39 <--, 3.20 <--                        9.7    9.47  )-----------( 96       ( 06 Apr 2019 06:30 )             27.0     Urine Studies:        RADIOLOGY & ADDITIONAL STUDIES:

## 2019-04-07 LAB
ANION GAP SERPL CALC-SCNC: 19 MMO/L — HIGH (ref 7–14)
BUN SERPL-MCNC: 83 MG/DL — HIGH (ref 7–23)
CALCIUM SERPL-MCNC: 8.7 MG/DL — SIGNIFICANT CHANGE UP (ref 8.4–10.5)
CHLORIDE SERPL-SCNC: 107 MMOL/L — SIGNIFICANT CHANGE UP (ref 98–107)
CO2 SERPL-SCNC: 17 MMOL/L — LOW (ref 22–31)
CREAT SERPL-MCNC: 5.7 MG/DL — HIGH (ref 0.5–1.3)
GLUCOSE SERPL-MCNC: 89 MG/DL — SIGNIFICANT CHANGE UP (ref 70–99)
HCT VFR BLD CALC: 27.8 % — LOW (ref 39–50)
HGB BLD-MCNC: 9.7 G/DL — LOW (ref 13–17)
INR BLD: 2.61 — HIGH (ref 0.88–1.17)
MAGNESIUM SERPL-MCNC: 2.4 MG/DL — SIGNIFICANT CHANGE UP (ref 1.6–2.6)
MCHC RBC-ENTMCNC: 28.4 PG — SIGNIFICANT CHANGE UP (ref 27–34)
MCHC RBC-ENTMCNC: 34.9 % — SIGNIFICANT CHANGE UP (ref 32–36)
MCV RBC AUTO: 81.5 FL — SIGNIFICANT CHANGE UP (ref 80–100)
NRBC # FLD: 0.83 K/UL — SIGNIFICANT CHANGE UP (ref 0–0)
NRBC FLD-RTO: 9.3 — SIGNIFICANT CHANGE UP
PLATELET # BLD AUTO: 77 K/UL — LOW (ref 150–400)
PMV BLD: SIGNIFICANT CHANGE UP FL (ref 7–13)
POTASSIUM SERPL-MCNC: 4 MMOL/L — SIGNIFICANT CHANGE UP (ref 3.5–5.3)
POTASSIUM SERPL-SCNC: 4 MMOL/L — SIGNIFICANT CHANGE UP (ref 3.5–5.3)
PROTHROM AB SERPL-ACNC: 30.7 SEC — HIGH (ref 9.8–13.1)
RBC # BLD: 3.41 M/UL — LOW (ref 4.2–5.8)
RBC # FLD: 28.3 % — HIGH (ref 10.3–14.5)
SODIUM SERPL-SCNC: 143 MMOL/L — SIGNIFICANT CHANGE UP (ref 135–145)
WBC # BLD: 8.89 K/UL — SIGNIFICANT CHANGE UP (ref 3.8–10.5)
WBC # FLD AUTO: 8.89 K/UL — SIGNIFICANT CHANGE UP (ref 3.8–10.5)

## 2019-04-07 PROCEDURE — 71045 X-RAY EXAM CHEST 1 VIEW: CPT | Mod: 26

## 2019-04-07 RX ORDER — LACTULOSE 10 G/15ML
200 SOLUTION ORAL ONCE
Qty: 0 | Refills: 0 | Status: COMPLETED | OUTPATIENT
Start: 2019-04-07 | End: 2019-04-07

## 2019-04-07 RX ADMIN — LIDOCAINE 1 PATCH: 4 CREAM TOPICAL at 22:47

## 2019-04-07 RX ADMIN — SODIUM CHLORIDE 50 MILLILITER(S): 9 INJECTION, SOLUTION INTRAVENOUS at 11:58

## 2019-04-07 RX ADMIN — LACTULOSE 200 GRAM(S): 10 SOLUTION ORAL at 17:26

## 2019-04-07 RX ADMIN — LIDOCAINE 1 PATCH: 4 CREAM TOPICAL at 19:45

## 2019-04-07 RX ADMIN — LIDOCAINE 1 PATCH: 4 CREAM TOPICAL at 02:29

## 2019-04-07 RX ADMIN — SODIUM CHLORIDE 50 MILLILITER(S): 9 INJECTION, SOLUTION INTRAVENOUS at 05:07

## 2019-04-07 RX ADMIN — LIDOCAINE 1 PATCH: 4 CREAM TOPICAL at 11:59

## 2019-04-07 NOTE — SWALLOW BEDSIDE ASSESSMENT ADULT - COMMENTS
Pt was lethargic this morning for a clinical assessment of swallow function. Per charting, pt is a 89 y/o male with PMHx significant for BPH, Cataracts, Diverticulosis, Anemia 2/2 GI Bleed with previous PRBC transfusions, who p/w 2 days generalized weakness, urinary retention and confusion. He was subsequently admitted for transaminitis and bilirubinemia, r/o liver failure, r/o biliary etiology. Recent CXR revealed "Patchy opacity within the right lower lung may represent pneumonia. Subsegmental atelectasis at the left base. No pleural effusion or pneumothorax. No pulmonary edema. The cardiac silhouette remains enlarged. Aortic aneurysm. Recommend further evaluation with aortogram. "
Pt was awake and cooperative for a clinical assessment of swallow function this am. Per charting, pt is a 89 y/o male with PMHx significant for BPH, Cataracts, Diverticulosis, Anemia 2/2 GI Bleed with previous PRBC transfusions, who p/w 2 days generalized weakness, urinary retention and confusion. He was subsequently admitted for transaminitis and bilirubinemia, r/o liver failure, r/o biliary etiology. Recent CXR revealed "Patchy opacity within the right lower lung may represent pneumonia. Subsegmental atelectasis at the left base. No pleural effusion or pneumothorax. No pulmonary edema. The cardiac silhouette remains enlarged. Aortic aneurysm. Recommend further evaluation with aortogram. "

## 2019-04-07 NOTE — SWALLOW BEDSIDE ASSESSMENT ADULT - ASR SWALLOW ASPIRATION MONITOR
fever/pneumonia/position upright (89Y)/throat clearing/upper respiratory infection/cough/gurgly voice/oral hygiene/change of breathing pattern
gurgly voice/upper respiratory infection/fever/pneumonia/change of breathing pattern/oral hygiene/position upright (89Y)/throat clearing/cough

## 2019-04-07 NOTE — PROGRESS NOTE ADULT - SUBJECTIVE AND OBJECTIVE BOX
S: denies chest pain, sob     MEDICATIONS  (STANDING):  dextrose 5% + sodium chloride 0.45% 1000 milliLiter(s) (50 mL/Hr) IV Continuous <Continuous>  ergocalciferol 68498 Unit(s) Oral every week  lactulose Syrup 10 Gram(s) Enteral Tube two times a day  lidocaine   Patch 1 Patch Transdermal daily  metoprolol tartrate 12.5 milliGRAM(s) Oral two times a day  midodrine 2.5 milliGRAM(s) Oral <User Schedule>  rifaximin 550 milliGRAM(s) Oral two times a day  ursodiol Capsule 300 milliGRAM(s) Oral two times a day    MEDICATIONS  (PRN):  dextrose 40% Gel 15 Gram(s) Oral once PRN Blood Glucose LESS THAN 70 milliGRAM(s)/deciLiter  glucagon  Injectable 1 milliGRAM(s) IntraMuscular once PRN Glucose <70 milliGRAM(s)/deciLiter  simethicone 80 milliGRAM(s) Chew once PRN Gas    LABS:                    9.7    8.89  )-----------( 77       ( 07 Apr 2019 07:29 )             27.8     143  |  107  |  83<H>  ----------------------------<  89  4.0   |  17<L>  |  5.70<H>    Ca    8.7      07 Apr 2019 07:29  Mg     2.4     04-07      Creatinine Trend: 5.70<--, 5.73<--, 5.11<--, 4.60<--, 4.25<--, 4.04<--   PT/INR - ( 07 Apr 2019 07:29 )   PT: 30.7 SEC;   INR: 2.61         PHYSICAL EXAM  Vital Signs Last 24 Hrs  T(C): 36.3 (07 Apr 2019 09:03), Max: 36.4 (06 Apr 2019 13:01)  T(F): 97.4 (07 Apr 2019 09:03), Max: 97.5 (06 Apr 2019 13:01)  HR: 52 (07 Apr 2019 09:03) (51 - 55)  BP: 113/63 (07 Apr 2019 09:03) (95/62 - 121/59)  RR: 16 (07 Apr 2019 09:03) (16 - 16)  SpO2: 97% (07 Apr 2019 09:03) (96% - 100%)    CV: Normal,  S1 S2 1/6 DEBBIE (+)2 Pulses B/l  Resp:  Clear to auscultation B/L, normal effort  GI: (+) BS Soft, NT, ND  Lymph:  (+) Pitting Edema B/L  Skin: Warm to touch    DATA:    TELEMETRY: SB     < from: Transthoracic Echocardiogram (03.23.19 @ 09:36) >  CONCLUSIONS:  1. Mitral annular calcification, otherwise normal mitral  valve. Mild mitral regurgitation.  2. Aortic valve leaflet morphology not well visualized;  appears calcified with normal opening. Moderate aortic  regurgitation.  3. Normal left ventricular internal dimensions and wall  thicknesses.  4. Hyperdynamic left ventricle.  5. Mild diastolic dysfunction (Stage I).  6. Normal right ventricular size and function.  7. Normal tricuspid valve. Mild tricuspid regurgitation.  8. Estimated pulmonary artery systolic pressure equals 36  mm Hg, assuming right atrial pressure equals 10  mm Hg,  consistent with borderline pulmonary hypertension.  *** Compared with echocardiogram of 6/11/2018,no  significant changes noted.  ------------------------------------------------------------------------  Confirmed on  3/23/2019 - 11:19:58 by Evaristo Castanon MD, [facility],  FASE, RPVI  < end of copied text >      ASSESSMENT/PLAN: 	    89 yo M, poor historian, charts reviewed, with PMHx BPH, Cataracts, Diverticulosis, Anemia 2/2 GI Bleed with previous PRBC transfusions, who p/w 2 days generalized  weakness, urinary retention and confusion. LFTs significant for transaminitis, scleral jaundice.     -TTE with moderate AI and normal LV function , medical management of valvular disease recommended   -CT chest with 4.5cm ascending aortic aneurysm, medical management   -continue metoprolol 12.5 mg PO bid with parameters  -nephrology note appreciated  -hepatology note appreciated, f/u ethics, palliative, family considering possible inpatient hospice  -no further cardiac workup needed at this time

## 2019-04-07 NOTE — CHART NOTE - NSCHARTNOTEFT_GEN_A_CORE
Called HCP Benitez Cottrell [phone] to clarify GOC since patient pulled out NGT on 4/5. Mr. Cottrell would like NGT replaced and meds and TF to be restarted at this time. He would only be willing to discontinue NGT if there were other means of giving patient nutrition and medications. He would also like a TPN evaluation and a repeat speech and swallow when the patient is more alert. He stated he no longer wants to continue blood draws but would like to continue finger sticks. As far as the code status he will remain full code but he does want to speak to Palliative Care regarding potentially making patient a DNI, I attempted to educate Mr. Cottrell regarding TPN and DNR/DNI but he still requested to speak to TPN speacialist and Palliative Care. Above discussed with Attending Dr. Branham.

## 2019-04-07 NOTE — CHART NOTE - NSCHARTNOTEFT_GEN_A_CORE
NGT placed in right nostril, unable to assess gastric bubble but STAT CXR done and radiologist called for a prelim read, NGT tip was in right lower lobe therefore NGT was removed, O2 saturation 97%. NGT reattempted but patient uncooperative shaking his head side to side and saying NO. Will reassess at a later time when HCP bedside later this afternoon.

## 2019-04-07 NOTE — SWALLOW BEDSIDE ASSESSMENT ADULT - SWALLOW EVAL: RECOMMENDED DIET
Initiate non-oral means of nutrition/hydration/medication if in agreement with GOC.
dysphagia 2 with honey thick liquids

## 2019-04-07 NOTE — SWALLOW BEDSIDE ASSESSMENT ADULT - ASR SWALLOW REFERRAL
Registered Dietitian/Registered dietician consult to ensure patient maintains their nutritional/hydration/caloric needs via non-oral diet.

## 2019-04-07 NOTE — PROGRESS NOTE ADULT - SUBJECTIVE AND OBJECTIVE BOX
Patient is seen and examined at the bed side, is normothermic.  He is alert and pulled his NGT  out again snd Family wants  swallow evaluation. The WBC count stay normal.         REVIEW OF SYSTEMS: All other review systems are negative        ALLERGIES: No Known Allergies      Vital Signs Last 24 Hrs  T(C): 36.2 (2019 19:34), Max: 36.3 (2019 21:01)  T(F): 97.2 (2019 19:34), Max: 97.4 (2019 21:01)  HR: 52 (2019 19:34) (51 - 54)  BP: 99/64 (2019 19:34) (99/64 - 121/59)  BP(mean): --  RR: 16 (2019 19:34) (16 - 18)  SpO2: 98% (2019 19:34) (97% - 100%)        PHYSICAL EXAM:  GENERAL: Lethargic but little more alert  HEENT: NGT pulled out by patient   CHEST/LUNG:  Air entry bilaterally  HEART: s1 and s2 present  ABDOMEN:  Nontender and  Non distended  : Flores catheter in placed  EXTREMITIES: B/L pedal  edema  CNS: Lethargic but little more alert          LABS:                                   9.7    8.89  )-----------( 77       ( 2019 07:29 )             27.8                8.1    9.51  )-----------( 75       ( 2019 07:15 )             22.6                           8.7    11.55 )-----------( 85       ( 2019 02:40 )             25.1                               143  |  107  |  83<H>  ----------------------------<  89  4.0   |  17<L>  |  5.70<H>    Ca    8.7      2019 07:29  Mg     2.4         140  |  105  |  80<H>  ----------------------------<  41<LL>  4.3   |  13<L>  |  5.73<H>    Ca    9.1      2019 06:30  Phos  4.3       Mg     2.4     -    TPro  4.8<L>  /  Alb  1.9<L>  /  TBili  21.2<H>  /  DBili  x   /  AST  192<H>  /  ALT  71<H>  /  AlkPhos  292<H>  -            DRUG LEVEL:     Ammonia, Serum (19 @ 07:45)    Ammonia, Serum: 51: Ammonia levels will be falsely elevated if specimen is NOT  collected, processed and maintained at 4-8 C. umol/L      Ammonia, Serum (19 @ 07:07)    Ammonia, Serum: 66: Ammonia levels will be falsely elevated if specimen is NOT  collected, processed and maintained at 4-8 C. umol/L        Ammonia, Serum (19 @ 09:45)    Ammonia, Serum: 76: Ammonia levels will be falsely elevated if specimen is NOT  collected, processed and maintained at 4-8 C. umol/L        Ammonia, Serum (19 @ 17:30)    Ammonia, Serum: 130: Ammonia levels will be falsely elevated if specimen is NOT  collected, processed and maintained at 4-8 C. umol/L        Leptospirosis Antibodies (19 @ 07:30)    Leptospirosis Antibodies: Negative: No IgM antibodies to Leptospira detected.  Since antibodies  may not be present or may be present at undetectable levels  during early disease, repeat testing of a convalescent  sample collected in 2-3 weeks is recommended.  Test Performed by:  [facility]  [address] [place] [address]          MEDICATIONS  (STANDING):  dextrose 5% + sodium chloride 0.45% 1000 milliLiter(s) (50 mL/Hr) IV Continuous <Continuous>  dextrose 5%. 1000 milliLiter(s) (50 mL/Hr) IV Continuous <Continuous>  dextrose 50% Injectable 12.5 Gram(s) IV Push once  dextrose 50% Injectable 25 Gram(s) IV Push once  dextrose 50% Injectable 25 Gram(s) IV Push once  ergocalciferol 74405 Unit(s) Oral every week  lactulose Syrup 10 Gram(s) Enteral Tube two times a day  lidocaine   Patch 1 Patch Transdermal daily  metoprolol tartrate 12.5 milliGRAM(s) Oral two times a day  midodrine 2.5 milliGRAM(s) Oral <User Schedule>  rifaximin 550 milliGRAM(s) Oral two times a day  ursodiol Capsule 300 milliGRAM(s) Oral two times a day    MEDICATIONS  (PRN):  dextrose 40% Gel 15 Gram(s) Oral once PRN Blood Glucose LESS THAN 70 milliGRAM(s)/deciLiter  glucagon  Injectable 1 milliGRAM(s) IntraMuscular once PRN Glucose <70 milliGRAM(s)/deciLiter  simethicone 80 milliGRAM(s) Chew once PRN Gas      RADIOLOGY & ADDITIONAL TESTS:      3/24/19 : CT Chest No Cont (19 @ 18:32) : 4.4 cm enlarged ascending thoracic aorta. Small bilateral pleural effusions.      3/20/19 : Xray Chest 1 View- PORTABLE-Urgent (19 @ 09:37) Patchy opacity within the right lower lung may represent pneumonia. Subsegmental atelectasis at the left base. No pleural effusion or  pneumothorax. No pulmonary edema. The cardiac silhouette remains enlarged. Aortic aneurysm. Recommend  further evaluation with aortogram.      3/18/19 : MR MRCP No Cont (19 @ 14:22) Gallbladder is distended with cholelithiasis.  No choledocholithiasis or biliary ductal dilatation.      3/17/19 : NM Hepatobiliary Imaging (19 @ 12:21) Abnormal hepatobiliary scan suspicious for common bile duct obstruction. Hepatocellular dysfunction. No evidence of acute cholecystitis.      3/18/19 : US Abdomen Limited (19 @ 07:48) Gallbladder is distended with sludge and stones.  Mural thickening of the wall of the common bile duct, possibly  cholangitis. No biliary ductal dilatation.      3/15/19 : CT Abdomen and Pelvis No Cont (03.15.19 @ 09:39) Distended gallbladder. Consider further evaluation with ultrasound if  acute cholecystitis is a concern. No discrete mass identified on this limited noncontrast study.          MICROBIOLOGY DATA:      Culture - Blood (19 @ 11:11)    Culture - Blood:   NO ORGANISMS ISOLATED  NO ORGANISMS ISOLATED AT 24 HOURS    Specimen Source: BLOOD PERIPHERAL        Culture - Fungal, Blood (19 @ 08:41)    Culture - Fungal, Blood:   CULTURE NEGATIVE FOR YEASTS AND MOLDS-PRELIMINARY RESULT  AFTER 24 HOURS INCUBATION    Specimen Source: BLOOD        Cytomegalovirus By PCR (19 @ 05:34)    Cytomegalovirus By PCR: 165 IU/mL    CMVPCR Lo.22: Assay lower limit of detection (LOD):  31.2 IU/mL (1.49 log10/mL)  Assay dynamic range:  50 to 156,000,000 (156M) CMV DNA IU/mL (1.70 log10/mL –  8.19 log10/mL)  Plasma CMV DNA Quantification by PCR using Abbott m2000:  The results of this test should be interpreted with  consideration of all clinical and laboratory findings. In  particular, caution should be used when interpreting low  level positive results when the test is used for  diagnostic purposes. Repeat testing on an additional  sample is recommended to confirm low level positive  results. A result of "Not Detected" does not preclude the  possibility of infection with CMV.  CMV DNA may be present  below the detection limit of assay. LogIU/mL        Culture - Urine (19 @ 15:21)    -  Vancomycin: S 2 CLOVIS    Culture - Urine:   ENTF^Enterococcus faecalis  COLONY COUNT: > = 100,000 CFU/ML    Culture - Urine:   Susceptibility not performed.    -  Tetra/Doxy: R >8 CLOVIS    -  Nitrofurantoin: S <=32 CLOVIS    -  Ampicillin: S <=2 CLOVIS    -  Ciprofloxacin: S <=1 CLOVIS    Specimen Source: URINE CATHETER    Organism Identification: Enterococcus faecalis  Staphylococcus sp.,coag neg    Organism: Enterococcus faecalis  COLONY COUNT: > = 100,000 CFU/ML    Organism: Staphylococcus sp.,coag neg  COLONY COUNT: > = 100,000 CFU/ML    Method Type: POSITIVE CLOVIS 29      Urinalysis (19 @ 13:25)    Color: ELSA    Urine Appearance: TURBID    Glucose: 100    Bilirubin: LARGE    Ketone - Urine: NEGATIVE    Specific Gravity: 1.020    Blood: MODERATE    pH - Urine: 6.0    Protein, Urine: 100    Urobilinogen: 4.0    Nitrite: NEGATIVE    Leukocyte Esterase Concentration: LARGE    Red Blood Cell - Urine: 3-5    White Blood Cell - Urine: >50    White Blood Cell Casts: 2-5/LPF    Epithelial Cells: OCC    Bacteria: MANY    Coarse Granular Casts: 0-2/LPF      MRSA Infection Control Screen (PCR) (19 @ 13:46)    MRSA Infection Control Screen (PCR): NOT DETECTED     REFERENCE RANGE:  MRSA DNA NOT DETECTED      Culture - Blood (03.15.19 @ 14:03)    Culture - Blood:   NO ORGANISMS ISOLATED  NO ORGANISMS ISOLATED AT 24 HOURS    Specimen Source: BLOOD          Assessment and Plan:   · Assessment		  A 89 yo Male with BPH, Cataracts, Diverticulosis, Anemia 2/2 GI Bleed with previous PRBC transfusions, who presents to the ER for evaluation of  generalized  weakness, urinary retention and confusion. On admission, he found to have elevated bilirubin, LFTS, transaminitis, scleral jaundice. and urinary retention. Flores catheter has placed with negative Urine analysis. The CT abdomen  and pelvis shows Distended gallbladder. Consider further evaluation with ultrasound if  acute cholecystitis is a concern. No discrete mass identified on this limited noncontrast study. The ID consult requested to assist with evaluation of Biliary sepsis.    # Encephalopathy  # Urinary retention - s/p flores catheter   # R/O biliary sepsis/ Cholangitis - Most likely acute cholestatic hepatitis as per GI -s/p  transjugular liver biopsy with two passes. and found to have small right atrium blood clot. 3/21/19  # Distended GB with cholelithiasis on MRCP 3/18/19  # Hypothermia- Pneumonia on CXR - MRSA PCR is negative   # UTI- Enterococcus faecalis 3/20/19  # Liver biopsy result - Not in the system yet  # Hepatic Encephalopathy- elevated Ammonia  # R/O Leptospirosis  - s/p Unasyn/Augmentin  ( MICHELINE for Moderate to severe Leptospirosis)  from 3/21/19 to 3/29/19 ,  which is the treatment of choice for Moderate to severe Leptospirosis. If patient has Leptospirosis, then it should have been treated,  Leptospirosis Antibodies: Negative: No IgM antibodies to Leptospira detected.    would recommend:    1. Consider lactulose rectally  if possible since patient pulled the NGT out  2.  Aspiration precaution   3. Discontinue IV Meropenem after today's doses  4. Supplemental oxygenation and  bronchodilator as needed  5. Monitor kidney function and LFTs, management as per Primary/GI team    d/w  Nursing staff Patient is seen and examined at the bed side, remains normothermic.  The events are noted regarding unsuccessful attempt to place a NGT and  swallow evaluation request by Family. Its less likely patient will be able to participate in swallow test due to his condition.         REVIEW OF SYSTEMS: Unable to obtain due to mental status        ALLERGIES: No Known Allergies      Vital Signs Last 24 Hrs  T(C): 36.2 (2019 19:34), Max: 36.3 (2019 21:01)  T(F): 97.2 (2019 19:34), Max: 97.4 (2019 21:01)  HR: 52 (2019 19:34) (51 - 54)  BP: 99/64 (2019 19:34) (99/64 - 121/59)  BP(mean): --  RR: 16 (2019 19:34) (16 - 18)  SpO2: 98% (2019 19:34) (97% - 100%)        PHYSICAL EXAM:  GENERAL: Lethargic again  HEENT: NGT pulled out by patient   CHEST/LUNG:  Air entry bilaterally  HEART: s1 and s2 present  ABDOMEN:  Nontender and  Non distended  : Oshea catheter in placed  EXTREMITIES: B/L pedal  edema  CNS: Lethargic again, barely can open eyes to verbal stimuli          LABS:                                   9.7    8.89  )-----------( 77       ( 2019 07:29 )             27.8                8.1    9.51  )-----------( 75       ( 2019 07:15 )             22.6                           8.7    11.55 )-----------( 85       ( 2019 02:40 )             25.1                               143  |  107  |  83<H>  ----------------------------<  89  4.0   |  17<L>  |  5.70<H>    Ca    8.7      2019 07:29  Mg     2.4         140  |  105  |  80<H>  ----------------------------<  41<LL>  4.3   |  13<L>  |  5.73<H>    Ca    9.1      2019 06:30  Phos  4.3     -  Mg     2.4     -    TPro  4.8<L>  /  Alb  1.9<L>  /  TBili  21.2<H>  /  DBili  x   /  AST  192<H>  /  ALT  71<H>  /  AlkPhos  292<H>  -            DRUG LEVEL:     Ammonia, Serum (19 @ 07:45)    Ammonia, Serum: 51: Ammonia levels will be falsely elevated if specimen is NOT  collected, processed and maintained at 4-8 C. umol/L      Ammonia, Serum (19 @ 07:07)    Ammonia, Serum: 66: Ammonia levels will be falsely elevated if specimen is NOT  collected, processed and maintained at 4-8 C. umol/L        Ammonia, Serum (19 @ 09:45)    Ammonia, Serum: 76: Ammonia levels will be falsely elevated if specimen is NOT  collected, processed and maintained at 4-8 C. umol/L        Ammonia, Serum (19 @ 17:30)    Ammonia, Serum: 130: Ammonia levels will be falsely elevated if specimen is NOT  collected, processed and maintained at 4-8 C. umol/L        Leptospirosis Antibodies (19 @ 07:30)    Leptospirosis Antibodies: Negative: No IgM antibodies to Leptospira detected.  Since antibodies  may not be present or may be present at undetectable levels  during early disease, repeat testing of a convalescent  sample collected in 2-3 weeks is recommended.  Test Performed by:  [facility]  [address] [place] [address]          MEDICATIONS  (STANDING):  dextrose 5% + sodium chloride 0.45% 1000 milliLiter(s) (50 mL/Hr) IV Continuous <Continuous>  dextrose 5%. 1000 milliLiter(s) (50 mL/Hr) IV Continuous <Continuous>  dextrose 50% Injectable 12.5 Gram(s) IV Push once  dextrose 50% Injectable 25 Gram(s) IV Push once  dextrose 50% Injectable 25 Gram(s) IV Push once  ergocalciferol 13052 Unit(s) Oral every week  lactulose Syrup 10 Gram(s) Enteral Tube two times a day  lidocaine   Patch 1 Patch Transdermal daily  metoprolol tartrate 12.5 milliGRAM(s) Oral two times a day  midodrine 2.5 milliGRAM(s) Oral <User Schedule>  rifaximin 550 milliGRAM(s) Oral two times a day  ursodiol Capsule 300 milliGRAM(s) Oral two times a day    MEDICATIONS  (PRN):  dextrose 40% Gel 15 Gram(s) Oral once PRN Blood Glucose LESS THAN 70 milliGRAM(s)/deciLiter  glucagon  Injectable 1 milliGRAM(s) IntraMuscular once PRN Glucose <70 milliGRAM(s)/deciLiter  simethicone 80 milliGRAM(s) Chew once PRN Gas      RADIOLOGY & ADDITIONAL TESTS:      3/24/19 : CT Chest No Cont (19 @ 18:32) : 4.4 cm enlarged ascending thoracic aorta. Small bilateral pleural effusions.      3/20/19 : Xray Chest 1 View- PORTABLE-Urgent (19 @ 09:37) Patchy opacity within the right lower lung may represent pneumonia. Subsegmental atelectasis at the left base. No pleural effusion or  pneumothorax. No pulmonary edema. The cardiac silhouette remains enlarged. Aortic aneurysm. Recommend  further evaluation with aortogram.      3/18/19 : MR MRCP No Cont (19 @ 14:22) Gallbladder is distended with cholelithiasis.  No choledocholithiasis or biliary ductal dilatation.      3/17/19 : NM Hepatobiliary Imaging (19 @ 12:21) Abnormal hepatobiliary scan suspicious for common bile duct obstruction. Hepatocellular dysfunction. No evidence of acute cholecystitis.      3/18/19 : US Abdomen Limited (19 @ 07:48) Gallbladder is distended with sludge and stones.  Mural thickening of the wall of the common bile duct, possibly  cholangitis. No biliary ductal dilatation.      3/15/19 : CT Abdomen and Pelvis No Cont (03.15.19 @ 09:39) Distended gallbladder. Consider further evaluation with ultrasound if  acute cholecystitis is a concern. No discrete mass identified on this limited noncontrast study.          MICROBIOLOGY DATA:      Culture - Blood (19 @ 11:11)    Culture - Blood:   NO ORGANISMS ISOLATED  NO ORGANISMS ISOLATED AT 24 HOURS    Specimen Source: BLOOD PERIPHERAL        Culture - Fungal, Blood (19 @ 08:41)    Culture - Fungal, Blood:   CULTURE NEGATIVE FOR YEASTS AND MOLDS-PRELIMINARY RESULT  AFTER 24 HOURS INCUBATION    Specimen Source: BLOOD        Cytomegalovirus By PCR (19 @ 05:34)    Cytomegalovirus By PCR: 165 IU/mL    CMVPCR Lo.22: Assay lower limit of detection (LOD):  31.2 IU/mL (1.49 log10/mL)  Assay dynamic range:  50 to 156,000,000 (156M) CMV DNA IU/mL (1.70 log10/mL –  8.19 log10/mL)  Plasma CMV DNA Quantification by PCR using Abbott m2000:  The results of this test should be interpreted with  consideration of all clinical and laboratory findings. In  particular, caution should be used when interpreting low  level positive results when the test is used for  diagnostic purposes. Repeat testing on an additional  sample is recommended to confirm low level positive  results. A result of "Not Detected" does not preclude the  possibility of infection with CMV.  CMV DNA may be present  below the detection limit of assay. LogIU/mL        Culture - Urine (19 @ 15:21)    -  Vancomycin: S 2 CLOVIS    Culture - Urine:   ENTF^Enterococcus faecalis  COLONY COUNT: > = 100,000 CFU/ML    Culture - Urine:   Susceptibility not performed.    -  Tetra/Doxy: R >8 CLOVIS    -  Nitrofurantoin: S <=32 CLOVIS    -  Ampicillin: S <=2 CLOVIS    -  Ciprofloxacin: S <=1 CLOVIS    Specimen Source: URINE CATHETER    Organism Identification: Enterococcus faecalis  Staphylococcus sp.,coag neg    Organism: Enterococcus faecalis  COLONY COUNT: > = 100,000 CFU/ML    Organism: Staphylococcus sp.,coag neg  COLONY COUNT: > = 100,000 CFU/ML    Method Type: POSITIVE CLOVIS 29      Urinalysis (19 @ 13:25)    Color: ELSA    Urine Appearance: TURBID    Glucose: 100    Bilirubin: LARGE    Ketone - Urine: NEGATIVE    Specific Gravity: 1.020    Blood: MODERATE    pH - Urine: 6.0    Protein, Urine: 100    Urobilinogen: 4.0    Nitrite: NEGATIVE    Leukocyte Esterase Concentration: LARGE    Red Blood Cell - Urine: 3-5    White Blood Cell - Urine: >50    White Blood Cell Casts: 2-5/LPF    Epithelial Cells: OCC    Bacteria: MANY    Coarse Granular Casts: 0-2/LPF      MRSA Infection Control Screen (PCR) (19 @ 13:46)    MRSA Infection Control Screen (PCR): NOT DETECTED     REFERENCE RANGE:  MRSA DNA NOT DETECTED      Culture - Blood (03.15.19 @ 14:03)    Culture - Blood:   NO ORGANISMS ISOLATED  NO ORGANISMS ISOLATED AT 24 HOURS    Specimen Source: BLOOD

## 2019-04-07 NOTE — SWALLOW BEDSIDE ASSESSMENT ADULT - SWALLOW EVAL: DIAGNOSIS
Patient presented with lethargy marked by intermittent opening of eyes and inability to maintain adequate alertness level for PO trials.  Patient presented with coated spoon; however upon spoon presentation to mouth, patient with reduced oral acceptance, poor utensil stripping, and reduced awareness of feeding task.  At this time, the Patient is noted with altered mental status and lethargy and therefore is not appropriate for oral intake at this time.  In addition, the patient is at high risk for malnutrition/dehydration as he would not be able to adequately maintain proper nutrition/hydration due to his AMS and lethargy.  MD to discuss GOC with family and patient.
1- functional oral management given puree, honey thick liquid, nectar thick liquid and thin liquid textures marked by adequate bolus collection, transfer and transport. 2- mild oral dysphagia given solids marked by prolonged mastication resulting in delayed oral preparation/AP transit. trace lingual residue remained post swallow which pt reduced with liquid wash. 3- mild pharyngeal dysphagia given solid, puree and honey thick liquids marked by delayed swallow trigger and reduced hyolaryngeal excursion without any overt s/s of penetration/aspiration noted. 4- moderate-severe pharyngeal dysphagia given nectar thick and thin liquid textures marked by delayed swallow trigger and reduced hyolaryngeal excursion resulting in intermittent utilization of multiple swallows and changes in vocal quality to a 'wet' tone, suggestive of penetration/aspiration and/or pharyngeal stasis.

## 2019-04-07 NOTE — PROGRESS NOTE ADULT - ASSESSMENT
89 yo M p/w confusion and generalized weakness    -> Nutrition:      - HCP Prentis requests NGT replacement.  HCP Prentis requests TPN evaluation, despite telling the HCP that the patient likely will not qualify for such therapy and how it will be more beneficial for a palliative/hospice approach, however, HCP refuses and still wants TPN eval.  Also, HCP requests further conversation with the palliative/hospice team.  F/u case management and social work.  F/u hospice/palliative.  -> D.I.C.:      - additional management appreciated per hem/onc and all consultants       - Very poor prognosis with high mortality rate      - Supportive care prn.       - Limit escalation of care 2/2 very limited life expectancy  -> ARF:      - persistent, not a candidate for HD      - adjust management per nephro. Avoid nephrotoxic rx      - midodrine renally adjusted    -> cholestatic hepatitis, acute liver failure, coagulopathy, thrombocytopenia:      - very poor prognosis      - ethics input appreciated, I'm in agreement      - all consultants recs/management appreciated        - not a candidate for liver transplantation      - c/w medical treatment      - adjust management accordingly, supportive comfort care  -> Urinary Retention:      - flores intact, strict i/o   -> Metabolic Encephalopathy:      - persistent      - fall precautions    -> Need for prophylactic measure:      - dvt/gi ppx

## 2019-04-07 NOTE — PROGRESS NOTE ADULT - ASSESSMENT
A 89 yo Male with BPH, Cataracts, Diverticulosis, Anemia 2/2 GI Bleed with previous PRBC transfusions, who presents to the ER for evaluation of  generalized  weakness, urinary retention and confusion. On admission, he found to have elevated bilirubin, LFTS, transaminitis, scleral jaundice. and urinary retention. Flores catheter has placed with negative Urine analysis. The CT abdomen  and pelvis shows Distended gallbladder. Consider further evaluation with ultrasound if  acute cholecystitis is a concern. No discrete mass identified on this limited noncontrast study. The ID consult requested to assist with evaluation of Biliary sepsis.    # Encephalopathy  # Urinary retention - s/p flores catheter   # R/O biliary sepsis/ Cholangitis - Most likely acute cholestatic hepatitis as per GI -s/p  transjugular liver biopsy with two passes. and found to have small right atrium blood clot. 3/21/19  # Distended GB with cholelithiasis on MRCP 3/18/19  # Hypothermia- Pneumonia on CXR - MRSA PCR is negative   # UTI- Enterococcus faecalis 3/20/19  # Liver biopsy result - DRug induced injury  # Hepatic Encephalopathy- elevated Ammonia  # R/O Leptospirosis  - s/p Unasyn/Augmentin  ( MICHELINE for Moderate to severe Leptospirosis)  from 3/21/19 to 3/29/19 ,  which is the treatment of choice for Moderate to severe Leptospirosis. If patient has Leptospirosis, then it should have been treated,  Leptospirosis Antibodies: Negative: No IgM antibodies to Leptospira detected.    would recommend:    1. Continue lactulose enema  2. Aspiration precaution   3. Monitor OFF antibiotic, s/p completed the course  4. Supplemental oxygenation and  bronchodilator as needed  5. Monitor kidney function and LFTs, management as per Primary/GI team    d/w  Nursing staff    - Very poor prognosis, will benefit from Palliative care

## 2019-04-07 NOTE — PROGRESS NOTE ADULT - ASSESSMENT
need ngt with lactulose, clearly somulent wihtout it.  pt may need restrait or 1;:1  not palliatve care at this tpoint

## 2019-04-07 NOTE — CHART NOTE - NSCHARTNOTEFT_GEN_A_CORE
Re-attempted to insert NGT with HCP Benitez and other family members bedside, patient not cooperating. As per family request NGT placement aborted and lactulose enema will be given to patient. NGT to be reattempted at later time. Family would like a S&S evaluation to be reattempted with a family member bedside. Patient remains full code, goal as per family is to have NGT reinserted and meds and TF to be restarted.

## 2019-04-07 NOTE — CHART NOTE - NSCHARTNOTEFT_GEN_A_CORE
I had an extensive phone conversation with the patients HCP [facility]  medical course reviewed, all questions/concerns answered accordingly   HCP wants NGT, requests TPN evaluation, and requests palliative/hospice team to speak to him  I stressed how the patient has a limited life expectancy and he would benefit from palliative/hospice, as well as how in my medical opinion the patient would not be a candidate and/or not benefit from TPN therapy.  The HCP continues to want full code.

## 2019-04-07 NOTE — PROGRESS NOTE ADULT - SUBJECTIVE AND OBJECTIVE BOX
Patient seen and examined at bedside  ngt remains out  I had an extensive phone conversation wtih pt's HCP Benitez. 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  Upon arrival to the ED, pt with scleral icterus and labs significant for transaminitis and TBili > 20 (15 Mar 2019 06:59)      PAST MEDICAL & SURGICAL HISTORY:  Chronic kidney disease (CKD), stage IV (severe)  Benign prostatic hypertrophy  S/P cataract surgery, left: 3 yrs ago      No Known Allergies       MEDICATIONS  (STANDING):  dextrose 5%. 1000 milliLiter(s) (50 mL/Hr) IV Continuous <Continuous>  dextrose 50% Injectable 12.5 Gram(s) IV Push once  dextrose 50% Injectable 12.5 Gram(s) IV Push once  dextrose 50% Injectable 25 Gram(s) IV Push once  dextrose 50% Injectable 25 Gram(s) IV Push once  ergocalciferol 10479 Unit(s) Oral every week  lactulose Syrup 10 Gram(s) Enteral Tube two times a day  lidocaine   Patch 1 Patch Transdermal daily  meropenem  IVPB 500 milliGRAM(s) IV Intermittent every 12 hours  metoprolol tartrate 12.5 milliGRAM(s) Oral two times a day  midodrine 2.5 milliGRAM(s) Oral <User Schedule>  rifaximin 550 milliGRAM(s) Oral two times a day  sodium chloride 0.9%. 1000 milliLiter(s) (75 mL/Hr) IV Continuous <Continuous>  ursodiol Capsule 300 milliGRAM(s) Oral two times a day    MEDICATIONS  (PRN):  dextrose 40% Gel 15 Gram(s) Oral once PRN Blood Glucose LESS THAN 70 milliGRAM(s)/deciLiter  glucagon  Injectable 1 milliGRAM(s) IntraMuscular once PRN Glucose <70 milliGRAM(s)/deciLiter  simethicone 80 milliGRAM(s) Chew once PRN Gas      REVIEW OF SYSTEMS: limited from pt 2/2 mental status	    T(C): 36.3 (04-06-19 @ 09:01), Max: 36.6 (04-05-19 @ 13:38)  HR: 55 (04-06-19 @ 09:01) (53 - 59)  BP: 90/50 (04-06-19 @ 09:01) (90/50 - 114/70)  RR: 16 (04-06-19 @ 09:01) (16 - 16)  SpO2: 97% (04-06-19 @ 09:01) (96% - 98%)    PHYSICAL EXAMINATION:   Constitutional: very ill appearing  HEENT: bitemp wasting, poor dentition. AT  Neck:  Supple  Respiratory:  Adequate airflow b/l. Not using accessory muscles of respiration.  Cardiovascular:  S1 & S2 intact, no R/G, 2+ radial pulses b/l  Gastrointestinal: Soft, normoactive b.s.  Extremities: warm  Neurological: minimal responsiveness to verbal/noxious stimuli. very lethargic. nonverbal to verbal commands.     Labs and imaging reviewed  Basic Metabolic Panel w/Magnesium (04.07.19 @ 07:29)    eGFR if Non : 8: The units for eGFR are ml/min/1.73m2 (normalized body  surface area). The eGFR is calculated from a serum  creatinine using the CKD-EPI equation. Other variables  required for calculation are race, age and sex. Among  patients with chronic kidney disease (CKD), the eGFR is  useful in determining the stage of disease according to  KDOQI CKD classification. All eGFR results are reported  numerically with the following interpretation.    GFR  (ml/min/1.73 m2)          W/KIDNEY DAMAGE    W/O KIDNEY DMG  ==========================================================  >= 90.......................Stage 1..............Normal  60-89.......................Stage 2...........Decreased GFR  30-59.......................Stage 3..............Stage 3  15-29.......................Stage 4..............Stage 4  < 15........................Stage 5..............Stage 5    Each stage of CKD assumes that the associated GFR level  has been in effect for at least 3 months. Determination of  stages one and two (with eGFR > 59ml/min/m2) requires  estimation of kidney damage for at least 3 months as  defined by structural or functional abnormalities.    Limitations: All estimates of GFR will be less accurate  for patients at extremes of muscle mass (including but  not limited to frail elderly, critically ill, or cancer  patients), those with unusual diets, and those with  conditions associated with reduced secretion or  extrarenal elimination of creatinine. The eGFR equation  is not recommended for use in patients with unstable  creatinine levels. mL/min    Calcium, Total Serum: 8.7 mg/dL    eGFR if : 9 mL/min    Sodium, Serum: 143 mmol/L    Potassium, Serum: 4.0 mmol/L    Chloride, Serum: 107 mmol/L    Carbon Dioxide, Serum: 17 mmol/L    Anion Gap, Serum: 19 mmo/L    Blood Urea Nitrogen, Serum: 83 mg/dL    Creatinine, Serum: 5.70 mg/dL    Glucose, Serum: 89: Delta: 41 on 04/06/  Delta: 41 on 04/06/ mg/dL    Magnesium, Serum: 2.4 mg/dL no headache

## 2019-04-07 NOTE — PROGRESS NOTE ADULT - SUBJECTIVE AND OBJECTIVE BOX
PASCUAL OLSEN:[id number],   89yMale followed for:  No Known Allergies    PAST MEDICAL & SURGICAL HISTORY:  Chronic kidney disease (CKD), stage IV (severe)  Benign prostatic hypertrophy  S/P cataract surgery, left: 3 yrs ago    FAMILY HISTORY:  No pertinent family history in first degree relatives    MEDICATIONS  (STANDING):  dextrose 5% + sodium chloride 0.45% 1000 milliLiter(s) (50 mL/Hr) IV Continuous <Continuous>  dextrose 5%. 1000 milliLiter(s) (50 mL/Hr) IV Continuous <Continuous>  dextrose 50% Injectable 12.5 Gram(s) IV Push once  dextrose 50% Injectable 25 Gram(s) IV Push once  dextrose 50% Injectable 25 Gram(s) IV Push once  ergocalciferol 42541 Unit(s) Oral every week  lactulose Syrup 10 Gram(s) Enteral Tube two times a day  lidocaine   Patch 1 Patch Transdermal daily  metoprolol tartrate 12.5 milliGRAM(s) Oral two times a day  midodrine 2.5 milliGRAM(s) Oral <User Schedule>  rifaximin 550 milliGRAM(s) Oral two times a day  ursodiol Capsule 300 milliGRAM(s) Oral two times a day    MEDICATIONS  (PRN):  dextrose 40% Gel 15 Gram(s) Oral once PRN Blood Glucose LESS THAN 70 milliGRAM(s)/deciLiter  glucagon  Injectable 1 milliGRAM(s) IntraMuscular once PRN Glucose <70 milliGRAM(s)/deciLiter  simethicone 80 milliGRAM(s) Chew once PRN Gas      Vital Signs Last 24 Hrs  T(C): 36.3 (07 Apr 2019 05:01), Max: 36.4 (06 Apr 2019 13:01)  T(F): 97.3 (07 Apr 2019 05:01), Max: 97.5 (06 Apr 2019 13:01)  HR: 54 (07 Apr 2019 05:01) (51 - 55)  BP: 105/56 (07 Apr 2019 05:01) (90/50 - 121/59)  BP(mean): --  RR: 16 (07 Apr 2019 05:01) (16 - 16)  SpO2: 100% (07 Apr 2019 05:01) (96% - 100%)  nc/at  s1s2  cta  soft, nt, nd no guarding or rebound  no c/c/e    CBC Full  -  ( 07 Apr 2019 07:29 )  WBC Count : 8.89 K/uL  RBC Count : 3.41 M/uL  Hemoglobin : 9.7 g/dL  Hematocrit : 27.8 %  Platelet Count - Automated : 77 K/uL  Mean Cell Volume : 81.5 fL  Mean Cell Hemoglobin : 28.4 pg  Mean Cell Hemoglobin Concentration : 34.9 %  Auto Neutrophil # : x  Auto Lymphocyte # : x  Auto Monocyte # : x  Auto Eosinophil # : x  Auto Basophil # : x  Auto Neutrophil % : x  Auto Lymphocyte % : x  Auto Monocyte % : x  Auto Eosinophil % : x  Auto Basophil % : x    04-07    143  |  107  |  83<H>  ----------------------------<  89  4.0   |  17<L>  |  5.70<H>    Ca    8.7      07 Apr 2019 07:29  Mg     2.4     04-07      PT/INR - ( 07 Apr 2019 07:29 )   PT: 30.7 SEC;   INR: 2.61

## 2019-04-08 PROCEDURE — 99232 SBSQ HOSP IP/OBS MODERATE 35: CPT | Mod: GC

## 2019-04-08 RX ORDER — SODIUM CHLORIDE 9 MG/ML
1000 INJECTION, SOLUTION INTRAVENOUS
Qty: 0 | Refills: 0 | Status: DISCONTINUED | OUTPATIENT
Start: 2019-04-08 | End: 2019-04-10

## 2019-04-08 RX ORDER — LACTULOSE 10 G/15ML
200 SOLUTION ORAL
Qty: 0 | Refills: 0 | Status: DISCONTINUED | OUTPATIENT
Start: 2019-04-08 | End: 2019-04-09

## 2019-04-08 RX ORDER — SODIUM CHLORIDE 9 MG/ML
500 INJECTION INTRAMUSCULAR; INTRAVENOUS; SUBCUTANEOUS ONCE
Qty: 0 | Refills: 0 | Status: COMPLETED | OUTPATIENT
Start: 2019-04-08 | End: 2019-04-08

## 2019-04-08 RX ADMIN — LIDOCAINE 1 PATCH: 4 CREAM TOPICAL at 19:00

## 2019-04-08 RX ADMIN — LIDOCAINE 1 PATCH: 4 CREAM TOPICAL at 11:38

## 2019-04-08 RX ADMIN — LACTULOSE 200 GRAM(S): 10 SOLUTION ORAL at 14:00

## 2019-04-08 RX ADMIN — LIDOCAINE 1 PATCH: 4 CREAM TOPICAL at 23:05

## 2019-04-08 RX ADMIN — SODIUM CHLORIDE 1000 MILLILITER(S): 9 INJECTION INTRAMUSCULAR; INTRAVENOUS; SUBCUTANEOUS at 08:12

## 2019-04-08 RX ADMIN — SODIUM CHLORIDE 50 MILLILITER(S): 9 INJECTION, SOLUTION INTRAVENOUS at 10:07

## 2019-04-08 NOTE — PROGRESS NOTE ADULT - ATTENDING COMMENTS
Agree with above.   No further inpatient cardiac workup needed at this time.   Off beta blockers for low bp    Rashmi Morrison MD

## 2019-04-08 NOTE — PROVIDER CONTACT NOTE (OTHER) - BACKGROUND
Pt. BP typically run around 110's. Pt. has D5 with 0.45 NS with sodium bicarb additive running at 50 mL/hr continuously.

## 2019-04-08 NOTE — PROGRESS NOTE ADULT - SUBJECTIVE AND OBJECTIVE BOX
Patient is seen and examined at the bed side, remains normothermic.  The events are noted regarding unsuccessful attempt to place a NGT and  swallow evaluation request by Family. Its less likely patient will be able to participate in swallow test due to his condition.         REVIEW OF SYSTEMS: Unable to obtain due to mental status        ALLERGIES: No Known Allergies      Vital Signs Last 24 Hrs  T(C): 36.3 (2019 16:50), Max: 36.3 (2019 05:59)  T(F): 97.3 (2019 16:50), Max: 97.4 (2019 12:50)  HR: 51 (2019 16:50) (50 - 54)  BP: 110/59 (2019 16:50) (89/50 - 110/59)  BP(mean): --  RR: 18 (2019 16:50) (16 - 18)  SpO2: 94% (2019 16:50) (94% - 98%)        PHYSICAL EXAM:  GENERAL: Lethargic again  HEENT: NGT pulled out by patient   CHEST/LUNG:  Air entry bilaterally  HEART: s1 and s2 present  ABDOMEN:  Nontender and  Non distended  : Oshea catheter in placed  EXTREMITIES: B/L pedal  edema  CNS: Lethargic again, barely can open eyes to verbal stimuli          LABS: No new labs                                   9.7    8.89  )-----------( 77       ( 2019 07:29 )             27.8                          8.1    9.51  )-----------( 75       ( 2019 07:15 )             22.6                           8.7    11.55 )-----------( 85       ( 2019 02:40 )             25.1                                 143  |  107  |  83<H>  ----------------------------<  89  4.0   |  17<L>  |  5.70<H>    Ca    8.7      2019 07:29  Mg     2.4         140  |  105  |  80<H>  ----------------------------<  41<LL>  4.3   |  13<L>  |  5.73<H>    Ca    9.1      2019 06:30  Phos  4.3     04-  Mg     2.4     -    TPro  4.8<L>  /  Alb  1.9<L>  /  TBili  21.2<H>  /  DBili  x   /  AST  192<H>  /  ALT  71<H>  /  AlkPhos  292<H>  -            DRUG LEVEL:     Ammonia, Serum (19 @ 07:45)    Ammonia, Serum: 51: Ammonia levels will be falsely elevated if specimen is NOT  collected, processed and maintained at 4-8 C. umol/L      Ammonia, Serum (19 @ 07:07)    Ammonia, Serum: 66: Ammonia levels will be falsely elevated if specimen is NOT  collected, processed and maintained at 4-8 C. umol/L        Ammonia, Serum (19 @ 09:45)    Ammonia, Serum: 76: Ammonia levels will be falsely elevated if specimen is NOT  collected, processed and maintained at 4-8 C. umol/L        Ammonia, Serum (19 @ 17:30)    Ammonia, Serum: 130: Ammonia levels will be falsely elevated if specimen is NOT  collected, processed and maintained at 4-8 C. umol/L        Leptospirosis Antibodies (19 @ 07:30)    Leptospirosis Antibodies: Negative: No IgM antibodies to Leptospira detected.  Since antibodies  may not be present or may be present at undetectable levels  during early disease, repeat testing of a convalescent  sample collected in 2-3 weeks is recommended.  Test Performed by:  [facility]  [address], [place] [address]          MEDICATIONS  (STANDING):  dextrose 5% + sodium chloride 0.45% 1000 milliLiter(s) (50 mL/Hr) IV Continuous <Continuous>  dextrose 5%. 1000 milliLiter(s) (50 mL/Hr) IV Continuous <Continuous>  dextrose 50% Injectable 12.5 Gram(s) IV Push once  dextrose 50% Injectable 25 Gram(s) IV Push once  dextrose 50% Injectable 25 Gram(s) IV Push once  ergocalciferol 28191 Unit(s) Oral every week  lactulose Retention Enema 200 Gram(s) Rectal two times a day  lactulose Syrup 10 Gram(s) Enteral Tube two times a day  lidocaine   Patch 1 Patch Transdermal daily  midodrine 2.5 milliGRAM(s) Oral <User Schedule>  rifaximin 550 milliGRAM(s) Oral two times a day  ursodiol Capsule 300 milliGRAM(s) Oral two times a day    MEDICATIONS  (PRN):  dextrose 40% Gel 15 Gram(s) Oral once PRN Blood Glucose LESS THAN 70 milliGRAM(s)/deciLiter  glucagon  Injectable 1 milliGRAM(s) IntraMuscular once PRN Glucose <70 milliGRAM(s)/deciLiter  simethicone 80 milliGRAM(s) Chew once PRN Gas        RADIOLOGY & ADDITIONAL TESTS:      3/24/19 : CT Chest No Cont (19 @ 18:32) : 4.4 cm enlarged ascending thoracic aorta. Small bilateral pleural effusions.      3/20/19 : Xray Chest 1 View- PORTABLE-Urgent (19 @ 09:37) Patchy opacity within the right lower lung may represent pneumonia. Subsegmental atelectasis at the left base. No pleural effusion or  pneumothorax. No pulmonary edema. The cardiac silhouette remains enlarged. Aortic aneurysm. Recommend  further evaluation with aortogram.      3/18/19 : MR MRCP No Cont (19 @ 14:22) Gallbladder is distended with cholelithiasis.  No choledocholithiasis or biliary ductal dilatation.      3/17/19 : NM Hepatobiliary Imaging (19 @ 12:21) Abnormal hepatobiliary scan suspicious for common bile duct obstruction. Hepatocellular dysfunction. No evidence of acute cholecystitis.      3/18/19 : US Abdomen Limited (19 @ 07:48) Gallbladder is distended with sludge and stones.  Mural thickening of the wall of the common bile duct, possibly  cholangitis. No biliary ductal dilatation.      3/15/19 : CT Abdomen and Pelvis No Cont (03.15.19 @ 09:39) Distended gallbladder. Consider further evaluation with ultrasound if  acute cholecystitis is a concern. No discrete mass identified on this limited noncontrast study.          MICROBIOLOGY DATA:      Culture - Blood (19 @ 11:11)    Culture - Blood:   NO ORGANISMS ISOLATED  NO ORGANISMS ISOLATED AT 24 HOURS    Specimen Source: BLOOD PERIPHERAL        Culture - Fungal, Blood (19 @ 08:41)    Culture - Fungal, Blood:   CULTURE NEGATIVE FOR YEASTS AND MOLDS-PRELIMINARY RESULT  AFTER 24 HOURS INCUBATION    Specimen Source: BLOOD        Cytomegalovirus By PCR (19 @ 05:34)    Cytomegalovirus By PCR: 165 IU/mL    CMVPCR Lo.22: Assay lower limit of detection (LOD):  31.2 IU/mL (1.49 log10/mL)  Assay dynamic range:  50 to 156,000,000 (156M) CMV DNA IU/mL (1.70 log10/mL –  8.19 log10/mL)  Plasma CMV DNA Quantification by PCR using Abbott m2000:  The results of this test should be interpreted with  consideration of all clinical and laboratory findings. In  particular, caution should be used when interpreting low  level positive results when the test is used for  diagnostic purposes. Repeat testing on an additional  sample is recommended to confirm low level positive  results. A result of "Not Detected" does not preclude the  possibility of infection with CMV.  CMV DNA may be present  below the detection limit of assay. LogIU/mL        Culture - Urine (19 @ 15:21)    -  Vancomycin: S 2 CLOVIS    Culture - Urine:   ENTF^Enterococcus faecalis  COLONY COUNT: > = 100,000 CFU/ML    Culture - Urine:   Susceptibility not performed.    -  Tetra/Doxy: R >8 CLOVIS    -  Nitrofurantoin: S <=32 CLOVIS    -  Ampicillin: S <=2 CLOVIS    -  Ciprofloxacin: S <=1 CLOVIS    Specimen Source: URINE CATHETER    Organism Identification: Enterococcus faecalis  Staphylococcus sp.,coag neg    Organism: Enterococcus faecalis  COLONY COUNT: > = 100,000 CFU/ML    Organism: Staphylococcus sp.,coag neg  COLONY COUNT: > = 100,000 CFU/ML    Method Type: POSITIVE CLOVIS 29      Urinalysis (19 @ 13:25)    Color: ELSA    Urine Appearance: TURBID    Glucose: 100    Bilirubin: LARGE    Ketone - Urine: NEGATIVE    Specific Gravity: 1.020    Blood: MODERATE    pH - Urine: 6.0    Protein, Urine: 100    Urobilinogen: 4.0    Nitrite: NEGATIVE    Leukocyte Esterase Concentration: LARGE    Red Blood Cell - Urine: 3-5    White Blood Cell - Urine: >50    White Blood Cell Casts: 2-5/LPF    Epithelial Cells: OCC    Bacteria: MANY    Coarse Granular Casts: 0-2/LPF      MRSA Infection Control Screen (PCR) (19 @ 13:46)    MRSA Infection Control Screen (PCR): NOT DETECTED     REFERENCE RANGE:  MRSA DNA NOT DETECTED      Culture - Blood (03.15.19 @ 14:03)    Culture - Blood:   NO ORGANISMS ISOLATED  NO ORGANISMS ISOLATED AT 24 HOURS    Specimen Source: BLOOD Patient is seen and examined at the bed side, remains normothermic.  No change in mental status, Family at the bed side.          REVIEW OF SYSTEMS: Unable to obtain due to mental status        ALLERGIES: No Known Allergies        Vital Signs Last 24 Hrs  T(C): 36.3 (2019 16:50), Max: 36.3 (2019 05:59)  T(F): 97.3 (2019 16:50), Max: 97.4 (2019 12:50)  HR: 51 (2019 16:50) (50 - 54)  BP: 110/59 (2019 16:50) (89/50 - 110/59)  BP(mean): --  RR: 18 (2019 16:50) (16 - 18)  SpO2: 94% (2019 16:50) (94% - 98%)        PHYSICAL EXAM:  GENERAL: Remains Lethargic  HEENT: NGT pulled out by patient   CHEST/LUNG:  Air entry bilaterally  HEART: s1 and s2 present  ABDOMEN:  Nontender and  Non distended  : Oshea catheter in placed  EXTREMITIES: B/L pedal  edema  CNS: Lethargic           LABS: No new labs                                   9.7    8.89  )-----------( 77       ( 2019 07:29 )             27.8                          8.1    9.51  )-----------( 75       ( 2019 07:15 )             22.6                           8.7    11.55 )-----------( 85       ( 2019 02:40 )             25.1                             04-07    143  |  107  |  83<H>  ----------------------------<  89  4.0   |  17<L>  |  5.70<H>    Ca    8.7      2019 07:29  Mg     2.4     04-07      04-06    140  |  105  |  80<H>  ----------------------------<  41<LL>  4.3   |  13<L>  |  5.73<H>    Ca    9.1      2019 06:30  Phos  4.3     04-05  Mg     2.4     04-06    TPro  4.8<L>  /  Alb  1.9<L>  /  TBili  21.2<H>  /  DBili  x   /  AST  192<H>  /  ALT  71<H>  /  AlkPhos  292<H>              DRUG LEVEL:     Ammonia, Serum (19 @ 07:45)    Ammonia, Serum: 51: Ammonia levels will be falsely elevated if specimen is NOT  collected, processed and maintained at 4-8 C. umol/L      Ammonia, Serum (19 @ 07:07)    Ammonia, Serum: 66: Ammonia levels will be falsely elevated if specimen is NOT  collected, processed and maintained at 4-8 C. umol/L        Ammonia, Serum (19 @ 09:45)    Ammonia, Serum: 76: Ammonia levels will be falsely elevated if specimen is NOT  collected, processed and maintained at 4-8 C. umol/L        Ammonia, Serum (19 @ 17:30)    Ammonia, Serum: 130: Ammonia levels will be falsely elevated if specimen is NOT  collected, processed and maintained at 4-8 C. umol/L        Leptospirosis Antibodies (19 @ 07:30)    Leptospirosis Antibodies: Negative: No IgM antibodies to Leptospira detected.  Since antibodies  may not be present or may be present at undetectable levels  during early disease, repeat testing of a convalescent  sample collected in 2-3 weeks is recommended.  Test Performed by:  [facility]  [address] [place] [address]          MEDICATIONS  (STANDING):  dextrose 5% + sodium chloride 0.45% 1000 milliLiter(s) (50 mL/Hr) IV Continuous <Continuous>  dextrose 5%. 1000 milliLiter(s) (50 mL/Hr) IV Continuous <Continuous>  dextrose 50% Injectable 12.5 Gram(s) IV Push once  dextrose 50% Injectable 25 Gram(s) IV Push once  dextrose 50% Injectable 25 Gram(s) IV Push once  ergocalciferol 70236 Unit(s) Oral every week  lactulose Retention Enema 200 Gram(s) Rectal two times a day  lactulose Syrup 10 Gram(s) Enteral Tube two times a day  lidocaine   Patch 1 Patch Transdermal daily  midodrine 2.5 milliGRAM(s) Oral <User Schedule>  rifaximin 550 milliGRAM(s) Oral two times a day  ursodiol Capsule 300 milliGRAM(s) Oral two times a day    MEDICATIONS  (PRN):  dextrose 40% Gel 15 Gram(s) Oral once PRN Blood Glucose LESS THAN 70 milliGRAM(s)/deciLiter  glucagon  Injectable 1 milliGRAM(s) IntraMuscular once PRN Glucose <70 milliGRAM(s)/deciLiter  simethicone 80 milliGRAM(s) Chew once PRN Gas        RADIOLOGY & ADDITIONAL TESTS:      3/24/19 : CT Chest No Cont (19 @ 18:32) : 4.4 cm enlarged ascending thoracic aorta. Small bilateral pleural effusions.      3/20/19 : Xray Chest 1 View- PORTABLE-Urgent (19 @ 09:37) Patchy opacity within the right lower lung may represent pneumonia. Subsegmental atelectasis at the left base. No pleural effusion or  pneumothorax. No pulmonary edema. The cardiac silhouette remains enlarged. Aortic aneurysm. Recommend  further evaluation with aortogram.      3/18/19 : MR MRCP No Cont (19 @ 14:22) Gallbladder is distended with cholelithiasis.  No choledocholithiasis or biliary ductal dilatation.      3/17/19 : NM Hepatobiliary Imaging (19 @ 12:21) Abnormal hepatobiliary scan suspicious for common bile duct obstruction. Hepatocellular dysfunction. No evidence of acute cholecystitis.      3/18/19 : US Abdomen Limited (19 @ 07:48) Gallbladder is distended with sludge and stones.  Mural thickening of the wall of the common bile duct, possibly  cholangitis. No biliary ductal dilatation.      3/15/19 : CT Abdomen and Pelvis No Cont (03.15.19 @ 09:39) Distended gallbladder. Consider further evaluation with ultrasound if  acute cholecystitis is a concern. No discrete mass identified on this limited noncontrast study.          MICROBIOLOGY DATA:      Culture - Blood (19 @ 11:11)    Culture - Blood:   NO ORGANISMS ISOLATED  NO ORGANISMS ISOLATED AT 24 HOURS    Specimen Source: BLOOD PERIPHERAL        Culture - Fungal, Blood (19 @ 08:41)    Culture - Fungal, Blood:   CULTURE NEGATIVE FOR YEASTS AND MOLDS-PRELIMINARY RESULT  AFTER 24 HOURS INCUBATION    Specimen Source: BLOOD        Cytomegalovirus By PCR (19 @ 05:34)    Cytomegalovirus By PCR: 165 IU/mL    CMVPCR Lo.22: Assay lower limit of detection (LOD):  31.2 IU/mL (1.49 log10/mL)  Assay dynamic range:  50 to 156,000,000 (156M) CMV DNA IU/mL (1.70 log10/mL –  8.19 log10/mL)  Plasma CMV DNA Quantification by PCR using Abbott m2000:  The results of this test should be interpreted with  consideration of all clinical and laboratory findings. In  particular, caution should be used when interpreting low  level positive results when the test is used for  diagnostic purposes. Repeat testing on an additional  sample is recommended to confirm low level positive  results. A result of "Not Detected" does not preclude the  possibility of infection with CMV.  CMV DNA may be present  below the detection limit of assay. LogIU/mL        Culture - Urine (19 @ 15:21)    -  Vancomycin: S 2 CLOVIS    Culture - Urine:   ENTF^Enterococcus faecalis  COLONY COUNT: > = 100,000 CFU/ML    Culture - Urine:   Susceptibility not performed.    -  Tetra/Doxy: R >8 CLOVIS    -  Nitrofurantoin: S <=32 CLOVIS    -  Ampicillin: S <=2 CLOVIS    -  Ciprofloxacin: S <=1 CLOVIS    Specimen Source: URINE CATHETER    Organism Identification: Enterococcus faecalis  Staphylococcus sp.,coag neg    Organism: Enterococcus faecalis  COLONY COUNT: > = 100,000 CFU/ML    Organism: Staphylococcus sp.,coag neg  COLONY COUNT: > = 100,000 CFU/ML    Method Type: POSITIVE CLOVIS 29      Urinalysis (19 @ 13:25)    Color: ELSA    Urine Appearance: TURBID    Glucose: 100    Bilirubin: LARGE    Ketone - Urine: NEGATIVE    Specific Gravity: 1.020    Blood: MODERATE    pH - Urine: 6.0    Protein, Urine: 100    Urobilinogen: 4.0    Nitrite: NEGATIVE    Leukocyte Esterase Concentration: LARGE    Red Blood Cell - Urine: 3-5    White Blood Cell - Urine: >50    White Blood Cell Casts: 2-5/LPF    Epithelial Cells: OCC    Bacteria: MANY    Coarse Granular Casts: 0-2/LPF      MRSA Infection Control Screen (PCR) (19 @ 13:46)    MRSA Infection Control Screen (PCR): NOT DETECTED     REFERENCE RANGE:  MRSA DNA NOT DETECTED      Culture - Blood (03.15.19 @ 14:03)    Culture - Blood:   NO ORGANISMS ISOLATED  NO ORGANISMS ISOLATED AT 24 HOURS    Specimen Source: BLOOD

## 2019-04-08 NOTE — PROGRESS NOTE ADULT - ASSESSMENT
A 89 yo Male with BPH, Cataracts, Diverticulosis, Anemia 2/2 GI Bleed with previous PRBC transfusions, who presents to the ER for evaluation of  generalized  weakness, urinary retention and confusion. On admission, he found to have elevated bilirubin, LFTS, transaminitis, scleral jaundice. and urinary retention. Flores catheter has placed with negative Urine analysis. The CT abdomen  and pelvis shows Distended gallbladder. Consider further evaluation with ultrasound if  acute cholecystitis is a concern. No discrete mass identified on this limited noncontrast study. The ID consult requested to assist with evaluation of Biliary sepsis.    # Encephalopathy  # Urinary retention - s/p flores catheter   # R/O biliary sepsis/ Cholangitis - Most likely acute cholestatic hepatitis as per GI -s/p  transjugular liver biopsy with two passes. and found to have small right atrium blood clot. 3/21/19  # Distended GB with cholelithiasis on MRCP 3/18/19  # Hypothermia- Pneumonia on CXR - MRSA PCR is negative   # UTI- Enterococcus faecalis 3/20/19  # Liver biopsy result - DRug induced injury  # Hepatic Encephalopathy- elevated Ammonia  # R/O Leptospirosis  - s/p Unasyn/Augmentin  ( MICHELINE for Moderate to severe Leptospirosis)  from 3/21/19 to 3/29/19 ,  which is the treatment of choice for Moderate to severe Leptospirosis. If patient has Leptospirosis, then it should have been treated,  Leptospirosis Antibodies: Negative: No IgM antibodies to Leptospira detected.    would recommend:    1. Continue lactulose enema  2. Aspiration precaution   3. Monitor OFF antibiotic, s/p completed the course  4. Supplemental oxygenation and  bronchodilator as needed  5. Monitor kidney function and LFTs, management as per Primary/GI team    d/w  Nursing staff    - Very poor prognosis, will benefit from Palliative care A 89 yo Male with BPH, Cataracts, Diverticulosis, Anemia 2/2 GI Bleed with previous PRBC transfusions, who presents to the ER for evaluation of  generalized  weakness, urinary retention and confusion. On admission, he found to have elevated bilirubin, LFTS, transaminitis, scleral jaundice. and urinary retention. Flores catheter has placed with negative Urine analysis. The CT abdomen  and pelvis shows Distended gallbladder. Consider further evaluation with ultrasound if  acute cholecystitis is a concern. No discrete mass identified on this limited noncontrast study. The ID consult requested to assist with evaluation of Biliary sepsis.    # Encephalopathy  # Urinary retention - s/p flores catheter   # R/O biliary sepsis/ Cholangitis - Most likely acute cholestatic hepatitis as per GI -s/p  transjugular liver biopsy with two passes. and found to have small right atrium blood clot. 3/21/19  # Distended GB with cholelithiasis on MRCP 3/18/19  # Hypothermia- Pneumonia on CXR - MRSA PCR is negative   # UTI- Enterococcus faecalis 3/20/19  # Liver biopsy result - DRug induced injury  # Hepatic Encephalopathy- elevated Ammonia  # R/O Leptospirosis  - s/p Unasyn/Augmentin  ( MICHELINE for Moderate to severe Leptospirosis)  from 3/21/19 to 3/29/19 ,  which is the treatment of choice for Moderate to severe Leptospirosis. If patient has Leptospirosis, then it should have been treated,  Leptospirosis Antibodies: Negative: No IgM antibodies to Leptospira detected.    would recommend:    1. Continue lactulose enema  2. Aspiration precaution   3. Monitor OFF antibiotic, s/p completed the course  4. Supplemental oxygenation and  bronchodilator as needed      d/w  Nursing staff and Family at the bed side    - The Family finally decided for Hospice evaluation

## 2019-04-08 NOTE — PROGRESS NOTE ADULT - ASSESSMENT
89 yo M p/w confusion and generalized weakness    -> End of life:      - Patient is actively dying.  Medical options have been exhausted.  Very limited life expectancy. Failed bedside swallow eval.  Pt is not cooperative with NGT placement.  ** Hospice follow up.  Discharge planning - f/u case management and social work.   -> D.I.C.:      - management appreciated per hem/onc and all consultants       - Very poor prognosis with high mortality rate      - Limit escalation of care 2/2 very limited life expectancy  -> ARF:      - persistent, not a candidate for HD      - adjust management per nephro. Avoid nephrotoxic rx      - midodrine renally adjusted    -> cholestatic hepatitis, acute liver failure, coagulopathy, thrombocytopenia:      - very poor prognosis      - ethics input appreciated, I'm in agreement      - all consultants recs/management appreciated        - not a candidate for liver transplantation      - c/w medical treatment      - adjust management accordingly, supportive comfort care  -> Urinary Retention:      - flores intact, strict i/o   -> Metabolic Encephalopathy:      - persistent      - fall precautions    -> Need for prophylactic measure:      - dvt/gi ppx

## 2019-04-08 NOTE — PROGRESS NOTE ADULT - ASSESSMENT
89 yo M with PMHx CKD, BPH, Diverticulosis, Anemia 2/2 GI Bleed with previous PRBC transfusions, who p/w 2 days genearlized weakness, urinary retention and confusion, found to have elevated LFTs. Renal following for PHU, CKD management.    PHU on CKD 3 b/l Cr 1.4-1.6 10/2018    PHU w/worsening RFT 2/2 ATN , u/o 600ml past 24hrs noted    Low C3 2/2 liver dz. no e/o acute GN  Urinary retention on CT w/o hydronephrosis. w/flores.   Transaminitis and Bilirubinemia s/p liver Bx by IR 3/21 likely drug induced per GI  AMS most liekly 2/2 Hepatic encephalopathy not from uremia- sr NH4 level high. on lactulose -per GI  h/o HTN- bp low. been off Norvasc. on midodrine      no labs.  chart reviewed  failed S/S, didn't cooperate w/NGT per primary team  Grave prognosis, multi-organ failure, advanced liver Dis, cachexia, advanced age with dementia at baseline. agree w/ comfort care. Son on board w/plan. awaiting hospice   will sign off  Thanks

## 2019-04-08 NOTE — PROGRESS NOTE ADULT - SUBJECTIVE AND OBJECTIVE BOX
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  Upon arrival to the ED, pt with scleral icterus and labs significant for transaminitis and TBili > 20 (15 Mar 2019 06:59)      PAST MEDICAL & SURGICAL HISTORY:  Chronic kidney disease (CKD), stage IV (severe)  Benign prostatic hypertrophy  S/P cataract surgery, left: 3 yrs ago      No Known Allergies       MEDICATIONS  (STANDING):  dextrose 5%. 1000 milliLiter(s) (50 mL/Hr) IV Continuous <Continuous>  dextrose 50% Injectable 12.5 Gram(s) IV Push once  dextrose 50% Injectable 12.5 Gram(s) IV Push once  dextrose 50% Injectable 25 Gram(s) IV Push once  dextrose 50% Injectable 25 Gram(s) IV Push once  ergocalciferol 09795 Unit(s) Oral every week  lactulose Syrup 10 Gram(s) Enteral Tube two times a day  lidocaine   Patch 1 Patch Transdermal daily  meropenem  IVPB 500 milliGRAM(s) IV Intermittent every 12 hours  metoprolol tartrate 12.5 milliGRAM(s) Oral two times a day  midodrine 2.5 milliGRAM(s) Oral <User Schedule>  rifaximin 550 milliGRAM(s) Oral two times a day  sodium chloride 0.9%. 1000 milliLiter(s) (75 mL/Hr) IV Continuous <Continuous>  ursodiol Capsule 300 milliGRAM(s) Oral two times a day    MEDICATIONS  (PRN):  dextrose 40% Gel 15 Gram(s) Oral once PRN Blood Glucose LESS THAN 70 milliGRAM(s)/deciLiter  glucagon  Injectable 1 milliGRAM(s) IntraMuscular once PRN Glucose <70 milliGRAM(s)/deciLiter  simethicone 80 milliGRAM(s) Chew once PRN Gas      REVIEW OF SYSTEMS: limited 2/2 pt's mental status	    Vital Signs Last 24 Hrs  T(C): 36.3 (08 Apr 2019 05:59), Max: 36.3 (07 Apr 2019 09:03)  T(F): 97.3 (08 Apr 2019 05:59), Max: 97.4 (07 Apr 2019 09:03)  HR: 50 (08 Apr 2019 05:59) (50 - 54)  BP: 94/50 (08 Apr 2019 05:59) (94/50 - 113/80)  BP(mean): --  RR: 16 (08 Apr 2019 05:59) (16 - 18)  SpO2: 97% (08 Apr 2019 05:59) (97% - 98%)    PHYSICAL EXAMINATION:   Constitutional: very ill appearance  HEENT: bitemp wasting, poor dentition. AT  Neck:  Supple  Respiratory:  Adequate airflow b/l. Not using accessory muscles of respiration.  Cardiovascular:  S1 & S2 intact, no R/G, 2+ radial pulses b/l  Gastrointestinal: Soft, normoactive b.s.  Extremities: warm  Neurological: stable minimal responsiveness to verbal/noxious stimuli. lethargic. nonverbal to verbal commands.     Labs and imaging reviewed  Basic Metabolic Panel w/Magnesium (04.07.19 @ 07:29)    eGFR if Non : 8: The units for eGFR are ml/min/1.73m2 (normalized body  surface area). The eGFR is calculated from a serum  creatinine using the CKD-EPI equation. Other variables  required for calculation are race, age and sex. Among  patients with chronic kidney disease (CKD), the eGFR is  useful in determining the stage of disease according to  KDOQI CKD classification. All eGFR results are reported  numerically with the following interpretation.    GFR  (ml/min/1.73 m2)          W/KIDNEY DAMAGE    W/O KIDNEY DMG  ==========================================================  >= 90.......................Stage 1..............Normal  60-89.......................Stage 2...........Decreased GFR  30-59.......................Stage 3..............Stage 3  15-29.......................Stage 4..............Stage 4  < 15........................Stage 5..............Stage 5    Each stage of CKD assumes that the associated GFR level  has been in effect for at least 3 months. Determination of  stages one and two (with eGFR > 59ml/min/m2) requires  estimation of kidney damage for at least 3 months as  defined by structural or functional abnormalities.    Limitations: All estimates of GFR will be less accurate  for patients at extremes of muscle mass (including but  not limited to frail elderly, critically ill, or cancer  patients), those with unusual diets, and those with  conditions associated with reduced secretion or  extrarenal elimination of creatinine. The eGFR equation  is not recommended for use in patients with unstable  creatinine levels. mL/min    eGFR if : 9 mL/min    Calcium, Total Serum: 8.7 mg/dL    Sodium, Serum: 143 mmol/L    Potassium, Serum: 4.0 mmol/L    Chloride, Serum: 107 mmol/L    Carbon Dioxide, Serum: 17 mmol/L    Anion Gap, Serum: 19 mmo/L    Blood Urea Nitrogen, Serum: 83 mg/dL    Creatinine, Serum: 5.70 mg/dL    Glucose, Serum: 89: Delta: 41 on 04/06/  Delta: 41 on 04/06/ mg/dL    Magnesium, Serum: 2.4 mg/dL

## 2019-04-08 NOTE — PROGRESS NOTE ADULT - SUBJECTIVE AND OBJECTIVE BOX
[facility] - Crystal / Corrie S /Kylah/ LARON Villegas/ LARON Beard/ Shiva Mullen / MAGUI Montielu  ---------------------------------------------------------------------------------------------------------------    Patient seen and examined bedside    Subjective and Objective: pt lethargic. NAD  son bedside    Allergies: No Known Allergies      Hospital Medications:   MEDICATIONS  (STANDING):  dextrose 5% + sodium chloride 0.45% 1000 milliLiter(s) (50 mL/Hr) IV Continuous <Continuous>  dextrose 5%. 1000 milliLiter(s) (50 mL/Hr) IV Continuous <Continuous>  dextrose 50% Injectable 12.5 Gram(s) IV Push once  dextrose 50% Injectable 25 Gram(s) IV Push once  dextrose 50% Injectable 25 Gram(s) IV Push once  ergocalciferol 35373 Unit(s) Oral every week  lactulose Retention Enema 200 Gram(s) Rectal two times a day  lactulose Syrup 10 Gram(s) Enteral Tube two times a day  lidocaine   Patch 1 Patch Transdermal daily  midodrine 2.5 milliGRAM(s) Oral <User Schedule>  rifaximin 550 milliGRAM(s) Oral two times a day  ursodiol Capsule 300 milliGRAM(s) Oral two times a day      VITALS:  T(F): 97.4 (04-08-19 @ 12:50), Max: 97.4 (04-08-19 @ 12:50)  HR: 51 (04-08-19 @ 12:50)  BP: 107/60 (04-08-19 @ 12:50)  RR: 16 (04-08-19 @ 12:50)  SpO2: 94% (04-08-19 @ 12:50)  Wt(kg): --    04-07 @ 07:01  -  04-08 @ 07:00  --------------------------------------------------------  IN: 0 mL / OUT: 600 mL / NET: -600 mL    04-08 @ 07:01  -  04-08 @ 14:02  --------------------------------------------------------  IN: 0 mL / OUT: 550 mL / NET: -550 mL      PHYSICAL EXAM:  Constitutional: NAD  HEENT: icteric sclera+  Neck: No JVD  Respiratory: CTAB, no wheezes, rales or rhonchi  Cardiovascular: S1, S2, RRR  Gastrointestinal: BS+, soft  Extremities: No cyanosis or clubbing. No peripheral edema  Neurological: lethargic  Psychiatric: Normal mood, normal affect  :  +flores.       LABS:  04-07    143  |  107  |  83<H>  ----------------------------<  89  4.0   |  17<L>  |  5.70<H>    Ca    8.7      07 Apr 2019 07:29  Mg     2.4     04-07      Creatinine Trend: 5.70 <--, 5.73 <--, 5.11 <--, 4.60 <--, 4.25 <--, 4.04 <--                        9.7    8.89  )-----------( 77       ( 07 Apr 2019 07:29 )             27.8     Urine Studies:        RADIOLOGY & ADDITIONAL STUDIES:

## 2019-04-08 NOTE — PROGRESS NOTE ADULT - SUBJECTIVE AND OBJECTIVE BOX
Patient is a 89y Male     Patient is a 89y old  Male who presents with a chief complaint of generalized weakness, urinary retention, and confusion X 2 days (08 Apr 2019 07:42)      HPI:  HPI:  89 yo M, poor historian, charts reviewed, with PMHx BPH, Cataracts, Diverticulosis, Anemia 2/2 GI Bleed with previous PRBC transfusions, who p/w 2 days genearlized weakness, urinary retention and confusion.  Upon arrival to the ED, pt with scleral icterus and labs significant for transaminitis and TBili > 20 (15 Mar 2019 06:59)      PAST MEDICAL & SURGICAL HISTORY:  Chronic kidney disease (CKD), stage IV (severe)  Benign prostatic hypertrophy  S/P cataract surgery, left: 3 yrs ago      MEDICATIONS  (STANDING):  dextrose 5% + sodium chloride 0.45% 1000 milliLiter(s) (50 mL/Hr) IV Continuous <Continuous>  dextrose 5%. 1000 milliLiter(s) (50 mL/Hr) IV Continuous <Continuous>  dextrose 50% Injectable 12.5 Gram(s) IV Push once  dextrose 50% Injectable 25 Gram(s) IV Push once  dextrose 50% Injectable 25 Gram(s) IV Push once  ergocalciferol 90940 Unit(s) Oral every week  lactulose Syrup 10 Gram(s) Enteral Tube two times a day  lidocaine   Patch 1 Patch Transdermal daily  midodrine 2.5 milliGRAM(s) Oral <User Schedule>  rifaximin 550 milliGRAM(s) Oral two times a day  ursodiol Capsule 300 milliGRAM(s) Oral two times a day      Allergies    No Known Allergies    Intolerances        SOCIAL HISTORY:  Denies ETOh,Smoking,     FAMILY HISTORY:  No pertinent family history in first degree relatives      REVIEW OF SYSTEMS:    CONSTITUTIONAL: No weakness, fevers or chills  EYES/ENT: No visual changes;  No vertigo or throat pain   NECK: No pain or stiffness  RESPIRATORY: No cough, wheezing, hemoptysis; No shortness of breath  CARDIOVASCULAR: No chest pain or palpitations  GASTROINTESTINAL: No abdominal or epigastric pain. No nausea, vomiting, or hematemesis; No diarrhea or constipation. No melena or hematochezia.  GENITOURINARY: No dysuria, frequency or hematuria  NEUROLOGICAL: No numbness or weakness  SKIN: No itching, burning, rashes, or lesions   All other review of systems is negative unless indicated above.    VITAL:  T(C): , Max: 36.3 (04-07-19 @ 13:03)  T(F): , Max: 97.3 (04-07-19 @ 13:03)  HR: 51 (04-08-19 @ 08:50)  BP: 105/68 (04-08-19 @ 08:50)  BP(mean): --  RR: 16 (04-08-19 @ 08:50)  SpO2: 94% (04-08-19 @ 08:50)  Wt(kg): --    I and O's:    04-06 @ 07:01  -  04-07 @ 07:00  --------------------------------------------------------  IN: 0 mL / OUT: 200 mL / NET: -200 mL    04-07 @ 07:01  -  04-08 @ 07:00  --------------------------------------------------------  IN: 0 mL / OUT: 600 mL / NET: -600 mL    04-08 @ 07:01  -  04-08 @ 10:08  --------------------------------------------------------  IN: 0 mL / OUT: 300 mL / NET: -300 mL          PHYSICAL EXAM:    Constitutional: NAD  HEENT: PERRLA,   Neck: No JVD  Respiratory: CTA B/L  Cardiovascular: S1 and S2  Gastrointestinal: BS+, soft, NT/ND  Extremities: No peripheral edema  Neurological: A/O x 3, no focal deficits  Psychiatric: Normal mood, normal affect  : No [place]  Skin: No rashes  Access: Not applicable  Back: No CVA tenderness    LABS:                        9.7    8.89  )-----------( 77       ( 07 Apr 2019 07:29 )             27.8     04-07    143  |  107  |  83<H>  ----------------------------<  89  4.0   |  17<L>  |  5.70<H>    Ca    8.7      07 Apr 2019 07:29  Mg     2.4     04-07            RADIOLOGY & ADDITIONAL STUDIES:

## 2019-04-08 NOTE — PROGRESS NOTE ADULT - ASSESSMENT
Impression:     1. Acute liver injury with marked  hyperbilirubinemia: DILI vs. viral. Rule out Hepatitis E. No improvement in liver function.    2. Kidney failure, worsening    3. Encephalopathy ? multifactorial, rule out sepsis    4. Drop in hemoglobin with appropriate response to transfusion, no overt bleeding    Recommendation:  -continue lactulose/ xifaxan PO if possible  -f/u hepatitis E IgM, I called the lab, it will probably not come back until next week  -in house SW and hospice per family request    Saritha Baez, PGY-4  Gastroenterology Fellow  Pager x [phone] or [phone]  (After 5 pm or on weekends please page GI on call)

## 2019-04-08 NOTE — PROGRESS NOTE ADULT - SUBJECTIVE AND OBJECTIVE BOX
[facility]      Seen earlier today, and examined.  - Today, patient is without complaints.           *****MEDICATIONS: Current medication reviewed and documented.    MEDICATIONS  (STANDING):  dextrose 5% + sodium chloride 0.45% 1000 milliLiter(s) (50 mL/Hr) IV Continuous <Continuous>  dextrose 5%. 1000 milliLiter(s) (50 mL/Hr) IV Continuous <Continuous>  dextrose 50% Injectable 12.5 Gram(s) IV Push once  dextrose 50% Injectable 25 Gram(s) IV Push once  dextrose 50% Injectable 25 Gram(s) IV Push once  ergocalciferol 56499 Unit(s) Oral every week  lactulose Retention Enema 200 Gram(s) Rectal two times a day  lactulose Syrup 10 Gram(s) Enteral Tube two times a day  lidocaine   Patch 1 Patch Transdermal daily  midodrine 2.5 milliGRAM(s) Oral <User Schedule>  rifaximin 550 milliGRAM(s) Oral two times a day  ursodiol Capsule 300 milliGRAM(s) Oral two times a day    MEDICATIONS  (PRN):  dextrose 40% Gel 15 Gram(s) Oral once PRN Blood Glucose LESS THAN 70 milliGRAM(s)/deciLiter  glucagon  Injectable 1 milliGRAM(s) IntraMuscular once PRN Glucose <70 milliGRAM(s)/deciLiter  simethicone 80 milliGRAM(s) Chew once PRN Gas          ***** VITAL SIGNS:  T(F): 97.3 (19 @ 16:50), Max: 97.4 (19 @ 12:50)  HR: 51 (19 @ 16:50) (50 - 54)  BP: 110/59 (19 @ 16:50) (89/50 - 110/59)  RR: 18 (19 @ 16:50) (16 - 18)  SpO2: 94% (19 @ 16:50) (94% - 98%)  Wt(kg): --  ,   I&O's Summary    2019 07:01  -  2019 07:00  --------------------------------------------------------  IN: 0 mL / OUT: 600 mL / NET: -600 mL    2019 07:  -  2019 18:31  --------------------------------------------------------  IN: 0 mL / OUT: 550 mL / NET: -550 mL             *****PHYSICAL EXAM:  lethargic icteric    alert to repeated tactile stimuli    able to mutter a few words.            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  Upon arrival to the ED, pt with scleral icterus and labs significant for transaminitis and TBili > 20    called to evaluate pt for ams         Problem/Recommendations 1:  Multifactorial toxic metabolic encephalopathy  , hyperbilirubinemia, hyperammonemia, and worsening renal function  correct metabolic dyscrasias defer to renal/gi    on lactulose   worsening lethargy /encephalopathy   pt not a candidate for dialysis per renal.    ethics consult noted.   continue supportive care.     spent a lot of time with pt and family  at bedside.     Thank you for allowing me to participate in the care of this patient. Please do not hesitate to call me if you have any  questions.        ________________  Ava Schreiber MD  [facility] [place] ([facility]  [phone]      30 minutes spent on total encounter; more than 50 % of the visit was  spent counseling about plan of care, compliance to diet/exercise and medication regimen and or  coordinating care by the attending physician.      It is advised that s stroke patients follow up with DULCE MARIA Rausch @ [phone] in 1- 2 weeks.   Others please follow up with Dr. Michael Nissenbaum [phone]

## 2019-04-08 NOTE — PROGRESS NOTE ADULT - SUBJECTIVE AND OBJECTIVE BOX
S: Denies chest pain or SOB      MEDICATIONS  (STANDING):  dextrose 5% + sodium chloride 0.45% 1000 milliLiter(s) (50 mL/Hr) IV Continuous <Continuous>  dextrose 5%. 1000 milliLiter(s) (50 mL/Hr) IV Continuous <Continuous>  dextrose 50% Injectable 12.5 Gram(s) IV Push once  dextrose 50% Injectable 25 Gram(s) IV Push once  dextrose 50% Injectable 25 Gram(s) IV Push once  ergocalciferol 69129 Unit(s) Oral every week  lactulose Syrup 10 Gram(s) Enteral Tube two times a day  lidocaine   Patch 1 Patch Transdermal daily  midodrine 2.5 milliGRAM(s) Oral <User Schedule>  rifaximin 550 milliGRAM(s) Oral two times a day  ursodiol Capsule 300 milliGRAM(s) Oral two times a day    MEDICATIONS  (PRN):  dextrose 40% Gel 15 Gram(s) Oral once PRN Blood Glucose LESS THAN 70 milliGRAM(s)/deciLiter  glucagon  Injectable 1 milliGRAM(s) IntraMuscular once PRN Glucose <70 milliGRAM(s)/deciLiter  simethicone 80 milliGRAM(s) Chew once PRN Gas      LABS:                            9.7    8.89  )-----------( 77       ( 07 Apr 2019 07:29 )             27.8     Hemoglobin: 9.7 g/dL (04-07 @ 07:29)  Hemoglobin: 9.7 g/dL (04-06 @ 06:30)  Hemoglobin: 8.1 g/dL (04-05 @ 07:15)  Hemoglobin: 8.7 g/dL (04-04 @ 02:40)    04-07    143  |  107  |  83<H>  ----------------------------<  89  4.0   |  17<L>  |  5.70<H>    Ca    8.7      07 Apr 2019 07:29  Mg     2.4     04-07      Creatinine Trend: 5.70<--, 5.73<--, 5.11<--, 4.60<--, 4.25<--, 4.04<--       PHYSICAL EXAM  Vital Signs Last 24 Hrs  T(C): 36.3 (08 Apr 2019 08:50), Max: 36.3 (07 Apr 2019 13:03)  T(F): 97.3 (08 Apr 2019 08:50), Max: 97.3 (07 Apr 2019 13:03)  HR: 51 (08 Apr 2019 08:50) (50 - 54)  BP: 105/68 (08 Apr 2019 08:50) (89/50 - 113/80)  BP(mean): --  RR: 16 (08 Apr 2019 08:50) (16 - 18)  SpO2: 94% (08 Apr 2019 08:50) (94% - 98%)      CV: Normal,  S1 S2 1/6 DEBBIE (+)2 Pulses B/l  Resp:  Clear to auscultation B/L, normal effort  GI: (+) BS Soft, NT, ND  Lymph:  (+) Pitting Edema B/L  Skin: Warm to touch    DATA:    TELEMETRY: SB     < from: Transthoracic Echocardiogram (03.23.19 @ 09:36) >  CONCLUSIONS:  1. Mitral annular calcification, otherwise normal mitral  valve. Mild mitral regurgitation.  2. Aortic valve leaflet morphology not well visualized;  appears calcified with normal opening. Moderate aortic  regurgitation.  3. Normal left ventricular internal dimensions and wall  thicknesses.  4. Hyperdynamic left ventricle.  5. Mild diastolic dysfunction (Stage I).  6. Normal right ventricular size and function.  7. Normal tricuspid valve. Mild tricuspid regurgitation.  8. Estimated pulmonary artery systolic pressure equals 36  mm Hg, assuming right atrial pressure equals 10  mm Hg,  consistent with borderline pulmonary hypertension.  *** Compared with echocardiogram of 6/11/2018,no  significant changes noted.  ------------------------------------------------------------------------  Confirmed on  3/23/2019 - 11:19:58 by Evaristo Castanon MD, [facility],  TAMMY, RPVI  < end of copied text >      ASSESSMENT/PLAN: 	    89 yo M, poor historian, charts reviewed, with PMHx BPH, Cataracts, Diverticulosis, Anemia 2/2 GI Bleed with previous PRBC transfusions, who p/w 2 days generalized  weakness, urinary retention and confusion. LFTs significant for transaminitis, scleral jaundice.     -TTE with moderate AI and normal LV function , medical management of valvular disease recommended   -CT chest with 4.5cm ascending aortic aneurysm, continue medical management   -nephrology/hepatology follow up noted  -Palliative/hospice follow up  -No further cardiac workup at this time S: Denies chest pain or SOB      MEDICATIONS  (STANDING):  dextrose 5% + sodium chloride 0.45% 1000 milliLiter(s) (50 mL/Hr) IV Continuous <Continuous>  dextrose 5%. 1000 milliLiter(s) (50 mL/Hr) IV Continuous <Continuous>  dextrose 50% Injectable 12.5 Gram(s) IV Push once  dextrose 50% Injectable 25 Gram(s) IV Push once  dextrose 50% Injectable 25 Gram(s) IV Push once  ergocalciferol 59496 Unit(s) Oral every week  lactulose Syrup 10 Gram(s) Enteral Tube two times a day  lidocaine   Patch 1 Patch Transdermal daily  midodrine 2.5 milliGRAM(s) Oral <User Schedule>  rifaximin 550 milliGRAM(s) Oral two times a day  ursodiol Capsule 300 milliGRAM(s) Oral two times a day    MEDICATIONS  (PRN):  dextrose 40% Gel 15 Gram(s) Oral once PRN Blood Glucose LESS THAN 70 milliGRAM(s)/deciLiter  glucagon  Injectable 1 milliGRAM(s) IntraMuscular once PRN Glucose <70 milliGRAM(s)/deciLiter  simethicone 80 milliGRAM(s) Chew once PRN Gas      LABS:                            9.7    8.89  )-----------( 77       ( 07 Apr 2019 07:29 )             27.8     Hemoglobin: 9.7 g/dL (04-07 @ 07:29)  Hemoglobin: 9.7 g/dL (04-06 @ 06:30)  Hemoglobin: 8.1 g/dL (04-05 @ 07:15)  Hemoglobin: 8.7 g/dL (04-04 @ 02:40)    04-07    143  |  107  |  83<H>  ----------------------------<  89  4.0   |  17<L>  |  5.70<H>    Ca    8.7      07 Apr 2019 07:29  Mg     2.4     04-07      Creatinine Trend: 5.70<--, 5.73<--, 5.11<--, 4.60<--, 4.25<--, 4.04<--       PHYSICAL EXAM  Vital Signs Last 24 Hrs  T(C): 36.3 (08 Apr 2019 08:50), Max: 36.3 (07 Apr 2019 13:03)  T(F): 97.3 (08 Apr 2019 08:50), Max: 97.3 (07 Apr 2019 13:03)  HR: 51 (08 Apr 2019 08:50) (50 - 54)  BP: 105/68 (08 Apr 2019 08:50) (89/50 - 113/80)  BP(mean): --  RR: 16 (08 Apr 2019 08:50) (16 - 18)  SpO2: 94% (08 Apr 2019 08:50) (94% - 98%)      CV: Normal,  S1 S2 1/6 DEBBIE (+)2 Pulses B/l  Resp:  Clear to auscultation B/L, normal effort  GI: (+) BS Soft, NT, ND  Lymph:  (+) Pitting Edema B/L  Skin: Warm to touch    DATA:    TELEMETRY: SB     < from: Transthoracic Echocardiogram (03.23.19 @ 09:36) >  CONCLUSIONS:  1. Mitral annular calcification, otherwise normal mitral  valve. Mild mitral regurgitation.  2. Aortic valve leaflet morphology not well visualized;  appears calcified with normal opening. Moderate aortic  regurgitation.  3. Normal left ventricular internal dimensions and wall  thicknesses.  4. Hyperdynamic left ventricle.  5. Mild diastolic dysfunction (Stage I).  6. Normal right ventricular size and function.  7. Normal tricuspid valve. Mild tricuspid regurgitation.  8. Estimated pulmonary artery systolic pressure equals 36  mm Hg, assuming right atrial pressure equals 10  mm Hg,  consistent with borderline pulmonary hypertension.  *** Compared with echocardiogram of 6/11/2018,no  significant changes noted.  ------------------------------------------------------------------------  Confirmed on  3/23/2019 - 11:19:58 by Evaristo Castanon MD, [facility],  TAMMY, RPVI  < end of copied text >      ASSESSMENT/PLAN: 	    89 yo M, poor historian, charts reviewed, with PMHx BPH, Cataracts, Diverticulosis, Anemia 2/2 GI Bleed with previous PRBC transfusions, who p/w 2 days generalized  weakness, urinary retention and confusion. LFTs significant for transaminitis, scleral jaundice.     -TTE with moderate AI and normal LV function , medical management of valvular disease recommended   -CT chest with 4.5cm ascending aortic aneurysm, continue medical management   -Unable to tolerate Beta blockers due to hypotension  -nephrology/hepatology follow up noted  -Palliative/hospice follow up  -No further cardiac workup at this time

## 2019-04-08 NOTE — PROGRESS NOTE ADULT - ATTENDING COMMENTS
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  now with renal failure and increasing somnolence.  Current bilirubin 21 mg/dL.  Patient not transplant candidate and no specific liver directed therapy available.  Continue supportive care.

## 2019-04-08 NOTE — PROGRESS NOTE ADULT - SUBJECTIVE AND OBJECTIVE BOX
Chief Complaint:  Patient is a 89y old  Male who presents with a chief complaint of generalized weakness, urinary retention, and confusion X 2 days (07 Apr 2019 19:47)      Interval Events:   no events over night.    Allergies:  No Known Allergies      Hospital Medications:  dextrose 40% Gel 15 Gram(s) Oral once PRN  dextrose 5% + sodium chloride 0.45% 1000 milliLiter(s) IV Continuous <Continuous>  dextrose 5%. 1000 milliLiter(s) IV Continuous <Continuous>  dextrose 50% Injectable 12.5 Gram(s) IV Push once  dextrose 50% Injectable 25 Gram(s) IV Push once  dextrose 50% Injectable 25 Gram(s) IV Push once  ergocalciferol 23243 Unit(s) Oral every week  glucagon  Injectable 1 milliGRAM(s) IntraMuscular once PRN  lactulose Syrup 10 Gram(s) Enteral Tube two times a day  lidocaine   Patch 1 Patch Transdermal daily  metoprolol tartrate 12.5 milliGRAM(s) Oral two times a day  midodrine 2.5 milliGRAM(s) Oral <User Schedule>  rifaximin 550 milliGRAM(s) Oral two times a day  simethicone 80 milliGRAM(s) Chew once PRN  ursodiol Capsule 300 milliGRAM(s) Oral two times a day      PMHX/PSHX:  Chronic kidney disease (CKD), stage IV (severe)  Benign prostatic hypertrophy  History of BPH  No pertinent past medical history  S/P cataract surgery, left  No significant past surgical history      Family history:  No pertinent family history in first degree relatives          PHYSICAL EXAM:   GENERAL: NAD, well-developed  HEAD:  Atraumatic, Normocephalic  EYES: EOMI, PERRLA, conjunctiva and sclera icteric  NECK: Supple, No JVD  CHEST/LUNG: Clear to auscultation bilaterally; No wheeze  HEART: Regular rate and rhythm; No murmurs, rubs, or gallops  ABDOMEN: Soft, Nontender, Nondistended; Bowel sounds present, no hepatosplenomegaly, no rebound or guarding  EXTREMITIES:  2+ Peripheral Pulses, No clubbing, cyanosis, or edema    Vital Signs:  Vital Signs Last 24 Hrs  T(C): 36.3 (08 Apr 2019 05:59), Max: 36.3 (07 Apr 2019 09:03)  T(F): 97.3 (08 Apr 2019 05:59), Max: 97.4 (07 Apr 2019 09:03)  HR: 50 (08 Apr 2019 05:59) (50 - 54)  BP: 94/50 (08 Apr 2019 05:59) (94/50 - 113/80)  BP(mean): --  RR: 16 (08 Apr 2019 05:59) (16 - 18)  SpO2: 97% (08 Apr 2019 05:59) (97% - 98%)  Daily     Daily     LABS:                        9.7    8.89  )-----------( 77       ( 07 Apr 2019 07:29 )             27.8     04-07    143  |  107  |  83<H>  ----------------------------<  89  4.0   |  17<L>  |  5.70<H>    Ca    8.7      07 Apr 2019 07:29  Mg     2.4     04-07        PT/INR - ( 07 Apr 2019 07:29 )   PT: 30.7 SEC;   INR: 2.61                  Imaging:

## 2019-04-08 NOTE — PROGRESS NOTE ADULT - PROBLEM SELECTOR PLAN 1
w/worsening RFT  Worsening third spacing from hypoalbuminemia.   not a candidate for dialysis.   avoid ACEi/ARB/NSAIDs/nephrotoxics  dose all meds for eGFR <15ml/min

## 2019-04-09 LAB
ANION GAP SERPL CALC-SCNC: 23 MMO/L — HIGH (ref 7–14)
ANISOCYTOSIS BLD QL: SIGNIFICANT CHANGE UP
APPEARANCE UR: SIGNIFICANT CHANGE UP
APPEARANCE UR: SIGNIFICANT CHANGE UP
APTT BLD: 135.4 SEC — CRITICAL HIGH (ref 27.5–36.3)
BACTERIA # UR AUTO: HIGH
BACTERIA # UR AUTO: NEGATIVE — SIGNIFICANT CHANGE UP
BASE EXCESS BLDA CALC-SCNC: -10.2 MMOL/L — SIGNIFICANT CHANGE UP
BASOPHILS # BLD AUTO: 0.02 K/UL — SIGNIFICANT CHANGE UP (ref 0–0.2)
BASOPHILS # BLD AUTO: 0.04 K/UL — SIGNIFICANT CHANGE UP (ref 0–0.2)
BASOPHILS NFR BLD AUTO: 0.2 % — SIGNIFICANT CHANGE UP (ref 0–2)
BASOPHILS NFR BLD AUTO: 0.4 % — SIGNIFICANT CHANGE UP (ref 0–2)
BASOPHILS NFR SPEC: 0.9 % — SIGNIFICANT CHANGE UP (ref 0–2)
BILIRUB SERPL-MCNC: 18.5 MG/DL — HIGH (ref 0.2–1.2)
BILIRUB UR-MCNC: HIGH
BILIRUB UR-MCNC: HIGH
BLASTS # FLD: 0 % — SIGNIFICANT CHANGE UP (ref 0–0)
BLD GP AB SCN SERPL QL: NEGATIVE — SIGNIFICANT CHANGE UP
BLOOD GAS ARTERIAL - FIO2: 100 — SIGNIFICANT CHANGE UP
BLOOD UR QL VISUAL: HIGH
BLOOD UR QL VISUAL: HIGH
BUN SERPL-MCNC: 88 MG/DL — HIGH (ref 7–23)
BURR CELLS BLD QL SMEAR: PRESENT — SIGNIFICANT CHANGE UP
BURR CELLS BLD QL SMEAR: SIGNIFICANT CHANGE UP
CALCIUM SERPL-MCNC: 8.5 MG/DL — SIGNIFICANT CHANGE UP (ref 8.4–10.5)
CHLORIDE BLDA-SCNC: 114 MMOL/L — HIGH (ref 96–108)
CHLORIDE SERPL-SCNC: 105 MMOL/L — SIGNIFICANT CHANGE UP (ref 98–107)
CO2 SERPL-SCNC: 17 MMOL/L — LOW (ref 22–31)
COD CRY URNS QL: SIGNIFICANT CHANGE UP (ref 0–0)
COLOR SPEC: SIGNIFICANT CHANGE UP
COLOR SPEC: SIGNIFICANT CHANGE UP
CREAT SERPL-MCNC: 6.04 MG/DL — HIGH (ref 0.5–1.3)
EOSINOPHIL # BLD AUTO: 0.12 K/UL — SIGNIFICANT CHANGE UP (ref 0–0.5)
EOSINOPHIL # BLD AUTO: 0.16 K/UL — SIGNIFICANT CHANGE UP (ref 0–0.5)
EOSINOPHIL NFR BLD AUTO: 1.2 % — SIGNIFICANT CHANGE UP (ref 0–6)
EOSINOPHIL NFR BLD AUTO: 1.7 % — SIGNIFICANT CHANGE UP (ref 0–6)
EOSINOPHIL NFR FLD: 0 % — SIGNIFICANT CHANGE UP (ref 0–6)
GLUCOSE BLDA-MCNC: 18 MG/DL — CRITICAL LOW (ref 70–99)
GLUCOSE BLDC GLUCOMTR-MCNC: 38 MG/DL — CRITICAL LOW (ref 70–99)
GLUCOSE BLDC GLUCOMTR-MCNC: 81 MG/DL — SIGNIFICANT CHANGE UP (ref 70–99)
GLUCOSE BLDC GLUCOMTR-MCNC: 91 MG/DL — SIGNIFICANT CHANGE UP (ref 70–99)
GLUCOSE BLDC GLUCOMTR-MCNC: 92 MG/DL — SIGNIFICANT CHANGE UP (ref 70–99)
GLUCOSE SERPL-MCNC: 75 MG/DL — SIGNIFICANT CHANGE UP (ref 70–99)
GLUCOSE UR-MCNC: NEGATIVE — SIGNIFICANT CHANGE UP
GLUCOSE UR-MCNC: NEGATIVE — SIGNIFICANT CHANGE UP
HCO3 BLDA-SCNC: 17 MMOL/L — LOW (ref 22–26)
HCT VFR BLD CALC: 24.6 % — LOW (ref 39–50)
HCT VFR BLD CALC: 26.9 % — LOW (ref 39–50)
HCT VFR BLDA CALC: 27.8 % — LOW (ref 39–51)
HGB BLD-MCNC: 8.4 G/DL — LOW (ref 13–17)
HGB BLD-MCNC: 9.3 G/DL — LOW (ref 13–17)
HGB BLDA-MCNC: 9 G/DL — LOW (ref 13–17)
HYALINE CASTS # UR AUTO: NEGATIVE — SIGNIFICANT CHANGE UP
HYALINE CASTS # UR AUTO: NEGATIVE — SIGNIFICANT CHANGE UP
IMM GRANULOCYTES NFR BLD AUTO: 2.9 % — HIGH (ref 0–1.5)
IMM GRANULOCYTES NFR BLD AUTO: 5.1 % — HIGH (ref 0–1.5)
INR BLD: 4.65 — HIGH (ref 0.88–1.17)
KETONES UR-MCNC: NEGATIVE — SIGNIFICANT CHANGE UP
KETONES UR-MCNC: NEGATIVE — SIGNIFICANT CHANGE UP
LACTATE BLDA-SCNC: 10.3 MMOL/L — CRITICAL HIGH (ref 0.5–2)
LACTATE SERPL-SCNC: 6.4 MMOL/L — CRITICAL HIGH (ref 0.5–2)
LEUKOCYTE ESTERASE UR-ACNC: SIGNIFICANT CHANGE UP
LEUKOCYTE ESTERASE UR-ACNC: SIGNIFICANT CHANGE UP
LYMPHOCYTES # BLD AUTO: 0.53 K/UL — LOW (ref 1–3.3)
LYMPHOCYTES # BLD AUTO: 0.55 K/UL — LOW (ref 1–3.3)
LYMPHOCYTES # BLD AUTO: 5.6 % — LOW (ref 13–44)
LYMPHOCYTES # BLD AUTO: 5.7 % — LOW (ref 13–44)
LYMPHOCYTES NFR SPEC AUTO: 4.4 % — LOW (ref 13–44)
MACROCYTES BLD QL: SIGNIFICANT CHANGE UP
MAGNESIUM SERPL-MCNC: 2.4 MG/DL — SIGNIFICANT CHANGE UP (ref 1.6–2.6)
MANUAL SMEAR VERIFICATION: SIGNIFICANT CHANGE UP
MANUAL SMEAR VERIFICATION: SIGNIFICANT CHANGE UP
MCHC RBC-ENTMCNC: 28.6 PG — SIGNIFICANT CHANGE UP (ref 27–34)
MCHC RBC-ENTMCNC: 28.8 PG — SIGNIFICANT CHANGE UP (ref 27–34)
MCHC RBC-ENTMCNC: 34.1 % — SIGNIFICANT CHANGE UP (ref 32–36)
MCHC RBC-ENTMCNC: 34.6 % — SIGNIFICANT CHANGE UP (ref 32–36)
MCV RBC AUTO: 82.8 FL — SIGNIFICANT CHANGE UP (ref 80–100)
MCV RBC AUTO: 84.2 FL — SIGNIFICANT CHANGE UP (ref 80–100)
METAMYELOCYTES # FLD: 0 % — SIGNIFICANT CHANGE UP (ref 0–1)
MONOCYTES # BLD AUTO: 0.72 K/UL — SIGNIFICANT CHANGE UP (ref 0–0.9)
MONOCYTES # BLD AUTO: 0.89 K/UL — SIGNIFICANT CHANGE UP (ref 0–0.9)
MONOCYTES NFR BLD AUTO: 7.7 % — SIGNIFICANT CHANGE UP (ref 2–14)
MONOCYTES NFR BLD AUTO: 9 % — SIGNIFICANT CHANGE UP (ref 2–14)
MONOCYTES NFR BLD: 3.5 % — SIGNIFICANT CHANGE UP (ref 2–9)
MYELOCYTES NFR BLD: 0 % — SIGNIFICANT CHANGE UP (ref 0–0)
NEUTROPHIL AB SER-ACNC: 90.3 % — HIGH (ref 43–77)
NEUTROPHILS # BLD AUTO: 7.43 K/UL — HIGH (ref 1.8–7.4)
NEUTROPHILS # BLD AUTO: 7.98 K/UL — HIGH (ref 1.8–7.4)
NEUTROPHILS NFR BLD AUTO: 79.4 % — HIGH (ref 43–77)
NEUTROPHILS NFR BLD AUTO: 81.1 % — HIGH (ref 43–77)
NEUTS BAND # BLD: 0 % — SIGNIFICANT CHANGE UP (ref 0–6)
NITRITE UR-MCNC: NEGATIVE — SIGNIFICANT CHANGE UP
NITRITE UR-MCNC: NEGATIVE — SIGNIFICANT CHANGE UP
NRBC # BLD: 18 /100WBC — SIGNIFICANT CHANGE UP
NRBC # FLD: 1.5 K/UL — SIGNIFICANT CHANGE UP (ref 0–0)
NRBC # FLD: 4.77 K/UL — SIGNIFICANT CHANGE UP (ref 0–0)
NRBC FLD-RTO: 15.2 — SIGNIFICANT CHANGE UP
NRBC FLD-RTO: 51 — SIGNIFICANT CHANGE UP
OTHER - HEMATOLOGY %: 0 — SIGNIFICANT CHANGE UP
PCO2 BLDA: 28 MMHG — LOW (ref 35–48)
PH BLDA: 7.34 PH — LOW (ref 7.35–7.45)
PH UR: 6 — SIGNIFICANT CHANGE UP (ref 5–8)
PH UR: 6 — SIGNIFICANT CHANGE UP (ref 5–8)
PLATELET # BLD AUTO: 63 K/UL — LOW (ref 150–400)
PLATELET # BLD AUTO: 72 K/UL — LOW (ref 150–400)
PLATELET COUNT - ESTIMATE: SIGNIFICANT CHANGE UP
PMV BLD: SIGNIFICANT CHANGE UP FL (ref 7–13)
PMV BLD: SIGNIFICANT CHANGE UP FL (ref 7–13)
PO2 BLDA: 123 MMHG — HIGH (ref 83–108)
POIKILOCYTOSIS BLD QL AUTO: SIGNIFICANT CHANGE UP
POLYCHROMASIA BLD QL SMEAR: SLIGHT — SIGNIFICANT CHANGE UP
POTASSIUM BLDA-SCNC: 3.1 MMOL/L — LOW (ref 3.4–4.5)
POTASSIUM SERPL-MCNC: 3.6 MMOL/L — SIGNIFICANT CHANGE UP (ref 3.5–5.3)
POTASSIUM SERPL-SCNC: 3.6 MMOL/L — SIGNIFICANT CHANGE UP (ref 3.5–5.3)
PROMYELOCYTES # FLD: 0 % — SIGNIFICANT CHANGE UP (ref 0–0)
PROT UR-MCNC: 30 — SIGNIFICANT CHANGE UP
PROT UR-MCNC: 30 — SIGNIFICANT CHANGE UP
PROTHROM AB SERPL-ACNC: 55.6 SEC — HIGH (ref 9.8–13.1)
RBC # BLD: 2.92 M/UL — LOW (ref 4.2–5.8)
RBC # BLD: 3.25 M/UL — LOW (ref 4.2–5.8)
RBC # FLD: 28.1 % — HIGH (ref 10.3–14.5)
RBC # FLD: 29.6 % — HIGH (ref 10.3–14.5)
RBC CASTS # UR COMP ASSIST: HIGH (ref 0–?)
RBC CASTS # UR COMP ASSIST: HIGH (ref 0–?)
RH IG SCN BLD-IMP: POSITIVE — SIGNIFICANT CHANGE UP
SAO2 % BLDA: 98.2 % — SIGNIFICANT CHANGE UP (ref 95–99)
SODIUM BLDA-SCNC: 141 MMOL/L — SIGNIFICANT CHANGE UP (ref 136–146)
SODIUM SERPL-SCNC: 145 MMOL/L — SIGNIFICANT CHANGE UP (ref 135–145)
SP GR SPEC: 1.01 — SIGNIFICANT CHANGE UP (ref 1–1.04)
SP GR SPEC: 1.01 — SIGNIFICANT CHANGE UP (ref 1–1.04)
SPECIMEN SOURCE: SIGNIFICANT CHANGE UP
SQUAMOUS # UR AUTO: SIGNIFICANT CHANGE UP
SQUAMOUS # UR AUTO: SIGNIFICANT CHANGE UP
TSH SERPL-MCNC: 2.48 UIU/ML — SIGNIFICANT CHANGE UP (ref 0.27–4.2)
UROBILINOGEN FLD QL: HIGH
UROBILINOGEN FLD QL: HIGH
VARIANT LYMPHS # BLD: 0.9 % — SIGNIFICANT CHANGE UP
WBC # BLD: 9.36 K/UL — SIGNIFICANT CHANGE UP (ref 3.8–10.5)
WBC # BLD: 9.85 K/UL — SIGNIFICANT CHANGE UP (ref 3.8–10.5)
WBC # FLD AUTO: 9.36 K/UL — SIGNIFICANT CHANGE UP (ref 3.8–10.5)
WBC # FLD AUTO: 9.85 K/UL — SIGNIFICANT CHANGE UP (ref 3.8–10.5)
WBC UR QL: SIGNIFICANT CHANGE UP (ref 0–?)
WBC UR QL: SIGNIFICANT CHANGE UP (ref 0–?)

## 2019-04-09 PROCEDURE — 71045 X-RAY EXAM CHEST 1 VIEW: CPT | Mod: 26,76

## 2019-04-09 PROCEDURE — 99232 SBSQ HOSP IP/OBS MODERATE 35: CPT | Mod: GC

## 2019-04-09 PROCEDURE — 71045 X-RAY EXAM CHEST 1 VIEW: CPT | Mod: 26,77

## 2019-04-09 PROCEDURE — 99291 CRITICAL CARE FIRST HOUR: CPT

## 2019-04-09 RX ORDER — SODIUM CHLORIDE 9 MG/ML
1000 INJECTION, SOLUTION INTRAVENOUS
Qty: 0 | Refills: 0 | Status: DISCONTINUED | OUTPATIENT
Start: 2019-04-09 | End: 2019-04-09

## 2019-04-09 RX ORDER — SODIUM CHLORIDE 9 MG/ML
500 INJECTION INTRAMUSCULAR; INTRAVENOUS; SUBCUTANEOUS ONCE
Qty: 0 | Refills: 0 | Status: COMPLETED | OUTPATIENT
Start: 2019-04-09 | End: 2019-04-09

## 2019-04-09 RX ORDER — DEXTROSE 50 % IN WATER 50 %
50 SYRINGE (ML) INTRAVENOUS ONCE
Qty: 0 | Refills: 0 | Status: COMPLETED | OUTPATIENT
Start: 2019-04-09 | End: 2019-04-09

## 2019-04-09 RX ORDER — MEROPENEM 1 G/30ML
500 INJECTION INTRAVENOUS EVERY 8 HOURS
Qty: 0 | Refills: 0 | Status: COMPLETED | OUTPATIENT
Start: 2019-04-09 | End: 2019-04-09

## 2019-04-09 RX ORDER — MORPHINE SULFATE 50 MG/1
4 CAPSULE, EXTENDED RELEASE ORAL ONCE
Qty: 0 | Refills: 0 | Status: DISCONTINUED | OUTPATIENT
Start: 2019-04-09 | End: 2019-04-09

## 2019-04-09 RX ORDER — MORPHINE SULFATE 50 MG/1
2 CAPSULE, EXTENDED RELEASE ORAL ONCE
Qty: 0 | Refills: 0 | Status: DISCONTINUED | OUTPATIENT
Start: 2019-04-09 | End: 2019-04-09

## 2019-04-09 RX ORDER — DEXTROSE 10 % IN WATER 10 %
1000 INTRAVENOUS SOLUTION INTRAVENOUS
Qty: 0 | Refills: 0 | Status: DISCONTINUED | OUTPATIENT
Start: 2019-04-09 | End: 2019-04-09

## 2019-04-09 RX ORDER — DEXTROSE 50 % IN WATER 50 %
25 SYRINGE (ML) INTRAVENOUS ONCE
Qty: 0 | Refills: 0 | Status: DISCONTINUED | OUTPATIENT
Start: 2019-04-09 | End: 2019-04-09

## 2019-04-09 RX ORDER — DEXTROSE 50 % IN WATER 50 %
50 SYRINGE (ML) INTRAVENOUS ONCE
Qty: 0 | Refills: 0 | Status: DISCONTINUED | OUTPATIENT
Start: 2019-04-09 | End: 2019-04-10

## 2019-04-09 RX ORDER — VANCOMYCIN HCL 1 G
500 VIAL (EA) INTRAVENOUS ONCE
Qty: 0 | Refills: 0 | Status: COMPLETED | OUTPATIENT
Start: 2019-04-09 | End: 2019-04-09

## 2019-04-09 RX ORDER — CHLORHEXIDINE GLUCONATE 213 G/1000ML
1 SOLUTION TOPICAL DAILY
Qty: 0 | Refills: 0 | Status: DISCONTINUED | OUTPATIENT
Start: 2019-04-09 | End: 2019-04-09

## 2019-04-09 RX ORDER — NOREPINEPHRINE BITARTRATE/D5W 8 MG/250ML
1 PLASTIC BAG, INJECTION (ML) INTRAVENOUS
Qty: 8 | Refills: 0 | Status: DISCONTINUED | OUTPATIENT
Start: 2019-04-09 | End: 2019-04-09

## 2019-04-09 RX ORDER — MORPHINE SULFATE 50 MG/1
2 CAPSULE, EXTENDED RELEASE ORAL
Qty: 100 | Refills: 0 | Status: DISCONTINUED | OUTPATIENT
Start: 2019-04-09 | End: 2019-04-10

## 2019-04-09 RX ORDER — CHLORHEXIDINE GLUCONATE 213 G/1000ML
1 SOLUTION TOPICAL
Qty: 0 | Refills: 0 | Status: DISCONTINUED | OUTPATIENT
Start: 2019-04-09 | End: 2019-04-10

## 2019-04-09 RX ORDER — MORPHINE SULFATE 50 MG/1
1 CAPSULE, EXTENDED RELEASE ORAL EVERY 6 HOURS
Qty: 0 | Refills: 0 | Status: DISCONTINUED | OUTPATIENT
Start: 2019-04-09 | End: 2019-04-09

## 2019-04-09 RX ORDER — DEXTROSE 50 % IN WATER 50 %
12.5 SYRINGE (ML) INTRAVENOUS ONCE
Qty: 0 | Refills: 0 | Status: COMPLETED | OUTPATIENT
Start: 2019-04-09 | End: 2019-04-09

## 2019-04-09 RX ADMIN — Medication 100 MILLIGRAM(S): at 07:06

## 2019-04-09 RX ADMIN — Medication 100.31 MICROGRAM(S)/KG/MIN: at 17:40

## 2019-04-09 RX ADMIN — Medication 50 MILLILITER(S): at 17:15

## 2019-04-09 RX ADMIN — SODIUM CHLORIDE 1000 MILLILITER(S): 9 INJECTION INTRAMUSCULAR; INTRAVENOUS; SUBCUTANEOUS at 06:04

## 2019-04-09 RX ADMIN — MORPHINE SULFATE 2 MILLIGRAM(S): 50 CAPSULE, EXTENDED RELEASE ORAL at 21:00

## 2019-04-09 RX ADMIN — MORPHINE SULFATE 2 MG/HR: 50 CAPSULE, EXTENDED RELEASE ORAL at 21:25

## 2019-04-09 RX ADMIN — Medication 12.5 GRAM(S): at 09:16

## 2019-04-09 RX ADMIN — Medication 50 MILLILITER(S): at 17:30

## 2019-04-09 RX ADMIN — MIDODRINE HYDROCHLORIDE 2.5 MILLIGRAM(S): 2.5 TABLET ORAL at 12:53

## 2019-04-09 RX ADMIN — MEROPENEM 100 MILLIGRAM(S): 1 INJECTION INTRAVENOUS at 06:38

## 2019-04-09 RX ADMIN — SODIUM CHLORIDE 50 MILLILITER(S): 9 INJECTION, SOLUTION INTRAVENOUS at 12:53

## 2019-04-09 RX ADMIN — LACTULOSE 200 GRAM(S): 10 SOLUTION ORAL at 02:11

## 2019-04-09 RX ADMIN — SODIUM CHLORIDE 50 MILLILITER(S): 9 INJECTION, SOLUTION INTRAVENOUS at 19:54

## 2019-04-09 RX ADMIN — LIDOCAINE 1 PATCH: 4 CREAM TOPICAL at 17:22

## 2019-04-09 RX ADMIN — LIDOCAINE 1 PATCH: 4 CREAM TOPICAL at 12:53

## 2019-04-09 RX ADMIN — SODIUM CHLORIDE 500 MILLILITER(S): 9 INJECTION INTRAMUSCULAR; INTRAVENOUS; SUBCUTANEOUS at 04:07

## 2019-04-09 NOTE — PROGRESS NOTE ADULT - ATTENDING COMMENTS
Patient seen and examined, agree with above assessment and plan as transcribed above.      - Palliative f/u  - can D/C tele no pertinent findings  - I will sign off, please call back if needed    Jeff Galicia MD, FACC  BEEPER [phone]

## 2019-04-09 NOTE — PROGRESS NOTE ADULT - REASON FOR ADMISSION
generalized weakness, urinary retention, and confusion X 2 days
weakness
generalized weakness, urinary retention, and confusion X 2 days

## 2019-04-09 NOTE — GOALS OF CARE CONVERSATION - PERSONAL ADVANCE DIRECTIVE - CONVERSATION/DISCUSSION
Hospice Referral
Prognosis/MOLST Discussed/Treatment Options/Diagnosis

## 2019-04-09 NOTE — PROGRESS NOTE ADULT - ASSESSMENT
89 yo M p/w confusion and generalized weakness    -> End of life Care:      - Patient is actively dying      - Worsening medical state despite exhausting medical treatment      - Hospice care network      - Hospice/Palliative care follow up today       - Limit medical treatment as more harm than good is happening to the patient.        - If the HCP is unrealistic, then get administration involved      - Case management, social work, and if needed reconsult ethics      - Supportive care prn        - All medical personnel management greatly appreciated  -> D.I.C.:      - management appreciated per hem/onc and all consultants       - Very poor prognosis with high mortality rate      - Limit escalation of care 2/2 very limited life expectancy  -> ARF:      - persistent, not a candidate for HD      - adjust management per nephro. Avoid nephrotoxic rx      - midodrine renally adjusted    -> cholestatic hepatitis, acute liver failure, coagulopathy, thrombocytopenia:      - very poor prognosis      - ethics input appreciated, I'm in agreement      - all consultants recs/management appreciated        - not a candidate for liver transplantation      - c/w medical treatment      - adjust management accordingly, supportive comfort care  -> Urinary Retention:      - flores intact, strict i/o   -> Metabolic Encephalopathy:      - persistent      - fall precautions    -> Need for prophylactic measure:      - dvt/gi ppx

## 2019-04-09 NOTE — PROGRESS NOTE ADULT - SUBJECTIVE AND OBJECTIVE BOX
S: Patient lethargic unable to answer questions fully      MEDICATIONS  (STANDING):  dextrose 5% + sodium chloride 0.45% 1000 milliLiter(s) (50 mL/Hr) IV Continuous <Continuous>  dextrose 5%. 1000 milliLiter(s) (50 mL/Hr) IV Continuous <Continuous>  dextrose 50% Injectable 12.5 Gram(s) IV Push once  dextrose 50% Injectable 25 Gram(s) IV Push once  dextrose 50% Injectable 25 Gram(s) IV Push once  ergocalciferol 57714 Unit(s) Oral every week  lactulose Retention Enema 200 Gram(s) Rectal two times a day  lactulose Syrup 10 Gram(s) Enteral Tube two times a day  lidocaine   Patch 1 Patch Transdermal daily  midodrine 2.5 milliGRAM(s) Oral <User Schedule>  rifaximin 550 milliGRAM(s) Oral two times a day  ursodiol Capsule 300 milliGRAM(s) Oral two times a day    MEDICATIONS  (PRN):  dextrose 40% Gel 15 Gram(s) Oral once PRN Blood Glucose LESS THAN 70 milliGRAM(s)/deciLiter  glucagon  Injectable 1 milliGRAM(s) IntraMuscular once PRN Glucose <70 milliGRAM(s)/deciLiter  LORazepam   Injectable 1 milliGRAM(s) IntraMuscular every 8 hours PRN anxiety, panic attack, combative, threatening  morphine  - Injectable 1 milliGRAM(s) IV Push every 6 hours PRN shortness of breath or  moderate pain  simethicone 80 milliGRAM(s) Chew once PRN Gas      LABS:                            9.3    9.85  )-----------( 72       ( 09 Apr 2019 02:50 )             26.9     Hemoglobin: 9.3 g/dL (04-09 @ 02:50)  Hemoglobin: 9.7 g/dL (04-07 @ 07:29)  Hemoglobin: 9.7 g/dL (04-06 @ 06:30)  Hemoglobin: 8.1 g/dL (04-05 @ 07:15)    04-09    145  |  105  |  88<H>  ----------------------------<  75  3.6   |  17<L>  |  6.04<H>    Ca    8.5      09 Apr 2019 02:50  Mg     2.4     04-09      Creatinine Trend: 6.04<--, 5.70<--, 5.73<--, 5.11<--, 4.60<--, 4.25<--           PHYSICAL EXAM  Vital Signs Last 24 Hrs  T(C): 36.3 (09 Apr 2019 08:01), Max: 36.3 (08 Apr 2019 12:50)  T(F): 97.4 (09 Apr 2019 08:01), Max: 97.4 (08 Apr 2019 12:50)  HR: 73 (09 Apr 2019 06:45) (51 - 73)  BP: 90/51 (09 Apr 2019 06:45) (84/48 - 127/56)  BP(mean): --  RR: 14 (09 Apr 2019 05:30) (14 - 18)  SpO2: 95% (09 Apr 2019 05:30) (94% - 96%)      CV: Normal,  S1 S2 1/6 DEBBIE (+)2 Pulses B/l  Resp:  Clear to auscultation B/L, normal effort  GI: (+) BS Soft, NT, ND  Lymph:  (+) Pitting Edema B/L  Skin: Warm to touch    DATA:    TELEMETRY: SB 57/SR    < from: Transthoracic Echocardiogram (03.23.19 @ 09:36) >  CONCLUSIONS:  1. Mitral annular calcification, otherwise normal mitral  valve. Mild mitral regurgitation.  2. Aortic valve leaflet morphology not well visualized;  appears calcified with normal opening. Moderate aortic  regurgitation.  3. Normal left ventricular internal dimensions and wall  thicknesses.  4. Hyperdynamic left ventricle.  5. Mild diastolic dysfunction (Stage I).  6. Normal right ventricular size and function.  7. Normal tricuspid valve. Mild tricuspid regurgitation.  8. Estimated pulmonary artery systolic pressure equals 36  mm Hg, assuming right atrial pressure equals 10  mm Hg,  consistent with borderline pulmonary hypertension.  *** Compared with echocardiogram of 6/11/2018,no  significant changes noted.  ------------------------------------------------------------------------  Confirmed on  3/23/2019 - 11:19:58 by Evaristo Castanon MD, FAC,  TAMMY, RPVI  < end of copied text >      ASSESSMENT/PLAN: 	    89 yo M, poor historian, charts reviewed, with PMHx BPH, Cataracts, Diverticulosis, Anemia 2/2 GI Bleed with previous PRBC transfusions, who p/w 2 days generalized  weakness, urinary retention and confusion. LFTs significant for transaminitis, scleral jaundice.     -TTE with moderate AI and normal LV function , medical management of valvular disease recommended   -Recommend medical management for CT chest with 4.5cm ascending aortic aneurysm  -Unable to tolerate Beta blockers due to hypotension  -Follow up with Palliative/hospice  -No further cardiac workup at this time

## 2019-04-09 NOTE — CHART NOTE - NSCHARTNOTEFT_GEN_A_CORE
Patient was hypothermic, hypotensive - pt's BP runs on low side however now BP was 85/50's   Pt's family members refused to allow the RN to perform temperature reading earlier - per Meche RN,  family wants rectal temp performed while pt due for lactulose enema only.      Patient was placed on hyperthermia blanket, warm compresses applied  Patient was on IVF D5 1/2 NS w bicarb @ 50 cc/ hr - held, pt given Normal saline 500 ccs IVF fluid bolus given will resume IVF as previously ordered   UA + - UCx ordered  Blood cultures x 2 sets ordered  CXR - prelim b/l pleural effusion, atelectasis - (no change from CXR from 19)  CBC - no leukocytosis,  TSH normal,   Lactate 6.4   Pt's temperature has improved   Will reassess vitals signs when IVF bolus completed   Will d/w attending         VITAL SIGNS:  Vital Signs Last 24 Hrs  T(C): 33.3 (2019 03:50), Max: 36.3 (2019 05:59)  T(F): 92 (2019 03:50), Max: 97.4 (2019 12:50)  HR: 56 (2019 03:55) (50 - 57)  BP: 86/52 (2019 03:55) (85/50 - 127/56)  BP(mean): --  RR: 14 (2019 03:50) (14 - 18)  SpO2: 94% (2019 03:50) (94% - 98%)      No Known Allergies      MEDICATIONS  (STANDING):  dextrose 5% + sodium chloride 0.45% 1000 milliLiter(s) (50 mL/Hr) IV Continuous <Continuous>  dextrose 5%. 1000 milliLiter(s) (50 mL/Hr) IV Continuous <Continuous>  dextrose 50% Injectable 12.5 Gram(s) IV Push once  dextrose 50% Injectable 25 Gram(s) IV Push once  dextrose 50% Injectable 25 Gram(s) IV Push once  ergocalciferol 63451 Unit(s) Oral every week  lactulose Retention Enema 200 Gram(s) Rectal two times a day  lactulose Syrup 10 Gram(s) Enteral Tube two times a day  lidocaine   Patch 1 Patch Transdermal daily  midodrine 2.5 milliGRAM(s) Oral <User Schedule>  rifaximin 550 milliGRAM(s) Oral two times a day  ursodiol Capsule 300 milliGRAM(s) Oral two times a day    MEDICATIONS  (PRN):  dextrose 40% Gel 15 Gram(s) Oral once PRN Blood Glucose LESS THAN 70 milliGRAM(s)/deciLiter  glucagon  Injectable 1 milliGRAM(s) IntraMuscular once PRN Glucose <70 milliGRAM(s)/deciLiter  simethicone 80 milliGRAM(s) Chew once PRN Gas                            9.3    9.85  )-----------( 72       ( 2019 02:50 )             26.9     04-    145  |  105  |  88<H>  ----------------------------<  75  3.6   |  17<L>  |  6.04<H>    Ca    8.5      2019 02:50  Mg     2.4     04-            CAPILLARY BLOOD GLUCOSE      POCT Blood Glucose.: 81 mg/dL (2019 02:56)  POCT Blood Glucose.: 82 mg/dL (2019 21:44)  POCT Blood Glucose.: 77 mg/dL (2019 17:40)  POCT Blood Glucose.: 71 mg/dL (2019 12:46)  POCT Blood Glucose.: 84 mg/dL (2019 06:03)      Urinalysis Basic - ( 2019 03:20 )    Color: DARK YELLOW / Appearance: Lt TURBID / S.015 / pH: 6.0  Gluc: NEGATIVE / Ketone: NEGATIVE  / Bili: SMALL / Urobili: SMALL   Blood: MODERATE / Protein: 30 / Nitrite: NEGATIVE   Leuk Esterase: MODERATE / RBC: 11-25 / WBC 0-2   Sq Epi: OCC / Non Sq Epi: x / Bacteria: NEGATIVE Patient was hypothermic, hypotensive - pt's BP runs on low side however now BP was 85/50's   Pt's family members refused to allow the RN to perform temperature reading earlier - per Meche RN,  family wants rectal temp performed while pt due for lactulose enema only.      Patient was placed on hyperthermia blanket, warm compresses applied  Patient was on IVF D5 1/2 NS w bicarb @ 50 cc/ hr - held, pt given Normal saline 500 ccs IVF fluid bolus given will resume IVF as previously ordered   UA + - UCx ordered  Blood cultures x 2 sets ordered  CXR - prelim b/l pleural effusion, atelectasis - (no change from CXR from 19)  CBC - no leukocytosis,  TSH normal,   Lactate 6.4   Pt's temperature has some improvement  Will reassess vitals signs when IVF bolus completed   Will d/w attending     Addendum Note:   Above events and findings d/w Attdg Dr Branham - plan: Meropenem IVPB x 1 dose & Vanco IVPB x 1 dose   Will give additional NS 0.9 % 500 ccs IVF bolus x 1 dose  Will reconsult ID services    Will call Hospice services   pt's prognosis is poor condition critical  Will continue to monitor         VITAL SIGNS:  Vital Signs Last 24 Hrs  T(C): 33.3 (2019 03:50), Max: 36.3 (2019 05:59)  T(F): 92 (2019 03:50), Max: 97.4 (2019 12:50)  HR: 56 (2019 03:55) (50 - 57)  BP: 86/52 (2019 03:55) (85/50 - 127/56)  BP(mean): --  RR: 14 (2019 03:50) (14 - 18)  SpO2: 94% (2019 03:50) (94% - 98%)      No Known Allergies      MEDICATIONS  (STANDING):  dextrose 5% + sodium chloride 0.45% 1000 milliLiter(s) (50 mL/Hr) IV Continuous <Continuous>  dextrose 5%. 1000 milliLiter(s) (50 mL/Hr) IV Continuous <Continuous>  dextrose 50% Injectable 12.5 Gram(s) IV Push once  dextrose 50% Injectable 25 Gram(s) IV Push once  dextrose 50% Injectable 25 Gram(s) IV Push once  ergocalciferol 73840 Unit(s) Oral every week  lactulose Retention Enema 200 Gram(s) Rectal two times a day  lactulose Syrup 10 Gram(s) Enteral Tube two times a day  lidocaine   Patch 1 Patch Transdermal daily  midodrine 2.5 milliGRAM(s) Oral <User Schedule>  rifaximin 550 milliGRAM(s) Oral two times a day  ursodiol Capsule 300 milliGRAM(s) Oral two times a day    MEDICATIONS  (PRN):  dextrose 40% Gel 15 Gram(s) Oral once PRN Blood Glucose LESS THAN 70 milliGRAM(s)/deciLiter  glucagon  Injectable 1 milliGRAM(s) IntraMuscular once PRN Glucose <70 milliGRAM(s)/deciLiter  simethicone 80 milliGRAM(s) Chew once PRN Gas                            9.3    9.85  )-----------( 72       ( 2019 02:50 )             26.9     04-09    145  |  105  |  88<H>  ----------------------------<  75  3.6   |  17<L>  |  6.04<H>    Ca    8.5      2019 02:50  Mg     2.4     04-09            CAPILLARY BLOOD GLUCOSE      POCT Blood Glucose.: 81 mg/dL (2019 02:56)  POCT Blood Glucose.: 82 mg/dL (2019 21:44)  POCT Blood Glucose.: 77 mg/dL (2019 17:40)  POCT Blood Glucose.: 71 mg/dL (2019 12:46)  POCT Blood Glucose.: 84 mg/dL (2019 06:03)      Urinalysis Basic - ( 2019 03:20 )    Color: DARK YELLOW / Appearance: Lt TURBID / S.015 / pH: 6.0  Gluc: NEGATIVE / Ketone: NEGATIVE  / Bili: SMALL / Urobili: SMALL   Blood: MODERATE / Protein: 30 / Nitrite: NEGATIVE   Leuk Esterase: MODERATE / RBC: 11-25 / WBC 0-2   Sq Epi: OCC / Non Sq Epi: x / Bacteria: NEGATIVE

## 2019-04-09 NOTE — GOALS OF CARE CONVERSATION - PERSONAL ADVANCE DIRECTIVE - NS PRO AD PATIENT TYPE
Health Care Proxy (HCP)/Living Will
Living Will/Health Care Proxy (HCP)

## 2019-04-09 NOTE — PROGRESS NOTE ADULT - ATTENDING COMMENTS
Patient with acute liver injury of unclear etiology, possibly DILI.   Now with worsening multiorgan system failure.  Patient with cardiac arrest.  Will follow as appropriate according to hospital course.

## 2019-04-09 NOTE — PROVIDER CONTACT NOTE (CRITICAL VALUE NOTIFICATION) - RECOMMENDATIONS
Continue with hypothermia protocol, IV Bolus running and reassess Pt as per protocol. Continue to monitor patient closely.

## 2019-04-09 NOTE — PROGRESS NOTE ADULT - PROVIDER SPECIALTY LIST ADULT
Cardiology
Gastroenterology
Heme/Onc
Hepatology
Infectious Disease
Internal Medicine
Intervent Radiology
Nephrology
Neurology
Palliative Care
Cardiology
Gastroenterology
Internal Medicine
Internal Medicine
Intervent Radiology
Gastroenterology
Gastroenterology
Infectious Disease
Infectious Disease
Internal Medicine

## 2019-04-09 NOTE — PROGRESS NOTE ADULT - SUBJECTIVE AND OBJECTIVE BOX
PASCUAL OLSEN:[id number],   89yMale followed for:  No Known Allergies    PAST MEDICAL & SURGICAL HISTORY:  Chronic kidney disease (CKD), stage IV (severe)  Benign prostatic hypertrophy  S/P cataract surgery, left: 3 yrs ago    FAMILY HISTORY:  No pertinent family history in first degree relatives    MEDICATIONS  (STANDING):  dextrose 5% + sodium chloride 0.45% 1000 milliLiter(s) (50 mL/Hr) IV Continuous <Continuous>  dextrose 5%. 1000 milliLiter(s) (50 mL/Hr) IV Continuous <Continuous>  dextrose 50% Injectable 12.5 Gram(s) IV Push once  dextrose 50% Injectable 25 Gram(s) IV Push once  dextrose 50% Injectable 25 Gram(s) IV Push once  ergocalciferol 60713 Unit(s) Oral every week  lactulose Retention Enema 200 Gram(s) Rectal two times a day  lactulose Syrup 10 Gram(s) Enteral Tube two times a day  lidocaine   Patch 1 Patch Transdermal daily  midodrine 2.5 milliGRAM(s) Oral <User Schedule>  rifaximin 550 milliGRAM(s) Oral two times a day  ursodiol Capsule 300 milliGRAM(s) Oral two times a day    MEDICATIONS  (PRN):  dextrose 40% Gel 15 Gram(s) Oral once PRN Blood Glucose LESS THAN 70 milliGRAM(s)/deciLiter  glucagon  Injectable 1 milliGRAM(s) IntraMuscular once PRN Glucose <70 milliGRAM(s)/deciLiter  simethicone 80 milliGRAM(s) Chew once PRN Gas      Vital Signs Last 24 Hrs  T(C): 36.3 (09 Apr 2019 08:01), Max: 36.3 (08 Apr 2019 08:50)  T(F): 97.4 (09 Apr 2019 08:01), Max: 97.4 (08 Apr 2019 12:50)  HR: 73 (09 Apr 2019 06:45) (51 - 73)  BP: 90/51 (09 Apr 2019 06:45) (84/48 - 127/56)  BP(mean): --  RR: 14 (09 Apr 2019 05:30) (14 - 18)  SpO2: 95% (09 Apr 2019 05:30) (94% - 96%)  nc/at  s1s2  cta  soft, nt, nd no guarding or rebound  no c/c/e    CBC Full  -  ( 09 Apr 2019 02:50 )  WBC Count : 9.85 K/uL  RBC Count : 3.25 M/uL  Hemoglobin : 9.3 g/dL  Hematocrit : 26.9 %  Platelet Count - Automated : 72 K/uL  Mean Cell Volume : 82.8 fL  Mean Cell Hemoglobin : 28.6 pg  Mean Cell Hemoglobin Concentration : 34.6 %  Auto Neutrophil # : 7.98 K/uL  Auto Lymphocyte # : 0.55 K/uL  Auto Monocyte # : 0.89 K/uL  Auto Eosinophil # : 0.12 K/uL  Auto Basophil # : 0.02 K/uL  Auto Neutrophil % : 81.1 %  Auto Lymphocyte % : 5.6 %  Auto Monocyte % : 9.0 %  Auto Eosinophil % : 1.2 %  Auto Basophil % : 0.2 %    04-09    145  |  105  |  88<H>  ----------------------------<  75  3.6   |  17<L>  |  6.04<H>    Ca    8.5      09 Apr 2019 02:50  Mg     2.4     04-09

## 2019-04-09 NOTE — PROGRESS NOTE ADULT - ASSESSMENT
Impression:     1. Acute liver injury with marked  hyperbilirubinemia: DILI vs. viral. Rule out Hepatitis E. No improvement in liver function.    2. Kidney failure, worsening    3. Encephalopathy ? multifactorial, rule out sepsis    4. Drop in hemoglobin with appropriate response to transfusion, no overt bleeding    Recommendation:  -continue lactulose/ xifaxan PO if possible  -f/u hepatitis E IgM, I called the lab, it will probably not come back until next week  -in house SW and hospice per family request    Saritha Baez, PGY-4  Gastroenterology Fellow  Pager [phone] or [phone]  (After 5 pm or on weekends please page GI on call)

## 2019-04-09 NOTE — CHART NOTE - NSCHARTNOTEFT_GEN_A_CORE
RRT initially called for bradycardia. Shortly after RRT was called, patient's RN was unable to palpate a pulse and so a code blue was called. In brief, patient is a 89 y/o M w/ a PMHx of BPH, cataracts, diverticulosis, and anemia 2/2 GIB with previous pRBC transfusions who initially p/w 2 days of generalized weakness, urinary retention, and confusion, found to have obstructive jaundice and acute liver failure of unknown etiology w/ hospital course c/b acute renal failure, metabolic/hepatic encephalopathy, and multiple GOC and ethics discussions. Patient is currently full code.     Upon arrival of the rapid response team, compressions were being performed and ACLS was initiated. Patient's nephew was contacted and reiterated that he would like everything done for the patient. Patient was intubated by anesthesia. He was given IV Epinephrine 1mg x3 and 1 amp of Bicarb prior to ROSC being achieved. An IO was placed to patient's LLE for additional access. Patient's BP shortly after ROSC was 80s/60s. He was started on a Norepinephrine gtt w/ improvement of his BP (SBP = 100-120). MICU was contacted and the patient was accepted. Patient was then transferred to the MICU for further management.    Ike Ray, PGY3  Pager: [phone] RRT initially called for bradycardia. Shortly after RRT was called, patient's RN was unable to palpate a pulse and so a code blue was called. In brief, patient is a 89 y/o M w/ a PMHx of BPH, cataracts, diverticulosis, and anemia 2/2 GIB with previous pRBC transfusions who initially p/w 2 days of generalized weakness, urinary retention, and confusion, found to have obstructive jaundice and acute liver failure of unknown etiology w/ hospital course c/b acute renal failure, metabolic/hepatic encephalopathy, and multiple GOC and ethics discussions. Patient is currently full code.     Upon arrival of the rapid response team, compressions were being performed and ACLS was initiated. Patient was found to be in PEA arrest. Patient's nephew was contacted and reiterated that he would like everything done for the patient. Patient was intubated by anesthesia. He was given IV Epinephrine 1mg x3 and 1 amp of Bicarb prior to ROSC being achieved. An IO was placed to patient's LLE for additional access. Patient's BP shortly after ROSC was 80s/60s. He was started on a Norepinephrine gtt w/ improvement of his BP (SBP = 100-120). MICU was contacted and the patient was accepted. Patient was then transferred to the MICU for further management.    Ike Ray, PGY3  Pager: #[phone]

## 2019-04-09 NOTE — PROVIDER CONTACT NOTE (OTHER) - SITUATION
Pt w/ increased lethargy & vomit. Pt not waking up to sternal rub. VS stable and charted in flowsheets.
89yM admitted with UTI and AMS ; FS q6- 9am FS 53.
BP 95/57, retaken /61
BP retaken 110/67
Had trouble finding a hypothermia blanket in hospital to use, ADN's made aware, notifying provider that we found one and on medium setting at 38C
Patient BP 94/60 this AM, held amlodipine as per parameter; patient urine minimum output via flores catheter as ordered; 400 mL total throughout 12 hour shift; patient temperature went up to 98.1 F
Patient temperature 93 F rectally, retaken 2x, same temperature reading
Pt. BP is 89/50.
Rechecked temperature on patient with hypothermia blanket, still low at 93.5 F rectally
fingerstick 64, patient lethargic
unable to get accurate rectal temp

## 2019-04-09 NOTE — PROVIDER CONTACT NOTE (OTHER) - ACTION/TREATMENT ORDERED:
12.5mg of dextrose order activated, to be given   will repeat fingerstick
500 mL bolus of NS 0.9% to be given over 30 minutes. Administered and recheck BP was low 100's, PA made aware.
Initiate hypoglycemic protocol. 12.5g D50 Injectable given. Recheck FS in 15 minutes. Continue to monitor.
MD notified, states ok to stop rectal temps as long as oral temps are within range. Will continue to monitor patient
Provider aware of temperature at this time, ordered to continue to monitor
Recheck temperature with AM vitals, continue to monitor patient
continue to monitor
recheck BP, continue to monitor
will come see patient, continue to monitor
will order hypothermia blanket, continue to monitor patient

## 2019-04-09 NOTE — CHART NOTE - NSCHARTNOTEFT_GEN_A_CORE
RRT initially called for bradycardia. Primary RN noticed lack of pulse. RRT upgraded to code blue. ACLS was started. Patient intubated by anesthesia, IOs placed for additional access. 3 rounds of epinephrine, 1 amp bicarb administered and ROSC was achieved in 9 minutes. Initial SBPs in the 70s. Patient started on Levo @ 1. Improvement in SBP to 100s. 89M, poor historian, with PMHx BPH, cataracts, diverticulosis, anemia 2/2 GIB with previous pRBC transfusions, who presenting initially with 2 days of generalized weakness, urinary retention and confusion.    Patient was initially admitted on 3/15 for generalized weakness and urinary retention. Oshea was placed for chronic retention. Urine culture grew E. faecalis and patient was treated with Unasyn for 7 days. Patient was noted to have jaundice with Tbili >20, AST in the 500s, ALT in the 290s. CT of the abdomen/pelvis did not show any masses but did show a distended gallbladder. MRCP without contrast on 3/18 also showed a distended gallbladder. Hepatobiliary scan on 3/17 hepatocellular dysfunction suspicious for CBD obstruction. Liver biopsy was done on 3/21, however during the procedure a blood clot was dislodged into the IVC and the RA afterward, without any hemodynamic compromise. Of note, pathology from the liver biopsy has still not resulted. Autoimmune liver workup returned negative. Patient developed worsening encephalopathy during admission, thought to be likely hepatic in origin with elevated ammonia levels, and he was started on rifaximin and lactulose, although his mental status continued to remain poor.     Patient on admission also noted to have an PHU with creatinine initially 1.78. Over the course of his admission his renal function has declined and he was seen by nephrology, who deemed the patient to not be a candidate for dialysis. Due to worsening multi-organ failure, GOC discussions were held with the patient's HCP Nephew Benitez, who wanted everything done for the patient. Ethics was also consulted as well, and while recognizing that the patient's living will clearly states that he would not want "everything done" in terms of end-of-life care, physicians must abide by the HCP that was appointed by the patient, who is his nephew (who wants everything to be done).     On 4/9, RRT initially called for bradycardia. Primary RN noticed lack of pulse. RRT upgraded to code blue. ACLS was started. Patient's nephew was contacted, and reiterated that he wanted everything done for the patient and stated that he was on his way to the hospital. Patient intubated by anesthesia, IO placed for additional access. 3 rounds of epinephrine, 1 amp bicarb administered and ROSC was achieved in 9 minutes. Initial SBPs in the 70s. Patient started on Levo @ 1. Improvement in SBP to 100s. Patient was transferred to the MICU for further care.    Assessment and Plan:    89M, poor historian, with PMHx BPH, cataracts, diverticulosis, anemia 2/2 GIB with previous pRBC transfusions, who presenting initially with 2 days of generalized weakness, urinary retention and confusion. Patient found to have obstructive jaundice, acute liver failure of unknown etiology, with hospital course c/b acute renal failure and metabolic/hepatic encephalopathy. Multiple GOC and ethics discussions held with patient's family as his prognosis remains poor.    #Neuro  - Mental status reportedly AAOx3 on admission, now lethargic and AAOx0  - Likely combination of hepatic and metabolic encephalopathy 2/2 acute liver and renal failure  - CT head on 3/30 with no acute changes or prior CVA  - Currently intubated, not on sedation as patient currently appears comfortable    #CV  - Patient hypotensive after ROSC, likely in cardiogenic shock from PEA arrest  - Continue Levophed, titrate to keep MAP >65, currently at a rate of 1.2  - Will d/c midodrine    #Pulm  - Currently remains intubated post cardiac-arrest  - Vent settings: 20/450/5/50%  - Will obtain post-intubation CXR    #GI  - Acute liver failure with hepatic encephalopathy: unclear etiology, still no biopsy results as of today. Imaging suggestive of cholecystitis versus cholangitis, with no fevers or leukocytosis. Continue with rifaximin and lactulose titrated to 3 BMs per day  - Trend CMP, coags daily  - Appreciate GI and hepatology input  - Autoimmune hepatitis workup so far negative, viral hepatitis panel negative  - NGT placed for tube feeds and medications    #ID  - Urine culture on admission grew E. faecalis, treated with 7 days of Unasyn  - Blood cultures negative, fungal cultures from 3/29 negative  - Repeat UA today negative  - Will empirically treat with meropenem and repeat blood cultures    #Renal  - PHU on CKD with acute renal failure- appreciate nephrology recommendations, likely ATN  - Patient is not a candidate for HD given multiorgan failure and overall poor prognosis  - Monitor urine output  - Avoid nephrotoxic agents, renally dose medications    #Heme  - H/H stable in the 9s  - Thrombocytopenia present since admission, stable in the 70s, likely 2/2 acute liver failure  - Trend CBC daily  - Monitor for any signs of bleeding    #Endo  - No active issues    #DVT prophylaxis  - Not currently on pharmacologic DVT prophylaxis- will start HSQ    #Code status  - Currently patient is not DNR/DNI, however his prognosis is very poor and any further escalation in care will likely be medically futile. Appreciate Ethics input. Will continue GOC discussions with patient's nephew

## 2019-04-09 NOTE — PROGRESS NOTE ADULT - SUBJECTIVE AND OBJECTIVE BOX
Patient seen and examined at bedside  hypothermic and hypotensive overnight, ngt placed this am, abx and ivf administered  Case discussed with medical team during the day and overnight, and consultants    HPI:  89 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  Upon arrival to the ED, pt with scleral icterus and labs significant for transaminitis and TBili > 20 (15 Mar 2019 06:59)      PAST MEDICAL & SURGICAL HISTORY:  Chronic kidney disease (CKD), stage IV (severe)  Benign prostatic hypertrophy  S/P cataract surgery, left: 3 yrs ago      No Known Allergies       MEDICATIONS  (STANDING):  dextrose 5% + sodium chloride 0.45% 1000 milliLiter(s) (50 mL/Hr) IV Continuous <Continuous>  dextrose 5%. 1000 milliLiter(s) (50 mL/Hr) IV Continuous <Continuous>  dextrose 50% Injectable 12.5 Gram(s) IV Push once  dextrose 50% Injectable 25 Gram(s) IV Push once  dextrose 50% Injectable 25 Gram(s) IV Push once  ergocalciferol 94141 Unit(s) Oral every week  lactulose Retention Enema 200 Gram(s) Rectal two times a day  lactulose Syrup 10 Gram(s) Enteral Tube two times a day  lidocaine   Patch 1 Patch Transdermal daily  midodrine 2.5 milliGRAM(s) Oral <User Schedule>  rifaximin 550 milliGRAM(s) Oral two times a day  ursodiol Capsule 300 milliGRAM(s) Oral two times a day    MEDICATIONS  (PRN):  dextrose 40% Gel 15 Gram(s) Oral once PRN Blood Glucose LESS THAN 70 milliGRAM(s)/deciLiter  glucagon  Injectable 1 milliGRAM(s) IntraMuscular once PRN Glucose <70 milliGRAM(s)/deciLiter  simethicone 80 milliGRAM(s) Chew once PRN Gas      REVIEW OF SYSTEMS: limited 2/2 pts medical state and mental status	    T(C): 36.3 (19 @ 08:01), Max: 36.3 (19 @ 08:50)  HR: 73 (19 @ 06:45) (51 - 73)  BP: 90/51 (19 @ 06:45) (84/48 - 127/56)  RR: 14 (19 @ 05:30) (14 - 18)  SpO2: 95% (19 @ 05:30) (94% - 96%)    PHYSICAL EXAMINATION:   Constitutional: very ill appearing, in distress, moaning aloud  HEENT: NGT intact. severe bitemp wasting  Neck:  Supple  Respiratory:  Adequate airflow b/l. Not using accessory muscles of respiration.  Cardiovascular: systolic murmur, S1 & S2 intact, no R/G, 2+ radial pulses b/l  Gastrointestinal: Soft, ND, normoactive b.s.  Extremities: poor skin turgor and elasticity.   Neurological: confused, disoriented, does not respond to questions or commands accordingly. awake.      Labs and imaging reviewed    LABS:                        9.3    9.85  )-----------( 72       ( 2019 02:50 )             26.9     -    145  |  105  |  88<H>  ----------------------------<  75  3.6   |  17<L>  |  6.04<H>    Ca    8.5      2019 02:50  Mg     2.4                 Urinalysis Basic - ( 2019 03:20 )    Color: DARK YELLOW / Appearance: Lt TURBID / S.015 / pH: 6.0  Gluc: NEGATIVE / Ketone: NEGATIVE  / Bili: SMALL / Urobili: SMALL   Blood: MODERATE / Protein: 30 / Nitrite: NEGATIVE   Leuk Esterase: MODERATE / RBC: 11-25 / WBC 0-2   Sq Epi: OCC / Non Sq Epi: x / Bacteria: NEGATIVE      CAPILLARY BLOOD GLUCOSE      POCT Blood Glucose.: 81 mg/dL (2019 02:56)  POCT Blood Glucose.: 82 mg/dL (2019 21:44)  POCT Blood Glucose.: 77 mg/dL (2019 17:40)  POCT Blood Glucose.: 71 mg/dL (2019 12:46)              RADIOLOGY & ADDITIONAL STUDIES:

## 2019-04-09 NOTE — CHART NOTE - NSCHARTNOTEFT_GEN_A_CORE
Notified by RN that pt with bradycardia on tele to 30's. Upon arrival pt with faint carotid pulse, RRT was called. Breaths became more shallow, on repeat pulse check no pulse was noted. CPR was initiated immediately by RN, RRT was upgraded to code blue. HCP notified and on way to hospital. Patient intubated by anesthesia Family on way to hospital and ROSC was acheived. Pt to be transferred to ICU for further care. Dr. Branham made aware. Notified by RN that pt with bradycardia on tele to 30's. Upon arrival pt with faint carotid pulse, RRT was called. Breaths became more shallow, on repeat pulse check no pulse was noted. CPR was initiated immediately by RN, RRT was upgraded to code blue. HCP notified and on way to hospital. Patient intubated by anesthesia and ROSC was achieved. Pt to be transferred to ICU for further care. Dr. Branham made aware.

## 2019-04-09 NOTE — PROGRESS NOTE ADULT - ASSESSMENT
A 89 yo Male with BPH, Cataracts, Diverticulosis, Anemia 2/2 GI Bleed with previous PRBC transfusions, who presents to the ER for evaluation of  generalized  weakness, urinary retention and confusion. On admission, he found to have elevated bilirubin, LFTS, transaminitis, scleral jaundice. and urinary retention. Flores catheter has placed with negative Urine analysis. The CT abdomen  and pelvis shows Distended gallbladder. Consider further evaluation with ultrasound if  acute cholecystitis is a concern. No discrete mass identified on this limited noncontrast study. The ID consult requested to assist with evaluation of Biliary sepsis.    # Encephalopathy  # Urinary retention - s/p flores catheter   # R/O biliary sepsis/ Cholangitis - Most likely acute cholestatic hepatitis as per GI -s/p  transjugular liver biopsy with two passes. and found to have small right atrium blood clot. 3/21/19  # Distended GB with cholelithiasis on MRCP 3/18/19  # Hypothermia- Pneumonia on CXR - MRSA PCR is negative   # UTI- Enterococcus faecalis 3/20/19  # Liver biopsy result - DRug induced injury  # Hepatic Encephalopathy- elevated Ammonia  # R/O Leptospirosis  - s/p Unasyn/Augmentin  ( MICHELINE for Moderate to severe Leptospirosis)  from 3/21/19 to 3/29/19 ,  which is the treatment of choice for Moderate to severe Leptospirosis. If patient has Leptospirosis, then it should have been treated,  Leptospirosis Antibodies: Negative: No IgM antibodies to Leptospira detected.    would recommend:    1. Continue lactulose enema  2. Aspiration precaution   3. Monitor OFF antibiotic, s/p completed the course  4. Supplemental oxygenation and  bronchodilator as needed      d/w  Nursing staff and Family at the bed side A 89 yo Male with BPH, Cataracts, Diverticulosis, Anemia 2/2 GI Bleed with previous PRBC transfusions, who presents to the ER for evaluation of  generalized  weakness, urinary retention and confusion. On admission, he found to have elevated bilirubin, LFTS, transaminitis, scleral jaundice. and urinary retention. Flores catheter has placed with negative Urine analysis. The CT abdomen  and pelvis shows Distended gallbladder. Consider further evaluation with ultrasound if  acute cholecystitis is a concern. No discrete mass identified on this limited noncontrast study. The ID consult requested to assist with evaluation of Biliary sepsis.    # Encephalopathy  # Urinary retention - s/p flores catheter   # R/O biliary sepsis/ Cholangitis - Most likely acute cholestatic hepatitis as per GI -s/p  transjugular liver biopsy with two passes. and found to have small right atrium blood clot. 3/21/19  # Distended GB with cholelithiasis on MRCP 3/18/19  # Hypothermia- Pneumonia on CXR - MRSA PCR is negative   # UTI- Enterococcus faecalis 3/20/19  # Liver biopsy result - DRug induced injury  # Hepatic Encephalopathy- elevated Ammonia  # R/O Leptospirosis  - s/p Unasyn/Augmentin  ( MICHELINE for Moderate to severe Leptospirosis)  from 3/21/19 to 3/29/19 ,  which is the treatment of choice for Moderate to severe Leptospirosis. If patient has Leptospirosis, then it should have been treated,  Leptospirosis Antibodies: Negative: No IgM antibodies to Leptospira detected.    would recommend:    1. Follow up the cultures  2. Aspiration precaution   3. Hold  antibiotic until cultures are resulted   4. Management of Vent. as per MICU protocol until  Palliative extubation    - prognosis guarded     d/w  Dr. Branham, MICU team and Family at the bed side A 89 yo Male with BPH, Cataracts, Diverticulosis, Anemia 2/2 GI Bleed with previous PRBC transfusions, who presents to the ER for evaluation of  generalized  weakness, urinary retention and confusion. On admission, he found to have elevated bilirubin, LFTS, transaminitis, scleral jaundice. and urinary retention. Flores catheter has placed with negative Urine analysis. The CT abdomen  and pelvis shows Distended gallbladder. Consider further evaluation with ultrasound if  acute cholecystitis is a concern. No discrete mass identified on this limited noncontrast study. The ID consult requested to assist with evaluation of Biliary sepsis.    # Encephalopathy  # Urinary retention - s/p flores catheter   # R/O biliary sepsis/ Cholangitis - Most likely acute cholestatic hepatitis as per GI -s/p  transjugular liver biopsy with two passes. and found to have small right atrium blood clot. 3/21/19  # Distended GB with cholelithiasis on MRCP 3/18/19  # Hypothermia- Pneumonia on CXR - MRSA PCR is negative   # UTI- Enterococcus faecalis 3/20/19  # Liver biopsy result - DRug induced injury  # Hepatic Encephalopathy- elevated Ammonia  # R/O Leptospirosis  - s/p Unasyn/Augmentin  ( MICHELINE for Moderate to severe Leptospirosis)  from 3/21/19 to 3/29/19 ,  which is the treatment of choice for Moderate to severe Leptospirosis. If patient has Leptospirosis, then it should have been treated,  Leptospirosis Antibodies: Negative: No IgM antibodies to Leptospira detected.  # S/p RRT to Code Blue- s/p extubated and transferred to MICU 4/9/19    would recommend:    1. Follow up the cultures  2. Aspiration precaution   3. Hold  antibiotic until cultures are resulted   4. Management of Vent. as per MICU protocol until  Palliative extubation    - prognosis guarded     d/w  Dr. Branham, MICU team and Family at the bed side

## 2019-04-09 NOTE — PROGRESS NOTE ADULT - SUBJECTIVE AND OBJECTIVE BOX
Patient is seen and examined at the bed side, remains normothermic.  No change in mental status, Family at the bed side.          REVIEW OF SYSTEMS: Unable to obtain due to mental status        ALLERGIES: No Known Allergies      ICU Vital Signs Last 24 Hrs  T(C): 36.1 (2019 16:00), Max: 37.2 (2019 11:22)  T(F): 97 (2019 16:00), Max: 98.9 (2019 11:22)  HR: 76 (2019 18:00) (54 - 107)  BP: 78/56 (2019 18:00) (78/56 - 131/79)  BP(mean): 64 (2019 18:00) (64 - 99)  ABP: --  ABP(mean): --  RR: 20 (2019 18:00) (14 - 20)  SpO2: 100% (2019 18:00) (94% - 100%)        PHYSICAL EXAM:  GENERAL: Remains Lethargic  HEENT: NGT pulled out by patient   CHEST/LUNG:  Air entry bilaterally  HEART: s1 and s2 present  ABDOMEN:  Nontender and  Non distended  : Oshea catheter in placed  EXTREMITIES: B/L pedal  edema  CNS: Lethargic           LABS:                          8.4    9.36  )-----------( 63       ( 2019 16:32 )             24.6                                   9.7    8.89  )-----------( 77       ( 2019 07:29 )             27.8                             8.7    11.55 )-----------( 85       ( 2019 02:40 )             25.1                               145  |  105  |  88<H>  ----------------------------<  75  3.6   |  17<L>  |  6.04<H>    Ca    8.5      2019 02:50  Mg     2.4         TPro  x   /  Alb  x   /  TBili  18.5<H>  /  DBili  x   /  AST  x   /  ALT  x   /  AlkPhos  x       04    143  |  107  |  83<H>  ----------------------------<  89  4.0   |  17<L>  |  5.70<H>    Ca    8.7      2019 07:29  Mg     2.4         TPro  4.8<L>  /  Alb  1.9<L>  /  TBili  21.2<H>  /  DBili  x   /  AST  192<H>  /  ALT  71<H>  /  AlkPhos  292<H>              DRUG LEVEL:         Ammonia, Serum (19 @ 07:45)    Ammonia, Serum: 51: Ammonia levels will be falsely elevated if specimen is NOT  collected, processed and maintained at 4-8 C. umol/L      Ammonia, Serum (19 @ 07:07)    Ammonia, Serum: 66: Ammonia levels will be falsely elevated if specimen is NOT  collected, processed and maintained at 4-8 C. umol/L        Ammonia, Serum (19 @ 09:45)    Ammonia, Serum: 76: Ammonia levels will be falsely elevated if specimen is NOT  collected, processed and maintained at 4-8 C. umol/L        Ammonia, Serum (19 @ 17:30)    Ammonia, Serum: 130: Ammonia levels will be falsely elevated if specimen is NOT  collected, processed and maintained at 4-8 C. umol/L        Leptospirosis Antibodies (19 @ 07:30)    Leptospirosis Antibodies: Negative: No IgM antibodies to Leptospira detected.  Since antibodies  may not be present or may be present at undetectable levels  during early disease, repeat testing of a convalescent  sample collected in 2-3 weeks is recommended.  Test Performed by:  [facility]  [address] [place] [address]        MEDICATIONS  (STANDING):  chlorhexidine 4% Liquid 1 Application(s) Topical <User Schedule>  dextrose 5% + sodium chloride 0.45% 1000 milliLiter(s) (50 mL/Hr) IV Continuous <Continuous>  dextrose 50% Injectable 50 milliLiter(s) IV Push once  lactulose Syrup 10 Gram(s) Enteral Tube two times a day  lidocaine   Patch 1 Patch Transdermal daily  norepinephrine Infusion 1 MICROgram(s)/kG/Min (100.313 mL/Hr) IV Continuous <Continuous>  rifaximin 550 milliGRAM(s) Oral two times a day  ursodiol Capsule 300 milliGRAM(s) Oral two times a day    MEDICATIONS  (PRN):        RADIOLOGY & ADDITIONAL TESTS:  < from: Xray Chest 1 View- PORTABLE-Urgent (19 @ 07:40) >      IMPRESSION:  New enteric tube with tip likely in the proximal stomach.    Bilateral pleural effusions, larger on the left. A small to moderate   right pleural effusion is not significantly changed in size. A moderate   left pleural effusion appears larger. Uncertain if this is an actual   change or this is due to different patient position given that there is   only 4 hours time difference between the 2 images.      < end of copied text >      3/24/19 : CT Chest No Cont (19 @ 18:32) : 4.4 cm enlarged ascending thoracic aorta. Small bilateral pleural effusions.      3/20/19 : Xray Chest 1 View- PORTABLE-Urgent (19 @ 09:37) Patchy opacity within the right lower lung may represent pneumonia. Subsegmental atelectasis at the left base. No pleural effusion or  pneumothorax. No pulmonary edema. The cardiac silhouette remains enlarged. Aortic aneurysm. Recommend  further evaluation with aortogram.      3/18/19 : MR MRCP No Cont (19 @ 14:22) Gallbladder is distended with cholelithiasis.  No choledocholithiasis or biliary ductal dilatation.      3/17/19 : NM Hepatobiliary Imaging (19 @ 12:21) Abnormal hepatobiliary scan suspicious for common bile duct obstruction. Hepatocellular dysfunction. No evidence of acute cholecystitis.      3/18/19 : US Abdomen Limited (19 @ 07:48) Gallbladder is distended with sludge and stones.  Mural thickening of the wall of the common bile duct, possibly  cholangitis. No biliary ductal dilatation.      3/15/19 : CT Abdomen and Pelvis No Cont (03.15.19 @ 09:39) Distended gallbladder. Consider further evaluation with ultrasound if  acute cholecystitis is a concern. No discrete mass identified on this limited noncontrast study.          MICROBIOLOGY DATA:      Culture - Blood (19 @ 11:11)    Culture - Blood:   NO ORGANISMS ISOLATED  NO ORGANISMS ISOLATED AT 24 HOURS    Specimen Source: BLOOD PERIPHERAL        Culture - Fungal, Blood (19 @ 08:41)    Culture - Fungal, Blood:   CULTURE NEGATIVE FOR YEASTS AND MOLDS-PRELIMINARY RESULT  AFTER 24 HOURS INCUBATION    Specimen Source: BLOOD        Cytomegalovirus By PCR (19 @ 05:34)    Cytomegalovirus By PCR: 165 IU/mL    CMVPCR Lo.22: Assay lower limit of detection (LOD):  31.2 IU/mL (1.49 log10/mL)  Assay dynamic range:  50 to 156,000,000 (156M) CMV DNA IU/mL (1.70 log10/mL –  8.19 log10/mL)  Plasma CMV DNA Quantification by PCR using Abbott m2000:  The results of this test should be interpreted with  consideration of all clinical and laboratory findings. In  particular, caution should be used when interpreting low  level positive results when the test is used for  diagnostic purposes. Repeat testing on an additional  sample is recommended to confirm low level positive  results. A result of "Not Detected" does not preclude the  possibility of infection with CMV.  CMV DNA may be present  below the detection limit of assay. LogIU/mL        Culture - Urine (19 @ 15:21)    -  Vancomycin: S 2 CLOVIS    Culture - Urine:   ENTF^Enterococcus faecalis  COLONY COUNT: > = 100,000 CFU/ML    Culture - Urine:   Susceptibility not performed.    -  Tetra/Doxy: R >8 CLOVIS    -  Nitrofurantoin: S <=32 CLOVIS    -  Ampicillin: S <=2 CLOVIS    -  Ciprofloxacin: S <=1 CLOVIS    Specimen Source: URINE CATHETER    Organism Identification: Enterococcus faecalis  Staphylococcus sp.,coag neg    Organism: Enterococcus faecalis  COLONY COUNT: > = 100,000 CFU/ML    Organism: Staphylococcus sp.,coag neg  COLONY COUNT: > = 100,000 CFU/ML    Method Type: POSITIVE CLOVIS 29      Urinalysis (19 @ 13:25)    Color: ELSA    Urine Appearance: TURBID    Glucose: 100    Bilirubin: LARGE    Ketone - Urine: NEGATIVE    Specific Gravity: 1.020    Blood: MODERATE    pH - Urine: 6.0    Protein, Urine: 100    Urobilinogen: 4.0    Nitrite: NEGATIVE    Leukocyte Esterase Concentration: LARGE    Red Blood Cell - Urine: 3-5    White Blood Cell - Urine: >50    White Blood Cell Casts: 2-5/LPF    Epithelial Cells: OCC    Bacteria: MANY    Coarse Granular Casts: 0-2/LPF      MRSA Infection Control Screen (PCR) (19 @ 13:46)    MRSA Infection Control Screen (PCR): NOT DETECTED     REFERENCE RANGE:  MRSA DNA NOT DETECTED      Culture - Blood (03.15.19 @ 14:03)    Culture - Blood:   NO ORGANISMS ISOLATED  NO ORGANISMS ISOLATED AT 24 HOURS    Specimen Source: BLOOD Patient is seen and examined at the bed side, is normothermic.  He is s/p RRT first then Code Blue, resuscitated, intubated and transferred to MICU since family wanted everything to be done.  Now Family at the bed side and requested for Palliative Extubation.          REVIEW OF SYSTEMS: Unable to obtain due to mental status        ALLERGIES: No Known Allergies      ICU Vital Signs Last 24 Hrs  T(C): 36.1 (2019 16:00), Max: 37.2 (2019 11:22)  T(F): 97 (2019 16:00), Max: 98.9 (2019 11:22)  HR: 76 (2019 18:00) (54 - 107)  BP: 78/56 (2019 18:00) (78/56 - 131/79)  BP(mean): 64 (2019 18:00) (64 - 99)  ABP: --  ABP(mean): --  RR: 20 (2019 18:00) (14 - 20)  SpO2: 100% (2019 18:00) (94% - 100%)        PHYSICAL EXAM:  GENERAL: Intubated/vented  CHEST/LUNG:  Air entry bilaterally  HEART: s1 and s2 present  ABDOMEN:  Nontender and  Non distended  : Oshea catheter in placed  EXTREMITIES: B/L pedal  edema  CNS: Intubated/vented           LABS:                          8.4    9.36  )-----------( 63       ( 2019 16:32 )             24.6                                   9.7    8.89  )-----------( 77       ( 2019 07:29 )             27.8                             8.7    11.55 )-----------( 85       ( 2019 02:40 )             25.1                               145  |  105  |  88<H>  ----------------------------<  75  3.6   |  17<L>  |  6.04<H>    Ca    8.5      2019 02:50  Mg     2.4         TPro  x   /  Alb  x   /  TBili  18.5<H>  /  DBili  x   /  AST  x   /  ALT  x   /  AlkPhos  x       143  |  107  |  83<H>  ----------------------------<  89  4.0   |  17<L>  |  5.70<H>    Ca    8.7      2019 07:29  Mg     2.4         TPro  4.8<L>  /  Alb  1.9<L>  /  TBili  21.2<H>  /  DBili  x   /  AST  192<H>  /  ALT  71<H>  /  AlkPhos  292<H>              DRUG LEVEL:         Ammonia, Serum (19 @ 07:45)    Ammonia, Serum: 51: Ammonia levels will be falsely elevated if specimen is NOT  collected, processed and maintained at 4-8 C. umol/L      Ammonia, Serum (19 @ 07:07)    Ammonia, Serum: 66: Ammonia levels will be falsely elevated if specimen is NOT  collected, processed and maintained at 4-8 C. umol/L        Ammonia, Serum (19 @ 09:45)    Ammonia, Serum: 76: Ammonia levels will be falsely elevated if specimen is NOT  collected, processed and maintained at 4-8 C. umol/L        Ammonia, Serum (19 @ 17:30)    Ammonia, Serum: 130: Ammonia levels will be falsely elevated if specimen is NOT  collected, processed and maintained at 4-8 C. umol/L        Leptospirosis Antibodies (19 @ 07:30)    Leptospirosis Antibodies: Negative: No IgM antibodies to Leptospira detected.  Since antibodies  may not be present or may be present at undetectable levels  during early disease, repeat testing of a convalescent  sample collected in 2-3 weeks is recommended.  Test Performed by:  [facility]  [address] [place] [address]        MEDICATIONS  (STANDING):  chlorhexidine 4% Liquid 1 Application(s) Topical <User Schedule>  dextrose 5% + sodium chloride 0.45% 1000 milliLiter(s) (50 mL/Hr) IV Continuous <Continuous>  dextrose 50% Injectable 50 milliLiter(s) IV Push once  lactulose Syrup 10 Gram(s) Enteral Tube two times a day  lidocaine   Patch 1 Patch Transdermal daily  norepinephrine Infusion 1 MICROgram(s)/kG/Min (100.313 mL/Hr) IV Continuous <Continuous>  rifaximin 550 milliGRAM(s) Oral two times a day  ursodiol Capsule 300 milliGRAM(s) Oral two times a day    MEDICATIONS  (PRN):        RADIOLOGY & ADDITIONAL TESTS:    19  : Xray Chest 1 View- PORTABLE-Urgent (19 @ 07:40)  New enteric tube with tip likely in the proximal stomach. Bilateral pleural effusions, larger on the left. A small to moderate  right pleural effusion is not significantly changed in size. A moderate left pleural effusion appears larger. Uncertain if this is an actual   change or this is due to different patient position given that there is  only 4 hours time difference between the 2 images.      3/24/19 : CT Chest No Cont (19 @ 18:32) : 4.4 cm enlarged ascending thoracic aorta. Small bilateral pleural effusions.      3/20/19 : Xray Chest 1 View- PORTABLE-Urgent (19 @ 09:37) Patchy opacity within the right lower lung may represent pneumonia. Subsegmental atelectasis at the left base. No pleural effusion or  pneumothorax. No pulmonary edema. The cardiac silhouette remains enlarged. Aortic aneurysm. Recommend  further evaluation with aortogram.      3/18/19 : MR MRCP No Cont (19 @ 14:22) Gallbladder is distended with cholelithiasis.  No choledocholithiasis or biliary ductal dilatation.      3/17/19 : NM Hepatobiliary Imaging (19 @ 12:21) Abnormal hepatobiliary scan suspicious for common bile duct obstruction. Hepatocellular dysfunction. No evidence of acute cholecystitis.      3/18/19 : US Abdomen Limited (19 @ 07:48) Gallbladder is distended with sludge and stones.  Mural thickening of the wall of the common bile duct, possibly  cholangitis. No biliary ductal dilatation.      3/15/19 : CT Abdomen and Pelvis No Cont (03.15.19 @ 09:39) Distended gallbladder. Consider further evaluation with ultrasound if  acute cholecystitis is a concern. No discrete mass identified on this limited noncontrast study.          MICROBIOLOGY DATA:      Culture - Blood (19 @ 11:11)    Culture - Blood:   NO ORGANISMS ISOLATED  NO ORGANISMS ISOLATED AT 24 HOURS    Specimen Source: BLOOD PERIPHERAL        Culture - Fungal, Blood (19 @ 08:41)    Culture - Fungal, Blood:   CULTURE NEGATIVE FOR YEASTS AND MOLDS-PRELIMINARY RESULT  AFTER 24 HOURS INCUBATION    Specimen Source: BLOOD        Cytomegalovirus By PCR (19 @ 05:34)    Cytomegalovirus By PCR: 165 IU/mL    CMVPCR Lo.22: Assay lower limit of detection (LOD):  31.2 IU/mL (1.49 log10/mL)  Assay dynamic range:  50 to 156,000,000 (156M) CMV DNA IU/mL (1.70 log10/mL –  8.19 log10/mL)  Plasma CMV DNA Quantification by PCR using Abbott m2000:  The results of this test should be interpreted with  consideration of all clinical and laboratory findings. In  particular, caution should be used when interpreting low  level positive results when the test is used for  diagnostic purposes. Repeat testing on an additional  sample is recommended to confirm low level positive  results. A result of "Not Detected" does not preclude the  possibility of infection with CMV.  CMV DNA may be present  below the detection limit of assay. LogIU/mL        Culture - Urine (19 @ 15:21)    -  Vancomycin: S 2 CLOVIS    Culture - Urine:   ENTF^Enterococcus faecalis  COLONY COUNT: > = 100,000 CFU/ML    Culture - Urine:   Susceptibility not performed.    -  Tetra/Doxy: R >8 CLOVIS    -  Nitrofurantoin: S <=32 CLOVIS    -  Ampicillin: S <=2 CLOVIS    -  Ciprofloxacin: S <=1 CLOVIS    Specimen Source: URINE CATHETER    Organism Identification: Enterococcus faecalis  Staphylococcus sp.,coag neg    Organism: Enterococcus faecalis  COLONY COUNT: > = 100,000 CFU/ML    Organism: Staphylococcus sp.,coag neg  COLONY COUNT: > = 100,000 CFU/ML    Method Type: POSITIVE CLOVIS 29      Urinalysis (19 @ 13:25)    Color: ELSA    Urine Appearance: TURBID    Glucose: 100    Bilirubin: LARGE    Ketone - Urine: NEGATIVE    Specific Gravity: 1.020    Blood: MODERATE    pH - Urine: 6.0    Protein, Urine: 100    Urobilinogen: 4.0    Nitrite: NEGATIVE    Leukocyte Esterase Concentration: LARGE    Red Blood Cell - Urine: 3-5    White Blood Cell - Urine: >50    White Blood Cell Casts: 2-5/LPF    Epithelial Cells: OCC    Bacteria: MANY    Coarse Granular Casts: 0-2/LPF      MRSA Infection Control Screen (PCR) (19 @ 13:46)    MRSA Infection Control Screen (PCR): NOT DETECTED     REFERENCE RANGE:  MRSA DNA NOT DETECTED      Culture - Blood (03.15.19 @ 14:03)    Culture - Blood:   NO ORGANISMS ISOLATED  NO ORGANISMS ISOLATED AT 24 HOURS    Specimen Source: BLOOD

## 2019-04-09 NOTE — GOALS OF CARE CONVERSATION - PERSONAL ADVANCE DIRECTIVE - NS PRO AD PATIENT TYPE ON CHART
Living Will/Health Care Proxy (HCP)
Living Will/Health Care Proxy (HCP)
Health Care Proxy (HCP)/Living Will
Health Care Proxy (HCP)/Living Will

## 2019-04-09 NOTE — GOALS OF CARE CONVERSATION - PERSONAL ADVANCE DIRECTIVE - CONVERSATION DETAILS
[facility]    Hospice RN Charlotte Ignacio will meet w/ pt's HCP on Tues 4/9/19 @ 10 AM.
[facility] - Met with pt's nephew Benitez and his wife Portillo. Nephew requests inpt hospice care but today pt does not meet medicare criteria. Discussion with nephew regarding the NGT and feedings and he is extremely concerned that if the feedings are stopped and pt cannot receive Lactulose, pt will become too agitated and will starve to death. Explained disease progression and ability of Hospice to keep pt comfortable. Nephew asked to be contacted again on Thursday.
Palliative care re-consulted; pt's family interested in knowing options for palliative care. Spoke to pt's HCP, Benitez Cottrell, via telephone. Mr. Cottrell states he is understanding that pt's medical condition is irreversible and pt is not likely to improve. Mr. Cottrell requested information regarding clarification between hospice and palliative care. Explanations provided. Mr. Cottrell states he is in agreement to discuss hospice care and amenable to a referral to hospice care network. Referral made.
Referral to palliative care for complex decision making in the setting of liver failure. Met with pt's HCP's his great nephew and great niece in law. Extensive discussion regarding pt's overall medical condition including a summary of pt's hospitalization and discussion regarding trajectory of illness. Family are aware that per hepatology pt is not a candidate for a liver transplant. Discussed pt's poor prognosis and reviewed pt's wises for advanced care planning via his living will. Pt stated he would not want aggressive measures such as CPR or mechanical ventilation if he had a terminal condition or could not make his own wishes known. Pt stated he would want to be a DNR/DNI.  Mr. Cottrell states he wants pt to remain a full code at this time. Discussed with attending, Dr. Branham, who agrees with recommendation for ethics referral to further review with family. Palliative care to follow for management of symptoms.

## 2019-04-09 NOTE — PROGRESS NOTE ADULT - SUBJECTIVE AND OBJECTIVE BOX
Chief Complaint:  Patient is a 89y old  Male who presents with a chief complaint of generalized weakness, urinary retention, and confusion X 2 days (2019 08:37)      Interval Events:   Patient was hypothermic and hypotensive yesterday.    Allergies:  No Known Allergies      Hospital Medications:  dextrose 40% Gel 15 Gram(s) Oral once PRN  dextrose 5% + sodium chloride 0.45% 1000 milliLiter(s) IV Continuous <Continuous>  dextrose 5%. 1000 milliLiter(s) IV Continuous <Continuous>  dextrose 50% Injectable 12.5 Gram(s) IV Push once  dextrose 50% Injectable 25 Gram(s) IV Push once  dextrose 50% Injectable 25 Gram(s) IV Push once  ergocalciferol 77074 Unit(s) Oral every week  glucagon  Injectable 1 milliGRAM(s) IntraMuscular once PRN  lactulose Retention Enema 200 Gram(s) Rectal two times a day  lactulose Syrup 10 Gram(s) Enteral Tube two times a day  lidocaine   Patch 1 Patch Transdermal daily  LORazepam   Injectable 1 milliGRAM(s) IntraMuscular every 8 hours PRN  midodrine 2.5 milliGRAM(s) Oral <User Schedule>  morphine  - Injectable 1 milliGRAM(s) IV Push every 6 hours PRN  rifaximin 550 milliGRAM(s) Oral two times a day  simethicone 80 milliGRAM(s) Chew once PRN  ursodiol Capsule 300 milliGRAM(s) Oral two times a day      PMHX/PSHX:  Chronic kidney disease (CKD), stage IV (severe)  Benign prostatic hypertrophy  History of BPH  No pertinent past medical history  S/P cataract surgery, left  No significant past surgical history      Family history:  No pertinent family history in first degree relatives          PHYSICAL EXAM:   GENERAL: NAD  HEAD:  Atraumatic, Normocephalic  EYES: EOMI, PERRLA, conjunctiva and sclera icteric  NECK: Supple, No JVD  CHEST/LUNG: Clear to auscultation bilaterally; No wheeze  HEART: Regular rate and rhythm; No murmurs, rubs, or gallops  ABDOMEN: Soft, Nontender, Nondistended; Bowel sounds present, no hepatosplenomegaly, no rebound or guarding  EXTREMITIES:  2+ Peripheral Pulses, No clubbing, cyanosis, or edema    Vital Signs:  Vital Signs Last 24 Hrs  T(C): 36.3 (2019 08:01), Max: 36.3 (2019 12:50)  T(F): 97.4 (2019 08:01), Max: 97.4 (2019 12:50)  HR: 73 (2019 06:45) (51 - 73)  BP: 90/51 (2019 06:45) (84/48 - 127/56)  BP(mean): --  RR: 14 (2019 05:30) (14 - 18)  SpO2: 95% (2019 05:30) (94% - 96%)  Daily     Daily     LABS:                        9.3    9.85  )-----------( 72       ( 2019 02:50 )             26.9     04-09    145  |  105  |  88<H>  ----------------------------<  75  3.6   |  17<L>  |  6.04<H>    Ca    8.5      2019 02:50  Mg     2.4     04-09          Urinalysis Basic - ( 2019 03:20 )    Color: DARK YELLOW / Appearance: Lt TURBID / S.015 / pH: 6.0  Gluc: NEGATIVE / Ketone: NEGATIVE  / Bili: SMALL / Urobili: SMALL   Blood: MODERATE / Protein: 30 / Nitrite: NEGATIVE   Leuk Esterase: MODERATE / RBC: 11-25 / WBC 0-2   Sq Epi: OCC / Non Sq Epi: x / Bacteria: NEGATIVE          Imaging: Chief Complaint:  Patient is a 89y old  Male who presents with a chief complaint of generalized weakness, urinary retention, and confusion X 2 days (2019 08:37)      Interval Events:   Patient was hypothermic and hypotensive yesterday.  Family is having GOC discussion today with family.    Allergies:  No Known Allergies      Hospital Medications:  dextrose 40% Gel 15 Gram(s) Oral once PRN  dextrose 5% + sodium chloride 0.45% 1000 milliLiter(s) IV Continuous <Continuous>  dextrose 5%. 1000 milliLiter(s) IV Continuous <Continuous>  dextrose 50% Injectable 12.5 Gram(s) IV Push once  dextrose 50% Injectable 25 Gram(s) IV Push once  dextrose 50% Injectable 25 Gram(s) IV Push once  ergocalciferol 66364 Unit(s) Oral every week  glucagon  Injectable 1 milliGRAM(s) IntraMuscular once PRN  lactulose Retention Enema 200 Gram(s) Rectal two times a day  lactulose Syrup 10 Gram(s) Enteral Tube two times a day  lidocaine   Patch 1 Patch Transdermal daily  LORazepam   Injectable 1 milliGRAM(s) IntraMuscular every 8 hours PRN  midodrine 2.5 milliGRAM(s) Oral <User Schedule>  morphine  - Injectable 1 milliGRAM(s) IV Push every 6 hours PRN  rifaximin 550 milliGRAM(s) Oral two times a day  simethicone 80 milliGRAM(s) Chew once PRN  ursodiol Capsule 300 milliGRAM(s) Oral two times a day      PMHX/PSHX:  Chronic kidney disease (CKD), stage IV (severe)  Benign prostatic hypertrophy  History of BPH  No pertinent past medical history  S/P cataract surgery, left  No significant past surgical history      Family history:  No pertinent family history in first degree relatives          PHYSICAL EXAM:   GENERAL: NAD  HEAD:  Atraumatic, Normocephalic  EYES: EOMI, PERRLA, conjunctiva and sclera icteric  NECK: Supple, No JVD  CHEST/LUNG: Clear to auscultation bilaterally; No wheeze  HEART: Regular rate and rhythm; No murmurs, rubs, or gallops  ABDOMEN: Soft, Nontender, Nondistended; Bowel sounds present, no hepatosplenomegaly, no rebound or guarding  EXTREMITIES:  2+ Peripheral Pulses, No clubbing, cyanosis, or edema    Vital Signs:  Vital Signs Last 24 Hrs  T(C): 36.3 (2019 08:01), Max: 36.3 (2019 12:50)  T(F): 97.4 (2019 08:01), Max: 97.4 (2019 12:50)  HR: 73 (2019 06:45) (51 - 73)  BP: 90/51 (2019 06:45) (84/48 - 127/56)  BP(mean): --  RR: 14 (2019 05:30) (14 - 18)  SpO2: 95% (2019 05:30) (94% - 96%)  Daily     Daily     LABS:                        9.3    9.85  )-----------( 72       ( 2019 02:50 )             26.9     04-09    145  |  105  |  88<H>  ----------------------------<  75  3.6   |  17<L>  |  6.04<H>    Ca    8.5      2019 02:50  Mg     2.4     04-09          Urinalysis Basic - ( 2019 03:20 )    Color: DARK YELLOW / Appearance: Lt TURBID / S.015 / pH: 6.0  Gluc: NEGATIVE / Ketone: NEGATIVE  / Bili: SMALL / Urobili: SMALL   Blood: MODERATE / Protein: 30 / Nitrite: NEGATIVE   Leuk Esterase: MODERATE / RBC: 11-25 / WBC 0-2   Sq Epi: OCC / Non Sq Epi: x / Bacteria: NEGATIVE          Imaging: Chief Complaint:  Patient is a 89y old  Male who presents with a chief complaint of generalized weakness, urinary retention, and confusion X 2 days (2019 08:37)      Interval Events:   Patient was hypothermic and hypotensive yesterday.  Family is having GOC discussion today with family.  Patient had Code blue called on him today, team seen at bedside with ROSC.    Allergies:  No Known Allergies      Hospital Medications:  dextrose 40% Gel 15 Gram(s) Oral once PRN  dextrose 5% + sodium chloride 0.45% 1000 milliLiter(s) IV Continuous <Continuous>  dextrose 5%. 1000 milliLiter(s) IV Continuous <Continuous>  dextrose 50% Injectable 12.5 Gram(s) IV Push once  dextrose 50% Injectable 25 Gram(s) IV Push once  dextrose 50% Injectable 25 Gram(s) IV Push once  ergocalciferol 18419 Unit(s) Oral every week  glucagon  Injectable 1 milliGRAM(s) IntraMuscular once PRN  lactulose Retention Enema 200 Gram(s) Rectal two times a day  lactulose Syrup 10 Gram(s) Enteral Tube two times a day  lidocaine   Patch 1 Patch Transdermal daily  LORazepam   Injectable 1 milliGRAM(s) IntraMuscular every 8 hours PRN  midodrine 2.5 milliGRAM(s) Oral <User Schedule>  morphine  - Injectable 1 milliGRAM(s) IV Push every 6 hours PRN  rifaximin 550 milliGRAM(s) Oral two times a day  simethicone 80 milliGRAM(s) Chew once PRN  ursodiol Capsule 300 milliGRAM(s) Oral two times a day      PMHX/PSHX:  Chronic kidney disease (CKD), stage IV (severe)  Benign prostatic hypertrophy  History of BPH  No pertinent past medical history  S/P cataract surgery, left  No significant past surgical history      Family history:  No pertinent family history in first degree relatives          PHYSICAL EXAM:   GENERAL: NAD  HEAD:  Atraumatic, Normocephalic  EYES: EOMI, PERRLA, conjunctiva and sclera icteric  NECK: Supple, No JVD  CHEST/LUNG: Clear to auscultation bilaterally; No wheeze  HEART: Regular rate and rhythm; No murmurs, rubs, or gallops  ABDOMEN: Soft, Nontender, Nondistended; Bowel sounds present, no hepatosplenomegaly, no rebound or guarding  EXTREMITIES:  2+ Peripheral Pulses, No clubbing, cyanosis, or edema    Vital Signs:  Vital Signs Last 24 Hrs  T(C): 36.3 (2019 08:01), Max: 36.3 (2019 12:50)  T(F): 97.4 (2019 08:01), Max: 97.4 (2019 12:50)  HR: 73 (2019 06:45) (51 - 73)  BP: 90/51 (2019 06:45) (84/48 - 127/56)  BP(mean): --  RR: 14 (2019 05:30) (14 - 18)  SpO2: 95% (2019 05:30) (94% - 96%)  Daily     Daily     LABS:                        9.3    9.85  )-----------( 72       ( 2019 02:50 )             26.9     04-09    145  |  105  |  88<H>  ----------------------------<  75  3.6   |  17<L>  |  6.04<H>    Ca    8.5      2019 02:50  Mg     2.4     04-09          Urinalysis Basic - ( 2019 03:20 )    Color: DARK YELLOW / Appearance: Lt TURBID / S.015 / pH: 6.0  Gluc: NEGATIVE / Ketone: NEGATIVE  / Bili: SMALL / Urobili: SMALL   Blood: MODERATE / Protein: 30 / Nitrite: NEGATIVE   Leuk Esterase: MODERATE / RBC: 11-25 / WBC 0-2   Sq Epi: OCC / Non Sq Epi: x / Bacteria: NEGATIVE          Imaging:

## 2019-04-09 NOTE — PROGRESS NOTE ADULT - ASSESSMENT
ngt placed, need x-ray and lactulsose, xifaan via tube.  explained to laie if not able should use [place] sump and restrain patient.  He is enchepheopathic

## 2019-04-09 NOTE — CHART NOTE - NSCHARTNOTEFT_GEN_A_CORE
I spoke to the pt's HCP Benitez @ 1:30 PM on the phone  All questions and concerns appropriately answered and addressed accordingly  HCP requests hospice nurse for further info regarding inpatient/outpatient hospice

## 2019-04-10 VITALS — HEART RATE: 44 BPM | RESPIRATION RATE: 5 BRPM

## 2019-04-10 LAB
METHOD TYPE: SIGNIFICANT CHANGE UP
ORGANISM # SPEC MICROSCOPIC CNT: SIGNIFICANT CHANGE UP
ORGANISM # SPEC MICROSCOPIC CNT: SIGNIFICANT CHANGE UP
SPECIMEN SOURCE: SIGNIFICANT CHANGE UP

## 2019-04-10 NOTE — DISCHARGE NOTE FOR THE EXPIRED PATIENT - HOSPITAL COURSE
Mr. Palacio was a 89 year old Male with History of BPH, cataracts, diverticulosis, anemia 2/2 GIB with previous pRBC transfusions, who presenting initially with 2 days of generalized weakness, urinary retention and confusion.    Patient was initially admitted on 3/15 for generalized weakness and urinary retention. Oshea was placed for chronic retention. Urine culture grew E. faecalis and patient was treated with Unasyn for 7 days. Patient was noted to have jaundice with Tbili >20, AST in the 500s, ALT in the 290s. CT of the abdomen/pelvis did not show any masses but did show a distended gallbladder. MRCP without contrast on 3/18 also showed a distended gallbladder. Hepatobiliary scan on 3/17 hepatocellular dysfunction suspicious for CBD obstruction. Liver biopsy was done on 3/21, however during the procedure a blood clot was dislodged into the IVC and the RA afterward, without any hemodynamic compromise. Of note, pathology from the liver biopsy has still not resulted. Autoimmune liver workup returned negative. Patient developed worsening encephalopathy during admission, thought to be likely hepatic in origin with elevated ammonia levels, and he was started on rifaximin and lactulose, although his mental status continued to remain poor.     Patient on admission also noted to have an PHU with creatinine initially 1.78. Over the course of his admission his renal function has declined and he was seen by nephrology, who deemed the patient to not be a candidate for dialysis. Due to worsening multi-organ failure, GOC discussions were held with the patient's HCP Nephew Benitez, who wanted everything done for the patient. Ethics was also consulted as well, and while recognizing that the patient's living will clearly states that he would not want "everything done" in terms of end-of-life care, physicians must abide by the HCP that was appointed by the patient, who is his nephew (who wants everything to be done).     On 4/9, RRT initially called for bradycardia. Primary RN noticed lack of pulse. RRT upgraded to code blue. ACLS was started. Patient's nephew was contacted, and reiterated that he wanted everything done for the patient and stated that he was on his way to the hospital. Patient intubated by anesthesia, IO placed for additional access. 3 rounds of epinephrine, 1 amp bicarb administered and ROSC was achieved in 9 minutes. Initial SBPs in the 70s. Patient started on Levo @ 1. Improvement in SBP to 100s. Patient was transferred to the MICU for further care. On admission to the MICU further goals of care conversation with family was had and they requested no further lab draws or additional interventions with plan for terminal extubation pending arrival of additional family. Mr. Palacio was a 89 year old Male with History of BPH, cataracts, diverticulosis, anemia 2/2 GIB with previous pRBC transfusions, who presenting initially with 2 days of generalized weakness, urinary retention and confusion.    Patient was initially admitted on 3/15 for generalized weakness and urinary retention. Oshea was placed for chronic retention. Urine culture grew E. faecalis and patient was treated with Unasyn for 7 days. Patient was noted to have jaundice with Tbili >20, AST in the 500s, ALT in the 290s. CT of the abdomen/pelvis did not show any masses but did show a distended gallbladder. MRCP without contrast on 3/18 also showed a distended gallbladder. Hepatobiliary scan on 3/17 hepatocellular dysfunction suspicious for CBD obstruction. Liver biopsy was done on 3/21, however during the procedure a blood clot was dislodged into the IVC and the RA afterward, without any hemodynamic compromise. Of note, pathology from the liver biopsy has still not resulted. Autoimmune liver workup returned negative. Patient developed worsening encephalopathy during admission, thought to be likely hepatic in origin with elevated ammonia levels, and he was started on rifaximin and lactulose, although his mental status continued to remain poor.     Patient on admission also noted to have an PHU with creatinine initially 1.78. Over the course of his admission his renal function has declined and he was seen by nephrology, who deemed the patient to not be a candidate for dialysis. Due to worsening multi-organ failure, GOC discussions were held with the patient's HCP Nephew Benitez, who wanted everything done for the patient. Ethics was also consulted as well, and while recognizing that the patient's living will clearly states that he would not want "everything done" in terms of end-of-life care, physicians must abide by the HCP that was appointed by the patient, who is his nephew (who wants everything to be done).     On 4/9, RRT initially called for bradycardia. Primary RN noticed lack of pulse. RRT upgraded to code blue. ACLS was started. Patient's nephew was contacted, and reiterated that he wanted everything done for the patient and stated that he was on his way to the hospital. Patient intubated by anesthesia, IO placed for additional access. 3 rounds of epinephrine, 1 amp bicarb administered and ROSC was achieved in 9 minutes. Initial SBPs in the 70s. Patient started on Levo @ 1. Improvement in SBP to 100s. Patient was transferred to the MICU for further care. On admission to the MICU further goals of care conversation with family was had and they requested no further lab draws or additional interventions with plan for terminal extubation pending arrival of additional family.     MOLST for was completed and pt was made DNR/DNI. At 9:30pm on 4/9/19 pt was extubated and all medications/fluids were stopped. At approximately 4am pt became increasingly bradycardic and entered asystole. At 4:10am pt was pronounced dead based on absent cardiac/pulmonary activity on telemetry, absent heart/lung sounds, absent pupillary reflex, and no response to painful stimuli. Family were at bedside and requested autopsy to learn more about the cause of pt's liver dysfunction.

## 2019-04-11 ENCOUNTER — RESULT REVIEW (OUTPATIENT)
Age: 84
End: 2019-04-11

## 2019-04-11 LAB
BACTERIA UR CULT: SIGNIFICANT CHANGE UP
DEPRECATED HSV+VZV DNA XXX PCR: SIGNIFICANT CHANGE UP
MISCELLANEOUS - CHEM: SIGNIFICANT CHANGE UP
ORGANISM # SPEC MICROSCOPIC CNT: SIGNIFICANT CHANGE UP
ORGANISM # SPEC MICROSCOPIC CNT: SIGNIFICANT CHANGE UP
SPECIMEN SOURCE: SIGNIFICANT CHANGE UP

## 2019-04-11 PROCEDURE — 88342 IMHCHEM/IMCYTCHM 1ST ANTB: CPT | Mod: 26

## 2019-04-11 PROCEDURE — 88341 IMHCHEM/IMCYTCHM EA ADD ANTB: CPT | Mod: 26

## 2019-04-12 LAB
-  AMPICILLIN: SIGNIFICANT CHANGE UP
-  DAPTOMYCIN: SIGNIFICANT CHANGE UP
-  GENTAMICIN 500: SIGNIFICANT CHANGE UP
-  LINEZOLID: SIGNIFICANT CHANGE UP
-  STREPTOMYCIN 1000: SIGNIFICANT CHANGE UP
-  VANCOMYCIN: SIGNIFICANT CHANGE UP
BACTERIA BLD CULT: SIGNIFICANT CHANGE UP
ENTEROCOC DNA BLD POS QL NAA+NON-PROBE: SIGNIFICANT CHANGE UP
METHOD TYPE: SIGNIFICANT CHANGE UP
ORGANISM # SPEC MICROSCOPIC CNT: SIGNIFICANT CHANGE UP

## 2019-04-14 LAB
BACTERIA BLD CULT: SIGNIFICANT CHANGE UP

## 2019-09-01 NOTE — CONSULT NOTE ADULT - CONSULT REQUESTED DATE/TIME
restrictions: h/o vertigo    SUBJECTIVE: Pt. Laying in bed upon arrival and pleasantly agrees to therapy session. Pt. Limited throughout session by pain. No confusion noted this session. Extra time required at end of session for pt. Comfort. PAIN: 8/10: L hip    OBJECTIVE:  Bed Mobility:  Rolling to Left: Minimal Assistance   Supine to Sit: Moderate Assistance    Transfers:  Sit to Stand: Moderate Assistance  Stand to Sit:Contact Guard Assistance    Ambulation:  Contact Guard Assistance  Distance: 8' x 1  Surface: Level Tile  Device:Rolling Walker  Gait Deviations: Forward Flexed Posture, Slow Lynn, Decreased Step Length Bilaterally, Decreased Weight Shift Right, Decreased Gait Speed, Decreased Heel Strike Bilaterally, Decreased Terminal Knee Extension and Easily fatigues. Exercise:  Patient was guided in 1 set(s) 10 reps of exercise to both lower extremities. Glut sets, Long arc quads, Hip abduction/adduction, Seated hip flexion and Seated heel/toe raises. Rest breaks required throughout due to fatigue. Exercises were completed for increased independence with functional mobility. Functional Outcome Measures: Not completed       ASSESSMENT:  Assessment: Patient progressing toward established goals. Activity Tolerance:  Patient tolerance of  treatment: good. Equipment Recommendations: Other: monitor for needs  Discharge Recommendations:  Continue to assess pending progress, Patient would benefit from continued therapy after discharge, 24 hour supervision or assist    Plan: Times per week: 5x/wk 90 min, 1x/wk 30 min  Specific instructions for Next Treatment: ther ex, transfers, gait training, bed mobility   Current Treatment Recommendations: Strengthening, Transfer Training, Endurance Training, Patient/Caregiver Education & Training, ROM, Balance Training, Gait Training, Stair training, Functional Mobility Training, Safety Education & Training, Home Exercise Program    Patient Education  Patient
15-Mar-2019
15-Mar-2019 18:31
15-Mar-2019 20:20
19-Mar-2019 15:45
27-Mar-2019 13:35
28-Mar-2019 13:58
28-Mar-2019 17:22
29-Mar-2019 10:30
Education: Plan of Care, Bed Mobility, Transfers, Gait, Verbal Exercise Instruction    Goals:  Patient goals : to get better  Short term goals  Time Frame for Short term goals: 1 week  Short term goal 1: Patient to be able to perform rolling L/R at MINx1 to decrease risk of pressure ulcers  Short term goal 2: Patient will be able to perform supine<>sit at MINx1 to sit EOB  Short term goal 3: Patient will be able to get up/down from various seated surfaces, +1 mod assist, to get up to walk. Short term goal 4: Patient to be able to walk >= 10 feet with MINx1 with RW in order to reach bathroom   Short term goal 5: Pt to perform stand/pivot transfer with RW, +1 min assist to get in/out of w/c. Long term goals  Time Frame for Long term goals : 3 weeks  Long term goal 1: Patient will be able to perform bed mobility at [facility] to sit EOB  Long term goal 2: Patient will be able to perform functional transfers at [facility] to sit in recliner at home  Long term goal 3: Patient to be able to ambulate 50 feet with RW and CGA to walk around home safely  Long term goal 4: Patient will be able to negotiate 2 steps at [facility] to enter home   Long term goal 5: Patient will demonstrate Good dynamic standing balance in order to decrease risk of future falls. Following session, patient left in safe position with all fall risk precautions in place.
15-Mar-2019 11:59

## 2020-10-29 NOTE — DISCHARGE NOTE ADULT - PATIENT PORTAL LINK FT
no
You can access the [facility] Patient Portal, offered by [facility], by registering with the following website: [email or web address]

## 2021-06-09 NOTE — DISCHARGE NOTE ADULT - CARE PLAN
Principal Discharge DX:	Rectal bleeding  Goal:	Remain free from bleeding  Assessment and plan of treatment:	- GI Dr Norton consulted  - Transfused 2 U PRBC on 10/1. 10/2 & 10/3 s/p 2 u PRBC (total 4 units) in the hospital  - Colorectal Sx Dr Michaels consulted no intervention offered  - CT A/P- No mass or lymphadenopathy. Colonic diverticulosis. Trabeculated urinary bladder with multiple diverticula and an enlarged prostate gland   - 10/4 s/p Colonoscopy-  Multiple small and large-mouthed diverticula were found in the sigmoid colon. No specimen collected  - follow up with your PCP and GI as outpatient for further management  Secondary Diagnosis:	Anemia due to acute blood loss  Assessment and plan of treatment:	-  CT A/P w no masses or TRAVON but extensive diverticulosis  - Iron Studies normal, No B12/Folate Deficiency, No Hemolysis  - Keep Hgb > 8 in light of active bleed  - SPEP- neg, RAUL- NO EVIDENCE OF MONOCLONAL COMPONENTS  - Hold off on Procrit in light of Active Bleed for now  - follow up with your PCP and Heme Dr Gloria as outpatient for further management  Secondary Diagnosis:	BPH (benign prostatic hyperplasia)  Assessment and plan of treatment:	- Tamsulosin 0.4mg PO QD  - follow up with your PCP as outpatient for further management  Secondary Diagnosis:	CKD (chronic kidney disease), stage III  Assessment and plan of treatment:	- Nephro Dr Villegas following  - Avoid nephrotoxins, NSAIDs, Renally dose meds  - follow up with your PCP as outpatient for further management Principal Discharge DX:	Rectal bleeding  Goal:	Follow-up with GI and Colorectal Surgery  Assessment and plan of treatment:	- GI Dr Norton consulted  - Transfused 2 U PRBC on 10/1. 10/2 & 10/3 s/p 2 u PRBC (total 4 units) in the hospital  - Colorectal Sx Dr Michaels consulted no intervention offered  - CT A/P- No mass or lymphadenopathy. Colonic diverticulosis. Trabeculated urinary bladder with multiple diverticula and an enlarged prostate gland   - 10/4 s/p Colonoscopy-  Multiple small and large-mouthed diverticula were found in the sigmoid colon. No specimen collected  - follow up with your PCP and GI as outpatient for further management  Secondary Diagnosis:	Anemia due to acute blood loss  Goal:	Follow-up with GI and Colorectal Surgery  Assessment and plan of treatment:	-  CT A/P w no masses or TRAVON but extensive diverticulosis  - Iron Studies normal, No B12/Folate Deficiency, No Hemolysis  - Keep Hgb > 8 in light of active bleed  - SPEP- neg, RAUL- NO EVIDENCE OF MONOCLONAL COMPONENTS  - Hold off on Procrit in light of Active Bleed for now  - follow up with your PCP and Heme Dr Gloria as outpatient for further management  Secondary Diagnosis:	BPH (benign prostatic hyperplasia)  Goal:	Follow-up with primary care doctor  Assessment and plan of treatment:	- Tamsulosin 0.4mg PO QD  - follow up with your PCP as outpatient for further management  Secondary Diagnosis:	CKD (chronic kidney disease), stage III  Goal:	Follow-up with your primary care doctor  Assessment and plan of treatment:	- Nephro Dr Villegas following  - Avoid nephrotoxins, NSAIDs, Renally dose meds  - follow up with your PCP as outpatient for further management Saucerization Excision Additional Text (Leave Blank If You Do Not Want): The margin was drawn around the clinically apparent lesion.  Incisions were then made along these lines, in a tangential fashion, to the appropriate tissue plane and the lesion was extirpated.

## 2021-07-05 NOTE — PROGRESS NOTE ADULT - SUBJECTIVE AND OBJECTIVE BOX
With elevated blood pressures and complaint of chest pain concern for ACS; however, based on history an and physical cannot rule out GI or MSK etiology. Will get EKG today. Also trial PPI.   Pt is seen and examined  pt is awake and lying in bed   feels much better  had colonoscopy, likely diverticular bleed    PAST MEDICAL & SURGICAL HISTORY:  Chronic kidney disease (CKD), stage IV (severe)  Benign prostatic hypertrophy  S/P cataract surgery, left: 3 yrs ago      ROS:  Negative except for: fatigue    MEDICATIONS  (STANDING):  chlorhexidine 4% Liquid 1 Application(s) Topical <User Schedule>  influenza   Vaccine 0.5 milliLiter(s) IntraMuscular once  lactobacillus acidophilus 1 Tablet(s) Oral daily  pantoprazole Infusion 8 mG/Hr (10 mL/Hr) IV Continuous <Continuous>  tamsulosin 0.4 milliGRAM(s) Oral at bedtime    MEDICATIONS  (PRN):  oxyCODONE    5 mG/acetaminophen 325 mG 1 Tablet(s) Oral every 6 hours PRN hip pain      Allergies    No Known Allergies    Intolerances        Vital Signs Last 24 Hrs  T(C): 36.5 (05 Oct 2018 07:18), Max: 36.8 (04 Oct 2018 21:01)  T(F): 97.7 (05 Oct 2018 07:18), Max: 98.3 (04 Oct 2018 21:01)  HR: 85 (05 Oct 2018 07:18) (63 - 88)  BP: 136/81 (05 Oct 2018 07:18) (136/81 - 152/91)  BP(mean): --  RR: 16 (05 Oct 2018 07:18) (16 - 18)  SpO2: 100% (05 Oct 2018 07:18) (95% - 100%)    PHYSICAL EXAM  General: elderly male, thin  in NAD  HEENT: clear oropharynx, anicteric sclera, pink conjunctiva  Neck: supple  CV: normal S1/S2 with no murmur rubs or gallops  Lungs: positive air movement b/l ant lungs,clear to auscultation, no wheezes, no rales  Abdomen: soft non-tender non-distended, no hepatosplenomegaly  Ext: no clubbing cyanosis or edema     LABS:                          8.3    7.35  )-----------( 144      ( 04 Oct 2018 06:30 )             25.3     Serial CBC's  10-04 @ 06:30  Hct-25.3 / Hgb-8.3 / Plat-144 / RBC-2.91 / WBC-7.35          Serial CBC's  10-03 @ 12:28  Hct-24.2 / Hgb-7.8 / Plat-139 / RBC-2.74 / WBC-7.59            10-04    143  |  107  |  43<H>  ----------------------------<  82  4.0   |  23  |  1.61<H>    Ca    8.7      04 Oct 2018 06:30  Phos  2.5     10-04  Mg     1.9     10-04            WBC Count: 7.35 K/uL (10-04 @ 06:30)  Hemoglobin: 8.3 g/dL (10-04 @ 06:30)            RADIOLOGY & ADDITIONAL STUDIES:

## 2021-10-22 NOTE — PHYSICAL THERAPY INITIAL EVALUATION ADULT - PLANNED THERAPY INTERVENTIONS, PT EVAL
gait training/transfer training/bed mobility training/balance training/strengthening/stair training
None known

## 2023-02-14 NOTE — CONSULT NOTE ADULT - CONSULT REQUESTED DATE/TIME
09-Jun-2018
10-Donny-2018 10:05
09-Jun-2018 13:40
Epidermal Closure Graft Donor Site (Optional): simple interrupted

## 2023-04-05 NOTE — PROVIDER CONTACT NOTE (OTHER) - NAME OF MD/NP/PA/DO NOTIFIED:
[id number] Desiree Alert-The patient is alert, awake and responds to voice. The patient is oriented to time, place, and person. The triage nurse is able to obtain subjective information.

## 2024-03-19 NOTE — PATIENT PROFILE ADULT - NSASFUNCLEVELADLTRANSFER_GEN_A_NUR
I printed orders for physical therapy and asked my MA to may orders to the patient.  
2 = assistive person
